# Patient Record
Sex: MALE | Race: BLACK OR AFRICAN AMERICAN | Employment: OTHER | URBAN - NONMETROPOLITAN AREA
[De-identification: names, ages, dates, MRNs, and addresses within clinical notes are randomized per-mention and may not be internally consistent; named-entity substitution may affect disease eponyms.]

---

## 2019-11-30 ENCOUNTER — APPOINTMENT (OUTPATIENT)
Dept: CT IMAGING | Age: 64
DRG: 062 | End: 2019-11-30
Payer: MEDICARE

## 2019-11-30 ENCOUNTER — HOSPITAL ENCOUNTER (INPATIENT)
Age: 64
LOS: 3 days | Discharge: HOME | DRG: 062 | End: 2019-12-03
Attending: EMERGENCY MEDICINE | Admitting: ANESTHESIOLOGY
Payer: MEDICARE

## 2019-11-30 ENCOUNTER — APPOINTMENT (OUTPATIENT)
Dept: GENERAL RADIOLOGY | Age: 64
DRG: 062 | End: 2019-11-30
Payer: MEDICARE

## 2019-11-30 ENCOUNTER — APPOINTMENT (OUTPATIENT)
Dept: INTERVENTIONAL RADIOLOGY/VASCULAR | Age: 64
DRG: 062 | End: 2019-11-30
Payer: MEDICARE

## 2019-11-30 DIAGNOSIS — R29.90 STROKE-LIKE SYMPTOMS: Primary | ICD-10-CM

## 2019-11-30 LAB
AMPHETAMINE+METHAMPHETAMINE URINE SCREEN: NEGATIVE
ANION GAP SERPL CALCULATED.3IONS-SCNC: 11 MEQ/L (ref 8–16)
APTT: 32.6 SECONDS (ref 22–38)
AVERAGE GLUCOSE: 102 MG/DL (ref 70–126)
BARBITURATE QUANTITATIVE URINE: NEGATIVE
BASOPHILS # BLD: 0.5 %
BASOPHILS ABSOLUTE: 0 THOU/MM3 (ref 0–0.1)
BENZODIAZEPINE QUANTITATIVE URINE: NEGATIVE
BILIRUBIN URINE: NEGATIVE
BLOOD, URINE: NEGATIVE
BUN BLDV-MCNC: 19 MG/DL (ref 7–22)
CALCIUM SERPL-MCNC: 9.1 MG/DL (ref 8.5–10.5)
CANNABINOID QUANTITATIVE URINE: POSITIVE
CHARACTER, URINE: CLEAR
CHLORIDE BLD-SCNC: 101 MEQ/L (ref 98–111)
CHOLESTEROL, TOTAL: 159 MG/DL (ref 100–199)
CO2: 25 MEQ/L (ref 23–33)
COCAINE METABOLITE QUANTITATIVE URINE: NEGATIVE
COLOR: YELLOW
CREAT SERPL-MCNC: 1.4 MG/DL (ref 0.4–1.2)
EKG ATRIAL RATE: 65 BPM
EKG P AXIS: 69 DEGREES
EKG P-R INTERVAL: 174 MS
EKG Q-T INTERVAL: 436 MS
EKG QRS DURATION: 76 MS
EKG QTC CALCULATION (BAZETT): 453 MS
EKG R AXIS: 61 DEGREES
EKG T AXIS: 95 DEGREES
EKG VENTRICULAR RATE: 65 BPM
EOSINOPHIL # BLD: 2.5 %
EOSINOPHILS ABSOLUTE: 0.2 THOU/MM3 (ref 0–0.4)
ERYTHROCYTE [DISTWIDTH] IN BLOOD BY AUTOMATED COUNT: 16.3 % (ref 11.5–14.5)
ERYTHROCYTE [DISTWIDTH] IN BLOOD BY AUTOMATED COUNT: 41.1 FL (ref 35–45)
GFR SERPL CREATININE-BSD FRML MDRD: 62 ML/MIN/1.73M2
GLUCOSE BLD-MCNC: 114 MG/DL (ref 70–108)
GLUCOSE BLD-MCNC: 99 MG/DL (ref 70–108)
GLUCOSE URINE: NEGATIVE MG/DL
HBA1C MFR BLD: 5.4 % (ref 4.4–6.4)
HCT VFR BLD CALC: 35.9 % (ref 42–52)
HDLC SERPL-MCNC: 67 MG/DL
HEMOGLOBIN: 11.1 GM/DL (ref 14–18)
IMMATURE GRANS (ABS): 0.09 THOU/MM3 (ref 0–0.07)
IMMATURE GRANULOCYTES: 1.2 %
INR BLD: 1.02 (ref 0.85–1.13)
KETONES, URINE: NEGATIVE
LDL CHOLESTEROL CALCULATED: 79 MG/DL
LEUKOCYTE ESTERASE, URINE: NEGATIVE
LYMPHOCYTES # BLD: 16.7 %
LYMPHOCYTES ABSOLUTE: 1.3 THOU/MM3 (ref 1–4.8)
MCH RBC QN AUTO: 22.1 PG (ref 26–33)
MCHC RBC AUTO-ENTMCNC: 30.9 GM/DL (ref 32.2–35.5)
MCV RBC AUTO: 71.4 FL (ref 80–94)
MONOCYTES # BLD: 8.3 %
MONOCYTES ABSOLUTE: 0.6 THOU/MM3 (ref 0.4–1.3)
MRSA SCREEN RT-PCR: NEGATIVE
NITRITE, URINE: NEGATIVE
NUCLEATED RED BLOOD CELLS: 0 /100 WBC
OPIATES, URINE: NEGATIVE
OSMOLALITY CALCULATION: 276.1 MOSMOL/KG (ref 275–300)
OXYCODONE: NEGATIVE
PH UA: 6.5 (ref 5–9)
PHENCYCLIDINE QUANTITATIVE URINE: NEGATIVE
PLATELET # BLD: 221 THOU/MM3 (ref 130–400)
PMV BLD AUTO: 9.1 FL (ref 9.4–12.4)
POTASSIUM REFLEX MAGNESIUM: 4.3 MEQ/L (ref 3.5–5.2)
PRO-BNP: 152.4 PG/ML (ref 0–900)
PROTEIN UA: NEGATIVE
RBC # BLD: 5.03 MILL/MM3 (ref 4.7–6.1)
SEG NEUTROPHILS: 70.8 %
SEGMENTED NEUTROPHILS ABSOLUTE COUNT: 5.4 THOU/MM3 (ref 1.8–7.7)
SODIUM BLD-SCNC: 137 MEQ/L (ref 135–145)
SPECIFIC GRAVITY, URINE: 1.03 (ref 1–1.03)
T4 FREE: 1.02 NG/DL (ref 0.93–1.76)
TRIGL SERPL-MCNC: 64 MG/DL (ref 0–199)
TROPONIN T: < 0.01 NG/ML
TSH SERPL DL<=0.05 MIU/L-ACNC: 5.46 UIU/ML (ref 0.4–4.2)
UROBILINOGEN, URINE: 1 EU/DL (ref 0–1)
VANCOMYCIN RESISTANT ENTEROCOCCUS: NEGATIVE
WBC # BLD: 7.6 THOU/MM3 (ref 4.8–10.8)

## 2019-11-30 PROCEDURE — 84484 ASSAY OF TROPONIN QUANT: CPT

## 2019-11-30 PROCEDURE — 6370000000 HC RX 637 (ALT 250 FOR IP): Performed by: ANESTHESIOLOGY

## 2019-11-30 PROCEDURE — 87500 VANOMYCIN DNA AMP PROBE: CPT

## 2019-11-30 PROCEDURE — 70498 CT ANGIOGRAPHY NECK: CPT

## 2019-11-30 PROCEDURE — 84443 ASSAY THYROID STIM HORMONE: CPT

## 2019-11-30 PROCEDURE — 83880 ASSAY OF NATRIURETIC PEPTIDE: CPT

## 2019-11-30 PROCEDURE — 84439 ASSAY OF FREE THYROXINE: CPT

## 2019-11-30 PROCEDURE — 82948 REAGENT STRIP/BLOOD GLUCOSE: CPT

## 2019-11-30 PROCEDURE — 36415 COLL VENOUS BLD VENIPUNCTURE: CPT

## 2019-11-30 PROCEDURE — 3E05317 INTRODUCTION OF OTHER THROMBOLYTIC INTO PERIPHERAL ARTERY, PERCUTANEOUS APPROACH: ICD-10-PCS | Performed by: PSYCHIATRY & NEUROLOGY

## 2019-11-30 PROCEDURE — 80061 LIPID PANEL: CPT

## 2019-11-30 PROCEDURE — 80048 BASIC METABOLIC PNL TOTAL CA: CPT

## 2019-11-30 PROCEDURE — 83036 HEMOGLOBIN GLYCOSYLATED A1C: CPT

## 2019-11-30 PROCEDURE — 6360000004 HC RX CONTRAST MEDICATION: Performed by: EMERGENCY MEDICINE

## 2019-11-30 PROCEDURE — 2000000000 HC ICU R&B

## 2019-11-30 PROCEDURE — 99222 1ST HOSP IP/OBS MODERATE 55: CPT | Performed by: PSYCHIATRY & NEUROLOGY

## 2019-11-30 PROCEDURE — 81003 URINALYSIS AUTO W/O SCOPE: CPT

## 2019-11-30 PROCEDURE — 2709999900 HC NON-CHARGEABLE SUPPLY

## 2019-11-30 PROCEDURE — 70496 CT ANGIOGRAPHY HEAD: CPT

## 2019-11-30 PROCEDURE — 87081 CULTURE SCREEN ONLY: CPT

## 2019-11-30 PROCEDURE — 87641 MR-STAPH DNA AMP PROBE: CPT

## 2019-11-30 PROCEDURE — 94760 N-INVAS EAR/PLS OXIMETRY 1: CPT

## 2019-11-30 PROCEDURE — 80307 DRUG TEST PRSMV CHEM ANLYZR: CPT

## 2019-11-30 PROCEDURE — 85025 COMPLETE CBC W/AUTO DIFF WBC: CPT

## 2019-11-30 PROCEDURE — 99285 EMERGENCY DEPT VISIT HI MDM: CPT

## 2019-11-30 PROCEDURE — 99223 1ST HOSP IP/OBS HIGH 75: CPT | Performed by: ANESTHESIOLOGY

## 2019-11-30 PROCEDURE — 99291 CRITICAL CARE FIRST HOUR: CPT | Performed by: PSYCHIATRY & NEUROLOGY

## 2019-11-30 PROCEDURE — 93005 ELECTROCARDIOGRAM TRACING: CPT | Performed by: EMERGENCY MEDICINE

## 2019-11-30 PROCEDURE — 37195 THROMBOLYTIC THERAPY STROKE: CPT

## 2019-11-30 PROCEDURE — 71045 X-RAY EXAM CHEST 1 VIEW: CPT

## 2019-11-30 PROCEDURE — 85730 THROMBOPLASTIN TIME PARTIAL: CPT

## 2019-11-30 PROCEDURE — 6360000002 HC RX W HCPCS: Performed by: EMERGENCY MEDICINE

## 2019-11-30 PROCEDURE — 85610 PROTHROMBIN TIME: CPT

## 2019-11-30 PROCEDURE — 2580000003 HC RX 258: Performed by: EMERGENCY MEDICINE

## 2019-11-30 PROCEDURE — 70450 CT HEAD/BRAIN W/O DYE: CPT

## 2019-11-30 RX ORDER — ATORVASTATIN CALCIUM 40 MG/1
40 TABLET, FILM COATED ORAL NIGHTLY
Status: DISCONTINUED | OUTPATIENT
Start: 2019-11-30 | End: 2019-12-03 | Stop reason: HOSPADM

## 2019-11-30 RX ORDER — GABAPENTIN 100 MG/1
200 CAPSULE ORAL 2 TIMES DAILY
Status: DISCONTINUED | OUTPATIENT
Start: 2019-11-30 | End: 2019-11-30

## 2019-11-30 RX ORDER — ASPIRIN 300 MG/1
300 SUPPOSITORY RECTAL DAILY
Status: DISCONTINUED | OUTPATIENT
Start: 2019-12-01 | End: 2019-12-01

## 2019-11-30 RX ORDER — GABAPENTIN 100 MG/1
100 CAPSULE ORAL 3 TIMES DAILY
Status: DISCONTINUED | OUTPATIENT
Start: 2019-11-30 | End: 2019-12-01

## 2019-11-30 RX ORDER — SODIUM CHLORIDE 9 MG/ML
1000 INJECTION, SOLUTION INTRAVENOUS CONTINUOUS
Status: DISCONTINUED | OUTPATIENT
Start: 2019-11-30 | End: 2019-12-01

## 2019-11-30 RX ORDER — LISINOPRIL 10 MG/1
10 TABLET ORAL 2 TIMES DAILY
Status: DISCONTINUED | OUTPATIENT
Start: 2019-11-30 | End: 2019-12-03 | Stop reason: HOSPADM

## 2019-11-30 RX ORDER — SODIUM CHLORIDE 0.9 % (FLUSH) 0.9 %
10 SYRINGE (ML) INJECTION EVERY 12 HOURS SCHEDULED
Status: DISCONTINUED | OUTPATIENT
Start: 2019-11-30 | End: 2019-12-03 | Stop reason: HOSPADM

## 2019-11-30 RX ORDER — DEXTROSE MONOHYDRATE 25 G/50ML
12.5 INJECTION, SOLUTION INTRAVENOUS
Status: ACTIVE | OUTPATIENT
Start: 2019-11-30 | End: 2019-11-30

## 2019-11-30 RX ORDER — AMIODARONE HYDROCHLORIDE 200 MG/1
200 TABLET ORAL DAILY
Status: DISCONTINUED | OUTPATIENT
Start: 2019-11-30 | End: 2019-12-03 | Stop reason: HOSPADM

## 2019-11-30 RX ORDER — SODIUM CHLORIDE 9 MG/ML
1000 INJECTION, SOLUTION INTRAVENOUS CONTINUOUS
Status: DISCONTINUED | OUTPATIENT
Start: 2019-11-30 | End: 2019-11-30

## 2019-11-30 RX ORDER — SODIUM CHLORIDE 0.9 % (FLUSH) 0.9 %
10 SYRINGE (ML) INJECTION PRN
Status: DISCONTINUED | OUTPATIENT
Start: 2019-11-30 | End: 2019-12-03 | Stop reason: HOSPADM

## 2019-11-30 RX ORDER — CARVEDILOL 6.25 MG/1
6.25 TABLET ORAL 2 TIMES DAILY WITH MEALS
Status: DISCONTINUED | OUTPATIENT
Start: 2019-11-30 | End: 2019-12-03 | Stop reason: HOSPADM

## 2019-11-30 RX ORDER — ASPIRIN 81 MG/1
81 TABLET ORAL DAILY
Status: DISCONTINUED | OUTPATIENT
Start: 2019-12-01 | End: 2019-12-03 | Stop reason: HOSPADM

## 2019-11-30 RX ORDER — NITROGLYCERIN 0.4 MG/1
0.4 TABLET SUBLINGUAL EVERY 5 MIN PRN
Status: DISCONTINUED | OUTPATIENT
Start: 2019-11-30 | End: 2019-12-03 | Stop reason: HOSPADM

## 2019-11-30 RX ORDER — FERROUS SULFATE 325(65) MG
325 TABLET ORAL 2 TIMES DAILY WITH MEALS
Status: DISCONTINUED | OUTPATIENT
Start: 2019-11-30 | End: 2019-12-01

## 2019-11-30 RX ORDER — LABETALOL 20 MG/4 ML (5 MG/ML) INTRAVENOUS SYRINGE
10 EVERY 4 HOURS PRN
Status: DISCONTINUED | OUTPATIENT
Start: 2019-11-30 | End: 2019-12-03 | Stop reason: HOSPADM

## 2019-11-30 RX ORDER — RANOLAZINE 500 MG/1
500 TABLET, EXTENDED RELEASE ORAL 2 TIMES DAILY
Status: DISCONTINUED | OUTPATIENT
Start: 2019-12-01 | End: 2019-12-03 | Stop reason: HOSPADM

## 2019-11-30 RX ORDER — HYDRALAZINE HYDROCHLORIDE 20 MG/ML
10 INJECTION INTRAMUSCULAR; INTRAVENOUS EVERY 4 HOURS PRN
Status: DISCONTINUED | OUTPATIENT
Start: 2019-11-30 | End: 2019-12-03 | Stop reason: HOSPADM

## 2019-11-30 RX ORDER — ISOSORBIDE MONONITRATE 30 MG/1
30 TABLET, EXTENDED RELEASE ORAL 2 TIMES DAILY
Status: DISCONTINUED | OUTPATIENT
Start: 2019-11-30 | End: 2019-12-03 | Stop reason: HOSPADM

## 2019-11-30 RX ORDER — 0.9 % SODIUM CHLORIDE 0.9 %
50 INTRAVENOUS SOLUTION INTRAVENOUS ONCE
Status: COMPLETED | OUTPATIENT
Start: 2019-11-30 | End: 2019-11-30

## 2019-11-30 RX ORDER — ONDANSETRON 2 MG/ML
4 INJECTION INTRAMUSCULAR; INTRAVENOUS EVERY 6 HOURS PRN
Status: DISCONTINUED | OUTPATIENT
Start: 2019-11-30 | End: 2019-12-03 | Stop reason: HOSPADM

## 2019-11-30 RX ORDER — CLOPIDOGREL BISULFATE 75 MG/1
75 TABLET ORAL DAILY
Status: DISCONTINUED | OUTPATIENT
Start: 2019-12-01 | End: 2019-12-01

## 2019-11-30 RX ADMIN — SODIUM CHLORIDE 50 ML: 9 INJECTION, SOLUTION INTRAVENOUS at 13:45

## 2019-11-30 RX ADMIN — AMIODARONE HYDROCHLORIDE 200 MG: 200 TABLET ORAL at 18:57

## 2019-11-30 RX ADMIN — ATORVASTATIN CALCIUM 40 MG: 40 TABLET, FILM COATED ORAL at 19:53

## 2019-11-30 RX ADMIN — LISINOPRIL 10 MG: 10 TABLET ORAL at 19:52

## 2019-11-30 RX ADMIN — GABAPENTIN 100 MG: 100 CAPSULE ORAL at 19:52

## 2019-11-30 RX ADMIN — FERROUS SULFATE TAB 325 MG (65 MG ELEMENTAL FE) 325 MG: 325 (65 FE) TAB at 18:57

## 2019-11-30 RX ADMIN — CARVEDILOL 6.25 MG: 6.25 TABLET, FILM COATED ORAL at 18:57

## 2019-11-30 RX ADMIN — SODIUM CHLORIDE 1000 ML: 9 INJECTION, SOLUTION INTRAVENOUS at 12:44

## 2019-11-30 RX ADMIN — ISOSORBIDE MONONITRATE 30 MG: 30 TABLET ORAL at 19:53

## 2019-11-30 RX ADMIN — ALTEPLASE 6.5 MG: KIT at 12:37

## 2019-11-30 RX ADMIN — IOPAMIDOL 80 ML: 755 INJECTION, SOLUTION INTRAVENOUS at 13:21

## 2019-11-30 RX ADMIN — ALTEPLASE 58.8 MG: KIT at 12:38

## 2019-11-30 ASSESSMENT — ENCOUNTER SYMPTOMS
DIARRHEA: 0
COUGH: 0
RHINORRHEA: 0
NAUSEA: 0
SORE THROAT: 0
EYE DISCHARGE: 0
SHORTNESS OF BREATH: 0
ABDOMINAL PAIN: 0
WHEEZING: 0
EYE REDNESS: 0
VOMITING: 0
BACK PAIN: 0

## 2019-11-30 ASSESSMENT — PAIN SCALES - GENERAL
PAINLEVEL_OUTOF10: 7
PAINLEVEL_OUTOF10: 0

## 2019-11-30 ASSESSMENT — VISUAL ACUITY: OU: 1

## 2019-11-30 ASSESSMENT — PAIN DESCRIPTION - LOCATION: LOCATION: CHEST;HEAD

## 2019-11-30 ASSESSMENT — PAIN DESCRIPTION - PAIN TYPE: TYPE: ACUTE PAIN

## 2019-11-30 ASSESSMENT — PAIN DESCRIPTION - PROGRESSION: CLINICAL_PROGRESSION: NOT CHANGED

## 2019-11-30 ASSESSMENT — PAIN DESCRIPTION - ONSET: ONSET: ON-GOING

## 2019-11-30 ASSESSMENT — PAIN DESCRIPTION - FREQUENCY: FREQUENCY: CONTINUOUS

## 2019-11-30 ASSESSMENT — PAIN DESCRIPTION - DESCRIPTORS: DESCRIPTORS: SHARP;CONSTANT

## 2019-12-01 ENCOUNTER — APPOINTMENT (OUTPATIENT)
Dept: CT IMAGING | Age: 64
DRG: 062 | End: 2019-12-01
Payer: MEDICARE

## 2019-12-01 LAB
ALBUMIN SERPL-MCNC: 3.5 G/DL (ref 3.5–5.1)
ALP BLD-CCNC: 52 U/L (ref 38–126)
ALT SERPL-CCNC: 16 U/L (ref 11–66)
ANION GAP SERPL CALCULATED.3IONS-SCNC: 11 MEQ/L (ref 8–16)
AST SERPL-CCNC: 14 U/L (ref 5–40)
AVERAGE GLUCOSE: 93 MG/DL (ref 70–126)
BASOPHILS # BLD: 0.3 %
BASOPHILS ABSOLUTE: 0 THOU/MM3 (ref 0–0.1)
BILIRUB SERPL-MCNC: 0.4 MG/DL (ref 0.3–1.2)
BUN BLDV-MCNC: 17 MG/DL (ref 7–22)
CALCIUM SERPL-MCNC: 8.6 MG/DL (ref 8.5–10.5)
CHLORIDE BLD-SCNC: 105 MEQ/L (ref 98–111)
CO2: 22 MEQ/L (ref 23–33)
CREAT SERPL-MCNC: 1 MG/DL (ref 0.4–1.2)
EOSINOPHIL # BLD: 2.5 %
EOSINOPHILS ABSOLUTE: 0.2 THOU/MM3 (ref 0–0.4)
ERYTHROCYTE [DISTWIDTH] IN BLOOD BY AUTOMATED COUNT: 16.1 % (ref 11.5–14.5)
ERYTHROCYTE [DISTWIDTH] IN BLOOD BY AUTOMATED COUNT: 40.4 FL (ref 35–45)
GFR SERPL CREATININE-BSD FRML MDRD: > 90 ML/MIN/1.73M2
GLUCOSE BLD-MCNC: 98 MG/DL (ref 70–108)
HBA1C MFR BLD: 5.1 % (ref 4.4–6.4)
HCT VFR BLD CALC: 33.2 % (ref 42–52)
HEMOGLOBIN: 10.5 GM/DL (ref 14–18)
IMMATURE GRANS (ABS): 0.08 THOU/MM3 (ref 0–0.07)
IMMATURE GRANULOCYTES: 1.1 %
LYMPHOCYTES # BLD: 21.1 %
LYMPHOCYTES ABSOLUTE: 1.5 THOU/MM3 (ref 1–4.8)
MCH RBC QN AUTO: 22.4 PG (ref 26–33)
MCHC RBC AUTO-ENTMCNC: 31.6 GM/DL (ref 32.2–35.5)
MCV RBC AUTO: 70.9 FL (ref 80–94)
MONOCYTES # BLD: 6.5 %
MONOCYTES ABSOLUTE: 0.5 THOU/MM3 (ref 0.4–1.3)
NUCLEATED RED BLOOD CELLS: 0 /100 WBC
PLATELET # BLD: 215 THOU/MM3 (ref 130–400)
PMV BLD AUTO: 9.1 FL (ref 9.4–12.4)
POTASSIUM SERPL-SCNC: 4.1 MEQ/L (ref 3.5–5.2)
RBC # BLD: 4.68 MILL/MM3 (ref 4.7–6.1)
SEG NEUTROPHILS: 68.5 %
SEGMENTED NEUTROPHILS ABSOLUTE COUNT: 4.9 THOU/MM3 (ref 1.8–7.7)
SODIUM BLD-SCNC: 138 MEQ/L (ref 135–145)
TOTAL PROTEIN: 6.2 G/DL (ref 6.1–8)
VITAMIN B-12: 382 PG/ML (ref 211–911)
WBC # BLD: 7.1 THOU/MM3 (ref 4.8–10.8)

## 2019-12-01 PROCEDURE — 85025 COMPLETE CBC W/AUTO DIFF WBC: CPT

## 2019-12-01 PROCEDURE — 6360000002 HC RX W HCPCS: Performed by: ANESTHESIOLOGY

## 2019-12-01 PROCEDURE — 6370000000 HC RX 637 (ALT 250 FOR IP): Performed by: ANESTHESIOLOGY

## 2019-12-01 PROCEDURE — 36415 COLL VENOUS BLD VENIPUNCTURE: CPT

## 2019-12-01 PROCEDURE — 2580000003 HC RX 258: Performed by: ANESTHESIOLOGY

## 2019-12-01 PROCEDURE — 2060000000 HC ICU INTERMEDIATE R&B

## 2019-12-01 PROCEDURE — 99233 SBSQ HOSP IP/OBS HIGH 50: CPT | Performed by: ANESTHESIOLOGY

## 2019-12-01 PROCEDURE — 2709999900 HC NON-CHARGEABLE SUPPLY

## 2019-12-01 PROCEDURE — 97110 THERAPEUTIC EXERCISES: CPT

## 2019-12-01 PROCEDURE — 80053 COMPREHEN METABOLIC PANEL: CPT

## 2019-12-01 PROCEDURE — 83036 HEMOGLOBIN GLYCOSYLATED A1C: CPT

## 2019-12-01 PROCEDURE — 6370000000 HC RX 637 (ALT 250 FOR IP): Performed by: FAMILY MEDICINE

## 2019-12-01 PROCEDURE — 97166 OT EVAL MOD COMPLEX 45 MIN: CPT

## 2019-12-01 PROCEDURE — 70450 CT HEAD/BRAIN W/O DYE: CPT

## 2019-12-01 PROCEDURE — 6370000000 HC RX 637 (ALT 250 FOR IP): Performed by: PSYCHIATRY & NEUROLOGY

## 2019-12-01 PROCEDURE — 6360000002 HC RX W HCPCS: Performed by: PSYCHIATRY & NEUROLOGY

## 2019-12-01 PROCEDURE — 94761 N-INVAS EAR/PLS OXIMETRY MLT: CPT

## 2019-12-01 PROCEDURE — 99232 SBSQ HOSP IP/OBS MODERATE 35: CPT | Performed by: PSYCHIATRY & NEUROLOGY

## 2019-12-01 PROCEDURE — 82607 VITAMIN B-12: CPT

## 2019-12-01 RX ORDER — IRON/C/B12/CALCIU/STOMACH CONC 70-150-10
70 TABLET ORAL DAILY
Status: DISCONTINUED | OUTPATIENT
Start: 2019-12-01 | End: 2019-12-01 | Stop reason: CLARIF

## 2019-12-01 RX ORDER — ACETAMINOPHEN 325 MG/1
TABLET ORAL
Status: DISCONTINUED
Start: 2019-12-01 | End: 2019-12-01

## 2019-12-01 RX ORDER — BUTALBITAL, ACETAMINOPHEN AND CAFFEINE 50; 325; 40 MG/1; MG/1; MG/1
1 TABLET ORAL EVERY 4 HOURS PRN
Status: DISCONTINUED | OUTPATIENT
Start: 2019-12-01 | End: 2019-12-03 | Stop reason: HOSPADM

## 2019-12-01 RX ORDER — CYANOCOBALAMIN 1000 UG/ML
1000 INJECTION INTRAMUSCULAR; SUBCUTANEOUS ONCE
Status: COMPLETED | OUTPATIENT
Start: 2019-12-01 | End: 2019-12-01

## 2019-12-01 RX ORDER — GABAPENTIN 300 MG/1
300 CAPSULE ORAL 2 TIMES DAILY
Status: DISCONTINUED | OUTPATIENT
Start: 2019-12-02 | End: 2019-12-03 | Stop reason: HOSPADM

## 2019-12-01 RX ORDER — M-VIT,TX,IRON,MINS/CALC/FOLIC 27MG-0.4MG
1 TABLET ORAL DAILY
Status: DISCONTINUED | OUTPATIENT
Start: 2019-12-01 | End: 2019-12-03 | Stop reason: HOSPADM

## 2019-12-01 RX ORDER — CLOPIDOGREL BISULFATE 75 MG/1
75 TABLET ORAL DAILY
Status: DISCONTINUED | OUTPATIENT
Start: 2019-12-01 | End: 2019-12-03 | Stop reason: HOSPADM

## 2019-12-01 RX ORDER — FERROUS SULFATE 325(65) MG
325 TABLET ORAL 2 TIMES DAILY WITH MEALS
Status: DISCONTINUED | OUTPATIENT
Start: 2019-12-01 | End: 2019-12-03 | Stop reason: HOSPADM

## 2019-12-01 RX ORDER — LANOLIN ALCOHOL/MO/W.PET/CERES
1000 CREAM (GRAM) TOPICAL DAILY
Status: DISCONTINUED | OUTPATIENT
Start: 2019-12-02 | End: 2019-12-03 | Stop reason: HOSPADM

## 2019-12-01 RX ORDER — ACETAMINOPHEN 325 MG/1
650 TABLET ORAL EVERY 4 HOURS PRN
Status: DISCONTINUED | OUTPATIENT
Start: 2019-12-01 | End: 2019-12-03 | Stop reason: HOSPADM

## 2019-12-01 RX ADMIN — CARVEDILOL 6.25 MG: 6.25 TABLET, FILM COATED ORAL at 09:00

## 2019-12-01 RX ADMIN — CYANOCOBALAMIN 1000 MCG: 1000 INJECTION, SOLUTION INTRAMUSCULAR at 20:34

## 2019-12-01 RX ADMIN — CARVEDILOL 6.25 MG: 6.25 TABLET, FILM COATED ORAL at 18:06

## 2019-12-01 RX ADMIN — ENOXAPARIN SODIUM 40 MG: 40 INJECTION SUBCUTANEOUS at 18:05

## 2019-12-01 RX ADMIN — BUTALBITAL, ACETAMINOPHEN, AND CAFFEINE 1 TABLET: 50; 325; 40 TABLET ORAL at 16:38

## 2019-12-01 RX ADMIN — LISINOPRIL 10 MG: 10 TABLET ORAL at 20:35

## 2019-12-01 RX ADMIN — LISINOPRIL 10 MG: 10 TABLET ORAL at 08:59

## 2019-12-01 RX ADMIN — CLOPIDOGREL BISULFATE 75 MG: 75 TABLET ORAL at 18:33

## 2019-12-01 RX ADMIN — ISOSORBIDE MONONITRATE 30 MG: 30 TABLET ORAL at 09:00

## 2019-12-01 RX ADMIN — ASPIRIN 81 MG: 81 TABLET ORAL at 18:05

## 2019-12-01 RX ADMIN — FERROUS SULFATE TAB 325 MG (65 MG ELEMENTAL FE) 325 MG: 325 (65 FE) TAB at 20:35

## 2019-12-01 RX ADMIN — ACETAMINOPHEN 650 MG: 325 TABLET ORAL at 04:17

## 2019-12-01 RX ADMIN — GABAPENTIN 100 MG: 100 CAPSULE ORAL at 09:00

## 2019-12-01 RX ADMIN — Medication 10 ML: at 20:35

## 2019-12-01 RX ADMIN — MULTIPLE VITAMINS W/ MINERALS TAB 1 TABLET: TAB at 09:00

## 2019-12-01 RX ADMIN — RANOLAZINE 500 MG: 500 TABLET, FILM COATED, EXTENDED RELEASE ORAL at 20:35

## 2019-12-01 RX ADMIN — GABAPENTIN 100 MG: 100 CAPSULE ORAL at 16:38

## 2019-12-01 RX ADMIN — ATORVASTATIN CALCIUM 40 MG: 40 TABLET, FILM COATED ORAL at 20:35

## 2019-12-01 RX ADMIN — ISOSORBIDE MONONITRATE 30 MG: 30 TABLET ORAL at 20:35

## 2019-12-01 RX ADMIN — AMIODARONE HYDROCHLORIDE 200 MG: 200 TABLET ORAL at 09:00

## 2019-12-01 ASSESSMENT — PAIN DESCRIPTION - LOCATION
LOCATION: HEAD

## 2019-12-01 ASSESSMENT — PAIN SCALES - GENERAL
PAINLEVEL_OUTOF10: 6
PAINLEVEL_OUTOF10: 6
PAINLEVEL_OUTOF10: 0
PAINLEVEL_OUTOF10: 0
PAINLEVEL_OUTOF10: 3
PAINLEVEL_OUTOF10: 8

## 2019-12-01 ASSESSMENT — PAIN DESCRIPTION - PROGRESSION
CLINICAL_PROGRESSION: GRADUALLY IMPROVING
CLINICAL_PROGRESSION: GRADUALLY IMPROVING
CLINICAL_PROGRESSION: GRADUALLY WORSENING
CLINICAL_PROGRESSION: GRADUALLY IMPROVING

## 2019-12-01 ASSESSMENT — PAIN DESCRIPTION - DESCRIPTORS
DESCRIPTORS: HEADACHE
DESCRIPTORS: ACHING

## 2019-12-01 ASSESSMENT — PAIN DESCRIPTION - PAIN TYPE
TYPE: ACUTE PAIN

## 2019-12-01 ASSESSMENT — PAIN DESCRIPTION - FREQUENCY
FREQUENCY: CONTINUOUS
FREQUENCY: INTERMITTENT
FREQUENCY: CONTINUOUS
FREQUENCY: INTERMITTENT

## 2019-12-01 ASSESSMENT — PAIN DESCRIPTION - ONSET
ONSET: GRADUAL

## 2019-12-01 ASSESSMENT — PAIN - FUNCTIONAL ASSESSMENT: PAIN_FUNCTIONAL_ASSESSMENT: ACTIVITIES ARE NOT PREVENTED

## 2019-12-01 ASSESSMENT — PAIN DESCRIPTION - ORIENTATION: ORIENTATION: POSTERIOR

## 2019-12-02 PROBLEM — D35.2 PITUITARY ADENOMA (HCC): Status: ACTIVE | Noted: 2019-12-02

## 2019-12-02 LAB
CORTISOL COLLECTION INFO: NORMAL
CORTISOL: 6.82 UG/DL
FOLLICLE STIMULATING HORMONE: 10.1 MIU/ML (ref 0.9–11)
LUTEINIZING HORMONE: 4.8 MIU/ML (ref 0.6–6.3)
LV EF: 45 %
LVEF MODALITY: NORMAL
MRSA SCREEN: NORMAL
PROLACTIN: 16.7 NG/ML

## 2019-12-02 PROCEDURE — APPSS180 APP SPLIT SHARED TIME > 60 MINUTES: Performed by: NURSE PRACTITIONER

## 2019-12-02 PROCEDURE — 97162 PT EVAL MOD COMPLEX 30 MIN: CPT

## 2019-12-02 PROCEDURE — 2580000003 HC RX 258: Performed by: ANESTHESIOLOGY

## 2019-12-02 PROCEDURE — 82024 ASSAY OF ACTH: CPT

## 2019-12-02 PROCEDURE — 6360000002 HC RX W HCPCS: Performed by: ANESTHESIOLOGY

## 2019-12-02 PROCEDURE — 2709999900 HC NON-CHARGEABLE SUPPLY

## 2019-12-02 PROCEDURE — 6370000000 HC RX 637 (ALT 250 FOR IP): Performed by: NURSE PRACTITIONER

## 2019-12-02 PROCEDURE — 97530 THERAPEUTIC ACTIVITIES: CPT

## 2019-12-02 PROCEDURE — 82533 TOTAL CORTISOL: CPT

## 2019-12-02 PROCEDURE — 82530 CORTISOL FREE: CPT

## 2019-12-02 PROCEDURE — 84305 ASSAY OF SOMATOMEDIN: CPT

## 2019-12-02 PROCEDURE — 36415 COLL VENOUS BLD VENIPUNCTURE: CPT

## 2019-12-02 PROCEDURE — 6370000000 HC RX 637 (ALT 250 FOR IP): Performed by: PSYCHIATRY & NEUROLOGY

## 2019-12-02 PROCEDURE — 94761 N-INVAS EAR/PLS OXIMETRY MLT: CPT

## 2019-12-02 PROCEDURE — 6370000000 HC RX 637 (ALT 250 FOR IP): Performed by: ANESTHESIOLOGY

## 2019-12-02 PROCEDURE — 83001 ASSAY OF GONADOTROPIN (FSH): CPT

## 2019-12-02 PROCEDURE — 97535 SELF CARE MNGMENT TRAINING: CPT

## 2019-12-02 PROCEDURE — 84403 ASSAY OF TOTAL TESTOSTERONE: CPT

## 2019-12-02 PROCEDURE — 2060000000 HC ICU INTERMEDIATE R&B

## 2019-12-02 PROCEDURE — 93306 TTE W/DOPPLER COMPLETE: CPT

## 2019-12-02 PROCEDURE — 83002 ASSAY OF GONADOTROPIN (LH): CPT

## 2019-12-02 PROCEDURE — 99233 SBSQ HOSP IP/OBS HIGH 50: CPT | Performed by: INTERNAL MEDICINE

## 2019-12-02 PROCEDURE — 84146 ASSAY OF PROLACTIN: CPT

## 2019-12-02 RX ORDER — PANTOPRAZOLE SODIUM 40 MG/1
40 TABLET, DELAYED RELEASE ORAL
Status: DISCONTINUED | OUTPATIENT
Start: 2019-12-02 | End: 2019-12-03 | Stop reason: HOSPADM

## 2019-12-02 RX ADMIN — ENOXAPARIN SODIUM 40 MG: 40 INJECTION SUBCUTANEOUS at 08:14

## 2019-12-02 RX ADMIN — LISINOPRIL 10 MG: 10 TABLET ORAL at 08:14

## 2019-12-02 RX ADMIN — Medication 1000 MCG: at 08:14

## 2019-12-02 RX ADMIN — RANOLAZINE 500 MG: 500 TABLET, FILM COATED, EXTENDED RELEASE ORAL at 08:14

## 2019-12-02 RX ADMIN — ISOSORBIDE MONONITRATE 30 MG: 30 TABLET ORAL at 20:28

## 2019-12-02 RX ADMIN — AMIODARONE HYDROCHLORIDE 200 MG: 200 TABLET ORAL at 08:14

## 2019-12-02 RX ADMIN — LISINOPRIL 10 MG: 10 TABLET ORAL at 20:28

## 2019-12-02 RX ADMIN — ASPIRIN 81 MG: 81 TABLET ORAL at 08:14

## 2019-12-02 RX ADMIN — RANOLAZINE 500 MG: 500 TABLET, FILM COATED, EXTENDED RELEASE ORAL at 20:28

## 2019-12-02 RX ADMIN — ATORVASTATIN CALCIUM 40 MG: 40 TABLET, FILM COATED ORAL at 20:28

## 2019-12-02 RX ADMIN — BUTALBITAL, ACETAMINOPHEN, AND CAFFEINE 1 TABLET: 50; 325; 40 TABLET ORAL at 21:19

## 2019-12-02 RX ADMIN — CARVEDILOL 6.25 MG: 6.25 TABLET, FILM COATED ORAL at 08:14

## 2019-12-02 RX ADMIN — CLOPIDOGREL BISULFATE 75 MG: 75 TABLET ORAL at 08:14

## 2019-12-02 RX ADMIN — FERROUS SULFATE TAB 325 MG (65 MG ELEMENTAL FE) 325 MG: 325 (65 FE) TAB at 08:14

## 2019-12-02 RX ADMIN — Medication 10 ML: at 08:15

## 2019-12-02 RX ADMIN — GABAPENTIN 300 MG: 300 CAPSULE ORAL at 08:14

## 2019-12-02 RX ADMIN — MULTIPLE VITAMINS W/ MINERALS TAB 1 TABLET: TAB at 08:14

## 2019-12-02 RX ADMIN — Medication 10 ML: at 20:30

## 2019-12-02 RX ADMIN — GABAPENTIN 300 MG: 300 CAPSULE ORAL at 20:28

## 2019-12-02 RX ADMIN — FERROUS SULFATE TAB 325 MG (65 MG ELEMENTAL FE) 325 MG: 325 (65 FE) TAB at 18:00

## 2019-12-02 RX ADMIN — ISOSORBIDE MONONITRATE 30 MG: 30 TABLET ORAL at 08:14

## 2019-12-02 RX ADMIN — CARVEDILOL 6.25 MG: 6.25 TABLET, FILM COATED ORAL at 18:00

## 2019-12-02 RX ADMIN — PANTOPRAZOLE SODIUM 40 MG: 40 TABLET, DELAYED RELEASE ORAL at 12:04

## 2019-12-02 ASSESSMENT — ENCOUNTER SYMPTOMS
WHEEZING: 0
PHOTOPHOBIA: 0
SHORTNESS OF BREATH: 0
CHEST TIGHTNESS: 0
ABDOMINAL PAIN: 0
SORE THROAT: 0
BACK PAIN: 0
COLOR CHANGE: 0
TROUBLE SWALLOWING: 0
NAUSEA: 0
VOMITING: 0

## 2019-12-02 ASSESSMENT — PAIN SCALES - GENERAL
PAINLEVEL_OUTOF10: 0
PAINLEVEL_OUTOF10: 7
PAINLEVEL_OUTOF10: 0
PAINLEVEL_OUTOF10: 0

## 2019-12-03 VITALS
OXYGEN SATURATION: 99 % | HEART RATE: 65 BPM | SYSTOLIC BLOOD PRESSURE: 165 MMHG | TEMPERATURE: 98.4 F | WEIGHT: 144.8 LBS | BODY MASS INDEX: 21.45 KG/M2 | DIASTOLIC BLOOD PRESSURE: 95 MMHG | RESPIRATION RATE: 20 BRPM | HEIGHT: 69 IN

## 2019-12-03 LAB
ACTH: 26.7 PG/ML (ref 7.2–63.3)
TESTOSTERONE TOTAL: 544 NG/DL (ref 300–720)

## 2019-12-03 PROCEDURE — 6370000000 HC RX 637 (ALT 250 FOR IP): Performed by: PSYCHIATRY & NEUROLOGY

## 2019-12-03 PROCEDURE — 6370000000 HC RX 637 (ALT 250 FOR IP): Performed by: ANESTHESIOLOGY

## 2019-12-03 PROCEDURE — 97116 GAIT TRAINING THERAPY: CPT

## 2019-12-03 PROCEDURE — 97535 SELF CARE MNGMENT TRAINING: CPT

## 2019-12-03 PROCEDURE — 99239 HOSP IP/OBS DSCHRG MGMT >30: CPT | Performed by: NURSE PRACTITIONER

## 2019-12-03 PROCEDURE — 6370000000 HC RX 637 (ALT 250 FOR IP): Performed by: NURSE PRACTITIONER

## 2019-12-03 PROCEDURE — 97110 THERAPEUTIC EXERCISES: CPT

## 2019-12-03 PROCEDURE — 94760 N-INVAS EAR/PLS OXIMETRY 1: CPT

## 2019-12-03 RX ORDER — M-VIT,TX,IRON,MINS/CALC/FOLIC 27MG-0.4MG
1 TABLET ORAL DAILY
Qty: 30 TABLET | Refills: 0 | Status: SHIPPED | OUTPATIENT
Start: 2019-12-04

## 2019-12-03 RX ORDER — RANOLAZINE 500 MG/1
500 TABLET, EXTENDED RELEASE ORAL 2 TIMES DAILY
Qty: 60 TABLET | Refills: 0 | Status: SHIPPED | OUTPATIENT
Start: 2019-12-03

## 2019-12-03 RX ORDER — FERROUS SULFATE 325(65) MG
325 TABLET ORAL 2 TIMES DAILY WITH MEALS
Qty: 30 TABLET | Refills: 3 | Status: SHIPPED | OUTPATIENT
Start: 2019-12-03

## 2019-12-03 RX ORDER — ATORVASTATIN CALCIUM 40 MG/1
40 TABLET, FILM COATED ORAL NIGHTLY
Qty: 30 TABLET | Refills: 0 | Status: SHIPPED | OUTPATIENT
Start: 2019-12-03

## 2019-12-03 RX ORDER — CARVEDILOL 6.25 MG/1
6.25 TABLET ORAL 2 TIMES DAILY WITH MEALS
Qty: 60 TABLET | Refills: 0 | Status: SHIPPED | OUTPATIENT
Start: 2019-12-03

## 2019-12-03 RX ORDER — PANTOPRAZOLE SODIUM 40 MG/1
40 TABLET, DELAYED RELEASE ORAL
Qty: 30 TABLET | Refills: 0 | Status: SHIPPED | OUTPATIENT
Start: 2019-12-04

## 2019-12-03 RX ADMIN — Medication 1000 MCG: at 09:50

## 2019-12-03 RX ADMIN — ASPIRIN 81 MG: 81 TABLET ORAL at 09:51

## 2019-12-03 RX ADMIN — MULTIPLE VITAMINS W/ MINERALS TAB 1 TABLET: TAB at 09:51

## 2019-12-03 RX ADMIN — AMIODARONE HYDROCHLORIDE 200 MG: 200 TABLET ORAL at 09:51

## 2019-12-03 RX ADMIN — PANTOPRAZOLE SODIUM 40 MG: 40 TABLET, DELAYED RELEASE ORAL at 05:28

## 2019-12-03 RX ADMIN — BUTALBITAL, ACETAMINOPHEN, AND CAFFEINE 1 TABLET: 50; 325; 40 TABLET ORAL at 04:14

## 2019-12-03 RX ADMIN — GABAPENTIN 300 MG: 300 CAPSULE ORAL at 09:51

## 2019-12-03 RX ADMIN — LISINOPRIL 10 MG: 10 TABLET ORAL at 09:51

## 2019-12-03 RX ADMIN — ISOSORBIDE MONONITRATE 30 MG: 30 TABLET ORAL at 09:51

## 2019-12-03 RX ADMIN — RANOLAZINE 500 MG: 500 TABLET, FILM COATED, EXTENDED RELEASE ORAL at 09:51

## 2019-12-03 RX ADMIN — FERROUS SULFATE TAB 325 MG (65 MG ELEMENTAL FE) 325 MG: 325 (65 FE) TAB at 09:50

## 2019-12-03 RX ADMIN — CLOPIDOGREL BISULFATE 75 MG: 75 TABLET ORAL at 09:51

## 2019-12-03 ASSESSMENT — PAIN DESCRIPTION - LOCATION: LOCATION: HEAD

## 2019-12-03 ASSESSMENT — PAIN DESCRIPTION - DESCRIPTORS: DESCRIPTORS: HEADACHE

## 2019-12-03 ASSESSMENT — PAIN DESCRIPTION - FREQUENCY: FREQUENCY: INTERMITTENT

## 2019-12-03 ASSESSMENT — PAIN SCALES - GENERAL
PAINLEVEL_OUTOF10: 0
PAINLEVEL_OUTOF10: 5

## 2019-12-03 ASSESSMENT — PAIN DESCRIPTION - ORIENTATION: ORIENTATION: POSTERIOR

## 2019-12-03 ASSESSMENT — PAIN DESCRIPTION - PROGRESSION
CLINICAL_PROGRESSION: GRADUALLY WORSENING
CLINICAL_PROGRESSION: RESOLVED

## 2019-12-03 ASSESSMENT — PAIN DESCRIPTION - ONSET: ONSET: GRADUAL

## 2019-12-03 ASSESSMENT — PAIN DESCRIPTION - PAIN TYPE: TYPE: ACUTE PAIN

## 2019-12-03 ASSESSMENT — PAIN - FUNCTIONAL ASSESSMENT: PAIN_FUNCTIONAL_ASSESSMENT: ACTIVITIES ARE NOT PREVENTED

## 2019-12-04 LAB
SOMATOMEDIN IGF 1 Z-SCORE: -0.3
SOMATOMEDIN: 105 NG/ML (ref 46–219)

## 2019-12-05 LAB — CORTISOL (UR), FREE: NORMAL

## 2021-09-10 ENCOUNTER — APPOINTMENT (OUTPATIENT)
Dept: CT IMAGING | Age: 66
DRG: 556 | End: 2021-09-10
Payer: MEDICARE

## 2021-09-10 ENCOUNTER — HOSPITAL ENCOUNTER (INPATIENT)
Age: 66
LOS: 3 days | Discharge: HOME | DRG: 556 | End: 2021-09-13
Attending: FAMILY MEDICINE | Admitting: INTERNAL MEDICINE
Payer: MEDICARE

## 2021-09-10 ENCOUNTER — APPOINTMENT (OUTPATIENT)
Dept: GENERAL RADIOLOGY | Age: 66
DRG: 556 | End: 2021-09-10
Payer: MEDICARE

## 2021-09-10 DIAGNOSIS — R07.9 CHEST PAIN, UNSPECIFIED TYPE: ICD-10-CM

## 2021-09-10 DIAGNOSIS — G89.29 CHRONIC HEAD PAIN: ICD-10-CM

## 2021-09-10 DIAGNOSIS — R51.9 CHRONIC HEAD PAIN: ICD-10-CM

## 2021-09-10 DIAGNOSIS — G81.92 HEMIPARESIS OF LEFT DOMINANT SIDE, UNSPECIFIED HEMIPARESIS ETIOLOGY (HCC): Primary | ICD-10-CM

## 2021-09-10 DIAGNOSIS — D35.2 PITUITARY ADENOMA (HCC): ICD-10-CM

## 2021-09-10 PROBLEM — I63.9 ACUTE CEREBROVASCULAR ACCIDENT (CVA) DUE TO ISCHEMIA (HCC): Status: ACTIVE | Noted: 2021-09-10

## 2021-09-10 LAB
ALBUMIN SERPL-MCNC: 4 G/DL (ref 3.5–5.1)
ALP BLD-CCNC: 59 U/L (ref 38–126)
ALT SERPL-CCNC: 14 U/L (ref 11–66)
ANION GAP SERPL CALCULATED.3IONS-SCNC: 5 MEQ/L (ref 8–16)
AST SERPL-CCNC: 48 U/L (ref 5–40)
BASOPHILS # BLD: 0.3 %
BASOPHILS ABSOLUTE: 0 THOU/MM3 (ref 0–0.1)
BILIRUB SERPL-MCNC: 1.2 MG/DL (ref 0.3–1.2)
BUN BLDV-MCNC: 12 MG/DL (ref 7–22)
CALCIUM SERPL-MCNC: 8.8 MG/DL (ref 8.5–10.5)
CHLORIDE BLD-SCNC: 105 MEQ/L (ref 98–111)
CO2: 25 MEQ/L (ref 23–33)
CREAT SERPL-MCNC: 0.7 MG/DL (ref 0.4–1.2)
EKG ATRIAL RATE: 77 BPM
EKG P AXIS: 78 DEGREES
EKG P-R INTERVAL: 172 MS
EKG Q-T INTERVAL: 404 MS
EKG QRS DURATION: 82 MS
EKG QTC CALCULATION (BAZETT): 457 MS
EKG R AXIS: -24 DEGREES
EKG T AXIS: 38 DEGREES
EKG VENTRICULAR RATE: 77 BPM
EOSINOPHIL # BLD: 0.8 %
EOSINOPHILS ABSOLUTE: 0.1 THOU/MM3 (ref 0–0.4)
ERYTHROCYTE [DISTWIDTH] IN BLOOD BY AUTOMATED COUNT: 16.9 % (ref 11.5–14.5)
ERYTHROCYTE [DISTWIDTH] IN BLOOD BY AUTOMATED COUNT: 41.1 FL (ref 35–45)
GFR SERPL CREATININE-BSD FRML MDRD: > 90 ML/MIN/1.73M2
GLUCOSE BLD-MCNC: 84 MG/DL (ref 70–108)
GLUCOSE BLD-MCNC: 85 MG/DL (ref 70–108)
GLUCOSE BLD-MCNC: 88 MG/DL
HCT VFR BLD CALC: 38.1 % (ref 42–52)
HEMOGLOBIN: 12.1 GM/DL (ref 14–18)
IMMATURE GRANS (ABS): 0.06 THOU/MM3 (ref 0–0.07)
IMMATURE GRANULOCYTES: 0.7 %
INR BLD: 1.06 (ref 0.85–1.13)
LYMPHOCYTES # BLD: 9.9 %
LYMPHOCYTES ABSOLUTE: 0.9 THOU/MM3 (ref 1–4.8)
MCH RBC QN AUTO: 22.4 PG (ref 26–33)
MCHC RBC AUTO-ENTMCNC: 31.8 GM/DL (ref 32.2–35.5)
MCV RBC AUTO: 70.4 FL (ref 80–94)
MONOCYTES # BLD: 6.8 %
MONOCYTES ABSOLUTE: 0.6 THOU/MM3 (ref 0.4–1.3)
MRSA SCREEN RT-PCR: NEGATIVE
NUCLEATED RED BLOOD CELLS: 0 /100 WBC
OSMOLALITY CALCULATION: 269.1 MOSMOL/KG (ref 275–300)
PLATELET # BLD: 214 THOU/MM3 (ref 130–400)
PMV BLD AUTO: 9.1 FL (ref 9.4–12.4)
POC CREATININE WHOLE BLOOD: 0.9 MG/DL (ref 0.5–1.2)
POTASSIUM REFLEX MAGNESIUM: 5.6 MEQ/L (ref 3.5–5.2)
POTASSIUM SERPL-SCNC: 3.6 MEQ/L (ref 3.5–5.2)
RBC # BLD: 5.41 MILL/MM3 (ref 4.7–6.1)
REASON FOR REJECTION: NORMAL
REJECTED TEST: NORMAL
SEG NEUTROPHILS: 81.5 %
SEGMENTED NEUTROPHILS ABSOLUTE COUNT: 7.3 THOU/MM3 (ref 1.8–7.7)
SODIUM BLD-SCNC: 135 MEQ/L (ref 135–145)
TOTAL PROTEIN: 7.3 G/DL (ref 6.1–8)
TROPONIN T: < 0.01 NG/ML
VANCOMYCIN RESISTANT ENTEROCOCCUS: NEGATIVE
WBC # BLD: 8.9 THOU/MM3 (ref 4.8–10.8)

## 2021-09-10 PROCEDURE — 99223 1ST HOSP IP/OBS HIGH 75: CPT | Performed by: INTERNAL MEDICINE

## 2021-09-10 PROCEDURE — 2500000003 HC RX 250 WO HCPCS: Performed by: PHYSICIAN ASSISTANT

## 2021-09-10 PROCEDURE — 87081 CULTURE SCREEN ONLY: CPT

## 2021-09-10 PROCEDURE — 3E04317 INTRODUCTION OF OTHER THROMBOLYTIC INTO CENTRAL VEIN, PERCUTANEOUS APPROACH: ICD-10-PCS | Performed by: HOSPITALIST

## 2021-09-10 PROCEDURE — 85025 COMPLETE CBC W/AUTO DIFF WBC: CPT

## 2021-09-10 PROCEDURE — 85610 PROTHROMBIN TIME: CPT

## 2021-09-10 PROCEDURE — 82948 REAGENT STRIP/BLOOD GLUCOSE: CPT

## 2021-09-10 PROCEDURE — 2000000000 HC ICU R&B

## 2021-09-10 PROCEDURE — 36415 COLL VENOUS BLD VENIPUNCTURE: CPT

## 2021-09-10 PROCEDURE — 6360000002 HC RX W HCPCS: Performed by: PHYSICIAN ASSISTANT

## 2021-09-10 PROCEDURE — 93005 ELECTROCARDIOGRAM TRACING: CPT | Performed by: FAMILY MEDICINE

## 2021-09-10 PROCEDURE — 93307 TTE W/O DOPPLER COMPLETE: CPT

## 2021-09-10 PROCEDURE — 70498 CT ANGIOGRAPHY NECK: CPT

## 2021-09-10 PROCEDURE — 71275 CT ANGIOGRAPHY CHEST: CPT

## 2021-09-10 PROCEDURE — 6370000000 HC RX 637 (ALT 250 FOR IP): Performed by: STUDENT IN AN ORGANIZED HEALTH CARE EDUCATION/TRAINING PROGRAM

## 2021-09-10 PROCEDURE — 99284 EMERGENCY DEPT VISIT MOD MDM: CPT

## 2021-09-10 PROCEDURE — 80053 COMPREHEN METABOLIC PANEL: CPT

## 2021-09-10 PROCEDURE — 6360000004 HC RX CONTRAST MEDICATION: Performed by: FAMILY MEDICINE

## 2021-09-10 PROCEDURE — 84132 ASSAY OF SERUM POTASSIUM: CPT

## 2021-09-10 PROCEDURE — 70496 CT ANGIOGRAPHY HEAD: CPT

## 2021-09-10 PROCEDURE — 2580000003 HC RX 258: Performed by: PHYSICIAN ASSISTANT

## 2021-09-10 PROCEDURE — 87500 VANOMYCIN DNA AMP PROBE: CPT

## 2021-09-10 PROCEDURE — 2580000003 HC RX 258: Performed by: STUDENT IN AN ORGANIZED HEALTH CARE EDUCATION/TRAINING PROGRAM

## 2021-09-10 PROCEDURE — 70450 CT HEAD/BRAIN W/O DYE: CPT

## 2021-09-10 PROCEDURE — 87641 MR-STAPH DNA AMP PROBE: CPT

## 2021-09-10 PROCEDURE — 82565 ASSAY OF CREATININE: CPT

## 2021-09-10 PROCEDURE — 84484 ASSAY OF TROPONIN QUANT: CPT

## 2021-09-10 RX ORDER — FUROSEMIDE 10 MG/ML
20 INJECTION INTRAMUSCULAR; INTRAVENOUS ONCE
Status: DISCONTINUED | OUTPATIENT
Start: 2021-09-10 | End: 2021-09-10

## 2021-09-10 RX ORDER — SODIUM CHLORIDE 0.9 % (FLUSH) 0.9 %
5-40 SYRINGE (ML) INJECTION PRN
Status: DISCONTINUED | OUTPATIENT
Start: 2021-09-10 | End: 2021-09-13 | Stop reason: HOSPADM

## 2021-09-10 RX ORDER — SODIUM CHLORIDE 0.9 % (FLUSH) 0.9 %
5-40 SYRINGE (ML) INJECTION EVERY 12 HOURS SCHEDULED
Status: DISCONTINUED | OUTPATIENT
Start: 2021-09-10 | End: 2021-09-13 | Stop reason: HOSPADM

## 2021-09-10 RX ORDER — SODIUM CHLORIDE 9 MG/ML
25 INJECTION, SOLUTION INTRAVENOUS PRN
Status: DISCONTINUED | OUTPATIENT
Start: 2021-09-10 | End: 2021-09-13 | Stop reason: HOSPADM

## 2021-09-10 RX ORDER — ONDANSETRON 4 MG/1
4 TABLET, ORALLY DISINTEGRATING ORAL EVERY 8 HOURS PRN
Status: DISCONTINUED | OUTPATIENT
Start: 2021-09-10 | End: 2021-09-13 | Stop reason: HOSPADM

## 2021-09-10 RX ORDER — SODIUM CHLORIDE 0.9 % (FLUSH) 0.9 %
5-40 SYRINGE (ML) INJECTION EVERY 12 HOURS SCHEDULED
Status: DISCONTINUED | OUTPATIENT
Start: 2021-09-10 | End: 2021-09-10 | Stop reason: SDUPTHER

## 2021-09-10 RX ORDER — HYDROCODONE BITARTRATE AND ACETAMINOPHEN 5; 325 MG/1; MG/1
1 TABLET ORAL EVERY 6 HOURS PRN
Status: DISCONTINUED | OUTPATIENT
Start: 2021-09-10 | End: 2021-09-13 | Stop reason: HOSPADM

## 2021-09-10 RX ORDER — POLYETHYLENE GLYCOL 3350 17 G/17G
17 POWDER, FOR SOLUTION ORAL DAILY PRN
Status: DISCONTINUED | OUTPATIENT
Start: 2021-09-10 | End: 2021-09-13 | Stop reason: HOSPADM

## 2021-09-10 RX ORDER — DEXTROSE MONOHYDRATE 25 G/50ML
12.5 INJECTION, SOLUTION INTRAVENOUS
Status: ACTIVE | OUTPATIENT
Start: 2021-09-10 | End: 2021-09-10

## 2021-09-10 RX ORDER — SODIUM POLYSTYRENE SULFONATE 15 G/60ML
15 SUSPENSION ORAL; RECTAL ONCE
Status: COMPLETED | OUTPATIENT
Start: 2021-09-10 | End: 2021-09-10

## 2021-09-10 RX ORDER — HYDROCODONE BITARTRATE AND ACETAMINOPHEN 5; 325 MG/1; MG/1
2 TABLET ORAL EVERY 6 HOURS PRN
Status: DISCONTINUED | OUTPATIENT
Start: 2021-09-10 | End: 2021-09-13 | Stop reason: HOSPADM

## 2021-09-10 RX ORDER — ONDANSETRON 2 MG/ML
4 INJECTION INTRAMUSCULAR; INTRAVENOUS EVERY 6 HOURS PRN
Status: DISCONTINUED | OUTPATIENT
Start: 2021-09-10 | End: 2021-09-13 | Stop reason: HOSPADM

## 2021-09-10 RX ORDER — SODIUM CHLORIDE 9 MG/ML
INJECTION, SOLUTION INTRAVENOUS CONTINUOUS
Status: DISCONTINUED | OUTPATIENT
Start: 2021-09-10 | End: 2021-09-11

## 2021-09-10 RX ORDER — 0.9 % SODIUM CHLORIDE 0.9 %
50 INTRAVENOUS SOLUTION INTRAVENOUS ONCE
Status: COMPLETED | OUTPATIENT
Start: 2021-09-10 | End: 2021-09-10

## 2021-09-10 RX ORDER — PANTOPRAZOLE SODIUM 40 MG/1
40 TABLET, DELAYED RELEASE ORAL
Status: DISCONTINUED | OUTPATIENT
Start: 2021-09-11 | End: 2021-09-13 | Stop reason: HOSPADM

## 2021-09-10 RX ORDER — ATORVASTATIN CALCIUM 80 MG/1
80 TABLET, FILM COATED ORAL NIGHTLY
Status: DISCONTINUED | OUTPATIENT
Start: 2021-09-10 | End: 2021-09-13 | Stop reason: HOSPADM

## 2021-09-10 RX ORDER — FUROSEMIDE 20 MG/1
20 TABLET ORAL DAILY
Status: DISCONTINUED | OUTPATIENT
Start: 2021-09-10 | End: 2021-09-11

## 2021-09-10 RX ADMIN — IOPAMIDOL 80 ML: 755 INJECTION, SOLUTION INTRAVENOUS at 11:44

## 2021-09-10 RX ADMIN — SODIUM CHLORIDE 50 ML: 9 INJECTION, SOLUTION INTRAVENOUS at 13:30

## 2021-09-10 RX ADMIN — HYDROCODONE BITARTRATE AND ACETAMINOPHEN 2 TABLET: 5; 325 TABLET ORAL at 20:11

## 2021-09-10 RX ADMIN — SODIUM CHLORIDE: 9 INJECTION, SOLUTION INTRAVENOUS at 15:19

## 2021-09-10 RX ADMIN — ALTEPLASE 6.9 MG: KIT at 12:08

## 2021-09-10 RX ADMIN — FUROSEMIDE 20 MG: 20 TABLET ORAL at 16:32

## 2021-09-10 RX ADMIN — ATORVASTATIN CALCIUM 80 MG: 80 TABLET, FILM COATED ORAL at 20:12

## 2021-09-10 RX ADMIN — SODIUM CHLORIDE 2 MG/HR: 9 INJECTION, SOLUTION INTRAVENOUS at 19:07

## 2021-09-10 RX ADMIN — ALTEPLASE 61.7 MG: KIT at 12:12

## 2021-09-10 RX ADMIN — SODIUM CHLORIDE 5 MG/HR: 9 INJECTION, SOLUTION INTRAVENOUS at 13:34

## 2021-09-10 RX ADMIN — SODIUM CHLORIDE, PRESERVATIVE FREE 10 ML: 5 INJECTION INTRAVENOUS at 20:12

## 2021-09-10 RX ADMIN — SODIUM POLYSTYRENE SULFONATE 15 G: 15 SUSPENSION ORAL; RECTAL at 14:52

## 2021-09-10 ASSESSMENT — ENCOUNTER SYMPTOMS
SHORTNESS OF BREATH: 0
NAUSEA: 0
SORE THROAT: 1
RHINORRHEA: 1
ABDOMINAL PAIN: 0
COUGH: 1

## 2021-09-10 ASSESSMENT — PAIN DESCRIPTION - ONSET
ONSET: ON-GOING
ONSET: ON-GOING

## 2021-09-10 ASSESSMENT — PAIN DESCRIPTION - ORIENTATION
ORIENTATION: LEFT
ORIENTATION: ANTERIOR;LEFT
ORIENTATION: ANTERIOR

## 2021-09-10 ASSESSMENT — PAIN DESCRIPTION - LOCATION
LOCATION: HEAD
LOCATION: CHEST
LOCATION: HEAD

## 2021-09-10 ASSESSMENT — PAIN DESCRIPTION - FREQUENCY
FREQUENCY: CONTINUOUS
FREQUENCY: CONTINUOUS

## 2021-09-10 ASSESSMENT — PAIN - FUNCTIONAL ASSESSMENT: PAIN_FUNCTIONAL_ASSESSMENT: ACTIVITIES ARE NOT PREVENTED

## 2021-09-10 ASSESSMENT — PAIN SCALES - GENERAL
PAINLEVEL_OUTOF10: 4
PAINLEVEL_OUTOF10: 8
PAINLEVEL_OUTOF10: 8
PAINLEVEL_OUTOF10: 10
PAINLEVEL_OUTOF10: 10

## 2021-09-10 ASSESSMENT — PAIN DESCRIPTION - DESCRIPTORS
DESCRIPTORS: THROBBING
DESCRIPTORS: THROBBING
DESCRIPTORS: CRAMPING

## 2021-09-10 ASSESSMENT — PAIN DESCRIPTION - PAIN TYPE
TYPE: ACUTE PAIN

## 2021-09-10 ASSESSMENT — PAIN DESCRIPTION - PROGRESSION: CLINICAL_PROGRESSION: NOT CHANGED

## 2021-09-10 NOTE — Clinical Note
Patient Class: Inpatient [101]   REQUIRED: Diagnosis: Stroke-like symptoms [703919]   Estimated Length of Stay: Estimated stay of more than 2 midnights   Telemetry/Cardiac Monitoring Required?: Yes

## 2021-09-10 NOTE — H&P
CRITICAL CARE PROGRESS NOTE      Patient:  Ronal Rubio    Unit/Bed:4D-10/010-A  YOB: 1955  MRN: [id number]   PCP: Corwin Santo MD  Date of Admission: 9/10/2021  Chief Complaint:-Left-sided weakness    Assessment and Plan:    Left-sided weakness with Vision Problems: Stroke alert was called. CT head along with CT of the head and neck was negative. TPA was given as it was within window. Neurology following. Echo pending. Hold all anticoagulation for 24 hours post TPA. PT and OT.  currently on a Cardene drip to p maintain pressure less than 185/105. Hemoglobin A1c and lipid panel for tomorrow morning. N.p.o. until speech evaluates. Neurochecks every 4 hours. Maintain telemetry. Per chart review patient has history of left-sided weakness with most recent in 7/21. At that time CT and CTA of the head and neck was also negative. Repeat CT of head in am per neurology. High Dose Statin. Pituitary macroadenoma: Diagnosed in 2020. Patient has since had intermittent vision problems. Follows with neurosurgery in [place]. Obtain FSH, LH, and Prolactin Levels in am.    CAD status post CABG: We will hold aspirin and Plavix due to patient receiving TPA. Resume after 24 hours post TPA. History of ischemic cardiomyopathy status post defibrillator:  ECHO in 2019 shows EF 45%. No obvious shunting seen at that time. GERD:  Resume protonix    Marijuana Use: States this is for his headaches. Benign essential hypertension: Current home meds on hold. Patient is currently on a Cardene drip to keep a blood pressure goal of less than 185/105. We will resume medications once off Cardene drip. Hyperkalemia: Likely secondary to home lisinopril use. Was given Lasix 20 oral and Kayexalate once. Repeat potassium pending  Migraines: Likely secondary to pituitary macroadenoma. Takes Neurontin at home. Normocytic Anemia:   Close to baseline. Monitor with CBC. Takes ferrous sulfate at home. Non-complaint with Meds: Per chart review. Will get social work consult   R 4.5 cm lower lobe nodule: has hx; patient is a former smoker. Does not follow with pulmonary for this nodule      INITIAL H AND P AND ICU COURSE:  Veronica Tsaio SADIE Murphy is a 79-year-old male with history of CAD status post CABG in 2011, pituitary macroadenoma ischemic cardiomyopathy with reduced EF and defibrillator placed, hypertension, marijuana use, and hyperlipidemia presents to [facility] room as a stroke alert. Patient stated around 9 AM this morning he was driving and he felt his left foot was weaker. Patient states he was also having some vision problems. Patient stated he also experienced some paresthesias in his left lower extremity and left upper extremity. Patient states he is developed some chest pain also at that time that radiated down his left arm. Came to emergency room where CT of the head and CTA of the head and neck were performed. Scans revealed no acute findings including no hemorrhage or large vessel occlusions. Due to patient having chest pain decision to hold treatment when she TPA was made until after the CTA was completed. Patient was a candidate for TPA as his last well-known was on for 4.5 hours and patient received TPA. Patient was transferred to ICU afterwards for monitoring. per chart review patient has history of left-sided weakness with most recent admission in 7/21 where patient was suspected to have a  acute lacunar infarct. Patient at that time was also having left-sided weakness CT of the head and CT of the head and neck was done and were negative at that time. Neurology at that chose not to MRI. Patient was previously admitted in 11/2019 also due to a strokelike symptoms. Patient at that time received TPA. Sent home on an event monitor which detected some nonsustained SVTs and SVTs.         Past Medical History:  As stated in HPI  Family History:    Family History   Problem Relation Age of Onset    High Blood Pressure Mother     Arthritis Mother     Cancer Mother         lung breast    Stroke Mother     Heart Attack Father         Father passed away from myocardial infarction. Arthritis Father     High Blood Pressure Maternal Aunt     High Cholesterol Maternal Aunt     Stroke Maternal Aunt      Social History:  Daily Marijuana use; Hx of tobacco smoking;     ROS   GENERAL: Positive for left sided weakness, negative for fatigue, no recent change in weight, no fever,  SKN: No lesions or rashes. HEAD: No headaches or recent injury  EYES: intermittent loss in vision bilaterally, no diplopia or blurred vision  EARS: No hearing loss, no tinnitus  NOSE/THROAT: No rhinorrhea or pharyngitis, no nasal drainage  NECK: No lumps or unusual neck stiffness  PULMONARY: No dyspnea, orthopnea or paroxysmal nocturnal dyspnea, stridor wheezing cough  CARDIAC: No chest pain, pressure, heaviness or tightness  GI: No history melena or hematochezia, no diarrhea or constipation  PERIPHERAL VASCULAR: No intermittent claudication or unusual leg cramps  MUSCULOSKELETAL: Occasional arthralgias, myalgias  NEUROLOGICAL:  Alert and oriented x3; reports headaches  hEMATOLOGIC:  No anemia, unusual bruising or bleeding  PSYCH: Denies any homicidal or suicidal ideations    Scheduled Meds:   sodium chloride flush  5-40 mL IntraVENous 2 times per day    atorvastatin  80 mg Oral Nightly    furosemide  20 mg Oral Daily     Continuous Infusions:   sodium chloride      niCARdipine 2.5 mg/hr (09/10/21 1639)    sodium chloride 100 mL/hr at 09/10/21 1519       PHYSICAL EXAMINATION:  T:  98.6. P: 75. RR:  14 B/P:  148/94. O2 Sat:  96.  I/O:  -1525  Body mass index is 22.85 kg/m². GCS:   15  General:   Elderly male with no acute distress  HEENT:  normocephalic and atraumatic. No scleral icterus. PERR  Neck: supple. No Thyromegaly. Lungs: clear to auscultation. No retractions  Cardiac: RRR. No JVD. Abdomen: soft. Nontender. Extremities:  No clubbing, cyanosis, or edema x 4. Vasculature: capillary refill < 3 seconds. Palpable dorsalis pedis pulses. Skin:  warm and dry. Psych:  Alert and oriented x3. Affect appropriate  Lymph:  No supraclavicular adenopathy. Neurologic:  No focal deficit. No seizures. Data: (All radiographs, tracings, PFTs, and imaging are personally viewed and interpreted unless otherwise noted). Sodium 135, potassium 5.6, chloride 105, CO2 25, BUN 12, creatinine 0.7, anion gap 5, GFR greater than 90, glucose 88, calcium 9.8  Troponin negative  WBC 8.9, hemoglobin 12.1, MCV 70.4, platelets 105  Telemetry shows normal sinus rhythm  Albumin 4.0, alk phos 59, ALT 14, AST 48, bilirubin 1.2   INR 1.06  CT of the head shows no intracranial hemorrhage, stable moderate severity chronic small vessel ischemic changes, and 1.5 cm focal area of mucosal thickening in the anterior aspect of the sphenoid sinus on the right. CTA of the head and neck shows no significant hemodynamic stenosis in the right and left common and internal carotid arteries. Dominant left and smaller right vertebral artery with antegrade flow bilaterally. Atherosclerotic calcification of the cavernous segments of both internal carotid arteries and in the distal vertebral arteries. Negative CTA of the [place]. Enlarged pituitary gland. Retention cyst or polyp in the right sphenoid sinus. Postoperative changes in the cervical spine. Status post CABG. Pacemaker in place  CT of the chest shows no evidence for aortic dissection or mediastinal hematoma. Cannot get evaluation of pulmonary arteries. Mild cardiomegaly status post CABG and pacemaker placement. Slight thickening of the interstitial lung markings. 4.3 nodule in the right lower lobe.       Seen with multidisciplinary ICU team.  Meets Continued ICU Level Care Criteria:    [x] Yes   [] No - Transfer Planned to listed location:  [] HOSPITALIST CONTACTED- DR Blackwell and plan discussed with Dr. Kassandra Watts. Electronically signed by Fabiola Rivas DO  CRITICAL CARE SPECIALIST  Patient seen by me. Case discussed with resident physician. Neurosurgical consult for visual changes. .  Italicized font represents changes to the note made by me.     Electronically signed by Jessica Price MD on 9/13/2021 at 6:51 AM

## 2021-09-10 NOTE — ED TRIAGE NOTES
PT states I started having chest pain about 0845 & denies taking nitro. EKG completed, connected to monitor. Respirations equal & unlabored.  Reports chest pain 8/10 at this time

## 2021-09-10 NOTE — ED NOTES
Bedside report given to [facility] RN  Discussed NIH and initial vitals     Mily Dickinson RN  09/10/21 4306

## 2021-09-10 NOTE — CONSULTS
headache, but denies any double vision. He gets headaches often and attributes to these to a pituitary tumor that he currently has. He does feel like he is slurring his words intermittently. He denies any dizziness or palpitations. His chest pain is improving. He does report a cough, runny nose, and sore throat for 1 week duration. Of note, the patient is fully vaccinated against COVID-19. Time of symptoms onset/last time seen at baseline:  9am.  Time of stroke alert:  1134. Time of Neurology arrival:  1136. Vascular risk factors:  Smoking, HTN, HLD. Initial Glucose: 88 .  Old deficits from prior stroke:  none. INR in ED if anticoagulated:  n/a.  BP in ED:  146/96. Initial NIHSS: 4.  Modified Caroline Score upon admission:  0.   IV tPA administerd:  1208. Time of Initial Imaging Read:  1150. Review of Systems   Review of Systems   Constitutional: Negative for chills and fever. HENT: Positive for rhinorrhea and sore throat. Eyes: Positive for visual disturbance. Respiratory: Positive for cough. Negative for shortness of breath. Cardiovascular: Positive for chest pain. Negative for palpitations. Gastrointestinal: Negative for abdominal pain and nausea. Genitourinary: Negative for dysuria. Musculoskeletal: Negative for myalgias. Skin: Negative for rash. Neurological: Positive for speech difficulty, numbness and headaches. Negative for dizziness and weakness. Psychiatric/Behavioral: Negative for dysphoric mood. The patient is not nervous/anxious.         Medications   Scheduled Meds:    sodium chloride flush  5-40 mL IntraVENous 2 times per day    atorvastatin  80 mg Oral Nightly    furosemide  20 mg Oral Daily     Continuous Infusions:    sodium chloride      niCARdipine 5 mg/hr (09/10/21 1334)    sodium chloride 100 mL/hr at 09/10/21 1519     PRN Meds: sodium chloride flush, sodium chloride, dextrose, ondansetron **OR** ondansetron, polyethylene glycol    Past History seconds   6b Motor right leg 0=No drift, limb holds 90 (or 45) degrees for full 10 seconds   7 Limb Ataxia 1=Present in one limb   8 Sensory 1=Mild to moderate sensory loss; patient feels pinprick is less sharp or is dull on the affected side; there is a loss of superficial pain with pinprick but patient is aware He is being touched   9 Best Language 0=No aphasia, normal   10 Dysarthria 0=Normal   11 Extinction and Inattention 0=No abnormality   TOTAL  4   Time NIHSS performed: 1145  Vitals:  BP (!) 193/113   Pulse 75   Temp 98.9 °F (37.2 °C) (Oral)   Resp 16   Ht 5' 9\" (1.753 m)   Wt 168 lb (76.2 kg)   SpO2 94%   BMI 24.81 kg/m²   Temp (24hrs), Av.4 °F (36.9 °C), Min:97.8 °F (36.6 °C), Max:98.9 °F (37.2 °C)      I/O (24Hr): Intake/Output Summary (Last 24 hours) at 9/10/2021 1525  Last data filed at 9/10/2021 1447  Gross per 24 hour   Intake --   Output 750 ml   Net -750 ml       Physical Exam  Vitals reviewed. Constitutional:       General: He is not in acute distress. Appearance: Normal appearance. He is not ill-appearing. HENT:      Head: Normocephalic and atraumatic. Right Ear: External ear normal.      Left Ear: External ear normal.      Nose: Nose normal.      Mouth/Throat:      Mouth: Mucous membranes are moist.      Pharynx: No oropharyngeal exudate or posterior oropharyngeal erythema. Eyes:      Extraocular Movements: EOM normal.      Pupils: Pupils are equal, round, and reactive to light. Cardiovascular:      Rate and Rhythm: Normal rate and regular rhythm. Heart sounds: Normal heart sounds. No murmur heard. Pulmonary:      Effort: Pulmonary effort is normal. No respiratory distress. Breath sounds: Normal breath sounds. No wheezing. Abdominal:      General: Bowel sounds are normal.      Palpations: Abdomen is soft. Tenderness: There is no abdominal tenderness. Musculoskeletal:         General: Normal range of motion. Right lower leg: No edema. Left lower leg: No edema. Skin:     General: Skin is warm. Neurological:      Mental Status: He is alert and oriented to person, place, and time. Coordination: Finger-Nose-Finger Test abnormal (ataxia noted on left). Heel to [facility] Test normal.      Deep Tendon Reflexes:      Reflex Scores:       Tricep reflexes are 1+ on the right side and 1+ on the left side. Bicep reflexes are 1+ on the right side and 1+ on the left side. Brachioradialis reflexes are 1+ on the right side and 1+ on the left side. Patellar reflexes are 1+ on the right side and 1+ on the left side. Achilles reflexes are 1+ on the right side and 1+ on the left side. Psychiatric:         Mood and Affect: Mood normal.         Speech: Speech normal.         Behavior: Behavior normal.       Neurologic Exam     Mental Status   Oriented to person, place, and time. Attention: normal. Concentration: normal.   Speech: speech is normal   Level of consciousness: alert  Normal comprehension. Cranial Nerves     CN II   Right visual field deficit: upper nasal quadrant(s)  Left visual field deficit: upper temporal quadrant(s)    CN III, IV, VI   Pupils are equal, round, and reactive to light. Extraocular motions are normal.   Right pupil: Size: 3 mm. Shape: regular. Reactivity: brisk. Left pupil: Size: 3 mm. Shape: regular. Reactivity: brisk. CN V   Right facial sensation deficit: none  Left facial sensation deficit: complete    CN VII   Facial expression full, symmetric.      CN VIII   CN VIII normal.     CN IX, X   CN IX normal.   CN X normal.     CN XI   CN XI normal.   Right trapezius strength: normal  Left trapezius strength: weak    CN XII   CN XII normal.   Tongue deviation: none    Motor Exam   Muscle bulk: normal  Overall muscle tone: normal  Right arm tone: normal  Left arm tone: normal  Right arm pronator drift: absent  Left arm pronator drift: present  Right leg tone: normal  Left leg tone: normal  Muscle strength 5/5 in right upper and lower extremity. Muscle strength 4+/5 in left upper and lower extremities. Sensory Exam   Patient has left sided hypoesthesia in upper and lower extremities to light touch     Gait, Coordination, and Reflexes     Coordination   Finger to nose coordination: abnormal (ataxia noted on left)  Heel to shin coordination: normal    Reflexes   Right brachioradialis: 1+  Left brachioradialis: 1+  Right biceps: 1+  Left biceps: 1+  Right triceps: 1+  Left triceps: 1+  Right patellar: 1+  Left patellar: 1+  Right achilles: 1+  Left achilles: 1+       Labs/Imaging/Diagnostics   Labs:  CBC:  Recent Labs     09/10/21  1113   WBC 8.9   RBC 5.41   HGB 12.1*   HCT 38.1*   MCV 70.4*        CHEMISTRIES:  Recent Labs     09/10/21  1113 09/10/21  1135     --    K 5.6*  --      --    CO2 25  --    BUN 12  --    CREATININE 0.7  --    GLUCOSE 84 88     PT/INR:  Recent Labs     09/10/21  1113   INR 1.06     APTT:No results for input(s): APTT in the last 72 hours. LIVER PROFILE:  Recent Labs     09/10/21  1113   AST 48*   ALT 14   BILITOT 1.2   ALKPHOS 59       Imaging Last 24 Hours:  CTA HEAD W WO CONTRAST    Result Date: 9/10/2021  PROCEDURE: CTA HEAD W WO CONTRAST, CTA NECK W WO CONTRAST CLINICAL INFORMATION: headache, left-sided weakness. COMPARISON: CT scan of the brain obtained on the same day. TECHNIQUE: 1 mm axial images were obtained through the head and neck after the fast bolus administration of contrast. A noncontrast localizer was obtained. 3-D reconstructions were performed on a dedicated 3-D workstation. These include multiplanar MPR images and multiplanar MIP images. Centerline reconstructions were obtained of the carotid systems. Isovue intravenous contrast was given. All CT scans at this facility use dose modulation, iterative reconstruction, and/or weight-based dosing when appropriate to reduce radiation dose to as low as reasonably achievable.  FINDINGS: CTA NECK: Aortic arch and branches: Atherosclerotic calcification aortic arch. No significant stenosis but at the origin of the brachiocephalic, left common carotid and left subclavian arteries. Right common carotid artery/ICA: No significant hemodynamic stenosis in the right common and internal carotid arteries. Left common carotid artery/ICA: No significant hemodynamic stenosis in the left common and internal carotid arteries. Vertebral arteries: Dominant left and smaller right vertebral artery with antegrade flow bilaterally. CTA HEAD: Internal carotid arteries: Atherosclerotic calcification in the cavernous segments of both internal carotid arteries. No evidence of severe stenosis in the internal carotid arteries bilaterally. . Middle cerebral arteries: No significant hemodynamic stenosis in the middle cerebral arteries bilaterally. Kannan C[place] Anterior cerebral arteries: No significant hemodynamic stenosis in the anterior cerebral arteries bilaterally. . Vertebral arteries: Atherosclerotic calcification in the distal vertebral arteries bilaterally. No significant hemodynamic stenosis. . Basilar artery: Normal. Superior cerebellar arteries: Normal. Posterior cerebral arteries: Normal. The proximal branches are also normal. No aneurysms, stenoses or occlusions are noted. The superior sagittal sinus, vein of Ramu, internal cerebral veins, straight sinus, transverse sinuses and sigmoid sinuses are patent. Axial source data: Enlarged pituitary gland measuring 11 x 11 mm in size. Retention cyst or polyp in the right sphenoid sinus. Postoperative changes in the cervical spine. 1. No significant hemodynamic stenosis in the right and left common and internal carotid arteries. 2. Dominant left and smaller right vertebral artery with antegrade flow bilaterally. 3. Atherosclerotic calcification in the cavernous segments of both internal carotid arteries and in the distal vertebral arteries. No evidence of severe stenosis.  4. Otherwise negative CTA of the [place]. 5. Enlarged pituitary gland. MRI scan of the brain and pituitary gland without intravenous contrast medium would be helpful for better position. 6. Retention cyst or polyp in the right sphenoid sinus. 7. Postoperative changes in the cervical spine. 8. Status post coronary bypass surgery. Pacemaker in place. **This report has been created using voice recognition software. It may contain minor errors which are inherent in voice recognition technology. ** Final report electronically signed by DR Jacky Pimentel on 9/10/2021 12:48 PM    CT Head WO Contrast    Result Date: 9/10/2021  PROCEDURE: CT HEAD WO CONTRAST CLINICAL INFORMATION: headache, left-sided weakness. COMPARISON: Head CT 12/1/2019. TECHNIQUE: Noncontrast 5 mm axial images were obtained through the brain. Sagittal and coronal reconstructions were obtained. All CT scans at this facility use dose modulation, iterative reconstruction, and/or weight-based dosing when appropriate to reduce radiation dose to as low as reasonably achievable. FINDINGS: There is no hemorrhage. There are no intra-or extra-axial collections. There is no hydrocephalus, midline shift or mass effect. There is a moderate amount of patchy abnormal low attenuation in the white matter the brain consistent with chronic small vessel ischemic changes. This is stable. There are vascular calcifications. The ventricles are prominent. This is consistent with central volume loss. There is a focal area of mucosal thickening along the anterior wall of the right sphenoid sinus measuring 1.5 cm. This has increased in size. There is some mucosal thickening in the left sphenoid sinus. There is no suspicious calvarial abnormality. 1. No intracranial hemorrhage. 2. Stable moderate severity chronic small vessel ischemic changes. 3. 1.5 cm focal area of mucosal thickening in the anterior aspect of the sphenoid sinus on the right. This has increased in size.  This may represent a mucous retention cyst. Preliminary findings of no hemorrhage were telephoned to Shirley Yi, the neurology PA at 11:50 AM on 9/10/2021 . **This report has been created using voice recognition software. It may contain minor errors which are inherent in voice recognition technology. ** Final report electronically signed by Dr. Stanley Mckay on 9/10/2021 11:53 AM    CTA NECK W WO CONTRAST    Result Date: 9/10/2021  PROCEDURE: CTA HEAD W WO CONTRAST, CTA NECK W WO CONTRAST CLINICAL INFORMATION: headache, left-sided weakness. COMPARISON: CT scan of the brain obtained on the same day. TECHNIQUE: 1 mm axial images were obtained through the head and neck after the fast bolus administration of contrast. A noncontrast localizer was obtained. 3-D reconstructions were performed on a dedicated 3-D workstation. These include multiplanar MPR images and multiplanar MIP images. Centerline reconstructions were obtained of the carotid systems. Isovue intravenous contrast was given. All CT scans at this facility use dose modulation, iterative reconstruction, and/or weight-based dosing when appropriate to reduce radiation dose to as low as reasonably achievable. FINDINGS: CTA NECK: Aortic arch and branches: Atherosclerotic calcification aortic arch. No significant stenosis but at the origin of the brachiocephalic, left common carotid and left subclavian arteries. Right common carotid artery/ICA: No significant hemodynamic stenosis in the right common and internal carotid arteries. Left common carotid artery/ICA: No significant hemodynamic stenosis in the left common and internal carotid arteries. Vertebral arteries: Dominant left and smaller right vertebral artery with antegrade flow bilaterally. CTA HEAD: Internal carotid arteries: Atherosclerotic calcification in the cavernous segments of both internal carotid arteries. No evidence of severe stenosis in the internal carotid arteries bilaterally. . Middle cerebral arteries: No significant hemodynamic stenosis in the middle cerebral arteries bilaterally. [place] Anterior cerebral arteries: No significant hemodynamic stenosis in the anterior cerebral arteries bilaterally. . Vertebral arteries: Atherosclerotic calcification in the distal vertebral arteries bilaterally. No significant hemodynamic stenosis. . Basilar artery: Normal. Superior cerebellar arteries: Normal. Posterior cerebral arteries: Normal. The proximal branches are also normal. No aneurysms, stenoses or occlusions are noted. The superior sagittal sinus, vein of Ramu, internal cerebral veins, straight sinus, transverse sinuses and sigmoid sinuses are patent. Axial source data: Enlarged pituitary gland measuring 11 x 11 mm in size. Retention cyst or polyp in the right sphenoid sinus. Postoperative changes in the cervical spine. 1. No significant hemodynamic stenosis in the right and left common and internal carotid arteries. 2. Dominant left and smaller right vertebral artery with antegrade flow bilaterally. 3. Atherosclerotic calcification in the cavernous segments of both internal carotid arteries and in the distal vertebral arteries. No evidence of severe stenosis. 4. Otherwise negative CTA of the [place]. 5. Enlarged pituitary gland. MRI scan of the brain and pituitary gland without intravenous contrast medium would be helpful for better position. 6. Retention cyst or polyp in the right sphenoid sinus. 7. Postoperative changes in the cervical spine. 8. Status post coronary bypass surgery. Pacemaker in place. **This report has been created using voice recognition software. It may contain minor errors which are inherent in voice recognition technology. ** Final report electronically signed by DR Obdulio De La Rosa on 9/10/2021 12:48 PM    CTA Chest W WO Contrast    Result Date: 9/10/2021  PROCEDURE: CTA CHEST W WO CONTRAST CLINICAL INFORMATION: chest pain, concern for dissection. COMPARISON: CTA chest dated 1/23/2012. [place] TECHNIQUE: 3 mm axial images were obtained through the chest after the administration of IV contrast.  A non-contrast localizer was obtained. 3D reconstructions were performed on the scanner to include MIP coronal and sagittal images through the chest. Isovue was the intravenous contrast utilized. All CT scans at this facility use dose modulation, iterative reconstruction, and/or weight-based dosing when appropriate to reduce radiation dose to as low as reasonably achievable. FINDINGS: There was inadequate opacification of the pulmonary arterial system. The ascending aorta measures 3.2 cm in diameter. The descending thoracic aorta measures 2.7 cm in diameter. . There is no evidence for aortic dissection. There is no mediastinal hematoma. . There is mild cardiomegaly in this patient status post coronary bypass surgery and pacemaker placement. . There is no pericardial or pleural effusion. There is no mediastinal, axillary or hilar adenopathy. There is slight thickening off the interstitial lung markings with no superimposed  infiltrates. There is a 4.3 mm nodule in the right lower lobe. No suspicious osseous lesions are present. There are no suspicious findings in the imaged aspects of the upper abdomen. 1. No evidence for aortic dissection or mediastinal hematoma. 2. Inadequate evaluation of the pulmonary arteries. 3. Mild cardiomegaly status post coronary bypass surgery and pacemaker placement. 4. Slight thickening of the interstitial lung markings. 4.3 mm nodule in the right lower lobe. 5. Otherwise negative CT scan of the chest. **This report has been created using voice recognition software. It may contain minor errors which are inherent in voice recognition technology. ** Final report electronically signed by DR Nayeli Bullock on 9/10/2021 12:36 PM        Assessment and Plan:          1.  Left sided weakness, sensory deficits, concern for ischemic stroke     Order for IV nicardipine placed as patient was above BP goal of 180/105 in ICU   IV tPA started at 12:08   Patient admitted to ICU   The patient's defibrillator is not MRI compatible. We will obtain a repeat non-contrast CT head tomorrow at 1300 to rule out bleed.  No lovenox 40 mg or heparin within 24 hours and please use SCDs for DVT prophylaxis    IV tPA precautions for 24 hours   No deep artery puncture or catheter within next 24 hours   Hold all antiplatelets and anticoagulation until 24 hours and if repeat CT or MRI is negative for bleeding   Neuro checks per IV tPA protocol then every 1 hour   Strict bedrest, ok to elevate head of bed to 30 degrees   NIHSS q shift   Permissive HTN with a blood pressure goal of less than 180/105 for 24 hours post-tPa administration.  2D Echo with bubble study, ordered   HgbA1C and Fasting Lipid Panel in am   Statin increased per primary team   PT/OT/SLP Evaluation    Maintain telemetry, monitor for atrial-fib   Maintain oxygenation saturation >94%   Start 0.9% Saline IV at 75 mL/hr   Dysphagia screen prior to oral intake   NPO pending swallow evaluation   Monitor for infection  o Urinalysis with Reflex  o Chest XRay   EKG, ordered   Labs daily   SCDs and lovenox for DVT prophylaxis   CT head is needed if severe headache or altered mental status   Provide stroke education for individualized risk factors     This patient was seen and evaluated with Dr. Dasia Tam and he is in agreement with the assessment and plan.   Electronically signed by Delmy Reynoso PA-C on 9/10/21 at 3:25 PM EDT

## 2021-09-10 NOTE — ED NOTES
Stroke alert called. Pt connected to transport monitor. Transported to CT scan with nurse.      Albina Sheldon RN  09/10/21 4026

## 2021-09-10 NOTE — ED NOTES
PT c/o vision changes. Dr Janel Veloz at bedside & evaluates last known well  0900.        Subha Saunders RN  09/10/21 7338
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
## 2021-09-11 ENCOUNTER — APPOINTMENT (OUTPATIENT)
Dept: CT IMAGING | Age: 66
DRG: 556 | End: 2021-09-11
Payer: MEDICARE

## 2021-09-11 LAB
ANION GAP SERPL CALCULATED.3IONS-SCNC: 9 MEQ/L (ref 8–16)
AVERAGE GLUCOSE: 99 MG/DL (ref 70–126)
BUN BLDV-MCNC: 9 MG/DL (ref 7–22)
CALCIUM SERPL-MCNC: 8.5 MG/DL (ref 8.5–10.5)
CHLORIDE BLD-SCNC: 106 MEQ/L (ref 98–111)
CHOLESTEROL, TOTAL: 130 MG/DL (ref 100–199)
CO2: 24 MEQ/L (ref 23–33)
CREAT SERPL-MCNC: 0.8 MG/DL (ref 0.4–1.2)
ERYTHROCYTE [DISTWIDTH] IN BLOOD BY AUTOMATED COUNT: 17.3 % (ref 11.5–14.5)
ERYTHROCYTE [DISTWIDTH] IN BLOOD BY AUTOMATED COUNT: 40.3 FL (ref 35–45)
GFR SERPL CREATININE-BSD FRML MDRD: > 90 ML/MIN/1.73M2
GLUCOSE BLD-MCNC: 93 MG/DL (ref 70–108)
HBA1C MFR BLD: 5.3 % (ref 4.4–6.4)
HCT VFR BLD CALC: 40.4 % (ref 42–52)
HDLC SERPL-MCNC: 49 MG/DL
HEMOGLOBIN: 12.8 GM/DL (ref 14–18)
LDL CHOLESTEROL CALCULATED: 67 MG/DL
MCH RBC QN AUTO: 22.3 PG (ref 26–33)
MCHC RBC AUTO-ENTMCNC: 31.7 GM/DL (ref 32.2–35.5)
MCV RBC AUTO: 70.3 FL (ref 80–94)
PLATELET # BLD: 217 THOU/MM3 (ref 130–400)
PMV BLD AUTO: 8.5 FL (ref 9.4–12.4)
POTASSIUM SERPL-SCNC: 3.6 MEQ/L (ref 3.5–5.2)
PROLACTIN: 17.8 NG/ML
RBC # BLD: 5.75 MILL/MM3 (ref 4.7–6.1)
SODIUM BLD-SCNC: 139 MEQ/L (ref 135–145)
TRIGL SERPL-MCNC: 72 MG/DL (ref 0–199)
WBC # BLD: 9.3 THOU/MM3 (ref 4.8–10.8)

## 2021-09-11 PROCEDURE — 83036 HEMOGLOBIN GLYCOSYLATED A1C: CPT

## 2021-09-11 PROCEDURE — 84146 ASSAY OF PROLACTIN: CPT

## 2021-09-11 PROCEDURE — 99232 SBSQ HOSP IP/OBS MODERATE 35: CPT | Performed by: INTERNAL MEDICINE

## 2021-09-11 PROCEDURE — 92523 SPEECH SOUND LANG COMPREHEN: CPT

## 2021-09-11 PROCEDURE — 92610 EVALUATE SWALLOWING FUNCTION: CPT

## 2021-09-11 PROCEDURE — 80048 BASIC METABOLIC PNL TOTAL CA: CPT

## 2021-09-11 PROCEDURE — 97161 PT EVAL LOW COMPLEX 20 MIN: CPT

## 2021-09-11 PROCEDURE — 97166 OT EVAL MOD COMPLEX 45 MIN: CPT

## 2021-09-11 PROCEDURE — 2060000000 HC ICU INTERMEDIATE R&B

## 2021-09-11 PROCEDURE — 70450 CT HEAD/BRAIN W/O DYE: CPT

## 2021-09-11 PROCEDURE — 6370000000 HC RX 637 (ALT 250 FOR IP): Performed by: NURSE PRACTITIONER

## 2021-09-11 PROCEDURE — 6370000000 HC RX 637 (ALT 250 FOR IP): Performed by: INTERNAL MEDICINE

## 2021-09-11 PROCEDURE — 97116 GAIT TRAINING THERAPY: CPT

## 2021-09-11 PROCEDURE — 99232 SBSQ HOSP IP/OBS MODERATE 35: CPT | Performed by: SURGERY

## 2021-09-11 PROCEDURE — 85027 COMPLETE CBC AUTOMATED: CPT

## 2021-09-11 PROCEDURE — 36415 COLL VENOUS BLD VENIPUNCTURE: CPT

## 2021-09-11 PROCEDURE — 2580000003 HC RX 258: Performed by: STUDENT IN AN ORGANIZED HEALTH CARE EDUCATION/TRAINING PROGRAM

## 2021-09-11 PROCEDURE — 80061 LIPID PANEL: CPT

## 2021-09-11 PROCEDURE — 6370000000 HC RX 637 (ALT 250 FOR IP): Performed by: SURGERY

## 2021-09-11 PROCEDURE — 6370000000 HC RX 637 (ALT 250 FOR IP): Performed by: STUDENT IN AN ORGANIZED HEALTH CARE EDUCATION/TRAINING PROGRAM

## 2021-09-11 RX ORDER — ASPIRIN 81 MG/1
81 TABLET, CHEWABLE ORAL DAILY
Status: CANCELLED | OUTPATIENT
Start: 2021-09-11

## 2021-09-11 RX ORDER — LISINOPRIL 2.5 MG/1
2.5 TABLET ORAL DAILY
Status: DISCONTINUED | OUTPATIENT
Start: 2021-09-12 | End: 2021-09-13 | Stop reason: HOSPADM

## 2021-09-11 RX ORDER — LISINOPRIL 2.5 MG/1
2.5 TABLET ORAL DAILY
Status: DISCONTINUED | OUTPATIENT
Start: 2021-09-11 | End: 2021-09-11

## 2021-09-11 RX ORDER — CARVEDILOL 6.25 MG/1
6.25 TABLET ORAL 2 TIMES DAILY WITH MEALS
Status: DISCONTINUED | OUTPATIENT
Start: 2021-09-11 | End: 2021-09-13 | Stop reason: HOSPADM

## 2021-09-11 RX ORDER — GABAPENTIN 100 MG/1
100 CAPSULE ORAL 3 TIMES DAILY
Status: DISCONTINUED | OUTPATIENT
Start: 2021-09-11 | End: 2021-09-13 | Stop reason: HOSPADM

## 2021-09-11 RX ORDER — AMIODARONE HYDROCHLORIDE 200 MG/1
200 TABLET ORAL DAILY
Status: DISCONTINUED | OUTPATIENT
Start: 2021-09-11 | End: 2021-09-13 | Stop reason: HOSPADM

## 2021-09-11 RX ORDER — ASPIRIN 81 MG/1
81 TABLET, CHEWABLE ORAL DAILY
Status: DISCONTINUED | OUTPATIENT
Start: 2021-09-11 | End: 2021-09-13 | Stop reason: HOSPADM

## 2021-09-11 RX ADMIN — CARVEDILOL 6.25 MG: 6.25 TABLET, FILM COATED ORAL at 16:29

## 2021-09-11 RX ADMIN — ATORVASTATIN CALCIUM 80 MG: 80 TABLET, FILM COATED ORAL at 20:13

## 2021-09-11 RX ADMIN — SODIUM CHLORIDE, PRESERVATIVE FREE 10 ML: 5 INJECTION INTRAVENOUS at 08:16

## 2021-09-11 RX ADMIN — GABAPENTIN 100 MG: 100 CAPSULE ORAL at 20:13

## 2021-09-11 RX ADMIN — AMIODARONE HYDROCHLORIDE 200 MG: 200 TABLET ORAL at 10:20

## 2021-09-11 RX ADMIN — HYDROCODONE BITARTRATE AND ACETAMINOPHEN 2 TABLET: 5; 325 TABLET ORAL at 19:29

## 2021-09-11 RX ADMIN — ASPIRIN 81 MG: 81 TABLET, CHEWABLE ORAL at 16:45

## 2021-09-11 RX ADMIN — HYDROCODONE BITARTRATE AND ACETAMINOPHEN 2 TABLET: 5; 325 TABLET ORAL at 12:51

## 2021-09-11 RX ADMIN — SODIUM CHLORIDE, PRESERVATIVE FREE 10 ML: 5 INJECTION INTRAVENOUS at 20:13

## 2021-09-11 RX ADMIN — APIXABAN 5 MG: 5 TABLET, FILM COATED ORAL at 21:38

## 2021-09-11 RX ADMIN — PANTOPRAZOLE SODIUM 40 MG: 40 TABLET, DELAYED RELEASE ORAL at 06:06

## 2021-09-11 RX ADMIN — HYDROCODONE BITARTRATE AND ACETAMINOPHEN 2 TABLET: 5; 325 TABLET ORAL at 04:22

## 2021-09-11 RX ADMIN — SODIUM CHLORIDE 100 ML/HR: 9 INJECTION, SOLUTION INTRAVENOUS at 01:30

## 2021-09-11 ASSESSMENT — PAIN DESCRIPTION - PROGRESSION
CLINICAL_PROGRESSION: NOT CHANGED

## 2021-09-11 ASSESSMENT — PAIN DESCRIPTION - LOCATION
LOCATION: HEAD

## 2021-09-11 ASSESSMENT — PAIN DESCRIPTION - ONSET
ONSET: ON-GOING

## 2021-09-11 ASSESSMENT — PAIN SCALES - GENERAL
PAINLEVEL_OUTOF10: 8
PAINLEVEL_OUTOF10: 3
PAINLEVEL_OUTOF10: 9
PAINLEVEL_OUTOF10: 7
PAINLEVEL_OUTOF10: 9
PAINLEVEL_OUTOF10: 0
PAINLEVEL_OUTOF10: 8
PAINLEVEL_OUTOF10: 7
PAINLEVEL_OUTOF10: 7
PAINLEVEL_OUTOF10: 4
PAINLEVEL_OUTOF10: 9
PAINLEVEL_OUTOF10: 8

## 2021-09-11 ASSESSMENT — PAIN - FUNCTIONAL ASSESSMENT
PAIN_FUNCTIONAL_ASSESSMENT: ACTIVITIES ARE NOT PREVENTED
PAIN_FUNCTIONAL_ASSESSMENT: ACTIVITIES ARE NOT PREVENTED
PAIN_FUNCTIONAL_ASSESSMENT: PREVENTS OR INTERFERES SOME ACTIVE ACTIVITIES AND ADLS
PAIN_FUNCTIONAL_ASSESSMENT: ACTIVITIES ARE NOT PREVENTED
PAIN_FUNCTIONAL_ASSESSMENT: ACTIVITIES ARE NOT PREVENTED

## 2021-09-11 ASSESSMENT — PAIN DESCRIPTION - PAIN TYPE
TYPE: ACUTE PAIN

## 2021-09-11 ASSESSMENT — PAIN DESCRIPTION - ORIENTATION
ORIENTATION: LEFT;ANTERIOR
ORIENTATION: LEFT
ORIENTATION: ANTERIOR
ORIENTATION: LEFT
ORIENTATION: LEFT
ORIENTATION: ANTERIOR;LEFT
ORIENTATION: ANTERIOR

## 2021-09-11 ASSESSMENT — PAIN DESCRIPTION - DESCRIPTORS
DESCRIPTORS: SHARP
DESCRIPTORS: THROBBING
DESCRIPTORS: HEADACHE
DESCRIPTORS: THROBBING
DESCRIPTORS: THROBBING;HEADACHE
DESCRIPTORS: THROBBING
DESCRIPTORS: HEADACHE

## 2021-09-11 ASSESSMENT — ENCOUNTER SYMPTOMS
COUGH: 1
SORE THROAT: 1
ABDOMINAL PAIN: 0
RHINORRHEA: 1
NAUSEA: 0
SHORTNESS OF BREATH: 0

## 2021-09-11 ASSESSMENT — PAIN DESCRIPTION - FREQUENCY
FREQUENCY: CONTINUOUS

## 2021-09-11 NOTE — PROGRESS NOTES
CRITICAL CARE PROGRESS NOTE      Patient:  [address]egorz St    Unit/Bed:4D-10/010-A  YOB: 1955  MRN: [id number]   PCP: Antwon Campo MD  Date of Admission: 9/10/2021  Chief Complaint:-Left-sided weakness    Assessment and Plan:    1. Left-sided weakness with Vision Problems: Stroke alert was called. CT head along with CT of the head and neck was negative. TPA was given as it was within window. Neurology following. Echo pending. Hold all anticoagulation for 24 hours post TPA.(given at 12 yesterday)   PT and OT.  currently on a Cardene drip to p maintain pressure less than 185/105. Hemoglobin A1c and Lipid panel normal .  N.p.o. until speech evaluates. Neurochecks every 4 hours. Maintain telemetry. Per chart review patient has history of left-sided weakness with most recent in 7/21. At that time CT and CTA of the head and neck was also negative. Repeat CT of head today  per neurology. High Dose Statin. 2. Pituitary macroadenoma: Diagnosed in 2020. Patient has since had intermittent vision problems. Follows with neurosurgery in [place]. Obtain FSH, LH, and Prolactin Levels in am.    3. CAD status post CABG: We will hold aspirin and Plavix due to patient receiving TPA. Resume after 24 hours post TPA. 4. History of ischemic cardiomyopathy status post defibrillator:  ECHO in 2019 shows EF 45%. No obvious shunting seen at that time. 5. GERD:  Resume protonix    6. Marijuana Use: States this is for his headaches. 7. Benign essential hypertension: Current home meds on hold. Patient is currently on a Cardene drip to keep a blood pressure goal of less than 185/105- cardeene drip currently off. Restart home meds   8. Hyperkalemia: Likely secondary to home lisinopril use. Was given Lasix 20 oral and Kayexalate once. Potassium 3.6 today   9. Migraines: Likely secondary to pituitary macroadenoma. Takes Neurontin at home. 10. Normocytic Anemia:   Close to baseline. Monitor with CBC.  Takes ferrous sulfate at home. 11. Non-complaint with Meds: Per chart review. Will get social work consult   12. R 4.5 cm lower lobe nodule: has hx; patient is a former smoker. Does not follow with pulmonary for this nodule      INITIAL H AND P AND ICU COURSE:  Ching RYAN. [employer] is a 21-year-old male with history of CAD status post CABG in 2011, pituitary macroadenoma ischemic cardiomyopathy with reduced EF and defibrillator placed, hypertension, marijuana use, and hyperlipidemia presents to [facility] room as a stroke alert. Patient stated around 9 AM this morning he was driving and he felt his left foot was weaker. Patient states he was also having some vision problems. Patient stated he also experienced some paresthesias in his left lower extremity and left upper extremity. Patient states he is developed some chest pain also at that time that radiated down his left arm. Came to emergency room where CT of the head and CTA of the head and neck were performed. Scans revealed no acute findings including no hemorrhage or large vessel occlusions. Due to patient having chest pain decision to hold treatment when she TPA was made until after the CTA was completed. Patient was a candidate for TPA as his last well-known was on for 4.5 hours and patient received TPA. Patient was transferred to ICU afterwards for monitoring. per chart review patient has history of left-sided weakness with most recent admission in 7/21 where patient was suspected to have a  acute lacunar infarct. Patient at that time was also having left-sided weakness CT of the head and CT of the head and neck was done and were negative at that time. Neurology at that chose not to MRI. Patient was previously admitted in 11/2019 also due to a strokelike symptoms. Patient at that time received TPA. Sent home on an event monitor which detected some nonsustained SVTs and SVTs.         Past Medical History:  As stated in HPI  Family History: Family History   Problem Relation Age of Onset    High Blood Pressure Mother     Arthritis Mother     Cancer Mother         lung breast    Stroke Mother     Heart Attack Father         Father passed away from myocardial infarction.  Arthritis Father     High Blood Pressure Maternal Aunt     High Cholesterol Maternal Aunt     Stroke Maternal Aunt      Social History:  Daily Marijuana use; Hx of tobacco smoking;     ROS   GENERAL: Positive for left sided weakness, negative for fatigue, no recent change in weight, no fever,  SKN: No lesions or rashes. HEAD: No headaches or recent injury  EYES: intermittent loss in vision bilaterally, no diplopia or blurred vision  EARS: No hearing loss, no tinnitus  NOSE/THROAT: No rhinorrhea or pharyngitis, no nasal drainage  NECK: No lumps or unusual neck stiffness  PULMONARY: No dyspnea, orthopnea or paroxysmal nocturnal dyspnea, stridor wheezing cough  CARDIAC: No chest pain, pressure, heaviness or tightness  GI: No history melena or hematochezia, no diarrhea or constipation  PERIPHERAL VASCULAR: No intermittent claudication or unusual leg cramps  MUSCULOSKELETAL: Occasional arthralgias, myalgias  NEUROLOGICAL:  Alert and oriented x3;no complaints   hEMATOLOGIC:  No anemia, unusual bruising or bleeding  PSYCH: Denies any homicidal or suicidal ideations    Scheduled Meds:   sodium chloride flush  5-40 mL IntraVENous 2 times per day    atorvastatin  80 mg Oral Nightly    furosemide  20 mg Oral Daily    pantoprazole  40 mg Oral QAM AC     Continuous Infusions:   sodium chloride      niCARdipine Stopped (09/11/21 0210)    sodium chloride 100 mL/hr (09/11/21 0130)       PHYSICAL EXAMINATION:  T:  98.6. P: 62. RR:  13 B/P:  131/88. O2 Sat: 62 I/O:  -1102/550 (net 552)  Body mass index is 23.44 kg/m². GCS:   15  General:   Elderly male with no acute distress  HEENT:  normocephalic and atraumatic. No scleral icterus. PERR  Neck: supple. No Thyromegaly.   Lungs: clear to auscultation. No retractions  Cardiac: RRR. No JVD. Abdomen: soft. Nontender. Extremities:  No clubbing, cyanosis, or edema x 4. Vasculature: capillary refill < 3 seconds. Palpable dorsalis pedis pulses. Skin:  warm and dry. Psych:  Alert and oriented x3. Affect appropriate  Lymph:  No supraclavicular adenopathy. Neurologic:  No focal deficit. No seizures. , no weakness, CN grossly intact and     Data: (All radiographs, tracings, PFTs, and imaging are personally viewed and interpreted unless otherwise noted).  Sodium 135, potassium 5.6, chloride 105, CO2 25, BUN 12, creatinine 0.7, anion gap 5, GFR greater than 90, glucose 88, calcium 9.8   Troponin negative   WBC 8.9, hemoglobin 12.1, MCV 70.4, platelets 244   Telemetry shows normal sinus rhythm   Albumin 4.0, alk phos 59, ALT 14, AST 48, bilirubin 1.2    INR 1.06   CT of the head shows no intracranial hemorrhage, stable moderate severity chronic small vessel ischemic changes, and 1.5 cm focal area of mucosal thickening in the anterior aspect of the sphenoid sinus on the right.  CTA of the head and neck shows no significant hemodynamic stenosis in the right and left common and internal carotid arteries. Dominant left and smaller right vertebral artery with antegrade flow bilaterally. Atherosclerotic calcification of the cavernous segments of both internal carotid arteries and in the distal vertebral arteries. Negative CTA of the [place]. Enlarged pituitary gland. Retention cyst or polyp in the right sphenoid sinus. Postoperative changes in the cervical spine. Status post CABG. Pacemaker in place   CT of the chest shows no evidence for aortic dissection or mediastinal hematoma. Cannot get evaluation of pulmonary arteries. Mild cardiomegaly status post CABG and pacemaker placement. Slight thickening of the interstitial lung markings. 4.3 nodule in the right lower lobe.       Seen with multidisciplinary ICU team.  Meets Continued ICU Level Care Criteria:    [] Yes   [x] No - Transfer Planned to listed location:  4A after 24 hrs from TPA adminstration @ noon today pending no changes to CT head   [] HOSPITALIST CONTACTED-              Electronically signed by Daniela Trujillo MD  CRITICAL CARE SPECIALIST
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
## 2021-09-11 NOTE — PROGRESS NOTES
ACCEPTING FOR HOSPITALIST SERVICE      Assessment and Plan:        1. Recurrent left sided weakness- neg CT- s/p TPA. CT today ok. ?hemiplegic migraine? 2. Ischemic cardiomyopathy  3. hbp- slowly restart meds  4. Hyperkalemia- resolved- will cautiously resume ACEI      CC:  Left weakness  HPI: pt with ischemic cm, has had several admissions for left weakness with TPA. Appears to get pounding headaches with it.    ROS (12 point review of systems completed. Pertinent positives noted.  Otherwise ROS is negative) : had chest pain on admission  PMH:  Per HPI  SHX:  Light smoker, lives alone  FHX: stroke, hbp  Allergies: See above    Medications:     sodium chloride        amiodarone  200 mg Oral Daily    carvedilol  6.25 mg Oral BID WC    gabapentin  100 mg Oral TID    [START ON 9/12/2021] lisinopril  2.5 mg Oral Daily    sodium chloride flush  5-40 mL IntraVENous 2 times per day    atorvastatin  80 mg Oral Nightly    pantoprazole  40 mg Oral QAM AC       Vital Signs:   BP (!) 150/94   Pulse 60   Temp 98 °F (36.7 °C)   Resp 11   Ht 5' 9\" (1.753 m)   Wt 158 lb 11.7 oz (72 kg)   SpO2 96%   BMI 23.44 kg/m²      Intake/Output Summary (Last 24 hours) at 9/11/2021 1423  Last data filed at 9/11/2021 1330  Gross per 24 hour   Intake 3037.65 ml   Output 2750 ml   Net 287.65 ml        Physical Examination: General appearance - alert, well appearing, and in no distress  Mental status - alert, oriented to person, place, and time  Neck - supple, no significant adenopathy, no JVD, or carotid bruits  Chest - clear to auscultation, no wheezes, rales or rhonchi, symmetric air entry  Heart - normal rate, regular rhythm, normal S1, S2, no murmurs, rubs, clicks or gallops  Abdomen - soft, nontender, nondistended, no masses or organomegaly  Neurological - mild LLE weakness; no facial assymetry  Musculoskeletal - no joint tenderness, deformity or swelling  Extremities - peripheral pulses normal, no pedal edema, no clubbing or cyanosis  Skin - normal coloration and turgor, no rashes, no suspicious skin lesions noted    Data: (All radiographs, tracings, PFTs, and imaging are personally viewed and interpreted unless otherwise noted).     Reviewed cat      Electronically signed by Claudene Stabs, MD on 9/11/2021 at 2:23 PM
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
## 2021-09-11 NOTE — FLOWSHEET NOTE
09/11/21 1014   Encounter Summary   Services provided to: Patient   Referral/Consult From: Rounding   Continue Visiting Yes  (9/11 NR)   Complexity of Encounter Low   Length of Encounter 15 minutes   Routine   Type Initial   Assessment Sleeping   Intervention Prayer   In my encounter with the 73 yr old patient, I attempted to see the patient, but they were unresponsive at this time. No family was present in the room. I offered a prayer at the pts side. A  will attempt to see the patient at a later time as a follow up. The pt was admitted due to stroke-like-symptoms.

## 2021-09-11 NOTE — PROGRESS NOTES
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 Patient stated around 9 AM this morning he was driving and he felt his left foot was weaker. Patient states he was also having some vision problems. Patient stated he also experienced some paresthesias in his left lower extremity and left upper extremity. Patient states he is developed some chest pain also at that time that radiated down his left arm. Came to emergency room where CT of the head and CTA of the head and neck were performed. Scans revealed no acute findings including no hemorrhage or large vessel occlusions. Due to patient having chest pain decision to hold treatment when she TPA was made until after the CTA was completed. Patient was a candidate for TPA as his last well-known was on for 4.5 hours and patient received TPA. Patient was transferred to ICU afterwards for monitoring. per chart review patient has history of left-sided weakness with most recent admission in  where patient was suspected to have a  acute lacunar infarct. Patient at that time was also having left-sided weakness CT of the head and CT of the head and neck was done and were negative at that time. Neurology at that chose not to MRI. Patient was previously admitted in 2019 also due to a strokelike symptoms. Patient at that time received TPA. Sent home on an event monitor which detected some nonsustained SVTs and SVTs. \"     Restrictions/Precautions:  Restrictions/Precautions: General Precautions    Subjective:  Chart Reviewed: Yes  Patient assessed for rehabilitation services?: Yes  Family / Caregiver Present: No  Subjective: Ok to see pt per nursing. Pt in bed when therapy arrived, agreeable to PT session, nursing wanting pt walking in hallway with portable heart monitor. Pt willing to sit in bedside chair at end of session. General:  Overall Orientation Status: Within Normal Limits  Follows Commands: Within Functional Limits    Vision: Impaired  Vision Exceptions: Wears glasses at all times    Hearing: Within functional limits         Pain: 3/10: headache    Vitals: WNLs during session with monitoring    Social/Functional History:    Lives With: Alone  Type of Home: House  Home Layout: One level  Home Access: Stairs to enter with rails  Entrance Stairs - Number of Steps: 3 MARIANA  Entrance Stairs - Rails: Left  Home Equipment: Cane             ADL Assistance: Independent  Homemaking Assistance: Independent  Homemaking Responsibilities: Yes  Ambulation Assistance: Independent  Transfer Assistance: Independent    Active : Yes  Occupation: Part time employment, Self employed  Additional Comments: Pt is was IND and active prior, completed all tasks himself, used cane for amb, has own company working PT.  Pt reports he does not have family or friends in the area to assist pt    OBJECTIVE:  Range of Motion:  Bilateral Lower Extremity: WNL    Strength:  Bilateral Lower Extremity: [facility]  4/5 grossly  Balance:  Static Standing Balance: Stand By Assistance, X 1, with cues for safety, with verbal cues   Dynamic Standing Balance: Contact Guard Assistance, X 1, with cues for safety, with verbal cues     Bed Mobility:  Supine to Sit: Stand By Assistance, X 1, with head of bed raised, with rail  Scooting: Stand By Assistance, X 1  Good technique noted and safety  Transfers:  Sit to Stand: Stand By Assistance, X 1, cues for hand placement, with verbal cues  Stand to Sit:Stand By Assistance, X 1, cues for hand placement, with verbal cues  RW for support as needed, cues for safety, fair demo noted with no LOB noted with completion, fair overall safety  Ambulation:  Stand By Assistance, Contact Guard Assistance, X 1, with cues for safety, with verbal cues , with increased time for completion  Distance: 300 ft  Surface: Level Tile  Device:Rolling Walker  Gait Deviations: Forward Flexed Posture, Decreased Step Length Bilaterally, Decreased Gait Speed and Decreased Heel Strike Bilaterally  Cues for safety with use of RW, no LOB noted, will trial cane    Functional Outcome Measures: Completed  AM-PAC Inpatient Mobility Raw Score : 19  AM-PAC Inpatient T-Scale Score : 45.44    ASSESSMENT:  Activity Tolerance:  Patient tolerance of  treatment: good. Treatment Initiated: Treatment and education initiated within context of evaluation. Evaluation time included review of current medical information, gathering information related to past medical, social and functional history, completion of standardized testing, formal and informal observation of tasks, assessment of data and development of plan of care and goals. Treatment time included skilled education and facilitation of tasks to increase safety and independence with functional mobility for improved independence and quality of life. Assessment: Body structures, Functions, Activity limitations: Decreased functional mobility , Increased pain, Decreased balance, Decreased strength, Decreased safe awareness, Decreased endurance  Assessment: Pt cont to require skilled PT services to progress with strength, endurance, balance, and mobility to progress towards PLOF and increase IND with functional tasks to return home safely with reduced risk for falls.   Prognosis: Good    REQUIRES PT FOLLOW UP: Yes    Discharge Recommendations:  Discharge Recommendations: Continue to assess pending progress, Home with Home health PT, Patient would benefit from continued therapy after discharge    Patient Education:  PT Education: PT Role, Plan of Care, Transfer Training, Equipment, Functional Mobility Training, General Safety, Gait Training    Equipment Recommendations:  Equipment Needed: No  Other: may need  RW if pt not safe with cane    Plan:  Times per week: 5-6x N  Current Treatment Recommendations: Strengthening, Neuromuscular Re-education, Home Exercise Program, Safety Education & Training, Balance Training, Endurance Training, Patient/Caregiver Education & Training, Transfer Training, Gait Training, Functional Mobility Training, Equipment Evaluation, Education, & procurement, Stair training    Goals:  Patient goals : go home and walk with cane  Short term goals  Time Frame for Short term goals: by discharge  Short term goal 1: Pt will amb with LRAD with S for >400 feet with no LOB with good safety to progress towards PLOF. Short term goal 2: Pt will complete higher level balance tasks with good safety and able self correct LOB to return home safely. Short term goal 3: Pt will complete transfers with IND with LRAD for safety to progress with mobility. Short term goal 4: Pt will complete stairs with rail for support with S to return home safely. Long term goals  Time Frame for Long term goals : NA due to short ELOS    Following session, patient left in safe position with all fall risk precautions in place. Pt left in chair with all needs and call light in reach following session, chair alarm on.

## 2021-09-11 NOTE — PROGRESS NOTES
[id number] ([place] [address] THERAPY  [facility] ICU 4D  EVALUATION    Time:   Time In:   Time Out: 1605  Timed Code Treatment Minutes: 5 Minutes  Minutes: 20          Date: 2021  Patient Name: Sarah Hewitt,   Gender: male      MRN: [id number]  : 1955  (73 y.o.)  Referring Practitioner: Dr. 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 Patient stated around 9 AM this morning he was driving and he felt his left foot was weaker. Patient states he was also having some vision problems. Patient stated he also experienced some paresthesias in his left lower extremity and left upper extremity. Patient states he is developed some chest pain also at that time that radiated down his left arm. Came to emergency room where CT of the head and CTA of the head and neck were performed. Scans revealed no acute findings including no hemorrhage or large vessel occlusions. Due to patient having chest pain decision to hold treatment when the TPA was made until after the CTA was completed. Patient was a candidate for TPA as his last well-known was on for 4.5 hours and patient received TPA. Patient was transferred to ICU afterwards for monitoring. Per chart review patient has history of left-sided weakness with most recent admission in  where patient was suspected to have a  acute lacunar infarct. Patient at that time was also having left-sided weakness CT of the head and CT of the head and neck was done and were negative at that time. Neurology at that chose not to MRI.     Restrictions/Precautions:  Restrictions/Precautions: General Precautions    Subjective  Chart Reviewed: Yes, Orders, History and Physical, Other (comment) (PT hand    ADL:   No ADL's completed this session. Ludia BALANCE:  Sitting Balance:  Stand By Assistance. Doing UE manual muscle testing    BED MOBILITY:  Not Tested    TRANSFERS:  Not demonstrated secondary to pt refusal.         Activity Tolerance:  Patient tolerance of  treatment: fair. Pt C/O fatigue from the activities today. Pt also was having a headache which limited his participation in the evaluation. Assessment:  Assessment: Patient would benefit from continued skilled OT services to address above deficits. He presents with stroke-like symptoms. He has a defibrillator. He had CABG 10 yrs ago. Pt was independent with self care and walked with a cane prior to admission. 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 Pt demonstrated his upper body functioning. He has fairly good coordination and hand strength but his L arm strength and sensation in his LUE are decreased. Pt has decreased vision with recent onset, per pt. He also C/O headaches. Performance deficits / Impairments: Decreased functional mobility , Decreased ADL status, Decreased endurance, Decreased strength, Decreased sensation, Decreased vision/visual deficit  Prognosis: Good  REQUIRES OT FOLLOW UP: Yes  Decision Making: Medium Complexity    Treatment Initiated: Treatment and education initiated within context of evaluation. Evaluation time included review of current medical information, gathering information related to past medical, social and functional history, completion of standardized testing, formal and informal observation of tasks, assessment of data and development of plan of care and goals. Treatment time included skilled education and facilitation of tasks to increase safety and independence with ADL's for improved functional independence and quality of life. Pt did start to read part of a hospital handbook in his room.   Pt was encouraged to read and to be careful how small of a print he is trying to read as well as for how long he is reading. Pt stated that he would avoid reading when it is making any headache worse. Discharge Recommendations:  Continue to assess pending progress, Patient would benefit from continued therapy after discharge, Home with Home health OT    Patient Education:  OT Education: OT Role, Plan of Care  Patient Education: Importance of monitoring his fatigue so that he can take breaks and stay safe while doing activities    Equipment Recommendations:  Equipment Needed: Yes  Other: Grabbars in shower; shower seat    Plan:  Times per week: 6x  Current Treatment Recommendations: Endurance Training, Functional Mobility Training, Strengthening, Self-Care / ADL, Safety Education & Training, Pain Management  Plan Comment: Pt would benefit from continued skilled OT services when medically stable and discharged from Acute. HHOT recommended. Specific instructions for Next Treatment: Functional mobility as able; ADLs and safety awareness; UE strengthening exercises. See long-term goal time frame for expected duration of plan of care. If no long-term goals established, a short length of stay is anticipated. Goals:  Patient goals : \"I want to return home and be able to remain independent. \" pt states. Short term goals  Time Frame for Short term goals: By discharge  Short term goal 1: Pt will demonstrate functional mobility with OTR to prepare for doing self care at the sink. Short term goal 2: Pt will complete BUE ROM/moderate resistance exercises while following any handout needed to increase his strength and endurance for ease of doing ADLs and functional mobility. Short term goal 3: Pt will complete a spongebath or dressing in shorts and a top with setup A while using any adaptations needed and verbal cues for safety to increase his independence with self care. Following session, patient left in safe position with all fall risk precautions in place.

## 2021-09-12 LAB
ANION GAP SERPL CALCULATED.3IONS-SCNC: 10 MEQ/L (ref 8–16)
BUN BLDV-MCNC: 16 MG/DL (ref 7–22)
CALCIUM SERPL-MCNC: 8.2 MG/DL (ref 8.5–10.5)
CHLORIDE BLD-SCNC: 105 MEQ/L (ref 98–111)
CO2: 24 MEQ/L (ref 23–33)
CREAT SERPL-MCNC: 0.9 MG/DL (ref 0.4–1.2)
GFR SERPL CREATININE-BSD FRML MDRD: > 90 ML/MIN/1.73M2
GLUCOSE BLD-MCNC: 88 MG/DL (ref 70–108)
MAGNESIUM: 2.1 MG/DL (ref 1.6–2.4)
MRSA SCREEN: NORMAL
PHOSPHORUS: 2.7 MG/DL (ref 2.4–4.7)
POTASSIUM SERPL-SCNC: 3.6 MEQ/L (ref 3.5–5.2)
SODIUM BLD-SCNC: 139 MEQ/L (ref 135–145)

## 2021-09-12 PROCEDURE — 2580000003 HC RX 258: Performed by: PHYSICIAN ASSISTANT

## 2021-09-12 PROCEDURE — 80048 BASIC METABOLIC PNL TOTAL CA: CPT

## 2021-09-12 PROCEDURE — 2060000000 HC ICU INTERMEDIATE R&B

## 2021-09-12 PROCEDURE — 6370000000 HC RX 637 (ALT 250 FOR IP): Performed by: STUDENT IN AN ORGANIZED HEALTH CARE EDUCATION/TRAINING PROGRAM

## 2021-09-12 PROCEDURE — 36415 COLL VENOUS BLD VENIPUNCTURE: CPT

## 2021-09-12 PROCEDURE — 6370000000 HC RX 637 (ALT 250 FOR IP): Performed by: SURGERY

## 2021-09-12 PROCEDURE — 99232 SBSQ HOSP IP/OBS MODERATE 35: CPT | Performed by: INTERNAL MEDICINE

## 2021-09-12 PROCEDURE — 99233 SBSQ HOSP IP/OBS HIGH 50: CPT | Performed by: NURSE PRACTITIONER

## 2021-09-12 PROCEDURE — 83735 ASSAY OF MAGNESIUM: CPT

## 2021-09-12 PROCEDURE — 6370000000 HC RX 637 (ALT 250 FOR IP): Performed by: INTERNAL MEDICINE

## 2021-09-12 PROCEDURE — 6370000000 HC RX 637 (ALT 250 FOR IP): Performed by: NURSE PRACTITIONER

## 2021-09-12 PROCEDURE — 84100 ASSAY OF PHOSPHORUS: CPT

## 2021-09-12 PROCEDURE — 2580000003 HC RX 258: Performed by: STUDENT IN AN ORGANIZED HEALTH CARE EDUCATION/TRAINING PROGRAM

## 2021-09-12 RX ADMIN — SODIUM CHLORIDE, PRESERVATIVE FREE 10 ML: 5 INJECTION INTRAVENOUS at 08:17

## 2021-09-12 RX ADMIN — PANTOPRAZOLE SODIUM 40 MG: 40 TABLET, DELAYED RELEASE ORAL at 08:17

## 2021-09-12 RX ADMIN — GABAPENTIN 100 MG: 100 CAPSULE ORAL at 21:18

## 2021-09-12 RX ADMIN — APIXABAN 5 MG: 5 TABLET, FILM COATED ORAL at 08:16

## 2021-09-12 RX ADMIN — AMIODARONE HYDROCHLORIDE 200 MG: 200 TABLET ORAL at 08:17

## 2021-09-12 RX ADMIN — APIXABAN 5 MG: 5 TABLET, FILM COATED ORAL at 21:18

## 2021-09-12 RX ADMIN — HYDROCODONE BITARTRATE AND ACETAMINOPHEN 2 TABLET: 5; 325 TABLET ORAL at 15:17

## 2021-09-12 RX ADMIN — ASPIRIN 81 MG: 81 TABLET, CHEWABLE ORAL at 08:16

## 2021-09-12 RX ADMIN — HYDROCODONE BITARTRATE AND ACETAMINOPHEN 2 TABLET: 5; 325 TABLET ORAL at 08:17

## 2021-09-12 RX ADMIN — CARVEDILOL 6.25 MG: 6.25 TABLET, FILM COATED ORAL at 17:26

## 2021-09-12 RX ADMIN — LISINOPRIL 2.5 MG: 2.5 TABLET ORAL at 08:16

## 2021-09-12 RX ADMIN — ATORVASTATIN CALCIUM 80 MG: 80 TABLET, FILM COATED ORAL at 21:18

## 2021-09-12 RX ADMIN — HYDROCODONE BITARTRATE AND ACETAMINOPHEN 2 TABLET: 5; 325 TABLET ORAL at 21:18

## 2021-09-12 RX ADMIN — SODIUM CHLORIDE, PRESERVATIVE FREE 10 ML: 5 INJECTION INTRAVENOUS at 21:18

## 2021-09-12 RX ADMIN — CARVEDILOL 6.25 MG: 6.25 TABLET, FILM COATED ORAL at 08:16

## 2021-09-12 RX ADMIN — GABAPENTIN 100 MG: 100 CAPSULE ORAL at 15:17

## 2021-09-12 RX ADMIN — GABAPENTIN 100 MG: 100 CAPSULE ORAL at 08:16

## 2021-09-12 RX ADMIN — HYDROCODONE BITARTRATE AND ACETAMINOPHEN 2 TABLET: 5; 325 TABLET ORAL at 01:25

## 2021-09-12 ASSESSMENT — PAIN DESCRIPTION - DESCRIPTORS
DESCRIPTORS: HEADACHE

## 2021-09-12 ASSESSMENT — ENCOUNTER SYMPTOMS
COUGH: 1
RHINORRHEA: 0
SORE THROAT: 0
SHORTNESS OF BREATH: 0
ABDOMINAL PAIN: 0
NAUSEA: 0

## 2021-09-12 ASSESSMENT — PAIN DESCRIPTION - PAIN TYPE
TYPE: CHRONIC PAIN

## 2021-09-12 ASSESSMENT — PAIN SCALES - GENERAL
PAINLEVEL_OUTOF10: 9
PAINLEVEL_OUTOF10: 9
PAINLEVEL_OUTOF10: 6
PAINLEVEL_OUTOF10: 8
PAINLEVEL_OUTOF10: 10
PAINLEVEL_OUTOF10: 8
PAINLEVEL_OUTOF10: 7

## 2021-09-12 ASSESSMENT — PAIN DESCRIPTION - PROGRESSION
CLINICAL_PROGRESSION: NOT CHANGED

## 2021-09-12 ASSESSMENT — PAIN DESCRIPTION - LOCATION
LOCATION: HEAD

## 2021-09-12 ASSESSMENT — PAIN - FUNCTIONAL ASSESSMENT
PAIN_FUNCTIONAL_ASSESSMENT: ACTIVITIES ARE NOT PREVENTED
PAIN_FUNCTIONAL_ASSESSMENT: ACTIVITIES ARE NOT PREVENTED

## 2021-09-12 ASSESSMENT — PAIN DESCRIPTION - ORIENTATION
ORIENTATION: LEFT
ORIENTATION: LEFT

## 2021-09-12 ASSESSMENT — PAIN DESCRIPTION - ONSET
ONSET: ON-GOING
ONSET: ON-GOING

## 2021-09-12 ASSESSMENT — PAIN DESCRIPTION - FREQUENCY
FREQUENCY: CONTINUOUS
FREQUENCY: CONTINUOUS
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
## 2021-09-13 VITALS
RESPIRATION RATE: 18 BRPM | HEART RATE: 68 BPM | DIASTOLIC BLOOD PRESSURE: 89 MMHG | WEIGHT: 158.73 LBS | SYSTOLIC BLOOD PRESSURE: 149 MMHG | OXYGEN SATURATION: 96 % | TEMPERATURE: 98 F | HEIGHT: 69 IN | BODY MASS INDEX: 23.51 KG/M2

## 2021-09-13 LAB
ANION GAP SERPL CALCULATED.3IONS-SCNC: 8 MEQ/L (ref 8–16)
BASOPHILS # BLD: 0.4 %
BASOPHILS ABSOLUTE: 0 THOU/MM3 (ref 0–0.1)
BUN BLDV-MCNC: 17 MG/DL (ref 7–22)
CALCIUM SERPL-MCNC: 8.4 MG/DL (ref 8.5–10.5)
CHLORIDE BLD-SCNC: 105 MEQ/L (ref 98–111)
CO2: 25 MEQ/L (ref 23–33)
CREAT SERPL-MCNC: 0.9 MG/DL (ref 0.4–1.2)
EOSINOPHIL # BLD: 1.1 %
EOSINOPHILS ABSOLUTE: 0.1 THOU/MM3 (ref 0–0.4)
ERYTHROCYTE [DISTWIDTH] IN BLOOD BY AUTOMATED COUNT: 15.9 % (ref 11.5–14.5)
ERYTHROCYTE [DISTWIDTH] IN BLOOD BY AUTOMATED COUNT: 40.3 FL (ref 35–45)
GFR SERPL CREATININE-BSD FRML MDRD: > 90 ML/MIN/1.73M2
GLUCOSE BLD-MCNC: 87 MG/DL (ref 70–108)
HCT VFR BLD CALC: 36.7 % (ref 42–52)
HEMOGLOBIN: 11.6 GM/DL (ref 14–18)
IMMATURE GRANS (ABS): 0.04 THOU/MM3 (ref 0–0.07)
IMMATURE GRANULOCYTES: 0.6 %
LYMPHOCYTES # BLD: 19.2 %
LYMPHOCYTES ABSOLUTE: 1.4 THOU/MM3 (ref 1–4.8)
MCH RBC QN AUTO: 22.4 PG (ref 26–33)
MCHC RBC AUTO-ENTMCNC: 31.6 GM/DL (ref 32.2–35.5)
MCV RBC AUTO: 70.8 FL (ref 80–94)
MONOCYTES # BLD: 8 %
MONOCYTES ABSOLUTE: 0.6 THOU/MM3 (ref 0.4–1.3)
NUCLEATED RED BLOOD CELLS: 0 /100 WBC
PLATELET # BLD: 204 THOU/MM3 (ref 130–400)
PMV BLD AUTO: 9.1 FL (ref 9.4–12.4)
POTASSIUM SERPL-SCNC: 3.7 MEQ/L (ref 3.5–5.2)
RBC # BLD: 5.18 MILL/MM3 (ref 4.7–6.1)
SEG NEUTROPHILS: 70.7 %
SEGMENTED NEUTROPHILS ABSOLUTE COUNT: 5 THOU/MM3 (ref 1.8–7.7)
SODIUM BLD-SCNC: 138 MEQ/L (ref 135–145)
WBC # BLD: 7.1 THOU/MM3 (ref 4.8–10.8)

## 2021-09-13 PROCEDURE — 2580000003 HC RX 258: Performed by: STUDENT IN AN ORGANIZED HEALTH CARE EDUCATION/TRAINING PROGRAM

## 2021-09-13 PROCEDURE — 6370000000 HC RX 637 (ALT 250 FOR IP): Performed by: STUDENT IN AN ORGANIZED HEALTH CARE EDUCATION/TRAINING PROGRAM

## 2021-09-13 PROCEDURE — 99222 1ST HOSP IP/OBS MODERATE 55: CPT | Performed by: NEUROLOGICAL SURGERY

## 2021-09-13 PROCEDURE — 97535 SELF CARE MNGMENT TRAINING: CPT

## 2021-09-13 PROCEDURE — 6370000000 HC RX 637 (ALT 250 FOR IP): Performed by: SURGERY

## 2021-09-13 PROCEDURE — 97110 THERAPEUTIC EXERCISES: CPT

## 2021-09-13 PROCEDURE — 85025 COMPLETE CBC W/AUTO DIFF WBC: CPT

## 2021-09-13 PROCEDURE — 80048 BASIC METABOLIC PNL TOTAL CA: CPT

## 2021-09-13 PROCEDURE — 6370000000 HC RX 637 (ALT 250 FOR IP): Performed by: INTERNAL MEDICINE

## 2021-09-13 PROCEDURE — 99239 HOSP IP/OBS DSCHRG MGMT >30: CPT | Performed by: HOSPITALIST

## 2021-09-13 PROCEDURE — 6370000000 HC RX 637 (ALT 250 FOR IP): Performed by: NURSE PRACTITIONER

## 2021-09-13 PROCEDURE — 97116 GAIT TRAINING THERAPY: CPT

## 2021-09-13 PROCEDURE — 36415 COLL VENOUS BLD VENIPUNCTURE: CPT

## 2021-09-13 RX ORDER — ASPIRIN 81 MG/1
81 TABLET, CHEWABLE ORAL DAILY
Qty: 30 TABLET | Refills: 3 | Status: SHIPPED | OUTPATIENT
Start: 2021-09-14

## 2021-09-13 RX ORDER — LISINOPRIL 10 MG/1
10 TABLET ORAL DAILY
Qty: 30 TABLET | Refills: 3
Start: 2021-09-13

## 2021-09-13 RX ORDER — HYDROCODONE BITARTRATE AND ACETAMINOPHEN 5; 325 MG/1; MG/1
1 TABLET ORAL EVERY 6 HOURS PRN
Qty: 6 TABLET | Refills: 0 | Status: SHIPPED | OUTPATIENT
Start: 2021-09-13 | End: 2021-09-18

## 2021-09-13 RX ADMIN — ASPIRIN 81 MG: 81 TABLET, CHEWABLE ORAL at 08:39

## 2021-09-13 RX ADMIN — HYDROCODONE BITARTRATE AND ACETAMINOPHEN 2 TABLET: 5; 325 TABLET ORAL at 11:40

## 2021-09-13 RX ADMIN — SODIUM CHLORIDE, PRESERVATIVE FREE 10 ML: 5 INJECTION INTRAVENOUS at 08:40

## 2021-09-13 RX ADMIN — AMIODARONE HYDROCHLORIDE 200 MG: 200 TABLET ORAL at 08:39

## 2021-09-13 RX ADMIN — CARVEDILOL 6.25 MG: 6.25 TABLET, FILM COATED ORAL at 08:39

## 2021-09-13 RX ADMIN — PANTOPRAZOLE SODIUM 40 MG: 40 TABLET, DELAYED RELEASE ORAL at 05:48

## 2021-09-13 RX ADMIN — LISINOPRIL 2.5 MG: 2.5 TABLET ORAL at 08:39

## 2021-09-13 RX ADMIN — GABAPENTIN 100 MG: 100 CAPSULE ORAL at 08:39

## 2021-09-13 RX ADMIN — HYDROCODONE BITARTRATE AND ACETAMINOPHEN 2 TABLET: 5; 325 TABLET ORAL at 03:20

## 2021-09-13 RX ADMIN — APIXABAN 5 MG: 5 TABLET, FILM COATED ORAL at 08:39

## 2021-09-13 ASSESSMENT — PAIN DESCRIPTION - PAIN TYPE
TYPE: CHRONIC PAIN

## 2021-09-13 ASSESSMENT — ENCOUNTER SYMPTOMS
ABDOMINAL PAIN: 0
CHEST TIGHTNESS: 0
BACK PAIN: 0
TROUBLE SWALLOWING: 0

## 2021-09-13 ASSESSMENT — PAIN DESCRIPTION - PROGRESSION
CLINICAL_PROGRESSION: NOT CHANGED

## 2021-09-13 ASSESSMENT — PAIN DESCRIPTION - LOCATION
LOCATION: HEAD
LOCATION: HAND
LOCATION: HEAD

## 2021-09-13 ASSESSMENT — PAIN - FUNCTIONAL ASSESSMENT
PAIN_FUNCTIONAL_ASSESSMENT: PREVENTS OR INTERFERES SOME ACTIVE ACTIVITIES AND ADLS
PAIN_FUNCTIONAL_ASSESSMENT: PREVENTS OR INTERFERES SOME ACTIVE ACTIVITIES AND ADLS
PAIN_FUNCTIONAL_ASSESSMENT: ACTIVITIES ARE NOT PREVENTED

## 2021-09-13 ASSESSMENT — PAIN DESCRIPTION - DIRECTION
RADIATING_TOWARDS: L EYE
RADIATING_TOWARDS: L EYE

## 2021-09-13 ASSESSMENT — PAIN DESCRIPTION - DESCRIPTORS
DESCRIPTORS: HEADACHE

## 2021-09-13 ASSESSMENT — PAIN DESCRIPTION - ORIENTATION: ORIENTATION: LEFT

## 2021-09-13 ASSESSMENT — PAIN DESCRIPTION - ONSET
ONSET: ON-GOING

## 2021-09-13 ASSESSMENT — PAIN DESCRIPTION - FREQUENCY
FREQUENCY: CONTINUOUS

## 2021-09-13 ASSESSMENT — PAIN SCALES - GENERAL
PAINLEVEL_OUTOF10: 8
PAINLEVEL_OUTOF10: 4

## 2021-09-13 NOTE — PROGRESS NOTES
Patient/family has been educated on their personal risk factors of:   Hypertension  Previous stroke  previous TIA  Hyperlipidemia  smoking cessation  Carotid Artery disease      They have been given hand outs on the following medications:  asa  statins(lipitor)  Antihypertensive(amiodorone, coreg, lisinopril)    Treatment for stroke includes:  Risk factor modifications  Following the medication regime prescribed by physician      Educated on BEFAST-Balance-Eyesight-Face-Arm-Speech-Time    A stroke is a brain attack. Stroke is a brain injury. It occurs when the brain's blood supply is interrupted. Blood carries oxygen and nutrients to the brain. Without oxygen and nutrients from blood, brain tissue starts to die rapidly. This can happen in less than 10 minutes. A stroke occurs when blood flow to the brain is blocked (called ischemic stroke). This is caused by one of the following:   Sudden decreased blood flow   Damage to a blood vessel supplying blood to the brain can occur suddenly from either:   Injury   A clot that forms and breaks off from another part of the body (such as the heart or neck)   There are certain conditions which predispose people to form blood clots, such as:   Cancer   Pregnancy   Atrial fibrillation   Certain autoimmune diseases   Local blood clot   A build-up of fatty substances ( atherosclerotic plaque ) along the inner lining of the artery causes:   Narrowing of artery   Reduced elasticity   Local inflammation   Blood protein defects leading to increased clotting tendency   Decreased blood flow in the artery   Clot in an artery supplying the brain   Inflammatory conditions in the blood vessels (vasculitis)   A stroke may also occur if a blood vessel breaks and bleeds into or around the brain. This is called hemorrhagic stroke. This condition needs to be monitored closely. Be sure to keep all appointments. Have exams and blood tests done as directed.      Call 911 If Any of the Following Occurs   It is important that you and those around you know the warning signs for stroke. CALL 911 immediately if you have any of the following which may suggest a new stroke:   Sudden weakness or numbness of face, arm, or leg, especially on one side of the body   Sudden confusion   Sudden trouble speaking or understanding   Sudden trouble seeing in one or both eyes   Sudden dizziness, trouble walking, loss of balance, or coordination   Sudden severe headache with no known cause   If you think you have an emergency, CALL 911       To help reduce your risk of stroke, take the following steps:   Eat a well-balanced diet. The DASH diet rich in fruits, vegetables and low-fat dairy foods, and low in saturated fat, total fat, and cholesterolmay help keep your blood pressure in the healthy range. Exercise regularly. Maintain a healthy weight. (Your body mass index should be below 25.)   If you smoke, quit . Drink alcohol in moderation. Moderate is two or fewer drinks per day for men and one or fewer drinks per day for women and older adults. Control your diabetes    All patient/family questions were answered and teach back method was utilized.

## 2021-09-13 NOTE — CARE COORDINATION
9/13/21, 3:19 PM EDT    Patient goals/plan/ treatment preferences discussed by  and . Patient goals/plan/ treatment preferences reviewed with patient/ family. Patient/ family verbalize understanding of discharge plan and are in agreement with goal/plan/treatment preferences. Understanding was demonstrated using the teach back method. AVS provided by RN at time of discharge, which includes all necessary medical information pertaining to the patients current course of illness, treatment, post-discharge goals of care, and treatment preferences. IMM Letter  IMM Letter given to Patient/Family/Significant other/Guardian/POA/by[de-identified]   IMM Letter date given[de-identified] 09/13/21  IMM Letter time given[de-identified] 3612     Pt to be discharged to home. He will need to set up appt with his PCP in [facility] as we are unable to schedule for an out of state resident. Handy Evans RN updated. . she will instruct pt importance of following up with his PCP in [place]. He denies any needs or services at this time.

## 2021-09-13 NOTE — PROGRESS NOTES
upon OT arrival; pleasant and agreeable to OT tx. Reports L UE is moving better and notices no deficits. PAIN: 0/10:     Vitals: Vitals not assessed per clinical judgement, see nursing flowsheet    COGNITION: WFL    ADL:   Grooming: Supervision. standing at sink to complete oral care and apply deodorant with no LOB or c/o fatigue or SOB  Bathing: Stand By Assistance. while standing at sink to complete UB/LB bathing and assist for back with 2 w/w close by for support as needed. demo no LOB during dyn standing task  Upper Extremity Dressing: Minimal Assistance. to change hospital gown  Lower Extremity Dressing: Independent. to demo doffing/donning slipper socks seated in recliner. Anthony Hernandez BALANCE:  Sitting Balance:  Independent. no LOB during dyn sitting task  Standing Balance: Supervision. with two hand release and no LOB during prolonged standing at sink to complete ADL tasks    BED MOBILITY:  Not Tested    TRANSFERS:  Sit to Stand:  Modified Independent. with good hand placement and ease with sit to stand    FUNCTIONAL MOBILITY:  Assistive Device: Rolling Walker  Assist Level:  Supervision. Distance: To and from bathroom  Patient abandoned 2 w/w prior to getting fully to recliner to sit with no LOB. Reports he uses a standard cane at home. ADDITIONAL ACTIVITIES:  Completed 1 set x 15 reps of UB exer with orange theraband with rest breaks and no difficulty gripping theraband with L UE t/o therex to increase UB strength for functional transfers and self care routine. ASSESSMENT:  Assessment: Patient demonstrates increased activity tolerance demonstrating no SOB during prolonged standing at sink to complete bathing and grooming tasks with 2 w/w available for support as needed.  patient reports his L UE is functioning better and demonstrated no asymmetry b/t R and L UE during functional tasks and did not demonstrate any neglect as well t/o ADL tasks using equally; especially during (B) UE exercises while following any handout needed to increase his strength and endurance for ease of doing ADLs and functional mobility. Short term goal 3: Pt will complete a spongebath or dressing in shorts and a top with setup A while using any adaptations needed and verbal cues for safety to increase his independence with self care. Following session, patient left in safe position with all fall risk precautions in place.
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
## 2021-09-13 NOTE — PROGRESS NOTES
EKG called this RN and advised interrogation report was never received. Dr. Yair Champion made aware and he advised to have it interrogated before patient leaves.  Tech/rep at bedside now

## 2021-09-13 NOTE — PROGRESS NOTES
Patient is in process of moving and says he will no longer have a PCP in his new area. Patient stated he would like to follow with [facility]'s network if possible. CM/RUTH made aware.

## 2021-09-13 NOTE — PROGRESS NOTES
Evaluated patient cost of Eliquis 5mg BID for Tala Fischer RPh. The patient has no copay and the medication is covered 100% by insurance. -[facility] (ext. [phone] 9/13/2021,12:20 PM
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
## 2021-09-13 NOTE — DISCHARGE SUMMARY
Hospital Medicine Discharge Summary      Patient Identification:   Yoselin Farias   : [id number]  MRN: [id number]   Account: [de-identified]      Patient's PCP: Jair Rodriguez MD    Admit Date: 9/10/2021     Discharge Date:   2021    Admitting Physician: Yaron Li MD     Discharge Physician: Luz Tobin MD     Discharge Diagnoses:    1. Left-sided Weakness/Paresthesias: Unclear etiology. CT head on arrival - no evidence of acute infarction or hemorrhage. CTA H/N - No evidence of significant hemodynamic stenosis. Received TPA. Repeat CT head with no change from earlier study. Unfortunately, patient's AICD not MRI compatible. Work-up thus far negative for cause. Interventional neurology believes stroke is likely embolic and therefore recommend initiating Eliquis along with aspirin and discontinuing Plavix.      2. CAD s/p CABG x3 (): No chest pain. Troponin negative. EKG reviewed. ASA, Lipitor, Imdur resumed. Plavix discontinued and Eliquis initiated to avoid triple therapy.     3. S/p Pacer/AICD: Placed in 2018 d/t hx of NSVT and syncope.     4. Chronic Systolic CHF: Echo  with EF at 40-45%. Moderate global hypokinesis of the left ventricle.      5. Hx of NSVT: On Amiodarone.      6. Primary Hypertension:  Uncontrolled. On Coreg and lisinopril.     7. Hyperlipidemia: On Lipitor.     8. GERD: On PPI.     9. Pituitary Macroadenoma: Noted. Seen by Dr. Jeanne White. Patient advised to follow-up with neurosurgery outpatient clinic in 3-4 weeks from discharge. The patient was seen and examined on day of discharge and this discharge summary is in conjunction with any daily progress note from day of discharge. Hospital Course:   Maicol Petit. Go Jara is a 79-year-old male with history of CAD status post CABG in , pituitary macroadenoma ischemic cardiomyopathy with reduced EF and defibrillator placed, hypertension, marijuana use, and hyperlipidemia presents to [facility] room as a stroke alert. color, texture, turgor normal.  No rashes or lesions. Neurologic:  Left pronator drift. Muscle strength 4/5 in the LUE/LLE and 5/5 in the RUE/RLE. Psychiatric: Alert and oriented, thought content appropriate, normal insight  Capillary Refill: Brisk,< 3 seconds   Peripheral Pulses: +2 palpable, equal bilaterally       Labs: For convenience and continuity at follow-up the following most recent labs are provided:      CBC:    Lab Results   Component Value Date    WBC 7.1 09/13/2021    HGB 11.6 09/13/2021    HCT 36.7 09/13/2021     09/13/2021       Renal:    Lab Results   Component Value Date     09/13/2021    K 3.7 09/13/2021    K 5.6 09/10/2021     09/13/2021    CO2 25 09/13/2021    BUN 17 09/13/2021    CREATININE 0.9 09/13/2021    CALCIUM 8.4 09/13/2021    PHOS 2.7 09/12/2021         Significant Diagnostic Studies    Radiology:   CT HEAD WO CONTRAST   Final Result   No acute intracranial process. No change from earlier study. **This report has been created using voice recognition software. It may contain minor errors which are inherent in voice recognition technology. **      Final report electronically signed by Dr. Palak Gonzalez on 9/11/2021 1:41 PM      CTA Chest W WO Contrast   Final Result      1. No evidence for aortic dissection or mediastinal hematoma. 2. Inadequate evaluation of the pulmonary arteries. 3. Mild cardiomegaly status post coronary bypass surgery and pacemaker placement. 4. Slight thickening of the interstitial lung markings. 4.3 mm nodule in the right lower lobe. 5. Otherwise negative CT scan of the chest.         **This report has been created using voice recognition software. It may contain minor errors which are inherent in voice recognition technology. **      Final report electronically signed by DR Miky Lawler on 9/10/2021 12:36 PM      CTA [address]   Final Result       1.  No significant hemodynamic stenosis in the right and left common and internal carotid arteries. 2. Dominant left and smaller right vertebral artery with antegrade flow bilaterally. 3. Atherosclerotic calcification in the cavernous segments of both internal carotid arteries and in the distal vertebral arteries. No evidence of severe stenosis. 4. Otherwise negative CTA of the [place]. 5. Enlarged pituitary gland. MRI scan of the brain and pituitary gland without intravenous contrast medium would be helpful for better position. 6. Retention cyst or polyp in the right sphenoid sinus. 7. Postoperative changes in the cervical spine. 8. Status post coronary bypass surgery. Pacemaker in place. **This report has been created using voice recognition software. It may contain minor errors which are inherent in voice recognition technology. **      Final report electronically signed by DR Alva Sands on 9/10/2021 12:48 PM      CTA HEAD W WO CONTRAST   Final Result       1. No significant hemodynamic stenosis in the right and left common and internal carotid arteries. 2. Dominant left and smaller right vertebral artery with antegrade flow bilaterally. 3. Atherosclerotic calcification in the cavernous segments of both internal carotid arteries and in the distal vertebral arteries. No evidence of severe stenosis. 4. Otherwise negative CTA of the [place]. 5. Enlarged pituitary gland. MRI scan of the brain and pituitary gland without intravenous contrast medium would be helpful for better position. 6. Retention cyst or polyp in the right sphenoid sinus. 7. Postoperative changes in the cervical spine. 8. Status post coronary bypass surgery. Pacemaker in place. **This report has been created using voice recognition software. It may contain minor errors which are inherent in voice recognition technology. **      Final report electronically signed by DR Alva Sands on 9/10/2021 12:48 PM      CT Head WO Contrast   Final Result daily (with meals)             gabapentin (NEURONTIN) 100 MG capsule  Take 1 capsule by mouth 3 times daily             isosorbide mononitrate (IMDUR) 60 MG extended release tablet  Take 60 mg by mouth 2 times daily             lisinopril (PRINIVIL;ZESTRIL) 10 MG tablet  Take 1 tablet by mouth 2 times daily             Multiple Vitamins-Minerals (THERAPEUTIC MULTIVITAMIN-MINERALS) tablet  Take 1 tablet by mouth daily             nitroGLYCERIN (NITROSTAT) 0.4 MG SL tablet  Place 0.4 mg under the tongue every 5 minutes as needed. pantoprazole (PROTONIX) 40 MG tablet  Take 1 tablet by mouth every morning (before breakfast)             ranolazine (RANEXA) 500 MG extended release tablet  Take 1 tablet by mouth 2 times daily             vitamin B-12 1000 MCG tablet  Take 1 tablet by mouth daily                 Time Spent on discharge is more than 30 minutes in the examination, evaluation, counseling and review of medications and discharge plan. Signed: Thank you Sophia Neal MD for the opportunity to be involved in this patient's care.     Electronically signed by Iona Weiss MD on 9/13/2021 at 1:21 PM

## 2021-09-13 NOTE — PROGRESS NOTES
Discharge instructions reviewed with patient. All questions answered. Patient verbalized understanding. IV x2 removed per policy with tip intact. Meds to beds delivered meds. All personal belongings gathered. Copies of AVS give. Patient repeated information back to this RN. He is fully aware he is to find a PCP immediately for follow-up care. Patient leaves in stable condition.

## 2021-09-13 NOTE — PROGRESS NOTES
CLINICAL PHARMACY: DISCHARGE MED RECONCILIATION/REVIEW    [facility]) Select Patient?: No  Total # of Interventions Recommended: 1 -   - Decreased Dose #: 1     Total # Interventions Accepted: 1  Intervention Severity:   - Level 1 Intervention Present?: No   - Level 2 #: 0   - Level 3 #: 1   Time Spent (min):  20    Additional Documentation:    Discharge medication reconciliation reviewed and complete.     Javier DorantesD, BCPS  9/13/2021  1:41 PM

## 2021-09-14 NOTE — PROGRESS NOTES
CLINICAL PHARMACY NOTE: MEDS TO BEDS    Total # of Prescriptions Filled: 2   The following medications were delivered to the patient:  Eliquis 5mg  ASA 81mg    Additional Documentation:
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
## 2021-09-17 NOTE — ADT AUTH CERT
Neurology [address] - [facility] Day 3 (9/12/2021) by Veronica Hernandez RN       Review Status Review Entered   Completed 9/16/2021 13:05      Criteria Review      Care Day: 3 Care Date: 9/12/2021 Level of Care: ICU    Guideline Day 2    Level Of Care    ( ) Floor    9/16/2021 1:04 PM EDT by Roshni Ch      ICU    Clinical Status    ( ) * No ICU or intermediate care needs    9/16/2021 1:04 PM EDT by Huang Man monitoring    Interventions    (X) Inpatient interventions continue    9/16/2021 1:04 PM EDT by Roshni Ch      monitoring    * Milestone   Additional Notes   9/12/21       VS- temp 98.5, HR 61, RR 18, /94, O2 97%          Labs- Ca 8.2      Internal medicine-   Assessment/Plan:       Left-sided Weakness/Paresthesias: Unclear etiology / Concern for ischemic stroke. CT head on arrival - no evidence of acute infarction or hemorrhage. CTA H/N - No evidence of significant hemodynamic stenosis. Received TPA.  Repeat CT head with no change from earlier study.  Unfortunately, patient's AICD not MRI compatible. Work-up thus far negative for cause.  Interventional neurology believes stroke is likely embolic and therefore recommend initiating Eliquis along with aspirin and discontinuing Plavix.       CAD s/p CABG x3 (2011): No chest pain. Troponin negative. EKG reviewed. ASA, Lipitor, Imdur resumed. Plavix discontinued and Eliquis initiated to avoid triple therapy.       S/p Pacer/AICD: Placed in 2018 d/t hx of NSVT and syncope. Pacer interrogation.       Chronic Systolic CHF: Echo 18/82/42 with EF at 40-45%. Moderate global hypokinesis of the left ventricle. Patient is euvolemic. Monitor intake and output. Daily weights.       Hx of NSVT: Amiodarone resumed. Maintain telemetry.       Primary Hypertension:  Uncontrolled. Coreg and lisinopril resumed with hold parameters.       Hyperlipidemia: Lipitor resumed.       GERD: Protonix resumed.       Pituitary Macroadenoma: Known hx.  Neurosurgery consulted per patient request for possible resection.       Subjective (past 24 hours):   Patient seen and examined. Reports still having left sided weakness. Would like to speak to neurosurgery about pituitary gland resection. No chest pain, shortness of breath, palpitations.                   Neurology-   Interval history 9/12/2021:   Patient reports feeling about the same today. Repeat CT head negative for hemorrhage. Patient still reports headache 8/10.        Assessment and Plan:           Tino Joe is a 61-year-old male who presented to the ER on 9/10/2021 and was a stroke alert for left-sided weakness and left sensory deficit.  TPA was given because the patients LKW was within the 4.5-hour timeframe and he had no contraindicating factors.  We are unable to obtain an MRI due to patient's defibrillator incompatibility.  Because patient's echo shows an EF of 40 to 45%, moderate global hypokinesis of the left ventricle and he presents with focal deficits while being on aspirin and Plavix it is our impression that his stroke is likely embolic in nature therefore we will recommend starting the patient on anticoagulation in addition to his antiplatelet.       Left sided weakness, sensory deficits, concern for ischemic stroke       The patient's defibrillator is not MRI compatible   Repeat head CT negative for hemorrhage   SCDs for DVT prophylaxis   Continue 5mg eliquis BID along with 81 mg ASA. Stop plavix   Neuro checks every 4 hours   NIHSS q shift   2D Echo completed and shows hypokinesis of left ventricle. Normal atria size, no pericardial effusion.  EF 40-45%   HgbA1C 5.3   Continue statin therapy, LDL 67 (Goal [id number])   PT/OT/SLP on board   Maintain telemetry, monitor for atrial-fib   Maintain oxygenation saturation >94%   Speech evaluated patient-regular diet   CT head is needed if severe headache or altered mental status   Provide stroke education for individualized risk factors   Neurology will sign off at this time             Summary- Patient in ICU          Patient received Coradrone 200mg PO x1, Eliquis 5mg PO x2, ASA 81mg PO x1, Lipitor 80mg PO x1, Coreg 6.25mg PO x2, Neurontin 100mg PO x3, Prinivil 2.5mg PO x1, Protonix 40mg PO x1, Norco 10-650mg PO x4,          =========================================================================

## 2021-10-04 NOTE — PROGRESS NOTES
CLINICAL PHARMACY NOTE: MEDS TO BEDS    Total # of Prescriptions Filled: 1   The following medications were delivered to the patient:  Hydrocodone-APAP 5/325mg    Additional Documentation:

## 2023-05-20 ENCOUNTER — APPOINTMENT (OUTPATIENT)
Dept: GENERAL RADIOLOGY | Age: 68
End: 2023-05-20
Payer: MEDICARE

## 2023-05-20 ENCOUNTER — HOSPITAL ENCOUNTER (OUTPATIENT)
Age: 68
Setting detail: OBSERVATION
Discharge: HOME | End: 2023-05-22
Attending: STUDENT IN AN ORGANIZED HEALTH CARE EDUCATION/TRAINING PROGRAM | Admitting: INTERNAL MEDICINE
Payer: MEDICARE

## 2023-05-20 DIAGNOSIS — R07.9 CHEST PAIN, UNSPECIFIED TYPE: Primary | ICD-10-CM

## 2023-05-20 DIAGNOSIS — R51.9 NONINTRACTABLE HEADACHE, UNSPECIFIED CHRONICITY PATTERN, UNSPECIFIED HEADACHE TYPE: ICD-10-CM

## 2023-05-20 LAB
ANION GAP SERPL CALC-SCNC: 11 MEQ/L (ref 8–16)
BASOPHILS ABSOLUTE: 0 THOU/MM3 (ref 0–0.1)
BASOPHILS NFR BLD AUTO: 0.3 %
BUN SERPL-MCNC: 15 MG/DL (ref 7–22)
CALCIUM SERPL-MCNC: 9.1 MG/DL (ref 8.5–10.5)
CHLORIDE SERPL-SCNC: 105 MEQ/L (ref 98–111)
CO2 SERPL-SCNC: 26 MEQ/L (ref 23–33)
CREAT SERPL-MCNC: 1.1 MG/DL (ref 0.4–1.2)
DEPRECATED RDW RBC AUTO: 41.1 FL (ref 35–45)
EOSINOPHIL NFR BLD AUTO: 2.5 %
EOSINOPHILS ABSOLUTE: 0.2 THOU/MM3 (ref 0–0.4)
ERYTHROCYTE [DISTWIDTH] IN BLOOD BY AUTOMATED COUNT: 17.2 % (ref 11.5–14.5)
FLUAV RNA RESP QL NAA+PROBE: NOT DETECTED
FLUBV RNA RESP QL NAA+PROBE: NOT DETECTED
GFR SERPL CREATININE-BSD FRML MDRD: > 60 ML/MIN/1.73M2
GLUCOSE SERPL-MCNC: 96 MG/DL (ref 70–108)
HCT VFR BLD AUTO: 41.2 % (ref 42–52)
HGB BLD-MCNC: 12.7 GM/DL (ref 14–18)
IMM GRANULOCYTES # BLD AUTO: 0.04 THOU/MM3 (ref 0–0.07)
IMM GRANULOCYTES NFR BLD AUTO: 0.5 %
LYMPHOCYTES ABSOLUTE: 1.5 THOU/MM3 (ref 1–4.8)
LYMPHOCYTES NFR BLD AUTO: 19.7 %
MAGNESIUM SERPL-MCNC: 2.2 MG/DL (ref 1.6–2.4)
MCH RBC QN AUTO: 22 PG (ref 26–33)
MCHC RBC AUTO-ENTMCNC: 30.8 GM/DL (ref 32.2–35.5)
MCV RBC AUTO: 71.3 FL (ref 80–94)
MONOCYTES ABSOLUTE: 0.5 THOU/MM3 (ref 0.4–1.3)
MONOCYTES NFR BLD AUTO: 6.1 %
NEUTROPHILS NFR BLD AUTO: 70.9 %
NRBC BLD AUTO-RTO: 0 /100 WBC
OSMOLALITY SERPL CALC.SUM OF ELEC: 283.8 MOSMOL/KG (ref 275–300)
PLATELET # BLD AUTO: 202 THOU/MM3 (ref 130–400)
PMV BLD AUTO: 8.8 FL (ref 9.4–12.4)
POTASSIUM SERPL-SCNC: 3.9 MEQ/L (ref 3.5–5.2)
RBC # BLD AUTO: 5.78 MILL/MM3 (ref 4.7–6.1)
SARS-COV-2 RNA RESP QL NAA+PROBE: NOT DETECTED
SEGMENTED NEUTROPHILS ABSOLUTE COUNT: 5.5 THOU/MM3 (ref 1.8–7.7)
SODIUM SERPL-SCNC: 142 MEQ/L (ref 135–145)
TROPONIN T: < 0.01 NG/ML
TSH SERPL DL<=0.005 MIU/L-ACNC: 3.04 UIU/ML (ref 0.4–4.2)
WBC # BLD AUTO: 7.7 THOU/MM3 (ref 4.8–10.8)

## 2023-05-20 PROCEDURE — 84443 ASSAY THYROID STIM HORMONE: CPT

## 2023-05-20 PROCEDURE — 6360000002 HC RX W HCPCS: Performed by: STUDENT IN AN ORGANIZED HEALTH CARE EDUCATION/TRAINING PROGRAM

## 2023-05-20 PROCEDURE — 84484 ASSAY OF TROPONIN QUANT: CPT

## 2023-05-20 PROCEDURE — 85025 COMPLETE CBC W/AUTO DIFF WBC: CPT

## 2023-05-20 PROCEDURE — 93005 ELECTROCARDIOGRAM TRACING: CPT | Performed by: STUDENT IN AN ORGANIZED HEALTH CARE EDUCATION/TRAINING PROGRAM

## 2023-05-20 PROCEDURE — 80048 BASIC METABOLIC PNL TOTAL CA: CPT

## 2023-05-20 PROCEDURE — 83735 ASSAY OF MAGNESIUM: CPT

## 2023-05-20 PROCEDURE — 96374 THER/PROPH/DIAG INJ IV PUSH: CPT

## 2023-05-20 PROCEDURE — 87636 SARSCOV2 & INF A&B AMP PRB: CPT

## 2023-05-20 PROCEDURE — 36415 COLL VENOUS BLD VENIPUNCTURE: CPT

## 2023-05-20 PROCEDURE — 71045 X-RAY EXAM CHEST 1 VIEW: CPT

## 2023-05-20 PROCEDURE — 6370000000 HC RX 637 (ALT 250 FOR IP): Performed by: STUDENT IN AN ORGANIZED HEALTH CARE EDUCATION/TRAINING PROGRAM

## 2023-05-20 PROCEDURE — 99285 EMERGENCY DEPT VISIT HI MDM: CPT

## 2023-05-20 RX ORDER — ONDANSETRON 2 MG/ML
4 INJECTION INTRAMUSCULAR; INTRAVENOUS ONCE
Status: COMPLETED | OUTPATIENT
Start: 2023-05-20 | End: 2023-05-20

## 2023-05-20 RX ORDER — ASPIRIN 81 MG/1
324 TABLET, CHEWABLE ORAL ONCE
Status: COMPLETED | OUTPATIENT
Start: 2023-05-20 | End: 2023-05-20

## 2023-05-20 RX ORDER — ACETAMINOPHEN 500 MG
1000 TABLET ORAL ONCE
Status: COMPLETED | OUTPATIENT
Start: 2023-05-20 | End: 2023-05-20

## 2023-05-20 RX ORDER — NITROGLYCERIN 0.4 MG/1
0.4 TABLET SUBLINGUAL EVERY 5 MIN PRN
Status: DISCONTINUED | OUTPATIENT
Start: 2023-05-20 | End: 2023-05-21 | Stop reason: SDUPTHER

## 2023-05-20 RX ORDER — PROCHLORPERAZINE EDISYLATE 5 MG/ML
10 INJECTION INTRAMUSCULAR; INTRAVENOUS ONCE
Status: COMPLETED | OUTPATIENT
Start: 2023-05-20 | End: 2023-05-20

## 2023-05-20 RX ADMIN — ACETAMINOPHEN 1000 MG: 500 TABLET ORAL at 22:20

## 2023-05-20 RX ADMIN — ONDANSETRON 4 MG: 2 INJECTION INTRAMUSCULAR; INTRAVENOUS at 21:47

## 2023-05-20 RX ADMIN — NITROGLYCERIN 0.4 MG: 0.4 TABLET, ORALLY DISINTEGRATING SUBLINGUAL at 21:50

## 2023-05-20 RX ADMIN — NITROGLYCERIN 0.4 MG: 0.4 TABLET, ORALLY DISINTEGRATING SUBLINGUAL at 22:03

## 2023-05-20 RX ADMIN — PROCHLORPERAZINE EDISYLATE 10 MG: 5 INJECTION INTRAMUSCULAR; INTRAVENOUS at 23:34

## 2023-05-20 RX ADMIN — ASPIRIN 81 MG 324 MG: 81 TABLET ORAL at 21:49

## 2023-05-20 ASSESSMENT — PAIN DESCRIPTION - FREQUENCY: FREQUENCY: CONTINUOUS

## 2023-05-20 ASSESSMENT — PAIN SCALES - GENERAL
PAINLEVEL_OUTOF10: 8

## 2023-05-20 ASSESSMENT — PAIN DESCRIPTION - ONSET: ONSET: ON-GOING

## 2023-05-20 ASSESSMENT — PAIN DESCRIPTION - ORIENTATION: ORIENTATION: MID

## 2023-05-20 ASSESSMENT — HEART SCORE: ECG: 1

## 2023-05-20 ASSESSMENT — PAIN DESCRIPTION - PAIN TYPE: TYPE: ACUTE PAIN

## 2023-05-20 ASSESSMENT — PAIN DESCRIPTION - LOCATION: LOCATION: CHEST

## 2023-05-20 ASSESSMENT — PAIN DESCRIPTION - DESCRIPTORS: DESCRIPTORS: ACHING

## 2023-05-20 ASSESSMENT — PAIN - FUNCTIONAL ASSESSMENT
PAIN_FUNCTIONAL_ASSESSMENT: 0-10
PAIN_FUNCTIONAL_ASSESSMENT: 0-10
PAIN_FUNCTIONAL_ASSESSMENT: ACTIVITIES ARE NOT PREVENTED
PAIN_FUNCTIONAL_ASSESSMENT: 0-10

## 2023-05-21 LAB
AMPHETAMINES UR QL SCN: NEGATIVE
BARBITURATES UR QL SCN: NEGATIVE
BENZODIAZ UR QL SCN: NEGATIVE
BZE UR QL SCN: NEGATIVE
CANNABINOIDS UR QL SCN: POSITIVE
CHOLEST SERPL-MCNC: 131 MG/DL (ref 100–199)
EKG ATRIAL RATE: 72 BPM
EKG P AXIS: 67 DEGREES
EKG P-R INTERVAL: 164 MS
EKG Q-T INTERVAL: 400 MS
EKG QRS DURATION: 74 MS
EKG QTC CALCULATION (BAZETT): 438 MS
EKG R AXIS: 0 DEGREES
EKG T AXIS: 53 DEGREES
EKG VENTRICULAR RATE: 72 BPM
FENTANYL: NEGATIVE
HDLC SERPL-MCNC: 44 MG/DL
LDLC SERPL CALC-MCNC: 71 MG/DL
OPIATES UR QL SCN: NEGATIVE
OXYCODONE: NEGATIVE
PCP UR QL SCN: NEGATIVE
PROLACTIN SERPL-MCNC: 17.5 NG/ML
TRIGL SERPL-MCNC: 79 MG/DL (ref 0–199)
TROPONIN T: < 0.01 NG/ML
TROPONIN T: < 0.01 NG/ML

## 2023-05-21 PROCEDURE — 2580000003 HC RX 258: Performed by: INTERNAL MEDICINE

## 2023-05-21 PROCEDURE — 80307 DRUG TEST PRSMV CHEM ANLYZR: CPT

## 2023-05-21 PROCEDURE — G0378 HOSPITAL OBSERVATION PER HR: HCPCS

## 2023-05-21 PROCEDURE — 84146 ASSAY OF PROLACTIN: CPT

## 2023-05-21 PROCEDURE — 6360000002 HC RX W HCPCS: Performed by: INTERNAL MEDICINE

## 2023-05-21 PROCEDURE — 84484 ASSAY OF TROPONIN QUANT: CPT

## 2023-05-21 PROCEDURE — 6370000000 HC RX 637 (ALT 250 FOR IP): Performed by: INTERNAL MEDICINE

## 2023-05-21 PROCEDURE — 36415 COLL VENOUS BLD VENIPUNCTURE: CPT

## 2023-05-21 PROCEDURE — 93010 ELECTROCARDIOGRAM REPORT: CPT | Performed by: INTERNAL MEDICINE

## 2023-05-21 PROCEDURE — 80061 LIPID PANEL: CPT

## 2023-05-21 RX ORDER — ALBUTEROL SULFATE 90 UG/1
2 AEROSOL, METERED RESPIRATORY (INHALATION) PRN
OUTPATIENT
Start: 2023-05-21 | End: 2023-05-21

## 2023-05-21 RX ORDER — ASPIRIN 81 MG/1
81 TABLET, CHEWABLE ORAL DAILY
Status: DISCONTINUED | OUTPATIENT
Start: 2023-05-21 | End: 2023-05-22 | Stop reason: HOSPADM

## 2023-05-21 RX ORDER — SODIUM CHLORIDE 0.9 % (FLUSH) 0.9 %
5-40 SYRINGE (ML) INJECTION PRN
OUTPATIENT
Start: 2023-05-21 | End: 2023-05-21

## 2023-05-21 RX ORDER — ONDANSETRON 4 MG/1
4 TABLET, ORALLY DISINTEGRATING ORAL EVERY 8 HOURS PRN
Status: DISCONTINUED | OUTPATIENT
Start: 2023-05-21 | End: 2023-05-22 | Stop reason: HOSPADM

## 2023-05-21 RX ORDER — PANTOPRAZOLE SODIUM 40 MG/1
40 TABLET, DELAYED RELEASE ORAL
Status: DISCONTINUED | OUTPATIENT
Start: 2023-05-21 | End: 2023-05-22 | Stop reason: HOSPADM

## 2023-05-21 RX ORDER — DOCUSATE SODIUM 100 MG/1
100 CAPSULE, LIQUID FILLED ORAL DAILY
Status: DISCONTINUED | OUTPATIENT
Start: 2023-05-21 | End: 2023-05-22 | Stop reason: HOSPADM

## 2023-05-21 RX ORDER — CLOPIDOGREL BISULFATE 75 MG/1
75 TABLET ORAL DAILY
Status: DISCONTINUED | OUTPATIENT
Start: 2023-05-21 | End: 2023-05-22 | Stop reason: HOSPADM

## 2023-05-21 RX ORDER — SODIUM CHLORIDE 9 MG/ML
INJECTION, SOLUTION INTRAVENOUS CONTINUOUS
Status: DISCONTINUED | OUTPATIENT
Start: 2023-05-21 | End: 2023-05-22

## 2023-05-21 RX ORDER — METOPROLOL TARTRATE 5 MG/5ML
5 INJECTION INTRAVENOUS EVERY 5 MIN PRN
OUTPATIENT
Start: 2023-05-21 | End: 2023-05-21

## 2023-05-21 RX ORDER — ACETAMINOPHEN 325 MG/1
650 TABLET ORAL EVERY 4 HOURS PRN
Status: DISCONTINUED | OUTPATIENT
Start: 2023-05-21 | End: 2023-05-22 | Stop reason: HOSPADM

## 2023-05-21 RX ORDER — ISOSORBIDE MONONITRATE 30 MG/1
30 TABLET, EXTENDED RELEASE ORAL DAILY
Status: DISCONTINUED | OUTPATIENT
Start: 2023-05-21 | End: 2023-05-22 | Stop reason: HOSPADM

## 2023-05-21 RX ORDER — POLYETHYLENE GLYCOL 3350 17 G/17G
17 POWDER, FOR SOLUTION ORAL DAILY
Status: DISCONTINUED | OUTPATIENT
Start: 2023-05-21 | End: 2023-05-22 | Stop reason: HOSPADM

## 2023-05-21 RX ORDER — ENOXAPARIN SODIUM 100 MG/ML
40 INJECTION SUBCUTANEOUS EVERY 24 HOURS
Status: DISCONTINUED | OUTPATIENT
Start: 2023-05-21 | End: 2023-05-22

## 2023-05-21 RX ORDER — ONDANSETRON 2 MG/ML
4 INJECTION INTRAMUSCULAR; INTRAVENOUS EVERY 6 HOURS PRN
Status: DISCONTINUED | OUTPATIENT
Start: 2023-05-21 | End: 2023-05-22 | Stop reason: HOSPADM

## 2023-05-21 RX ORDER — AMINOPHYLLINE DIHYDRATE 25 MG/ML
50 INJECTION, SOLUTION INTRAVENOUS PRN
OUTPATIENT
Start: 2023-05-21 | End: 2023-05-21

## 2023-05-21 RX ORDER — ATORVASTATIN CALCIUM 40 MG/1
40 TABLET, FILM COATED ORAL NIGHTLY
Status: DISCONTINUED | OUTPATIENT
Start: 2023-05-21 | End: 2023-05-22 | Stop reason: HOSPADM

## 2023-05-21 RX ORDER — NITROGLYCERIN 0.4 MG/1
0.4 TABLET SUBLINGUAL EVERY 5 MIN PRN
Status: DISCONTINUED | OUTPATIENT
Start: 2023-05-21 | End: 2023-05-22 | Stop reason: HOSPADM

## 2023-05-21 RX ORDER — SODIUM CHLORIDE 9 MG/ML
500 INJECTION, SOLUTION INTRAVENOUS CONTINUOUS PRN
OUTPATIENT
Start: 2023-05-21 | End: 2023-05-21

## 2023-05-21 RX ORDER — NITROGLYCERIN 0.4 MG/1
0.4 TABLET SUBLINGUAL EVERY 5 MIN PRN
OUTPATIENT
Start: 2023-05-21 | End: 2023-05-21

## 2023-05-21 RX ORDER — CALCIUM CARBONATE 200(500)MG
500 TABLET,CHEWABLE ORAL 3 TIMES DAILY PRN
Status: DISCONTINUED | OUTPATIENT
Start: 2023-05-21 | End: 2023-05-22 | Stop reason: HOSPADM

## 2023-05-21 RX ORDER — ATROPINE SULFATE 0.1 MG/ML
0.5 INJECTION INTRAVENOUS EVERY 5 MIN PRN
OUTPATIENT
Start: 2023-05-21 | End: 2023-05-21

## 2023-05-21 RX ADMIN — ATORVASTATIN CALCIUM 40 MG: 40 TABLET, FILM COATED ORAL at 21:45

## 2023-05-21 RX ADMIN — ASPIRIN 81 MG 81 MG: 81 TABLET ORAL at 08:26

## 2023-05-21 RX ADMIN — ENOXAPARIN SODIUM 40 MG: 100 INJECTION SUBCUTANEOUS at 08:26

## 2023-05-21 RX ADMIN — CLOPIDOGREL BISULFATE 75 MG: 75 TABLET ORAL at 08:26

## 2023-05-21 RX ADMIN — ATORVASTATIN CALCIUM 40 MG: 40 TABLET, FILM COATED ORAL at 02:53

## 2023-05-21 RX ADMIN — SODIUM CHLORIDE: 9 INJECTION, SOLUTION INTRAVENOUS at 01:22

## 2023-05-21 RX ADMIN — METOPROLOL TARTRATE 25 MG: 25 TABLET, FILM COATED ORAL at 21:45

## 2023-05-21 RX ADMIN — METOPROLOL TARTRATE 25 MG: 25 TABLET, FILM COATED ORAL at 02:53

## 2023-05-21 RX ADMIN — METOPROLOL TARTRATE 25 MG: 25 TABLET, FILM COATED ORAL at 08:27

## 2023-05-21 RX ADMIN — ISOSORBIDE MONONITRATE 30 MG: 30 TABLET, EXTENDED RELEASE ORAL at 08:27

## 2023-05-21 RX ADMIN — DOCUSATE SODIUM 100 MG: 100 CAPSULE, LIQUID FILLED ORAL at 08:26

## 2023-05-21 RX ADMIN — PANTOPRAZOLE SODIUM 40 MG: 40 TABLET, DELAYED RELEASE ORAL at 06:44

## 2023-05-21 RX ADMIN — POLYETHYLENE GLYCOL 3350 17 G: 17 POWDER, FOR SOLUTION ORAL at 08:26

## 2023-05-21 ASSESSMENT — PAIN SCALES - GENERAL
PAINLEVEL_OUTOF10: 8
PAINLEVEL_OUTOF10: 0

## 2023-05-22 ENCOUNTER — APPOINTMENT (OUTPATIENT)
Dept: NON INVASIVE DIAGNOSTICS | Age: 68
End: 2023-05-22
Payer: MEDICARE

## 2023-05-22 VITALS
DIASTOLIC BLOOD PRESSURE: 76 MMHG | HEIGHT: 69 IN | HEART RATE: 64 BPM | OXYGEN SATURATION: 99 % | BODY MASS INDEX: 22.75 KG/M2 | RESPIRATION RATE: 16 BRPM | WEIGHT: 153.6 LBS | TEMPERATURE: 98.5 F | SYSTOLIC BLOOD PRESSURE: 160 MMHG

## 2023-05-22 PROCEDURE — 99205 OFFICE O/P NEW HI 60 MIN: CPT | Performed by: NUCLEAR MEDICINE

## 2023-05-22 PROCEDURE — 6360000002 HC RX W HCPCS

## 2023-05-22 PROCEDURE — 93017 CV STRESS TEST TRACING ONLY: CPT | Performed by: NUCLEAR MEDICINE

## 2023-05-22 PROCEDURE — G0378 HOSPITAL OBSERVATION PER HR: HCPCS

## 2023-05-22 PROCEDURE — 3430000000 HC RX DIAGNOSTIC RADIOPHARMACEUTICAL: Performed by: NUCLEAR MEDICINE

## 2023-05-22 PROCEDURE — A9500 TC99M SESTAMIBI: HCPCS | Performed by: NUCLEAR MEDICINE

## 2023-05-22 PROCEDURE — 6370000000 HC RX 637 (ALT 250 FOR IP): Performed by: INTERNAL MEDICINE

## 2023-05-22 PROCEDURE — 78452 HT MUSCLE IMAGE SPECT MULT: CPT | Performed by: NUCLEAR MEDICINE

## 2023-05-22 PROCEDURE — 6360000002 HC RX W HCPCS: Performed by: INTERNAL MEDICINE

## 2023-05-22 RX ORDER — TETRAKIS(2-METHOXYISOBUTYLISOCYANIDE)COPPER(I) TETRAFLUOROBORATE 1 MG/ML
29.4 INJECTION, POWDER, LYOPHILIZED, FOR SOLUTION INTRAVENOUS
Status: COMPLETED | OUTPATIENT
Start: 2023-05-22 | End: 2023-05-22

## 2023-05-22 RX ORDER — AMIODARONE HYDROCHLORIDE 200 MG/1
200 TABLET ORAL DAILY
Status: DISCONTINUED | OUTPATIENT
Start: 2023-05-22 | End: 2023-05-22 | Stop reason: HOSPADM

## 2023-05-22 RX ORDER — TETRAKIS(2-METHOXYISOBUTYLISOCYANIDE)COPPER(I) TETRAFLUOROBORATE 1 MG/ML
9.3 INJECTION, POWDER, LYOPHILIZED, FOR SOLUTION INTRAVENOUS
Status: COMPLETED | OUTPATIENT
Start: 2023-05-22 | End: 2023-05-22

## 2023-05-22 RX ORDER — AMLODIPINE BESYLATE 10 MG/1
10 TABLET ORAL DAILY
Status: DISCONTINUED | OUTPATIENT
Start: 2023-05-22 | End: 2023-05-22 | Stop reason: HOSPADM

## 2023-05-22 RX ORDER — LISINOPRIL 10 MG/1
10 TABLET ORAL DAILY
Status: DISCONTINUED | OUTPATIENT
Start: 2023-05-22 | End: 2023-05-22 | Stop reason: HOSPADM

## 2023-05-22 RX ORDER — AMLODIPINE BESYLATE 10 MG/1
10 TABLET ORAL DAILY
Qty: 30 TABLET | Refills: 3 | Status: SHIPPED | OUTPATIENT
Start: 2023-05-22

## 2023-05-22 RX ADMIN — METOPROLOL TARTRATE 25 MG: 25 TABLET, FILM COATED ORAL at 11:26

## 2023-05-22 RX ADMIN — CLOPIDOGREL BISULFATE 75 MG: 75 TABLET ORAL at 11:26

## 2023-05-22 RX ADMIN — PANTOPRAZOLE SODIUM 40 MG: 40 TABLET, DELAYED RELEASE ORAL at 05:54

## 2023-05-22 RX ADMIN — ENOXAPARIN SODIUM 40 MG: 100 INJECTION SUBCUTANEOUS at 11:26

## 2023-05-22 RX ADMIN — POLYETHYLENE GLYCOL 3350 17 G: 17 POWDER, FOR SOLUTION ORAL at 11:26

## 2023-05-22 RX ADMIN — Medication 29.4 MILLICURIE: at 08:23

## 2023-05-22 RX ADMIN — Medication 9.3 MILLICURIE: at 07:28

## 2023-05-22 RX ADMIN — ISOSORBIDE MONONITRATE 30 MG: 30 TABLET, EXTENDED RELEASE ORAL at 11:26

## 2023-05-22 RX ADMIN — ASPIRIN 81 MG 81 MG: 81 TABLET ORAL at 11:26

## 2023-05-22 RX ADMIN — DOCUSATE SODIUM 100 MG: 100 CAPSULE, LIQUID FILLED ORAL at 11:27

## 2023-05-22 NOTE — CARE COORDINATION
Case Management Assessment  Initial Evaluation    Date/Time of Evaluation: 5/22/2023 11:59 AM  Assessment Completed by: Ervin Potts RN    If patient is discharged prior to next notation, then this note serves as note for discharge by case management. Patient Name: Meghan Echavarria                   YOB: 1955  Diagnosis: Chest pain [R07.9]                   Date / Time: 5/20/2023  8:59 PM  Location: [address] [place] [address]     Patient Admission Status: Observation   Readmission Risk Low 0-14, Mod 15-19), High > 20: No data recorded  Current PCP: Cristóbal Molina, DO  PCP verified by CM? Yes    Chart Reviewed: Yes      History Provided by: Patient  Patient Orientation: Alert and Oriented    Patient Cognition: Alert    Hospitalization in the last 30 days (Readmission):  No    If yes, Readmission Assessment in CM Navigator will be completed. Advance Directives:      Code Status: Prior   Patient's Primary Decision Maker is: Legal Next of Kin      Discharge Planning:    Patient lives with: Friends Type of Home: House  Primary Care Giver: Self  Patient Support Systems include: Family Members   Current Financial resources: Medicare  Current community resources: None  Current services prior to admission: None, Durable Medical Equipment            Current DME: [employer]            Type of Home Care services:  None    ADLS  Prior functional level: Independent in ADLs/IADLs  Current functional level: Independent in ADLs/IADLs    Family can provide assistance at DC: Yes  Would you like Case Management to discuss the discharge plan with any other family members/significant others, and if so, who?  No  Plans to Return to Present Housing: Yes  Other Identified Issues/Barriers to RETURNING to current housing: No  Potential Assistance needed at discharge: N/A            Potential DME:    Patient expects to discharge to: [address] [place] [address] for transportation at discharge: Self    Financial    Payor: Jennifer Burden / Plan: [facility]

## 2023-05-22 NOTE — DISCHARGE SUMMARY
Discharge Summary    Charly Sawyer  :  1955  MRN:  [id number]    Admit date:  2023  Discharge date:      Admitting Physician:  Veronica Castañeda MD    Discharge Diagnoses:    Chest pains  Chronic systolic congestive heart failure. CAD /History of CABG 2018  AICD status since 2018  Hyperlipidemia  Hypertension  16mm pituitary microadenoma     Admission Condition:  serious  Discharged Condition:  good    Hospital Course:   ***    Discharge Medications:         Medication List        START taking these medications      amLODIPine 10 MG tablet  Commonly known as: NORVASC  Take 1 tablet by mouth daily            CONTINUE taking these medications      amiodarone 200 MG tablet  Commonly known as: CORDARONE  Take 1 tablet by mouth daily.      apixaban 5 MG Tabs tablet  Commonly known as: ELIQUIS  Take 1 tablet by mouth 2 times daily     aspirin 81 MG chewable tablet  Take 1 tablet by mouth daily     atorvastatin 40 MG tablet  Commonly known as: LIPITOR  Take 1 tablet by mouth nightly     carvedilol 6.25 MG tablet  Commonly known as: COREG  Take 1 tablet by mouth 2 times daily (with meals)     cyanocobalamin 1000 MCG tablet  Take 1 tablet by mouth daily     ferrous sulfate 325 (65 Fe) MG tablet  Commonly known as: IRON 325  Take 1 tablet by mouth 2 times daily (with meals)     gabapentin 100 MG capsule  Commonly known as: NEURONTIN  Take 1 capsule by mouth 3 times daily     isosorbide mononitrate 60 MG extended release tablet  Commonly known as: IMDUR     lisinopril 10 MG tablet  Commonly known as: PRINIVIL;ZESTRIL  Take 1 tablet by mouth daily     nitroGLYCERIN 0.4 MG SL tablet  Commonly known as: NITROSTAT     pantoprazole 40 MG tablet  Commonly known as: PROTONIX  Take 1 tablet by mouth every morning (before breakfast)     ranolazine 500 MG extended release tablet  Commonly known as: RANEXA  Take 1 tablet by mouth 2 times daily     therapeutic multivitamin-minerals tablet  Take 1 tablet by mouth daily

## 2023-05-22 NOTE — CONSULTS
documentation was approximately 80 minutes. Electronically signed by Sissy Hale MD on 5/22/2023 at 3:20 PM    Interventional Cardiology - [facility]     Patient seen and examined  Discussed with the staff on rounds  COMPLIANCE and FOLLOW UP issues  Know CMP And CAD  Chest pain , chronic symptoms  Multiple ER visits since October last year   Planning non invasive work up   Decide accordingly   Thank you for allowing me to participate in the care of your patient.  Please don't hesitate to contact me regarding any further issues related to the patient care    Shabnam Interiano MD

## 2023-05-22 NOTE — PROGRESS NOTES
Comprehensive Nutrition Assessment    Type and Reason for Visit:  Initial, Positive Nutrition Screen    Nutrition Recommendations/Plan:   Diet as per provider  Trial Ensure Enlive BID     Malnutrition Assessment:  Malnutrition Status:  Insufficient data (05/22/23 1233)    Context:  Acute Illness     Findings of the 6 clinical characteristics of malnutrition:  Energy Intake:  Unable to assess  Weight Loss:  No significant weight loss (per EMR)     Body Fat Loss:  Unable to assess     Muscle Mass Loss:  Unable to assess    Fluid Accumulation:  No significant fluid accumulation     Strength:  Not Performed    Nutrition Assessment:     Pt. nutritionally compromised AEB NPO. At risk for further nutrition compromise r/t admit SOB and chest pain, and underlying medical condition (Hx; Anemia, Angina, Arthritis, CAD, Cardiomyopathy, Depression, GERD, HTN, HLD, Polysubstance Abuse). Nutrition Related Findings:    Pt. Report/Treatments/Miscellaneous: Pt off unit during attempted visit (at stress test), will re-attempt visit as time allows. GI Status: No BM  Pertinent Labs: 5/20 BUN 15, Cr 1.1, Glucose 96  Pertinent Meds: Colace, Protonix, Glycolax,  ml/hr     Wound Type: None       Current Nutrition Intake & Therapies:    Average Meal Intake: NPO, 1-25%  Average Supplements Intake: NPO  ADULT DIET; Regular    Anthropometric Measures:  Height: 5' 9\" (175.3 cm)  Ideal Body Weight (IBW): 160 lbs (73 kg)    Admission Body Weight: 153 lb 5 oz (69.5 kg) (5/20; No Edema)  Current Body Weight: 153 lb 10 oz (69.7 kg) (5/22; No Edema), 96 % IBW. Weight Source: Bed Scale  Current BMI (kg/m2): 22.7  Usual Body Weight:  (Per EMR 9/11/21 158 lb 12 oz)     Weight Adjustment For: No Adjustment                 BMI Categories: Normal Weight (BMI 18.5-24. 9)    Estimated Daily Nutrient Needs:  Energy Requirements Based On: Kcal/kg  Weight Used for Energy Requirements: Current (69.7 kg)  Energy (kcal/day): 6055-0183 (25-30

## 2023-05-22 NOTE — PROGRESS NOTES
INTERNAL MEDICINE Progress Note  5/22/2023 4:42 PM  Subjective:   Admit Date: 5/20/2023  PCP: Katy Lacey DO  Interval History:   No further CP, no SOB    Objective:   Vitals: BP (!) 160/76   Pulse 64   Temp 98.5 °F (36.9 °C) (Oral)   Resp 16   Ht 5' 9\" (1.753 m)   Wt 153 lb 9.6 oz (69.7 kg)   SpO2 99%   BMI 22.68 kg/m²   General appearance: alert and cooperative with exam  HEENT: Head: atraumatic  Neck: no adenopathy, no carotid bruit, and no JVD  Lungs: clear to auscultation bilaterally  Heart: S1, S2 normal  Abdomen: soft, non-tender; bowel sounds normal; no masses,  no organomegaly  Extremities: no edema, redness or tenderness in the calves or thighs  Neurologic: Mental status: Alert, oriented, thought content appropriate      Medications:   Scheduled Meds:   amLODIPine  10 mg Oral Daily    aspirin  81 mg Oral Daily    atorvastatin  40 mg Oral Nightly    clopidogrel  75 mg Oral Daily    docusate sodium  100 mg Oral Daily    enoxaparin  40 mg SubCUTAneous Q24H    isosorbide mononitrate  30 mg Oral Daily    metoprolol tartrate  25 mg Oral BID    pantoprazole  40 mg Oral QAM AC    polyethylene glycol  17 g Oral Daily     Continuous Infusions:    Lab Results:   CBC:   Recent Labs     05/20/23  2105   WBC 7.7   HGB 12.7*        BMP:    Recent Labs     05/20/23  2105      K 3.9      CO2 26   BUN 15   CREATININE 1.1   GLUCOSE 96     Lipids:   Recent Labs     05/21/23  0553   CHOL 131   HDL 44     Magnesium:    Lab Results   Component Value Date/Time    MG 2.2 05/20/2023 09:05 PM     HgBA1c:    Lab Results   Component Value Date/Time    LABA1C 5.3 09/11/2021 04:20 AM     TSH:    Lab Results   Component Value Date/Time    TSH 3.040 05/20/2023 09:05 PM     FOLATE:    Lab Results   Component Value Date/Time    FOLATE 13.4 03/14/2012 06:03 AM     IRON:    Lab Results   Component Value Date/Time    IRON 90 11/24/2016 07:26 AM     FERRITIN:    Lab Results   Component Value Date/Time    FERRITIN

## 2023-05-22 NOTE — PLAN OF CARE
Problem: Discharge Planning  Goal: Discharge to home or other facility with appropriate resources  Outcome: Adequate for Discharge     Problem: Pain  Goal: Verbalizes/displays adequate comfort level or baseline comfort level  Outcome: Adequate for Discharge     Problem: Safety - Adult  Goal: Free from fall injury  Outcome: Adequate for Discharge     Problem: Chronic Conditions and Co-morbidities  Goal: Patient's chronic conditions and co-morbidity symptoms are monitored and maintained or improved  Outcome: Adequate for Discharge     Problem: Nutrition Deficit:  Goal: Optimize nutritional status  Outcome: Adequate for Discharge

## 2023-08-11 ENCOUNTER — HOSPITAL ENCOUNTER (EMERGENCY)
Age: 68
Discharge: HOME | End: 2023-08-11
Attending: EMERGENCY MEDICINE
Payer: MEDICARE

## 2023-08-11 ENCOUNTER — APPOINTMENT (OUTPATIENT)
Dept: CT IMAGING | Age: 68
End: 2023-08-11
Payer: MEDICARE

## 2023-08-11 VITALS
WEIGHT: 160 LBS | SYSTOLIC BLOOD PRESSURE: 137 MMHG | TEMPERATURE: 97.5 F | HEART RATE: 74 BPM | RESPIRATION RATE: 18 BRPM | OXYGEN SATURATION: 99 % | DIASTOLIC BLOOD PRESSURE: 73 MMHG | BODY MASS INDEX: 23.63 KG/M2

## 2023-08-11 DIAGNOSIS — R00.2 PALPITATIONS: Primary | ICD-10-CM

## 2023-08-11 LAB
ANION GAP SERPL CALC-SCNC: 15 MEQ/L (ref 8–16)
BASOPHILS ABSOLUTE: 0 THOU/MM3 (ref 0–0.1)
BASOPHILS NFR BLD AUTO: 0.3 %
BUN SERPL-MCNC: 14 MG/DL (ref 7–22)
CALCIUM SERPL-MCNC: 9 MG/DL (ref 8.5–10.5)
CHLORIDE SERPL-SCNC: 100 MEQ/L (ref 98–111)
CO2 SERPL-SCNC: 20 MEQ/L (ref 23–33)
CREAT SERPL-MCNC: 0.8 MG/DL (ref 0.4–1.2)
DEPRECATED RDW RBC AUTO: 42.5 FL (ref 35–45)
EOSINOPHIL NFR BLD AUTO: 1.4 %
EOSINOPHILS ABSOLUTE: 0.1 THOU/MM3 (ref 0–0.4)
ERYTHROCYTE [DISTWIDTH] IN BLOOD BY AUTOMATED COUNT: 17.8 % (ref 11.5–14.5)
ETHANOL SERPL-MCNC: 0.07 %
GFR SERPL CREATININE-BSD FRML MDRD: > 60 ML/MIN/1.73M2
GLUCOSE SERPL-MCNC: 111 MG/DL (ref 70–108)
HCT VFR BLD AUTO: 39.2 % (ref 42–52)
HGB BLD-MCNC: 12.2 GM/DL (ref 14–18)
IMM GRANULOCYTES # BLD AUTO: 0.14 THOU/MM3 (ref 0–0.07)
IMM GRANULOCYTES NFR BLD AUTO: 1.5 %
LYMPHOCYTES ABSOLUTE: 2.6 THOU/MM3 (ref 1–4.8)
LYMPHOCYTES NFR BLD AUTO: 28.7 %
MCH RBC QN AUTO: 22.3 PG (ref 26–33)
MCHC RBC AUTO-ENTMCNC: 31.1 GM/DL (ref 32.2–35.5)
MCV RBC AUTO: 71.8 FL (ref 80–94)
MONOCYTES ABSOLUTE: 0.8 THOU/MM3 (ref 0.4–1.3)
MONOCYTES NFR BLD AUTO: 8.7 %
NEUTROPHILS NFR BLD AUTO: 59.4 %
NRBC BLD AUTO-RTO: 0 /100 WBC
OSMOLALITY SERPL CALC.SUM OF ELEC: 271.3 MOSMOL/KG (ref 275–300)
PLATELET # BLD AUTO: 207 THOU/MM3 (ref 130–400)
PMV BLD AUTO: 9.3 FL (ref 9.4–12.4)
POTASSIUM SERPL-SCNC: 3.5 MEQ/L (ref 3.5–5.2)
RBC # BLD AUTO: 5.46 MILL/MM3 (ref 4.7–6.1)
SEGMENTED NEUTROPHILS ABSOLUTE COUNT: 5.5 THOU/MM3 (ref 1.8–7.7)
SODIUM SERPL-SCNC: 135 MEQ/L (ref 135–145)
TROPONIN, HIGH SENSITIVITY: 6 NG/L (ref 0–12)
WBC # BLD AUTO: 9.2 THOU/MM3 (ref 4.8–10.8)

## 2023-08-11 PROCEDURE — 84484 ASSAY OF TROPONIN QUANT: CPT

## 2023-08-11 PROCEDURE — 82077 ASSAY SPEC XCP UR&BREATH IA: CPT

## 2023-08-11 PROCEDURE — 93005 ELECTROCARDIOGRAM TRACING: CPT | Performed by: EMERGENCY MEDICINE

## 2023-08-11 PROCEDURE — 70450 CT HEAD/BRAIN W/O DYE: CPT

## 2023-08-11 PROCEDURE — 36415 COLL VENOUS BLD VENIPUNCTURE: CPT

## 2023-08-11 PROCEDURE — 80048 BASIC METABOLIC PNL TOTAL CA: CPT

## 2023-08-11 PROCEDURE — 85025 COMPLETE CBC W/AUTO DIFF WBC: CPT

## 2023-08-11 PROCEDURE — 99284 EMERGENCY DEPT VISIT MOD MDM: CPT

## 2023-08-11 PROCEDURE — 93010 ELECTROCARDIOGRAM REPORT: CPT | Performed by: INTERNAL MEDICINE

## 2023-08-11 ASSESSMENT — PAIN - FUNCTIONAL ASSESSMENT
PAIN_FUNCTIONAL_ASSESSMENT: NONE - DENIES PAIN
PAIN_FUNCTIONAL_ASSESSMENT: NONE - DENIES PAIN

## 2023-08-11 NOTE — ED NOTES
Attempted to interrogate pacemaker at this time. Interpretation unable to transmitter. Provider notified.       Arsh Oden RN  08/11/23 9527
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
## 2023-08-11 NOTE — DISCHARGE INSTRUCTIONS
Get up slowly; dangle your feet over the bed before standing up, do not stand up quickly. Avoid drugs and alcohol. Return to the Emergency Department for blacking out, any headache, slurring of speech, loss of strength, excessive nausea or vomiting, any other care or concern.

## 2023-08-11 NOTE — ED NOTES
Pt updated on POC at this time, verbalized understanding. Pt denies pain or discomfort. Call light in reach. Telemetry in place.       Maciel Ayers RN  08/11/23 0952

## 2023-08-12 LAB
EKG ATRIAL RATE: 60 BPM
EKG P-R INTERVAL: 216 MS
EKG Q-T INTERVAL: 484 MS
EKG QRS DURATION: 94 MS
EKG QTC CALCULATION (BAZETT): 484 MS
EKG R AXIS: -8 DEGREES
EKG T AXIS: 36 DEGREES
EKG VENTRICULAR RATE: 60 BPM

## 2023-10-03 ENCOUNTER — APPOINTMENT (OUTPATIENT)
Dept: GENERAL RADIOLOGY | Age: 68
End: 2023-10-03
Payer: MEDICARE

## 2023-10-03 ENCOUNTER — HOSPITAL ENCOUNTER (EMERGENCY)
Age: 68
Discharge: HOME | End: 2023-10-03
Attending: EMERGENCY MEDICINE
Payer: MEDICARE

## 2023-10-03 VITALS
SYSTOLIC BLOOD PRESSURE: 173 MMHG | HEART RATE: 74 BPM | DIASTOLIC BLOOD PRESSURE: 108 MMHG | RESPIRATION RATE: 10 BRPM | OXYGEN SATURATION: 98 % | TEMPERATURE: 97.8 F | BODY MASS INDEX: 25.4 KG/M2 | WEIGHT: 172 LBS

## 2023-10-03 DIAGNOSIS — G89.29 CHRONIC NONINTRACTABLE HEADACHE, UNSPECIFIED HEADACHE TYPE: ICD-10-CM

## 2023-10-03 DIAGNOSIS — R51.9 CHRONIC NONINTRACTABLE HEADACHE, UNSPECIFIED HEADACHE TYPE: ICD-10-CM

## 2023-10-03 DIAGNOSIS — R07.9 CHEST PAIN, UNSPECIFIED TYPE: Primary | ICD-10-CM

## 2023-10-03 LAB
ALBUMIN SERPL BCG-MCNC: 3.9 G/DL (ref 3.5–5.1)
ALP SERPL-CCNC: 71 U/L (ref 38–126)
ALT SERPL W/O P-5'-P-CCNC: 11 U/L (ref 11–66)
ANION GAP SERPL CALC-SCNC: 11 MEQ/L (ref 8–16)
AST SERPL-CCNC: 17 U/L (ref 5–40)
BASOPHILS ABSOLUTE: 0 THOU/MM3 (ref 0–0.1)
BASOPHILS NFR BLD AUTO: 0.5 %
BILIRUB CONJ SERPL-MCNC: < 0.2 MG/DL (ref 0–0.3)
BILIRUB SERPL-MCNC: 0.9 MG/DL (ref 0.3–1.2)
BUN SERPL-MCNC: 15 MG/DL (ref 7–22)
CALCIUM SERPL-MCNC: 9.1 MG/DL (ref 8.5–10.5)
CHLORIDE SERPL-SCNC: 108 MEQ/L (ref 98–111)
CO2 SERPL-SCNC: 22 MEQ/L (ref 23–33)
CREAT SERPL-MCNC: 0.9 MG/DL (ref 0.4–1.2)
DEPRECATED RDW RBC AUTO: 38.2 FL (ref 35–45)
EOSINOPHIL NFR BLD AUTO: 1.2 %
EOSINOPHILS ABSOLUTE: 0.1 THOU/MM3 (ref 0–0.4)
ERYTHROCYTE [DISTWIDTH] IN BLOOD BY AUTOMATED COUNT: 15.8 % (ref 11.5–14.5)
GFR SERPL CREATININE-BSD FRML MDRD: > 60 ML/MIN/1.73M2
GLUCOSE SERPL-MCNC: 100 MG/DL (ref 70–108)
HCT VFR BLD AUTO: 37.3 % (ref 42–52)
HGB BLD-MCNC: 12.1 GM/DL (ref 14–18)
IMM GRANULOCYTES # BLD AUTO: 0.03 THOU/MM3 (ref 0–0.07)
IMM GRANULOCYTES NFR BLD AUTO: 0.5 %
LYMPHOCYTES ABSOLUTE: 1.2 THOU/MM3 (ref 1–4.8)
LYMPHOCYTES NFR BLD AUTO: 18 %
MCH RBC QN AUTO: 22.7 PG (ref 26–33)
MCHC RBC AUTO-ENTMCNC: 32.4 GM/DL (ref 32.2–35.5)
MCV RBC AUTO: 69.9 FL (ref 80–94)
MICROCYTES BLD QL SMEAR: PRESENT
MONOCYTES ABSOLUTE: 0.5 THOU/MM3 (ref 0.4–1.3)
MONOCYTES NFR BLD AUTO: 7.9 %
NEUTROPHILS NFR BLD AUTO: 71.9 %
NRBC BLD AUTO-RTO: 0 /100 WBC
NT-PROBNP SERPL IA-MCNC: 280.6 PG/ML (ref 0–124)
OSMOLALITY SERPL CALC.SUM OF ELEC: 282.2 MOSMOL/KG (ref 275–300)
PLATELET # BLD AUTO: 213 THOU/MM3 (ref 130–400)
PMV BLD AUTO: 9.9 FL (ref 9.4–12.4)
POIKILOCYTES: ABNORMAL
POTASSIUM SERPL-SCNC: 4.4 MEQ/L (ref 3.5–5.2)
PROT SERPL-MCNC: 6.6 G/DL (ref 6.1–8)
RBC # BLD AUTO: 5.34 MILL/MM3 (ref 4.7–6.1)
SCAN OF BLOOD SMEAR: NORMAL
SEGMENTED NEUTROPHILS ABSOLUTE COUNT: 4.7 THOU/MM3 (ref 1.8–7.7)
SODIUM SERPL-SCNC: 141 MEQ/L (ref 135–145)
TARGETS BLD QL SMEAR: ABNORMAL
TROPONIN, HIGH SENSITIVITY: 7 NG/L (ref 0–12)
TROPONIN, HIGH SENSITIVITY: 7 NG/L (ref 0–12)
WBC # BLD AUTO: 6.6 THOU/MM3 (ref 4.8–10.8)

## 2023-10-03 PROCEDURE — 85025 COMPLETE CBC W/AUTO DIFF WBC: CPT

## 2023-10-03 PROCEDURE — 96374 THER/PROPH/DIAG INJ IV PUSH: CPT

## 2023-10-03 PROCEDURE — 83880 ASSAY OF NATRIURETIC PEPTIDE: CPT

## 2023-10-03 PROCEDURE — 93005 ELECTROCARDIOGRAM TRACING: CPT | Performed by: STUDENT IN AN ORGANIZED HEALTH CARE EDUCATION/TRAINING PROGRAM

## 2023-10-03 PROCEDURE — 6360000002 HC RX W HCPCS: Performed by: STUDENT IN AN ORGANIZED HEALTH CARE EDUCATION/TRAINING PROGRAM

## 2023-10-03 PROCEDURE — 99285 EMERGENCY DEPT VISIT HI MDM: CPT

## 2023-10-03 PROCEDURE — 80053 COMPREHEN METABOLIC PANEL: CPT

## 2023-10-03 PROCEDURE — 6370000000 HC RX 637 (ALT 250 FOR IP): Performed by: STUDENT IN AN ORGANIZED HEALTH CARE EDUCATION/TRAINING PROGRAM

## 2023-10-03 PROCEDURE — 93010 ELECTROCARDIOGRAM REPORT: CPT | Performed by: INTERNAL MEDICINE

## 2023-10-03 PROCEDURE — 71046 X-RAY EXAM CHEST 2 VIEWS: CPT

## 2023-10-03 PROCEDURE — 84484 ASSAY OF TROPONIN QUANT: CPT

## 2023-10-03 PROCEDURE — 36415 COLL VENOUS BLD VENIPUNCTURE: CPT

## 2023-10-03 PROCEDURE — 82248 BILIRUBIN DIRECT: CPT

## 2023-10-03 RX ORDER — ASPIRIN 81 MG/1
324 TABLET, CHEWABLE ORAL ONCE
Status: DISCONTINUED | OUTPATIENT
Start: 2023-10-03 | End: 2023-10-03 | Stop reason: HOSPADM

## 2023-10-03 RX ORDER — MORPHINE SULFATE 2 MG/ML
2 INJECTION, SOLUTION INTRAMUSCULAR; INTRAVENOUS ONCE
Status: COMPLETED | OUTPATIENT
Start: 2023-10-03 | End: 2023-10-03

## 2023-10-03 RX ADMIN — MORPHINE SULFATE 2 MG: 2 INJECTION, SOLUTION INTRAMUSCULAR; INTRAVENOUS at 16:08

## 2023-10-03 ASSESSMENT — PAIN DESCRIPTION - LOCATION: LOCATION: CHEST

## 2023-10-03 ASSESSMENT — PAIN DESCRIPTION - PAIN TYPE: TYPE: ACUTE PAIN

## 2023-10-03 ASSESSMENT — HEART SCORE: ECG: 0

## 2023-10-03 ASSESSMENT — PAIN - FUNCTIONAL ASSESSMENT: PAIN_FUNCTIONAL_ASSESSMENT: 0-10

## 2023-10-03 ASSESSMENT — PAIN SCALES - GENERAL: PAINLEVEL_OUTOF10: 8

## 2023-10-03 NOTE — ED NOTES
Pt resting on cot watching television. Medicated for pain. Udpdated on poc. No concerns voiced.      Naun August, RN  10/03/23 [id number]

## 2023-10-04 LAB
EKG ATRIAL RATE: 76 BPM
EKG P AXIS: 67 DEGREES
EKG P-R INTERVAL: 156 MS
EKG Q-T INTERVAL: 406 MS
EKG QRS DURATION: 86 MS
EKG QTC CALCULATION (BAZETT): 456 MS
EKG R AXIS: -29 DEGREES
EKG T AXIS: 52 DEGREES
EKG VENTRICULAR RATE: 76 BPM

## 2023-10-16 ENCOUNTER — APPOINTMENT (OUTPATIENT)
Dept: GENERAL RADIOLOGY | Age: 68
End: 2023-10-16
Payer: MEDICARE

## 2023-10-16 ENCOUNTER — HOSPITAL ENCOUNTER (OUTPATIENT)
Age: 68
Setting detail: OBSERVATION
Discharge: HOME | End: 2023-10-20
Attending: STUDENT IN AN ORGANIZED HEALTH CARE EDUCATION/TRAINING PROGRAM
Payer: MEDICARE

## 2023-10-16 DIAGNOSIS — N17.9 AKI (ACUTE KIDNEY INJURY) (HCC): ICD-10-CM

## 2023-10-16 DIAGNOSIS — R07.9 CHEST PAIN, UNSPECIFIED TYPE: Primary | ICD-10-CM

## 2023-10-16 PROBLEM — I48.0 PAROXYSMAL ATRIAL FIBRILLATION (HCC): Status: ACTIVE | Noted: 2022-12-07

## 2023-10-16 LAB
ANION GAP SERPL CALC-SCNC: 12 MEQ/L (ref 8–16)
BASOPHILS ABSOLUTE: 0 THOU/MM3 (ref 0–0.1)
BASOPHILS NFR BLD AUTO: 0.5 %
BUN SERPL-MCNC: 16 MG/DL (ref 7–22)
CALCIUM SERPL-MCNC: 9.8 MG/DL (ref 8.5–10.5)
CHLORIDE SERPL-SCNC: 103 MEQ/L (ref 98–111)
CO2 SERPL-SCNC: 24 MEQ/L (ref 23–33)
CREAT SERPL-MCNC: 1 MG/DL (ref 0.4–1.2)
DEPRECATED RDW RBC AUTO: 39.3 FL (ref 35–45)
EOSINOPHIL NFR BLD AUTO: 1.7 %
EOSINOPHILS ABSOLUTE: 0.1 THOU/MM3 (ref 0–0.4)
ERYTHROCYTE [DISTWIDTH] IN BLOOD BY AUTOMATED COUNT: 17.5 % (ref 11.5–14.5)
GFR SERPL CREATININE-BSD FRML MDRD: > 60 ML/MIN/1.73M2
GLUCOSE SERPL-MCNC: 93 MG/DL (ref 70–108)
HCT VFR BLD AUTO: 46.3 % (ref 42–52)
HGB BLD-MCNC: 14.3 GM/DL (ref 14–18)
IMM GRANULOCYTES # BLD AUTO: 0.12 THOU/MM3 (ref 0–0.07)
IMM GRANULOCYTES NFR BLD AUTO: 1.5 %
LYMPHOCYTES ABSOLUTE: 1.7 THOU/MM3 (ref 1–4.8)
LYMPHOCYTES NFR BLD AUTO: 20.1 %
MCH RBC QN AUTO: 22 PG (ref 26–33)
MCHC RBC AUTO-ENTMCNC: 30.9 GM/DL (ref 32.2–35.5)
MCV RBC AUTO: 71.1 FL (ref 80–94)
MONOCYTES ABSOLUTE: 0.6 THOU/MM3 (ref 0.4–1.3)
MONOCYTES NFR BLD AUTO: 7.3 %
NEUTROPHILS NFR BLD AUTO: 68.9 %
NRBC BLD AUTO-RTO: 0 /100 WBC
NT-PROBNP SERPL IA-MCNC: 126.5 PG/ML (ref 0–124)
OSMOLALITY SERPL CALC.SUM OF ELEC: 278.4 MOSMOL/KG (ref 275–300)
PLATELET # BLD AUTO: 225 THOU/MM3 (ref 130–400)
PMV BLD AUTO: 8.9 FL (ref 9.4–12.4)
POTASSIUM SERPL-SCNC: 4.1 MEQ/L (ref 3.5–5.2)
RBC # BLD AUTO: 6.51 MILL/MM3 (ref 4.7–6.1)
SEGMENTED NEUTROPHILS ABSOLUTE COUNT: 5.7 THOU/MM3 (ref 1.8–7.7)
SODIUM SERPL-SCNC: 139 MEQ/L (ref 135–145)
TROPONIN, HIGH SENSITIVITY: 14 NG/L (ref 0–12)
TROPONIN, HIGH SENSITIVITY: 15 NG/L (ref 0–12)
WBC # BLD AUTO: 8.3 THOU/MM3 (ref 4.8–10.8)

## 2023-10-16 PROCEDURE — 84484 ASSAY OF TROPONIN QUANT: CPT

## 2023-10-16 PROCEDURE — 6370000000 HC RX 637 (ALT 250 FOR IP): Performed by: PHYSICIAN ASSISTANT

## 2023-10-16 PROCEDURE — 36415 COLL VENOUS BLD VENIPUNCTURE: CPT

## 2023-10-16 PROCEDURE — 85025 COMPLETE CBC W/AUTO DIFF WBC: CPT

## 2023-10-16 PROCEDURE — G0378 HOSPITAL OBSERVATION PER HR: HCPCS

## 2023-10-16 PROCEDURE — 6370000000 HC RX 637 (ALT 250 FOR IP): Performed by: EMERGENCY MEDICINE

## 2023-10-16 PROCEDURE — 80048 BASIC METABOLIC PNL TOTAL CA: CPT

## 2023-10-16 PROCEDURE — 93010 ELECTROCARDIOGRAM REPORT: CPT | Performed by: INTERNAL MEDICINE

## 2023-10-16 PROCEDURE — 99285 EMERGENCY DEPT VISIT HI MDM: CPT

## 2023-10-16 PROCEDURE — 99223 1ST HOSP IP/OBS HIGH 75: CPT | Performed by: PHYSICIAN ASSISTANT

## 2023-10-16 PROCEDURE — 6360000002 HC RX W HCPCS: Performed by: PHYSICIAN ASSISTANT

## 2023-10-16 PROCEDURE — 71046 X-RAY EXAM CHEST 2 VIEWS: CPT

## 2023-10-16 PROCEDURE — 93005 ELECTROCARDIOGRAM TRACING: CPT | Performed by: STUDENT IN AN ORGANIZED HEALTH CARE EDUCATION/TRAINING PROGRAM

## 2023-10-16 PROCEDURE — 2580000003 HC RX 258: Performed by: PHYSICIAN ASSISTANT

## 2023-10-16 PROCEDURE — 83880 ASSAY OF NATRIURETIC PEPTIDE: CPT

## 2023-10-16 RX ORDER — AMLODIPINE BESYLATE 10 MG/1
10 TABLET ORAL DAILY
Status: DISCONTINUED | OUTPATIENT
Start: 2023-10-17 | End: 2023-10-20 | Stop reason: HOSPADM

## 2023-10-16 RX ORDER — AMIODARONE HYDROCHLORIDE 200 MG/1
200 TABLET ORAL DAILY
Status: DISCONTINUED | OUTPATIENT
Start: 2023-10-17 | End: 2023-10-20 | Stop reason: HOSPADM

## 2023-10-16 RX ORDER — ONDANSETRON 4 MG/1
4 TABLET, ORALLY DISINTEGRATING ORAL EVERY 8 HOURS PRN
Status: DISCONTINUED | OUTPATIENT
Start: 2023-10-16 | End: 2023-10-20 | Stop reason: HOSPADM

## 2023-10-16 RX ORDER — SPIRONOLACTONE 25 MG/1
25 TABLET ORAL DAILY
Status: ON HOLD | COMMUNITY
End: 2023-10-20 | Stop reason: HOSPADM

## 2023-10-16 RX ORDER — CARVEDILOL 6.25 MG/1
6.25 TABLET ORAL 2 TIMES DAILY WITH MEALS
Status: DISCONTINUED | OUTPATIENT
Start: 2023-10-17 | End: 2023-10-20 | Stop reason: HOSPADM

## 2023-10-16 RX ORDER — MULTIVITAMIN WITH IRON
1 TABLET ORAL DAILY
Status: DISCONTINUED | OUTPATIENT
Start: 2023-10-17 | End: 2023-10-20 | Stop reason: HOSPADM

## 2023-10-16 RX ORDER — HYDRALAZINE HYDROCHLORIDE 25 MG/1
25 TABLET, FILM COATED ORAL EVERY 8 HOURS SCHEDULED
Status: DISCONTINUED | OUTPATIENT
Start: 2023-10-16 | End: 2023-10-20

## 2023-10-16 RX ORDER — ACETAMINOPHEN 650 MG/1
650 SUPPOSITORY RECTAL EVERY 6 HOURS PRN
Status: DISCONTINUED | OUTPATIENT
Start: 2023-10-16 | End: 2023-10-20 | Stop reason: HOSPADM

## 2023-10-16 RX ORDER — MORPHINE SULFATE 4 MG/ML
4 INJECTION, SOLUTION INTRAMUSCULAR; INTRAVENOUS
Status: DISCONTINUED | OUTPATIENT
Start: 2023-10-16 | End: 2023-10-20 | Stop reason: HOSPADM

## 2023-10-16 RX ORDER — ACETAMINOPHEN 325 MG/1
650 TABLET ORAL EVERY 6 HOURS PRN
Status: DISCONTINUED | OUTPATIENT
Start: 2023-10-16 | End: 2023-10-20 | Stop reason: HOSPADM

## 2023-10-16 RX ORDER — SODIUM CHLORIDE 0.9 % (FLUSH) 0.9 %
5-40 SYRINGE (ML) INJECTION PRN
Status: DISCONTINUED | OUTPATIENT
Start: 2023-10-16 | End: 2023-10-20 | Stop reason: HOSPADM

## 2023-10-16 RX ORDER — MAGNESIUM SULFATE IN WATER 40 MG/ML
2000 INJECTION, SOLUTION INTRAVENOUS PRN
Status: DISCONTINUED | OUTPATIENT
Start: 2023-10-16 | End: 2023-10-20 | Stop reason: HOSPADM

## 2023-10-16 RX ORDER — POTASSIUM CHLORIDE 7.45 MG/ML
10 INJECTION INTRAVENOUS PRN
Status: DISCONTINUED | OUTPATIENT
Start: 2023-10-16 | End: 2023-10-20 | Stop reason: HOSPADM

## 2023-10-16 RX ORDER — POLYETHYLENE GLYCOL 3350 17 G/17G
17 POWDER, FOR SOLUTION ORAL DAILY PRN
Status: DISCONTINUED | OUTPATIENT
Start: 2023-10-16 | End: 2023-10-20 | Stop reason: HOSPADM

## 2023-10-16 RX ORDER — NITROGLYCERIN 0.4 MG/1
0.4 TABLET SUBLINGUAL EVERY 5 MIN PRN
Status: DISCONTINUED | OUTPATIENT
Start: 2023-10-16 | End: 2023-10-20 | Stop reason: HOSPADM

## 2023-10-16 RX ORDER — GABAPENTIN 100 MG/1
100 CAPSULE ORAL 3 TIMES DAILY
Status: DISCONTINUED | OUTPATIENT
Start: 2023-10-16 | End: 2023-10-20 | Stop reason: HOSPADM

## 2023-10-16 RX ORDER — ASPIRIN 81 MG/1
81 TABLET, CHEWABLE ORAL DAILY
Status: DISCONTINUED | OUTPATIENT
Start: 2023-10-17 | End: 2023-10-20 | Stop reason: HOSPADM

## 2023-10-16 RX ORDER — RANOLAZINE 500 MG/1
500 TABLET, EXTENDED RELEASE ORAL 2 TIMES DAILY
Status: DISCONTINUED | OUTPATIENT
Start: 2023-10-16 | End: 2023-10-18

## 2023-10-16 RX ORDER — NITROGLYCERIN 20 MG/100ML
5-200 INJECTION INTRAVENOUS CONTINUOUS
Status: DISCONTINUED | OUTPATIENT
Start: 2023-10-16 | End: 2023-10-18

## 2023-10-16 RX ORDER — ISOSORBIDE MONONITRATE 60 MG/1
60 TABLET, EXTENDED RELEASE ORAL 2 TIMES DAILY
Status: DISCONTINUED | OUTPATIENT
Start: 2023-10-16 | End: 2023-10-18

## 2023-10-16 RX ORDER — ONDANSETRON 2 MG/ML
4 INJECTION INTRAMUSCULAR; INTRAVENOUS EVERY 6 HOURS PRN
Status: DISCONTINUED | OUTPATIENT
Start: 2023-10-16 | End: 2023-10-20 | Stop reason: HOSPADM

## 2023-10-16 RX ORDER — ASPIRIN 81 MG/1
324 TABLET, CHEWABLE ORAL ONCE
Status: COMPLETED | OUTPATIENT
Start: 2023-10-16 | End: 2023-10-16

## 2023-10-16 RX ORDER — ATORVASTATIN CALCIUM 40 MG/1
40 TABLET, FILM COATED ORAL NIGHTLY
Status: DISCONTINUED | OUTPATIENT
Start: 2023-10-16 | End: 2023-10-20 | Stop reason: HOSPADM

## 2023-10-16 RX ORDER — POTASSIUM CHLORIDE 20 MEQ/1
40 TABLET, EXTENDED RELEASE ORAL PRN
Status: DISCONTINUED | OUTPATIENT
Start: 2023-10-16 | End: 2023-10-20 | Stop reason: HOSPADM

## 2023-10-16 RX ORDER — NITROGLYCERIN 0.4 MG/1
0.4 TABLET SUBLINGUAL EVERY 5 MIN PRN
Status: DISCONTINUED | OUTPATIENT
Start: 2023-10-16 | End: 2023-10-16

## 2023-10-16 RX ORDER — FERROUS SULFATE 325(65) MG
325 TABLET ORAL 2 TIMES DAILY WITH MEALS
Status: DISCONTINUED | OUTPATIENT
Start: 2023-10-17 | End: 2023-10-20 | Stop reason: HOSPADM

## 2023-10-16 RX ORDER — MORPHINE SULFATE 2 MG/ML
2 INJECTION, SOLUTION INTRAMUSCULAR; INTRAVENOUS
Status: DISCONTINUED | OUTPATIENT
Start: 2023-10-16 | End: 2023-10-20 | Stop reason: HOSPADM

## 2023-10-16 RX ORDER — LANOLIN ALCOHOL/MO/W.PET/CERES
1000 CREAM (GRAM) TOPICAL DAILY
Status: DISCONTINUED | OUTPATIENT
Start: 2023-10-17 | End: 2023-10-20 | Stop reason: HOSPADM

## 2023-10-16 RX ORDER — LISINOPRIL 10 MG/1
10 TABLET ORAL DAILY
Status: DISCONTINUED | OUTPATIENT
Start: 2023-10-17 | End: 2023-10-20 | Stop reason: HOSPADM

## 2023-10-16 RX ORDER — SODIUM CHLORIDE 9 MG/ML
INJECTION, SOLUTION INTRAVENOUS PRN
Status: DISCONTINUED | OUTPATIENT
Start: 2023-10-16 | End: 2023-10-20 | Stop reason: HOSPADM

## 2023-10-16 RX ORDER — SODIUM CHLORIDE 0.9 % (FLUSH) 0.9 %
5-40 SYRINGE (ML) INJECTION EVERY 12 HOURS SCHEDULED
Status: DISCONTINUED | OUTPATIENT
Start: 2023-10-16 | End: 2023-10-20 | Stop reason: HOSPADM

## 2023-10-16 RX ORDER — PANTOPRAZOLE SODIUM 40 MG/1
40 TABLET, DELAYED RELEASE ORAL
Status: DISCONTINUED | OUTPATIENT
Start: 2023-10-17 | End: 2023-10-20 | Stop reason: HOSPADM

## 2023-10-16 RX ADMIN — APIXABAN 5 MG: 5 TABLET, FILM COATED ORAL at 23:29

## 2023-10-16 RX ADMIN — SODIUM CHLORIDE, PRESERVATIVE FREE 10 ML: 5 INJECTION INTRAVENOUS at 23:13

## 2023-10-16 RX ADMIN — ATORVASTATIN CALCIUM 40 MG: 40 TABLET, FILM COATED ORAL at 23:30

## 2023-10-16 RX ADMIN — NITROGLYCERIN 5 MCG/MIN: 20 INJECTION INTRAVENOUS at 23:12

## 2023-10-16 RX ADMIN — NITROGLYCERIN 0.4 MG: 0.4 TABLET, ORALLY DISINTEGRATING SUBLINGUAL at 19:29

## 2023-10-16 RX ADMIN — ASPIRIN 324 MG: 81 TABLET, CHEWABLE ORAL at 19:28

## 2023-10-16 RX ADMIN — GABAPENTIN 100 MG: 100 CAPSULE ORAL at 23:29

## 2023-10-16 RX ADMIN — NITROGLYCERIN 0.4 MG: 0.4 TABLET, ORALLY DISINTEGRATING SUBLINGUAL at 19:36

## 2023-10-16 RX ADMIN — RANOLAZINE 500 MG: 500 TABLET, EXTENDED RELEASE ORAL at 23:29

## 2023-10-16 RX ADMIN — ACETAMINOPHEN 650 MG: 325 TABLET ORAL at 23:12

## 2023-10-16 ASSESSMENT — PAIN DESCRIPTION - PAIN TYPE
TYPE: ACUTE PAIN

## 2023-10-16 ASSESSMENT — PAIN DESCRIPTION - FREQUENCY
FREQUENCY: CONTINUOUS
FREQUENCY: CONTINUOUS

## 2023-10-16 ASSESSMENT — PAIN - FUNCTIONAL ASSESSMENT
PAIN_FUNCTIONAL_ASSESSMENT: 0-10
PAIN_FUNCTIONAL_ASSESSMENT: ACTIVITIES ARE NOT PREVENTED
PAIN_FUNCTIONAL_ASSESSMENT: ACTIVITIES ARE NOT PREVENTED

## 2023-10-16 ASSESSMENT — PAIN DESCRIPTION - DESCRIPTORS
DESCRIPTORS: ACHING
DESCRIPTORS: ACHING
DESCRIPTORS: PRESSURE

## 2023-10-16 ASSESSMENT — PAIN DESCRIPTION - LOCATION
LOCATION: CHEST
LOCATION: HEAD
LOCATION: CHEST

## 2023-10-16 ASSESSMENT — PAIN DESCRIPTION - ORIENTATION
ORIENTATION: MID
ORIENTATION: LEFT

## 2023-10-16 ASSESSMENT — PAIN SCALES - GENERAL
PAINLEVEL_OUTOF10: 8
PAINLEVEL_OUTOF10: 3
PAINLEVEL_OUTOF10: 8
PAINLEVEL_OUTOF10: 3
PAINLEVEL_OUTOF10: 7

## 2023-10-16 ASSESSMENT — ENCOUNTER SYMPTOMS
ALLERGIC/IMMUNOLOGIC NEGATIVE: 1
EYES NEGATIVE: 1
NAUSEA: 1
SHORTNESS OF BREATH: 1

## 2023-10-16 ASSESSMENT — PAIN DESCRIPTION - ONSET
ONSET: ON-GOING
ONSET: ON-GOING

## 2023-10-16 ASSESSMENT — HEART SCORE: ECG: 0
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
## 2023-10-16 NOTE — H&P
Creatinine 1.0 0.4 - 1.2 mg/dL    Calcium 9.8 8.5 - 10.5 mg/dL   Troponin    Collection Time: 10/16/23  5:31 PM   Result Value Ref Range    Troponin, High Sensitivity 15 (H) 0 - 12 ng/L   Brain Natriuretic Peptide    Collection Time: 10/16/23  5:31 PM   Result Value Ref Range    Pro-.5 (H) 0.0 - 124.0 pg/mL   Anion Gap    Collection Time: 10/16/23  5:31 PM   Result Value Ref Range    Anion Gap 12.0 8.0 - 16.0 meq/L   Glomerular Filtration Rate, Estimated    Collection Time: 10/16/23  5:31 PM   Result Value Ref Range    Est, Glom Filt Rate >60 >60 ml/min/1.73m2   Osmolality    Collection Time: 10/16/23  5:31 PM   Result Value Ref Range    Osmolality Calc 278.4 275.0 - 300.0 mOsmol/kg         Vital Signs: T: 97.9 F P: 79 RR: 10 B/P: 167/110: FiO2: RA: O2 Sat: 98%: I/O: No intake or output data in the 24 hours ending 10/16/23 1935      General:   No acute distress  HEENT:  normocephalic and atraumatic. No scleral icterus. PEARLA, mucous membranes moist  Neck: supple. Trachea midline. No JVD. Full ROM, no meningismus. Lungs: clear to auscultation. No retractions, no accessory muscle use. Cardiac: RRR, no murmur, 2+ pulses  Abdomen: soft. Nontender. Bowel sounds active  Extremities:  No clubbing, cyanosis x 4, no edema    Vasculature: capillary refill < 3 seconds. Skin:  warm and dry. No  visible rashes  Psych:  Alert and oriented x3. Affect appropriate  Lymph:  No supraclavicular adenopathy. Neurologic:  CN II-XII grossly intact. No focal deficit. Data: (All radiographs, tracings, PFTs, and imaging are personally viewed and interpreted unless otherwise noted).    Past visit data reviewed  EKG: rhythm: normal sinus rhythm, rate=78 bpm, ku=040 ms, qrs=76 ms, pp=544 ms, axis=40 degrees        Electronically signed by  Yara Ybarra PA-C

## 2023-10-16 NOTE — ED TRIAGE NOTES
Patient presents to ER with complaints of chest pain that started this morning. Patient reports he had increased belching this morning and had eggs, sausage, and acevedo. Pt is aware and understanding  Cardiac

## 2023-10-17 LAB
ANION GAP SERPL CALC-SCNC: 15 MEQ/L (ref 8–16)
APTT PPP: 36.6 SECONDS (ref 22–38)
BUN SERPL-MCNC: 18 MG/DL (ref 7–22)
CALCIUM SERPL-MCNC: 8.8 MG/DL (ref 8.5–10.5)
CHLORIDE SERPL-SCNC: 105 MEQ/L (ref 98–111)
CO2 SERPL-SCNC: 20 MEQ/L (ref 23–33)
CREAT SERPL-MCNC: 0.9 MG/DL (ref 0.4–1.2)
DEPRECATED RDW RBC AUTO: 38.2 FL (ref 35–45)
DEPRECATED RDW RBC AUTO: 39.5 FL (ref 35–45)
ERYTHROCYTE [DISTWIDTH] IN BLOOD BY AUTOMATED COUNT: 15.9 % (ref 11.5–14.5)
ERYTHROCYTE [DISTWIDTH] IN BLOOD BY AUTOMATED COUNT: 17 % (ref 11.5–14.5)
GFR SERPL CREATININE-BSD FRML MDRD: > 60 ML/MIN/1.73M2
GLUCOSE SERPL-MCNC: 99 MG/DL (ref 70–108)
HCT VFR BLD AUTO: 39.6 % (ref 42–52)
HCT VFR BLD AUTO: 40.7 % (ref 42–52)
HEPARIN UNFRACTIONATED: 1.71 U/ML (ref 0.3–0.7)
HGB BLD-MCNC: 12.5 GM/DL (ref 14–18)
HGB BLD-MCNC: 12.6 GM/DL (ref 14–18)
INR PPP: 1.36 (ref 0.85–1.13)
MCH RBC QN AUTO: 21.7 PG (ref 26–33)
MCH RBC QN AUTO: 22 PG (ref 26–33)
MCHC RBC AUTO-ENTMCNC: 31 GM/DL (ref 32.2–35.5)
MCHC RBC AUTO-ENTMCNC: 31.6 GM/DL (ref 32.2–35.5)
MCV RBC AUTO: 69.7 FL (ref 80–94)
MCV RBC AUTO: 70.1 FL (ref 80–94)
PLATELET # BLD AUTO: 205 THOU/MM3 (ref 130–400)
PLATELET # BLD AUTO: 222 THOU/MM3 (ref 130–400)
PMV BLD AUTO: 9 FL (ref 9.4–12.4)
PMV BLD AUTO: 9 FL (ref 9.4–12.4)
POTASSIUM SERPL-SCNC: 4.1 MEQ/L (ref 3.5–5.2)
RBC # BLD AUTO: 5.68 MILL/MM3 (ref 4.7–6.1)
RBC # BLD AUTO: 5.81 MILL/MM3 (ref 4.7–6.1)
SODIUM SERPL-SCNC: 140 MEQ/L (ref 135–145)
TROPONIN, HIGH SENSITIVITY: 14 NG/L (ref 0–12)
WBC # BLD AUTO: 6.7 THOU/MM3 (ref 4.8–10.8)
WBC # BLD AUTO: 7.4 THOU/MM3 (ref 4.8–10.8)

## 2023-10-17 PROCEDURE — 85730 THROMBOPLASTIN TIME PARTIAL: CPT

## 2023-10-17 PROCEDURE — 84484 ASSAY OF TROPONIN QUANT: CPT

## 2023-10-17 PROCEDURE — 6360000002 HC RX W HCPCS

## 2023-10-17 PROCEDURE — 85520 HEPARIN ASSAY: CPT

## 2023-10-17 PROCEDURE — 93010 ELECTROCARDIOGRAM REPORT: CPT | Performed by: INTERNAL MEDICINE

## 2023-10-17 PROCEDURE — 93005 ELECTROCARDIOGRAM TRACING: CPT

## 2023-10-17 PROCEDURE — 85610 PROTHROMBIN TIME: CPT

## 2023-10-17 PROCEDURE — 6360000002 HC RX W HCPCS: Performed by: INTERNAL MEDICINE

## 2023-10-17 PROCEDURE — 6370000000 HC RX 637 (ALT 250 FOR IP): Performed by: PHYSICIAN ASSISTANT

## 2023-10-17 PROCEDURE — 36415 COLL VENOUS BLD VENIPUNCTURE: CPT

## 2023-10-17 PROCEDURE — 85027 COMPLETE CBC AUTOMATED: CPT

## 2023-10-17 PROCEDURE — 99232 SBSQ HOSP IP/OBS MODERATE 35: CPT | Performed by: INTERNAL MEDICINE

## 2023-10-17 PROCEDURE — 80048 BASIC METABOLIC PNL TOTAL CA: CPT

## 2023-10-17 PROCEDURE — G0378 HOSPITAL OBSERVATION PER HR: HCPCS

## 2023-10-17 RX ORDER — HEPARIN SODIUM 10000 [USP'U]/100ML
5-30 INJECTION, SOLUTION INTRAVENOUS CONTINUOUS
Status: DISCONTINUED | OUTPATIENT
Start: 2023-10-17 | End: 2023-10-19

## 2023-10-17 RX ORDER — HEPARIN SODIUM 1000 [USP'U]/ML
2000 INJECTION, SOLUTION INTRAVENOUS; SUBCUTANEOUS PRN
Status: DISCONTINUED | OUTPATIENT
Start: 2023-10-17 | End: 2023-10-19 | Stop reason: ALTCHOICE

## 2023-10-17 RX ORDER — SPIRONOLACTONE 25 MG/1
25 TABLET ORAL DAILY
Status: DISCONTINUED | OUTPATIENT
Start: 2023-10-17 | End: 2023-10-20 | Stop reason: HOSPADM

## 2023-10-17 RX ORDER — HEPARIN SODIUM 1000 [USP'U]/ML
4000 INJECTION, SOLUTION INTRAVENOUS; SUBCUTANEOUS PRN
Status: DISCONTINUED | OUTPATIENT
Start: 2023-10-17 | End: 2023-10-19 | Stop reason: ALTCHOICE

## 2023-10-17 RX ORDER — HEPARIN SODIUM 1000 [USP'U]/ML
60 INJECTION, SOLUTION INTRAVENOUS; SUBCUTANEOUS ONCE
Status: DISCONTINUED | OUTPATIENT
Start: 2023-10-17 | End: 2023-10-17

## 2023-10-17 RX ADMIN — CARVEDILOL 6.25 MG: 6.25 TABLET, FILM COATED ORAL at 07:49

## 2023-10-17 RX ADMIN — ATORVASTATIN CALCIUM 40 MG: 40 TABLET, FILM COATED ORAL at 21:15

## 2023-10-17 RX ADMIN — FERROUS SULFATE TAB 325 MG (65 MG ELEMENTAL FE) 325 MG: 325 (65 FE) TAB at 07:51

## 2023-10-17 RX ADMIN — MORPHINE SULFATE 2 MG: 2 INJECTION, SOLUTION INTRAMUSCULAR; INTRAVENOUS at 15:09

## 2023-10-17 RX ADMIN — SPIRONOLACTONE 25 MG: 25 TABLET ORAL at 07:54

## 2023-10-17 RX ADMIN — APIXABAN 5 MG: 5 TABLET, FILM COATED ORAL at 07:49

## 2023-10-17 RX ADMIN — MORPHINE SULFATE 4 MG: 4 INJECTION, SOLUTION INTRAMUSCULAR; INTRAVENOUS at 00:35

## 2023-10-17 RX ADMIN — RANOLAZINE 500 MG: 500 TABLET, EXTENDED RELEASE ORAL at 07:54

## 2023-10-17 RX ADMIN — HYDRALAZINE HYDROCHLORIDE 25 MG: 25 TABLET, FILM COATED ORAL at 07:52

## 2023-10-17 RX ADMIN — RANOLAZINE 500 MG: 500 TABLET, EXTENDED RELEASE ORAL at 21:16

## 2023-10-17 RX ADMIN — AMLODIPINE BESYLATE 10 MG: 10 TABLET ORAL at 07:48

## 2023-10-17 RX ADMIN — MORPHINE SULFATE 2 MG: 2 INJECTION, SOLUTION INTRAMUSCULAR; INTRAVENOUS at 07:55

## 2023-10-17 RX ADMIN — MORPHINE SULFATE 4 MG: 4 INJECTION, SOLUTION INTRAMUSCULAR; INTRAVENOUS at 04:50

## 2023-10-17 RX ADMIN — Medication 1 TABLET: at 07:53

## 2023-10-17 RX ADMIN — AMIODARONE HYDROCHLORIDE 200 MG: 200 TABLET ORAL at 07:48

## 2023-10-17 RX ADMIN — MORPHINE SULFATE 4 MG: 4 INJECTION, SOLUTION INTRAMUSCULAR; INTRAVENOUS at 21:16

## 2023-10-17 RX ADMIN — GABAPENTIN 100 MG: 100 CAPSULE ORAL at 21:16

## 2023-10-17 RX ADMIN — HEPARIN SODIUM AND DEXTROSE 12 UNITS/KG/HR: 10000; 5 INJECTION INTRAVENOUS at 15:17

## 2023-10-17 RX ADMIN — PANTOPRAZOLE SODIUM 40 MG: 40 TABLET, DELAYED RELEASE ORAL at 06:23

## 2023-10-17 RX ADMIN — GABAPENTIN 100 MG: 100 CAPSULE ORAL at 15:08

## 2023-10-17 RX ADMIN — LISINOPRIL 10 MG: 10 TABLET ORAL at 07:52

## 2023-10-17 RX ADMIN — GABAPENTIN 100 MG: 100 CAPSULE ORAL at 07:52

## 2023-10-17 RX ADMIN — HYDRALAZINE HYDROCHLORIDE 25 MG: 25 TABLET, FILM COATED ORAL at 01:12

## 2023-10-17 RX ADMIN — Medication 1000 MCG: at 07:54

## 2023-10-17 ASSESSMENT — PAIN SCALES - GENERAL
PAINLEVEL_OUTOF10: 7
PAINLEVEL_OUTOF10: 5
PAINLEVEL_OUTOF10: 7
PAINLEVEL_OUTOF10: 6
PAINLEVEL_OUTOF10: 7
PAINLEVEL_OUTOF10: 8
PAINLEVEL_OUTOF10: 6
PAINLEVEL_OUTOF10: 5
PAINLEVEL_OUTOF10: 0
PAINLEVEL_OUTOF10: 4

## 2023-10-17 ASSESSMENT — PAIN DESCRIPTION - ONSET
ONSET: ON-GOING

## 2023-10-17 ASSESSMENT — PAIN DESCRIPTION - DESCRIPTORS
DESCRIPTORS: ACHING

## 2023-10-17 ASSESSMENT — PAIN DESCRIPTION - PAIN TYPE
TYPE: ACUTE PAIN

## 2023-10-17 ASSESSMENT — PAIN - FUNCTIONAL ASSESSMENT
PAIN_FUNCTIONAL_ASSESSMENT: ACTIVITIES ARE NOT PREVENTED

## 2023-10-17 ASSESSMENT — PAIN DESCRIPTION - LOCATION
LOCATION: CHEST;HEAD
LOCATION: HEAD
LOCATION: CHEST
LOCATION: HEAD
LOCATION: HEAD

## 2023-10-17 ASSESSMENT — PAIN DESCRIPTION - FREQUENCY
FREQUENCY: CONTINUOUS

## 2023-10-17 ASSESSMENT — PAIN DESCRIPTION - ORIENTATION
ORIENTATION: MID;LEFT
ORIENTATION: MID
ORIENTATION: MID

## 2023-10-17 NOTE — PROGRESS NOTES
Internal Medicine Resident Progress Note    Name: Damian Redmond, male, : 1955, MRN: [id number]    PCP: No primary care provider on file. Date of Admission: 10/16/2023  Date of Service: Pt seen/examined on 10/17/23      Assessment/Plan:  Unstable angina: Patient presented with left-sided chest pain, minimally relieved with nitro. It was mildly relieved with morphine. There have been no EKG changes. He had a positive stress test at [facility] and a chronic occlusion of the RCA echo showed EF 35 to 40%. Troponin 15, 14, 14 trending down. Patient was aspirin loaded. Cardio consulted, recommendations appreciated  Patient on nitro drip and continuing home Ranexa  Heparin GTT started and holding Eliquis    HTN: Continue home amlodipine, Coreg, hydralazine, lisinopril, Aldactone  Patient also on nitro drip    Paroxysmal A-fib: Will continue home amiodarone, holding Eliquis as patient is on heparin gtt. HFmrEF: Patient had an echo  stating EF 35 to 40%. Daily weights, strict I's and O's    Microcytic anemia: Continue iron, daily vitamin and B12 supplementation. Baseline seems to be around 14. Disposition:   [x] Home  [] Inpatient Rehab  [] Psychiatric Unit  [] SNF  [] [address]  [] Other-    ===================================================================      Chief Complaint: Chest pain    Hospital Course:    Patient presents the emergency department for complaint of chest pain that started this morning. Patient states chest pain was 10 out of 10 constant radiating to his left arm and down the left arm. He felt short of breath and nauseated with the pain. Denies diaphoresis. Patient reports he was hospitalized a week ago at [facility] for chest pain. He states they were unable to complete a heart cath because the blood vessels in his arm were \"blocked\" and they \"could not get to his heart\".  Patient reports that his blood pressures have been poorly controlled

## 2023-10-17 NOTE — ED NOTES
ED to inpatient nurses report    Chief Complaint   Patient presents with    Chest Pain      Present to ED from home  LOC: alert and orientated to name, place, date  Vital signs   Vitals:    10/16/23 1929 10/16/23 1936 10/16/23 2031 10/16/23 2201   BP: (!) 159/95 (!) 167/110 (!) 153/103 (!) 142/99   Pulse: 81 79 70 72   Resp:   12 11   Temp:       TempSrc:       SpO2:    99%   Weight:       Height:          Oxygen Baseline none    Current needs required none   LDAs:   Peripheral IV 10/16/23 Right;Upper Arm (Active)   Site Assessment Clean, dry & intact 10/16/23 2208   Line Status Flushed 10/16/23 2208   Line Care Connections checked and tightened 10/16/23 2208   Phlebitis Assessment No symptoms 10/16/23 2208   Infiltration Assessment 0 10/16/23 2208   Dressing Status Clean, dry & intact 10/16/23 2208   Dressing Type Transparent 10/16/23 2208     Mobility: Requires assistance * 1  Pending ED orders: none  Present condition: stable    C-SSRS Risk of Suicide: No Risk  Swallow Screening    Preferred Language: Ruthy Posada     Electronically signed by Cristobal Mcguire RN on 10/16/2023 at 10:29 PM      Cristobal Mcguire RN  10/16/23 2230
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
## 2023-10-18 LAB
ANION GAP SERPL CALC-SCNC: 11 MEQ/L (ref 8–16)
APTT PPP: 118 SECONDS (ref 22–38)
APTT PPP: 56.7 SECONDS (ref 22–38)
APTT PPP: 74.8 SECONDS (ref 22–38)
APTT PPP: 92.8 SECONDS (ref 22–38)
BUN SERPL-MCNC: 23 MG/DL (ref 7–22)
CALCIUM SERPL-MCNC: 8.7 MG/DL (ref 8.5–10.5)
CHLORIDE SERPL-SCNC: 104 MEQ/L (ref 98–111)
CO2 SERPL-SCNC: 24 MEQ/L (ref 23–33)
CREAT SERPL-MCNC: 1.3 MG/DL (ref 0.4–1.2)
DEPRECATED RDW RBC AUTO: 38.6 FL (ref 35–45)
EKG ATRIAL RATE: 62 BPM
EKG ATRIAL RATE: 78 BPM
EKG P AXIS: 40 DEGREES
EKG P AXIS: 40 DEGREES
EKG P-R INTERVAL: 172 MS
EKG P-R INTERVAL: 174 MS
EKG Q-T INTERVAL: 388 MS
EKG Q-T INTERVAL: 450 MS
EKG QRS DURATION: 76 MS
EKG QRS DURATION: 94 MS
EKG QTC CALCULATION (BAZETT): 442 MS
EKG QTC CALCULATION (BAZETT): 456 MS
EKG R AXIS: -5 DEGREES
EKG T AXIS: 45 DEGREES
EKG T AXIS: 53 DEGREES
EKG VENTRICULAR RATE: 62 BPM
EKG VENTRICULAR RATE: 78 BPM
ERYTHROCYTE [DISTWIDTH] IN BLOOD BY AUTOMATED COUNT: 15.9 % (ref 11.5–14.5)
GFR SERPL CREATININE-BSD FRML MDRD: 60 ML/MIN/1.73M2
GLUCOSE SERPL-MCNC: 90 MG/DL (ref 70–108)
HCT VFR BLD AUTO: 38.6 % (ref 42–52)
HGB BLD-MCNC: 12 GM/DL (ref 14–18)
MCH RBC QN AUTO: 21.7 PG (ref 26–33)
MCHC RBC AUTO-ENTMCNC: 31.1 GM/DL (ref 32.2–35.5)
MCV RBC AUTO: 69.7 FL (ref 80–94)
PATHOLOGIST REVIEW: ABNORMAL
PLATELET # BLD AUTO: 200 THOU/MM3 (ref 130–400)
PMV BLD AUTO: 8.9 FL (ref 9.4–12.4)
POTASSIUM SERPL-SCNC: 4.2 MEQ/L (ref 3.5–5.2)
RBC # BLD AUTO: 5.54 MILL/MM3 (ref 4.7–6.1)
SCAN OF BLOOD SMEAR: NORMAL
SODIUM SERPL-SCNC: 139 MEQ/L (ref 135–145)
WBC # BLD AUTO: 8.4 THOU/MM3 (ref 4.8–10.8)

## 2023-10-18 PROCEDURE — G0378 HOSPITAL OBSERVATION PER HR: HCPCS

## 2023-10-18 PROCEDURE — 36415 COLL VENOUS BLD VENIPUNCTURE: CPT

## 2023-10-18 PROCEDURE — 99223 1ST HOSP IP/OBS HIGH 75: CPT | Performed by: INTERNAL MEDICINE

## 2023-10-18 PROCEDURE — 6360000002 HC RX W HCPCS

## 2023-10-18 PROCEDURE — 85027 COMPLETE CBC AUTOMATED: CPT

## 2023-10-18 PROCEDURE — 6370000000 HC RX 637 (ALT 250 FOR IP): Performed by: PHYSICIAN ASSISTANT

## 2023-10-18 PROCEDURE — 80048 BASIC METABOLIC PNL TOTAL CA: CPT

## 2023-10-18 PROCEDURE — 85730 THROMBOPLASTIN TIME PARTIAL: CPT

## 2023-10-18 PROCEDURE — 6360000002 HC RX W HCPCS: Performed by: INTERNAL MEDICINE

## 2023-10-18 PROCEDURE — 6370000000 HC RX 637 (ALT 250 FOR IP): Performed by: INTERNAL MEDICINE

## 2023-10-18 RX ORDER — ISOSORBIDE MONONITRATE 60 MG/1
120 TABLET, EXTENDED RELEASE ORAL DAILY
Status: DISCONTINUED | OUTPATIENT
Start: 2023-10-18 | End: 2023-10-20 | Stop reason: HOSPADM

## 2023-10-18 RX ORDER — RANOLAZINE 500 MG/1
1000 TABLET, EXTENDED RELEASE ORAL 2 TIMES DAILY
Status: DISCONTINUED | OUTPATIENT
Start: 2023-10-18 | End: 2023-10-20 | Stop reason: HOSPADM

## 2023-10-18 RX ADMIN — ASPIRIN 81 MG: 81 TABLET, CHEWABLE ORAL at 09:33

## 2023-10-18 RX ADMIN — HEPARIN SODIUM AND DEXTROSE 11 UNITS/KG/HR: 10000; 5 INJECTION INTRAVENOUS at 16:42

## 2023-10-18 RX ADMIN — GABAPENTIN 100 MG: 100 CAPSULE ORAL at 21:48

## 2023-10-18 RX ADMIN — GABAPENTIN 100 MG: 100 CAPSULE ORAL at 16:08

## 2023-10-18 RX ADMIN — PANTOPRAZOLE SODIUM 40 MG: 40 TABLET, DELAYED RELEASE ORAL at 06:28

## 2023-10-18 RX ADMIN — CARVEDILOL 6.25 MG: 6.25 TABLET, FILM COATED ORAL at 09:32

## 2023-10-18 RX ADMIN — Medication 1000 MCG: at 09:32

## 2023-10-18 RX ADMIN — SPIRONOLACTONE 25 MG: 25 TABLET ORAL at 09:33

## 2023-10-18 RX ADMIN — GABAPENTIN 100 MG: 100 CAPSULE ORAL at 09:32

## 2023-10-18 RX ADMIN — MORPHINE SULFATE 4 MG: 4 INJECTION, SOLUTION INTRAMUSCULAR; INTRAVENOUS at 10:56

## 2023-10-18 RX ADMIN — EMPAGLIFLOZIN 10 MG: 10 TABLET, FILM COATED ORAL at 09:32

## 2023-10-18 RX ADMIN — LISINOPRIL 10 MG: 10 TABLET ORAL at 09:32

## 2023-10-18 RX ADMIN — MORPHINE SULFATE 4 MG: 4 INJECTION, SOLUTION INTRAMUSCULAR; INTRAVENOUS at 01:43

## 2023-10-18 RX ADMIN — FERROUS SULFATE TAB 325 MG (65 MG ELEMENTAL FE) 325 MG: 325 (65 FE) TAB at 16:08

## 2023-10-18 RX ADMIN — Medication 1 TABLET: at 09:32

## 2023-10-18 RX ADMIN — RANOLAZINE 1000 MG: 500 TABLET, EXTENDED RELEASE ORAL at 21:48

## 2023-10-18 RX ADMIN — MORPHINE SULFATE 4 MG: 4 INJECTION, SOLUTION INTRAMUSCULAR; INTRAVENOUS at 06:45

## 2023-10-18 RX ADMIN — ATORVASTATIN CALCIUM 40 MG: 40 TABLET, FILM COATED ORAL at 21:46

## 2023-10-18 RX ADMIN — CARVEDILOL 6.25 MG: 6.25 TABLET, FILM COATED ORAL at 16:08

## 2023-10-18 RX ADMIN — ISOSORBIDE MONONITRATE 120 MG: 60 TABLET, EXTENDED RELEASE ORAL at 16:37

## 2023-10-18 RX ADMIN — RANOLAZINE 500 MG: 500 TABLET, EXTENDED RELEASE ORAL at 09:32

## 2023-10-18 RX ADMIN — AMIODARONE HYDROCHLORIDE 200 MG: 200 TABLET ORAL at 09:32

## 2023-10-18 RX ADMIN — AMLODIPINE BESYLATE 10 MG: 10 TABLET ORAL at 09:33

## 2023-10-18 RX ADMIN — FERROUS SULFATE TAB 325 MG (65 MG ELEMENTAL FE) 325 MG: 325 (65 FE) TAB at 09:32

## 2023-10-18 ASSESSMENT — PAIN DESCRIPTION - DIRECTION
RADIATING_TOWARDS: DENIES
RADIATING_TOWARDS: DENIES

## 2023-10-18 ASSESSMENT — PAIN DESCRIPTION - DESCRIPTORS
DESCRIPTORS_2: POUNDING
DESCRIPTORS: ACHING
DESCRIPTORS: POUNDING
DESCRIPTORS: POUNDING
DESCRIPTORS: ACHING
DESCRIPTORS: ACHING

## 2023-10-18 ASSESSMENT — PAIN - FUNCTIONAL ASSESSMENT
PAIN_FUNCTIONAL_ASSESSMENT: ACTIVITIES ARE NOT PREVENTED
PAIN_FUNCTIONAL_ASSESSMENT_SITE2: ACTIVITIES ARE NOT PREVENTED
PAIN_FUNCTIONAL_ASSESSMENT: ACTIVITIES ARE NOT PREVENTED

## 2023-10-18 ASSESSMENT — PAIN DESCRIPTION - FREQUENCY
FREQUENCY: CONTINUOUS

## 2023-10-18 ASSESSMENT — PAIN DESCRIPTION - LOCATION
LOCATION: HEAD
LOCATION: CHEST
LOCATION: HEAD
LOCATION_2: HEAD
LOCATION: HEAD
LOCATION: HEAD;CHEST

## 2023-10-18 ASSESSMENT — PAIN DESCRIPTION - ONSET
ONSET: ON-GOING

## 2023-10-18 ASSESSMENT — PAIN DESCRIPTION - PAIN TYPE
TYPE: ACUTE PAIN

## 2023-10-18 ASSESSMENT — PAIN SCALES - GENERAL
PAINLEVEL_OUTOF10: 8
PAINLEVEL_OUTOF10: 0
PAINLEVEL_OUTOF10: 7
PAINLEVEL_OUTOF10: 0
PAINLEVEL_OUTOF10: 8
PAINLEVEL_OUTOF10: 7
PAINLEVEL_OUTOF10: 8
PAINLEVEL_OUTOF10: 8
PAINLEVEL_OUTOF10: 7
PAINLEVEL_OUTOF10: 0

## 2023-10-18 ASSESSMENT — PAIN DESCRIPTION - ORIENTATION
ORIENTATION: MID
ORIENTATION_2: LEFT;DISTAL
ORIENTATION: MID;LEFT
ORIENTATION: MID

## 2023-10-18 ASSESSMENT — PAIN DESCRIPTION - INTENSITY: RATING_2: 8

## 2023-10-18 NOTE — CONSULTS
[facility]'s  Consult    Patient's Name/Date of Birth: Aaron Grier / 1955 (41 y.o.)    Date: October 18, 2023     Referring Provider: Wood Farah MD    CHIEF COMPLAINT: Chest pain      HPI: This is a pleasant 80 y.o. male with a PMH pertinent for obstructive CAD s/p CABG 9/15/18, prior PCIs, HFrEF s/p AICD 9/13/18, PAF on eliquis, hypertension and hyperlipidemia that presented to [facility] on 10/16/23 for worsening chest pain that started that morning when the patient was at rest. He reports the pain was initially sharp and stabbing but transitioned to a pressure like sensation. He states that it is associated with pain radiating up into his jaw. This chest pressure is associated with shortness of breath. Patient was seen at [facility] last week for simliar complaint and patient reports ythat he underwent LHC at that time. Per patient no intervention was done at that time, he states that he had a blockage in a coronary but was not opened. No documentation received from [facility] at this time. Patient does not follow with a cardiologist at this time, he is [facility] [place] and was only following a PCP. He is non compliant with home medications. On admission to the hospital patient was started on a heparin gtt and his eliquis was held. Patient was started on a nitro gtt that slightly mitigated his chest pain. Patient reports that he has intermittent chest pressure and SOB since admission. Patient seen and evaluated morning of 10/18/23, he reports that he is having some shortness of breath with talking at this time and is having some dizziness while sitting in bed. Patient denies any chest pain or chest pressure at this time. No fever, chills, palpitations, abdominal pain, nausea or vomiting. Echo: MIKEY, 6/19/23 - Moderately decreased left ventricular systolic function. The EF is   visually estimated at 40%. Mild global hypokinesis. Mildly reduced RV function.  Mild biatrial

## 2023-10-18 NOTE — PROGRESS NOTES
Problem: Discharge Planning  Goal: Discharge to home or other facility with appropriate resources  Outcome: Progressing  Note: Pt will discharge home when appropriate. Problem: Pain  Goal: Verbalizes/displays adequate comfort level or baseline comfort level  Outcome: Progressing  Note: Pt denies pain on my shift. Non pharmaceutical interventions like ice and repositioning offered before pain medications. Will continue to assess. Problem: Safety - Adult  Goal: Free from fall injury  Outcome: Progressing  Note: Fall precaution in place. Bed alarm/chair alarm. Bed locked in lowest position. Fall band applied if applicable. Call light and overhead table within reach. Will continue to monitor. Pt verbalizes understanding of care plan. Pt contributes to goal settings.

## 2023-10-18 NOTE — CARE COORDINATION
10/18/23, 11:00 AM EDT    DISCHARGE ON GOING EVALUATION    Maxi Pill day: 0  Location: [facility]/036-A Reason for admit: Chest pain [R07.9]  Chest pain, unspecified type [R07.9]   Procedure: No.  Barriers to Discharge: Cardiology consulted and awaiting recs. Holding Eliquis and on Heparin gtt. Ntg gtt as well. Creat bumped to 1.3 today from 0.9 yesterday. PCP: No primary care provider on file. Patient Goals/Plan/Treatment Preferences: From home with a room mate. Missed recent PCP appt but to be re-scheduled with that [facility] Provider.

## 2023-10-18 NOTE — PROGRESS NOTES
Internal Medicine Resident Progress Note    Name: Raymond Meredith, male, : 1955, MRN: [id number]    PCP: No primary care provider on file. Date of Admission: 10/16/2023  Date of Service: Pt seen/examined on 10/18/23      Assessment/Plan:  Unstable angina: Patient presented with left-sided chest pain, minimally relieved with nitro. It was mildly relieved with morphine. There have been no EKG changes. He had a positive stress test at [facility] and a chronic occlusion of the RCA echo showed EF 35 to 40%. Troponin 15, 14, 14 trending down. Patient was aspirin loaded. Cardio consulted, recommendations appreciated  We will continue medical therapy as ACS is not suspected and no plan for [facility]  We will plan to discontinue heparin drip tomorrow and restart Eliquis  Discontinuing nitro drip, increased home Ranexa and resumed Imdur  Heparin GTT started and holding Eliquis    HTN: Continue home amlodipine, Coreg, hydralazine, lisinopril, Aldactone  Will monitor blood pressure now that nitro drip is off may have to adjust above medications    Paroxysmal A-fib: Will continue home amiodarone, holding Eliquis as patient is on heparin gtt. will transition back to Eliquis tomorrow    HFmrEF: Patient had an echo  stating EF 35 to 40%. Daily weights, strict I's and O's    Microcytic anemia: Continue iron, daily vitamin and B12 supplementation. Baseline seems to be around 14. Disposition:   [x] Home  [] Inpatient Rehab  [] Psychiatric Unit  [] SNF  [] [address] [place] [address]  [] Other-    ===================================================================      Chief Complaint: Chest pain    Hospital Course:    Patient presents the emergency department for complaint of chest pain that started this morning. Patient states chest pain was 10 out of 10 constant radiating to his left arm and down the left arm. He felt short of breath and nauseated with the pain. Denies diaphoresis.   Patient reports he was

## 2023-10-19 LAB
ANION GAP SERPL CALC-SCNC: 10 MEQ/L (ref 8–16)
ANION GAP SERPL CALC-SCNC: 12 MEQ/L (ref 8–16)
APTT PPP: 56.6 SECONDS (ref 22–38)
APTT PPP: 73.4 SECONDS (ref 22–38)
BUN SERPL-MCNC: 32 MG/DL (ref 7–22)
BUN SERPL-MCNC: 34 MG/DL (ref 7–22)
CALCIUM SERPL-MCNC: 8.6 MG/DL (ref 8.5–10.5)
CALCIUM SERPL-MCNC: 8.6 MG/DL (ref 8.5–10.5)
CHLORIDE SERPL-SCNC: 102 MEQ/L (ref 98–111)
CHLORIDE SERPL-SCNC: 104 MEQ/L (ref 98–111)
CO2 SERPL-SCNC: 22 MEQ/L (ref 23–33)
CO2 SERPL-SCNC: 22 MEQ/L (ref 23–33)
CREAT SERPL-MCNC: 1.6 MG/DL (ref 0.4–1.2)
CREAT SERPL-MCNC: 1.7 MG/DL (ref 0.4–1.2)
CREAT UR-MCNC: 249.5 MG/DL
DEPRECATED RDW RBC AUTO: 38.7 FL (ref 35–45)
ERYTHROCYTE [DISTWIDTH] IN BLOOD BY AUTOMATED COUNT: 16 % (ref 11.5–14.5)
GFR SERPL CREATININE-BSD FRML MDRD: 44 ML/MIN/1.73M2
GFR SERPL CREATININE-BSD FRML MDRD: 47 ML/MIN/1.73M2
GLUCOSE SERPL-MCNC: 129 MG/DL (ref 70–108)
GLUCOSE SERPL-MCNC: 90 MG/DL (ref 70–108)
HCT VFR BLD AUTO: 38.8 % (ref 42–52)
HGB BLD-MCNC: 12 GM/DL (ref 14–18)
MCH RBC QN AUTO: 21.6 PG (ref 26–33)
MCHC RBC AUTO-ENTMCNC: 30.9 GM/DL (ref 32.2–35.5)
MCV RBC AUTO: 69.8 FL (ref 80–94)
OSMOLALITY UR: 701 MOSMOL/KG (ref 250–750)
PLATELET # BLD AUTO: 211 THOU/MM3 (ref 130–400)
PMV BLD AUTO: 9.2 FL (ref 9.4–12.4)
POTASSIUM SERPL-SCNC: 4 MEQ/L (ref 3.5–5.2)
POTASSIUM SERPL-SCNC: 4.5 MEQ/L (ref 3.5–5.2)
RBC # BLD AUTO: 5.56 MILL/MM3 (ref 4.7–6.1)
SODIUM SERPL-SCNC: 136 MEQ/L (ref 135–145)
SODIUM SERPL-SCNC: 136 MEQ/L (ref 135–145)
SODIUM UR-SCNC: 57 MEQ/L
UUN 24H UR-MCNC: 1166 MG/DL
WBC # BLD AUTO: 7.6 THOU/MM3 (ref 4.8–10.8)

## 2023-10-19 PROCEDURE — 2580000003 HC RX 258

## 2023-10-19 PROCEDURE — 83935 ASSAY OF URINE OSMOLALITY: CPT

## 2023-10-19 PROCEDURE — 6370000000 HC RX 637 (ALT 250 FOR IP): Performed by: INTERNAL MEDICINE

## 2023-10-19 PROCEDURE — 99232 SBSQ HOSP IP/OBS MODERATE 35: CPT | Performed by: STUDENT IN AN ORGANIZED HEALTH CARE EDUCATION/TRAINING PROGRAM

## 2023-10-19 PROCEDURE — 6370000000 HC RX 637 (ALT 250 FOR IP): Performed by: PHYSICIAN ASSISTANT

## 2023-10-19 PROCEDURE — G0378 HOSPITAL OBSERVATION PER HR: HCPCS

## 2023-10-19 PROCEDURE — 84540 ASSAY OF URINE/UREA-N: CPT

## 2023-10-19 PROCEDURE — 85027 COMPLETE CBC AUTOMATED: CPT

## 2023-10-19 PROCEDURE — 84300 ASSAY OF URINE SODIUM: CPT

## 2023-10-19 PROCEDURE — 36415 COLL VENOUS BLD VENIPUNCTURE: CPT

## 2023-10-19 PROCEDURE — 82570 ASSAY OF URINE CREATININE: CPT

## 2023-10-19 PROCEDURE — 80048 BASIC METABOLIC PNL TOTAL CA: CPT

## 2023-10-19 PROCEDURE — 85730 THROMBOPLASTIN TIME PARTIAL: CPT

## 2023-10-19 PROCEDURE — 6360000002 HC RX W HCPCS

## 2023-10-19 PROCEDURE — 6370000000 HC RX 637 (ALT 250 FOR IP)

## 2023-10-19 RX ORDER — SODIUM CHLORIDE, SODIUM LACTATE, POTASSIUM CHLORIDE, CALCIUM CHLORIDE 600; 310; 30; 20 MG/100ML; MG/100ML; MG/100ML; MG/100ML
INJECTION, SOLUTION INTRAVENOUS CONTINUOUS
Status: ACTIVE | OUTPATIENT
Start: 2023-10-19 | End: 2023-10-20

## 2023-10-19 RX ORDER — LIDOCAINE 4 G/G
1 PATCH TOPICAL DAILY
Status: DISCONTINUED | OUTPATIENT
Start: 2023-10-19 | End: 2023-10-20 | Stop reason: HOSPADM

## 2023-10-19 RX ORDER — SODIUM CHLORIDE, SODIUM LACTATE, POTASSIUM CHLORIDE, CALCIUM CHLORIDE 600; 310; 30; 20 MG/100ML; MG/100ML; MG/100ML; MG/100ML
INJECTION, SOLUTION INTRAVENOUS CONTINUOUS
Status: ACTIVE | OUTPATIENT
Start: 2023-10-19 | End: 2023-10-19

## 2023-10-19 RX ADMIN — SODIUM CHLORIDE, POTASSIUM CHLORIDE, SODIUM LACTATE AND CALCIUM CHLORIDE: 600; 310; 30; 20 INJECTION, SOLUTION INTRAVENOUS at 16:14

## 2023-10-19 RX ADMIN — GABAPENTIN 100 MG: 100 CAPSULE ORAL at 09:08

## 2023-10-19 RX ADMIN — RANOLAZINE 1000 MG: 500 TABLET, EXTENDED RELEASE ORAL at 09:08

## 2023-10-19 RX ADMIN — AMLODIPINE BESYLATE 10 MG: 10 TABLET ORAL at 09:09

## 2023-10-19 RX ADMIN — MORPHINE SULFATE 4 MG: 4 INJECTION, SOLUTION INTRAMUSCULAR; INTRAVENOUS at 21:35

## 2023-10-19 RX ADMIN — APIXABAN 5 MG: 5 TABLET, FILM COATED ORAL at 21:33

## 2023-10-19 RX ADMIN — LISINOPRIL 10 MG: 10 TABLET ORAL at 09:08

## 2023-10-19 RX ADMIN — MORPHINE SULFATE 4 MG: 4 INJECTION, SOLUTION INTRAMUSCULAR; INTRAVENOUS at 09:09

## 2023-10-19 RX ADMIN — FERROUS SULFATE TAB 325 MG (65 MG ELEMENTAL FE) 325 MG: 325 (65 FE) TAB at 16:13

## 2023-10-19 RX ADMIN — RANOLAZINE 1000 MG: 500 TABLET, EXTENDED RELEASE ORAL at 21:33

## 2023-10-19 RX ADMIN — FERROUS SULFATE TAB 325 MG (65 MG ELEMENTAL FE) 325 MG: 325 (65 FE) TAB at 09:08

## 2023-10-19 RX ADMIN — MORPHINE SULFATE 2 MG: 2 INJECTION, SOLUTION INTRAMUSCULAR; INTRAVENOUS at 06:52

## 2023-10-19 RX ADMIN — Medication 1 TABLET: at 09:08

## 2023-10-19 RX ADMIN — GABAPENTIN 100 MG: 100 CAPSULE ORAL at 21:33

## 2023-10-19 RX ADMIN — POLYETHYLENE GLYCOL 3350 17 G: 17 POWDER, FOR SOLUTION ORAL at 09:09

## 2023-10-19 RX ADMIN — ATORVASTATIN CALCIUM 40 MG: 40 TABLET, FILM COATED ORAL at 21:33

## 2023-10-19 RX ADMIN — ACETAMINOPHEN 650 MG: 325 TABLET ORAL at 16:12

## 2023-10-19 RX ADMIN — AMIODARONE HYDROCHLORIDE 200 MG: 200 TABLET ORAL at 09:09

## 2023-10-19 RX ADMIN — GABAPENTIN 100 MG: 100 CAPSULE ORAL at 16:13

## 2023-10-19 RX ADMIN — PANTOPRAZOLE SODIUM 40 MG: 40 TABLET, DELAYED RELEASE ORAL at 06:45

## 2023-10-19 RX ADMIN — APIXABAN 5 MG: 5 TABLET, FILM COATED ORAL at 10:22

## 2023-10-19 RX ADMIN — EMPAGLIFLOZIN 10 MG: 10 TABLET, FILM COATED ORAL at 12:55

## 2023-10-19 RX ADMIN — Medication 1000 MCG: at 09:08

## 2023-10-19 RX ADMIN — ASPIRIN 81 MG: 81 TABLET, CHEWABLE ORAL at 09:14

## 2023-10-19 ASSESSMENT — PAIN DESCRIPTION - LOCATION
LOCATION: HEAD

## 2023-10-19 ASSESSMENT — PAIN DESCRIPTION - ORIENTATION
ORIENTATION: LEFT
ORIENTATION: MID
ORIENTATION: LEFT;DISTAL
ORIENTATION: LEFT;DISTAL

## 2023-10-19 ASSESSMENT — PAIN DESCRIPTION - DESCRIPTORS
DESCRIPTORS: ACHING
DESCRIPTORS: ACHING;DISCOMFORT
DESCRIPTORS: ACHING
DESCRIPTORS: ACHING

## 2023-10-19 ASSESSMENT — PAIN SCALES - GENERAL
PAINLEVEL_OUTOF10: 3
PAINLEVEL_OUTOF10: 4
PAINLEVEL_OUTOF10: 6
PAINLEVEL_OUTOF10: 8
PAINLEVEL_OUTOF10: 0
PAINLEVEL_OUTOF10: 3
PAINLEVEL_OUTOF10: 0
PAINLEVEL_OUTOF10: 0
PAINLEVEL_OUTOF10: 8

## 2023-10-19 ASSESSMENT — PAIN DESCRIPTION - ONSET
ONSET: ON-GOING

## 2023-10-19 ASSESSMENT — PAIN DESCRIPTION - FREQUENCY
FREQUENCY: INTERMITTENT
FREQUENCY: INTERMITTENT
FREQUENCY: CONTINUOUS
FREQUENCY: CONTINUOUS

## 2023-10-19 ASSESSMENT — PAIN - FUNCTIONAL ASSESSMENT
PAIN_FUNCTIONAL_ASSESSMENT: ACTIVITIES ARE NOT PREVENTED

## 2023-10-19 ASSESSMENT — PAIN DESCRIPTION - DIRECTION
RADIATING_TOWARDS: DENIES
RADIATING_TOWARDS: DENIES

## 2023-10-19 ASSESSMENT — PAIN DESCRIPTION - PAIN TYPE
TYPE: ACUTE PAIN

## 2023-10-19 NOTE — PROGRESS NOTES
Internal Medicine Resident Progress Note    Name: Wen Castillo, male, : 1955, MRN: [id number]    PCP: No primary care provider on file. Date of Admission: 10/16/2023  Date of Service: Pt seen/examined on 10/19/23      Assessment/Plan:  Pleuritic chest pain: Patient presented with left-sided chest pain, minimally relieved with nitro. It was mildly relieved with morphine. There have been no EKG changes. He had a positive stress test at [facility] and a chronic occlusion of the RCA echo showed EF 35 to 40%. Troponin 15, 14, 14 trending down. Patient was aspirin loaded. Cardio consulted, recommendations appreciated  We will continue medical therapy as ACS is not suspected and no plan for LHC  He was restarted on his Eliquis today  Discontinuing nitro drip, increased home Ranexa and resumed Imdur  JASMYN: Patient's creatinine jumped to 1.6 today, 1.7 on repeat. We started on 40 mL an hour of fluid, still increased. We will continue IVF overnight with the anticipation that his kidney function will improve    HTN: Continue home amlodipine, Coreg, hydralazine, lisinopril, Aldactone  Will monitor blood pressure now that nitro drip is off may have to adjust above medications    Paroxysmal A-fib: Will continue home amiodarone, restarted home Eliquis    HFmrEF: Patient had an echo  stating EF 35 to 40%. Daily weights, strict I's and O's    Microcytic anemia: Continue iron, daily vitamin and B12 supplementation. Baseline seems to be around 14. Disposition:   [x] Home  [] Inpatient Rehab  [] Psychiatric Unit  [] SNF  [] [address] [place] [address]  [] Other-    ===================================================================      Chief Complaint: Chest pain    Hospital Course:    Patient presents the emergency department for complaint of chest pain that started this morning. Patient states chest pain was 10 out of 10 constant radiating to his left arm and down the left arm.   He felt short of breath

## 2023-10-19 NOTE — CARE COORDINATION
10/19/23, 2:24 PM EDT    DISCHARGE ON GOING EVALUATION    Priscila Arias day: 0  Location: [facility]/036-A Reason for admit: Chest pain [R07.9]  Chest pain, unspecified type [R07.9]   Procedure: No.  Barriers to Discharge:  Heparin gtt discontinued and Eliquis started. Hypotensive today. IVF initiated. Coreg, hydralazine and Imdur held this morning. CM will cont to follow. PCP: No primary care provider on file. Set up with Dr. Jenn Regan on Nov 6. Patient Goals/Plan/Treatment Preferences: Plans return home with room mate. Scheduled with new PCP.

## 2023-10-20 VITALS
OXYGEN SATURATION: 98 % | SYSTOLIC BLOOD PRESSURE: 118 MMHG | BODY MASS INDEX: 25.48 KG/M2 | HEART RATE: 64 BPM | HEIGHT: 69 IN | DIASTOLIC BLOOD PRESSURE: 79 MMHG | TEMPERATURE: 97.6 F | WEIGHT: 172 LBS | RESPIRATION RATE: 18 BRPM

## 2023-10-20 LAB
ANION GAP SERPL CALC-SCNC: 11 MEQ/L (ref 8–16)
BUN SERPL-MCNC: 29 MG/DL (ref 7–22)
CALCIUM SERPL-MCNC: 8.5 MG/DL (ref 8.5–10.5)
CHLORIDE SERPL-SCNC: 104 MEQ/L (ref 98–111)
CO2 SERPL-SCNC: 26 MEQ/L (ref 23–33)
CREAT SERPL-MCNC: 1.3 MG/DL (ref 0.4–1.2)
DEPRECATED RDW RBC AUTO: 38 FL (ref 35–45)
ERYTHROCYTE [DISTWIDTH] IN BLOOD BY AUTOMATED COUNT: 15.6 % (ref 11.5–14.5)
GFR SERPL CREATININE-BSD FRML MDRD: 60 ML/MIN/1.73M2
GLUCOSE SERPL-MCNC: 91 MG/DL (ref 70–108)
HCT VFR BLD AUTO: 38.3 % (ref 42–52)
HGB BLD-MCNC: 12 GM/DL (ref 14–18)
MCH RBC QN AUTO: 21.7 PG (ref 26–33)
MCHC RBC AUTO-ENTMCNC: 31.3 GM/DL (ref 32.2–35.5)
MCV RBC AUTO: 69.4 FL (ref 80–94)
PLATELET # BLD AUTO: 206 THOU/MM3 (ref 130–400)
PMV BLD AUTO: 9 FL (ref 9.4–12.4)
POTASSIUM SERPL-SCNC: 4.4 MEQ/L (ref 3.5–5.2)
RBC # BLD AUTO: 5.52 MILL/MM3 (ref 4.7–6.1)
SODIUM SERPL-SCNC: 141 MEQ/L (ref 135–145)
WBC # BLD AUTO: 7.4 THOU/MM3 (ref 4.8–10.8)

## 2023-10-20 PROCEDURE — 2580000003 HC RX 258: Performed by: PHYSICIAN ASSISTANT

## 2023-10-20 PROCEDURE — 6360000002 HC RX W HCPCS

## 2023-10-20 PROCEDURE — 85027 COMPLETE CBC AUTOMATED: CPT

## 2023-10-20 PROCEDURE — 6370000000 HC RX 637 (ALT 250 FOR IP): Performed by: PHYSICIAN ASSISTANT

## 2023-10-20 PROCEDURE — 36415 COLL VENOUS BLD VENIPUNCTURE: CPT

## 2023-10-20 PROCEDURE — 6370000000 HC RX 637 (ALT 250 FOR IP): Performed by: INTERNAL MEDICINE

## 2023-10-20 PROCEDURE — 80048 BASIC METABOLIC PNL TOTAL CA: CPT

## 2023-10-20 PROCEDURE — G0378 HOSPITAL OBSERVATION PER HR: HCPCS

## 2023-10-20 PROCEDURE — 99239 HOSP IP/OBS DSCHRG MGMT >30: CPT | Performed by: STUDENT IN AN ORGANIZED HEALTH CARE EDUCATION/TRAINING PROGRAM

## 2023-10-20 PROCEDURE — 6370000000 HC RX 637 (ALT 250 FOR IP)

## 2023-10-20 RX ORDER — BUTALBITAL, ACETAMINOPHEN AND CAFFEINE 50; 325; 40 MG/1; MG/1; MG/1
1 TABLET ORAL EVERY 4 HOURS PRN
Status: DISCONTINUED | OUTPATIENT
Start: 2023-10-20 | End: 2023-10-20 | Stop reason: HOSPADM

## 2023-10-20 RX ORDER — LISINOPRIL 2.5 MG/1
2.5 TABLET ORAL DAILY
Qty: 30 TABLET | Refills: 0 | Status: SHIPPED | OUTPATIENT
Start: 2023-10-20

## 2023-10-20 RX ORDER — FERROUS SULFATE 325(65) MG
325 TABLET ORAL EVERY OTHER DAY
Qty: 30 TABLET | Refills: 3 | Status: SHIPPED | OUTPATIENT
Start: 2023-10-20

## 2023-10-20 RX ORDER — RANOLAZINE 1000 MG/1
1000 TABLET, EXTENDED RELEASE ORAL 2 TIMES DAILY
Qty: 60 TABLET | Refills: 3 | Status: SHIPPED | OUTPATIENT
Start: 2023-10-20

## 2023-10-20 RX ORDER — LIDOCAINE 4 G/G
1 PATCH TOPICAL DAILY
Qty: 15 PATCH | Refills: 1 | Status: SHIPPED | OUTPATIENT
Start: 2023-10-21

## 2023-10-20 RX ADMIN — APIXABAN 5 MG: 5 TABLET, FILM COATED ORAL at 10:00

## 2023-10-20 RX ADMIN — CARVEDILOL 6.25 MG: 6.25 TABLET, FILM COATED ORAL at 10:00

## 2023-10-20 RX ADMIN — BUTALBITAL, ACETAMINOPHEN AND CAFFEINE 1 TABLET: 325; 50; 40 TABLET ORAL at 10:15

## 2023-10-20 RX ADMIN — MORPHINE SULFATE 4 MG: 4 INJECTION, SOLUTION INTRAMUSCULAR; INTRAVENOUS at 02:36

## 2023-10-20 RX ADMIN — MORPHINE SULFATE 4 MG: 4 INJECTION, SOLUTION INTRAMUSCULAR; INTRAVENOUS at 05:20

## 2023-10-20 RX ADMIN — GABAPENTIN 100 MG: 100 CAPSULE ORAL at 10:00

## 2023-10-20 RX ADMIN — ISOSORBIDE MONONITRATE 120 MG: 60 TABLET, EXTENDED RELEASE ORAL at 10:01

## 2023-10-20 RX ADMIN — ASPIRIN 81 MG: 81 TABLET, CHEWABLE ORAL at 10:01

## 2023-10-20 RX ADMIN — AMLODIPINE BESYLATE 10 MG: 10 TABLET ORAL at 10:01

## 2023-10-20 RX ADMIN — RANOLAZINE 1000 MG: 500 TABLET, EXTENDED RELEASE ORAL at 10:00

## 2023-10-20 RX ADMIN — PANTOPRAZOLE SODIUM 40 MG: 40 TABLET, DELAYED RELEASE ORAL at 05:20

## 2023-10-20 RX ADMIN — FERROUS SULFATE TAB 325 MG (65 MG ELEMENTAL FE) 325 MG: 325 (65 FE) TAB at 10:00

## 2023-10-20 RX ADMIN — POLYETHYLENE GLYCOL 3350 17 G: 17 POWDER, FOR SOLUTION ORAL at 10:07

## 2023-10-20 RX ADMIN — Medication 1 TABLET: at 10:00

## 2023-10-20 RX ADMIN — SODIUM CHLORIDE, PRESERVATIVE FREE 10 ML: 5 INJECTION INTRAVENOUS at 10:00

## 2023-10-20 RX ADMIN — Medication 1000 MCG: at 10:00

## 2023-10-20 RX ADMIN — AMIODARONE HYDROCHLORIDE 200 MG: 200 TABLET ORAL at 10:00

## 2023-10-20 ASSESSMENT — PAIN DESCRIPTION - DIRECTION: RADIATING_TOWARDS: DENIES

## 2023-10-20 ASSESSMENT — PAIN DESCRIPTION - ONSET
ONSET: ON-GOING

## 2023-10-20 ASSESSMENT — PAIN DESCRIPTION - LOCATION
LOCATION: HEAD

## 2023-10-20 ASSESSMENT — PAIN DESCRIPTION - DESCRIPTORS
DESCRIPTORS: ACHING

## 2023-10-20 ASSESSMENT — PAIN DESCRIPTION - FREQUENCY
FREQUENCY: CONTINUOUS

## 2023-10-20 ASSESSMENT — PAIN SCALES - GENERAL
PAINLEVEL_OUTOF10: 9
PAINLEVEL_OUTOF10: 4
PAINLEVEL_OUTOF10: 9
PAINLEVEL_OUTOF10: 5
PAINLEVEL_OUTOF10: 8

## 2023-10-20 ASSESSMENT — PAIN - FUNCTIONAL ASSESSMENT
PAIN_FUNCTIONAL_ASSESSMENT: ACTIVITIES ARE NOT PREVENTED

## 2023-10-20 ASSESSMENT — PAIN DESCRIPTION - PAIN TYPE
TYPE: ACUTE PAIN
TYPE: ACUTE PAIN
TYPE: CHRONIC PAIN

## 2023-10-20 ASSESSMENT — PAIN DESCRIPTION - ORIENTATION
ORIENTATION: MID
ORIENTATION: LEFT;DISTAL
ORIENTATION: MID;LEFT

## 2023-10-20 NOTE — DISCHARGE SUMMARY
therapeutic multivitamin-minerals tablet  Take 1 tablet by mouth daily            STOP taking these medications      amLODIPine 10 MG tablet  Commonly known as: NORVASC     spironolactone 25 MG tablet  Commonly known as: ALDACTONE               Where to Get Your Medications        These medications were sent to [address], [address] [facility] [place] [address] [id number]      Phone: [phone]   ferrous sulfate 325 (65 Fe) MG tablet  lidocaine 4 % external patch  lisinopril 2.5 MG tablet  ranolazine 1000 MG extended release tablet                      Time Spent on discharge is 40 minutes in the examination, evaluation, counseling and review of medications and discharge plan. Thank you No primary care provider on file. for the opportunity to be involved in this patient's care.       Signed:    Electronically signed by Joshua Hussein MD on 10/20/23 at 6:50 PM EDT     Case was discussed with Attending, Dr. Jason Llamas

## 2023-10-20 NOTE — PROGRESS NOTES
Pt discharge home with taxi transporting; discharge packet reviewed with pt regarding: medications, follow ups, discharge instructions, and pt education with no further questions or concerns voiced at this time. Heart attack teaching covered with patient and/or family or significant other:  Signs and symptoms of a heart attack. When to call 911 and the importance of calling 911. Personal risk factors and ways to lower their risk. 4.   Importance of quitting smoking if applicable. Heart attack booklet given to the patient and/or family or significant other. Reviewed: How to take Nitroglycerin. The importance of participating in Cardiac Rehab and hours of operation. Heart Healthy Diet. Risk factor modification. (Overweight, Obesity, Diabetes, Hypertension, Smoking, High Cholesterol, Stress)  Discharge instructions for Cath/Intervention procedure site if applicable. Packet given and reviewed at discharge.

## 2023-10-20 NOTE — PLAN OF CARE
Problem: Discharge Planning  Goal: Discharge to home or other facility with appropriate resources  Outcome: Progressing  Flowsheets (Taken 10/18/2023 1800)  Discharge to home or other facility with appropriate resources:   Identify barriers to discharge with patient and caregiver   Arrange for needed discharge resources and transportation as appropriate   Identify discharge learning needs (meds, wound care, etc)   Arrange for interpreters to assist at discharge as needed   Refer to discharge planning if patient needs post-hospital services based on physician order or complex needs related to functional status, cognitive ability or social support system     Problem: Pain  Goal: Verbalizes/displays adequate comfort level or baseline comfort level  Outcome: Progressing  Flowsheets (Taken 10/18/2023 1800)  Verbalizes/displays adequate comfort level or baseline comfort level:   Encourage patient to monitor pain and request assistance   Assess pain using appropriate pain scale   Administer analgesics based on type and severity of pain and evaluate response   Implement non-pharmacological measures as appropriate and evaluate response   Consider cultural and social influences on pain and pain management   Notify Licensed Independent Practitioner if interventions unsuccessful or patient reports new pain     Problem: Safety - Adult  Goal: Free from fall injury  Outcome: Progressing  Flowsheets (Taken 10/18/2023 1800)  Free From Fall Injury:   Instruct family/caregiver on patient safety   Based on caregiver fall risk screen, instruct family/caregiver to ask for assistance with transferring infant if caregiver noted to have fall risk factors     Problem: Chronic Conditions and Co-morbidities  Goal: Patient's chronic conditions and co-morbidity symptoms are monitored and maintained or improved  Outcome: Progressing  Flowsheets (Taken 10/18/2023 1800)  Care Plan - Patient's Chronic Conditions and Co-Morbidity Symptoms are
Problem: Discharge Planning  Goal: Discharge to home or other facility with appropriate resources  Outcome: Progressing  Flowsheets (Taken 10/18/2023 1800)  Discharge to home or other facility with appropriate resources:   Identify barriers to discharge with patient and caregiver   Arrange for needed discharge resources and transportation as appropriate   Identify discharge learning needs (meds, wound care, etc)   Arrange for interpreters to assist at discharge as needed   Refer to discharge planning if patient needs post-hospital services based on physician order or complex needs related to functional status, cognitive ability or social support system     Problem: Pain  Goal: Verbalizes/displays adequate comfort level or baseline comfort level  Outcome: Progressing  Flowsheets (Taken 10/18/2023 1800)  Verbalizes/displays adequate comfort level or baseline comfort level:   Encourage patient to monitor pain and request assistance   Assess pain using appropriate pain scale   Administer analgesics based on type and severity of pain and evaluate response   Implement non-pharmacological measures as appropriate and evaluate response   Consider cultural and social influences on pain and pain management   Notify Licensed Independent Practitioner if interventions unsuccessful or patient reports new pain     Problem: Safety - Adult  Goal: Free from fall injury  Outcome: Progressing  Flowsheets (Taken 10/18/2023 1800)  Free From Fall Injury:   Instruct family/caregiver on patient safety   Based on caregiver fall risk screen, instruct family/caregiver to ask for assistance with transferring infant if caregiver noted to have fall risk factors     Problem: Chronic Conditions and Co-morbidities  Goal: Patient's chronic conditions and co-morbidity symptoms are monitored and maintained or improved  Outcome: Progressing  Flowsheets (Taken 10/18/2023 1800)  Care Plan - Patient's Chronic Conditions and Co-Morbidity Symptoms are
Problem: Discharge Planning  Goal: Discharge to home or other facility with appropriate resources  Outcome: Progressing  Note: Pt will discharge home when appropriate. Problem: Pain  Goal: Verbalizes/displays adequate comfort level or baseline comfort level  Outcome: Progressing  Note: Pt denies pain on my shift. Non pharmaceutical interventions like ice and repositioning offered before pain medications. Will continue to assess. Problem: Safety - Adult  Goal: Free from fall injury  Outcome: Progressing  Note: Fall precaution in place. Bed alarm/chair alarm. Bed locked in lowest position. Fall band applied if applicable. Call light and overhead table within reach. Will continue to monitor. Pt verbalizes understanding of care plan. Pt contributes to goal settings.
Monitored and Maintained or Improved:   Monitor and assess patient's chronic conditions and comorbid symptoms for stability, deterioration, or improvement   Collaborate with multidisciplinary team to address chronic and comorbid conditions and prevent exacerbation or deterioration   Update acute care plan with appropriate goals if chronic or comorbid symptoms are exacerbated and prevent overall improvement and discharge     Problem: Neurosensory - Adult  Goal: Achieves maximal functionality and self care  Outcome: Progressing  Flowsheets (Taken 10/18/2023 1800)  Achieves maximal functionality and self care:   Monitor swallowing and airway patency with patient fatigue and changes in neurological status   Encourage and assist patient to increase activity and self care with guidance from physical therapy/occupational therapy   Encourage visually impaired, hearing impaired and aphasic patients to use assistive/communication devices     Problem: Respiratory - Adult  Goal: Achieves optimal ventilation and oxygenation  Outcome: Progressing  Flowsheets (Taken 10/18/2023 1800)  Achieves optimal ventilation and oxygenation:   Assess for changes in respiratory status   Assess for changes in mentation and behavior   Position to facilitate oxygenation and minimize respiratory effort   Oxygen supplementation based on oxygen saturation or arterial blood gases   Initiate smoking cessation protocol as indicated   Encourage broncho-pulmonary hygiene including cough, deep breathe, incentive spirometry   Assess the need for suctioning and aspirate as needed   Assess and instruct to report shortness of breath or any respiratory difficulty   Respiratory therapy support as indicated     Problem: Cardiovascular - Adult  Goal: Maintains optimal cardiac output and hemodynamic stability  Outcome: Progressing  Flowsheets (Taken 10/18/2023 1800)  Maintains optimal cardiac output and hemodynamic stability:   Monitor blood pressure and heart

## 2023-10-20 NOTE — CARE COORDINATION
10/20/23, 1:16 PM EDT    Patient goals/plan/ treatment preferences discussed by  and . Patient goals/plan/ treatment preferences reviewed with patient/ family. Patient/ family verbalize understanding of discharge plan and are in agreement with goal/plan/treatment preferences. Understanding was demonstrated using the teach back method. AVS provided by RN at time of discharge, which includes all necessary medical information pertaining to the patients current course of illness, treatment, post-discharge goals of care, and treatment preferences. Services At/After Discharge: In cab       IMM Letter  Observation Status Letter date given[de-identified] 10/16/23  Observation Status Letter time given[de-identified] 2005  Observation Status Letter given to Patient/Family/Significant other/Guardian/POA/by[de-identified] Pt Access     Discharged home today to home with room mate. Transportation per cab.

## 2023-10-20 NOTE — DISCHARGE INSTRUCTIONS
Follow up with Dr. Magnolia Ramos, endocrinology, pain management and the family medicine clinic. Stop amlodipine, aldactone. We have decreased your dose of lisinopril to 2.5 mg. We have increased your ranexa to 1000 mg BID. Make sure you check your blood pressure and write it down as your blood pressure has been running soft.      Follow up with primary cardiologist 2 weeks, if does not have one then dr romero 2 weeks    Get blood work (a BMP) in 1 week

## 2024-01-04 ENCOUNTER — HOSPITAL ENCOUNTER (EMERGENCY)
Age: 69
Discharge: HOME | End: 2024-01-04
Attending: FAMILY MEDICINE
Payer: MEDICARE

## 2024-01-04 ENCOUNTER — APPOINTMENT (OUTPATIENT)
Dept: GENERAL RADIOLOGY | Age: 69
End: 2024-01-04
Payer: MEDICARE

## 2024-01-04 VITALS
SYSTOLIC BLOOD PRESSURE: 134 MMHG | WEIGHT: 172 LBS | OXYGEN SATURATION: 97 % | DIASTOLIC BLOOD PRESSURE: 95 MMHG | BODY MASS INDEX: 25.4 KG/M2 | TEMPERATURE: 98.8 F | HEART RATE: 78 BPM | RESPIRATION RATE: 16 BRPM

## 2024-01-04 DIAGNOSIS — U07.1 COVID-19 VIRUS INFECTION: Primary | ICD-10-CM

## 2024-01-04 LAB
ANION GAP SERPL CALC-SCNC: 9 MEQ/L (ref 8–16)
BASOPHILS ABSOLUTE: 0 THOU/MM3 (ref 0–0.1)
BASOPHILS NFR BLD AUTO: 0.4 %
BUN SERPL-MCNC: 18 MG/DL (ref 7–22)
CALCIUM SERPL-MCNC: 9.5 MG/DL (ref 8.5–10.5)
CHLORIDE SERPL-SCNC: 104 MEQ/L (ref 98–111)
CO2 SERPL-SCNC: 27 MEQ/L (ref 23–33)
CREAT SERPL-MCNC: 1.2 MG/DL (ref 0.4–1.2)
DEPRECATED RDW RBC AUTO: 40.6 FL (ref 35–45)
EOSINOPHIL NFR BLD AUTO: 1.2 %
EOSINOPHILS ABSOLUTE: 0.1 THOU/MM3 (ref 0–0.4)
ERYTHROCYTE [DISTWIDTH] IN BLOOD BY AUTOMATED COUNT: 17.7 % (ref 11.5–14.5)
FLUAV RNA RESP QL NAA+PROBE: NOT DETECTED
FLUBV RNA RESP QL NAA+PROBE: NOT DETECTED
GFR SERPL CREATININE-BSD FRML MDRD: > 60 ML/MIN/1.73M2
GLUCOSE SERPL-MCNC: 111 MG/DL (ref 70–108)
HCT VFR BLD AUTO: 44.9 % (ref 42–52)
HGB BLD-MCNC: 13.8 GM/DL (ref 14–18)
IMM GRANULOCYTES # BLD AUTO: 0.06 THOU/MM3 (ref 0–0.07)
IMM GRANULOCYTES NFR BLD AUTO: 0.8 %
LYMPHOCYTES ABSOLUTE: 1.1 THOU/MM3 (ref 1–4.8)
LYMPHOCYTES NFR BLD AUTO: 15.6 %
MCH RBC QN AUTO: 21.8 PG (ref 26–33)
MCHC RBC AUTO-ENTMCNC: 30.7 GM/DL (ref 32.2–35.5)
MCV RBC AUTO: 70.9 FL (ref 80–94)
MONOCYTES ABSOLUTE: 1 THOU/MM3 (ref 0.4–1.3)
MONOCYTES NFR BLD AUTO: 13.1 %
NEUTROPHILS NFR BLD AUTO: 68.9 %
NRBC BLD AUTO-RTO: 0 /100 WBC
NT-PROBNP SERPL IA-MCNC: 355.6 PG/ML (ref 0–124)
OSMOLALITY SERPL CALC.SUM OF ELEC: 282 MOSMOL/KG (ref 275–300)
PLATELET # BLD AUTO: 215 THOU/MM3 (ref 130–400)
PMV BLD AUTO: 9.2 FL (ref 9.4–12.4)
POTASSIUM SERPL-SCNC: 4.2 MEQ/L (ref 3.5–5.2)
RBC # BLD AUTO: 6.33 MILL/MM3 (ref 4.7–6.1)
SARS-COV-2 RNA RESP QL NAA+PROBE: DETECTED
SEGMENTED NEUTROPHILS ABSOLUTE COUNT: 5 THOU/MM3 (ref 1.8–7.7)
SODIUM SERPL-SCNC: 140 MEQ/L (ref 135–145)
TROPONIN, HIGH SENSITIVITY: 8 NG/L (ref 0–12)
WBC # BLD AUTO: 7.3 THOU/MM3 (ref 4.8–10.8)

## 2024-01-04 PROCEDURE — 83880 ASSAY OF NATRIURETIC PEPTIDE: CPT

## 2024-01-04 PROCEDURE — 6370000000 HC RX 637 (ALT 250 FOR IP): Performed by: EMERGENCY MEDICINE

## 2024-01-04 PROCEDURE — 87636 SARSCOV2 & INF A&B AMP PRB: CPT

## 2024-01-04 PROCEDURE — 71046 X-RAY EXAM CHEST 2 VIEWS: CPT

## 2024-01-04 PROCEDURE — 84484 ASSAY OF TROPONIN QUANT: CPT

## 2024-01-04 PROCEDURE — 93005 ELECTROCARDIOGRAM TRACING: CPT | Performed by: STUDENT IN AN ORGANIZED HEALTH CARE EDUCATION/TRAINING PROGRAM

## 2024-01-04 PROCEDURE — 80048 BASIC METABOLIC PNL TOTAL CA: CPT

## 2024-01-04 PROCEDURE — 85025 COMPLETE CBC W/AUTO DIFF WBC: CPT

## 2024-01-04 PROCEDURE — 36415 COLL VENOUS BLD VENIPUNCTURE: CPT

## 2024-01-04 PROCEDURE — 99285 EMERGENCY DEPT VISIT HI MDM: CPT

## 2024-01-04 RX ORDER — ACETAMINOPHEN 500 MG
TABLET ORAL
Status: DISCONTINUED
Start: 2024-01-04 | End: 2024-01-04 | Stop reason: HOSPADM

## 2024-01-04 RX ORDER — ACETAMINOPHEN 500 MG
1000 TABLET ORAL ONCE
Status: COMPLETED | OUTPATIENT
Start: 2024-01-04 | End: 2024-01-04

## 2024-01-04 RX ADMIN — ACETAMINOPHEN 1000 MG: 500 TABLET, FILM COATED ORAL at 17:55

## 2024-01-04 ASSESSMENT — PAIN SCALES - GENERAL
PAINLEVEL_OUTOF10: 5
PAINLEVEL_OUTOF10: 10

## 2024-01-04 ASSESSMENT — PAIN DESCRIPTION - LOCATION
LOCATION: CHEST
LOCATION: CHEST;HEAD

## 2024-01-04 ASSESSMENT — PAIN - FUNCTIONAL ASSESSMENT: PAIN_FUNCTIONAL_ASSESSMENT: 0-10

## 2024-01-04 ASSESSMENT — PAIN DESCRIPTION - PAIN TYPE: TYPE: ACUTE PAIN

## 2024-01-04 ASSESSMENT — HEART SCORE: ECG: 1

## 2024-01-04 ASSESSMENT — PAIN DESCRIPTION - FREQUENCY: FREQUENCY: CONTINUOUS

## 2024-01-04 ASSESSMENT — PAIN DESCRIPTION - ONSET: ONSET: SUDDEN

## 2024-01-04 NOTE — ED PROVIDER NOTES
[facility] EMERGENCY DEPT    EMERGENCY MEDICINE      Pt Name: Crhiss Braga  MRN: [id number]  Birthdate 1955  Date of evaluation: 1/4/2024  Treating Physician:   Resident Physician: An Nolan MD    CHIEF COMPLAINT       Chief Complaint   Patient presents with    Chest Pain     History obtained from chart review and the patient.    HISTORY OF PRESENT ILLNESS    HPI    Chriss Braga is a 68 y.o. male with PMH of ICD, HTN, polysubstance abuse CVA, presents to the emergency department for evaluation of headache with cough and chest pain for the past 2 days.  Patient stated that his roommate told him that he had COVID and patient's been having this pain that has not gone away.  Patient is denying any shortness of breath, fever, nausea, vomiting, diarrhea, trauma.    The patient has no other acute complaints at this time.    PAST MEDICAL AND SURGICAL HISTORY     Past Medical History:   Diagnosis Date    Anemia     Microcytic anemia.     Angina     Arthritis     Atypical chest pain     Question if this is secondary to his uncontrolled hypertension or if this is secondary to exacerbation of COPD or secondary to his recent ICD implantation.    Blood transfusion 2011    CAD (coronary artery disease)     Cardiomyopathy (HCC)     Cataract     bilateral    Chest discomfort     Depression     Dizziness     Patient complains of dizziness with movement.     Erectile dysfunction     Possible erectile dysfunction symptoms.     Family history of hypertension     Frequent nocturnal awakening     Frequent nocturnal awakenings in a patient with a history of CHF, S/P defibrillator placement and excessive daytime sleepiness, rule out sleep apnea versus central sleep apnea.     GERD (gastroesophageal reflux disease)     Currently controlled.     Glaucoma     Headache(784.0)     Homeless     Social issue with the patient currently being homeless.     Hyperlipidemia     Treated medically.    Hypertension     Essential  a visit in 1 week  to establish primary care        This transcription was electronically signed. Parts of this transcriptions may have been dictated by use of voice recognition software and electronically transcribed, and parts may have been transcribed with the assistance of an ED scribe. The transcription may contain errors not detected in proofreading. Please refer to my supervising physician's documentation if my documentation differs.    Electronically Signed: An Nolan MD, 01/04/24, 6:17 PM

## 2024-01-04 NOTE — DISCHARGE INSTRUCTIONS
Take your medication as indicated.  For pain use ibuprofen (Motrin / Advil) or acetaminophen (Tylenol), unless prescribed medications that have acetaminophen in it.  You can take over the counter acetaminophen tablets (1 - 2 tablets of the 500-mg strength every 6 hours) or ibuprofen tablets (2 tablets every 4 hours).    If you have not had a stress test in over a year your primary care physician may order this test as further work-up for your chest pain.  If you have a cardiologist, then you should also call them to discuss further treatment options.    PLEASE RETURN TO THE EMERGENCY DEPARTMENT IMMEDIATELY for worsening symptoms of increasing pain, shortness of breath, feeling of your heart fluttering or racing, swelling to your feet, unable to lay flat, or if you develop any concerning symptoms such as: high fever not relieved by acetaminophen (Tylenol) and/or ibuprofen (Motrin / Advil), chills, persistent nausea and/or vomiting, loss of consciousness, numbness, weakness or tingling in the arms or legs or change in color of the extremities, changes in mental status, persistent headache, blurry vision, loss of bladder / bowel control, unable to follow up with your physician, or other any other care or concern.

## 2024-01-04 NOTE — ED NOTES
[facility] called for a [facility]. [facility] [employer] ETA is about 45-60minutes. Patient given oral nutrition and ice water.

## 2024-01-05 LAB
EKG ATRIAL RATE: 84 BPM
EKG P AXIS: 79 DEGREES
EKG P-R INTERVAL: 168 MS
EKG Q-T INTERVAL: 374 MS
EKG QRS DURATION: 74 MS
EKG QTC CALCULATION (BAZETT): 441 MS
EKG R AXIS: 48 DEGREES
EKG T AXIS: 79 DEGREES
EKG VENTRICULAR RATE: 84 BPM

## 2024-01-05 PROCEDURE — 93010 ELECTROCARDIOGRAM REPORT: CPT | Performed by: INTERNAL MEDICINE

## 2024-01-17 ENCOUNTER — HOSPITAL ENCOUNTER (INPATIENT)
Age: 69
LOS: 5 days | Discharge: HOME HEALTH | DRG: 322 | End: 2024-01-22
Attending: EMERGENCY MEDICINE | Admitting: INTERNAL MEDICINE
Payer: MEDICARE

## 2024-01-17 ENCOUNTER — APPOINTMENT (OUTPATIENT)
Age: 69
DRG: 322 | End: 2024-01-17
Attending: INTERNAL MEDICINE
Payer: MEDICARE

## 2024-01-17 DIAGNOSIS — I21.3 STEMI (ST ELEVATION MYOCARDIAL INFARCTION) (HCC): ICD-10-CM

## 2024-01-17 DIAGNOSIS — I21.29 ST ELEVATION MYOCARDIAL INFARCTION (STEMI) INVOLVING OTHER CORONARY ARTERY (HCC): Primary | ICD-10-CM

## 2024-01-17 DIAGNOSIS — I21.02 ST ELEVATION MYOCARDIAL INFARCTION INVOLVING LEFT ANTERIOR DESCENDING (LAD) CORONARY ARTERY (HCC): ICD-10-CM

## 2024-01-17 DIAGNOSIS — R07.9 CHEST PAIN, UNSPECIFIED TYPE: ICD-10-CM

## 2024-01-17 LAB
ABO: NORMAL
ACTIVATED CLOTTING TIME: 239 SECONDS (ref 1–150)
ANION GAP SERPL CALC-SCNC: 13 MEQ/L (ref 8–16)
ANTIBODY SCREEN: NORMAL
BUN SERPL-MCNC: 21 MG/DL (ref 7–22)
CALCIUM SERPL-MCNC: 9.4 MG/DL (ref 8.5–10.5)
CHLORIDE SERPL-SCNC: 104 MEQ/L (ref 98–111)
CHOLEST SERPL-MCNC: 198 MG/DL (ref 100–199)
CO2 SERPL-SCNC: 22 MEQ/L (ref 23–33)
CREAT SERPL-MCNC: 1 MG/DL (ref 0.4–1.2)
GFR SERPL CREATININE-BSD FRML MDRD: > 60 ML/MIN/1.73M2
GLUCOSE SERPL-MCNC: 121 MG/DL (ref 70–108)
HDLC SERPL-MCNC: 49 MG/DL
INR PPP: 0.99 (ref 0.85–1.13)
LDLC SERPL CALC-MCNC: 130 MG/DL
MAGNESIUM SERPL-MCNC: 2 MG/DL (ref 1.6–2.4)
PHOSPHATE SERPL-MCNC: 2.7 MG/DL (ref 2.4–4.7)
PLATELET # BLD AUTO: 228 THOU/MM3 (ref 130–400)
POTASSIUM SERPL-SCNC: 3.9 MEQ/L (ref 3.5–5.2)
POTASSIUM SERPL-SCNC: 4 MEQ/L (ref 3.5–5.2)
RH FACTOR: NORMAL
SODIUM SERPL-SCNC: 139 MEQ/L (ref 135–145)
TRIGL SERPL-MCNC: 94 MG/DL (ref 0–199)
TROPONIN, HIGH SENSITIVITY: 7 NG/L (ref 0–12)

## 2024-01-17 PROCEDURE — C1725 CATH, TRANSLUMIN NON-LASER: HCPCS | Performed by: INTERNAL MEDICINE

## 2024-01-17 PROCEDURE — C1769 GUIDE WIRE: HCPCS | Performed by: INTERNAL MEDICINE

## 2024-01-17 PROCEDURE — 84484 ASSAY OF TROPONIN QUANT: CPT

## 2024-01-17 PROCEDURE — 80048 BASIC METABOLIC PNL TOTAL CA: CPT

## 2024-01-17 PROCEDURE — 027034Z DILATION OF CORONARY ARTERY, ONE ARTERY WITH DRUG-ELUTING INTRALUMINAL DEVICE, PERCUTANEOUS APPROACH: ICD-10-PCS | Performed by: INTERNAL MEDICINE

## 2024-01-17 PROCEDURE — 6370000000 HC RX 637 (ALT 250 FOR IP): Performed by: INTERNAL MEDICINE

## 2024-01-17 PROCEDURE — 85610 PROTHROMBIN TIME: CPT

## 2024-01-17 PROCEDURE — 99152 MOD SED SAME PHYS/QHP 5/>YRS: CPT | Performed by: INTERNAL MEDICINE

## 2024-01-17 PROCEDURE — 6360000002 HC RX W HCPCS

## 2024-01-17 PROCEDURE — 85049 AUTOMATED PLATELET COUNT: CPT

## 2024-01-17 PROCEDURE — 6360000004 HC RX CONTRAST MEDICATION: Performed by: INTERNAL MEDICINE

## 2024-01-17 PROCEDURE — 83735 ASSAY OF MAGNESIUM: CPT

## 2024-01-17 PROCEDURE — 2709999900 HC NON-CHARGEABLE SUPPLY: Performed by: INTERNAL MEDICINE

## 2024-01-17 PROCEDURE — 6370000000 HC RX 637 (ALT 250 FOR IP): Performed by: PHYSICIAN ASSISTANT

## 2024-01-17 PROCEDURE — 6360000002 HC RX W HCPCS: Performed by: INTERNAL MEDICINE

## 2024-01-17 PROCEDURE — 84100 ASSAY OF PHOSPHORUS: CPT

## 2024-01-17 PROCEDURE — C1887 CATHETER, GUIDING: HCPCS | Performed by: INTERNAL MEDICINE

## 2024-01-17 PROCEDURE — C1894 INTRO/SHEATH, NON-LASER: HCPCS | Performed by: INTERNAL MEDICINE

## 2024-01-17 PROCEDURE — C9606 PERC D-E COR REVASC W AMI S: HCPCS | Performed by: INTERNAL MEDICINE

## 2024-01-17 PROCEDURE — 93010 ELECTROCARDIOGRAM REPORT: CPT | Performed by: NUCLEAR MEDICINE

## 2024-01-17 PROCEDURE — 86900 BLOOD TYPING SEROLOGIC ABO: CPT

## 2024-01-17 PROCEDURE — C1760 CLOSURE DEV, VASC: HCPCS | Performed by: INTERNAL MEDICINE

## 2024-01-17 PROCEDURE — 93005 ELECTROCARDIOGRAM TRACING: CPT | Performed by: INTERNAL MEDICINE

## 2024-01-17 PROCEDURE — 86901 BLOOD TYPING SEROLOGIC RH(D): CPT

## 2024-01-17 PROCEDURE — 93455 CORONARY ART/GRFT ANGIO S&I: CPT | Performed by: INTERNAL MEDICINE

## 2024-01-17 PROCEDURE — 99285 EMERGENCY DEPT VISIT HI MDM: CPT

## 2024-01-17 PROCEDURE — 99153 MOD SED SAME PHYS/QHP EA: CPT | Performed by: INTERNAL MEDICINE

## 2024-01-17 PROCEDURE — 86850 RBC ANTIBODY SCREEN: CPT

## 2024-01-17 PROCEDURE — 93306 TTE W/DOPPLER COMPLETE: CPT

## 2024-01-17 PROCEDURE — 93005 ELECTROCARDIOGRAM TRACING: CPT | Performed by: EMERGENCY MEDICINE

## 2024-01-17 PROCEDURE — 84132 ASSAY OF SERUM POTASSIUM: CPT

## 2024-01-17 PROCEDURE — 36415 COLL VENOUS BLD VENIPUNCTURE: CPT

## 2024-01-17 PROCEDURE — 80061 LIPID PANEL: CPT

## 2024-01-17 PROCEDURE — 85347 COAGULATION TIME ACTIVATED: CPT

## 2024-01-17 PROCEDURE — 4A023N7 MEASUREMENT OF CARDIAC SAMPLING AND PRESSURE, LEFT HEART, PERCUTANEOUS APPROACH: ICD-10-PCS | Performed by: INTERNAL MEDICINE

## 2024-01-17 PROCEDURE — C1874 STENT, COATED/COV W/DEL SYS: HCPCS | Performed by: INTERNAL MEDICINE

## 2024-01-17 PROCEDURE — 2100000000 HC CCU R&B

## 2024-01-17 PROCEDURE — B2111ZZ FLUOROSCOPY OF MULTIPLE CORONARY ARTERIES USING LOW OSMOLAR CONTRAST: ICD-10-PCS | Performed by: INTERNAL MEDICINE

## 2024-01-17 PROCEDURE — 2500000003 HC RX 250 WO HCPCS: Performed by: INTERNAL MEDICINE

## 2024-01-17 DEVICE — STENT CORONARY ONYX FRONTIER RX 2.5X18 MM ZOTAROLIMUS ELUT: Type: IMPLANTABLE DEVICE | Status: FUNCTIONAL

## 2024-01-17 RX ORDER — ATORVASTATIN CALCIUM 80 MG/1
80 TABLET, FILM COATED ORAL NIGHTLY
Status: DISCONTINUED | OUTPATIENT
Start: 2024-01-17 | End: 2024-01-22 | Stop reason: HOSPADM

## 2024-01-17 RX ORDER — MORPHINE SULFATE 4 MG/ML
INJECTION, SOLUTION INTRAMUSCULAR; INTRAVENOUS
Status: COMPLETED
Start: 2024-01-17 | End: 2024-01-17

## 2024-01-17 RX ORDER — LIDOCAINE HYDROCHLORIDE 20 MG/ML
INJECTION, SOLUTION EPIDURAL; INFILTRATION; INTRACAUDAL; PERINEURAL PRN
Status: DISCONTINUED | OUTPATIENT
Start: 2024-01-17 | End: 2024-01-17 | Stop reason: HOSPADM

## 2024-01-17 RX ORDER — GABAPENTIN 100 MG/1
100 CAPSULE ORAL 3 TIMES DAILY
Status: DISCONTINUED | OUTPATIENT
Start: 2024-01-17 | End: 2024-01-17 | Stop reason: ALTCHOICE

## 2024-01-17 RX ORDER — LISINOPRIL 2.5 MG/1
2.5 TABLET ORAL DAILY
Status: DISCONTINUED | OUTPATIENT
Start: 2024-01-17 | End: 2024-01-22 | Stop reason: HOSPADM

## 2024-01-17 RX ORDER — FENTANYL CITRATE 50 UG/ML
INJECTION, SOLUTION INTRAMUSCULAR; INTRAVENOUS PRN
Status: DISCONTINUED | OUTPATIENT
Start: 2024-01-17 | End: 2024-01-17 | Stop reason: HOSPADM

## 2024-01-17 RX ORDER — ASPIRIN 81 MG/1
81 TABLET, CHEWABLE ORAL DAILY
Status: DISCONTINUED | OUTPATIENT
Start: 2024-01-18 | End: 2024-01-18

## 2024-01-17 RX ORDER — SODIUM CHLORIDE 0.9 % (FLUSH) 0.9 %
5-40 SYRINGE (ML) INJECTION PRN
Status: DISCONTINUED | OUTPATIENT
Start: 2024-01-17 | End: 2024-01-22 | Stop reason: HOSPADM

## 2024-01-17 RX ORDER — SODIUM CHLORIDE 9 MG/ML
INJECTION, SOLUTION INTRAVENOUS PRN
Status: DISCONTINUED | OUTPATIENT
Start: 2024-01-17 | End: 2024-01-22 | Stop reason: HOSPADM

## 2024-01-17 RX ORDER — ATORVASTATIN CALCIUM 40 MG/1
40 TABLET, FILM COATED ORAL NIGHTLY
Status: DISCONTINUED | OUTPATIENT
Start: 2024-01-17 | End: 2024-01-18 | Stop reason: SDUPTHER

## 2024-01-17 RX ORDER — AMIODARONE HYDROCHLORIDE 200 MG/1
200 TABLET ORAL DAILY
Status: DISCONTINUED | OUTPATIENT
Start: 2024-01-17 | End: 2024-01-22 | Stop reason: HOSPADM

## 2024-01-17 RX ORDER — HEPARIN SODIUM 1000 [USP'U]/ML
INJECTION, SOLUTION INTRAVENOUS; SUBCUTANEOUS PRN
Status: DISCONTINUED | OUTPATIENT
Start: 2024-01-17 | End: 2024-01-17 | Stop reason: HOSPADM

## 2024-01-17 RX ORDER — NITROGLYCERIN 20 MG/100ML
INJECTION INTRAVENOUS CONTINUOUS PRN
Status: COMPLETED | OUTPATIENT
Start: 2024-01-17 | End: 2024-01-17

## 2024-01-17 RX ORDER — CARVEDILOL 6.25 MG/1
6.25 TABLET ORAL 2 TIMES DAILY WITH MEALS
Status: DISCONTINUED | OUTPATIENT
Start: 2024-01-17 | End: 2024-01-19

## 2024-01-17 RX ORDER — MIDAZOLAM HYDROCHLORIDE 1 MG/ML
INJECTION INTRAMUSCULAR; INTRAVENOUS PRN
Status: DISCONTINUED | OUTPATIENT
Start: 2024-01-17 | End: 2024-01-17 | Stop reason: HOSPADM

## 2024-01-17 RX ORDER — ACETAMINOPHEN 325 MG/1
650 TABLET ORAL EVERY 4 HOURS PRN
Status: DISCONTINUED | OUTPATIENT
Start: 2024-01-17 | End: 2024-01-22 | Stop reason: HOSPADM

## 2024-01-17 RX ORDER — LANOLIN ALCOHOL/MO/W.PET/CERES
1000 CREAM (GRAM) TOPICAL DAILY
Status: DISCONTINUED | OUTPATIENT
Start: 2024-01-17 | End: 2024-01-22 | Stop reason: HOSPADM

## 2024-01-17 RX ORDER — M-VIT,TX,IRON,MINS/CALC/FOLIC 27MG-0.4MG
1 TABLET ORAL DAILY
Status: DISCONTINUED | OUTPATIENT
Start: 2024-01-17 | End: 2024-01-17 | Stop reason: CLARIF

## 2024-01-17 RX ORDER — AMLODIPINE BESYLATE 10 MG/1
10 TABLET ORAL DAILY
COMMUNITY

## 2024-01-17 RX ORDER — ASPIRIN 81 MG/1
81 TABLET, CHEWABLE ORAL DAILY
Status: DISCONTINUED | OUTPATIENT
Start: 2024-01-17 | End: 2024-01-22 | Stop reason: HOSPADM

## 2024-01-17 RX ORDER — HYDROCODONE BITARTRATE AND ACETAMINOPHEN 5; 325 MG/1; MG/1
1 TABLET ORAL EVERY 4 HOURS PRN
Status: DISCONTINUED | OUTPATIENT
Start: 2024-01-17 | End: 2024-01-18

## 2024-01-17 RX ORDER — ISOSORBIDE MONONITRATE 60 MG/1
60 TABLET, EXTENDED RELEASE ORAL 2 TIMES DAILY
Status: DISCONTINUED | OUTPATIENT
Start: 2024-01-17 | End: 2024-01-22 | Stop reason: HOSPADM

## 2024-01-17 RX ORDER — MULTIVITAMIN WITH IRON
1 TABLET ORAL DAILY
Status: DISCONTINUED | OUTPATIENT
Start: 2024-01-17 | End: 2024-01-22 | Stop reason: HOSPADM

## 2024-01-17 RX ORDER — AMIODARONE HYDROCHLORIDE 200 MG/1
200 TABLET ORAL DAILY
Status: DISCONTINUED | OUTPATIENT
Start: 2024-01-17 | End: 2024-01-17

## 2024-01-17 RX ORDER — SODIUM CHLORIDE 0.9 % (FLUSH) 0.9 %
5-40 SYRINGE (ML) INJECTION EVERY 12 HOURS SCHEDULED
Status: DISCONTINUED | OUTPATIENT
Start: 2024-01-17 | End: 2024-01-22 | Stop reason: HOSPADM

## 2024-01-17 RX ORDER — SPIRONOLACTONE 25 MG/1
25 TABLET ORAL DAILY
COMMUNITY

## 2024-01-17 RX ORDER — RANOLAZINE 500 MG/1
1000 TABLET, EXTENDED RELEASE ORAL 2 TIMES DAILY
Status: DISCONTINUED | OUTPATIENT
Start: 2024-01-17 | End: 2024-01-22 | Stop reason: HOSPADM

## 2024-01-17 RX ORDER — NITROGLYCERIN 20 MG/100ML
5-200 INJECTION INTRAVENOUS CONTINUOUS
Status: DISCONTINUED | OUTPATIENT
Start: 2024-01-17 | End: 2024-01-18

## 2024-01-17 RX ORDER — FERROUS SULFATE 325(65) MG
325 TABLET ORAL EVERY OTHER DAY
Status: DISCONTINUED | OUTPATIENT
Start: 2024-01-17 | End: 2024-01-22 | Stop reason: HOSPADM

## 2024-01-17 RX ORDER — PANTOPRAZOLE SODIUM 40 MG/1
40 TABLET, DELAYED RELEASE ORAL
Status: DISCONTINUED | OUTPATIENT
Start: 2024-01-18 | End: 2024-01-17 | Stop reason: ALTCHOICE

## 2024-01-17 RX ORDER — SACUBITRIL AND VALSARTAN 24; 26 MG/1; MG/1
1 TABLET, FILM COATED ORAL 2 TIMES DAILY
Status: ON HOLD | COMMUNITY
End: 2024-01-17

## 2024-01-17 RX ADMIN — LISINOPRIL 2.5 MG: 2.5 TABLET ORAL at 17:00

## 2024-01-17 RX ADMIN — ISOSORBIDE MONONITRATE 60 MG: 60 TABLET, EXTENDED RELEASE ORAL at 20:03

## 2024-01-17 RX ADMIN — FERROUS SULFATE TAB 325 MG (65 MG ELEMENTAL FE) 325 MG: 325 (65 FE) TAB at 16:58

## 2024-01-17 RX ADMIN — TICAGRELOR 90 MG: 90 TABLET ORAL at 20:03

## 2024-01-17 RX ADMIN — HYDROCODONE BITARTRATE AND ACETAMINOPHEN 1 TABLET: 5; 325 TABLET ORAL at 23:49

## 2024-01-17 RX ADMIN — MORPHINE SULFATE: 4 INJECTION, SOLUTION INTRAMUSCULAR; INTRAVENOUS at 13:18

## 2024-01-17 RX ADMIN — RANOLAZINE 1000 MG: 500 TABLET, EXTENDED RELEASE ORAL at 20:03

## 2024-01-17 RX ADMIN — ASPIRIN 81 MG 81 MG: 81 TABLET ORAL at 17:02

## 2024-01-17 RX ADMIN — ACETAMINOPHEN 650 MG: 325 TABLET ORAL at 18:45

## 2024-01-17 RX ADMIN — Medication 1000 MCG: at 16:57

## 2024-01-17 RX ADMIN — AMIODARONE HYDROCHLORIDE 200 MG: 200 TABLET ORAL at 16:55

## 2024-01-17 RX ADMIN — ATORVASTATIN CALCIUM 80 MG: 80 TABLET, FILM COATED ORAL at 20:03

## 2024-01-17 RX ADMIN — Medication 1 TABLET: at 16:57

## 2024-01-17 RX ADMIN — NITROGLYCERIN 80 MCG/MIN: 20 INJECTION INTRAVENOUS at 16:55

## 2024-01-17 RX ADMIN — CARVEDILOL 6.25 MG: 6.25 TABLET, FILM COATED ORAL at 16:56

## 2024-01-17 RX ADMIN — EMPAGLIFLOZIN 10 MG: 10 TABLET, FILM COATED ORAL at 16:58

## 2024-01-17 ASSESSMENT — PAIN - FUNCTIONAL ASSESSMENT: PAIN_FUNCTIONAL_ASSESSMENT: ACTIVITIES ARE NOT PREVENTED

## 2024-01-17 ASSESSMENT — PAIN DESCRIPTION - DESCRIPTORS
DESCRIPTORS: TENDER

## 2024-01-17 ASSESSMENT — PAIN SCALES - GENERAL
PAINLEVEL_OUTOF10: 10
PAINLEVEL_OUTOF10: 8

## 2024-01-17 ASSESSMENT — PAIN DESCRIPTION - LOCATION
LOCATION: GROIN

## 2024-01-17 ASSESSMENT — PAIN DESCRIPTION - ORIENTATION
ORIENTATION: RIGHT

## 2024-01-17 ASSESSMENT — PAIN DESCRIPTION - PAIN TYPE: TYPE: ACUTE PAIN
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
## 2024-01-17 NOTE — H&P
[facility]  Sedation/Analgesia History & Physical    Pt Name: Chriss Braga  Account number: [id number]  MRN: [id number]  YOB: 1955  Provider Performing Procedure: Gunner Hatch MD MD  Referring Provider: No att. providers found   Primary Care Physician: No primary care provider on file.  Date: 1/17/2024    PRE-PROCEDURE    Code Status: FULL CODE  Brief History/Pre-Procedure Diagnosis:   STEMI    Consent: : I have discussed with the patient risks, benefits, and alternatives (and relevant risks, benefits, and side effects related to alternatives or not receiving care), and likelihood of the success.   The patient and/or representative appear to understand and agree to proceed.  The discussion encompasses risks, benefits, and side effects related to the alternatives and the risks related to not receiving the proposed care, treatment, and services.     The indication, risks and benefits of the procedure and possible therapeutic consequences and alternatives were discussed with the patient. The patient was given the opportunity to ask questions and to have them answered to his/her satisfaction. Risks of the procedure include but are not limited to the following: Bleeding, hematoma including retroperitoneal hemmorhage, infection, pain and discomfort, injury to the aorta and other blood vessels, rhythm disturbance, low blood pressure, myocardial infarction, stroke, kidney damage/failure, myocardial perforation, allergic reactions to sedatives/contrast material, loss of pulse/vascular compromise requiring surgery, aneurysm/pseudoaneurysm formation, possible loss of a limb/hand/leg, needing blood transfusion, requiring emergent open heart surgery or emergent coronary intervention, the need for intubation/respiratory support, the requirement for defibrillation/cardioversion, and death. Alternatives to and omission of the suggested procedure were discussed. The patient had no further questions

## 2024-01-17 NOTE — H&P
[facility]'s  Consult    Patient's Name/Date of Birth: Chriss Braga / 1955 (68 y.o.)    Date: January 17, 2024     Referring Provider: No att. providers found    CHIEF COMPLAINT: STEMI      HPI: This is a pleasant 68 y.o. 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  10/10.  Both shoulders and substernal.  No syncope.  No symptoms prior.  Has had hx of stents, unclear anatomy.  Has had a CVA.  Prior EMR shows he was on Eliquis, but has not taken it for a couple days.  Unclear why he took it, no clear afib in the chart.  6/2023 MIKEY done at [facility] shows EF 40%.   2018 cath showed:  Prox RCA to Dist RCA lesion, with 80% stenosis.   · Ost LAD to Prox LAD lesion, with 80% stenosis.   · Ost Cx lesion, with 90% stenosis.   · Ost 1st Mrg lesion, with 75% stenosis.   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   · Continue with medical therapy.     Coronary Findings Diagnostic Dominance: Right   Left Anterior Descending: Ost LAD to Prox LAD lesion is 80% stenosed.   Left Circumflex: Ost Cx lesion is 90% stenosed. First Obtuse Marginal Branch: The vessel is large. Ost 1st Mrg lesion is 75% stenosed.   Right Coronary Artery: The vessel is small. Prox RCA to Dist RCA lesion is 80% stenosed.   Graft To RPDA   LIMA Graft To Dist LAD   Graft To 1st Mrg     No alcohol or tobacco.  No spouse.  POA is friend/aunt.  No kidney issues, allergy to dye.         Echo: No results found for this or any previous visit.       All labs, EKG's, diagnostic testing and images as well as cardiac cath, stress testing were reviewed during this encounter    Past Medical History:   Diagnosis Date    Anemia     Microcytic anemia.     Angina     Arthritis     Atypical chest pain     Question if this is secondary to his uncontrolled hypertension or if this is secondary to exacerbation of COPD or secondary to his recent ICD implantation.    Blood

## 2024-01-18 LAB
ANION GAP SERPL CALC-SCNC: 10 MEQ/L (ref 8–16)
BUN SERPL-MCNC: 19 MG/DL (ref 7–22)
CALCIUM SERPL-MCNC: 9.2 MG/DL (ref 8.5–10.5)
CHLORIDE SERPL-SCNC: 106 MEQ/L (ref 98–111)
CHOLEST SERPL-MCNC: 179 MG/DL (ref 100–199)
CO2 SERPL-SCNC: 24 MEQ/L (ref 23–33)
CREAT SERPL-MCNC: 0.9 MG/DL (ref 0.4–1.2)
DEPRECATED RDW RBC AUTO: 38.9 FL (ref 35–45)
ECHO AO ASC DIAM: 3.3 CM
ECHO AV CUSP MM: 2 CM
ECHO AV PEAK GRADIENT: 5 MMHG
ECHO AV PEAK VELOCITY: 1.1 M/S
ECHO AV VELOCITY RATIO: 0.73
ECHO EST RA PRESSURE: 5 MMHG
ECHO IVC PROX: 1 CM
ECHO LA AREA 2C: 15 CM2
ECHO LA AREA 4C: 13.7 CM2
ECHO LA DIAMETER: 3 CM
ECHO LA MAJOR AXIS: 5.3 CM
ECHO LA MINOR AXIS: 4.8 CM
ECHO LA VOL BP: 35 ML (ref 18–58)
ECHO LA VOL MOD A2C: 39 ML (ref 18–58)
ECHO LA VOL MOD A4C: 28 ML (ref 18–58)
ECHO LV E' LATERAL VELOCITY: 4 CM/S
ECHO LV E' SEPTAL VELOCITY: 3 CM/S
ECHO LV EDV A2C: 95 ML
ECHO LV EDV A4C: 142 ML
ECHO LV EJECTION FRACTION A2C: 5 %
ECHO LV EJECTION FRACTION A4C: 20 %
ECHO LV EJECTION FRACTION BIPLANE: 14 % (ref 55–100)
ECHO LV ESV A2C: 90 ML
ECHO LV ESV A4C: 114 ML
ECHO LV FRACTIONAL SHORTENING: 9 % (ref 28–44)
ECHO LV INTERNAL DIMENSION DIASTOLIC: 5.4 CM (ref 4.2–5.9)
ECHO LV INTERNAL DIMENSION SYSTOLIC: 4.9 CM
ECHO LV ISOVOLUMETRIC RELAXATION TIME (IVRT): 99 MS
ECHO LV IVSD: 1.3 CM (ref 0.6–1)
ECHO LV MASS 2D: 295.6 G (ref 88–224)
ECHO LV POSTERIOR WALL DIASTOLIC: 1.3 CM (ref 0.6–1)
ECHO LV RELATIVE WALL THICKNESS RATIO: 0.48
ECHO LVOT PEAK GRADIENT: 2 MMHG
ECHO LVOT PEAK VELOCITY: 0.8 M/S
ECHO MV A VELOCITY: 0.9 M/S
ECHO MV E DECELERATION TIME (DT): 363 MS
ECHO MV E VELOCITY: 0.35 M/S
ECHO MV E/A RATIO: 0.39
ECHO MV E/E' LATERAL: 8.75
ECHO MV E/E' RATIO (AVERAGED): 10.21
ECHO PV MAX VELOCITY: 0.5 M/S
ECHO PV PEAK GRADIENT: 1 MMHG
ECHO RIGHT VENTRICULAR SYSTOLIC PRESSURE (RVSP): 19 MMHG
ECHO RV INTERNAL DIMENSION: 2.3 CM
ECHO RV TAPSE: 1.2 CM (ref 1.7–?)
ECHO TV E WAVE: 0.4 M/S
ECHO TV REGURGITANT MAX VELOCITY: 1.88 M/S
ECHO TV REGURGITANT PEAK GRADIENT: 14 MMHG
ERYTHROCYTE [DISTWIDTH] IN BLOOD BY AUTOMATED COUNT: 16 % (ref 11.5–14.5)
GFR SERPL CREATININE-BSD FRML MDRD: > 60 ML/MIN/1.73M2
GLUCOSE SERPL-MCNC: 103 MG/DL (ref 70–108)
HCT VFR BLD AUTO: 38.9 % (ref 42–52)
HDLC SERPL-MCNC: 44 MG/DL
HGB BLD-MCNC: 12.1 GM/DL (ref 14–18)
LDLC SERPL CALC-MCNC: 120 MG/DL
MCH RBC QN AUTO: 21.6 PG (ref 26–33)
MCHC RBC AUTO-ENTMCNC: 31.1 GM/DL (ref 32.2–35.5)
MCV RBC AUTO: 69.6 FL (ref 80–94)
PATHOLOGIST REVIEW: ABNORMAL
PLATELET # BLD AUTO: 220 THOU/MM3 (ref 130–400)
PMV BLD AUTO: 9.5 FL (ref 9.4–12.4)
POTASSIUM SERPL-SCNC: 4.1 MEQ/L (ref 3.5–5.2)
RBC # BLD AUTO: 5.59 MILL/MM3 (ref 4.7–6.1)
SCAN OF BLOOD SMEAR: NORMAL
SODIUM SERPL-SCNC: 140 MEQ/L (ref 135–145)
TRIGL SERPL-MCNC: 75 MG/DL (ref 0–199)
WBC # BLD AUTO: 12.5 THOU/MM3 (ref 4.8–10.8)

## 2024-01-18 PROCEDURE — 80048 BASIC METABOLIC PNL TOTAL CA: CPT

## 2024-01-18 PROCEDURE — 6360000002 HC RX W HCPCS: Performed by: REGISTERED NURSE

## 2024-01-18 PROCEDURE — 6360000002 HC RX W HCPCS: Performed by: INTERNAL MEDICINE

## 2024-01-18 PROCEDURE — 6370000000 HC RX 637 (ALT 250 FOR IP): Performed by: INTERNAL MEDICINE

## 2024-01-18 PROCEDURE — 80061 LIPID PANEL: CPT

## 2024-01-18 PROCEDURE — 6370000000 HC RX 637 (ALT 250 FOR IP): Performed by: PHYSICIAN ASSISTANT

## 2024-01-18 PROCEDURE — 93010 ELECTROCARDIOGRAM REPORT: CPT | Performed by: NUCLEAR MEDICINE

## 2024-01-18 PROCEDURE — 93005 ELECTROCARDIOGRAM TRACING: CPT

## 2024-01-18 PROCEDURE — 93306 TTE W/DOPPLER COMPLETE: CPT | Performed by: INTERNAL MEDICINE

## 2024-01-18 PROCEDURE — 99232 SBSQ HOSP IP/OBS MODERATE 35: CPT | Performed by: REGISTERED NURSE

## 2024-01-18 PROCEDURE — 6370000000 HC RX 637 (ALT 250 FOR IP): Performed by: REGISTERED NURSE

## 2024-01-18 PROCEDURE — 2100000000 HC CCU R&B

## 2024-01-18 PROCEDURE — 36415 COLL VENOUS BLD VENIPUNCTURE: CPT

## 2024-01-18 PROCEDURE — 2580000003 HC RX 258: Performed by: INTERNAL MEDICINE

## 2024-01-18 PROCEDURE — 85027 COMPLETE CBC AUTOMATED: CPT

## 2024-01-18 RX ORDER — PANTOPRAZOLE SODIUM 40 MG/1
40 TABLET, DELAYED RELEASE ORAL
Status: DISCONTINUED | OUTPATIENT
Start: 2024-01-18 | End: 2024-01-22 | Stop reason: HOSPADM

## 2024-01-18 RX ORDER — CLOPIDOGREL BISULFATE 75 MG/1
75 TABLET ORAL DAILY
Status: DISCONTINUED | OUTPATIENT
Start: 2024-01-19 | End: 2024-01-22 | Stop reason: HOSPADM

## 2024-01-18 RX ORDER — CLOPIDOGREL 300 MG/1
600 TABLET, FILM COATED ORAL ONCE
Status: COMPLETED | OUTPATIENT
Start: 2024-01-18 | End: 2024-01-18

## 2024-01-18 RX ORDER — ONDANSETRON 2 MG/ML
4 INJECTION INTRAMUSCULAR; INTRAVENOUS EVERY 6 HOURS PRN
Status: DISCONTINUED | OUTPATIENT
Start: 2024-01-18 | End: 2024-01-22 | Stop reason: HOSPADM

## 2024-01-18 RX ORDER — HYDROCODONE BITARTRATE AND ACETAMINOPHEN 5; 325 MG/1; MG/1
2 TABLET ORAL EVERY 4 HOURS PRN
Status: DISCONTINUED | OUTPATIENT
Start: 2024-01-18 | End: 2024-01-22 | Stop reason: HOSPADM

## 2024-01-18 RX ORDER — MORPHINE SULFATE 2 MG/ML
2 INJECTION, SOLUTION INTRAMUSCULAR; INTRAVENOUS ONCE
Status: COMPLETED | OUTPATIENT
Start: 2024-01-18 | End: 2024-01-18

## 2024-01-18 RX ADMIN — APIXABAN 5 MG: 5 TABLET, FILM COATED ORAL at 21:59

## 2024-01-18 RX ADMIN — Medication 1 TABLET: at 09:15

## 2024-01-18 RX ADMIN — MORPHINE SULFATE 2 MG: 2 INJECTION, SOLUTION INTRAMUSCULAR; INTRAVENOUS at 15:01

## 2024-01-18 RX ADMIN — AMIODARONE HYDROCHLORIDE 200 MG: 200 TABLET ORAL at 09:13

## 2024-01-18 RX ADMIN — Medication 1000 MCG: at 09:15

## 2024-01-18 RX ADMIN — ONDANSETRON 4 MG: 2 INJECTION INTRAMUSCULAR; INTRAVENOUS at 02:19

## 2024-01-18 RX ADMIN — CARVEDILOL 6.25 MG: 6.25 TABLET, FILM COATED ORAL at 06:53

## 2024-01-18 RX ADMIN — HYDROCODONE BITARTRATE AND ACETAMINOPHEN 1 TABLET: 5; 325 TABLET ORAL at 12:29

## 2024-01-18 RX ADMIN — APIXABAN 5 MG: 5 TABLET, FILM COATED ORAL at 15:01

## 2024-01-18 RX ADMIN — RANOLAZINE 1000 MG: 500 TABLET, EXTENDED RELEASE ORAL at 09:14

## 2024-01-18 RX ADMIN — ATORVASTATIN CALCIUM 80 MG: 80 TABLET, FILM COATED ORAL at 20:12

## 2024-01-18 RX ADMIN — ISOSORBIDE MONONITRATE 60 MG: 60 TABLET, EXTENDED RELEASE ORAL at 09:14

## 2024-01-18 RX ADMIN — HYDROCODONE BITARTRATE AND ACETAMINOPHEN 2 TABLET: 5; 325 TABLET ORAL at 20:13

## 2024-01-18 RX ADMIN — EMPAGLIFLOZIN 10 MG: 10 TABLET, FILM COATED ORAL at 09:15

## 2024-01-18 RX ADMIN — ISOSORBIDE MONONITRATE 60 MG: 60 TABLET, EXTENDED RELEASE ORAL at 20:13

## 2024-01-18 RX ADMIN — TICAGRELOR 90 MG: 90 TABLET ORAL at 09:13

## 2024-01-18 RX ADMIN — SODIUM CHLORIDE, PRESERVATIVE FREE 10 ML: 5 INJECTION INTRAVENOUS at 09:15

## 2024-01-18 RX ADMIN — CLOPIDOGREL BISULFATE 600 MG: 300 TABLET, FILM COATED ORAL at 20:13

## 2024-01-18 RX ADMIN — CARVEDILOL 6.25 MG: 6.25 TABLET, FILM COATED ORAL at 17:36

## 2024-01-18 RX ADMIN — SODIUM CHLORIDE, PRESERVATIVE FREE 10 ML: 5 INJECTION INTRAVENOUS at 20:13

## 2024-01-18 RX ADMIN — LISINOPRIL 2.5 MG: 2.5 TABLET ORAL at 09:15

## 2024-01-18 RX ADMIN — HYDROCODONE BITARTRATE AND ACETAMINOPHEN 1 TABLET: 5; 325 TABLET ORAL at 06:52

## 2024-01-18 RX ADMIN — RANOLAZINE 1000 MG: 500 TABLET, EXTENDED RELEASE ORAL at 20:13

## 2024-01-18 RX ADMIN — PANTOPRAZOLE SODIUM 40 MG: 40 TABLET, DELAYED RELEASE ORAL at 17:35

## 2024-01-18 RX ADMIN — ASPIRIN 81 MG 81 MG: 81 TABLET ORAL at 09:13

## 2024-01-18 RX ADMIN — NITROGLYCERIN 60 MCG/MIN: 20 INJECTION INTRAVENOUS at 01:53

## 2024-01-18 ASSESSMENT — PAIN DESCRIPTION - ORIENTATION
ORIENTATION: RIGHT

## 2024-01-18 ASSESSMENT — PAIN DESCRIPTION - LOCATION
LOCATION: GROIN;CHEST
LOCATION: CHEST
LOCATION: GROIN;CHEST
LOCATION: CHEST;GROIN

## 2024-01-18 ASSESSMENT — PAIN SCALES - GENERAL
PAINLEVEL_OUTOF10: 7
PAINLEVEL_OUTOF10: 6
PAINLEVEL_OUTOF10: 8
PAINLEVEL_OUTOF10: 8
PAINLEVEL_OUTOF10: 4
PAINLEVEL_OUTOF10: 8

## 2024-01-18 NOTE — CARE COORDINATION
Case Management Assessment  Initial Evaluation    Date/Time of Evaluation: 1/18/2024 11:41 AM  Assessment Completed by: Laura Guardado RN    If patient is discharged prior to next notation, then this note serves as note for discharge by case management.    Patient Name: Chriss Braga                   YOB: 1955  Diagnosis: STEMI (ST elevation myocardial infarction) (HCC) [I21.3]  Chest pain, unspecified type [R07.9]                   Date / Time: 1/17/2024  1:03 PM  Location: [address] [place] [address]     Patient Admission Status: Inpatient   Readmission Risk Low 0-14, Mod 15-19), High > 20: Readmission Risk Score: 17.6    Current PCP: No primary care provider on file.  PCP verified by CM? Yes (Wants to establish with someone locally)    Chart Reviewed: Yes      History Provided by: Patient  Patient Orientation: Alert and Oriented    Patient Cognition: Alert    Hospitalization in the last 30 days (Readmission):  No    If yes, Readmission Assessment in CM Navigator will be completed.    Advance Directives:      Code Status: Full Code   Patient's Primary Decision Maker is: Patient Declined (Legal Next of Kin Remains as Decision Maker)    Primary Decision Maker: [email or web address] - Aunt/Uncle - [phone]    Discharge Planning:    Patient lives with: Alone Type of Home: House  Primary Care Giver: Self  Patient Support Systems include: Children, Family Members   Current Financial resources: Medicaid, Medicare  Current community resources: None  Current services prior to admission: Durable Medical Equipment            Current DME: Cane            Type of Home Care services:  None    ADLS  Prior functional level: Independent in ADLs/IADLs  Current functional level: Independent in ADLs/IADLs    Family can provide assistance at DC: Yes  Would you like Case Management to discuss the discharge plan with any other family members/significant others, and if so, who? No  Plans to Return to Present Housing: Yes  Other Identified

## 2024-01-18 NOTE — H&P
CRITICAL CARE PROGRESS NOTE      Patient:  Chriss Braga    Unit/Bed:3A-11/011-A  YOB: 1955  MRN: [id number]   PCP: No primary care provider on file.  Date of Admission: 1/17/2024  Chief Complaint:-Chest pain, post cath lab     Assessment and Plan:    NSTEMI: Previous medical history of ICD,CABG 15 years ago, presents with chest pain to the ED, EKG with ST elevation on inferolateral wall, required PCI, previous anatomy described low, new anatomy showed Left Anterior Descending: Ost LAD to Prox LAD lesion is 80% stenosed. Left Circumflex: Ost Cx lesion is 90% stenosed. First Obtuse Marginal Branch: The vessel is large. Ost 1st Mrg lesion is 75% stenosed.  Required Graft To RPDA , LIMA Graft To Dist LAD , Graft To 1st Mrg.  Assess in the morning, persisted with chest pain, required new PCI stent on OM1, currently on heparin drip, improvement of chest pain, will monitor  Post PCI: Initial PCI Graft To RPDA , LIMA Graft To Dist LAD , Graft To 1st Mrg, required no intervention for persistence of chest pain, stent to OM I #1 on Coreg, lisinopril, Ranexa, Brilinta, amiodarone  History of hypertension: Home medications Coreg lisinopril Ranexa  History of DVT: Currently on Brilinta  History of hyperlipidemia: Lipitor at home  History of CABG: Cath Lab 2018 : RCA to Dist RCA lesion, with 80% stenosis. Ost LAD to Prox LAD lesion, with 80% stenosis. Ost Cx lesion, with 90% stenosis.  Ost 1st Mrg lesion, with 75% stenosis.       INITIAL H AND P AND ICU COURSE:   Medical history of ICD, CABG, hypertension hyperlipidemia presents emergency department complaining of 1 day of chest pain, and EKG initial presents's elevation inferior leads and lateral wall, admitted for PCI, required initially 3 stents previously described, as arriving to stepdown patient remains with typical chest pain even with IV nitroglycerin why patient required go back to PCI with OM 1 stent.  Currently on ICU for observation, on antiarrhythmic

## 2024-01-18 NOTE — DISCHARGE INSTRUCTIONS
Follow up with Dr. Hatch in 2 weeks  Follow up with CHF clinic in 1-2 weeks to establish care    Follow up with PCP in 1 week  Cont aspirin, plavix, eliquis for 1 month then stop aspirin and continue plavix and eliquis.     Groin Care Instructions        Normal Observation: You may or may not experience these.    Soreness or tenderness that may last a few weeks.    Possible bruising that could last a few weeks and up to one month.   Formation of a small lump (dime to quarter size) that should last only a few weeks.    Care of your incision  You may shower 24 hours after the procedure. Wet the dressing thoroughly and gently remove the bandage from the hospital during showering. It is easier to remove this way.             Gently clean your site daily using soap and water while standing in the shower. Dry thoroughly.   Do not apply powders or lotions to the site for 2 weeks.   Keep the site clean and dry to prevent infection.    Do not sit in a bathtub or a pool of water for 7 days.    Inspect the site daily.    Activity   You may resume normal activity in 2 days, including driving, letting pain be your     guide.   Limit lifting over 5 pounds (half gallon of milk) to one week or until site heals.  Limit vigorous activity (contact sports) to two weeks time.  You will be able to return to work in 1-3 days.    Call our office immediately if you experience any of the following…  Significant bleeding does not stop after 10 minutes of applying firm pressure directly over incision.   Increased swelling of groin or leg.   Unusual pain at groin or down that leg.   Signs of infection: redness, warmth to touch, drainage, poorly healing incision, fever, or chills.

## 2024-01-19 LAB
ANION GAP SERPL CALC-SCNC: 11 MEQ/L (ref 8–16)
BUN SERPL-MCNC: 17 MG/DL (ref 7–22)
CALCIUM SERPL-MCNC: 8.7 MG/DL (ref 8.5–10.5)
CHLORIDE SERPL-SCNC: 104 MEQ/L (ref 98–111)
CO2 SERPL-SCNC: 20 MEQ/L (ref 23–33)
CREAT SERPL-MCNC: 1.3 MG/DL (ref 0.4–1.2)
DEPRECATED MEAN GLUCOSE BLD GHB EST-ACNC: 105 MG/DL (ref 70–126)
DEPRECATED RDW RBC AUTO: 38.5 FL (ref 35–45)
ERYTHROCYTE [DISTWIDTH] IN BLOOD BY AUTOMATED COUNT: 15.5 % (ref 11.5–14.5)
GFR SERPL CREATININE-BSD FRML MDRD: 60 ML/MIN/1.73M2
GLUCOSE SERPL-MCNC: 100 MG/DL (ref 70–108)
HBA1C MFR BLD HPLC: 5.5 % (ref 4.4–6.4)
HCT VFR BLD AUTO: 35.5 % (ref 42–52)
HGB BLD-MCNC: 11.1 GM/DL (ref 14–18)
MAGNESIUM SERPL-MCNC: 2.1 MG/DL (ref 1.6–2.4)
MCH RBC QN AUTO: 21.8 PG (ref 26–33)
MCHC RBC AUTO-ENTMCNC: 31.3 GM/DL (ref 32.2–35.5)
MCV RBC AUTO: 69.7 FL (ref 80–94)
PLATELET # BLD AUTO: 198 THOU/MM3 (ref 130–400)
PMV BLD AUTO: 9.6 FL (ref 9.4–12.4)
POTASSIUM SERPL-SCNC: 3.9 MEQ/L (ref 3.5–5.2)
RBC # BLD AUTO: 5.09 MILL/MM3 (ref 4.7–6.1)
SODIUM SERPL-SCNC: 135 MEQ/L (ref 135–145)
WBC # BLD AUTO: 8.2 THOU/MM3 (ref 4.8–10.8)

## 2024-01-19 PROCEDURE — 2140000000 HC CCU INTERMEDIATE R&B

## 2024-01-19 PROCEDURE — 80048 BASIC METABOLIC PNL TOTAL CA: CPT

## 2024-01-19 PROCEDURE — 83036 HEMOGLOBIN GLYCOSYLATED A1C: CPT

## 2024-01-19 PROCEDURE — 85027 COMPLETE CBC AUTOMATED: CPT

## 2024-01-19 PROCEDURE — 99232 SBSQ HOSP IP/OBS MODERATE 35: CPT | Performed by: REGISTERED NURSE

## 2024-01-19 PROCEDURE — 36415 COLL VENOUS BLD VENIPUNCTURE: CPT

## 2024-01-19 PROCEDURE — 83735 ASSAY OF MAGNESIUM: CPT

## 2024-01-19 PROCEDURE — 94761 N-INVAS EAR/PLS OXIMETRY MLT: CPT

## 2024-01-19 PROCEDURE — 6370000000 HC RX 637 (ALT 250 FOR IP): Performed by: INTERNAL MEDICINE

## 2024-01-19 PROCEDURE — 2580000003 HC RX 258: Performed by: INTERNAL MEDICINE

## 2024-01-19 PROCEDURE — 6370000000 HC RX 637 (ALT 250 FOR IP): Performed by: REGISTERED NURSE

## 2024-01-19 PROCEDURE — 6360000002 HC RX W HCPCS: Performed by: INTERNAL MEDICINE

## 2024-01-19 PROCEDURE — 93010 ELECTROCARDIOGRAM REPORT: CPT | Performed by: NUCLEAR MEDICINE

## 2024-01-19 PROCEDURE — 93005 ELECTROCARDIOGRAM TRACING: CPT | Performed by: STUDENT IN AN ORGANIZED HEALTH CARE EDUCATION/TRAINING PROGRAM

## 2024-01-19 PROCEDURE — 2580000003 HC RX 258: Performed by: REGISTERED NURSE

## 2024-01-19 PROCEDURE — 6360000002 HC RX W HCPCS: Performed by: STUDENT IN AN ORGANIZED HEALTH CARE EDUCATION/TRAINING PROGRAM

## 2024-01-19 RX ORDER — METOPROLOL SUCCINATE 25 MG/1
25 TABLET, EXTENDED RELEASE ORAL DAILY
Status: DISCONTINUED | OUTPATIENT
Start: 2024-01-19 | End: 2024-01-22 | Stop reason: HOSPADM

## 2024-01-19 RX ORDER — SODIUM CHLORIDE 9 MG/ML
INJECTION, SOLUTION INTRAVENOUS CONTINUOUS
Status: DISCONTINUED | OUTPATIENT
Start: 2024-01-19 | End: 2024-01-22

## 2024-01-19 RX ADMIN — SODIUM CHLORIDE, PRESERVATIVE FREE 10 ML: 5 INJECTION INTRAVENOUS at 09:11

## 2024-01-19 RX ADMIN — HYDROCODONE BITARTRATE AND ACETAMINOPHEN 2 TABLET: 5; 325 TABLET ORAL at 13:14

## 2024-01-19 RX ADMIN — ISOSORBIDE MONONITRATE 60 MG: 60 TABLET, EXTENDED RELEASE ORAL at 22:27

## 2024-01-19 RX ADMIN — ASPIRIN 81 MG 81 MG: 81 TABLET ORAL at 09:09

## 2024-01-19 RX ADMIN — Medication 1 TABLET: at 09:10

## 2024-01-19 RX ADMIN — RANOLAZINE 1000 MG: 500 TABLET, EXTENDED RELEASE ORAL at 09:10

## 2024-01-19 RX ADMIN — CLOPIDOGREL BISULFATE 75 MG: 75 TABLET ORAL at 09:09

## 2024-01-19 RX ADMIN — FERROUS SULFATE TAB 325 MG (65 MG ELEMENTAL FE) 325 MG: 325 (65 FE) TAB at 09:10

## 2024-01-19 RX ADMIN — PANTOPRAZOLE SODIUM 40 MG: 40 TABLET, DELAYED RELEASE ORAL at 05:36

## 2024-01-19 RX ADMIN — APIXABAN 5 MG: 5 TABLET, FILM COATED ORAL at 20:59

## 2024-01-19 RX ADMIN — LISINOPRIL 2.5 MG: 2.5 TABLET ORAL at 09:10

## 2024-01-19 RX ADMIN — METOPROLOL SUCCINATE 25 MG: 25 TABLET, EXTENDED RELEASE ORAL at 17:04

## 2024-01-19 RX ADMIN — ONDANSETRON 4 MG: 2 INJECTION INTRAMUSCULAR; INTRAVENOUS at 20:59

## 2024-01-19 RX ADMIN — SODIUM CHLORIDE, PRESERVATIVE FREE 10 ML: 5 INJECTION INTRAVENOUS at 20:59

## 2024-01-19 RX ADMIN — HYDROCODONE BITARTRATE AND ACETAMINOPHEN 2 TABLET: 5; 325 TABLET ORAL at 21:04

## 2024-01-19 RX ADMIN — CARVEDILOL 6.25 MG: 6.25 TABLET, FILM COATED ORAL at 09:10

## 2024-01-19 RX ADMIN — ATORVASTATIN CALCIUM 80 MG: 80 TABLET, FILM COATED ORAL at 20:59

## 2024-01-19 RX ADMIN — RANOLAZINE 1000 MG: 500 TABLET, EXTENDED RELEASE ORAL at 20:59

## 2024-01-19 RX ADMIN — EMPAGLIFLOZIN 10 MG: 10 TABLET, FILM COATED ORAL at 09:10

## 2024-01-19 RX ADMIN — ISOSORBIDE MONONITRATE 60 MG: 60 TABLET, EXTENDED RELEASE ORAL at 09:10

## 2024-01-19 RX ADMIN — SODIUM CHLORIDE: 9 INJECTION, SOLUTION INTRAVENOUS at 11:07

## 2024-01-19 RX ADMIN — AMIODARONE HYDROCHLORIDE 200 MG: 200 TABLET ORAL at 09:10

## 2024-01-19 RX ADMIN — HYDROMORPHONE HYDROCHLORIDE 0.5 MG: 1 INJECTION, SOLUTION INTRAMUSCULAR; INTRAVENOUS; SUBCUTANEOUS at 09:09

## 2024-01-19 RX ADMIN — APIXABAN 5 MG: 5 TABLET, FILM COATED ORAL at 09:09

## 2024-01-19 RX ADMIN — HYDROCODONE BITARTRATE AND ACETAMINOPHEN 2 TABLET: 5; 325 TABLET ORAL at 01:01

## 2024-01-19 RX ADMIN — Medication 1000 MCG: at 09:10

## 2024-01-19 RX ADMIN — HYDROCODONE BITARTRATE AND ACETAMINOPHEN 2 TABLET: 5; 325 TABLET ORAL at 17:04

## 2024-01-19 ASSESSMENT — PAIN DESCRIPTION - LOCATION
LOCATION: HEAD
LOCATION: HEAD
LOCATION: GROIN
LOCATION: HEAD

## 2024-01-19 ASSESSMENT — PAIN SCALES - GENERAL
PAINLEVEL_OUTOF10: 4
PAINLEVEL_OUTOF10: 5
PAINLEVEL_OUTOF10: 0
PAINLEVEL_OUTOF10: 8
PAINLEVEL_OUTOF10: 1
PAINLEVEL_OUTOF10: 9
PAINLEVEL_OUTOF10: 8
PAINLEVEL_OUTOF10: 9

## 2024-01-19 ASSESSMENT — PAIN - FUNCTIONAL ASSESSMENT: PAIN_FUNCTIONAL_ASSESSMENT: PREVENTS OR INTERFERES SOME ACTIVE ACTIVITIES AND ADLS

## 2024-01-19 ASSESSMENT — PAIN DESCRIPTION - DESCRIPTORS
DESCRIPTORS: ACHING

## 2024-01-19 ASSESSMENT — PAIN DESCRIPTION - FREQUENCY
FREQUENCY: INTERMITTENT
FREQUENCY: INTERMITTENT

## 2024-01-19 ASSESSMENT — PAIN DESCRIPTION - PAIN TYPE
TYPE: OTHER (COMMENT)
TYPE: ACUTE PAIN

## 2024-01-19 ASSESSMENT — PAIN DESCRIPTION - ORIENTATION: ORIENTATION: MID

## 2024-01-19 ASSESSMENT — PAIN DESCRIPTION - ONSET: ONSET: ON-GOING

## 2024-01-19 NOTE — CARE COORDINATION
1/19/24, 2:25 PM EST    DISCHARGE ON GOING EVALUATION    Chriss RYAN [facility] day: 2  Location: [address] [place] [address] Reason for admit: STEMI (ST elevation myocardial infarction) (HCC) [I21.3]  Chest pain, unspecified type [R07.9]   Procedure: 1/17 Cardiac cath with Dr. Hatch: Successful PCI of OM1 STEMI via SVG x 1 LESTER. Severe, residual RCA stenosis (medically manage). Patent LIMA to LAD.   1/18 ECHO: results pending.   Barriers to Discharge: Still having some VT throughout the day. Meds being adjusted.   PCP: No primary care provider on file. Desires [facility].   Readmission Risk Score: 16.9%  Patient Goals/Plan/Treatment Preferences: Plans return home. Will need ride home. Pt needs to schedule follow up  and Get Established visit with [facility].     1/19/24, 2:29 PM EST    Patient goals/plan/ treatment preferences discussed by  and .  Patient goals/plan/ treatment preferences reviewed with patient/ family.  Patient/ family verbalize understanding of discharge plan and are in agreement with goal/plan/treatment preferences.  Understanding was demonstrated using the teach back method.  AVS provided by RN at time of discharge, which includes all necessary medical information pertaining to the patients current course of illness, treatment, post-discharge goals of care, and treatment preferences.     Services At/After Discharge: None  Will need transport home.        IMM Letter  IMM Letter given to Patient/Family/Significant other/Guardian/POA/by:: Jazzy JO Case Manager  IMM Letter date given:: 01/19/24  IMM Letter time given:: 0857     Anticipate discharge home in next 24-48 hours. Will need transportation home.

## 2024-01-20 LAB
ANION GAP SERPL CALC-SCNC: 9 MEQ/L (ref 8–16)
BUN SERPL-MCNC: 16 MG/DL (ref 7–22)
CALCIUM SERPL-MCNC: 8.6 MG/DL (ref 8.5–10.5)
CHLORIDE SERPL-SCNC: 105 MEQ/L (ref 98–111)
CO2 SERPL-SCNC: 23 MEQ/L (ref 23–33)
CREAT SERPL-MCNC: 1.3 MG/DL (ref 0.4–1.2)
GFR SERPL CREATININE-BSD FRML MDRD: 60 ML/MIN/1.73M2
GLUCOSE SERPL-MCNC: 100 MG/DL (ref 70–108)
MAGNESIUM SERPL-MCNC: 2.1 MG/DL (ref 1.6–2.4)
POTASSIUM SERPL-SCNC: 4.1 MEQ/L (ref 3.5–5.2)
SODIUM SERPL-SCNC: 137 MEQ/L (ref 135–145)

## 2024-01-20 PROCEDURE — 6370000000 HC RX 637 (ALT 250 FOR IP): Performed by: INTERNAL MEDICINE

## 2024-01-20 PROCEDURE — 36415 COLL VENOUS BLD VENIPUNCTURE: CPT

## 2024-01-20 PROCEDURE — 83735 ASSAY OF MAGNESIUM: CPT

## 2024-01-20 PROCEDURE — 6370000000 HC RX 637 (ALT 250 FOR IP): Performed by: REGISTERED NURSE

## 2024-01-20 PROCEDURE — 80048 BASIC METABOLIC PNL TOTAL CA: CPT

## 2024-01-20 PROCEDURE — 2140000000 HC CCU INTERMEDIATE R&B

## 2024-01-20 PROCEDURE — 2580000003 HC RX 258: Performed by: REGISTERED NURSE

## 2024-01-20 PROCEDURE — 2580000003 HC RX 258: Performed by: INTERNAL MEDICINE

## 2024-01-20 PROCEDURE — 99222 1ST HOSP IP/OBS MODERATE 55: CPT | Performed by: HOSPITALIST

## 2024-01-20 RX ORDER — NITROGLYCERIN 0.4 MG/1
0.4 TABLET SUBLINGUAL EVERY 5 MIN PRN
Status: DISCONTINUED | OUTPATIENT
Start: 2024-01-20 | End: 2024-01-22 | Stop reason: HOSPADM

## 2024-01-20 RX ADMIN — SODIUM CHLORIDE: 9 INJECTION, SOLUTION INTRAVENOUS at 13:55

## 2024-01-20 RX ADMIN — SODIUM CHLORIDE, PRESERVATIVE FREE 10 ML: 5 INJECTION INTRAVENOUS at 19:44

## 2024-01-20 RX ADMIN — PANTOPRAZOLE SODIUM 40 MG: 40 TABLET, DELAYED RELEASE ORAL at 06:42

## 2024-01-20 RX ADMIN — ASPIRIN 81 MG 81 MG: 81 TABLET ORAL at 09:46

## 2024-01-20 RX ADMIN — CLOPIDOGREL BISULFATE 75 MG: 75 TABLET ORAL at 09:46

## 2024-01-20 RX ADMIN — EMPAGLIFLOZIN 10 MG: 10 TABLET, FILM COATED ORAL at 09:46

## 2024-01-20 RX ADMIN — RANOLAZINE 1000 MG: 500 TABLET, EXTENDED RELEASE ORAL at 19:44

## 2024-01-20 RX ADMIN — ISOSORBIDE MONONITRATE 60 MG: 60 TABLET, EXTENDED RELEASE ORAL at 09:46

## 2024-01-20 RX ADMIN — APIXABAN 5 MG: 5 TABLET, FILM COATED ORAL at 19:44

## 2024-01-20 RX ADMIN — ATORVASTATIN CALCIUM 80 MG: 80 TABLET, FILM COATED ORAL at 19:44

## 2024-01-20 RX ADMIN — APIXABAN 5 MG: 5 TABLET, FILM COATED ORAL at 09:46

## 2024-01-20 RX ADMIN — ISOSORBIDE MONONITRATE 60 MG: 60 TABLET, EXTENDED RELEASE ORAL at 19:44

## 2024-01-20 RX ADMIN — HYDROCODONE BITARTRATE AND ACETAMINOPHEN 2 TABLET: 5; 325 TABLET ORAL at 02:33

## 2024-01-20 RX ADMIN — Medication 1 TABLET: at 09:46

## 2024-01-20 RX ADMIN — Medication 1000 MCG: at 09:46

## 2024-01-20 RX ADMIN — AMIODARONE HYDROCHLORIDE 200 MG: 200 TABLET ORAL at 09:46

## 2024-01-20 RX ADMIN — HYDROCODONE BITARTRATE AND ACETAMINOPHEN 2 TABLET: 5; 325 TABLET ORAL at 06:42

## 2024-01-20 RX ADMIN — HYDROCODONE BITARTRATE AND ACETAMINOPHEN 2 TABLET: 5; 325 TABLET ORAL at 12:27

## 2024-01-20 RX ADMIN — RANOLAZINE 1000 MG: 500 TABLET, EXTENDED RELEASE ORAL at 09:46

## 2024-01-20 RX ADMIN — SODIUM CHLORIDE, PRESERVATIVE FREE 10 ML: 5 INJECTION INTRAVENOUS at 09:48

## 2024-01-20 RX ADMIN — METOPROLOL SUCCINATE 25 MG: 25 TABLET, EXTENDED RELEASE ORAL at 19:44

## 2024-01-20 ASSESSMENT — PAIN DESCRIPTION - PAIN TYPE
TYPE: ACUTE PAIN

## 2024-01-20 ASSESSMENT — PAIN - FUNCTIONAL ASSESSMENT
PAIN_FUNCTIONAL_ASSESSMENT: PREVENTS OR INTERFERES SOME ACTIVE ACTIVITIES AND ADLS

## 2024-01-20 ASSESSMENT — PAIN DESCRIPTION - LOCATION
LOCATION: HEAD
LOCATION: HEAD
LOCATION: CHEST;ARM
LOCATION: HEAD

## 2024-01-20 ASSESSMENT — PAIN SCALES - GENERAL
PAINLEVEL_OUTOF10: 8
PAINLEVEL_OUTOF10: 0
PAINLEVEL_OUTOF10: 8

## 2024-01-20 ASSESSMENT — PAIN DESCRIPTION - DESCRIPTORS
DESCRIPTORS: ACHING
DESCRIPTORS: POUNDING
DESCRIPTORS: ACHING
DESCRIPTORS: PRESSURE

## 2024-01-20 ASSESSMENT — PAIN DESCRIPTION - ORIENTATION
ORIENTATION: MID
ORIENTATION: MID
ORIENTATION: LEFT
ORIENTATION: MID;ANTERIOR

## 2024-01-20 ASSESSMENT — PAIN DESCRIPTION - FREQUENCY
FREQUENCY: INTERMITTENT
FREQUENCY: CONTINUOUS
FREQUENCY: INTERMITTENT

## 2024-01-20 ASSESSMENT — PAIN DESCRIPTION - ONSET
ONSET: ON-GOING

## 2024-01-21 ENCOUNTER — APPOINTMENT (OUTPATIENT)
Dept: CT IMAGING | Age: 69
DRG: 322 | End: 2024-01-21
Payer: MEDICARE

## 2024-01-21 LAB
ALBUMIN SERPL BCG-MCNC: 3.7 G/DL (ref 3.5–5.1)
ALP SERPL-CCNC: 69 U/L (ref 38–126)
ALT SERPL W/O P-5'-P-CCNC: 17 U/L (ref 11–66)
ANION GAP SERPL CALC-SCNC: 7 MEQ/L (ref 8–16)
AST SERPL-CCNC: 47 U/L (ref 5–40)
BILIRUB SERPL-MCNC: 1 MG/DL (ref 0.3–1.2)
BUN SERPL-MCNC: 13 MG/DL (ref 7–22)
CALCIUM SERPL-MCNC: 8.6 MG/DL (ref 8.5–10.5)
CHLORIDE SERPL-SCNC: 104 MEQ/L (ref 98–111)
CO2 SERPL-SCNC: 24 MEQ/L (ref 23–33)
CREAT SERPL-MCNC: 1.1 MG/DL (ref 0.4–1.2)
D DIMER PPP IA.FEU-MCNC: 782 NG/ML FEU (ref 0–500)
DEPRECATED RDW RBC AUTO: 39.6 FL (ref 35–45)
EKG ATRIAL RATE: 105 BPM
EKG ATRIAL RATE: 62 BPM
EKG ATRIAL RATE: 73 BPM
EKG ATRIAL RATE: 76 BPM
EKG ATRIAL RATE: 89 BPM
EKG ATRIAL RATE: 97 BPM
EKG P AXIS: 40 DEGREES
EKG P AXIS: 56 DEGREES
EKG P AXIS: 64 DEGREES
EKG P AXIS: 65 DEGREES
EKG P AXIS: 77 DEGREES
EKG P AXIS: 78 DEGREES
EKG P-R INTERVAL: 162 MS
EKG P-R INTERVAL: 164 MS
EKG P-R INTERVAL: 166 MS
EKG P-R INTERVAL: 170 MS
EKG P-R INTERVAL: 172 MS
EKG P-R INTERVAL: 174 MS
EKG Q-T INTERVAL: 358 MS
EKG Q-T INTERVAL: 366 MS
EKG Q-T INTERVAL: 390 MS
EKG Q-T INTERVAL: 426 MS
EKG Q-T INTERVAL: 430 MS
EKG Q-T INTERVAL: 446 MS
EKG QRS DURATION: 82 MS
EKG QRS DURATION: 84 MS
EKG QRS DURATION: 86 MS
EKG QRS DURATION: 96 MS
EKG QRS DURATION: 96 MS
EKG QRS DURATION: 98 MS
EKG QTC CALCULATION (BAZETT): 436 MS
EKG QTC CALCULATION (BAZETT): 454 MS
EKG QTC CALCULATION (BAZETT): 474 MS
EKG QTC CALCULATION (BAZETT): 479 MS
EKG QTC CALCULATION (BAZETT): 483 MS
EKG QTC CALCULATION (BAZETT): 491 MS
EKG R AXIS: 51 DEGREES
EKG R AXIS: 60 DEGREES
EKG R AXIS: 69 DEGREES
EKG R AXIS: 74 DEGREES
EKG R AXIS: 87 DEGREES
EKG R AXIS: 89 DEGREES
EKG T AXIS: -24 DEGREES
EKG T AXIS: -70 DEGREES
EKG T AXIS: -72 DEGREES
EKG T AXIS: -85 DEGREES
EKG T AXIS: 56 DEGREES
EKG T AXIS: 66 DEGREES
EKG VENTRICULAR RATE: 105 BPM
EKG VENTRICULAR RATE: 62 BPM
EKG VENTRICULAR RATE: 73 BPM
EKG VENTRICULAR RATE: 76 BPM
EKG VENTRICULAR RATE: 89 BPM
EKG VENTRICULAR RATE: 97 BPM
ERYTHROCYTE [DISTWIDTH] IN BLOOD BY AUTOMATED COUNT: 15.8 % (ref 11.5–14.5)
GFR SERPL CREATININE-BSD FRML MDRD: > 60 ML/MIN/1.73M2
GLUCOSE SERPL-MCNC: 90 MG/DL (ref 70–108)
HCT VFR BLD AUTO: 35.6 % (ref 42–52)
HGB BLD-MCNC: 10.9 GM/DL (ref 14–18)
MCH RBC QN AUTO: 21.7 PG (ref 26–33)
MCHC RBC AUTO-ENTMCNC: 30.6 GM/DL (ref 32.2–35.5)
MCV RBC AUTO: 70.9 FL (ref 80–94)
PLATELET # BLD AUTO: 179 THOU/MM3 (ref 130–400)
PMV BLD AUTO: 9.4 FL (ref 9.4–12.4)
POTASSIUM SERPL-SCNC: 4.3 MEQ/L (ref 3.5–5.2)
PROT SERPL-MCNC: 6.5 G/DL (ref 6.1–8)
RBC # BLD AUTO: 5.02 MILL/MM3 (ref 4.7–6.1)
SODIUM SERPL-SCNC: 135 MEQ/L (ref 135–145)
WBC # BLD AUTO: 7.1 THOU/MM3 (ref 4.8–10.8)

## 2024-01-21 PROCEDURE — 36415 COLL VENOUS BLD VENIPUNCTURE: CPT

## 2024-01-21 PROCEDURE — 6370000000 HC RX 637 (ALT 250 FOR IP): Performed by: REGISTERED NURSE

## 2024-01-21 PROCEDURE — 2140000000 HC CCU INTERMEDIATE R&B

## 2024-01-21 PROCEDURE — 85379 FIBRIN DEGRADATION QUANT: CPT

## 2024-01-21 PROCEDURE — 80053 COMPREHEN METABOLIC PANEL: CPT

## 2024-01-21 PROCEDURE — 85027 COMPLETE CBC AUTOMATED: CPT

## 2024-01-21 PROCEDURE — 99222 1ST HOSP IP/OBS MODERATE 55: CPT | Performed by: STUDENT IN AN ORGANIZED HEALTH CARE EDUCATION/TRAINING PROGRAM

## 2024-01-21 PROCEDURE — 70450 CT HEAD/BRAIN W/O DYE: CPT

## 2024-01-21 PROCEDURE — 6370000000 HC RX 637 (ALT 250 FOR IP): Performed by: HOSPITALIST

## 2024-01-21 PROCEDURE — 6370000000 HC RX 637 (ALT 250 FOR IP): Performed by: INTERNAL MEDICINE

## 2024-01-21 PROCEDURE — 2580000003 HC RX 258: Performed by: INTERNAL MEDICINE

## 2024-01-21 RX ORDER — LIDOCAINE 4 G/G
1 PATCH TOPICAL DAILY
Status: DISCONTINUED | OUTPATIENT
Start: 2024-01-21 | End: 2024-01-22 | Stop reason: HOSPADM

## 2024-01-21 RX ADMIN — EMPAGLIFLOZIN 10 MG: 10 TABLET, FILM COATED ORAL at 07:36

## 2024-01-21 RX ADMIN — SODIUM CHLORIDE, PRESERVATIVE FREE 10 ML: 5 INJECTION INTRAVENOUS at 07:33

## 2024-01-21 RX ADMIN — RANOLAZINE 1000 MG: 500 TABLET, EXTENDED RELEASE ORAL at 07:36

## 2024-01-21 RX ADMIN — APIXABAN 5 MG: 5 TABLET, FILM COATED ORAL at 09:57

## 2024-01-21 RX ADMIN — APIXABAN 5 MG: 5 TABLET, FILM COATED ORAL at 19:46

## 2024-01-21 RX ADMIN — ASPIRIN 81 MG 81 MG: 81 TABLET ORAL at 07:34

## 2024-01-21 RX ADMIN — RANOLAZINE 1000 MG: 500 TABLET, EXTENDED RELEASE ORAL at 19:47

## 2024-01-21 RX ADMIN — ATORVASTATIN CALCIUM 80 MG: 80 TABLET, FILM COATED ORAL at 19:46

## 2024-01-21 RX ADMIN — Medication 1 TABLET: at 07:36

## 2024-01-21 RX ADMIN — AMIODARONE HYDROCHLORIDE 200 MG: 200 TABLET ORAL at 07:36

## 2024-01-21 RX ADMIN — METOPROLOL SUCCINATE 25 MG: 25 TABLET, EXTENDED RELEASE ORAL at 19:46

## 2024-01-21 RX ADMIN — ISOSORBIDE MONONITRATE 60 MG: 60 TABLET, EXTENDED RELEASE ORAL at 19:46

## 2024-01-21 RX ADMIN — PANTOPRAZOLE SODIUM 40 MG: 40 TABLET, DELAYED RELEASE ORAL at 07:33

## 2024-01-21 RX ADMIN — CLOPIDOGREL BISULFATE 75 MG: 75 TABLET ORAL at 07:34

## 2024-01-21 RX ADMIN — ISOSORBIDE MONONITRATE 60 MG: 60 TABLET, EXTENDED RELEASE ORAL at 07:36

## 2024-01-21 RX ADMIN — SODIUM CHLORIDE, PRESERVATIVE FREE 10 ML: 5 INJECTION INTRAVENOUS at 19:47

## 2024-01-21 RX ADMIN — FERROUS SULFATE TAB 325 MG (65 MG ELEMENTAL FE) 325 MG: 325 (65 FE) TAB at 07:38

## 2024-01-21 RX ADMIN — Medication 1000 MCG: at 07:36

## 2024-01-21 ASSESSMENT — PAIN SCALES - GENERAL
PAINLEVEL_OUTOF10: 0

## 2024-01-22 VITALS
DIASTOLIC BLOOD PRESSURE: 111 MMHG | HEART RATE: 70 BPM | OXYGEN SATURATION: 99 % | WEIGHT: 150.7 LBS | TEMPERATURE: 97.9 F | BODY MASS INDEX: 22.32 KG/M2 | SYSTOLIC BLOOD PRESSURE: 162 MMHG | HEIGHT: 69 IN | RESPIRATION RATE: 16 BRPM

## 2024-01-22 LAB
ALBUMIN SERPL BCG-MCNC: 3.7 G/DL (ref 3.5–5.1)
ALP SERPL-CCNC: 70 U/L (ref 38–126)
ALT SERPL W/O P-5'-P-CCNC: 14 U/L (ref 11–66)
ANION GAP SERPL CALC-SCNC: 8 MEQ/L (ref 8–16)
AST SERPL-CCNC: 34 U/L (ref 5–40)
BILIRUB SERPL-MCNC: 0.9 MG/DL (ref 0.3–1.2)
BUN SERPL-MCNC: 13 MG/DL (ref 7–22)
CALCIUM SERPL-MCNC: 8.8 MG/DL (ref 8.5–10.5)
CHLORIDE SERPL-SCNC: 104 MEQ/L (ref 98–111)
CO2 SERPL-SCNC: 25 MEQ/L (ref 23–33)
CREAT SERPL-MCNC: 1.1 MG/DL (ref 0.4–1.2)
DEPRECATED RDW RBC AUTO: 38.8 FL (ref 35–45)
ERYTHROCYTE [DISTWIDTH] IN BLOOD BY AUTOMATED COUNT: 15.8 % (ref 11.5–14.5)
GFR SERPL CREATININE-BSD FRML MDRD: > 60 ML/MIN/1.73M2
GLUCOSE SERPL-MCNC: 96 MG/DL (ref 70–108)
HCT VFR BLD AUTO: 35.7 % (ref 42–52)
HGB BLD-MCNC: 11.3 GM/DL (ref 14–18)
MCH RBC QN AUTO: 21.9 PG (ref 26–33)
MCHC RBC AUTO-ENTMCNC: 31.7 GM/DL (ref 32.2–35.5)
MCV RBC AUTO: 69.3 FL (ref 80–94)
PLATELET # BLD AUTO: 234 THOU/MM3 (ref 130–400)
PMV BLD AUTO: 10 FL (ref 9.4–12.4)
POTASSIUM SERPL-SCNC: 4.4 MEQ/L (ref 3.5–5.2)
PROT SERPL-MCNC: 6.7 G/DL (ref 6.1–8)
RBC # BLD AUTO: 5.15 MILL/MM3 (ref 4.7–6.1)
SODIUM SERPL-SCNC: 137 MEQ/L (ref 135–145)
WBC # BLD AUTO: 7.8 THOU/MM3 (ref 4.8–10.8)

## 2024-01-22 PROCEDURE — 80053 COMPREHEN METABOLIC PANEL: CPT

## 2024-01-22 PROCEDURE — 2580000003 HC RX 258: Performed by: INTERNAL MEDICINE

## 2024-01-22 PROCEDURE — 99239 HOSP IP/OBS DSCHRG MGMT >30: CPT

## 2024-01-22 PROCEDURE — 6370000000 HC RX 637 (ALT 250 FOR IP): Performed by: INTERNAL MEDICINE

## 2024-01-22 PROCEDURE — 6370000000 HC RX 637 (ALT 250 FOR IP): Performed by: REGISTERED NURSE

## 2024-01-22 PROCEDURE — 6370000000 HC RX 637 (ALT 250 FOR IP): Performed by: HOSPITALIST

## 2024-01-22 PROCEDURE — 36415 COLL VENOUS BLD VENIPUNCTURE: CPT

## 2024-01-22 PROCEDURE — 85027 COMPLETE CBC AUTOMATED: CPT

## 2024-01-22 RX ORDER — METOPROLOL SUCCINATE 25 MG/1
25 TABLET, EXTENDED RELEASE ORAL DAILY
Qty: 30 TABLET | Refills: 3 | Status: SHIPPED | OUTPATIENT
Start: 2024-01-22

## 2024-01-22 RX ORDER — CLOPIDOGREL BISULFATE 75 MG/1
75 TABLET ORAL DAILY
Qty: 30 TABLET | Refills: 3 | Status: SHIPPED | OUTPATIENT
Start: 2024-01-23

## 2024-01-22 RX ORDER — ATORVASTATIN CALCIUM 80 MG/1
80 TABLET, FILM COATED ORAL NIGHTLY
Qty: 30 TABLET | Refills: 1 | Status: SHIPPED | OUTPATIENT
Start: 2024-01-22 | End: 2024-01-22

## 2024-01-22 RX ORDER — PANTOPRAZOLE SODIUM 40 MG/1
40 TABLET, DELAYED RELEASE ORAL
Qty: 30 TABLET | Refills: 3 | Status: SHIPPED | OUTPATIENT
Start: 2024-01-23

## 2024-01-22 RX ORDER — ATORVASTATIN CALCIUM 80 MG/1
80 TABLET, FILM COATED ORAL NIGHTLY
Qty: 30 TABLET | Refills: 1 | Status: SHIPPED | OUTPATIENT
Start: 2024-01-22

## 2024-01-22 RX ADMIN — SODIUM CHLORIDE, PRESERVATIVE FREE 10 ML: 5 INJECTION INTRAVENOUS at 08:25

## 2024-01-22 RX ADMIN — RANOLAZINE 1000 MG: 500 TABLET, EXTENDED RELEASE ORAL at 08:24

## 2024-01-22 RX ADMIN — Medication 1000 MCG: at 08:25

## 2024-01-22 RX ADMIN — ISOSORBIDE MONONITRATE 60 MG: 60 TABLET, EXTENDED RELEASE ORAL at 08:24

## 2024-01-22 RX ADMIN — Medication 1 TABLET: at 08:24

## 2024-01-22 RX ADMIN — AMIODARONE HYDROCHLORIDE 200 MG: 200 TABLET ORAL at 08:23

## 2024-01-22 RX ADMIN — ASPIRIN 81 MG 81 MG: 81 TABLET ORAL at 08:23

## 2024-01-22 RX ADMIN — CLOPIDOGREL BISULFATE 75 MG: 75 TABLET ORAL at 08:23

## 2024-01-22 RX ADMIN — PANTOPRAZOLE SODIUM 40 MG: 40 TABLET, DELAYED RELEASE ORAL at 08:23

## 2024-01-22 RX ADMIN — APIXABAN 5 MG: 5 TABLET, FILM COATED ORAL at 08:23

## 2024-01-22 RX ADMIN — EMPAGLIFLOZIN 10 MG: 10 TABLET, FILM COATED ORAL at 08:24

## 2024-01-22 ASSESSMENT — PAIN SCALES - GENERAL: PAINLEVEL_OUTOF10: 0

## 2024-01-22 NOTE — PLAN OF CARE
Problem: Chronic Conditions and Co-morbidities  Goal: Patient's chronic conditions and co-morbidity symptoms are monitored and maintained or improved  1/22/2024 1101 by Nelia Abdi RN  Outcome: Progressing  Flowsheets (Taken 1/19/2024 2237 by Katty Forbes RN)  Care Plan - Patient's Chronic Conditions and Co-Morbidity Symptoms are Monitored and Maintained or Improved: Monitor and assess patient's chronic conditions and comorbid symptoms for stability, deterioration, or improvement  Note: Meds ordered for chronic conditions.  1/22/2024 0316 by Maria Luz Rojas, RN  Outcome: Progressing  Flowsheets (Taken 1/19/2024 2237 by Katty Forbes RN)  Care Plan - Patient's Chronic Conditions and Co-Morbidity Symptoms are Monitored and Maintained or Improved: Monitor and assess patient's chronic conditions and comorbid symptoms for stability, deterioration, or improvement     Problem: Discharge Planning  Goal: Discharge to home or other facility with appropriate resources  1/22/2024 1101 by Nelia Abdi RN  Outcome: Progressing  Flowsheets (Taken 1/19/2024 2237 by Katty Forbes RN)  Discharge to home or other facility with appropriate resources:   Identify barriers to discharge with patient and caregiver   Identify discharge learning needs (meds, wound care, etc)   Refer to discharge planning if patient needs post-hospital services based on physician order or complex needs related to functional status, cognitive ability or social support system   Arrange for needed discharge resources and transportation as appropriate  Note: He is from home and plans on returning there at discharge.  He is agreeable to home health nurse to set up pill boxes.    1/22/2024 0316 by Maria Luz Rojas, RN  Outcome: Progressing  Flowsheets (Taken 1/19/2024 2237 by Katty Forbes RN)  Discharge to home or other facility with appropriate resources:   Identify barriers to discharge with patient and caregiver   
  Problem: Chronic Conditions and Co-morbidities  Goal: Patient's chronic conditions and co-morbidity symptoms are monitored and maintained or improved  Outcome: Progressing  Flowsheets (Taken 1/18/2024 0133)  Care Plan - Patient's Chronic Conditions and Co-Morbidity Symptoms are Monitored and Maintained or Improved:   Monitor and assess patient's chronic conditions and comorbid symptoms for stability, deterioration, or improvement   Collaborate with multidisciplinary team to address chronic and comorbid conditions and prevent exacerbation or deterioration   Update acute care plan with appropriate goals if chronic or comorbid symptoms are exacerbated and prevent overall improvement and discharge     Problem: Discharge Planning  Goal: Discharge to home or other facility with appropriate resources  Outcome: Progressing  Flowsheets (Taken 1/18/2024 0133)  Discharge to home or other facility with appropriate resources:   Identify barriers to discharge with patient and caregiver   Identify discharge learning needs (meds, wound care, etc)   Arrange for needed discharge resources and transportation as appropriate     Problem: Safety - Adult  Goal: Free from fall injury  Outcome: Progressing  Flowsheets (Taken 1/18/2024 0133)  Free From Fall Injury: Instruct family/caregiver on patient safety     Problem: Pain  Goal: Verbalizes/displays adequate comfort level or baseline comfort level  Outcome: Progressing  Flowsheets (Taken 1/18/2024 0133)  Verbalizes/displays adequate comfort level or baseline comfort level:   Encourage patient to monitor pain and request assistance   Administer analgesics based on type and severity of pain and evaluate response   Implement non-pharmacological measures as appropriate and evaluate response   Assess pain using appropriate pain scale     
  Problem: Chronic Conditions and Co-morbidities  Goal: Patient's chronic conditions and co-morbidity symptoms are monitored and maintained or improved  Outcome: Progressing  Flowsheets (Taken 1/19/2024 2237 by Katty Forbes RN)  Care Plan - Patient's Chronic Conditions and Co-Morbidity Symptoms are Monitored and Maintained or Improved: Monitor and assess patient's chronic conditions and comorbid symptoms for stability, deterioration, or improvement     Problem: Discharge Planning  Goal: Discharge to home or other facility with appropriate resources  Outcome: Progressing  Flowsheets (Taken 1/19/2024 2237 by Katty Forbes RN)  Discharge to home or other facility with appropriate resources:   Identify barriers to discharge with patient and caregiver   Identify discharge learning needs (meds, wound care, etc)   Refer to discharge planning if patient needs post-hospital services based on physician order or complex needs related to functional status, cognitive ability or social support system   Arrange for needed discharge resources and transportation as appropriate     Problem: Safety - Adult  Goal: Free from fall injury  Outcome: Progressing  Flowsheets (Taken 1/19/2024 2237 by Katty Forbes RN)  Free From Fall Injury: Instruct family/caregiver on patient safety     Problem: Pain  Goal: Verbalizes/displays adequate comfort level or baseline comfort level  Outcome: Progressing  Flowsheets (Taken 1/19/2024 2237 by Katty Forbes RN)  Verbalizes/displays adequate comfort level or baseline comfort level:   Encourage patient to monitor pain and request assistance   Assess pain using appropriate pain scale   Implement non-pharmacological measures as appropriate and evaluate response   Administer analgesics based on type and severity of pain and evaluate response     Problem: Cardiovascular - Adult  Goal: Maintains optimal cardiac output and hemodynamic stability  Outcome: Progressing  Flowsheets (Taken 
  Problem: Chronic Conditions and Co-morbidities  Goal: Patient's chronic conditions and co-morbidity symptoms are monitored and maintained or improved  Outcome: Progressing  Flowsheets (Taken 1/19/2024 2237)  Care Plan - Patient's Chronic Conditions and Co-Morbidity Symptoms are Monitored and Maintained or Improved: Monitor and assess patient's chronic conditions and comorbid symptoms for stability, deterioration, or improvement     Problem: Discharge Planning  Goal: Discharge to home or other facility with appropriate resources  Outcome: Progressing  Flowsheets (Taken 1/19/2024 2237)  Discharge to home or other facility with appropriate resources:   Identify barriers to discharge with patient and caregiver   Identify discharge learning needs (meds, wound care, etc)   Refer to discharge planning if patient needs post-hospital services based on physician order or complex needs related to functional status, cognitive ability or social support system   Arrange for needed discharge resources and transportation as appropriate     Problem: Safety - Adult  Goal: Free from fall injury  Outcome: Progressing  Flowsheets  Taken 1/19/2024 2237  Free From Fall Injury: Instruct family/caregiver on patient safety  Taken 1/19/2024 2235  Free From Fall Injury: Instruct family/caregiver on patient safety     Problem: Pain  Goal: Verbalizes/displays adequate comfort level or baseline comfort level  Outcome: Progressing  Flowsheets (Taken 1/19/2024 2237)  Verbalizes/displays adequate comfort level or baseline comfort level:   Encourage patient to monitor pain and request assistance   Assess pain using appropriate pain scale   Implement non-pharmacological measures as appropriate and evaluate response   Administer analgesics based on type and severity of pain and evaluate response     Problem: Cardiovascular - Adult  Goal: Maintains optimal cardiac output and hemodynamic stability  Outcome: Progressing  Flowsheets (Taken 1/19/2024 
1/19/2024 2237 by Katty Forbes RN)  Maintains optimal cardiac output and hemodynamic stability:   Monitor blood pressure and heart rate   Assess for signs of decreased cardiac output  Goal: Absence of cardiac dysrhythmias or at baseline  Outcome: Progressing  Flowsheets (Taken 1/19/2024 2237 by Katty Forbes RN)  Absence of cardiac dysrhythmias or at baseline:   Monitor cardiac rate and rhythm   Assess for signs of decreased cardiac output     Problem: Skin/Tissue Integrity - Adult  Goal: Incisions, wounds, or drain sites healing without S/S of infection  Outcome: Progressing  Flowsheets (Taken 1/19/2024 2235 by Katty Forbes RN)  Incisions, Wounds, or Drain Sites Healing Without Sign and Symptoms of Infection: TWICE DAILY: Assess and document skin integrity     Problem: Hematologic - Adult  Goal: Maintains hematologic stability  Outcome: Progressing  Flowsheets (Taken 1/19/2024 2237 by Katty Forbes RN)  Maintains hematologic stability:   Assess for signs and symptoms of bleeding or hemorrhage   Monitor labs for bleeding or clotting disorders     Problem: ABCDS Injury Assessment  Goal: Absence of physical injury  Outcome: Progressing  Flowsheets (Taken 1/19/2024 2237 by Katty Forbes RN)  Absence of Physical Injury: Implement safety measures based on patient assessment     Problem: Infection - Adult  Goal: Absence of infection during hospitalization  Outcome: Progressing  Flowsheets (Taken 1/19/2024 2237 by Katty Forbes RN)  Absence of infection during hospitalization:   Assess and monitor for signs and symptoms of infection   Monitor all insertion sites i.e., indwelling lines, tubes and drains   Monitor lab/diagnostic results       Care plan reviewed with patient.  Patient verbalizes understanding of the care plan and contributed to goal setting.

## 2024-01-22 NOTE — CARE COORDINATION
1/22/24, 1:20 PM EST    Patient goals/plan/ treatment preferences discussed by  and .  Patient goals/plan/ treatment preferences reviewed with patient/ family.  Patient/ family verbalize understanding of discharge plan and are in agreement with goal/plan/treatment preferences.  Understanding was demonstrated using the teach back method.  AVS provided by RN at time of discharge, which includes all necessary medical information pertaining to the patients current course of illness, treatment, post-discharge goals of care, and treatment preferences. SW called referral to [facility].     Services At/After Discharge: Home Health       IMM Letter  IMM Letter given to Patient/Family/Significant other/Guardian/POA/by:: Laura JO   IMM Letter date given:: 01/22/24  IMM Letter time given:: 0950      Discharged home with [facility] for RN.

## 2024-01-22 NOTE — CARE COORDINATION
1/22/24, 10:58 AM EST    DISCHARGE PLANNING EVALUATION    Left VM for Deepthi at [facility] with referral information for new [facility]. Physician to put in HH orders.

## 2024-01-22 NOTE — CARE COORDINATION
Pt verbalized understanding and gave permission for possible discharge within 4 hours of receiving IMM.    Electronically signed by Laura Guardado RN on 1/22/2024 at 10:56 AM

## 2024-01-22 NOTE — FLOWSHEET NOTE
How to care for your heart after coronary artery disease educational booklet and cardiac cath discharge instructions discussed and given to the patient.    Discussed the importance of taking all of his blood thinners, antiplatelets, and to take all his meds as directed.

## 2024-01-22 NOTE — PROGRESS NOTES
Hospitalist Progress Note      Name:  Chriss Braga /Age/Sex: 1955  (68 y.o. male)   MRN & CSN:  [id number] & [id number] Admission Date/Time: 2024  1:03 PM   Location:  [address] [place] [address] PCP: No primary care provider on file.         Hospital Day: 5    Assessment and Plan:     68 years old male with history of hypertension hyperlipidemia history of CABG 3 times was admitted because of acute chest pain with ST elevation on the inferior and lateral wall status post left heart cath and 3 PCI then chest pain persists and the patient required another new stent today to OM1    STEMI: Status post urgent left heart cath with 4 PCI  Continue on aspirin atorvastatin Plavix Isorbid mononitrate and metoprolol  Cardiology on board  Cardiology recommend to continue Eliquis Plavix aspirin and may consider dropping aspirin in 1 month  Continue statin  Patient still complaining of chest pain left-sided radiating to left arm this morning  Cardiology believes it is noncardiac pain  Started on lidocaine patch        2-acute kidney injury with serum creatinine jumped to 1.3:   Continue IV fluids to prevent contrast-induced nephropathy  Monitor renal function  Hold nephrotoxic medication      Headache: Patient states he has a brain tumor and he complained of headache  I looked at the CT of the head in  and there is no evidence of brain tumor  Order MRI of the brain, unable to do MRI because of defibrillator  Order another CT head without contrast       Diet ADULT DIET; Regular; Low Fat/Low Chol/High Fiber/JAMISON; Low Sodium (2 gm); 2000 ml  ADULT ORAL NUTRITION SUPPLEMENT; Dinner, Lunch, Breakfast; Standard High Calorie/High Protein Oral Supplement   DVT Prophylaxis [] Lovenox, []  Heparin, [] SCDs, [] Ambulation   GI Prophylaxis [] PPI,  [] H2 Blocker,  [] Carafate,  [] Diet/Tube Feeds   Code Status Full Code   Disposition Patient requires continued admission due to    MDM [] Low, [] Moderate,[]  High  Patient's risk as 
      Hospitalist Progress Note      Name:  Chriss Braga /Age/Sex: 1955  (68 y.o. male)   MRN & CSN:  [id number] & [id number] Admission Date/Time: 2024  1:03 PM   Location:  [address] [place] [address] PCP: No primary care provider on file.         Hospital Day: 4    Assessment and Plan:     68 years old male with history of hypertension hyperlipidemia history of CABG 3 times was admitted because of acute chest pain with ST elevation on the inferior and lateral wall status post left heart cath and 3 PCI then chest pain persists and the patient required another new stent today to OM1    STEMI: Status post urgent left heart cath with 4 PCI  Continue on aspirin atorvastatin Plavix Isorbid mononitrate and metoprolol  Cardiology on board  Cardiology recommend to continue Eliquis Plavix aspirin and may consider dropping aspirin in 1 month  Continue statin  Patient still complaining of chest pain left-sided radiating to left arm this morning      2-acute kidney injury with serum creatinine jumped to 1.3:   Continue IV fluids to prevent contrast-induced nephropathy  Monitor renal function  Hold nephrotoxic medication      Headache: Patient states he has a brain tumor and he complained of headache  I looked at the CT of the head in  and there is no evidence of brain tumor  Order MRI of the brain      Diet ADULT DIET; Regular; Low Fat/Low Chol/High Fiber/JAMISON; Low Sodium (2 gm); 2000 ml  ADULT ORAL NUTRITION SUPPLEMENT; Dinner, Lunch, Breakfast; Standard High Calorie/High Protein Oral Supplement   DVT Prophylaxis [] Lovenox, []  Heparin, [] SCDs, [] Ambulation   GI Prophylaxis [] PPI,  [] H2 Blocker,  [] Carafate,  [] Diet/Tube Feeds   Code Status Full Code   Disposition Patient requires continued admission due to    MDM [] Low, [] Moderate,[]  High  Patient's risk as above due to      History of Present Illness:   Patient was seen and examined at the bedside  Patient patient reports left-sided chest pain today morning 
      Hospitalist Progress Note      Name:  Chriss Braga /Age/Sex: 1955  (68 y.o. male)   MRN & CSN:  [id number] & [id number] Admission Date/Time: 2024  1:03 PM   Location:  [address] [place] [address] PCP: No primary care provider on file.         Hospital Day: 3    Assessment and Plan:     68 years old male with history of hypertension hyperlipidemia history of CABG 3 times was admitted because of acute chest pain with ST elevation on the inferior and lateral wall status post left heart cath and 3 PCI then chest pain persists and the patient required another new stent today to OM1    STEMI: Status post urgent left heart cath with 4 PCI  Continue on aspirin atorvastatin Plavix Isorbid mononitrate and metoprolol  Cardiology on board  Cardiology recommend to continue Eliquis Plavix aspirin and may consider dropping aspirin in 1 month  Continue statin      2-acute kidney injury with serum creatinine jumped to 1.3:   Continue IV fluids to prevent contrast-induced nephropathy  Monitor renal function  Hold nephrotoxic medication      Diet ADULT DIET; Regular; Low Fat/Low Chol/High Fiber/JAMISON; Low Sodium (2 gm); 2000 ml  ADULT ORAL NUTRITION SUPPLEMENT; Dinner, Lunch, Breakfast; Standard High Calorie/High Protein Oral Supplement   DVT Prophylaxis [] Lovenox, []  Heparin, [] SCDs, [] Ambulation   GI Prophylaxis [] PPI,  [] H2 Blocker,  [] Carafate,  [] Diet/Tube Feeds   Code Status Full Code   Disposition Patient requires continued admission due to    MDM [] Low, [] Moderate,[]  High  Patient's risk as above due to      History of Present Illness:   Patient was seen and examined at the bedside  Patient still complaining of chest pain      Objective:     Intake/Output Summary (Last 24 hours) at 2024 1348  Last data filed at 2024 0909  Gross per 24 hour   Intake 950 ml   Output 425 ml   Net 525 ml      Vitals:   Vitals:    24 1314   BP:    Pulse:    Resp: 16   Temp:    SpO2:      Physical Exam:   GEN Awake. Alert , 
  CRITICAL CARE PROGRESS NOTE      Patient:  Chriss Braga    Unit/Bed:3B-33/033-A  YOB: 1955  MRN: [id number]   PCP: No primary care provider on file.  Date of Admission: 1/17/2024  Chief Complaint:-Chest pain, post cath lab     Assessment and Plan:    NSTEMI: Previous medical history of ICD,CABG 15 years ago, presents with chest pain to the ED, EKG with ST elevation on inferolateral wall, required PCI, previous anatomy described low, new anatomy showed Left Anterior Descending: Ost LAD to Prox LAD lesion is 80% stenosed. Left Circumflex: Ost Cx lesion is 90% stenosed. First Obtuse Marginal Branch: The vessel is large. Ost 1st Mrg lesion is 75% stenosed.  Required Graft To RPDA , LIMA Graft To Dist LAD , Graft To 1st Mrg.  Assess in the morning, persisted with chest pain, required new PCI stent on OM1, currently on heparin drip, no chest pain after second intervention, will transfer  Post PCI: Initial PCI Graft To RPDA , LIMA Graft To Dist LAD , Graft To 1st Mrg, required no intervention for persistence of chest pain, stent to OM I #1 on Coreg, lisinopril, Ranexa, Brilinta, amiodarone  History of hypertension: Home medications Coreg lisinopril Ranexa  History of DVT: Currently on Brilinta  History of hyperlipidemia: Lipitor at home  History of CABG: Cath Lab 2018 : RCA to Dist RCA lesion, with 80% stenosis. Ost LAD to Prox LAD lesion, with 80% stenosis. Ost Cx lesion, with 90% stenosis.  Ost 1st Mrg lesion, with 75% stenosis.       INITIAL H AND P AND ICU COURSE:   Medical history of ICD, CABG, hypertension hyperlipidemia presents emergency department complaining of 1 day of chest pain, and EKG initial presents's elevation inferior leads and lateral wall, admitted for PCI, required initially 3 stents previously described, as arriving to stepdown patient remains with typical chest pain even with IV nitroglycerin why patient required go back to PCI with OM 1 stent.  Currently on ICU for observation, on 
CLINICAL PHARMACY: DISCHARGE MED RECONCILIATION/REVIEW    [facility] Patient?: No  Total # of Interventions Recommended: 1 -   - Increased Dose #: 1  Total # Interventions Accepted: 1  Intervention Severity:   - Level 1 Intervention Present?: No   - Level 2 #: 0   - Level 3 #: 1   Time Spent (min): 15    Additional Documentation:   
Cardiology Progress Note      Patient:  Chriss Braga  YOB: 1955  MRN: [id number]   Acct: [id number]  Admit Date:  1/17/2024  Primary Cardiologist: Reports does not follow up routinely with any cardiology practice  Consulting Cardiologist: Dr. Hatch     Chief Complaint: STEMI      HPI per Dr. Hatch Consult:  This is a pleasant 68 y.o. 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  10/10.  Both shoulders and substernal.  No syncope.  No symptoms prior.  Has had hx of stents, unclear anatomy.  Has had a CVA.  Prior EMR shows he was on Eliquis, but has not taken it for a couple days.  Unclear why he took it, no clear afib in the chart.  6/2023 MIKEY done at [facility] shows EF 40%.   2018 cath showed:  Prox RCA to Dist RCA lesion, with 80% stenosis.   · Ost LAD to Prox LAD lesion, with 80% stenosis.   · Ost Cx lesion, with 90% stenosis.   · Ost 1st Mrg lesion, with 75% stenosis.   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   · Continue with medical therapy.     Coronary Findings Diagnostic Dominance: Right   Left Anterior Descending: Ost LAD to Prox LAD lesion is 80% stenosed.   Left Circumflex: Ost Cx lesion is 90% stenosed. First Obtuse Marginal Branch: The vessel is large. Ost 1st Mrg lesion is 75% stenosed.   Right Coronary Artery: The vessel is small. Prox RCA to Dist RCA lesion is 80% stenosed.   Graft To RPDA   LIMA Graft To Dist LAD   Graft To 1st Mrg      No alcohol or tobacco.  No spouse.  POA is friend/aunt.  No kidney issues, allergy to dye.         Subjective (Events in last 24 hours):   Seen in follow up. Continues with reports of multifocal pain: +headache; w/ left anterior chest pain- sore/ localized/+point tenderness; easily reproducible to palpation. Tenderness at right groin cath site- soft/no hematoma, distal perfusion/sensation intact.     Intermittent nausea- tolerating medications; PO intake.    Vitals stable. 
Cardiology Progress Note      Patient:  Chriss Braga  YOB: 1955  MRN: [id number]   Acct: [id number]  Admit Date:  1/17/2024  Primary Cardiologist: Reports does not follow up routinely with any cardiology practice  Consulting Cardiologist: Dr. Hatch     Chief Complaint: STEMI    HPI per Dr. Hatch Consult:  This is a pleasant 68 y.o. 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  10/10.  Both shoulders and substernal.  No syncope.  No symptoms prior.  Has had hx of stents, unclear anatomy.  Has had a CVA.  Prior EMR shows he was on Eliquis, but has not taken it for a couple days.  Unclear why he took it, no clear afib in the chart.  6/2023 MIKEY done at [facility] shows EF 40%.   2018 cath showed:  Prox RCA to Dist RCA lesion, with 80% stenosis.   · Ost LAD to Prox LAD lesion, with 80% stenosis.   · Ost Cx lesion, with 90% stenosis.   · Ost 1st Mrg lesion, with 75% stenosis.   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   · Continue with medical therapy.     Coronary Findings Diagnostic Dominance: Right   Left Anterior Descending: Ost LAD to Prox LAD lesion is 80% stenosed.   Left Circumflex: Ost Cx lesion is 90% stenosed. First Obtuse Marginal Branch: The vessel is large. Ost 1st Mrg lesion is 75% stenosed.   Right Coronary Artery: The vessel is small. Prox RCA to Dist RCA lesion is 80% stenosed.   Graft To RPDA   LIMA Graft To Dist LAD   Graft To 1st Mrg      No alcohol or tobacco.  No spouse.  POA is friend/aunt.  No kidney issues, allergy to dye.         Subjective (Events in last 24 hours):   Seen in follow up. Reports had episode misternal chest discomfort yesterday immediately after cath. Now having pain over anterior left chest- +point tenderness; easily reproducible to palpation- likely MSK etiology. Pain at right groin cath site- treated with norco- soft/no hematoma, distal perfusion/sensation intact.     Nausea; one 
Cardiology Progress Note      Patient:  Chriss Braga  YOB: 1955  MRN: [id number]   Acct: [id number]  Admit Date:  1/17/2024  Primary Cardiologist:  none  Consulting Cardiologist: Dr. Hatch      Chief Complaint: STEMI        HPI per Dr. Hatch Consult:  This is a pleasant 68 y.o. 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  10/10.  Both shoulders and substernal.  No syncope.  No symptoms prior.  Has had hx of stents, unclear anatomy.  Has had a CVA.  Prior EMR shows he was on Eliquis, but has not taken it for a couple days.  Unclear why he took it, no clear afib in the chart.  6/2023 MIKEY done at [facility] shows EF 40%.   2018 cath showed:  Prox RCA to Dist RCA lesion, with 80% stenosis.   · Ost LAD to Prox LAD lesion, with 80% stenosis.   · Ost Cx lesion, with 90% stenosis.   · Ost 1st Mrg lesion, with 75% stenosis.   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   · Continue with medical therapy.     Coronary Findings Diagnostic Dominance: Right   Left Anterior Descending: Ost LAD to Prox LAD lesion is 80% stenosed.   Left Circumflex: Ost Cx lesion is 90% stenosed. First Obtuse Marginal Branch: The vessel is large. Ost 1st Mrg lesion is 75% stenosed.   Right Coronary Artery: The vessel is small. Prox RCA to Dist RCA lesion is 80% stenosed.   Graft To RPDA   LIMA Graft To Dist LAD   Graft To 1st Mrg      No alcohol or tobacco.  No spouse.  POA is friend/aunt.  No kidney issues, allergy to dye.     Subjective (Events in last 24 hours):   Pt awake, alert. Nad. Chest pains much improved. D/w patient again about importance of plavix, asa, eliquis for 1  month then stop aspirin. Patient expressed understanding and agrees to be compliant with meds and follow up       Objective:   BP (!) 149/94   Pulse 67   Temp 98.2 °F (36.8 °C) (Oral)   Resp 16   Ht 1.753 m (5' 9\")   Wt 68.2 kg (150 lb 4.8 oz)   SpO2 98%   BMI 22.20 kg/m²  
Evaluated cost of Entresto for [facility]    Entresto: $4.60/month, patient eligible for free 30-day trial from [facility].     If there are any questions, please call me. Thank you! Brianna Malik [facility] - Prescription Assistance ([phone]) 1/18/2024,7:22 AM    
Inpatient Cardiac Rehabilitation Consult    Received consult for Phase II Cardiac Rehabilitation.  Patient needs cardiac rehab due to STEMI/PCI on 1/17/24.  Importance of Cardiac Rehab discussed with patient.  Reviewed cardiac rehab class times.  Patient questions answered.  We will contact patient at home to schedule evaluation appointment.    Cardiac Rehab brochure given.            
MI Documentation    MI: : STEMI    PCI: YES    EF: 40%  ASA w/i first 24 hours: YES   BB w/i first 24 hours: YES       ------------------------------------------  1.  ASA: YES  2.  BB: YES  3.  ACE/ARB: YES  4.  Statin: YES  5.  P2Y12: YES    ))))))REMEMBER NTG SL AT DISCHARGE((((((    
MRI not able to be completed due to pt having MR UNSAFE ICD.  Rn notified, order will be cancelled  
Pharmacy Medication History Note      List of current medications patient is taking is complete.    Source of information: Patient     Changes made to medication list:  Medications removed (include reason, ex. therapy complete or physician discontinued):  Gabapentin, lidocaine patches, and pantoprazole all removed as patient reported no longer taking them.    Medications added/doses adjusted:  Amlodipine 10 mg tablets, Entresto 24-26 mg tablets, and spironolactone 25 mg tablets were added as they were in his dispense history and patient confirmed still taking.     Other notes (ex. Recent course of antibiotics, Coumadin dosing):  Patient claimed taking all the medications \"a couple days ago\" however his dispense report shows not having medications billed through insurance since October 20th at the earliest for 30 day supplies.   Denies use of other OTC or herbal medications.      Allergies reviewed      Electronically signed by Tim Greer on 1/17/2024 at 4:11 PM                                                     
Pt was in bed and alone at the time of the visit. He was dealing with ST elevated myocardial infarction. He was encouraged and blessed.    01/18/24 1408   Encounter Summary   Encounter Overview/Reason  Initial Encounter   Service Provided For: Patient   Referral/Consult From: [facility]   Support System Family members   Last Encounter  01/18/24   Complexity of Encounter Low   Begin Time 1250   End Time  1257   Total Time Calculated 7 min   Spiritual/Emotional needs   Type Spiritual Support   Assessment/Intervention/Outcome   Assessment Hopeful   Intervention Empowerment   Outcome Encouraged       
Breakfast; Standard High Calorie/High Protein Oral Supplement    Anthropometric Measures:  Height: 175.3 cm (5' 9\")  Ideal Body Weight (IBW): 160 lbs (73 kg)    Admission Body Weight: 77.1 kg (170 lb) (1/17; no edema noted)  Current Body Weight: 77.1 kg (170 lb) (1/17; no edema noted), 106.3 % IBW.  Current BMI (kg/m2): 25.1  Usual Body Weight:  (170 lbs per pt report. Per EMR: 153 lb 9.6 oz on 5/22/23)  BMI Categories: Overweight (BMI 25.0-29.9)    Estimated Daily Nutrient Needs:  Energy Requirements Based On: Kcal/kg  Weight Used for Energy Requirements: Current (77.1 kg on 1/19)  Energy (kcal/day): 7135-1855 kcal/day (20-25 kcal/kg)  Weight Used for Protein Requirements: Current  Protein (g/day): 100-116 g/day (1.3-1.5 g/kg)    Nutrition Diagnosis:   Inadequate oral intake related to inadequate protein-energy intake as evidenced by poor intake prior to admission    Nutrition Interventions:   Food and/or Nutrient Delivery: Continue Current Diet, Start Oral Nutrition Supplement, Vitamin Supplement  Nutrition Education/Counseling: Education completed (Completed Cardiac, Low-Sodium and Fluid-Restricted diet education on 1/19/23. RD contact information provided.)  Coordination of Nutrition Care: Continue to monitor while inpatient    Goals:  Previous Goal Met: Progressing toward Goal(s)  Goals: PO intake 75% or greater, by next RD assessment    Nutrition Monitoring and Evaluation:   Food/Nutrient Intake Outcomes: Food and Nutrient Intake, Supplement Intake, Vitamin/Mineral Intake  Physical Signs/Symptoms Outcomes: Biochemical Data, Weight, Skin, Nutrition Focused Physical Findings, GI Status, Fluid Status or Edema    Discharge Planning:    Too soon to determine     Noelle Quach MS, RD, LD  Contact: [phone]    
mmHg                         Aorta Measurements          Dimensions   Measurement Value (Range)    Ascending Aorta 3.3 cm                   Diastolic Filling/Shunts          Diastolic Filling   MV E Velocity 0.35 m/s           MV A Velocity 0.9 m/s           MV E/A 0.39           E/E' Ratio (Averaged) 10.21           LV E' Lateral Velocity 4 cm/s           E/E' Lateral 8.75           LV E' Septal Velocity 3 cm/s           MV E Wave Deceleration Time 363 ms           LV IVRT 99 ms           TV E Wave Velocity 0.4 m/s                   Left Heart Cath: 1/17/2024  Conclusion          Successful PCI of OM1 STEMI via SVG x 1 LESTER    Severe, residual RCA stenosis (medically manage)    Patent LIMA to LAD     Recommendations     1.  Bedrest.  2.  Optimal medical therapy.  3.  Routine access site care.  4.  Gentle diuresis  5.  DAPT.  6.  Guideline therapy for CAD/CHF.  7.  Uptitrate antianginals.  8.  Cardiac rehab (if indicated)  9.  Follow up with primary cardiologist/PCP in two to four weeks post procedure      Complications     Complications documented before study signed (1/17/2024  2:41 PM)      No complications were associated with this study.   Documented by Gunner Hatch MD - 1/17/2024  2:40 PM         Coronary Findings     Diagnostic  Dominance: Right  Left Main   The vessel was visualized by angiography and is moderate in size. Size of vessel >=2.0 mm. There is mild disease.      Left Anterior Descending   Size of vessel >=2.0 mm.   Ost LAD to Mid LAD lesion, 85% stenosed.      First Diagonal Branch   1st Diag lesion, 90% stenosed.      Second Diagonal Branch   2nd Diag lesion, 70% stenosed.      Left Circumflex   The vessel was visualized by angiography and is moderate in size. Size of vessel >=2.0 mm. There is mild disease.   Prox Cx lesion, 70% stenosed with 90% stenosed side branch in 1st Mrg.      First Obtuse Marginal Branch   1st Mrg lesion, 100% stenosed. Lesion is the culprit lesion. The lesion is

## 2024-01-25 NOTE — DISCHARGE SUMMARY
Hospitalist Discharge Summary    Patient: Chriss Braga  YOB: 1955  MRN: [id number]   Acct: [id number]    Primary Care Physician: No primary care provider on file.    Admit date  1/17/2024    Discharge date: 1/22/2024     Chief Complaint on presentation: Chest pain    Initial H&P / Hospital Course:   Patient presented on 1/17 with complaints of chest pain.  Patient found to have a STEMI and taken to the Cath Lab.  Graft to RPDA, LIMA graft to distal LAD, and graft to first MRG and stent to OM1.  Patient was admitted to the ICU on 1/18 following Cath Lab.  Per cardiology, patient to take aspirin/Plavix, statin, beta-blocker, lisinopril.  Additionally to take Amio, Eliquis, Imdur, and Ranexa.  Patient was then transferred to stepdown unit on 1/19 and developed an JASMYN with creatinine jumped to 1.3.  On 1/20 creatinine improved and patient also noted that he had a headache and reported history of a brain tumor.  MRI brain was placed however given defibrillator, patient could not undergo MRI.  CT head without contrast ordered-revealed no acute findings on 1/21.  Patient is stable from a cardiac standpoint to be discharged home 1/22.  Patient improvement in his headache following some pain medication.  Okay with cardiology for discharge.  Discussed plan for discharge with patient and patient in agreement.  All VSS and patient appropriate for discharge    Subjective (day of discharge):   1/22: Patient reported having a slight headache, improved following some pain medication.  Reports he gets chronic headaches at home.  Denies any chest pain or shortness of breath.  Denies palpitations or lightheadedness/dizziness.    Patient responded well to medical management. Consultants had signed off or were contacted and agree with discharge plan. The patient was discharged in stable condition with appropriate outpatient follow up arranged.      Discharge Assessment and Plan:    STEMI s/p left heart cath with PCI,

## 2024-02-03 ENCOUNTER — HOSPITAL ENCOUNTER (INPATIENT)
Age: 69
LOS: 3 days | Discharge: HOME HEALTH | DRG: 281 | End: 2024-02-06
Attending: STUDENT IN AN ORGANIZED HEALTH CARE EDUCATION/TRAINING PROGRAM | Admitting: STUDENT IN AN ORGANIZED HEALTH CARE EDUCATION/TRAINING PROGRAM
Payer: MEDICARE

## 2024-02-03 ENCOUNTER — APPOINTMENT (OUTPATIENT)
Dept: GENERAL RADIOLOGY | Age: 69
DRG: 281 | End: 2024-02-03
Payer: MEDICARE

## 2024-02-03 ENCOUNTER — APPOINTMENT (OUTPATIENT)
Dept: CT IMAGING | Age: 69
DRG: 281 | End: 2024-02-03
Payer: MEDICARE

## 2024-02-03 DIAGNOSIS — I21.4 NSTEMI (NON-ST ELEVATED MYOCARDIAL INFARCTION) (HCC): Primary | ICD-10-CM

## 2024-02-03 DIAGNOSIS — Z87.891 PERSONAL HISTORY OF TOBACCO USE: ICD-10-CM

## 2024-02-03 DIAGNOSIS — I20.0 UNSTABLE ANGINA (HCC): ICD-10-CM

## 2024-02-03 DIAGNOSIS — R93.89 ABNORMAL CHEST CT: ICD-10-CM

## 2024-02-03 DIAGNOSIS — R07.9 CHEST PAIN, UNSPECIFIED TYPE: ICD-10-CM

## 2024-02-03 LAB
ANION GAP SERPL CALC-SCNC: 9 MEQ/L (ref 8–16)
APTT PPP: 117.8 SECONDS (ref 22–38)
APTT PPP: 32.6 SECONDS (ref 22–38)
APTT PPP: 79.3 SECONDS (ref 22–38)
BUN SERPL-MCNC: 19 MG/DL (ref 7–22)
CALCIUM SERPL-MCNC: 9.1 MG/DL (ref 8.5–10.5)
CHLORIDE SERPL-SCNC: 106 MEQ/L (ref 98–111)
CO2 SERPL-SCNC: 26 MEQ/L (ref 23–33)
CREAT SERPL-MCNC: 1 MG/DL (ref 0.4–1.2)
DEPRECATED RDW RBC AUTO: 40.4 FL (ref 35–45)
EKG ATRIAL RATE: 74 BPM
EKG P AXIS: 67 DEGREES
EKG P-R INTERVAL: 166 MS
EKG Q-T INTERVAL: 410 MS
EKG QRS DURATION: 84 MS
EKG QTC CALCULATION (BAZETT): 455 MS
EKG R AXIS: 123 DEGREES
EKG T AXIS: -45 DEGREES
EKG VENTRICULAR RATE: 74 BPM
ERYTHROCYTE [DISTWIDTH] IN BLOOD BY AUTOMATED COUNT: 16.3 % (ref 11.5–14.5)
GFR SERPL CREATININE-BSD FRML MDRD: > 60 ML/MIN/1.73M2
GLUCOSE SERPL-MCNC: 90 MG/DL (ref 70–108)
HCT VFR BLD AUTO: 37.1 % (ref 42–52)
HEPARIN UNFRACTIONATED: < 0.04 U/ML (ref 0.3–0.7)
HGB BLD-MCNC: 11.5 GM/DL (ref 14–18)
INR PPP: 0.99 (ref 0.85–1.13)
MCH RBC QN AUTO: 22.1 PG (ref 26–33)
MCHC RBC AUTO-ENTMCNC: 31 GM/DL (ref 32.2–35.5)
MCV RBC AUTO: 71.2 FL (ref 80–94)
NT-PROBNP SERPL IA-MCNC: 846.3 PG/ML (ref 0–124)
OSMOLALITY SERPL CALC.SUM OF ELEC: 283 MOSMOL/KG (ref 275–300)
PLATELET # BLD AUTO: 246 THOU/MM3 (ref 130–400)
PMV BLD AUTO: 9.1 FL (ref 9.4–12.4)
POTASSIUM SERPL-SCNC: 4.6 MEQ/L (ref 3.5–5.2)
RBC # BLD AUTO: 5.21 MILL/MM3 (ref 4.7–6.1)
SODIUM SERPL-SCNC: 141 MEQ/L (ref 135–145)
TROPONIN, HIGH SENSITIVITY: 17 NG/L (ref 0–12)
TROPONIN, HIGH SENSITIVITY: 19 NG/L (ref 0–12)
WBC # BLD AUTO: 7 THOU/MM3 (ref 4.8–10.8)

## 2024-02-03 PROCEDURE — 2060000000 HC ICU INTERMEDIATE R&B

## 2024-02-03 PROCEDURE — 85027 COMPLETE CBC AUTOMATED: CPT

## 2024-02-03 PROCEDURE — 36415 COLL VENOUS BLD VENIPUNCTURE: CPT

## 2024-02-03 PROCEDURE — 85520 HEPARIN ASSAY: CPT

## 2024-02-03 PROCEDURE — 93005 ELECTROCARDIOGRAM TRACING: CPT | Performed by: PHYSICIAN ASSISTANT

## 2024-02-03 PROCEDURE — 6360000002 HC RX W HCPCS: Performed by: STUDENT IN AN ORGANIZED HEALTH CARE EDUCATION/TRAINING PROGRAM

## 2024-02-03 PROCEDURE — 2580000003 HC RX 258: Performed by: PHYSICIAN ASSISTANT

## 2024-02-03 PROCEDURE — 6370000000 HC RX 637 (ALT 250 FOR IP): Performed by: PHYSICIAN ASSISTANT

## 2024-02-03 PROCEDURE — 96374 THER/PROPH/DIAG INJ IV PUSH: CPT

## 2024-02-03 PROCEDURE — 6360000004 HC RX CONTRAST MEDICATION: Performed by: STUDENT IN AN ORGANIZED HEALTH CARE EDUCATION/TRAINING PROGRAM

## 2024-02-03 PROCEDURE — 71275 CT ANGIOGRAPHY CHEST: CPT

## 2024-02-03 PROCEDURE — 85730 THROMBOPLASTIN TIME PARTIAL: CPT

## 2024-02-03 PROCEDURE — 85610 PROTHROMBIN TIME: CPT

## 2024-02-03 PROCEDURE — 93010 ELECTROCARDIOGRAM REPORT: CPT | Performed by: INTERNAL MEDICINE

## 2024-02-03 PROCEDURE — 84484 ASSAY OF TROPONIN QUANT: CPT

## 2024-02-03 PROCEDURE — 93005 ELECTROCARDIOGRAM TRACING: CPT | Performed by: STUDENT IN AN ORGANIZED HEALTH CARE EDUCATION/TRAINING PROGRAM

## 2024-02-03 PROCEDURE — 99223 1ST HOSP IP/OBS HIGH 75: CPT | Performed by: PHYSICIAN ASSISTANT

## 2024-02-03 PROCEDURE — 99285 EMERGENCY DEPT VISIT HI MDM: CPT

## 2024-02-03 PROCEDURE — 71045 X-RAY EXAM CHEST 1 VIEW: CPT

## 2024-02-03 PROCEDURE — 80048 BASIC METABOLIC PNL TOTAL CA: CPT

## 2024-02-03 PROCEDURE — 6360000002 HC RX W HCPCS: Performed by: PHYSICIAN ASSISTANT

## 2024-02-03 PROCEDURE — 83880 ASSAY OF NATRIURETIC PEPTIDE: CPT

## 2024-02-03 PROCEDURE — 6370000000 HC RX 637 (ALT 250 FOR IP): Performed by: STUDENT IN AN ORGANIZED HEALTH CARE EDUCATION/TRAINING PROGRAM

## 2024-02-03 RX ORDER — ACETAMINOPHEN 325 MG/1
650 TABLET ORAL EVERY 6 HOURS PRN
Status: DISCONTINUED | OUTPATIENT
Start: 2024-02-03 | End: 2024-02-06 | Stop reason: HOSPADM

## 2024-02-03 RX ORDER — ONDANSETRON 4 MG/1
4 TABLET, ORALLY DISINTEGRATING ORAL EVERY 8 HOURS PRN
Status: DISCONTINUED | OUTPATIENT
Start: 2024-02-03 | End: 2024-02-06 | Stop reason: HOSPADM

## 2024-02-03 RX ORDER — POTASSIUM CHLORIDE 20 MEQ/1
40 TABLET, EXTENDED RELEASE ORAL PRN
Status: DISCONTINUED | OUTPATIENT
Start: 2024-02-03 | End: 2024-02-06 | Stop reason: HOSPADM

## 2024-02-03 RX ORDER — AMIODARONE HYDROCHLORIDE 200 MG/1
200 TABLET ORAL DAILY
Status: DISCONTINUED | OUTPATIENT
Start: 2024-02-04 | End: 2024-02-06 | Stop reason: HOSPADM

## 2024-02-03 RX ORDER — ATORVASTATIN CALCIUM 80 MG/1
80 TABLET, FILM COATED ORAL NIGHTLY
Status: DISCONTINUED | OUTPATIENT
Start: 2024-02-03 | End: 2024-02-06 | Stop reason: HOSPADM

## 2024-02-03 RX ORDER — POTASSIUM CHLORIDE 7.45 MG/ML
10 INJECTION INTRAVENOUS PRN
Status: DISCONTINUED | OUTPATIENT
Start: 2024-02-03 | End: 2024-02-06 | Stop reason: HOSPADM

## 2024-02-03 RX ORDER — HEPARIN SODIUM 1000 [USP'U]/ML
4000 INJECTION, SOLUTION INTRAVENOUS; SUBCUTANEOUS ONCE
Status: COMPLETED | OUTPATIENT
Start: 2024-02-03 | End: 2024-02-03

## 2024-02-03 RX ORDER — CLOPIDOGREL BISULFATE 75 MG/1
75 TABLET ORAL DAILY
Status: DISCONTINUED | OUTPATIENT
Start: 2024-02-03 | End: 2024-02-06 | Stop reason: HOSPADM

## 2024-02-03 RX ORDER — HEPARIN SODIUM 1000 [USP'U]/ML
4000 INJECTION, SOLUTION INTRAVENOUS; SUBCUTANEOUS PRN
Status: DISCONTINUED | OUTPATIENT
Start: 2024-02-03 | End: 2024-02-05

## 2024-02-03 RX ORDER — HEPARIN SODIUM 10000 [USP'U]/100ML
5-30 INJECTION, SOLUTION INTRAVENOUS CONTINUOUS
Status: DISCONTINUED | OUTPATIENT
Start: 2024-02-03 | End: 2024-02-05

## 2024-02-03 RX ORDER — ACETAMINOPHEN 650 MG/1
650 SUPPOSITORY RECTAL EVERY 6 HOURS PRN
Status: DISCONTINUED | OUTPATIENT
Start: 2024-02-03 | End: 2024-02-06 | Stop reason: HOSPADM

## 2024-02-03 RX ORDER — ISOSORBIDE MONONITRATE 60 MG/1
60 TABLET, EXTENDED RELEASE ORAL 2 TIMES DAILY
Status: DISCONTINUED | OUTPATIENT
Start: 2024-02-03 | End: 2024-02-06 | Stop reason: HOSPADM

## 2024-02-03 RX ORDER — SODIUM CHLORIDE 0.9 % (FLUSH) 0.9 %
5-40 SYRINGE (ML) INJECTION PRN
Status: DISCONTINUED | OUTPATIENT
Start: 2024-02-03 | End: 2024-02-06 | Stop reason: HOSPADM

## 2024-02-03 RX ORDER — MAGNESIUM SULFATE IN WATER 40 MG/ML
2000 INJECTION, SOLUTION INTRAVENOUS PRN
Status: DISCONTINUED | OUTPATIENT
Start: 2024-02-03 | End: 2024-02-06 | Stop reason: HOSPADM

## 2024-02-03 RX ORDER — ASPIRIN 81 MG/1
81 TABLET, CHEWABLE ORAL DAILY
Status: DISCONTINUED | OUTPATIENT
Start: 2024-02-04 | End: 2024-02-06 | Stop reason: HOSPADM

## 2024-02-03 RX ORDER — NITROGLYCERIN 0.4 MG/1
0.4 TABLET SUBLINGUAL EVERY 5 MIN PRN
Status: DISCONTINUED | OUTPATIENT
Start: 2024-02-03 | End: 2024-02-06 | Stop reason: HOSPADM

## 2024-02-03 RX ORDER — HEPARIN SODIUM 1000 [USP'U]/ML
2000 INJECTION, SOLUTION INTRAVENOUS; SUBCUTANEOUS PRN
Status: DISCONTINUED | OUTPATIENT
Start: 2024-02-03 | End: 2024-02-05

## 2024-02-03 RX ORDER — FENTANYL CITRATE 50 UG/ML
50 INJECTION, SOLUTION INTRAMUSCULAR; INTRAVENOUS ONCE
Status: COMPLETED | OUTPATIENT
Start: 2024-02-03 | End: 2024-02-03

## 2024-02-03 RX ORDER — SODIUM CHLORIDE 9 MG/ML
INJECTION, SOLUTION INTRAVENOUS PRN
Status: DISCONTINUED | OUTPATIENT
Start: 2024-02-03 | End: 2024-02-06 | Stop reason: HOSPADM

## 2024-02-03 RX ORDER — SPIRONOLACTONE 25 MG/1
25 TABLET ORAL DAILY
Status: DISCONTINUED | OUTPATIENT
Start: 2024-02-04 | End: 2024-02-06 | Stop reason: HOSPADM

## 2024-02-03 RX ORDER — SODIUM CHLORIDE 0.9 % (FLUSH) 0.9 %
5-40 SYRINGE (ML) INJECTION EVERY 12 HOURS SCHEDULED
Status: DISCONTINUED | OUTPATIENT
Start: 2024-02-03 | End: 2024-02-06 | Stop reason: HOSPADM

## 2024-02-03 RX ORDER — MORPHINE SULFATE 4 MG/ML
4 INJECTION, SOLUTION INTRAMUSCULAR; INTRAVENOUS
Status: DISCONTINUED | OUTPATIENT
Start: 2024-02-03 | End: 2024-02-06

## 2024-02-03 RX ORDER — AMLODIPINE BESYLATE 10 MG/1
10 TABLET ORAL DAILY
Status: DISCONTINUED | OUTPATIENT
Start: 2024-02-04 | End: 2024-02-06 | Stop reason: HOSPADM

## 2024-02-03 RX ORDER — POLYETHYLENE GLYCOL 3350 17 G/17G
17 POWDER, FOR SOLUTION ORAL DAILY PRN
Status: DISCONTINUED | OUTPATIENT
Start: 2024-02-03 | End: 2024-02-06 | Stop reason: HOSPADM

## 2024-02-03 RX ORDER — MORPHINE SULFATE 2 MG/ML
2 INJECTION, SOLUTION INTRAMUSCULAR; INTRAVENOUS
Status: DISCONTINUED | OUTPATIENT
Start: 2024-02-03 | End: 2024-02-06

## 2024-02-03 RX ORDER — RANOLAZINE 500 MG/1
1000 TABLET, EXTENDED RELEASE ORAL 2 TIMES DAILY
Status: DISCONTINUED | OUTPATIENT
Start: 2024-02-03 | End: 2024-02-06 | Stop reason: HOSPADM

## 2024-02-03 RX ORDER — LISINOPRIL 2.5 MG/1
2.5 TABLET ORAL DAILY
Status: DISCONTINUED | OUTPATIENT
Start: 2024-02-03 | End: 2024-02-06 | Stop reason: HOSPADM

## 2024-02-03 RX ORDER — METOPROLOL SUCCINATE 25 MG/1
25 TABLET, EXTENDED RELEASE ORAL DAILY
Status: DISCONTINUED | OUTPATIENT
Start: 2024-02-03 | End: 2024-02-06 | Stop reason: HOSPADM

## 2024-02-03 RX ORDER — PANTOPRAZOLE SODIUM 40 MG/1
40 TABLET, DELAYED RELEASE ORAL
Status: DISCONTINUED | OUTPATIENT
Start: 2024-02-04 | End: 2024-02-06 | Stop reason: HOSPADM

## 2024-02-03 RX ORDER — ONDANSETRON 2 MG/ML
4 INJECTION INTRAMUSCULAR; INTRAVENOUS EVERY 6 HOURS PRN
Status: DISCONTINUED | OUTPATIENT
Start: 2024-02-03 | End: 2024-02-06 | Stop reason: HOSPADM

## 2024-02-03 RX ADMIN — METOPROLOL SUCCINATE 25 MG: 25 TABLET, EXTENDED RELEASE ORAL at 23:43

## 2024-02-03 RX ADMIN — HEPARIN SODIUM 4000 UNITS: 1000 INJECTION INTRAVENOUS; SUBCUTANEOUS at 13:46

## 2024-02-03 RX ADMIN — MORPHINE SULFATE 4 MG: 4 INJECTION, SOLUTION INTRAMUSCULAR; INTRAVENOUS at 22:55

## 2024-02-03 RX ADMIN — ATORVASTATIN CALCIUM 80 MG: 80 TABLET, FILM COATED ORAL at 23:44

## 2024-02-03 RX ADMIN — SODIUM CHLORIDE, PRESERVATIVE FREE 10 ML: 5 INJECTION INTRAVENOUS at 22:30

## 2024-02-03 RX ADMIN — IOPAMIDOL 80 ML: 755 INJECTION, SOLUTION INTRAVENOUS at 15:22

## 2024-02-03 RX ADMIN — NITROGLYCERIN 0.4 MG: 0.4 TABLET, ORALLY DISINTEGRATING SUBLINGUAL at 12:32

## 2024-02-03 RX ADMIN — HEPARIN SODIUM 12 UNITS/KG/HR: 10000 INJECTION, SOLUTION INTRAVENOUS at 13:50

## 2024-02-03 RX ADMIN — MORPHINE SULFATE 4 MG: 4 INJECTION, SOLUTION INTRAMUSCULAR; INTRAVENOUS at 18:18

## 2024-02-03 RX ADMIN — NITROGLYCERIN 0.4 MG: 0.4 TABLET, ORALLY DISINTEGRATING SUBLINGUAL at 12:41

## 2024-02-03 RX ADMIN — NITROGLYCERIN 0.4 MG: 0.4 TABLET, ORALLY DISINTEGRATING SUBLINGUAL at 12:27

## 2024-02-03 RX ADMIN — FENTANYL CITRATE 50 MCG: 50 INJECTION INTRAMUSCULAR; INTRAVENOUS at 12:29

## 2024-02-03 ASSESSMENT — PAIN SCALES - GENERAL
PAINLEVEL_OUTOF10: 7
PAINLEVEL_OUTOF10: 8
PAINLEVEL_OUTOF10: 4
PAINLEVEL_OUTOF10: 8
PAINLEVEL_OUTOF10: 6
PAINLEVEL_OUTOF10: 0

## 2024-02-03 ASSESSMENT — HEART SCORE: ECG: 1

## 2024-02-03 ASSESSMENT — PAIN DESCRIPTION - LOCATION
LOCATION: CHEST

## 2024-02-03 ASSESSMENT — PAIN DESCRIPTION - DESCRIPTORS: DESCRIPTORS: HEAVINESS

## 2024-02-03 ASSESSMENT — PAIN DESCRIPTION - ORIENTATION: ORIENTATION: MID

## 2024-02-03 ASSESSMENT — PAIN - FUNCTIONAL ASSESSMENT: PAIN_FUNCTIONAL_ASSESSMENT: 0-10

## 2024-02-03 NOTE — ED PROVIDER NOTES
[facility]  EMERGENCY DEPARTMENT ENCOUNTER          Pt Name: Chriss Braga  MRN: [id number]  Birthdate 1955  Date of evaluation: 2/3/2024  Emergency Physician: Xavier Willingham MD    CHIEF COMPLAINT       Chief Complaint   Patient presents with    Chest Pain     History obtained from the patient.      HISTORY OF PRESENT ILLNESS    HPI  Chriss Braga is a 68 y.o. male with past medical history of CAD, STEMI, hyperlipidemia, hypertension, ICD, cardiomyopathy, GERD, CVA, pituitary adenoma who presents to the emergency department for evaluation of chest pain. Reports chest pain started about one hour prior to arrival while sweeping the steps inside his house. Reports pain was left parasternal chest (\"Same spot I had pain with my heart attack\".). Says its radiating down left arm, back, and jaw. Reports nausea with diaphoresis when pain occurred. EMS was called and was given full dose of ASA but no nitroglycerin. Reports currently feeling short of breath and chest pain 8/10. Reports palpitations. Denies fevers, leg swelling, abdominal pain. {Pain is not pleuritic.    The patient has no other acute complaints at this time.          REVIEW OF SYSTEMS   Review of Systems    See HPI. 12 point ROS performed, pertinent positives listed above otherwise negative  PAST MEDICAL AND SURGICAL HISTORY     Past Medical History:   Diagnosis Date    Anemia     Microcytic anemia.     Angina     Arthritis     Atypical chest pain     Question if this is secondary to his uncontrolled hypertension or if this is secondary to exacerbation of COPD or secondary to his recent ICD implantation.    Blood transfusion 2011    CAD (coronary artery disease)     Cardiomyopathy (HCC)     Cataract     bilateral    Chest discomfort     Depression     Dizziness     Patient complains of dizziness with movement.     Erectile dysfunction     Possible erectile dysfunction symptoms.     Family history of hypertension

## 2024-02-03 NOTE — ED NOTES
Dr. Huber at bedside aware patient pain still 8/10. Orders to give last oral nitro at this time.

## 2024-02-03 NOTE — H&P
Hospitalist History & Physical    Patient:  Chriss Braga    Unit/Bed:04/004A  YOB: 1955  MRN: [id number]   Acct: [id number]   PCP: No primary care provider on file.  Code Status: Prior    Date of Service: Pt seen/examined on 02/03/24 and admitted to Inpatient with expected LOS less than two midnights due to medical therapy.     Chief Complaint: chest pain    Assessment/Plan:    Unstable angina  Patient had recent STEMI with PCI on 1/17/2024   Initial high-sensitivity troponin 17, repeat 19  EKG with T wave inversions in inferior leads, seen previously on EKG 1/17/2024  Placed on IV Heparin drip  Nitroglycerin and Morphine for pain management.   Cardiology consult  Coronary artery diease  History of CABG x3 in 2011  Recent STEMI with PCI of OM1 on 1/17/2024, with severe RCA stenosis treatment with medical management, and patent BRADEN to LAD. Patient on aspirin and Plavix.   Continue aspirin, Plavix, Imdur, Toprol, and Ranexa.   HFrEF not in acute exacerbation  TTE on 1/17/2024 shows EF of 10-15%.  AICD in place.  Defer repeat echocardiogram to cardiology service.  Hold Aldactone in light of possible cath  Low-sodium diet.  Strict ins and outs.  Daily weights.  Fluid restriction.  Endobronchial lesion, pulmonary nodules  CTA of the chest notable for a lesion within the right mainstem bronchus concerning for mucus versus endobronchial lesion.  Also notable for 3 pulmonary nodules measuring 7 mm, 4 mm, and 3 mm.  Consult pulmonology for further assessment.  Paroxsymal atrial fibrillation  EKG shows current sinus rhythm   Continue amiodarone  Hold Eliquis while on heparin   Hyperlipidemia  Continue Lipitor  Hypertension  Continue Lisinopril and Amlodipine  GERD  Continue Protonix  History of Pituitary Macroadenoma  Noted    History of Present Illness:  Chriss Braga is a 68 y.o. male with a history of STEMI with PCI on 1/17/2024, hyperlipidemia, hypertension, coronary artery disease, HFrEF, ICD  hours.  Recent Labs     02/03/24  1331   INR 0.99     No results for input(s): \"CKTOTAL\", \"TROPONINI\" in the last 72 hours.  Urinalysis:   Lab Results   Component Value Date/Time    NITRU NEGATIVE 11/30/2019 02:00 PM    WBCUA 25-50 01/07/2012 05:00 PM    BACTERIA NONE 01/07/2012 05:00 PM    RBCUA 25-50 01/07/2012 05:00 PM    BLOODU NEGATIVE 11/30/2019 02:00 PM    SPECGRAV >1.030 11/23/2016 05:05 PM    GLUCOSEU NEGATIVE 11/30/2019 02:00 PM       EKG:  sinus rhythm with PACs, T wave inversions in inferior leads seen on previous EKG 1/19/2024     Radiology:  CTA CHEST W WO CONTRAST    Result Date: 2/3/2024  PROCEDURE: CTA CHEST W WO CONTRAST CLINICAL INFORMATION: 8/10 chest pain, recent cardiac intervention, shortness of breath COMPARISON: 1/23/2012 TECHNIQUE: 1.5 mm axial images were obtained through the chest after the administration of IV contrast.  A non-contrast localizer was obtained.  3D reconstructions were performed on the scanner to include coronal and sagittal reformatted images through both the right and left pulmonary arteries. All CT scans at this facility use dose modulation, iterative reconstruction, and/or weight-based dosing when appropriate to reduce radiation dose to as low as reasonably achievable. FINDINGS: There is a focal opacity demonstrated within the proximal right mainstem bronchus which could be related to retained mucus secretion. However, an endobronchial lesion is not excluded. This is seen on axial image 88. Coronary artery calcifications are noted. There is no CT angiographic evidence of pulmonary embolism. There is a 4 mm pulmonary nodule demonstrated within the right mid lung on axial image 134. There is a 7 mm pulmonary nodule within the right lower lobe on axial image 153. There is a 3 mm pulmonary nodule within the right lower lobe on axial image 145. No focal consolidation, pleural effusion or pneumothorax is seen. There is no axillary, hilar or mediastinal lymphadenopathy.

## 2024-02-03 NOTE — ED TRIAGE NOTES
Patient to ER due to having chest pain. Patient states he was here two weeks ago and went straight to cath lab for a MI. Patient had a stent placed per patient. Patient states his pain is 8/10 at this time. Patient took aspirin 324mg per EMS but no nitro PTA. Patient has been taking medications he was prescribed since last visit but has not yet taken them today. Patient has a defibrillator in place. Patient was sweaping his steps around 1100 when he started having chest pain similar to two weeks ago when he had his MI.

## 2024-02-04 LAB
ANION GAP SERPL CALC-SCNC: 9 MEQ/L (ref 8–16)
APTT PPP: 58.6 SECONDS (ref 22–38)
APTT PPP: 69.5 SECONDS (ref 22–38)
APTT PPP: 86.5 SECONDS (ref 22–38)
BASOPHILS ABSOLUTE: 0 THOU/MM3 (ref 0–0.1)
BASOPHILS NFR BLD AUTO: 0.5 %
BUN SERPL-MCNC: 14 MG/DL (ref 7–22)
CALCIUM SERPL-MCNC: 8.5 MG/DL (ref 8.5–10.5)
CHLORIDE SERPL-SCNC: 108 MEQ/L (ref 98–111)
CO2 SERPL-SCNC: 23 MEQ/L (ref 23–33)
CREAT SERPL-MCNC: 0.9 MG/DL (ref 0.4–1.2)
DEPRECATED RDW RBC AUTO: 39.2 FL (ref 35–45)
EKG ATRIAL RATE: 63 BPM
EKG P AXIS: 60 DEGREES
EKG P-R INTERVAL: 168 MS
EKG Q-T INTERVAL: 430 MS
EKG QRS DURATION: 88 MS
EKG QTC CALCULATION (BAZETT): 440 MS
EKG R AXIS: -38 DEGREES
EKG T AXIS: -60 DEGREES
EKG VENTRICULAR RATE: 63 BPM
EOSINOPHIL NFR BLD AUTO: 2.4 %
EOSINOPHILS ABSOLUTE: 0.2 THOU/MM3 (ref 0–0.4)
ERYTHROCYTE [DISTWIDTH] IN BLOOD BY AUTOMATED COUNT: 15.8 % (ref 11.5–14.5)
GFR SERPL CREATININE-BSD FRML MDRD: > 60 ML/MIN/1.73M2
GLUCOSE SERPL-MCNC: 89 MG/DL (ref 70–108)
HCT VFR BLD AUTO: 33.3 % (ref 42–52)
HGB BLD-MCNC: 10.4 GM/DL (ref 14–18)
IMM GRANULOCYTES # BLD AUTO: 0.06 THOU/MM3 (ref 0–0.07)
IMM GRANULOCYTES NFR BLD AUTO: 1 %
LYMPHOCYTES ABSOLUTE: 1.9 THOU/MM3 (ref 1–4.8)
LYMPHOCYTES NFR BLD AUTO: 29.9 %
MAGNESIUM SERPL-MCNC: 2.1 MG/DL (ref 1.6–2.4)
MCH RBC QN AUTO: 21.9 PG (ref 26–33)
MCHC RBC AUTO-ENTMCNC: 31.2 GM/DL (ref 32.2–35.5)
MCV RBC AUTO: 70.1 FL (ref 80–94)
MONOCYTES ABSOLUTE: 0.5 THOU/MM3 (ref 0.4–1.3)
MONOCYTES NFR BLD AUTO: 7.5 %
NEUTROPHILS NFR BLD AUTO: 58.7 %
NRBC BLD AUTO-RTO: 0 /100 WBC
PLATELET # BLD AUTO: 223 THOU/MM3 (ref 130–400)
PMV BLD AUTO: 9.4 FL (ref 9.4–12.4)
POTASSIUM SERPL-SCNC: 3.8 MEQ/L (ref 3.5–5.2)
RBC # BLD AUTO: 4.75 MILL/MM3 (ref 4.7–6.1)
SEGMENTED NEUTROPHILS ABSOLUTE COUNT: 3.7 THOU/MM3 (ref 1.8–7.7)
SODIUM SERPL-SCNC: 140 MEQ/L (ref 135–145)
TROPONIN, HIGH SENSITIVITY: 15 NG/L (ref 0–12)
TROPONIN, HIGH SENSITIVITY: 15 NG/L (ref 0–12)
WBC # BLD AUTO: 6.3 THOU/MM3 (ref 4.8–10.8)

## 2024-02-04 PROCEDURE — 6370000000 HC RX 637 (ALT 250 FOR IP): Performed by: PHYSICIAN ASSISTANT

## 2024-02-04 PROCEDURE — 85025 COMPLETE CBC W/AUTO DIFF WBC: CPT

## 2024-02-04 PROCEDURE — 85730 THROMBOPLASTIN TIME PARTIAL: CPT

## 2024-02-04 PROCEDURE — 99233 SBSQ HOSP IP/OBS HIGH 50: CPT | Performed by: STUDENT IN AN ORGANIZED HEALTH CARE EDUCATION/TRAINING PROGRAM

## 2024-02-04 PROCEDURE — 80048 BASIC METABOLIC PNL TOTAL CA: CPT

## 2024-02-04 PROCEDURE — 2580000003 HC RX 258: Performed by: PHYSICIAN ASSISTANT

## 2024-02-04 PROCEDURE — 36415 COLL VENOUS BLD VENIPUNCTURE: CPT

## 2024-02-04 PROCEDURE — 99223 1ST HOSP IP/OBS HIGH 75: CPT | Performed by: NUCLEAR MEDICINE

## 2024-02-04 PROCEDURE — 2060000000 HC ICU INTERMEDIATE R&B

## 2024-02-04 PROCEDURE — 84484 ASSAY OF TROPONIN QUANT: CPT

## 2024-02-04 PROCEDURE — 6360000002 HC RX W HCPCS: Performed by: STUDENT IN AN ORGANIZED HEALTH CARE EDUCATION/TRAINING PROGRAM

## 2024-02-04 PROCEDURE — 6370000000 HC RX 637 (ALT 250 FOR IP): Performed by: STUDENT IN AN ORGANIZED HEALTH CARE EDUCATION/TRAINING PROGRAM

## 2024-02-04 PROCEDURE — 6360000002 HC RX W HCPCS: Performed by: PHYSICIAN ASSISTANT

## 2024-02-04 PROCEDURE — 93010 ELECTROCARDIOGRAM REPORT: CPT | Performed by: INTERNAL MEDICINE

## 2024-02-04 PROCEDURE — 83735 ASSAY OF MAGNESIUM: CPT

## 2024-02-04 RX ORDER — GLUCAGON 1 MG/ML
1 KIT INJECTION PRN
Status: DISCONTINUED | OUTPATIENT
Start: 2024-02-04 | End: 2024-02-06 | Stop reason: HOSPADM

## 2024-02-04 RX ORDER — DEXTROSE MONOHYDRATE 100 MG/ML
INJECTION, SOLUTION INTRAVENOUS CONTINUOUS PRN
Status: DISCONTINUED | OUTPATIENT
Start: 2024-02-04 | End: 2024-02-06 | Stop reason: HOSPADM

## 2024-02-04 RX ADMIN — AMIODARONE HYDROCHLORIDE 200 MG: 200 TABLET ORAL at 09:13

## 2024-02-04 RX ADMIN — RANOLAZINE 1000 MG: 500 TABLET, EXTENDED RELEASE ORAL at 09:14

## 2024-02-04 RX ADMIN — AMLODIPINE BESYLATE 10 MG: 10 TABLET ORAL at 09:13

## 2024-02-04 RX ADMIN — MORPHINE SULFATE 2 MG: 2 INJECTION, SOLUTION INTRAMUSCULAR; INTRAVENOUS at 15:47

## 2024-02-04 RX ADMIN — LISINOPRIL 2.5 MG: 2.5 TABLET ORAL at 00:19

## 2024-02-04 RX ADMIN — ISOSORBIDE MONONITRATE 60 MG: 60 TABLET, EXTENDED RELEASE ORAL at 00:19

## 2024-02-04 RX ADMIN — MORPHINE SULFATE 4 MG: 4 INJECTION, SOLUTION INTRAMUSCULAR; INTRAVENOUS at 02:56

## 2024-02-04 RX ADMIN — ASPIRIN 81 MG 81 MG: 81 TABLET ORAL at 09:14

## 2024-02-04 RX ADMIN — MORPHINE SULFATE 4 MG: 4 INJECTION, SOLUTION INTRAMUSCULAR; INTRAVENOUS at 08:58

## 2024-02-04 RX ADMIN — LISINOPRIL 2.5 MG: 2.5 TABLET ORAL at 09:14

## 2024-02-04 RX ADMIN — SODIUM CHLORIDE, PRESERVATIVE FREE 10 ML: 5 INJECTION INTRAVENOUS at 20:32

## 2024-02-04 RX ADMIN — ISOSORBIDE MONONITRATE 60 MG: 60 TABLET, EXTENDED RELEASE ORAL at 20:32

## 2024-02-04 RX ADMIN — MORPHINE SULFATE 4 MG: 4 INJECTION, SOLUTION INTRAMUSCULAR; INTRAVENOUS at 23:11

## 2024-02-04 RX ADMIN — SODIUM CHLORIDE, PRESERVATIVE FREE 10 ML: 5 INJECTION INTRAVENOUS at 09:14

## 2024-02-04 RX ADMIN — ISOSORBIDE MONONITRATE 60 MG: 60 TABLET, EXTENDED RELEASE ORAL at 09:14

## 2024-02-04 RX ADMIN — MORPHINE SULFATE 4 MG: 4 INJECTION, SOLUTION INTRAMUSCULAR; INTRAVENOUS at 20:32

## 2024-02-04 RX ADMIN — CLOPIDOGREL BISULFATE 75 MG: 75 TABLET ORAL at 02:56

## 2024-02-04 RX ADMIN — CLOPIDOGREL BISULFATE 75 MG: 75 TABLET ORAL at 09:14

## 2024-02-04 RX ADMIN — POLYETHYLENE GLYCOL 3350 17 G: 17 POWDER, FOR SOLUTION ORAL at 09:14

## 2024-02-04 RX ADMIN — RANOLAZINE 1000 MG: 500 TABLET, EXTENDED RELEASE ORAL at 21:23

## 2024-02-04 RX ADMIN — RANOLAZINE 1000 MG: 500 TABLET, EXTENDED RELEASE ORAL at 00:19

## 2024-02-04 RX ADMIN — HEPARIN SODIUM 11 UNITS/KG/HR: 10000 INJECTION, SOLUTION INTRAVENOUS at 23:05

## 2024-02-04 RX ADMIN — NITROGLYCERIN 0.4 MG: 0.4 TABLET, ORALLY DISINTEGRATING SUBLINGUAL at 12:01

## 2024-02-04 RX ADMIN — ATORVASTATIN CALCIUM 80 MG: 80 TABLET, FILM COATED ORAL at 20:32

## 2024-02-04 ASSESSMENT — PAIN SCALES - GENERAL
PAINLEVEL_OUTOF10: 7
PAINLEVEL_OUTOF10: 7
PAINLEVEL_OUTOF10: 6
PAINLEVEL_OUTOF10: 8
PAINLEVEL_OUTOF10: 7
PAINLEVEL_OUTOF10: 6
PAINLEVEL_OUTOF10: 8
PAINLEVEL_OUTOF10: 7
PAINLEVEL_OUTOF10: 7
PAINLEVEL_OUTOF10: 6
PAINLEVEL_OUTOF10: 7

## 2024-02-04 ASSESSMENT — PAIN DESCRIPTION - DESCRIPTORS
DESCRIPTORS: ACHING;SHARP;PRESSURE
DESCRIPTORS: ACHING
DESCRIPTORS: SHARP;PRESSURE
DESCRIPTORS: ACHING;SHARP;PRESSURE
DESCRIPTORS: THROBBING
DESCRIPTORS: SHARP
DESCRIPTORS: PRESSURE

## 2024-02-04 ASSESSMENT — PAIN DESCRIPTION - ONSET
ONSET: GRADUAL

## 2024-02-04 ASSESSMENT — PAIN DESCRIPTION - PAIN TYPE
TYPE: ACUTE PAIN

## 2024-02-04 ASSESSMENT — PAIN - FUNCTIONAL ASSESSMENT
PAIN_FUNCTIONAL_ASSESSMENT: ACTIVITIES ARE NOT PREVENTED

## 2024-02-04 ASSESSMENT — PAIN DESCRIPTION - FREQUENCY
FREQUENCY: INTERMITTENT

## 2024-02-04 ASSESSMENT — PAIN DESCRIPTION - ORIENTATION
ORIENTATION: MID
ORIENTATION: MID
ORIENTATION: LEFT
ORIENTATION: MID

## 2024-02-04 ASSESSMENT — PAIN DESCRIPTION - LOCATION
LOCATION: CHEST

## 2024-02-04 NOTE — CONSULTS
nodules appear relatively unchanged from prior CT chest 4/26/23  - Will need repeat CT chest in 3 months to follow-up on the nodules and follow up in Pulmonology clinic upon discharge home       All questions and concerns were addressed to patient's satisfaction.    Thank you for allowing us to participate in the care of this patient.  Please reach out to me with any additional questions or concerns.  We will continue to follow along.      I spent 80 mins in the care of this patient. This includes time spent doing chart review, review of imaging and lab work, review of prior provider documentation, and discussion with staff as well as patient and their family.       Electronically signed by   Jose South DO on 2/4/2024 at 8:52 AM    **This report has been created using voice recognition software. It may contain minor errors which are inherent in voice recognition technology.**

## 2024-02-04 NOTE — PROGRESS NOTES
Patient in bed awake. Bedside table and call light in reach. No pain or concerns at this time. Xuan WHITE/MONTSE

## 2024-02-04 NOTE — PLAN OF CARE
Problem: Pain  Goal: Verbalizes/displays adequate comfort level or baseline comfort level  Outcome: Progressing     Problem: Chronic Conditions and Co-morbidities  Goal: Patient's chronic conditions and co-morbidity symptoms are monitored and maintained or improved  Outcome: Progressing     Problem: Safety - Adult  Goal: Free from fall injury  Outcome: Progressing

## 2024-02-04 NOTE — CARE COORDINATION
02/04/24 1307   Service Assessment   Patient Orientation Alert and Oriented   Cognition Alert   History Provided By Patient   Primary Caregiver Self   Accompanied By/Relationship n/a   Support Systems Children;Family Members;Friends/Neighbors   Patient's Healthcare Decision Maker is: Patient Declined (Legal Next of Kin Remains as Decision Maker)   PCP Verified by CM Yes  ([facility] to go to [facility]; didn't make appointment; wants to establish)   Prior Functional Level Independent in ADLs/IADLs   Current Functional Level Independent in ADLs/IADLs   Can patient return to prior living arrangement Yes   Ability to make needs known: Good   Family able to assist with home care needs: Yes   Would you like for me to discuss the discharge plan with any other family members/significant others, and if so, who? No   Financial Resources Medicare   Community Resources ECF/Home Care   CM/SW Referral ADLs/IADLs;DME   Social/Functional History   Lives With Alone   Type of Home House   Active  No   Patient's  Info \"I find someone. Usually a friend or family\"   Discharge Planning   Type of Residence House   Living Arrangements Alone   Current Services Prior To Admission Durable Medical Equipment;Home Care   Current DME Prior to Arrival Cane   Potential Assistance Needed N/A   DME Ordered? No   Potential Assistance Purchasing Medications No   Type of Home Care Services Nursing Services   Patient expects to be discharged to: House   Services At/After Discharge   Confirm Follow Up Transport Cab     Patient Goals/Plan/Treatment Preferences: Met w/ Chriss. Verified insurance and PCP. He was supposed to establish PCP at [facility] after last hospitalization, but didn't have transportation to appointment. Would still like to establish PCP at [facility]. Clinic and states he can get transportation. He lives at home independently. Has a cane. Was set up with [facility] at discharge, however he states they haven't came  out to see him yet. Prefers to continue with [facility]. Chriss plans to return home alone. Says he will need a cab home but is unable to afford it.     If patient is discharged prior to next notation, then this note serves as note for discharge by case management.

## 2024-02-04 NOTE — PROGRESS NOTES
Internal Medicine Resident Progress Note    Name: Chriss Braga, male, : 1955, MRN: [id number]    PCP: No primary care provider on file.    Date of Admission: 2/3/2024  Date of Service: Pt seen/examined on 24      Assessment/Plan:  Unstable angina: Typical chest pain, radiating to left arm. Accompanied by diaphoresis, nausea, feeling of heart racing, SOB. HEART score 8. EKG on 2/3/24 shows new T wave depressions in V5 and V6. Troponins 17 -> 19 -> 15 -> 15. Recent STEMI with PCI on . Noted 18 beat run of NSVT on tele.   Continue heparin gtt  Continue ASA and high intensity statin.  Continue home BB and ACEI.  Cardiology consulted, appreciate recommendations.  PRN EKGs for worsened chest pain  Continuous telemetry   ECHO ordered  SL Nitro + morphine pain panel   Obstructive CAD s/p LESTER on 24 and CABG x 3 on  : Active. Last Cardiac Stress test on 23: negative for ischemia. Last LHC on 24: successful PCI of OM1 STEMI via SVG x1 LESTER. On ASA 81 mg.   Continue high dose Statin   Continue Plavix 75mg daily   Continue ASA 81 mg  Endobronchial lesion, pulmonary nodules: CTA chest noted for lesion within right mainstem bronchus concerning for mucous vs endobronchial lesion; also noted 3 pulmonary nodules in right lung measuring 7mm, 4mm, 3mm. Then mainstem lesion is new compared to prior CT on 23. Nodules appear relatively unchanged from prior CT.   Pulmonology consulted, appreciate recommendations  Bronchoscopy planned for 2 with endobronchial biopsies  Per cardio, okay to proceed with bronch if pt is able to remain on blood thinners given his recent STEMI; otherwise recommend waiting   Systolic HFrEF: w/ EF of 10-15% on Echo 24. NYHA II. BNP on arrival 846 (baseline 355). CXR findings - negative acute findings, cardiac silhouette mildly enlarged. Appears euvolemic, no abnormal lung sounds or edema noted; not in acute exacerbation.  Admission weight f/b daily standing  not excluded. This is seen on axial image 88. Coronary artery calcifications are noted. There is no CT angiographic evidence of pulmonary embolism. There is a 4 mm pulmonary nodule demonstrated within the right mid lung on axial image 134. There is a 7 mm pulmonary nodule within the right lower lobe on axial image 153. There is a 3 mm pulmonary nodule within the right lower lobe on axial image 145. No focal consolidation, pleural effusion or pneumothorax is seen. There is no axillary, hilar or mediastinal lymphadenopathy. Limited evaluation of the upper abdomen appears unremarkable. No acute osseous findings are seen.     1. There is a focal opacity demonstrated within the proximal right mainstem bronchus which could be related to retained mucus secretion. However, an endobronchial lesion is not excluded. 2. There is no CT angiographic evidence of pulmonary embolism. 3. There is a 4 mm pulmonary nodule demonstrated within the right mid lung on axial image 134. There is a 7 mm pulmonary nodule within the right lower lobe on axial image 153. There is a 3 mm pulmonary nodule within the right lower lobe on axial image 145. In the absence of a known primary malignancy, recommend three-month follow-up chest CT to document stability. **This report has been created using voice recognition software.  It may contain minor errors which are inherent in voice recognition technology.**  Final report electronically signed by Dr. Chris Cope on 2/3/2024 3:36 PM    XR CHEST PORTABLE    Result Date: 2/3/2024  PROCEDURE: XR CHEST PORTABLE CLINICAL INFORMATION: Chest pain COMPARISON: 1/4/2024 TECHNIQUE: AP portable chest radiograph performed. FINDINGS: No focal pulmonary consolidation. Cardiac silhouette is mildly enlarged. No pleural effusion. No pneumothorax. No acute bony abnormality. Median sternotomy has been performed. Degenerative changes of the thoracic spine.     1. No acute cardiopulmonary finding. **This report has been

## 2024-02-04 NOTE — PROGRESS NOTES
Patient awake in bed. Respirations are 14. Bedside table and call light in reach. Patient has no pain or concerns at this time.

## 2024-02-04 NOTE — CARE COORDINATION
02/04/24 1306   Readmission Assessment   Number of Days since last admission? 8-30 days   Previous Disposition Home with Home Health   Who is being Interviewed Patient   What was the patient's/caregiver's perception as to why they think they needed to return back to the hospital? Could not/did not make appointment with PCP;Other (Comment)  (Had CP)   Did you visit your Primary Care Physician after you left the hospital, before you returned this time? No   Why weren't you able to visit your PCP? Had no transportation   Did you see a specialist, such as Cardiac, Pulmonary, Orthopedic Physician, etc. after you left the hospital? No   Who advised the patient to return to the hospital? Self-referral   Does the patient report anything that got in the way of taking their medications? No   In our efforts to provide the best possible care to you and others like you, can you think of anything that we could have done to help you after you left the hospital the first time, so that you might not have needed to return so soon? Other (Comment)  (\"no.\")

## 2024-02-04 NOTE — PLAN OF CARE
Problem: Discharge Planning  Goal: Discharge to home or other facility with appropriate resources  Outcome: Progressing  Flowsheets (Taken 2/4/2024 0857)  Discharge to home or other facility with appropriate resources: Identify barriers to discharge with patient and caregiver     Problem: Pain  Goal: Verbalizes/displays adequate comfort level or baseline comfort level  2/4/2024 1813 by Jaun Santillan, RN  Outcome: Progressing  2/4/2024 0833 by CATHLEEN MCDONOUGH  Outcome: Progressing     Problem: Chronic Conditions and Co-morbidities  Goal: Patient's chronic conditions and co-morbidity symptoms are monitored and maintained or improved  2/4/2024 1813 by Jaun Santillan RN  Outcome: Progressing  Flowsheets (Taken 2/4/2024 0857)  Care Plan - Patient's Chronic Conditions and Co-Morbidity Symptoms are Monitored and Maintained or Improved: Monitor and assess patient's chronic conditions and comorbid symptoms for stability, deterioration, or improvement  2/4/2024 0833 by CATHLEEN MCDONOUGH  Outcome: Progressing     Problem: Safety - Adult  Goal: Free from fall injury  2/4/2024 1813 by Jaun Santillan RN  Outcome: Progressing  2/4/2024 0833 by CATHLEEN MCDONOUGH  Outcome: Progressing     Problem: Cardiovascular - Adult  Goal: Maintains optimal cardiac output and hemodynamic stability  Outcome: Progressing  Goal: Absence of cardiac dysrhythmias or at baseline  Outcome: Progressing     Problem: Metabolic/Fluid and Electrolytes - Adult  Goal: Electrolytes maintained within normal limits  Outcome: Progressing     Problem: Hematologic - Adult  Goal: Maintains hematologic stability  Outcome: Progressing  Flowsheets (Taken 2/4/2024 0857)  Maintains hematologic stability: Assess for signs and symptoms of bleeding or hemorrhage

## 2024-02-04 NOTE — CONSULTS
a day, amlodipine 10 a day, Eliquis  5 a day, Lipitor 80 a day, Plavix 75 a day, Protonix 40 a day, Ranexa  500 twice a day, Aldactone 25 a day.    SOCIAL HISTORY:  History of smoking.  Denies any alcohol or drug abuse.    FAMILY HISTORY:  Significant for coronary artery disease.    PHYSICAL EXAMINATION:  VITAL SIGNS:  Blood pressure 140/80, heart rate of 70.  GENERAL APPEARANCE:  A pleasant gentleman in no obvious acute distress.  EYES AND EARS:  No discharge.  NECK:  No JVD.  No bruits.  No masses.  LUNGS:  Decreased air entry.  No crackles.  No wheezes.  HEART:  Normal S1, S2.  Systolic murmur grade 2/6.  ABDOMEN:  Soft, nontender.  Positive bowel sounds.  No organomegaly.  EXTREMITIES:  No significant edema.  NEUROLOGIC:  Grossly intact.  Awake, alert.  No focal deficits.  PSYCH:  No evidence of active psychosis.  SKIN:  No rashes.    LABORATORY DATA: Showed sodium 140, potassium 3.8, BUN 14, creatinine  0.9, white count 6.3, hemoglobin 10.4, hematocrit 33.3, platelets 223.    EKG showed sinus rhythm, nonspecific changes.    IMPRESSION:  This is a gentleman who comes in with the above  presentation.  Unfortunately, this gentleman is a very poor historian  and he was sitting, looking pretty comfortable, yet complaining about a  steady chest pain of 8/10 which I could not make anything in particular  out of it.  He apparently has had the steady chest pain since the  myocardial infarct.  Currently with the absence of objective findings, I  am not certain that we should re-cath the patient unless there is more  objective findings.    RECOMMENDATIONS:  As follows:  1.  Continue with medical treatment.  2.  We will obtain an echo to rule out any pericardial effusion or signs  of pericarditis.  3.  Further management will follow accordingly.    Thank you for allowing me to participate in the care of this patient.          MARCIN AUGUST M.D.    D: 02/04/2024 12:23:52       T: 02/04/2024 12:27:06      [email or web address]  Job#: [id number]     Doc#: [id number]    CC:

## 2024-02-05 ENCOUNTER — ANESTHESIA EVENT (OUTPATIENT)
Dept: ENDOSCOPY | Age: 69
DRG: 281 | End: 2024-02-05
Payer: MEDICARE

## 2024-02-05 ENCOUNTER — ANESTHESIA (OUTPATIENT)
Dept: ENDOSCOPY | Age: 69
DRG: 281 | End: 2024-02-05
Payer: MEDICARE

## 2024-02-05 ENCOUNTER — APPOINTMENT (OUTPATIENT)
Age: 69
DRG: 281 | End: 2024-02-05
Attending: NUCLEAR MEDICINE
Payer: MEDICARE

## 2024-02-05 PROBLEM — R93.89 ABNORMAL CHEST CT: Status: ACTIVE | Noted: 2024-02-05

## 2024-02-05 PROBLEM — I21.4 NSTEMI (NON-ST ELEVATED MYOCARDIAL INFARCTION) (HCC): Status: ACTIVE | Noted: 2024-01-17

## 2024-02-05 LAB
ACINETOBACTER CALCOACETICUS-BAUMANNII BY PCR: NOT DETECTED
ANION GAP SERPL CALC-SCNC: 7 MEQ/L (ref 8–16)
APTT PPP: 34.5 SECONDS (ref 22–38)
APTT PPP: 37.9 SECONDS (ref 22–38)
APTT PPP: 97.6 SECONDS (ref 22–38)
BLACTX-M ISLT/SPM QL: NORMAL
BLAIMP ISLT/SPM QL: NORMAL
BLAKPC ISLT/SPM QL: NORMAL
BLAOXA-48-LIKE ISLT/SPM QL: NORMAL
BLAVIM ISLT/SPM QL: NORMAL
BUN SERPL-MCNC: 15 MG/DL (ref 7–22)
C PNEUM DNA LOWER RESP QL NAA+NON-PROBE: NOT DETECTED
CALCIUM SERPL-MCNC: 8.8 MG/DL (ref 8.5–10.5)
CHLORIDE SERPL-SCNC: 104 MEQ/L (ref 98–111)
CO2 SERPL-SCNC: 28 MEQ/L (ref 23–33)
CREAT SERPL-MCNC: 1.1 MG/DL (ref 0.4–1.2)
CRP SERPL-MCNC: < 0.3 MG/DL (ref 0–1)
DEPRECATED RDW RBC AUTO: 39.4 FL (ref 35–45)
ECHO BSA: 1.97 M2
ECHO LV EDV A2C: 127 ML
ECHO LV EDV A4C: 117 ML
ECHO LV EDV INDEX A4C: 60 ML/M2
ECHO LV EDV NDEX A2C: 65 ML/M2
ECHO LV EJECTION FRACTION A2C: 27 %
ECHO LV EJECTION FRACTION A4C: 27 %
ECHO LV EJECTION FRACTION BIPLANE: 28 % (ref 55–100)
ECHO LV ESV A2C: 92 ML
ECHO LV ESV A4C: 85 ML
ECHO LV ESV INDEX A2C: 47 ML/M2
ECHO LV ESV INDEX A4C: 44 ML/M2
ENTEROBACTER CLOACAE COMPLEX BY PCR: NOT DETECTED
ERYTHROCYTE [DISTWIDTH] IN BLOOD BY AUTOMATED COUNT: 15.9 % (ref 11.5–14.5)
ERYTHROCYTE [SEDIMENTATION RATE] IN BLOOD BY WESTERGREN METHOD: 9 MM/HR (ref 0–10)
ESCHERICHIA COLI BY PCR: NOT DETECTED
FLUAV RNA LOWER RESP QL NAA+NON-PROBE: NOT DETECTED
FLUBV RNA LOWER RESP QL NAA+NON-PROBE: NOT DETECTED
GFR SERPL CREATININE-BSD FRML MDRD: > 60 ML/MIN/1.73M2
GLUCOSE SERPL-MCNC: 95 MG/DL (ref 70–108)
HADV DNA LOWER RESP QL NAA+NON-PROBE: NOT DETECTED
HAEMOPHILUS INFLUENZAE BY PCR: NOT DETECTED
HCOV RNA LOWER RESP QL NAA+NON-PROBE: NOT DETECTED
HCT VFR BLD AUTO: 32.3 % (ref 42–52)
HGB BLD-MCNC: 10 GM/DL (ref 14–18)
HMPV RNA LOWER RESP QL NAA+NON-PROBE: NOT DETECTED
HPIV RNA LOWER RESP QL NAA+NON-PROBE: NOT DETECTED
KLEBSIELLA AEROGENES BY PCR: NOT DETECTED
KLEBSIELLA OXYTOCA BY PCR: NOT DETECTED
KLEBSIELLA PNEUMONIAE GROUP BY PCR: NOT DETECTED
L PNEUMO DNA LOWER RESP QL NAA+NON-PROBE: NOT DETECTED
M PNEUMO DNA LOWER RESP QL NAA+NON-PROBE: NOT DETECTED
MAGNESIUM SERPL-MCNC: 2 MG/DL (ref 1.6–2.4)
MCH RBC QN AUTO: 21.8 PG (ref 26–33)
MCHC RBC AUTO-ENTMCNC: 31 GM/DL (ref 32.2–35.5)
MCV RBC AUTO: 70.4 FL (ref 80–94)
MORAXELLA CATARRHALIS BY PCR: NOT DETECTED
PLATELET # BLD AUTO: 202 THOU/MM3 (ref 130–400)
PMV BLD AUTO: 9 FL (ref 9.4–12.4)
POTASSIUM SERPL-SCNC: 4.3 MEQ/L (ref 3.5–5.2)
PROTEUS SPECIES BY PCR: NOT DETECTED
PSEUDOMONAS AERUGINOSA BY PCR: NOT DETECTED
RBC # BLD AUTO: 4.59 MILL/MM3 (ref 4.7–6.1)
RESISTANT GENE MECA/C & MREJ BY PCR: NORMAL
RESISTANT GENE NDM BY PCR: NORMAL
RSV RNA LOWER RESP QL NAA+NON-PROBE: NOT DETECTED
RV+EV RNA LOWER RESP QL NAA+NON-PROBE: NOT DETECTED
SERRATIA MARCESCENS BY PCR: NOT DETECTED
SODIUM SERPL-SCNC: 139 MEQ/L (ref 135–145)
SOURCE: NORMAL
SPECIMEN ACCEPTABILITY: NORMAL
STAPH AUREUS BY PCR: NOT DETECTED
STREP AGALACTIAE BY PCR: NOT DETECTED
STREP PNEUMONIAE BY PCR: NOT DETECTED
STREP PYOGENES BY PCR: NOT DETECTED
WBC # BLD AUTO: 6.3 THOU/MM3 (ref 4.8–10.8)

## 2024-02-05 PROCEDURE — 6360000002 HC RX W HCPCS: Performed by: STUDENT IN AN ORGANIZED HEALTH CARE EDUCATION/TRAINING PROGRAM

## 2024-02-05 PROCEDURE — 87541 LEGION PNEUMO DNA AMP PROB: CPT

## 2024-02-05 PROCEDURE — 3700000000 HC ANESTHESIA ATTENDED CARE: Performed by: INTERNAL MEDICINE

## 2024-02-05 PROCEDURE — 6370000000 HC RX 637 (ALT 250 FOR IP): Performed by: PHYSICIAN ASSISTANT

## 2024-02-05 PROCEDURE — 0BC38ZZ EXTIRPATION OF MATTER FROM RIGHT MAIN BRONCHUS, VIA NATURAL OR ARTIFICIAL OPENING ENDOSCOPIC: ICD-10-PCS | Performed by: INTERNAL MEDICINE

## 2024-02-05 PROCEDURE — 31645 BRNCHSC W/THER ASPIR 1ST: CPT | Performed by: INTERNAL MEDICINE

## 2024-02-05 PROCEDURE — 7100000000 HC PACU RECOVERY - FIRST 15 MIN: Performed by: INTERNAL MEDICINE

## 2024-02-05 PROCEDURE — 2580000003 HC RX 258: Performed by: PHYSICIAN ASSISTANT

## 2024-02-05 PROCEDURE — 87486 CHLMYD PNEUM DNA AMP PROBE: CPT

## 2024-02-05 PROCEDURE — 86140 C-REACTIVE PROTEIN: CPT

## 2024-02-05 PROCEDURE — 93325 DOPPLER ECHO COLOR FLOW MAPG: CPT | Performed by: NUCLEAR MEDICINE

## 2024-02-05 PROCEDURE — 6360000002 HC RX W HCPCS: Performed by: PHYSICIAN ASSISTANT

## 2024-02-05 PROCEDURE — 80048 BASIC METABOLIC PNL TOTAL CA: CPT

## 2024-02-05 PROCEDURE — 36415 COLL VENOUS BLD VENIPUNCTURE: CPT

## 2024-02-05 PROCEDURE — 99232 SBSQ HOSP IP/OBS MODERATE 35: CPT | Performed by: STUDENT IN AN ORGANIZED HEALTH CARE EDUCATION/TRAINING PROGRAM

## 2024-02-05 PROCEDURE — 85027 COMPLETE CBC AUTOMATED: CPT

## 2024-02-05 PROCEDURE — 85651 RBC SED RATE NONAUTOMATED: CPT

## 2024-02-05 PROCEDURE — 87798 DETECT AGENT NOS DNA AMP: CPT

## 2024-02-05 PROCEDURE — 87205 SMEAR GRAM STAIN: CPT

## 2024-02-05 PROCEDURE — 3609027000 HC BRONCHOSCOPY: Performed by: INTERNAL MEDICINE

## 2024-02-05 PROCEDURE — 87581 M.PNEUMON DNA AMP PROBE: CPT

## 2024-02-05 PROCEDURE — 99233 SBSQ HOSP IP/OBS HIGH 50: CPT | Performed by: INTERNAL MEDICINE

## 2024-02-05 PROCEDURE — 87631 RESP VIRUS 3-5 TARGETS: CPT

## 2024-02-05 PROCEDURE — 93308 TTE F-UP OR LMTD: CPT

## 2024-02-05 PROCEDURE — 7100000001 HC PACU RECOVERY - ADDTL 15 MIN: Performed by: INTERNAL MEDICINE

## 2024-02-05 PROCEDURE — 2500000003 HC RX 250 WO HCPCS: Performed by: STUDENT IN AN ORGANIZED HEALTH CARE EDUCATION/TRAINING PROGRAM

## 2024-02-05 PROCEDURE — 85730 THROMBOPLASTIN TIME PARTIAL: CPT

## 2024-02-05 PROCEDURE — 87070 CULTURE OTHR SPECIMN AEROBIC: CPT

## 2024-02-05 PROCEDURE — 99233 SBSQ HOSP IP/OBS HIGH 50: CPT | Performed by: STUDENT IN AN ORGANIZED HEALTH CARE EDUCATION/TRAINING PROGRAM

## 2024-02-05 PROCEDURE — 1200000003 HC TELEMETRY R&B

## 2024-02-05 PROCEDURE — 83735 ASSAY OF MAGNESIUM: CPT

## 2024-02-05 PROCEDURE — 3700000001 HC ADD 15 MINUTES (ANESTHESIA): Performed by: INTERNAL MEDICINE

## 2024-02-05 PROCEDURE — 93308 TTE F-UP OR LMTD: CPT | Performed by: NUCLEAR MEDICINE

## 2024-02-05 PROCEDURE — 2580000003 HC RX 258: Performed by: STUDENT IN AN ORGANIZED HEALTH CARE EDUCATION/TRAINING PROGRAM

## 2024-02-05 RX ORDER — SODIUM CHLORIDE 9 MG/ML
INJECTION, SOLUTION INTRAVENOUS CONTINUOUS PRN
Status: DISCONTINUED | OUTPATIENT
Start: 2024-02-05 | End: 2024-02-05 | Stop reason: SDUPTHER

## 2024-02-05 RX ORDER — DEXAMETHASONE SODIUM PHOSPHATE 4 MG/ML
INJECTION, SOLUTION INTRA-ARTICULAR; INTRALESIONAL; INTRAMUSCULAR; INTRAVENOUS; SOFT TISSUE PRN
Status: DISCONTINUED | OUTPATIENT
Start: 2024-02-05 | End: 2024-02-05 | Stop reason: SDUPTHER

## 2024-02-05 RX ORDER — PROPOFOL 10 MG/ML
INJECTION, EMULSION INTRAVENOUS PRN
Status: DISCONTINUED | OUTPATIENT
Start: 2024-02-05 | End: 2024-02-05 | Stop reason: SDUPTHER

## 2024-02-05 RX ORDER — ATORVASTATIN CALCIUM 80 MG/1
80 TABLET, FILM COATED ORAL DAILY
COMMUNITY

## 2024-02-05 RX ORDER — ATORVASTATIN CALCIUM 80 MG/1
80 TABLET, FILM COATED ORAL DAILY
Status: ON HOLD | COMMUNITY
End: 2024-02-06 | Stop reason: HOSPADM

## 2024-02-05 RX ORDER — SUCCINYLCHOLINE/SOD CL,ISO/PF 200MG/10ML
SYRINGE (ML) INTRAVENOUS PRN
Status: DISCONTINUED | OUTPATIENT
Start: 2024-02-05 | End: 2024-02-05 | Stop reason: SDUPTHER

## 2024-02-05 RX ORDER — CARVEDILOL 6.25 MG/1
6.25 TABLET ORAL 2 TIMES DAILY WITH MEALS
Status: ON HOLD | COMMUNITY
End: 2024-02-05

## 2024-02-05 RX ORDER — ONDANSETRON 2 MG/ML
INJECTION INTRAMUSCULAR; INTRAVENOUS PRN
Status: DISCONTINUED | OUTPATIENT
Start: 2024-02-05 | End: 2024-02-05 | Stop reason: SDUPTHER

## 2024-02-05 RX ADMIN — APIXABAN 5 MG: 5 TABLET, FILM COATED ORAL at 20:47

## 2024-02-05 RX ADMIN — AMLODIPINE BESYLATE 10 MG: 10 TABLET ORAL at 08:46

## 2024-02-05 RX ADMIN — CLOPIDOGREL BISULFATE 75 MG: 75 TABLET ORAL at 08:46

## 2024-02-05 RX ADMIN — ISOSORBIDE MONONITRATE 60 MG: 60 TABLET, EXTENDED RELEASE ORAL at 08:46

## 2024-02-05 RX ADMIN — SODIUM CHLORIDE, PRESERVATIVE FREE 10 ML: 5 INJECTION INTRAVENOUS at 19:19

## 2024-02-05 RX ADMIN — AMIODARONE HYDROCHLORIDE 200 MG: 200 TABLET ORAL at 08:46

## 2024-02-05 RX ADMIN — ATORVASTATIN CALCIUM 80 MG: 80 TABLET, FILM COATED ORAL at 19:45

## 2024-02-05 RX ADMIN — ASPIRIN 81 MG 81 MG: 81 TABLET ORAL at 08:46

## 2024-02-05 RX ADMIN — ONDANSETRON 4 MG: 2 INJECTION INTRAMUSCULAR; INTRAVENOUS at 15:58

## 2024-02-05 RX ADMIN — MORPHINE SULFATE 4 MG: 4 INJECTION, SOLUTION INTRAMUSCULAR; INTRAVENOUS at 03:28

## 2024-02-05 RX ADMIN — Medication 120 MG: at 15:50

## 2024-02-05 RX ADMIN — RANOLAZINE 1000 MG: 500 TABLET, EXTENDED RELEASE ORAL at 08:45

## 2024-02-05 RX ADMIN — RANOLAZINE 1000 MG: 500 TABLET, EXTENDED RELEASE ORAL at 19:45

## 2024-02-05 RX ADMIN — SODIUM CHLORIDE: 9 INJECTION, SOLUTION INTRAVENOUS at 15:44

## 2024-02-05 RX ADMIN — DEXAMETHASONE SODIUM PHOSPHATE 8 MG: 4 INJECTION, SOLUTION INTRAMUSCULAR; INTRAVENOUS at 15:57

## 2024-02-05 RX ADMIN — PANTOPRAZOLE SODIUM 40 MG: 40 TABLET, DELAYED RELEASE ORAL at 05:29

## 2024-02-05 RX ADMIN — PROPOFOL 170 MG: 10 INJECTION, EMULSION INTRAVENOUS at 15:50

## 2024-02-05 RX ADMIN — ISOSORBIDE MONONITRATE 60 MG: 60 TABLET, EXTENDED RELEASE ORAL at 19:44

## 2024-02-05 RX ADMIN — LISINOPRIL 2.5 MG: 2.5 TABLET ORAL at 08:46

## 2024-02-05 RX ADMIN — MORPHINE SULFATE 4 MG: 4 INJECTION, SOLUTION INTRAMUSCULAR; INTRAVENOUS at 19:45

## 2024-02-05 ASSESSMENT — PAIN SCALES - GENERAL
PAINLEVEL_OUTOF10: 5
PAINLEVEL_OUTOF10: 8
PAINLEVEL_OUTOF10: 8
PAINLEVEL_OUTOF10: 0

## 2024-02-05 ASSESSMENT — PAIN - FUNCTIONAL ASSESSMENT
PAIN_FUNCTIONAL_ASSESSMENT: PREVENTS OR INTERFERES SOME ACTIVE ACTIVITIES AND ADLS
PAIN_FUNCTIONAL_ASSESSMENT: NONE - DENIES PAIN
PAIN_FUNCTIONAL_ASSESSMENT: NONE - DENIES PAIN

## 2024-02-05 ASSESSMENT — PAIN DESCRIPTION - DESCRIPTORS
DESCRIPTORS: CRAMPING
DESCRIPTORS: SHARP

## 2024-02-05 ASSESSMENT — PAIN DESCRIPTION - ORIENTATION: ORIENTATION: MID

## 2024-02-05 ASSESSMENT — PAIN DESCRIPTION - PAIN TYPE: TYPE: ACUTE PAIN

## 2024-02-05 ASSESSMENT — PAIN DESCRIPTION - LOCATION: LOCATION: CHEST

## 2024-02-05 ASSESSMENT — PAIN DESCRIPTION - ONSET: ONSET: ON-GOING

## 2024-02-05 ASSESSMENT — PAIN DESCRIPTION - FREQUENCY: FREQUENCY: INTERMITTENT

## 2024-02-05 NOTE — PROGRESS NOTES
Pharmacy Medication History Note      List of current medications patient is taking is complete.    Source of information: Patient at bedside    Changes made to medication list:  Medications removed (include reason, ex. therapy complete or physician discontinued):  Dapagliflozin 10 mg tablet  Spironolactone 25 mg tablet    Medications added:    Medications doses adjusted:  Ranolazine 1000 mg ER tablet from twice daily to once daily.    Other notes (ex. Recent course of antibiotics, Coumadin dosing):  Pt was discharged 1/22/24 with instructions to stop carvedilol- was changed to metoprolol succinate pt said he was taking carvedilol. Will want to confirm at discharge he is not taking both.   Was directed to stop atorvastatin 40 mg and increase to 80 mg. Rx filled 1/22/24.  Denies use of other OTC or herbal medications.      Allergies reviewed      Electronically signed by Jonah Newman on 2/5/2024 at 1:57 PM

## 2024-02-05 NOTE — PROGRESS NOTES
Cardiology Progress Note      Patient:  Chriss Braga  YOB: 1955  MRN: [id number]   Acct: [id number]  Admit Date:  2/3/2024  Primary Cardiologist: Gunner Hatch MD  Note per DR Jaime:  \"REASON FOR THE CONSULTATION:  Chest pain.     REQUESTING PROVIDER:  Hospitalist service.     HISTORY OF PRESENT ILLNESS:  This is a very unfortunate 68-year-old  gentleman who is not necessarily the best historian who seems to have  had some of his workup in [place] and moved to the area here not too  long ago.  He was in for ST-elevation myocardial infarct a few weeks  ago, had stent, discharged home, and apparently has had chest pain since  then.  He describes a sharp pain, pretty much steady, constant all the  time which did not get any better or worse by any specific maneuvers,  came in with these symptoms, and EKG and cardiac enzymes did not show  any significant elevation.  He seemed to be comfortable, yet he  continued to mention that he had this steady chest pain on the left side  which was not responding to any specific treatment.  We were consulted  to assist in the management of the patient.   \"    Subjective (Events in last 24 hours):   Pt awake, alert. Nad. Denies cp currently. No sob. He has felt much better since yesterday. Planning bronch with antiplatelets on board later today. Have echo planned for today as well. D/w patient about his symptoms, less likely cardiac type symptoms.     Objective:   /79   Pulse 61   Temp 98.4 °F (36.9 °C) (Oral)   Resp 18   Ht 1.753 m (5' 9\")   Wt 79 kg (174 lb 2.6 oz)   SpO2 95%   BMI 25.72 kg/m²      vss  TELEMETRY: nsr     Physical Exam:  General Appearance: alert and oriented to person, place and time, in no acute distress  Cardiovascular: normal rate, regular rhythm, normal S1 and S2, no murmurs, rubs, clicks, or gallops, distal pulses intact, no carotid bruits, no JVD  Pulmonary/Chest: clear to auscultation bilaterally- no wheezes, rales or rhonchi,

## 2024-02-05 NOTE — CARE COORDINATION
Collaborative Discharge Planning    Chriss Braga  :  1955  MRN:  [id number]    ADMIT DATE:  2/3/2024      Discharge Planning Discharge Planning  Type of Residence: House  Living Arrangements: Alone  Support Systems: Children, Family Members, Friends/Neighbors  Current Services Prior To Admission: Durable Medical Equipment, Home Care  Current DME Prior to Arrival: Cane  Potential Assistance Needed: N/A  DME Ordered?: No  Potential Assistance Purchasing Medications: No  Type of Home Care Services: Nursing Services  Patient expects to be discharged to:: House  White Board Notes /Social Work Whiteboard Notes  /Social Work Whiteboard: 2/ CM; plans home alone w [facility] (nsg, med mgmt), needs [facility], new PCP w [facility] (see AVS)    Discharge Plan Home with home health  Plans home alone w [facility] (nsg, medication s/u as PTA), needs [facility], new PCP w [facility] (see AVS), has cane  Discharge Milestones and Delays: Clinical status  USA/Right MSB Opacity (plug v  lesion)  Pulmonary plans Bronchoscopy/RML Nodule  History: AICD, Cardiomyopathy, Polysubstance Abuse, Smoker/Alcohol/Marijuana Use   ECHO planned today  Cardiology OK to not hold anticoagulation w recent MI 2 weeks ago  Client assigned to TYLER; Hand-off given to TYLER Martin CM        SIGNED:  Ralph Mojica RN   2024, 12:52 PM

## 2024-02-05 NOTE — PLAN OF CARE
Problem: Discharge Planning  Goal: Discharge to home or other facility with appropriate resources  2/5/2024 0209 by Ary Noel RN  Outcome: Progressing  Flowsheets (Taken 2/4/2024 2031)  Discharge to home or other facility with appropriate resources:   Identify barriers to discharge with patient and caregiver   Identify discharge learning needs (meds, wound care, etc)  2/4/2024 1813 by Jaun Santillan RN  Outcome: Progressing  Flowsheets (Taken 2/4/2024 0857)  Discharge to home or other facility with appropriate resources: Identify barriers to discharge with patient and caregiver     Problem: Pain  Goal: Verbalizes/displays adequate comfort level or baseline comfort level  2/5/2024 0209 by Ary Noel RN  Outcome: Progressing  Flowsheets (Taken 2/4/2024 2015)  Verbalizes/displays adequate comfort level or baseline comfort level:   Encourage patient to monitor pain and request assistance   Assess pain using appropriate pain scale   Administer analgesics based on type and severity of pain and evaluate response   Implement non-pharmacological measures as appropriate and evaluate response  2/4/2024 1813 by Jaun Santillan RN  Outcome: Progressing     Problem: Chronic Conditions and Co-morbidities  Goal: Patient's chronic conditions and co-morbidity symptoms are monitored and maintained or improved  2/5/2024 0209 by Ary Noel RN  Outcome: Progressing  Flowsheets (Taken 2/4/2024 2031)  Care Plan - Patient's Chronic Conditions and Co-Morbidity Symptoms are Monitored and Maintained or Improved:   Monitor and assess patient's chronic conditions and comorbid symptoms for stability, deterioration, or improvement   Collaborate with multidisciplinary team to address chronic and comorbid conditions and prevent exacerbation or deterioration  2/4/2024 1813 by Jaun Santillan RN  Outcome: Progressing  Flowsheets (Taken 2/4/2024 0857)  Care Plan - Patient's Chronic Conditions and Co-Morbidity Symptoms are  Monitored and Maintained or Improved: Monitor and assess patient's chronic conditions and comorbid symptoms for stability, deterioration, or improvement     Problem: Safety - Adult  Goal: Free from fall injury  2/5/2024 0209 by Ary Noel RN  Outcome: Progressing  Flowsheets (Taken 2/5/2024 0209)  Free From Fall Injury:   Instruct family/caregiver on patient safety   Based on caregiver fall risk screen, instruct family/caregiver to ask for assistance with transferring infant if caregiver noted to have fall risk factors  2/4/2024 1813 by Jaun Santillan RN  Outcome: Progressing     Problem: Cardiovascular - Adult  Goal: Maintains optimal cardiac output and hemodynamic stability  2/5/2024 0209 by Ary Noel RN  Outcome: Progressing  Flowsheets (Taken 2/4/2024 2031)  Maintains optimal cardiac output and hemodynamic stability:   Monitor blood pressure and heart rate   Monitor urine output and notify Licensed Independent Practitioner for values outside of normal range   Assess for signs of decreased cardiac output  2/4/2024 1813 by Jaun Santillan RN  Outcome: Progressing     Problem: Cardiovascular - Adult  Goal: Absence of cardiac dysrhythmias or at baseline  2/5/2024 0209 by Ary Noel RN  Outcome: Progressing  Flowsheets (Taken 2/4/2024 2031)  Absence of cardiac dysrhythmias or at baseline:   Monitor cardiac rate and rhythm   Assess for signs of decreased cardiac output  2/4/2024 1813 by Jaun Santillan RN  Outcome: Progressing     Problem: Metabolic/Fluid and Electrolytes - Adult  Goal: Electrolytes maintained within normal limits  2/5/2024 0209 by Ary Noel RN  Outcome: Progressing  Flowsheets (Taken 2/4/2024 2031)  Electrolytes maintained within normal limits:   Monitor response to electrolyte replacements, including repeat lab results as appropriate   Monitor labs and assess patient for signs and symptoms of electrolyte imbalances   Administer electrolyte replacement as

## 2024-02-05 NOTE — PROGRESS NOTES
Patient in endo for bronchoscopy. Scope  used. Pictures taken. Washings completed, Specimen labeled and sent to lab. Procedure completed. Patient tolerated well.

## 2024-02-05 NOTE — PROGRESS NOTES
1611 Awake and oriented on arrival to PACU with o2 3L NC HOB elevated , denies any pain or nausea   1625 resting resp easy   1640 resting resp easy   1648 awakens easily to name denies any needs  1651 meets criteria for discharge , transported to

## 2024-02-05 NOTE — OP NOTE
Therapeutic Bronchoscopy procedure note under general anesthesia for mucus plugging     Patient: Chriss Braga                        : 1955                           Operation: Flexible therapeutic fiberoptic bronchoscopy without fluoroscopy.      During procedure following procedures were performed:  Therapeutic bronchoscope was performed from right tracheobronchial tree. Mucus plugs were removed by doing therapeutic suctioning from right tracheobronchial tree.    Bronch washings obtained from right tracheobronchial tree.     Date of Operation: 2024     Indication for procedure: Abnormal CT angiogram of chest with contrast performed on 3 February 2024.  The CT chest showed  focal opacity demonstrated within the proximal right mainstem bronchus which could be related to retained mucus secretion Vs endobronchial lesion is not excluded.      Pre operative diagnoses:   -Abnormal CT scan of chest  -Proximal right mainstem bronchus endobronchial lesion- ?  Etiology  -Pulmonary nodules  -Coronary artery disease S/p and placement     Post operative diagnoses:   -Proximal right mainstem bronchus endobronchial lesion due to retained thick mucous plug.  -Abnormal CT scan of chest  -Pulmonary nodules  -Coronary artery disease S/p and placement.        Surgeon: Dylan Ayala MD     Consent: Chriss Braga is a 68 y.o. male . The risks, benefits, complications, treatment options and expected outcomes were discussed with the patient. Consent obtained from the patient after explaining the risks and complications of the procedure. The possibilities of reaction to medication, pulmonary aspiration, perforation of a viscus, development of pneumothorax with requirement of chest tube placement,air way bleeding, failure to diagnose a condition and creating a complication leading to respiratory failure requiring mechanical ventilation, transfusion or operation were discussed with the patient who freely signed the  performed from right side of the lower part of the trachea and right tracheobronchial tree. Mucus plugs were removed by doing therapeutic suctioning.       Bronchial washings: Bronchial washings were performed form right tracheobronchial tree. More than 15 ml of Bronchial washings were obtained and sent to the laboratory for further analysis.     Endobronchial findings:  Patient noted to have a thick light white-colored mucous present in the lower part of the trachea especially on the right side.  After removing the thick mucus by therapeutic suctioning visualization of the kayla is sharp with no growths.     After removing the above mucous plugs/thick secretions, airway examination revealed  Trachea: Normal mucosa.  Kayla: Normal mucosa  Right main bronchus: Normal mucosa  Right upper lobe bronchus: Normal mucosa  Right Middle lobe bronchus: Normal mucosa  Right Lower lobe bronchus:Normal mucosa  Left main bronchus: Normal mucosa  Left upper lobe bronchus: Normal mucosa  Left lower lobe bronchus: Normal mucosa        The Patient was taken to the endoscopy recovery area in satisfactory condition.        Estimated Blood Loss: None     Complications: None.     Recommendation/Plan:  1. F/U on culturs results  2.  Follow pneumonia panel by PCR     Follow up: Patient will be followed as inpatient.     Attestation: I performed the procedure.     Dylan Ayala MD

## 2024-02-05 NOTE — CARE COORDINATION
DISCHARGE PLANNING EVALUATION  2/5/24, 1:26 PM EST    Reason for Referral: current with [facility].  Mental Status: Answered questions appropriately  Decision Making: Makes own decisions   Family/Social/Home Environment: Patient resides at home alone. He stated that his home is 2 stories and he manages the stairs in his home.   Current Services including food security, transportation and housekeeping: Self   Current Equipment: NA  Payment Source:[employer]  Concerns or Barriers to Discharge: NA  Post-acute (PAC) provider list was provided to patient. Patient was informed of their freedom to choose PAC provider. Discussed and offered to show the patient the relevant PAC Providers quality and resource use measures on Medicare Compare web site via computer based on patient's goals of care and treatment preferences. Questions regarding selection process were answered.      Teach Back Method used with  Chriss regarding care plan and options for discharge.   Patient  verbalized understanding of the plan of care and contribute to goal setting.       Patient goals, treatment preferences and discharge plan: Patient stated that he was current with [facility].  Called and spoke with Deepthi and she stated that they had received a referral on patient but that he missed his PCP appointment and they are unable to start services on patient.  She stated that patient would need seen to establish a PCP and then they could make the referral.  Spoke with patient and he stated forget it to setting up home health.     Electronically signed by BUBBA Byrd on 2/5/2024 at 1:26 PM

## 2024-02-05 NOTE — PLAN OF CARE
Problem: Discharge Planning  Goal: Discharge to home or other facility with appropriate resources  2/5/2024 1351 by Jaun Santillan RN  Outcome: Progressing  2/5/2024 0209 by Ary Noel RN  Outcome: Progressing  Flowsheets (Taken 2/4/2024 2031)  Discharge to home or other facility with appropriate resources:   Identify barriers to discharge with patient and caregiver   Identify discharge learning needs (meds, wound care, etc)     Problem: Pain  Goal: Verbalizes/displays adequate comfort level or baseline comfort level  2/5/2024 1351 by Jaun Santillan RN  Outcome: Progressing  2/5/2024 0209 by Ary Noel RN  Outcome: Progressing  Flowsheets (Taken 2/4/2024 2015)  Verbalizes/displays adequate comfort level or baseline comfort level:   Encourage patient to monitor pain and request assistance   Assess pain using appropriate pain scale   Administer analgesics based on type and severity of pain and evaluate response   Implement non-pharmacological measures as appropriate and evaluate response     Problem: Chronic Conditions and Co-morbidities  Goal: Patient's chronic conditions and co-morbidity symptoms are monitored and maintained or improved  2/5/2024 1351 by Jaun Santillan RN  Outcome: Progressing  2/5/2024 0209 by Ary Noel RN  Outcome: Progressing  Flowsheets (Taken 2/4/2024 2031)  Care Plan - Patient's Chronic Conditions and Co-Morbidity Symptoms are Monitored and Maintained or Improved:   Monitor and assess patient's chronic conditions and comorbid symptoms for stability, deterioration, or improvement   Collaborate with multidisciplinary team to address chronic and comorbid conditions and prevent exacerbation or deterioration     Problem: Safety - Adult  Goal: Free from fall injury  2/5/2024 1351 by Jaun Santillan RN  Outcome: Progressing  2/5/2024 0209 by Ary Noel RN  Outcome: Progressing  Flowsheets (Taken 2/5/2024 0209)  Free From Fall Injury:   Instruct family/caregiver

## 2024-02-05 NOTE — ANESTHESIA POSTPROCEDURE EVALUATION
Department of Anesthesiology  Postprocedure Note    Patient: Chriss Braga  MRN: [id number]  YOB: 1955  Date of evaluation: 2/5/2024    Procedure Summary       Date: 02/05/24 Room / Location: [place] [address] / [facility]    Anesthesia Start: 1544 Anesthesia Stop:     Procedure: BRONCHOSCOPY FOR AIRWAY EXAMINATION Diagnosis: Unstable angina (HCC)    Surgeons: Dylan Ayala MD Responsible Provider: Igor Johnson DO    Anesthesia Type: Not recorded ASA Status: Not recorded            Anesthesia Type: No value filed.    Rashid Phase I:      Rashid Phase II:      Anesthesia Post Evaluation    Patient location during evaluation: PACU  Patient participation: complete - patient participated  Level of consciousness: awake  Airway patency: patent  Nausea & Vomiting: no vomiting and no nausea  Cardiovascular status: hemodynamically stable  Respiratory status: acceptable  Hydration status: stable  Pain management: adequate    No notable events documented.

## 2024-02-05 NOTE — PROGRESS NOTES
Physician Progress Note      PATIENT:               ERIKA HICKS  CSN #:                  [id number]  :                       1955  ADMIT DATE:       2/3/2024 11:30 AM  DISCH DATE:  RESPONDING  PROVIDER #:        FRANKLIN ZAMBRANO          QUERY TEXT:    Patient admitted with Unstable angina, noted to have Paroxysmal atrial   fibrillation and on Eliquis. Now on Heparin Drip.   If possible, please   document in progress notes and discharge summary if you are evaluating and/or   treating any of the following:    The medical record reflects the following:  Risk Factors: Patient is an 68-year-old male with hypertension and CHF,   history of Atrial fibrillation  Clinical Indicators: maintained on Eliquis  Treatment: Eliquis management, bleeding precautions    Thank you. Laura Maldonado RN, Clinical Documentation Integrity, [employer]   [employer], [facility], CRCR  Options provided:  -- Secondary hypercoagulable state due to Atrial Fibrillation.  -- Other - I will add my own diagnosis  -- Disagree - Not applicable / Not valid  -- Disagree - Clinically unable to determine / Unknown  -- Refer to Clinical Documentation Reviewer    PROVIDER RESPONSE TEXT:    Patient is on Heparin drip now for possible NSTEMI event.    Query created by: Laura Maldonado on 2024 10:49 AM      Electronically signed by:  FRANKLIN ZAMBRANO 2024 1:12 PM

## 2024-02-05 NOTE — PROGRESS NOTES
Internal Medicine Resident Progress Note    Name: Chriss Braga, male, : 1955, MRN: [id number]    PCP: No primary care provider on file.    Date of Admission: 2/3/2024  Date of Service: Pt seen/examined on 24      Assessment/Plan:  Unstable angina, improving: Typical chest pain, radiating to left arm. Accompanied by diaphoresis, nausea, feeling of heart racing, SOB. HEART score 8. EKG on 2/3/24 shows new T wave depressions in V5 and V6. Troponins 17 -> 19 -> 15 -> 15. Recent STEMI with PCI on . Noted 18 beat run of NSVT on tele .   Stop Heparin gtt   Per cardio, okay to resume home Eliquis after bronchoscopy   Continue ASA and high intensity statin.  Continue home BB and ACEI.  Cardiology consulted, appreciate recommendations  F/u OP with Dr. Hatch, cardiology, in 1-2 weeks after discharge   PRN EKGs for worsened chest pain  Continuous telemetry   ECHO completed, follow results   SL Nitro + morphine pain panel   Endobronchial lesion, pulmonary nodules: CTA chest noted for lesion within right mainstem bronchus concerning for mucous vs endobronchial lesion; also noted 3 pulmonary nodules in right lung measuring 7mm, 4mm, 3mm. Then mainstem lesion is new compared to prior CT on 23. Nodules appear relatively unchanged from prior CT.   Pulmonology consulted, appreciate recommendations  Heparin gtt off   Bronchoscopy planned for  with possible tissue biopsy  Will need repeat CT chest in 3 mo to f/u on nodules  F/u in pulmonology clinic after discharge   Obstructive CAD s/p LESTER on 24 and CABG x 3 on  : Active. Last Cardiac Stress test on 23: negative for ischemia. Last LHC on 24: successful PCI of OM1 STEMI via SVG x1 LESTER. On ASA 81 mg.   Continue high dose Statin   Continue Plavix 75mg daily   Continue ASA 81 mg  Systolic HFrEF: w/ EF of 10-15% on Echo 24. NYHA II. BNP on arrival 846 (baseline 355). CXR findings - negative acute findings, cardiac silhouette mildly

## 2024-02-05 NOTE — PRE SEDATION
SURGERY      cabg harvests --right leg       Medications:   Current Facility-Administered Medications   Medication Dose Route Frequency Provider Last Rate Last Admin    glucose chewable tablet 16 g  4 tablet Oral PRN Emma Kapadia MD        dextrose bolus 10% 125 mL  125 mL IntraVENous PRN Emma Kapadia MD        Or    dextrose bolus 10% 250 mL  250 mL IntraVENous PRN Emma Kapadia MD        glucagon injection 1 mg  1 mg SubCUTAneous PRN Emma Kapadia MD        dextrose 10 % infusion   IntraVENous Continuous PRN Emma Kapadia MD        nitroGLYCERIN (NITROSTAT) SL tablet 0.4 mg  0.4 mg SubLINGual Q5 Min PRN Xavier Willingham MD   0.4 mg at 02/04/24 1201    heparin (porcine) injection 4,000 Units  4,000 Units IntraVENous PRN Xavier Willingham MD        heparin (porcine) injection 2,000 Units  2,000 Units IntraVENous PRN Xavier Willingham MD        [Held by provider] heparin 25,000 units in dextrose 5% 250 mL (premix) infusion  5-30 Units/kg/hr IntraVENous Continuous Xavier Willingham MD   Stopped at 02/05/24 1022    sodium chloride flush 0.9 % injection 5-40 mL  5-40 mL IntraVENous 2 times per day Tank Yanez PA-C   10 mL at 02/04/24 2032    sodium chloride flush 0.9 % injection 5-40 mL  5-40 mL IntraVENous PRN Tank Yanez PA-C        0.9 % sodium chloride infusion   IntraVENous PRN Tank Yanez PA-C        potassium chloride (KLOR-CON M) extended release tablet 40 mEq  40 mEq Oral PRN OhlemacTank laguerre PA-C        Or    potassium bicarb-citric acid (EFFER-K) effervescent tablet 40 mEq  40 mEq Oral PRN OhlemacherTank PA-C        Or    potassium chloride 10 mEq/100 mL IVPB (Peripheral Line)  10 mEq IntraVENous PRN Tank Yanez, PA-C        magnesium sulfate 2000 mg in 50 mL IVPB premix  2,000 mg IntraVENous PRN Tank Yanez PA-C        ondansetron (ZOFRAN-ODT) disintegrating tablet 4 mg  4 mg Oral Q8H PRN Tank Yanez,

## 2024-02-05 NOTE — PROGRESS NOTES
[facility]      Patient - Chriss Braga   MRN -  [id number]   [id number]   - 1955      Date of Admission -  2/3/2024 11:30 AM  Date of evaluation -  2024  Room - --A   Hospital Day - 2  Consulting - Rafal John MD Primary Care Physician - No primary care provider on file.     Problem List      Active Hospital Problems    Diagnosis Date Noted    Unstable angina (HCC) [I20.0] 2024     Reason for Consult    New endobronchial lesion vs mucous plug  HPI   History Obtained From: Patient  and electronic medical record.    Chriss Braga is a 68 y.o. male PMH STEMI with PCI in 2024, HTN, HLD, HFrEF EF 10-15% with ICD placement  and CABG x 3 in , paroxysmal A-fib and pituitary macroadenoma who presented with chest pain.  Pulmonology was consulted due to incidentally found exophytic right mainstem bronchus lesion was concerning for mucus impaction versus endobronchial mass.  Patient was evaluated at bedside this afternoon.  He denies choking on food, cough, sputum production, wheezing, hemoptysis, night sweats, flushing, diarrhea, rash but does endorse 10 pound weight loss over the last 12 months.      Past 24 hrs   -On RA   -Reports Chest pain has resolved  -On heparin gtt NPO for planned procedure  -Denies active wheezing, hemoptysis, or AOB  All other systems reviewed    PMHx   Past Medical History      Diagnosis Date    Anemia     Microcytic anemia.     Angina     Arthritis     Atypical chest pain     Question if this is secondary to his uncontrolled hypertension or if this is secondary to exacerbation of COPD or secondary to his recent ICD implantation.    Blood transfusion     CAD (coronary artery disease)     Cardiomyopathy (HCC)     Cataract     bilateral    Chest discomfort     Depression     Dizziness     Patient complains of dizziness with movement.     Erectile dysfunction     Possible erectile dysfunction symptoms.   CONTRAST    2/3/2024    FINDINGS:     There is a focal opacity demonstrated within the proximal right mainstem bronchus which could be related to retained mucus secretion. However, an endobronchial lesion is not excluded. This is seen on axial image 88.  Coronary artery calcifications are noted.  There is no CT angiographic evidence of pulmonary embolism.  There is a 4 mm pulmonary nodule demonstrated within the right mid lung on axial image 134. There is a 7 mm pulmonary nodule within the right lower lobe on axial image 153. There is a 3 mm pulmonary nodule within the right lower lobe on axial image 145.  No focal consolidation, pleural effusion or pneumothorax is seen.  There is no axillary, hilar or mediastinal lymphadenopathy.  Limited evaluation of the upper abdomen appears unremarkable.  No acute osseous findings are seen.     IMPRESSION:  1. There is a focal opacity demonstrated within the proximal right mainstem bronchus which could be related to retained mucus secretion. However, an endobronchial lesion is not excluded.     2. There is no CT angiographic evidence of pulmonary embolism.     3. There is a 4 mm pulmonary nodule demonstrated within the right mid lung on axial image 134. There is a 7 mm pulmonary nodule within the right lower lobe on axial image 153. There is a 3 mm pulmonary nodule within the right lower lobe on axial image   145. In the absence of a known primary malignancy, recommend three-month follow-up chest CT to document stability.      (See actual reports for details)    Assessment   -Exophytic Right Mainstem Endobronchial Lesion- seen on CTA chest 2/3/2024 appx 11 x12 mm, noted multiple sub centimeter nodules in right lung field as noted in CTA report largest 7 mm   -Pulmonary Nodules- sub centimeter follow-up with repeat imaging outpatient    -Personal history of tobacco use-10 years 0.5 PPD Quit January 2024  -Personal history of marijuana use    Assessment   -Spoke with [employer] PA

## 2024-02-05 NOTE — ANESTHESIA PRE PROCEDURE
Department of Anesthesiology  Preprocedure Note       Name:  Chriss Braga   Age:  68 y.o.  :  1955                                          MRN:  [id number]         Date:  2024      Surgeon: Surgeon(s):  Dylan Ayala MD    Procedure: Procedure(s):  BRONCHOSCOPY FOR AIRWAY EXAMINATION    Medications prior to admission:   Prior to Admission medications    Medication Sig Start Date End Date Taking? Authorizing Provider   atorvastatin (LIPITOR) 80 MG tablet Take 1 tablet by mouth daily   Yes Aman Jenkins MD   atorvastatin (LIPITOR) 80 MG tablet Take 1 tablet by mouth daily   Yes Aman Jenkins MD   metoprolol succinate (TOPROL XL) 25 MG extended release tablet Take 1 tablet by mouth daily 24   Niki Hale PA-C   clopidogrel (PLAVIX) 75 MG tablet Take 1 tablet by mouth daily 24   Niki Hale PA-C   pantoprazole (PROTONIX) 40 MG tablet Take 1 tablet by mouth every morning (before breakfast) 24   Niki Hale PA-C   amLODIPine (NORVASC) 10 MG tablet Take 1 tablet by mouth daily    ProviderAman MD   ranolazine (RANEXA) 1000 MG extended release tablet Take 1 tablet by mouth 2 times daily  Patient taking differently: Take 1 tablet by mouth daily 10/20/23   Ancelmo Trujillo MD   ferrous sulfate (IRON 325) 325 (65 Fe) MG tablet Take 1 tablet by mouth every other day 10/20/23   Ancelmo Trujillo MD   lisinopril (PRINIVIL;ZESTRIL) 2.5 MG tablet Take 1 tablet by mouth daily 10/20/23   Ancelmo Trujillo MD   aspirin 81 MG chewable tablet Take 1 tablet by mouth daily 21   Franck Francis MD   apixaban (ELIQUIS) 5 MG TABS tablet Take 1 tablet by mouth 2 times daily 21   Franck Francis MD   Multiple Vitamins-Minerals (THERAPEUTIC MULTIVITAMIN-MINERALS) tablet Take 1 tablet by mouth daily 19   Lorraine Esquivel APRN - CNP   vitamin B-12 1000 MCG tablet Take 1 tablet by mouth daily 19   Lorraine Esquivel

## 2024-02-05 NOTE — PROCEDURES
Therapeutic Bronchoscopy procedure note under general anesthesia for mucus plugging    Patient: Chriss Braga  : 1955      Operation: Flexible therapeutic fiberoptic bronchoscopy without fluoroscopy.     During procedure following procedures were performed:  Therapeutic bronchoscope was performed from right tracheobronchial tree. Mucus plugs were removed by doing therapeutic suctioning from right tracheobronchial tree.    Bronch washings obtained from right tracheobronchial tree.    Date of Operation: 2024    Indication for procedure: Abnormal CT angiogram of chest with contrast performed on 3 February 2024.  The CT chest showed  focal opacity demonstrated within the proximal right mainstem bronchus which could be related to retained mucus secretion Vs endobronchial lesion is not excluded.     Pre operative diagnoses:   -Abnormal CT scan of chest  -Proximal right mainstem bronchus endobronchial lesion- ?  Etiology  -Pulmonary nodules  -Coronary artery disease S/p and placement    Post operative diagnoses:   -Proximal right mainstem bronchus endobronchial lesion due to retained thick mucous plug.  -Abnormal CT scan of chest  -Pulmonary nodules  -Coronary artery disease S/p and placement.      Surgeon: Dylan Ayala MD    Consent: Chriss Braga is a 68 y.o. male . The risks, benefits, complications, treatment options and expected outcomes were discussed with the patient. Consent obtained from the patient after explaining the risks and complications of the procedure. The possibilities of reaction to medication, pulmonary aspiration, perforation of a viscus, development of pneumothorax with requirement of chest tube placement,air way bleeding, failure to diagnose a condition and creating a complication leading to respiratory failure requiring mechanical ventilation, transfusion or operation were discussed with the patient who freely signed the consent.    The patient was counseled at length about the

## 2024-02-06 VITALS
BODY MASS INDEX: 25.8 KG/M2 | DIASTOLIC BLOOD PRESSURE: 82 MMHG | TEMPERATURE: 97.9 F | HEIGHT: 69 IN | WEIGHT: 174.16 LBS | HEART RATE: 78 BPM | RESPIRATION RATE: 18 BRPM | SYSTOLIC BLOOD PRESSURE: 134 MMHG | OXYGEN SATURATION: 98 %

## 2024-02-06 LAB
ANION GAP SERPL CALC-SCNC: 12 MEQ/L (ref 8–16)
BUN SERPL-MCNC: 18 MG/DL (ref 7–22)
CALCIUM SERPL-MCNC: 8.9 MG/DL (ref 8.5–10.5)
CHLORIDE SERPL-SCNC: 103 MEQ/L (ref 98–111)
CO2 SERPL-SCNC: 21 MEQ/L (ref 23–33)
CREAT SERPL-MCNC: 1.2 MG/DL (ref 0.4–1.2)
DEPRECATED RDW RBC AUTO: 38.4 FL (ref 35–45)
ERYTHROCYTE [DISTWIDTH] IN BLOOD BY AUTOMATED COUNT: 15.7 % (ref 11.5–14.5)
GFR SERPL CREATININE-BSD FRML MDRD: > 60 ML/MIN/1.73M2
GLUCOSE SERPL-MCNC: 144 MG/DL (ref 70–108)
HCT VFR BLD AUTO: 34.7 % (ref 42–52)
HGB BLD-MCNC: 10.7 GM/DL (ref 14–18)
MAGNESIUM SERPL-MCNC: 2.1 MG/DL (ref 1.6–2.4)
MCH RBC QN AUTO: 21.5 PG (ref 26–33)
MCHC RBC AUTO-ENTMCNC: 30.8 GM/DL (ref 32.2–35.5)
MCV RBC AUTO: 69.7 FL (ref 80–94)
PLATELET # BLD AUTO: 229 THOU/MM3 (ref 130–400)
PMV BLD AUTO: 9.2 FL (ref 9.4–12.4)
POTASSIUM SERPL-SCNC: 4.8 MEQ/L (ref 3.5–5.2)
RBC # BLD AUTO: 4.98 MILL/MM3 (ref 4.7–6.1)
SODIUM SERPL-SCNC: 136 MEQ/L (ref 135–145)
WBC # BLD AUTO: 5.7 THOU/MM3 (ref 4.8–10.8)

## 2024-02-06 PROCEDURE — 6370000000 HC RX 637 (ALT 250 FOR IP): Performed by: STUDENT IN AN ORGANIZED HEALTH CARE EDUCATION/TRAINING PROGRAM

## 2024-02-06 PROCEDURE — 6370000000 HC RX 637 (ALT 250 FOR IP): Performed by: PHYSICIAN ASSISTANT

## 2024-02-06 PROCEDURE — 2580000003 HC RX 258: Performed by: PHYSICIAN ASSISTANT

## 2024-02-06 PROCEDURE — 99239 HOSP IP/OBS DSCHRG MGMT >30: CPT | Performed by: STUDENT IN AN ORGANIZED HEALTH CARE EDUCATION/TRAINING PROGRAM

## 2024-02-06 PROCEDURE — 6360000002 HC RX W HCPCS: Performed by: PHYSICIAN ASSISTANT

## 2024-02-06 PROCEDURE — 99232 SBSQ HOSP IP/OBS MODERATE 35: CPT | Performed by: INTERNAL MEDICINE

## 2024-02-06 PROCEDURE — 85027 COMPLETE CBC AUTOMATED: CPT

## 2024-02-06 PROCEDURE — 83735 ASSAY OF MAGNESIUM: CPT

## 2024-02-06 PROCEDURE — 36415 COLL VENOUS BLD VENIPUNCTURE: CPT

## 2024-02-06 PROCEDURE — 80048 BASIC METABOLIC PNL TOTAL CA: CPT

## 2024-02-06 RX ORDER — LIDOCAINE 4 G/G
1 PATCH TOPICAL DAILY
Status: DISCONTINUED | OUTPATIENT
Start: 2024-02-06 | End: 2024-02-06 | Stop reason: HOSPADM

## 2024-02-06 RX ORDER — LIDOCAINE 4 G/G
1 PATCH TOPICAL DAILY
Qty: 10 PATCH | Refills: 0 | Status: SHIPPED | OUTPATIENT
Start: 2024-02-06

## 2024-02-06 RX ORDER — OXYCODONE HYDROCHLORIDE AND ACETAMINOPHEN 5; 325 MG/1; MG/1
1 TABLET ORAL EVERY 8 HOURS PRN
Qty: 9 TABLET | Refills: 0 | Status: ON HOLD | OUTPATIENT
Start: 2024-02-06 | End: 2024-02-12 | Stop reason: HOSPADM

## 2024-02-06 RX ORDER — OXYCODONE HYDROCHLORIDE AND ACETAMINOPHEN 5; 325 MG/1; MG/1
1 TABLET ORAL EVERY 8 HOURS PRN
Status: DISCONTINUED | OUTPATIENT
Start: 2024-02-06 | End: 2024-02-06 | Stop reason: HOSPADM

## 2024-02-06 RX ADMIN — CLOPIDOGREL BISULFATE 75 MG: 75 TABLET ORAL at 08:53

## 2024-02-06 RX ADMIN — OXYCODONE HYDROCHLORIDE AND ACETAMINOPHEN 1 TABLET: 5; 325 TABLET ORAL at 10:52

## 2024-02-06 RX ADMIN — METOPROLOL SUCCINATE 25 MG: 25 TABLET, EXTENDED RELEASE ORAL at 08:32

## 2024-02-06 RX ADMIN — PANTOPRAZOLE SODIUM 40 MG: 40 TABLET, DELAYED RELEASE ORAL at 04:48

## 2024-02-06 RX ADMIN — SODIUM CHLORIDE, PRESERVATIVE FREE 10 ML: 5 INJECTION INTRAVENOUS at 08:56

## 2024-02-06 RX ADMIN — LISINOPRIL 2.5 MG: 2.5 TABLET ORAL at 08:54

## 2024-02-06 RX ADMIN — AMIODARONE HYDROCHLORIDE 200 MG: 200 TABLET ORAL at 08:54

## 2024-02-06 RX ADMIN — RANOLAZINE 1000 MG: 500 TABLET, EXTENDED RELEASE ORAL at 08:54

## 2024-02-06 RX ADMIN — ASPIRIN 81 MG 81 MG: 81 TABLET ORAL at 08:53

## 2024-02-06 RX ADMIN — APIXABAN 5 MG: 5 TABLET, FILM COATED ORAL at 08:54

## 2024-02-06 RX ADMIN — AMLODIPINE BESYLATE 10 MG: 10 TABLET ORAL at 08:32

## 2024-02-06 RX ADMIN — MORPHINE SULFATE 4 MG: 4 INJECTION, SOLUTION INTRAMUSCULAR; INTRAVENOUS at 00:58

## 2024-02-06 RX ADMIN — SPIRONOLACTONE 25 MG: 25 TABLET ORAL at 08:53

## 2024-02-06 RX ADMIN — ISOSORBIDE MONONITRATE 60 MG: 60 TABLET, EXTENDED RELEASE ORAL at 08:32

## 2024-02-06 RX ADMIN — MORPHINE SULFATE 2 MG: 2 INJECTION, SOLUTION INTRAMUSCULAR; INTRAVENOUS at 04:46

## 2024-02-06 ASSESSMENT — PAIN SCALES - GENERAL
PAINLEVEL_OUTOF10: 8
PAINLEVEL_OUTOF10: 6

## 2024-02-06 ASSESSMENT — PAIN DESCRIPTION - LOCATION
LOCATION: CHEST

## 2024-02-06 ASSESSMENT — PAIN DESCRIPTION - DESCRIPTORS
DESCRIPTORS: DISCOMFORT

## 2024-02-06 ASSESSMENT — PAIN DESCRIPTION - ORIENTATION
ORIENTATION: LEFT
ORIENTATION: MID

## 2024-02-06 ASSESSMENT — PAIN DESCRIPTION - PAIN TYPE: TYPE: ACUTE PAIN

## 2024-02-06 NOTE — PLAN OF CARE
Problem: Discharge Planning  Goal: Discharge to home or other facility with appropriate resources  2/5/2024 2055 by Sharon Adame RN  Outcome: Progressing  2/5/2024 1351 by Jaun Santillan RN  Outcome: Progressing     Problem: Pain  Goal: Verbalizes/displays adequate comfort level or baseline comfort level  2/5/2024 2055 by Sharon Adame RN  Outcome: Progressing  2/5/2024 1351 by Jaun Santillan RN  Outcome: Progressing     Problem: Chronic Conditions and Co-morbidities  Goal: Patient's chronic conditions and co-morbidity symptoms are monitored and maintained or improved  2/5/2024 2055 by Sharon Adame RN  Outcome: Progressing  2/5/2024 1351 by Jaun Santillan RN  Outcome: Progressing     Problem: Safety - Adult  Goal: Free from fall injury  2/5/2024 2055 by Sharon Adame RN  Outcome: Progressing  2/5/2024 1351 by Jaun Santillan RN  Outcome: Progressing     Problem: Cardiovascular - Adult  Goal: Maintains optimal cardiac output and hemodynamic stability  2/5/2024 2055 by Sharon Adame RN  Outcome: Progressing  2/5/2024 1351 by Jaun Santillan RN  Outcome: Progressing  Goal: Absence of cardiac dysrhythmias or at baseline  2/5/2024 2055 by Sharon Adame RN  Outcome: Progressing  2/5/2024 1351 by Jaun Santillan RN  Outcome: Progressing     Problem: Metabolic/Fluid and Electrolytes - Adult  Goal: Electrolytes maintained within normal limits  2/5/2024 2055 by Sharon Adame RN  Outcome: Progressing  2/5/2024 1351 by Jaun Santillan RN  Outcome: Progressing     Problem: Hematologic - Adult  Goal: Maintains hematologic stability  2/5/2024 2055 by Sharon Adame RN  Outcome: Progressing  2/5/2024 1351 by Jaun Santillan RN  Outcome: Progressing

## 2024-02-06 NOTE — PROGRESS NOTES
[facility]      Patient - Chriss Braga   MRN -  [id number]   [id number]   - 1955      Date of Admission -  2/3/2024 11:30 AM  Date of evaluation -  2024  Room - 8A--A   Hospital Day - 3  Consulting - Rafal John MD Primary Care Physician - No primary care provider on file.     Problem List      Active Hospital Problems    Diagnosis Date Noted    NSTEMI (non-ST elevated myocardial infarction) (HCC) [I21.4] 2024     Priority: High    Abnormal chest CT [R93.89] 2024    Unstable angina (HCC) [I20.0] 2024     Reason for Consult    New endobronchial lesion vs mucous plug   HPI   History Obtained From: Patient  and electronic medical record.    Chriss Braga is a 68 y.o. male PMH STEMI with PCI in 2024, HTN, HLD, HFrEF EF 10-15% with ICD placement  and CABG x 3 in , paroxysmal A-fib and pituitary macroadenoma who presented with chest pain.  Pulmonology was consulted due to incidentally found exophytic right mainstem bronchus lesion was concerning for mucus impaction versus endobronchial mass.  Patient was evaluated at bedside this afternoon.  He denies choking on food, cough, sputum production, wheezing, hemoptysis, night sweats, flushing, diarrhea, rash but does endorse 10 pound weight loss over the last 12 months.      Past 24 hrs   -Completed bronchoscopy yesterday right bronchus with noted mucous plug see Op note for full details   -On RA   -reports no SOB at rest denies significant sore throat or hemoptysis   -ECHO completed improved LVEF see below for details  All other systems reviewed    PMHx   Past Medical History      Diagnosis Date    Anemia     Microcytic anemia.     Angina     Arthritis     Atypical chest pain     Question if this is secondary to his uncontrolled hypertension or if this is secondary to exacerbation of COPD or secondary to his recent ICD implantation.    Blood transfusion     CAD  EP Progress Note    EP Attending: Mitchell Lacy MD  EP NP:  FLORESITA Krueger  EP service phone:  [phone]    CHIEF COMPLAINT: Syncope, Heart block    Subjective:  No acute events noted overnight.  Denies any recurrent syncope since admission.  Denies chest pain, palpitations, SOB or dizziness.     Denies any fever or chills, recent illness or infection, denies nausea/vomiting or diarrhea.        ALLERGIES:  No Known Allergies    VITAL SIGNS:   Visit Vitals  /71 (BP Location: [facility], Patient Position: Semi-Traore's)   Pulse 63   Temp 97.8 °F (36.6 °C) (Temporal)   Resp 18   Ht 5' 11\" (1.803 m)   Wt 84.9 kg   SpO2 94%   BMI 26.11 kg/m²        PHYSICAL EXAM  GENERAL:  Cooperative, sitting comfortably, in no acute distress.  HEAD: Normocephalic, atraumatic.  NECK: Trachea midline.  CARDIOVASCULAR:   Regular rate and rhythm, normal S1/S2, no murmur.   RESPIRATORY:  Clear to ausculation bilaterally. No wheezes, rales, or rhonchi.  GI: Non-distended, bowel sounds heard.  EXTREMITIES: No clubbing or cyanosis. No pitting edema.  NEURO: No obvious motor or sensor deficits.  PSYCHIATRIC:   Alert and oriented to person, place, and time.  INTEGUMENTARY:  Warm, no rashes or nodules.    Telemetry: Sinus bradycardia, NSR with first degree AVB, RBBB, episodes of Wenckebach and Mobitz II     LAB  Lab Results   Component Value Date    TSH 1.040 08/27/2019    POTASSIUM 4.4 08/27/2019    CHLORIDE 110 (H) 08/27/2019    GLUCOSE 107 (H) 08/27/2019    CALCIUM 8.9 08/27/2019    CO2 29 08/27/2019    BUN 13 08/27/2019    CREATININE 0.82 08/27/2019    WBC 8.5 08/26/2019    RBC 3.84 (L) 08/26/2019    HCT 37.3 (L) 08/26/2019    HGB 13.7 08/26/2019     08/26/2019       I have personally reviewed and interpreted ECG and telemetry.  I have reviewed and summarized old records as in the impression section.    Impression:   · Syncope without prodrome in the setting of conduction system disease (second degree AV block type  I-Wenckebach, Trafasicular block - first degree AVB, left anterior fascicular block & right bundle branch block)  ? History of 3 syncopal episodes, all with minimal to no prodrome, worrisome for severe bradycardia given underlying conduction system disease  ? On no AV hema blocking agents  · Hypertension   · ECG/cardiac monitoring  ? ECG 8/27/19-sinus rhythm 69 bpm with first-degree AV block, left axis deviation, nonspecific intraventricular conduction delay,  ms, QTc 454 ms  ? ECG 8/26/19-sinus rhythm 75 bpm with first-degree AV block, right bundle-branch block, left anterior fascicular block,  ms, QTc 480 ms  · Cardiac imaging  ? Echocardiogram 8/26/19-EF 60%, no regional wall motion abnormalities, no significant valve disease, IVS 1.1, LVPW 1.0, LA VI 39.9 mL/m²  · Labs  ? 8/27/19-creatinine 0.82, GFR 82, potassium 4.4, TSH WNL  ? 8/26/19-WBC 8.5, hemoglobin 13.7, platelets 174    Recommendation:  · Would recommend he undergo insertion of dual chamber pacemaker given recurrent syncope without prodrome in the setting of severe conduction system disease as evidenced by sinus bradycardia, Wenckebach, and trifascicular block. The procedure itself was discussed in detail with the patient including risks, benefits, and alternatives. Questions were invited and answered. He verbalized understanding and wishes to pursue insertion of dual-chamber pacemaker  · Post-device restrictions along with incisional care and pain management were reviewed. Additional remote monitoring and office follow-ups were discussed  · N.p.o.  · Will require IV and left arm (patient is right-handed) prior to procedure - discussed with nursing services  · Start IV fluids at 75 cc an hour  · Of note, patient resides in [place] and will require follow-up with EP for incision check and future device interrogation.  He reports that his wife has been looking into EP group near his hometown.       Device implant was discussed in detail.  The procedure, benefits, alternatives, and risks including but not limited to infection, bleeding, pneumothorax, hemothorax, tamponade, neurovascular injury, pericarditis, embolic events, kidney injury, emergent need for surgery,  myocardial infarction, stroke and death were explained to the patient. The patient's questions were answered. The patient voiced understanding and wishes to proceed.    Discussed with nursing services    During this visit I obtained the chief complaint, history of present illness, performed the exam and developed the impression and plan. I confirmed with patient the review of system, past family and social history that was originally obtained by the nurse/scribe. All of this has been recorded here as a scribed note by the nurse/scribe who performed the duties of a scribe for this encounter in my presence.               For EP questions between 7am-4pm please contact the NP/MD listed above. After 4pm and before 7am contact the answering service.     His blood pressure is 134/82 and his pulse is 78. His respiration is 18 and oxygen saturation is 98%.   Body mass index is 25.72 kg/m².    SUPPLEMENTAL O2: O2 Flow Rate (L/min): 2 L/min     I/O      Intake/Output Summary (Last 24 hours) at 2/6/2024 1114  Last data filed at 2/6/2024 0430  Gross per 24 hour   Intake 375 ml   Output 975 ml   Net -600 ml       I/O last 3 completed shifts:  In: 625 [P.O.:490; I.V.:135]  Out: 1500 [Urine:1500]   Patient Vitals for the past 96 hrs (Last 3 readings):   Weight   02/05/24 0530 79 kg (174 lb 2.6 oz)   02/03/24 1141 79.4 kg (175 lb)         Physical Exam   Nursing note and vitals reviewed.  Physical Exam   Constitutional: No distress on RA. Patient appears moderately built and  moderately nourished.   Head: Normocephalic and atraumatic.   Mouth/Throat: Oropharynx is clear and moist.  No oral thrush.  Eyes: Conjunctivae are normal. Pupils are equal, round. No scleral icterus.   Neck: Neck supple. No tracheal deviation present.   Cardiovascular: S1 and S2 with no murmur.  No peripheral edema  Pulmonary/Chest: Normal effort with bilateral air entry, clear breath sounds. No stridor. No respiratory distress. Patient exhibits no tenderness.   Abdominal: Soft. Bowel sounds audible. No distension or tenderness to palp.   Musculoskeletal: Moves all extremities  Neurological: Patient is alert and follows simple commands     Labs  - Old records and notes have been reviewed in [facility]   ABG  No results found for: \"PH\", \"PO2\", \"PCO2\", \"HCO3\", \"O2SAT\"  No results found for: \"IFIO2\", \"MODE\", \"SETTIDVOL\", \"SETPEEP\"  CBC  Recent Labs     02/04/24  0402 02/05/24  0421 02/06/24  0528   WBC 6.3 6.3 5.7   RBC 4.75 4.59* 4.98   HGB 10.4* 10.0* 10.7*   HCT 33.3* 32.3* 34.7*   MCV 70.1* 70.4* 69.7*   MCH 21.9* 21.8* 21.5*   MCHC 31.2* 31.0* 30.8*    202 229   MPV 9.4 9.0* 9.2*        BMP  Recent Labs     02/04/24  0402 02/05/24  0421 02/06/24  0528    139 136   K 3.8 4.3 4.8    104

## 2024-02-06 NOTE — CARE COORDINATION
2/6/24, 11:12 AM EST    Patient goals/plan/ treatment preferences discussed by  and .  Patient goals/plan/ treatment preferences reviewed with patient/ family.  Patient/ family verbalize understanding of discharge plan and are in agreement with goal/plan/treatment preferences.  Understanding was demonstrated using the teach back method.  AVS provided by RN at time of discharge, which includes all necessary medical information pertaining to the patients current course of illness, treatment, post-discharge goals of care, and treatment preferences.     Services At/After Discharge: Home Health, Aide services, and Nursing service       IMM Letter  IMM Letter given to Patient/Family/Significant other/Guardian/POA/by:: Veronica JO CM  IMM Letter date given:: 02/06/24  IMM Letter time given:: 1057     See SW note regarding [facility]. HH services dependent on follow up to establish new PCP.     Pt verbalized understanding and gave permission for possible discharge within 4 hours of receiving IMM.

## 2024-02-06 NOTE — CARE COORDINATION
2/6/24, 9:52 AM EST    DISCHARGE PLANNING EVALUATION    DONAL Martni RN spoke with patient, he is agreeable to [facility].  New PCP appointment scheduled [facility]. Patient is aware that he need to go to new PCP appointment for [facility] to begin. Left message with [facility] with referral, informed discharge is today.

## 2024-02-06 NOTE — PROGRESS NOTES
Discharge teaching and instructions for diagnosis/procedure of unstable angina completed with patient using teachback method. AVS reviewed. Prescriptions sent to OP pharmacy by provider. Patient voiced understanding regarding prescriptions, follow up appointments, and care of self at home. Discharged in a wheelchair to  home with support per self via [employer].

## 2024-02-06 NOTE — DISCHARGE SUMMARY
Resident Discharge Summary (Hospitalist)      Patient Identification:   Chriss Braga   : 1955  MRN: [id number]   Account: [id number]   Patient's PCP: No primary care provider on file.    Admit Date: 2/3/2024   Discharge Date:   24    Admitting Physician: Rafal Jhon MD  Discharge Physician: Emma Kapadia MD       Discharge Diagnoses:  Unstable angina, improving: Typical chest pain, radiating to left arm. Accompanied by diaphoresis, nausea, feeling of heart racing, SOB. HEART score 8. EKG on 2/3/24 shows new T wave depressions in V5 and V6. Troponins 17 -> 19 -> 15 -> 15. Recent STEMI with PCI on . Noted 18 beat run of NSVT on tele . ECHO from  improved compared to prior; EF 30-35%, moderate global hypokinesis, abnormal diastolic function.   Continue home Eliquis, ASA, Statin, Metoprolol, Lisinopril   F/u OP with Dr. Hatch, cardiology, in 1-2 weeks after discharge   Home health order for skilled nursing and medication management  Lidocaine patch daily as needed for pain  Percocet every 8 hours as needed for pain for 3 days   Retained mucous plug, pulmonary nodules: CTA chest noted for lesion within right mainstem bronchus concerning for mucous vs endobronchial lesion; also noted 3 pulmonary nodules in right lung measuring 7mm, 4mm, 3mm. Then mainstem lesion is new compared to prior CT on 23. Nodules appear relatively unchanged from prior CT.   Bronchoscopy performed 24  Right mainstem bronchus lesion was found to be due to a retained thick mucous plug. The plug was removed and the kayla appeared shark with no growths. Sample sent for culture.   Will need repeat CT chest in 3 mo to f/u on nodules  F/u in pulmonology clinic 1 week after discharge   Obstructive CAD s/p LESTER on 24 and CABG x 3 on  : Active. Last Cardiac Stress test on 23: negative for ischemia. Last LHC on 24: successful PCI of OM1 STEMI via SVG x1 LESTER. On ASA 81 mg.   Continue high dose

## 2024-02-07 LAB
BACTERIA SPEC RESP CULT: NORMAL
GRAM STN SPEC: NORMAL

## 2024-02-08 ENCOUNTER — APPOINTMENT (OUTPATIENT)
Dept: CT IMAGING | Age: 69
DRG: 303 | End: 2024-02-08
Payer: MEDICARE

## 2024-02-08 ENCOUNTER — APPOINTMENT (OUTPATIENT)
Dept: GENERAL RADIOLOGY | Age: 69
DRG: 303 | End: 2024-02-08
Payer: MEDICARE

## 2024-02-08 ENCOUNTER — TELEPHONE (OUTPATIENT)
Dept: CARDIOLOGY CLINIC | Age: 69
End: 2024-02-08

## 2024-02-08 ENCOUNTER — HOSPITAL ENCOUNTER (INPATIENT)
Age: 69
LOS: 3 days | Discharge: HOME HEALTH | DRG: 303 | End: 2024-02-12
Attending: STUDENT IN AN ORGANIZED HEALTH CARE EDUCATION/TRAINING PROGRAM | Admitting: NUCLEAR MEDICINE
Payer: MEDICARE

## 2024-02-08 DIAGNOSIS — I21.4 NSTEMI (NON-ST ELEVATED MYOCARDIAL INFARCTION) (HCC): ICD-10-CM

## 2024-02-08 DIAGNOSIS — I20.0 UNSTABLE ANGINA (HCC): ICD-10-CM

## 2024-02-08 DIAGNOSIS — I21.02 ST ELEVATION MYOCARDIAL INFARCTION INVOLVING LEFT ANTERIOR DESCENDING (LAD) CORONARY ARTERY (HCC): ICD-10-CM

## 2024-02-08 DIAGNOSIS — R55 SYNCOPE AND COLLAPSE: Primary | ICD-10-CM

## 2024-02-08 DIAGNOSIS — R07.9 CHEST PAIN, UNSPECIFIED TYPE: ICD-10-CM

## 2024-02-08 DIAGNOSIS — I25.5 ISCHEMIC CARDIOMYOPATHY: ICD-10-CM

## 2024-02-08 LAB
ALBUMIN SERPL BCG-MCNC: 3.7 G/DL (ref 3.5–5.1)
ALP SERPL-CCNC: 73 U/L (ref 38–126)
ALT SERPL W/O P-5'-P-CCNC: 14 U/L (ref 11–66)
ANION GAP SERPL CALC-SCNC: 11 MEQ/L (ref 8–16)
APTT PPP: 30.3 SECONDS (ref 22–38)
AST SERPL-CCNC: 18 U/L (ref 5–40)
BASOPHILS ABSOLUTE: 0 THOU/MM3 (ref 0–0.1)
BASOPHILS NFR BLD AUTO: 0.3 %
BILIRUB SERPL-MCNC: 1.1 MG/DL (ref 0.3–1.2)
BUN SERPL-MCNC: 21 MG/DL (ref 7–22)
CALCIUM SERPL-MCNC: 8.5 MG/DL (ref 8.5–10.5)
CHLORIDE SERPL-SCNC: 100 MEQ/L (ref 98–111)
CO2 SERPL-SCNC: 23 MEQ/L (ref 23–33)
CREAT SERPL-MCNC: 1.3 MG/DL (ref 0.4–1.2)
CRP SERPL-MCNC: 0.71 MG/DL (ref 0–1)
DEPRECATED RDW RBC AUTO: 40.1 FL (ref 35–45)
EOSINOPHIL NFR BLD AUTO: 0.2 %
EOSINOPHILS ABSOLUTE: 0 THOU/MM3 (ref 0–0.4)
ERYTHROCYTE [DISTWIDTH] IN BLOOD BY AUTOMATED COUNT: 17.6 % (ref 11.5–14.5)
ERYTHROCYTE [SEDIMENTATION RATE] IN BLOOD BY WESTERGREN METHOD: 10 MM/HR (ref 0–10)
GFR SERPL CREATININE-BSD FRML MDRD: 60 ML/MIN/1.73M2
GLUCOSE BLD STRIP.AUTO-MCNC: 112 MG/DL (ref 70–108)
GLUCOSE SERPL-MCNC: 81 MG/DL (ref 70–108)
HCT VFR BLD AUTO: 43 % (ref 42–52)
HGB BLD-MCNC: 13.2 GM/DL (ref 14–18)
IMM GRANULOCYTES # BLD AUTO: 0.11 THOU/MM3 (ref 0–0.07)
IMM GRANULOCYTES NFR BLD AUTO: 1.1 %
INR PPP: 1.07 (ref 0.85–1.13)
LYMPHOCYTES ABSOLUTE: 0.6 THOU/MM3 (ref 1–4.8)
LYMPHOCYTES NFR BLD AUTO: 6 %
MCH RBC QN AUTO: 21.9 PG (ref 26–33)
MCHC RBC AUTO-ENTMCNC: 30.7 GM/DL (ref 32.2–35.5)
MCV RBC AUTO: 71.2 FL (ref 80–94)
MONOCYTES ABSOLUTE: 0.5 THOU/MM3 (ref 0.4–1.3)
MONOCYTES NFR BLD AUTO: 4.6 %
NEUTROPHILS NFR BLD AUTO: 87.8 %
NRBC BLD AUTO-RTO: 0 /100 WBC
OSMOLALITY SERPL CALC.SUM OF ELEC: 270.2 MOSMOL/KG (ref 275–300)
PLATELET # BLD AUTO: 237 THOU/MM3 (ref 130–400)
PMV BLD AUTO: 9.4 FL (ref 9.4–12.4)
POTASSIUM SERPL-SCNC: 4.5 MEQ/L (ref 3.5–5.2)
PROT SERPL-MCNC: 7 G/DL (ref 6.1–8)
RBC # BLD AUTO: 6.04 MILL/MM3 (ref 4.7–6.1)
REASON FOR REJECTION: NORMAL
REJECTED TEST: NORMAL
SEGMENTED NEUTROPHILS ABSOLUTE COUNT: 8.6 THOU/MM3 (ref 1.8–7.7)
SODIUM SERPL-SCNC: 134 MEQ/L (ref 135–145)
TROPONIN, HIGH SENSITIVITY: 12 NG/L (ref 0–12)
WBC # BLD AUTO: 9.8 THOU/MM3 (ref 4.8–10.8)

## 2024-02-08 PROCEDURE — 85651 RBC SED RATE NONAUTOMATED: CPT

## 2024-02-08 PROCEDURE — 36415 COLL VENOUS BLD VENIPUNCTURE: CPT

## 2024-02-08 PROCEDURE — 85610 PROTHROMBIN TIME: CPT

## 2024-02-08 PROCEDURE — 99285 EMERGENCY DEPT VISIT HI MDM: CPT

## 2024-02-08 PROCEDURE — 96365 THER/PROPH/DIAG IV INF INIT: CPT

## 2024-02-08 PROCEDURE — 99223 1ST HOSP IP/OBS HIGH 75: CPT

## 2024-02-08 PROCEDURE — 70450 CT HEAD/BRAIN W/O DYE: CPT

## 2024-02-08 PROCEDURE — 6360000002 HC RX W HCPCS

## 2024-02-08 PROCEDURE — 84484 ASSAY OF TROPONIN QUANT: CPT

## 2024-02-08 PROCEDURE — 82948 REAGENT STRIP/BLOOD GLUCOSE: CPT

## 2024-02-08 PROCEDURE — 6360000004 HC RX CONTRAST MEDICATION

## 2024-02-08 PROCEDURE — 80053 COMPREHEN METABOLIC PANEL: CPT

## 2024-02-08 PROCEDURE — 93005 ELECTROCARDIOGRAM TRACING: CPT

## 2024-02-08 PROCEDURE — 70496 CT ANGIOGRAPHY HEAD: CPT

## 2024-02-08 PROCEDURE — 70498 CT ANGIOGRAPHY NECK: CPT

## 2024-02-08 PROCEDURE — 71045 X-RAY EXAM CHEST 1 VIEW: CPT

## 2024-02-08 PROCEDURE — 85730 THROMBOPLASTIN TIME PARTIAL: CPT

## 2024-02-08 PROCEDURE — 71275 CT ANGIOGRAPHY CHEST: CPT

## 2024-02-08 PROCEDURE — 85025 COMPLETE CBC W/AUTO DIFF WBC: CPT

## 2024-02-08 PROCEDURE — 2580000003 HC RX 258

## 2024-02-08 PROCEDURE — 86140 C-REACTIVE PROTEIN: CPT

## 2024-02-08 RX ORDER — 0.9 % SODIUM CHLORIDE 0.9 %
500 INTRAVENOUS SOLUTION INTRAVENOUS ONCE
Status: COMPLETED | OUTPATIENT
Start: 2024-02-08 | End: 2024-02-08

## 2024-02-08 RX ORDER — SODIUM CHLORIDE 9 MG/ML
INJECTION, SOLUTION INTRAVENOUS CONTINUOUS
Status: DISCONTINUED | OUTPATIENT
Start: 2024-02-08 | End: 2024-02-10

## 2024-02-08 RX ORDER — NITROGLYCERIN 20 MG/100ML
5-200 INJECTION INTRAVENOUS CONTINUOUS
Status: DISCONTINUED | OUTPATIENT
Start: 2024-02-08 | End: 2024-02-09

## 2024-02-08 RX ADMIN — IOPAMIDOL 80 ML: 755 INJECTION, SOLUTION INTRAVENOUS at 20:55

## 2024-02-08 RX ADMIN — SODIUM CHLORIDE: 9 INJECTION, SOLUTION INTRAVENOUS at 22:11

## 2024-02-08 RX ADMIN — SODIUM CHLORIDE 500 ML: 9 INJECTION, SOLUTION INTRAVENOUS at 21:10

## 2024-02-08 RX ADMIN — NITROGLYCERIN 5 MCG/MIN: 20 INJECTION INTRAVENOUS at 23:04

## 2024-02-08 RX ADMIN — IOPAMIDOL 80 ML: 755 INJECTION, SOLUTION INTRAVENOUS at 21:07

## 2024-02-08 NOTE — TELEPHONE ENCOUNTER
Patient no showed to new patient CHF appointment   Called to patient   No answer   [facility]  Letter mailed

## 2024-02-09 ENCOUNTER — APPOINTMENT (OUTPATIENT)
Age: 69
DRG: 303 | End: 2024-02-09
Payer: MEDICARE

## 2024-02-09 PROBLEM — R55 SYNCOPE AND COLLAPSE: Status: ACTIVE | Noted: 2024-02-09

## 2024-02-09 PROBLEM — R53.1 LEFT-SIDED WEAKNESS: Status: ACTIVE | Noted: 2024-02-09

## 2024-02-09 PROBLEM — J20.8 ACUTE BRONCHITIS DUE TO OTHER SPECIFIED ORGANISMS: Status: ACTIVE | Noted: 2024-02-09

## 2024-02-09 LAB
AMPHETAMINES UR QL SCN: NEGATIVE
ANION GAP SERPL CALC-SCNC: 11 MEQ/L (ref 8–16)
ANION GAP SERPL CALC-SCNC: 12 MEQ/L (ref 8–16)
BARBITURATES UR QL SCN: NEGATIVE
BASOPHILS ABSOLUTE: 0 THOU/MM3 (ref 0–0.1)
BASOPHILS NFR BLD AUTO: 0.2 %
BENZODIAZ UR QL SCN: NEGATIVE
BILIRUB UR QL STRIP.AUTO: NEGATIVE
BUN SERPL-MCNC: 20 MG/DL (ref 7–22)
BUN SERPL-MCNC: 20 MG/DL (ref 7–22)
BZE UR QL SCN: NEGATIVE
CALCIUM SERPL-MCNC: 8.3 MG/DL (ref 8.5–10.5)
CALCIUM SERPL-MCNC: 8.4 MG/DL (ref 8.5–10.5)
CANNABINOIDS UR QL SCN: POSITIVE
CHARACTER UR: CLEAR
CHLORIDE SERPL-SCNC: 103 MEQ/L (ref 98–111)
CHLORIDE SERPL-SCNC: 104 MEQ/L (ref 98–111)
CO2 SERPL-SCNC: 21 MEQ/L (ref 23–33)
CO2 SERPL-SCNC: 22 MEQ/L (ref 23–33)
COLOR: YELLOW
CREAT SERPL-MCNC: 1.1 MG/DL (ref 0.4–1.2)
CREAT SERPL-MCNC: 1.1 MG/DL (ref 0.4–1.2)
DEPRECATED RDW RBC AUTO: 39.9 FL (ref 35–45)
ECHO AO ASC DIAM: 3.4 CM
ECHO AO ASCENDING AORTA INDEX: 1.79 CM/M2
ECHO AV CUSP MM: 2.2 CM
ECHO BSA: 1.9 M2
ECHO IVC PROX: 1.4 CM
ECHO LA AREA 2C: 14.5 CM2
ECHO LA AREA 4C: 15.5 CM2
ECHO LA DIAMETER INDEX: 1.74 CM/M2
ECHO LA DIAMETER: 3.3 CM
ECHO LA MAJOR AXIS: 5.6 CM
ECHO LA MINOR AXIS: 5.1 CM
ECHO LA VOL BP: 35 ML (ref 18–58)
ECHO LA VOL MOD A2C: 34 ML (ref 18–58)
ECHO LA VOL MOD A4C: 34 ML (ref 18–58)
ECHO LA VOL/BSA BIPLANE: 18 ML/M2 (ref 16–34)
ECHO LA VOLUME INDEX MOD A2C: 18 ML/M2 (ref 16–34)
ECHO LA VOLUME INDEX MOD A4C: 18 ML/M2 (ref 16–34)
ECHO LV EDV A2C: 114 ML
ECHO LV EDV A4C: 162 ML
ECHO LV EDV INDEX A4C: 85 ML/M2
ECHO LV EDV NDEX A2C: 60 ML/M2
ECHO LV EJECTION FRACTION A2C: 43 %
ECHO LV EJECTION FRACTION A4C: 33 %
ECHO LV EJECTION FRACTION BIPLANE: 37 % (ref 55–100)
ECHO LV ESV A2C: 66 ML
ECHO LV ESV A4C: 108 ML
ECHO LV ESV INDEX A2C: 35 ML/M2
ECHO LV ESV INDEX A4C: 57 ML/M2
ECHO LV FRACTIONAL SHORTENING: 15 % (ref 28–44)
ECHO LV INTERNAL DIMENSION DIASTOLE INDEX: 2.74 CM/M2
ECHO LV INTERNAL DIMENSION DIASTOLIC: 5.2 CM (ref 4.2–5.9)
ECHO LV INTERNAL DIMENSION SYSTOLIC INDEX: 2.32 CM/M2
ECHO LV INTERNAL DIMENSION SYSTOLIC: 4.4 CM
ECHO LV IVSD: 1.2 CM (ref 0.6–1)
ECHO LV MASS 2D: 263.4 G (ref 88–224)
ECHO LV MASS INDEX 2D: 138.7 G/M2 (ref 49–115)
ECHO LV POSTERIOR WALL DIASTOLIC: 1.3 CM (ref 0.6–1)
ECHO LV RELATIVE WALL THICKNESS RATIO: 0.5
ECHO RV INTERNAL DIMENSION: 2.9 CM
ECHO RV TAPSE: 1.5 CM (ref 1.7–?)
EKG ATRIAL RATE: 66 BPM
EKG ATRIAL RATE: 72 BPM
EKG ATRIAL RATE: 83 BPM
EKG P AXIS: 29 DEGREES
EKG P AXIS: 34 DEGREES
EKG P AXIS: 70 DEGREES
EKG P-R INTERVAL: 158 MS
EKG P-R INTERVAL: 158 MS
EKG P-R INTERVAL: 166 MS
EKG Q-T INTERVAL: 374 MS
EKG Q-T INTERVAL: 428 MS
EKG Q-T INTERVAL: 442 MS
EKG QRS DURATION: 88 MS
EKG QRS DURATION: 96 MS
EKG QRS DURATION: 98 MS
EKG QTC CALCULATION (BAZETT): 439 MS
EKG QTC CALCULATION (BAZETT): 463 MS
EKG QTC CALCULATION (BAZETT): 468 MS
EKG R AXIS: -24 DEGREES
EKG R AXIS: -48 DEGREES
EKG R AXIS: 51 DEGREES
EKG T AXIS: -3 DEGREES
EKG T AXIS: -37 DEGREES
EKG T AXIS: 16 DEGREES
EKG VENTRICULAR RATE: 66 BPM
EKG VENTRICULAR RATE: 72 BPM
EKG VENTRICULAR RATE: 83 BPM
EOSINOPHIL NFR BLD AUTO: 0.2 %
EOSINOPHILS ABSOLUTE: 0 THOU/MM3 (ref 0–0.4)
ERYTHROCYTE [DISTWIDTH] IN BLOOD BY AUTOMATED COUNT: 16.3 % (ref 11.5–14.5)
FENTANYL: NEGATIVE
FOLATE SERPL-MCNC: 9.9 NG/ML (ref 4.8–24.2)
GFR SERPL CREATININE-BSD FRML MDRD: > 60 ML/MIN/1.73M2
GFR SERPL CREATININE-BSD FRML MDRD: > 60 ML/MIN/1.73M2
GLUCOSE SERPL-MCNC: 95 MG/DL (ref 70–108)
GLUCOSE SERPL-MCNC: 96 MG/DL (ref 70–108)
GLUCOSE UR QL STRIP.AUTO: NEGATIVE MG/DL
HCT VFR BLD AUTO: 39.3 % (ref 42–52)
HGB BLD-MCNC: 12.2 GM/DL (ref 14–18)
HGB UR QL STRIP.AUTO: NEGATIVE
IMM GRANULOCYTES # BLD AUTO: 0.1 THOU/MM3 (ref 0–0.07)
IMM GRANULOCYTES NFR BLD AUTO: 1.2 %
KETONES UR QL STRIP.AUTO: NEGATIVE
LYMPHOCYTES ABSOLUTE: 0.9 THOU/MM3 (ref 1–4.8)
LYMPHOCYTES NFR BLD AUTO: 10.4 %
MCH RBC QN AUTO: 21.8 PG (ref 26–33)
MCHC RBC AUTO-ENTMCNC: 31 GM/DL (ref 32.2–35.5)
MCV RBC AUTO: 70.3 FL (ref 80–94)
MONOCYTES ABSOLUTE: 0.6 THOU/MM3 (ref 0.4–1.3)
MONOCYTES NFR BLD AUTO: 7 %
NEUTROPHILS NFR BLD AUTO: 81 %
NRBC BLD AUTO-RTO: 0 /100 WBC
OPIATES UR QL SCN: NEGATIVE
OSMOLALITY SERPL CALC.SUM OF ELEC: 274.4 MOSMOL/KG (ref 275–300)
OSMOLALITY UR: 803 MOSMOL/KG (ref 250–750)
OXYCODONE: NEGATIVE
PCP UR QL SCN: NEGATIVE
PH UR STRIP.AUTO: 5 [PH] (ref 5–9)
PLATELET # BLD AUTO: 206 THOU/MM3 (ref 130–400)
PMV BLD AUTO: 8.9 FL (ref 9.4–12.4)
POTASSIUM SERPL-SCNC: 3.8 MEQ/L (ref 3.5–5.2)
POTASSIUM SERPL-SCNC: 4.1 MEQ/L (ref 3.5–5.2)
PROT UR STRIP.AUTO-MCNC: NEGATIVE MG/DL
RBC # BLD AUTO: 5.59 MILL/MM3 (ref 4.7–6.1)
SEGMENTED NEUTROPHILS ABSOLUTE COUNT: 6.6 THOU/MM3 (ref 1.8–7.7)
SODIUM SERPL-SCNC: 136 MEQ/L (ref 135–145)
SODIUM SERPL-SCNC: 137 MEQ/L (ref 135–145)
SODIUM UR-SCNC: 53 MEQ/L
SP GR UR REFRACT.AUTO: > 1.03 (ref 1–1.03)
TROPONIN, HIGH SENSITIVITY: 11 NG/L (ref 0–12)
TROPONIN, HIGH SENSITIVITY: 12 NG/L (ref 0–12)
TSH SERPL DL<=0.005 MIU/L-ACNC: 3.16 UIU/ML (ref 0.4–4.2)
UROBILINOGEN, URINE: 1 EU/DL (ref 0–1)
VIT B12 SERPL-MCNC: 567 PG/ML (ref 211–911)
WBC # BLD AUTO: 8.2 THOU/MM3 (ref 4.8–10.8)
WBC #/AREA URNS HPF: NEGATIVE /[HPF]

## 2024-02-09 PROCEDURE — 82746 ASSAY OF FOLIC ACID SERUM: CPT

## 2024-02-09 PROCEDURE — 36415 COLL VENOUS BLD VENIPUNCTURE: CPT

## 2024-02-09 PROCEDURE — 93005 ELECTROCARDIOGRAM TRACING: CPT | Performed by: INTERNAL MEDICINE

## 2024-02-09 PROCEDURE — 97161 PT EVAL LOW COMPLEX 20 MIN: CPT

## 2024-02-09 PROCEDURE — 93010 ELECTROCARDIOGRAM REPORT: CPT | Performed by: INTERNAL MEDICINE

## 2024-02-09 PROCEDURE — 83935 ASSAY OF URINE OSMOLALITY: CPT

## 2024-02-09 PROCEDURE — 93005 ELECTROCARDIOGRAM TRACING: CPT

## 2024-02-09 PROCEDURE — 82607 VITAMIN B-12: CPT

## 2024-02-09 PROCEDURE — 84300 ASSAY OF URINE SODIUM: CPT

## 2024-02-09 PROCEDURE — 6370000000 HC RX 637 (ALT 250 FOR IP): Performed by: INTERNAL MEDICINE

## 2024-02-09 PROCEDURE — 2060000000 HC ICU INTERMEDIATE R&B

## 2024-02-09 PROCEDURE — 81003 URINALYSIS AUTO W/O SCOPE: CPT

## 2024-02-09 PROCEDURE — 6370000000 HC RX 637 (ALT 250 FOR IP): Performed by: PHYSICIAN ASSISTANT

## 2024-02-09 PROCEDURE — 92523 SPEECH SOUND LANG COMPREHEN: CPT

## 2024-02-09 PROCEDURE — 92610 EVALUATE SWALLOWING FUNCTION: CPT

## 2024-02-09 PROCEDURE — 97530 THERAPEUTIC ACTIVITIES: CPT

## 2024-02-09 PROCEDURE — 99223 1ST HOSP IP/OBS HIGH 75: CPT

## 2024-02-09 PROCEDURE — 84484 ASSAY OF TROPONIN QUANT: CPT

## 2024-02-09 PROCEDURE — 85025 COMPLETE CBC W/AUTO DIFF WBC: CPT

## 2024-02-09 PROCEDURE — 99232 SBSQ HOSP IP/OBS MODERATE 35: CPT | Performed by: INTERNAL MEDICINE

## 2024-02-09 PROCEDURE — 2580000003 HC RX 258

## 2024-02-09 PROCEDURE — 99223 1ST HOSP IP/OBS HIGH 75: CPT | Performed by: NUCLEAR MEDICINE

## 2024-02-09 PROCEDURE — 80307 DRUG TEST PRSMV CHEM ANLYZR: CPT

## 2024-02-09 PROCEDURE — 80048 BASIC METABOLIC PNL TOTAL CA: CPT

## 2024-02-09 PROCEDURE — 6370000000 HC RX 637 (ALT 250 FOR IP)

## 2024-02-09 PROCEDURE — 84443 ASSAY THYROID STIM HORMONE: CPT

## 2024-02-09 PROCEDURE — 93307 TTE W/O DOPPLER COMPLETE: CPT

## 2024-02-09 PROCEDURE — 93307 TTE W/O DOPPLER COMPLETE: CPT | Performed by: NUCLEAR MEDICINE

## 2024-02-09 RX ORDER — ATORVASTATIN CALCIUM 80 MG/1
80 TABLET, FILM COATED ORAL DAILY
Status: DISCONTINUED | OUTPATIENT
Start: 2024-02-09 | End: 2024-02-12 | Stop reason: HOSPADM

## 2024-02-09 RX ORDER — POTASSIUM CHLORIDE 7.45 MG/ML
10 INJECTION INTRAVENOUS PRN
Status: DISCONTINUED | OUTPATIENT
Start: 2024-02-09 | End: 2024-02-12 | Stop reason: HOSPADM

## 2024-02-09 RX ORDER — DOXYCYCLINE HYCLATE 100 MG
100 TABLET ORAL 2 TIMES DAILY
Status: DISCONTINUED | OUTPATIENT
Start: 2024-02-09 | End: 2024-02-12 | Stop reason: HOSPADM

## 2024-02-09 RX ORDER — METOPROLOL SUCCINATE 25 MG/1
25 TABLET, EXTENDED RELEASE ORAL DAILY
Status: DISCONTINUED | OUTPATIENT
Start: 2024-02-09 | End: 2024-02-12 | Stop reason: HOSPADM

## 2024-02-09 RX ORDER — POLYETHYLENE GLYCOL 3350 17 G/17G
17 POWDER, FOR SOLUTION ORAL DAILY PRN
Status: DISCONTINUED | OUTPATIENT
Start: 2024-02-09 | End: 2024-02-12 | Stop reason: HOSPADM

## 2024-02-09 RX ORDER — HYDROCODONE BITARTRATE AND ACETAMINOPHEN 5; 325 MG/1; MG/1
1 TABLET ORAL ONCE
Status: COMPLETED | OUTPATIENT
Start: 2024-02-09 | End: 2024-02-09

## 2024-02-09 RX ORDER — ONDANSETRON 4 MG/1
4 TABLET, ORALLY DISINTEGRATING ORAL EVERY 8 HOURS PRN
Status: DISCONTINUED | OUTPATIENT
Start: 2024-02-09 | End: 2024-02-11

## 2024-02-09 RX ORDER — ASPIRIN 81 MG/1
81 TABLET, CHEWABLE ORAL DAILY
Status: DISCONTINUED | OUTPATIENT
Start: 2024-02-09 | End: 2024-02-12 | Stop reason: HOSPADM

## 2024-02-09 RX ORDER — RANOLAZINE 500 MG/1
1000 TABLET, EXTENDED RELEASE ORAL DAILY
Status: DISCONTINUED | OUTPATIENT
Start: 2024-02-09 | End: 2024-02-12 | Stop reason: HOSPADM

## 2024-02-09 RX ORDER — AMLODIPINE BESYLATE 10 MG/1
10 TABLET ORAL DAILY
Status: DISCONTINUED | OUTPATIENT
Start: 2024-02-09 | End: 2024-02-12 | Stop reason: HOSPADM

## 2024-02-09 RX ORDER — CLOPIDOGREL BISULFATE 75 MG/1
75 TABLET ORAL DAILY
Status: DISCONTINUED | OUTPATIENT
Start: 2024-02-09 | End: 2024-02-12 | Stop reason: HOSPADM

## 2024-02-09 RX ORDER — ACETAMINOPHEN 325 MG/1
650 TABLET ORAL EVERY 6 HOURS PRN
Status: DISCONTINUED | OUTPATIENT
Start: 2024-02-09 | End: 2024-02-12 | Stop reason: HOSPADM

## 2024-02-09 RX ORDER — FERROUS SULFATE 325(65) MG
325 TABLET ORAL EVERY OTHER DAY
Status: DISCONTINUED | OUTPATIENT
Start: 2024-02-10 | End: 2024-02-12 | Stop reason: HOSPADM

## 2024-02-09 RX ORDER — MULTIVITAMIN WITH IRON
1 TABLET ORAL DAILY
Status: DISCONTINUED | OUTPATIENT
Start: 2024-02-09 | End: 2024-02-12 | Stop reason: HOSPADM

## 2024-02-09 RX ORDER — ISOSORBIDE MONONITRATE 60 MG/1
60 TABLET, EXTENDED RELEASE ORAL 2 TIMES DAILY
Status: DISCONTINUED | OUTPATIENT
Start: 2024-02-09 | End: 2024-02-12 | Stop reason: HOSPADM

## 2024-02-09 RX ORDER — PANTOPRAZOLE SODIUM 40 MG/1
40 TABLET, DELAYED RELEASE ORAL
Status: DISCONTINUED | OUTPATIENT
Start: 2024-02-09 | End: 2024-02-12 | Stop reason: HOSPADM

## 2024-02-09 RX ORDER — LANOLIN ALCOHOL/MO/W.PET/CERES
1000 CREAM (GRAM) TOPICAL DAILY
Status: DISCONTINUED | OUTPATIENT
Start: 2024-02-09 | End: 2024-02-12 | Stop reason: HOSPADM

## 2024-02-09 RX ORDER — ONDANSETRON 2 MG/ML
4 INJECTION INTRAMUSCULAR; INTRAVENOUS EVERY 6 HOURS PRN
Status: DISCONTINUED | OUTPATIENT
Start: 2024-02-09 | End: 2024-02-11

## 2024-02-09 RX ORDER — HYDROCODONE POLISTIREX AND CHLORPHENIRAMINE POLISTIREX 10; 8 MG/5ML; MG/5ML
5 SUSPENSION, EXTENDED RELEASE ORAL EVERY 12 HOURS PRN
Status: DISCONTINUED | OUTPATIENT
Start: 2024-02-09 | End: 2024-02-12 | Stop reason: HOSPADM

## 2024-02-09 RX ORDER — MAGNESIUM SULFATE IN WATER 40 MG/ML
2000 INJECTION, SOLUTION INTRAVENOUS PRN
Status: DISCONTINUED | OUTPATIENT
Start: 2024-02-09 | End: 2024-02-12 | Stop reason: HOSPADM

## 2024-02-09 RX ORDER — LIDOCAINE 4 G/G
1 PATCH TOPICAL DAILY
Status: DISCONTINUED | OUTPATIENT
Start: 2024-02-09 | End: 2024-02-12 | Stop reason: HOSPADM

## 2024-02-09 RX ORDER — POTASSIUM CHLORIDE 20 MEQ/1
40 TABLET, EXTENDED RELEASE ORAL PRN
Status: DISCONTINUED | OUTPATIENT
Start: 2024-02-09 | End: 2024-02-12 | Stop reason: HOSPADM

## 2024-02-09 RX ORDER — ACETAMINOPHEN 650 MG/1
650 SUPPOSITORY RECTAL EVERY 6 HOURS PRN
Status: DISCONTINUED | OUTPATIENT
Start: 2024-02-09 | End: 2024-02-12 | Stop reason: HOSPADM

## 2024-02-09 RX ORDER — AMIODARONE HYDROCHLORIDE 200 MG/1
200 TABLET ORAL DAILY
Status: DISCONTINUED | OUTPATIENT
Start: 2024-02-09 | End: 2024-02-12 | Stop reason: HOSPADM

## 2024-02-09 RX ORDER — LISINOPRIL 2.5 MG/1
2.5 TABLET ORAL DAILY
Status: DISCONTINUED | OUTPATIENT
Start: 2024-02-09 | End: 2024-02-12 | Stop reason: HOSPADM

## 2024-02-09 RX ADMIN — HYDROCODONE BITARTRATE AND ACETAMINOPHEN 1 TABLET: 5; 325 TABLET ORAL at 04:23

## 2024-02-09 RX ADMIN — SODIUM CHLORIDE: 9 INJECTION, SOLUTION INTRAVENOUS at 14:44

## 2024-02-09 RX ADMIN — APIXABAN 5 MG: 5 TABLET, FILM COATED ORAL at 03:33

## 2024-02-09 RX ADMIN — Medication 1000 MCG: at 08:21

## 2024-02-09 RX ADMIN — APIXABAN 5 MG: 5 TABLET, FILM COATED ORAL at 08:22

## 2024-02-09 RX ADMIN — AMIODARONE HYDROCHLORIDE 200 MG: 200 TABLET ORAL at 08:22

## 2024-02-09 RX ADMIN — PANTOPRAZOLE SODIUM 40 MG: 40 TABLET, DELAYED RELEASE ORAL at 03:33

## 2024-02-09 RX ADMIN — ISOSORBIDE MONONITRATE 60 MG: 60 TABLET, EXTENDED RELEASE ORAL at 11:10

## 2024-02-09 RX ADMIN — Medication 1 TABLET: at 08:23

## 2024-02-09 RX ADMIN — CLOPIDOGREL BISULFATE 75 MG: 75 TABLET ORAL at 08:22

## 2024-02-09 RX ADMIN — ISOSORBIDE MONONITRATE 60 MG: 60 TABLET, EXTENDED RELEASE ORAL at 21:17

## 2024-02-09 RX ADMIN — RANOLAZINE 1000 MG: 500 TABLET, EXTENDED RELEASE ORAL at 08:21

## 2024-02-09 RX ADMIN — DOXYCYCLINE HYCLATE 100 MG: 100 TABLET, COATED ORAL at 11:10

## 2024-02-09 RX ADMIN — APIXABAN 5 MG: 5 TABLET, FILM COATED ORAL at 21:17

## 2024-02-09 RX ADMIN — METOPROLOL SUCCINATE 25 MG: 25 TABLET, EXTENDED RELEASE ORAL at 08:21

## 2024-02-09 RX ADMIN — AMLODIPINE BESYLATE 10 MG: 10 TABLET ORAL at 08:22

## 2024-02-09 RX ADMIN — Medication 5 ML: at 23:16

## 2024-02-09 RX ADMIN — SODIUM CHLORIDE: 9 INJECTION, SOLUTION INTRAVENOUS at 23:29

## 2024-02-09 RX ADMIN — DOXYCYCLINE HYCLATE 100 MG: 100 TABLET, COATED ORAL at 21:18

## 2024-02-09 RX ADMIN — SODIUM CHLORIDE: 9 INJECTION, SOLUTION INTRAVENOUS at 01:51

## 2024-02-09 RX ADMIN — ASPIRIN 81 MG 81 MG: 81 TABLET ORAL at 08:21

## 2024-02-09 RX ADMIN — ATORVASTATIN CALCIUM 80 MG: 80 TABLET, FILM COATED ORAL at 08:21

## 2024-02-09 NOTE — CARE COORDINATION
Case Management Assessment  Initial Evaluation    Date/Time of Evaluation: 2/9/2024 11:13 AM  Assessment Completed by: Gina hCun RN    If patient is discharged prior to next notation, then this note serves as note for discharge by case management.    Patient Name: Chriss Braga                   YOB: 1955  Diagnosis: Syncope and collapse [R55]  Unstable angina (HCC) [I20.0]  Chest pain, unspecified type [R07.9]                   Date / Time: 2/8/2024  8:14 PM  Location: [facility]03/003     Patient Admission Status: Inpatient   Readmission Risk Low 0-14, Mod 15-19), High > 20: Readmission Risk Score: 21.7    Current PCP: No primary care provider on file.  PCP verified by CM? Yes (pt to follow up at [facility])    Chart Reviewed: Yes      History Provided by: Patient  Patient Orientation: Alert and Oriented    Patient Cognition: Alert    Hospitalization in the last 30 days (Readmission):  Yes    If yes, Readmission Assessment in CM Navigator will be completed.    Advance Directives:      Code Status: Full Code   Patient's Primary Decision Maker is: Patient Declined (Legal Next of Kin Remains as Decision Maker)    Primary Decision Maker: katie hopikns - Aunt/Uncle - [phone]    Discharge Planning:    Patient lives with: Alone Type of Home: House  Primary Care Giver: Self  Patient Support Systems include: Family Members   Current Financial resources: Medicare  Current community resources: ECF/Home Care  Current services prior to admission: Home Care (current [facility])            Current DME: Other (Comment) (none)            Type of Home Care services:  Nursing Services    ADLS  Prior functional level: Independent in ADLs/IADLs  Current functional level: Independent in ADLs/IADLs    Family can provide assistance at DC: Yes  Would you like Case Management to discuss the discharge plan with any other family members/significant others, and if so, who? No  Plans to Return to Present

## 2024-02-09 NOTE — CONSULTS
Neurology Consult Note    Date:2/9/2024       Room:[facility]003-A  Patient Name:Chriss Braga     YOB: 1955     Age:68 y.o.    Requesting Physician: Zacarias Mclaughlin MD     Reason for Consult:  Evaluate for syncope, L-weakness and drift, Code stroke      Chief Complaint:   Chief Complaint   Patient presents with    Chest Pain    Loss of Consciousness       Subjective     Chriss Braga is a 68 y.o. male with a history of arthritis, CAD, cardiomyopathy, depression, hypertension, hyperlipidemia, headache, GERD, ICD, polysubstance abuse, previous right MCA infarct, and atrial fibrillation on Eliquis who presented to [facility] ED yesterday 2/8 for the evaluation of a syncopal event with preceding chest pain. Patient states that he had a stabbing left sided chest pain that radiated down his left arm that began approximately two hours prior to arrival to ED. Patient was given 4 nitro and 324mg Aspirin in route to the hospital by EMS. Patient admits to compliance with Eliquis, Aspirin, and Plavix. He states he recently quit smoking, but does admit to cannabis use. Chart review on patient indicates that he has been seen several times with these same symptoms of transient left sided weakness that comes and goes. Patient was recently discharged from [facility] on 2/6/24 for unstable angina and retained mucus plug. On examination patient still present with mild left sided weakness and paresthesias. He states that it is improving. CT head negative for any acute intracranial abnormalities, it does reveal old right MCA infarct. CTA head and neck negative for hemodynamically significant stenosis. Patient unable to obtain MRI brain secondary to ICD. Low suspicion for acute ischemic event, suspect symptoms secondary to old right MCA infarct.     Review of Systems   Review of Systems   Constitutional:  Negative for chills and fever.   HENT:  Negative for rhinorrhea and sore throat.    Eyes:  Positive for visual disturbance 
LEUKOCYTESUR NEGATIVE 02/09/2024 03:37 AM    UROBILINOGEN 1.0 02/09/2024 03:37 AM    BILIRUBINUR NEGATIVE 02/09/2024 03:37 AM    BILIRUBINUR NEGATIVE 01/07/2012 05:00 PM    BLOODU NEGATIVE 02/09/2024 03:37 AM    GLUCOSEU NEGATIVE 02/09/2024 03:37 AM    AMORPHOUS NONE SEEN 01/07/2012 05:00 PM         Physical Exam:  Vitals:    02/09/24 0815   BP: (!) 156/93   Pulse: 76   Resp: 18   Temp: 98.5 °F (36.9 °C)   SpO2: 99%      Intake/Output Summary (Last 24 hours) at 2/9/2024 0822  Last data filed at 2/9/2024 0339  Gross per 24 hour   Intake 512.15 ml   Output 100 ml   Net 412.15 ml      General:  No acute distress  Neck: Supple, no JVD  Heart: RRR S1 and S2, no murmurs.   Lungs: CTAB  Abdomen: positive bowel sounds, soft, non-tender, non-distended, no bruits, no masses  Extremities:no edema.   Neurologic: alert and oriented x 3, cranial nerves 2-12 grossly intact, motor and sensory intact, moving all extremities  Skin: No rashes  Psych: AO x3.     Assessment:  Recurrent Angina: HEART Score: 5.   Pt having left-sided CP, radiating to left arm.  Soon afterwards had syncopal episode.  Complained of dizziness prior to syncopal episode.  Was given SL nitro with significant relief.   Recent [facility] 01/2024.   Given SL nitro + 324 ASA.   EKG is NSR, nonspecific T wave abnormality in inferior leads.   Troponin x3: 12 + 12 +12.   Recent hospital admission 02/3/24 for CP.   - Continue medical treatment.   - Repeat echo.   - On nitro gtt, discontinue.   - Will medically manage.     CAD s/p CABG 2011 + PCI OM1:   [facility] 01/17/2024: PCI of OM1. Severe, residual RCA stenosis. Patent LIMA to LAD.   - On ASA + Plavix + Eliquis + Ranexa 1g BID, Toprol XL 25 mg.     Chronic systolic HFrEF 30%: s/p AICD. ICM   TTE 02/04/2024: LV systolic function moderately reduced, EF 30-35%. Moderate global hypokinesis present. Abnormal diastolic function.   GDMT: Lisinopril 2,5 mg, Toprol XL 25 mg.   Diuretic: N/A  CTA chest: Cardiomegaly  - Strict I/O, daily

## 2024-02-09 NOTE — ED TRIAGE NOTES
Pt presents to the ED via EMS for evaluation of chest pain and LOC. Pt reports having chest pain and SOB approx 1 hour PTA. Pt had witnessed LOC with fall from chair to floor. When EMS arrives pt is alert and oriented. Pt received  .4 nitro and 324 of aspirin PTA. Pt now complaining of chest pain rated 7/10 and left sided paresthesias.

## 2024-02-09 NOTE — ED PROVIDER NOTES
with which the patient agrees.      FINAL DIAGNOSES:  Final diagnoses:   Syncope and collapse   Chest pain, unspecified type       Condition: condition: stable  Dispo: Admit to med/surg floor  DISPOSITION Decision To Admit 02/08/2024 10:45:58 PM      This transcription was electronically signed. It was dictated by use of voice recognition software and electronically transcribed. The transcription may contain errors not detected in proofreading.

## 2024-02-09 NOTE — CARE COORDINATION
2/9/24, 3:00 PM EST    DISCHARGE PLANNING EVALUATION    Met with patient.  He plans to return home alone.  Asked if he has a wife because there is one on the face sheet.  He said he is  and she lives in [place].  He told SW that she is supportive.  He would like [facility] at discharge.  Called Deepthi at [facility] and he needs to be set up with a PCP and be seen before they will take him.
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
## 2024-02-09 NOTE — H&P
Hospitalist History & Physical    Patient:  Chriss Braga    Unit/Bed:06/006A  YOB: 1955  MRN: [id number]   Acct: [id number]   PCP: No primary care provider on file.  Code Status: Prior    Date of Service: Pt seen/examined on 02/08/24 and admitted to Inpatient with expected LOS greater than two midnights due to medical therapy.     Chief Complaint: chest pain and LOC    Assessment/Plan:    Syncope and collapse secondary to concern for CVA/TIA vs substance abuse: Patient sustained a witnessed fall from chair to floor at home. Complains of left sided weakness with paraesthesias and blurry vision. CT Head without evidence of acute infarct. CTA H&N unremarkable, without significant stenosis, occlusion, aneurysm, or dissection. NIH 7. 500mL fluid bolus given in ED.   Risk Factor modification: Continue ASA, Plavix, statin.   Lipid panel in AM, LDL goal 45-70.   Check A1c, goal <7.   Allow permissive HTN with SBP up to 220/120 for 24 hrs.   After 24hrs, BP goal <130/80.   Neuro checks q4h.   Echo with bubble study.   Tele to monitor for AF. Event monitor at dc.   PT/OT/SLP  Neurology consulted.   Fall precautions  Orthostatic BP QS   UDS pending.     Unstable angina: Patient reports chest pain and SOB prior to arrival. Heart score 5. Given 4 SL Nitro and 324mg ASA by EMS. Initial HS troponin 12. EKG without acute ischemic changes. Patient recently admitted 2/3-2/6/2024 for NSTEMI. CTA chest negative for PE, no acute infiltrate or pleural effusion, unchanged pulmonary nodules, cardiomegaly noted. CXR negative for acute disease, cardiomegaly noted. Nitro gtt initiated in ED.   Continue Nitro gtt.   Trend troponin  Repeat EKG  Continue ranolazine  Telemetry monitoring  Cardiology consult in AM    Obstructive CAD s/p LESTER on 1/17/2024 and CABG x3 in 2011: Last Cardiac Stress test on 5/20/23: negative for ischemia. Last LHC on 1/17/24: successful PCI of OM1 STEMI via SVG x1 LESTER.  Continue ASA,

## 2024-02-09 NOTE — CARE COORDINATION
02/09/24 1117   Readmission Assessment   Number of Days since last admission? 1-7 days   Previous Disposition Home with Home Health   Who is being Interviewed Patient   What was the patient's/caregiver's perception as to why they think they needed to return back to the hospital? Other (Comment)  (\"I think I passed out\")   Did you visit your Primary Care Physician after you left the hospital, before you returned this time? No  (just discharged)   Why weren't you able to visit your PCP? Other (Comment)  (just discharged)   Did you see a specialist, such as Cardiac, Pulmonary, Orthopedic Physician, etc. after you left the hospital? No   Who advised the patient to return to the hospital? Self-referral   Does the patient report anything that got in the way of taking their medications? No   In our efforts to provide the best possible care to you and others like you, can you think of anything that we could have done to help you after you left the hospital the first time, so that you might not have needed to return so soon? Other (Comment)  (\"I was ready to go \")

## 2024-02-10 LAB
CHOLEST SERPL-MCNC: 91 MG/DL (ref 100–199)
DEPRECATED MEAN GLUCOSE BLD GHB EST-ACNC: 108 MG/DL (ref 70–126)
HBA1C MFR BLD HPLC: 5.6 % (ref 4.4–6.4)
HDLC SERPL-MCNC: 31 MG/DL
LDLC SERPL CALC-MCNC: 44 MG/DL
TRIGL SERPL-MCNC: 81 MG/DL (ref 0–199)

## 2024-02-10 PROCEDURE — 99232 SBSQ HOSP IP/OBS MODERATE 35: CPT | Performed by: NURSE PRACTITIONER

## 2024-02-10 PROCEDURE — 99232 SBSQ HOSP IP/OBS MODERATE 35: CPT | Performed by: INTERNAL MEDICINE

## 2024-02-10 PROCEDURE — 36415 COLL VENOUS BLD VENIPUNCTURE: CPT

## 2024-02-10 PROCEDURE — 80061 LIPID PANEL: CPT

## 2024-02-10 PROCEDURE — 6370000000 HC RX 637 (ALT 250 FOR IP): Performed by: PHYSICIAN ASSISTANT

## 2024-02-10 PROCEDURE — 83036 HEMOGLOBIN GLYCOSYLATED A1C: CPT

## 2024-02-10 PROCEDURE — 2060000000 HC ICU INTERMEDIATE R&B

## 2024-02-10 PROCEDURE — 6370000000 HC RX 637 (ALT 250 FOR IP): Performed by: INTERNAL MEDICINE

## 2024-02-10 PROCEDURE — 6370000000 HC RX 637 (ALT 250 FOR IP)

## 2024-02-10 RX ADMIN — FERROUS SULFATE TAB 325 MG (65 MG ELEMENTAL FE) 325 MG: 325 (65 FE) TAB at 08:27

## 2024-02-10 RX ADMIN — RANOLAZINE 1000 MG: 500 TABLET, EXTENDED RELEASE ORAL at 08:27

## 2024-02-10 RX ADMIN — ISOSORBIDE MONONITRATE 60 MG: 60 TABLET, EXTENDED RELEASE ORAL at 19:19

## 2024-02-10 RX ADMIN — Medication 1000 MCG: at 08:27

## 2024-02-10 RX ADMIN — Medication 5 ML: at 08:28

## 2024-02-10 RX ADMIN — AMLODIPINE BESYLATE 10 MG: 10 TABLET ORAL at 08:27

## 2024-02-10 RX ADMIN — APIXABAN 5 MG: 5 TABLET, FILM COATED ORAL at 08:28

## 2024-02-10 RX ADMIN — ISOSORBIDE MONONITRATE 60 MG: 60 TABLET, EXTENDED RELEASE ORAL at 08:27

## 2024-02-10 RX ADMIN — Medication 1 TABLET: at 08:27

## 2024-02-10 RX ADMIN — APIXABAN 5 MG: 5 TABLET, FILM COATED ORAL at 19:20

## 2024-02-10 RX ADMIN — DOXYCYCLINE HYCLATE 100 MG: 100 TABLET, COATED ORAL at 19:19

## 2024-02-10 RX ADMIN — ASPIRIN 81 MG 81 MG: 81 TABLET ORAL at 08:28

## 2024-02-10 RX ADMIN — METOPROLOL SUCCINATE 25 MG: 25 TABLET, EXTENDED RELEASE ORAL at 08:27

## 2024-02-10 RX ADMIN — ATORVASTATIN CALCIUM 80 MG: 80 TABLET, FILM COATED ORAL at 08:27

## 2024-02-10 RX ADMIN — AMIODARONE HYDROCHLORIDE 200 MG: 200 TABLET ORAL at 08:28

## 2024-02-10 RX ADMIN — DOXYCYCLINE HYCLATE 100 MG: 100 TABLET, COATED ORAL at 08:28

## 2024-02-10 RX ADMIN — CLOPIDOGREL BISULFATE 75 MG: 75 TABLET ORAL at 08:28

## 2024-02-10 NOTE — DISCHARGE INSTRUCTIONS
Event monitor at discharge  No driving, climbing ladders or swimming for 6 months.  Follow-up with your primary care provider (PCP) in 1 to 2 weeks. ( GO TO THE [facility]) - SCHEDULED FOR 2/21/24 at 130p  Follow-up with Dr. Jaime in office in 2 weeks  Follow-up with CHF clinic in 2 - 3 week  [facility]  [address] [place] [address]  [address]  [place] [address]  Get DirectionsTel: [phone]  Fax: [phone]  Hours:  Monday - Thursday  8:00 AM - 4:30 PM  Friday  8:00 AM - 2:00 PM  Saturday - Sunday  Closed  Follow-up with outpatient neurology Dr. Aguilar in 2-4 weeks.  Repeat CT chest in 3 months to follow-up on nodules and full PFT prior to follow up in Pulmonology clinic.    Cardiology office is located in [facility] - [address].  [address] at [facility] - enter front entrance of hospital and go to left at . Take K elevators to 2nd floor. Cardiology check-in desk will be to your right as you exit the elevator.

## 2024-02-10 NOTE — FLOWSHEET NOTE
Orthos done at 1325. Pt went walking in hallway see chart.    02/10/24 1325   Vital Signs   Orthostatic B/P and Pulse? Yes   Blood Pressure Lying 113/83   Pulse Lying 73 PER MINUTE   Blood Pressure Sitting 118/81   Pulse Sitting 80 PER MINUTE   Blood Pressure Standing 101/70   Pulse Standing 85 PER MINUTE

## 2024-02-11 LAB
ANION GAP SERPL CALC-SCNC: 11 MEQ/L (ref 8–16)
BUN SERPL-MCNC: 10 MG/DL (ref 7–22)
CALCIUM SERPL-MCNC: 8.3 MG/DL (ref 8.5–10.5)
CHLORIDE SERPL-SCNC: 106 MEQ/L (ref 98–111)
CO2 SERPL-SCNC: 20 MEQ/L (ref 23–33)
CREAT SERPL-MCNC: 0.9 MG/DL (ref 0.4–1.2)
EKG ATRIAL RATE: 67 BPM
EKG P AXIS: 60 DEGREES
EKG P-R INTERVAL: 172 MS
EKG Q-T INTERVAL: 442 MS
EKG QRS DURATION: 92 MS
EKG QTC CALCULATION (BAZETT): 467 MS
EKG R AXIS: -13 DEGREES
EKG T AXIS: 31 DEGREES
EKG VENTRICULAR RATE: 67 BPM
GFR SERPL CREATININE-BSD FRML MDRD: > 60 ML/MIN/1.73M2
GLUCOSE SERPL-MCNC: 106 MG/DL (ref 70–108)
POTASSIUM SERPL-SCNC: 3.9 MEQ/L (ref 3.5–5.2)
SODIUM SERPL-SCNC: 137 MEQ/L (ref 135–145)
TROPONIN, HIGH SENSITIVITY: 10 NG/L (ref 0–12)

## 2024-02-11 PROCEDURE — 6370000000 HC RX 637 (ALT 250 FOR IP): Performed by: PHYSICIAN ASSISTANT

## 2024-02-11 PROCEDURE — 99232 SBSQ HOSP IP/OBS MODERATE 35: CPT | Performed by: NURSE PRACTITIONER

## 2024-02-11 PROCEDURE — 99232 SBSQ HOSP IP/OBS MODERATE 35: CPT | Performed by: INTERNAL MEDICINE

## 2024-02-11 PROCEDURE — 36415 COLL VENOUS BLD VENIPUNCTURE: CPT

## 2024-02-11 PROCEDURE — 6370000000 HC RX 637 (ALT 250 FOR IP): Performed by: NURSE PRACTITIONER

## 2024-02-11 PROCEDURE — 80048 BASIC METABOLIC PNL TOTAL CA: CPT

## 2024-02-11 PROCEDURE — 93010 ELECTROCARDIOGRAM REPORT: CPT | Performed by: INTERNAL MEDICINE

## 2024-02-11 PROCEDURE — 84484 ASSAY OF TROPONIN QUANT: CPT

## 2024-02-11 PROCEDURE — 6370000000 HC RX 637 (ALT 250 FOR IP)

## 2024-02-11 PROCEDURE — 93005 ELECTROCARDIOGRAM TRACING: CPT | Performed by: INTERNAL MEDICINE

## 2024-02-11 PROCEDURE — 6370000000 HC RX 637 (ALT 250 FOR IP): Performed by: INTERNAL MEDICINE

## 2024-02-11 PROCEDURE — 2060000000 HC ICU INTERMEDIATE R&B

## 2024-02-11 RX ORDER — POTASSIUM CHLORIDE 20 MEQ/1
20 TABLET, EXTENDED RELEASE ORAL ONCE
Status: COMPLETED | OUTPATIENT
Start: 2024-02-11 | End: 2024-02-11

## 2024-02-11 RX ORDER — RANOLAZINE 1000 MG/1
1000 TABLET, EXTENDED RELEASE ORAL DAILY
Qty: 60 TABLET | Refills: 3 | Status: SHIPPED | OUTPATIENT
Start: 2024-02-12

## 2024-02-11 RX ADMIN — DOXYCYCLINE HYCLATE 100 MG: 100 TABLET, COATED ORAL at 22:10

## 2024-02-11 RX ADMIN — PANTOPRAZOLE SODIUM 40 MG: 40 TABLET, DELAYED RELEASE ORAL at 03:22

## 2024-02-11 RX ADMIN — ATORVASTATIN CALCIUM 80 MG: 80 TABLET, FILM COATED ORAL at 20:08

## 2024-02-11 RX ADMIN — ACETAMINOPHEN 650 MG: 325 TABLET ORAL at 12:00

## 2024-02-11 RX ADMIN — Medication 5 ML: at 08:41

## 2024-02-11 RX ADMIN — CLOPIDOGREL BISULFATE 75 MG: 75 TABLET ORAL at 08:40

## 2024-02-11 RX ADMIN — ASPIRIN 81 MG 81 MG: 81 TABLET ORAL at 08:40

## 2024-02-11 RX ADMIN — Medication 1000 MCG: at 08:40

## 2024-02-11 RX ADMIN — ISOSORBIDE MONONITRATE 60 MG: 60 TABLET, EXTENDED RELEASE ORAL at 20:08

## 2024-02-11 RX ADMIN — APIXABAN 5 MG: 5 TABLET, FILM COATED ORAL at 22:10

## 2024-02-11 RX ADMIN — RANOLAZINE 1000 MG: 500 TABLET, EXTENDED RELEASE ORAL at 08:40

## 2024-02-11 RX ADMIN — POTASSIUM CHLORIDE 20 MEQ: 1500 TABLET, EXTENDED RELEASE ORAL at 11:43

## 2024-02-11 RX ADMIN — Medication 1 TABLET: at 08:41

## 2024-02-11 RX ADMIN — AMLODIPINE BESYLATE 10 MG: 10 TABLET ORAL at 08:41

## 2024-02-11 RX ADMIN — APIXABAN 5 MG: 5 TABLET, FILM COATED ORAL at 08:40

## 2024-02-11 RX ADMIN — METOPROLOL SUCCINATE 25 MG: 25 TABLET, EXTENDED RELEASE ORAL at 08:41

## 2024-02-11 RX ADMIN — ONDANSETRON 4 MG: 4 TABLET, ORALLY DISINTEGRATING ORAL at 08:54

## 2024-02-11 RX ADMIN — ISOSORBIDE MONONITRATE 60 MG: 60 TABLET, EXTENDED RELEASE ORAL at 08:41

## 2024-02-11 RX ADMIN — DOXYCYCLINE HYCLATE 100 MG: 100 TABLET, COATED ORAL at 08:41

## 2024-02-11 RX ADMIN — AMIODARONE HYDROCHLORIDE 200 MG: 200 TABLET ORAL at 08:41

## 2024-02-12 ENCOUNTER — APPOINTMENT (OUTPATIENT)
Age: 69
DRG: 303 | End: 2024-02-12
Payer: MEDICARE

## 2024-02-12 VITALS
RESPIRATION RATE: 16 BRPM | HEIGHT: 69 IN | DIASTOLIC BLOOD PRESSURE: 69 MMHG | TEMPERATURE: 98.4 F | HEART RATE: 61 BPM | OXYGEN SATURATION: 97 % | SYSTOLIC BLOOD PRESSURE: 116 MMHG | WEIGHT: 168.65 LBS | BODY MASS INDEX: 24.98 KG/M2

## 2024-02-12 LAB — ECHO BSA: 1.9 M2

## 2024-02-12 PROCEDURE — 97130 THER IVNTJ EA ADDL 15 MIN: CPT

## 2024-02-12 PROCEDURE — 95819 EEG AWAKE AND ASLEEP: CPT | Performed by: PSYCHIATRY & NEUROLOGY

## 2024-02-12 PROCEDURE — 97166 OT EVAL MOD COMPLEX 45 MIN: CPT

## 2024-02-12 PROCEDURE — 6370000000 HC RX 637 (ALT 250 FOR IP)

## 2024-02-12 PROCEDURE — 93270 REMOTE 30 DAY ECG REV/REPORT: CPT

## 2024-02-12 PROCEDURE — 6370000000 HC RX 637 (ALT 250 FOR IP): Performed by: INTERNAL MEDICINE

## 2024-02-12 PROCEDURE — 95819 EEG AWAKE AND ASLEEP: CPT

## 2024-02-12 PROCEDURE — 97129 THER IVNTJ 1ST 15 MIN: CPT

## 2024-02-12 PROCEDURE — 99239 HOSP IP/OBS DSCHRG MGMT >30: CPT | Performed by: INTERNAL MEDICINE

## 2024-02-12 RX ORDER — DOXYCYCLINE HYCLATE 100 MG
100 TABLET ORAL 2 TIMES DAILY
Qty: 12 TABLET | Refills: 0 | Status: SHIPPED | OUTPATIENT
Start: 2024-02-12 | End: 2024-02-18

## 2024-02-12 RX ADMIN — CLOPIDOGREL BISULFATE 75 MG: 75 TABLET ORAL at 10:01

## 2024-02-12 RX ADMIN — ASPIRIN 81 MG 81 MG: 81 TABLET ORAL at 10:01

## 2024-02-12 RX ADMIN — DOXYCYCLINE HYCLATE 100 MG: 100 TABLET, COATED ORAL at 10:01

## 2024-02-12 RX ADMIN — ISOSORBIDE MONONITRATE 60 MG: 60 TABLET, EXTENDED RELEASE ORAL at 10:01

## 2024-02-12 RX ADMIN — FERROUS SULFATE TAB 325 MG (65 MG ELEMENTAL FE) 325 MG: 325 (65 FE) TAB at 10:01

## 2024-02-12 RX ADMIN — PANTOPRAZOLE SODIUM 40 MG: 40 TABLET, DELAYED RELEASE ORAL at 06:51

## 2024-02-12 RX ADMIN — Medication 1 TABLET: at 10:01

## 2024-02-12 RX ADMIN — RANOLAZINE 1000 MG: 500 TABLET, EXTENDED RELEASE ORAL at 10:01

## 2024-02-12 RX ADMIN — Medication 1000 MCG: at 10:01

## 2024-02-12 RX ADMIN — APIXABAN 5 MG: 5 TABLET, FILM COATED ORAL at 10:01

## 2024-02-12 RX ADMIN — AMLODIPINE BESYLATE 10 MG: 10 TABLET ORAL at 10:01

## 2024-02-12 RX ADMIN — AMIODARONE HYDROCHLORIDE 200 MG: 200 TABLET ORAL at 10:01

## 2024-02-12 RX ADMIN — METOPROLOL SUCCINATE 25 MG: 25 TABLET, EXTENDED RELEASE ORAL at 10:01

## 2024-02-12 NOTE — PROGRESS NOTES
[facility]     Neurodiagnostic Laboratory Technician Worksheet      EEG Date: 2024    Name: Chriss Braga  : 1955  Age: 68 y.o.  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 Pt here for a fall with chest pain and tingling in upper and lower extremities on the left side.  CT of the head 2024     IMPRESSION:  1. No evidence of an acute infarct or intraparenchymal hemorrhage.  2. Enlarged pituitary gland is again noted, estimated to measure 1.4 cm in   craniocaudal dimension. This does not appear to be significantly changed.   An underlying pituitary mass lesion is suspected. Follow-up outpatient   pituitary MRI is advised.     This document has been electronically signed by: Willie Chowdhury M.D. on   2024 09:11 PM    CTA of the head 2024  IMPRESSION:  1. Unremarkable CTA head.     This document has been electronically signed by: Willie Chowdhury M.D. on   2024 09:55 PM       Hand Dominance: Right and left   Sedation: No   H.V. Completed: No, age protocol   Photic: Yes   Sleep: Yes   Drowsy: Yes   Sleep Deprived: No   Seizures Observed: No   Mentality: alert     Technician: Briana Marti 2024             
6 [facility]--HOSPITALIST GROUP    Hospitalist PROGRESS NOTE dictated by Zacarias Mclaughlin MD on 2024    Patient ID: Chriss Braga is 68 y.o. and presently in room [facility]  : 1955 MRN: [id number]    Admit date: 2024  Primary Care Physician: No primary care provider on file.   No care team member to display  Tel: None  IP CONSULT TO STROKE TEAM  IP CONSULT TO NEUROLOGY  IP CONSULT TO CARDIOLOGY  IP CONSULT TO SOCIAL WORK  IP CONSULT TO SOCIAL WORK  Admitting Physician: No admitting provider for patient encounter.   Code Status:  Full Code    Chriss Braga is a 68 y.o.  male who presented with Chest Pain and Loss of Consciousness    Admit date: 2024  Room: [facility]    History of Present Illness:  Chriss Braga is a 68 y.o. male with PMHx of NSTEMI, CHF, CAD, HTN, PAF who presented to [facility] with chief complaint of chest pain and LOC. Patient states that he developed some left sided chest pain that radiated down his left arm described as pressure and stabbing rated 8/10 with SOB this afternoon and then passed out while sitting in a chair. States he was at a neighbors house and they witnessed the fall. Patient lost consciousness but did not hit his head. Reports that he has had a productive cough since quitting smoking approximately 1 month ago. Complains of numbness and tingling to his left upper and lower extremities. Reports a poor oral intake. Also states that he has had diarrhea the last couple days. Reports some urinary retention. Admits to smoking marijuana frequently. Complains of a headache currently and states chest pain remains a 7/10. Denies dizziness, or lightheadedness. Denies fever or chills. Denies recent illness. Denies abdominal pain or nausea.    -----    Patient sustained a witnessed fall from chair to floor at home. Complains of left sided weakness with paraesthesias and blurry vision. CT Head without evidence of acute infarct. CTA H&N 
6 [facility]--HOSPITALIST GROUP    Hospitalist PROGRESS NOTE dictated by Zacarias Mclaughlin MD on 2/10/2024    Patient ID: Chriss Braga is 68 y.o. and presently in room --A  : 1955 MRN: [id number]    Admit date: 2024  Primary Care Physician: No primary care provider on file.   No care team member to display  Tel: None  IP CONSULT TO STROKE TEAM  IP CONSULT TO NEUROLOGY  IP CONSULT TO CARDIOLOGY  IP CONSULT TO SOCIAL WORK  IP CONSULT TO SOCIAL WORK  Admitting Physician: No admitting provider for patient encounter.   Code Status:  Full Code    Chriss Braga is a 68 y.o.  male who presented with Chest Pain and Loss of Consciousness    Admit date: 2024  Room: [facility]    Patient sustained a witnessed fall from chair to floor at home. Complains of left sided weakness with paraesthesias and blurry vision. CT Head without evidence of acute infarct. CTA H&N unremarkable, without significant stenosis, occlusion, aneurysm, or dissection. NIH 7. 500mL fluid bolus given in ED. Allow permissive HTN with SBP up to 220/120 for 24 hrs.    Patient reports chest pain and SOB prior to arrival. Heart score 5. Given 4 SL Nitro and 324mg ASA by EMS. Initial HS troponin 12. EKG without acute ischemic changes    --------    2024: Patient states he has left-sided pleuritic type chest pain with inspiration or cough for the past 2 days.  Cough productive of yellow sputum for 2 days.  No shortness of breath.    Also has had watery diarrhea having 3 times yesterday and once today.  No hematochezia or melena.  Vomited once yesterday but no coffee-ground or hematemesis.  Denies abdominal pain.  No fevers or chills.    Patient did have a syncopal episode lasting 2 to 3 minutes yesterday that was witnessed by his cousin.  Apparently no shaking motions.  No fecal or urinary incontinence.  No tongue biting.  No confusion when came around his knee where he was.  Patient was sitting when 
[facility]  PHYSICAL THERAPY MISSED TREATMENT NOTE  [facility] NEUROSCIENCES 4A    Date: 2024  Patient Name: Chriss Braga        MRN: [id number]   : 1955  (68 y.o.)  Gender: male   Referring Practitioner: Katina Calixto APRN - CNP  Diagnosis: syncope and collapse         REASON FOR MISSED TREATMENT:  Patient getting EEG at time of attempt. Will check back as time permits.    GHULAM GarnerT    
[facility]  SPEECH THERAPY  [facility] NEUROSCIENCES 4A  Speech - Language - Cognitive Evaluation + Clinical Swallow Evaluation    SLP Individual Minutes  Time In: 1044  Time Out: 1102  Minutes: 18  Timed Code Treatment Minutes: 0 Minutes     Speech, Language, Cognitive Evaluation: 10 minutes  Clinical Swallow Evaluation: 8 minutes    Date: 2024  Patient Name: Chriss Braga      CSN: [id number]   : 1955  (68 y.o.)  Gender: male   Referring Physician:  Katina Calixto APRN - CNP   Diagnosis: Syncope and collapse  Precautions: fall risk  History of Present Illness/Injury: Patient admitted to [facility] with above diagnosis: see physician H&P for further information. Per chart review, \"Chriss Braga is a 68 y.o. male with PMHx of NSTEMI, CHF, CAD, HTN, PAF who presented to [facility] with chief complaint of chest pain and LOC. Patient states that he developed some left sided chest pain that radiated down his left arm described as pressure and stabbing rated 8/10 with SOB this afternoon and then passed out while sitting in a chair. States he was at a neighbors house and they witnessed the fall. Patient lost consciousness but did not hit his head. Reports that he has had a productive cough since quitting smoking approximately 1 month ago. Complains of numbness and tingling to his left upper and lower extremities. Reports a poor oral intake. Also states that he has had diarrhea the last couple days. Reports some urinary retention. Admits to smoking marijuana frequently. Complains of a headache currently and states chest pain remains a 7/10. Denies dizziness, or lightheadedness. Denies fever or chills. Denies recent illness. Denies abdominal pain or nausea.\"    Past Medical History:   Diagnosis Date    Anemia     Microcytic anemia.     Angina     Arthritis     Atypical chest pain     Question if this is secondary to his uncontrolled hypertension or if this is secondary to exacerbation of COPD or secondary to his 
Cardiology Progress Note      Patient:  Chriss Braga  YOB: 1955  MRN: [id number]   Acct: [id number]  Admit Date:  2/8/2024  Primary Cardiologist: Anand Lisa MD    Rationale for Cardiology consult: chest pain    HPI/PMH: per Dr. Jaime consult note of 2/9/2024;  This is a pleasant 68 y.o. male with PMHx of CAD, CHF, HTN, PAF, substance abuse, who presents with CP. Pt states he developed lest sided CP that radiating to left arm. He was sitting in chair, when he passed out from pain. Unsure of LOC.  Patient also complained of dizziness.  EMS was called, patient was given 4 SL nitro and significant relief.  Patient had CT head done which showed no acute infarct or parenchymal hemorrhage, CTA head and neck had no significant LVO unremarkable CTA head.  CTA chest showed no evidence of PE.  Cardiomegaly.  No evidence of TAA.  No acute infiltrate or pleural effusion noted.  Troponin X3 12 +12 +11.  Positive cannabinoids. EKG is NSR, nonspecific T wave abnormality in inferior leads.      Chief Complaint: \"Feeling good today\"    Nursing reports: received 2/10;  Pt neighbor called pt. Stating that he had what looked like a seizure prior to coming in here. Pt \neighbor, Paige, stated to pt that he had fallen on the floor and eyes rolled to back of head and was staring off into space.   Primary service notified by nursing; Neurology consulted.       Subjective (Events in last 24 hours):   No chest discomfort or dyspnea  No lightheadedness or dizziness; no syncope or near syncope  BP stable - lisinopril remains on hold  Tele without evidence of arrhythmia  No shocks from AICD  Tolerated ambulation yesterday well  K 3.39; Potassium 20 meq x 1  No noted seizure activity    2/10/24  Denies chest discomfort or dyspnea  States only felt dizzy prior to event - no chest pain that he recalls  Had been at a gathering - had just sat down to eat - thought maybe got too warm; no heart racing; no shock from his ICD  No 
Cardiology Progress Note      Patient:  Chriss Braga  YOB: 1955  MRN: [id number]   Acct: [id number]  Admit Date:  2/8/2024  Primary Cardiologist: Anand Lisa MD    Rationale for Cardiology consult: chest pain    HPI/PMH: per Dr. Jaime consult note of 2/9/2024;  This is a pleasant 68 y.o. male with PMHx of CAD, CHF, HTN, PAF, substance abuse, who presents with CP. Pt states he developed lest sided CP that radiating to left arm. He was sitting in chair, when he passed out from pain. Unsure of LOC.  Patient also complained of dizziness.  EMS was called, patient was given 4 SL nitro and significant relief.  Patient had CT head done which showed no acute infarct or parenchymal hemorrhage, CTA head and neck had no significant LVO unremarkable CTA head.  CTA chest showed no evidence of PE.  Cardiomegaly.  No evidence of TAA.  No acute infiltrate or pleural effusion noted.  Troponin X3 12 +12 +11.  Positive cannabinoids. EKG is NSR, nonspecific T wave abnormality in inferior leads.      Chief Complaint: \"Feeling fine - just laying here - have had no problems\"    Subjective (Events in last 24 hours):   Denies chest discomfort or dyspnea  States only felt dizzy prior to event - no chest pain that he recalls  Had been at a gathering - had just sat down to eat - thought maybe got too warm; no heart racing; no shock from his ICD  No lightheadedness or dizziness; no syncope or near syncope here thus far - has not been getting up  No swelling or water retention  Just moved to area from [facility] - yet to establish with cardiology - discussed  - will follow in our office and in CHF clinic at discharge      Objective:   /80   Pulse 74   Temp 98.5 °F (36.9 °C) (Oral)   Resp 16   Ht 1.753 m (5' 9\")   Wt 74.1 kg (163 lb 6.4 oz)   SpO2 95%   BMI 24.13 kg/m²        TELEMETRY: SR 70's without ectopy    Physical Exam:  General Appearance: alert and oriented to person, place and time, in no acute distress, 
[facility]  INPATIENT OCCUPATIONAL THERAPY  [facility] NEUROSCIENCES 4A  EVALUATION    Time:   Time In: 908  Time Out: 920  Timed Code Treatment Minutes: 0 Minutes  Minutes: 12          Date: 2024  Patient Name: Chriss Braga,   Gender: male      MRN: [id number]  : 1955  (68 y.o.)  Referring Practitioner: Katina Calixto APRN - CNP  Diagnosis: syncope and collapse  Additional Pertinent Hx: Chriss Braga is a 68 y.o. male with PMHx of NSTEMI, CHF, CAD, HTN, PAF who presented to [facility] with chief complaint of chest pain and LOC. Patient states that he developed some left sided chest pain that radiated down his left arm described as pressure and stabbing rated 8/10 with SOB this afternoon and then passed out while sitting in a chair. States he was at a neighbors house and they witnessed the fall. Patient lost consciousness but did not hit his head. Reports that he has had a productive cough since quitting smoking approximately 1 month ago. Complains of numbness and tingling to his left upper and lower extremities. Reports a poor oral intake. Also states that he has had diarrhea the last couple days. Reports some urinary retention. Admits to smoking marijuana frequently. Complains of a headache currently and states chest pain remains a 7/10. Denies dizziness, or lightheadedness. Denies fever or chills. Denies recent illness. Denies abdominal pain or nausea.    Restrictions/Precautions:  Restrictions/Precautions: General Precautions, Fall Risk  Position Activity Restriction  Other position/activity restrictions: defibrillator    Subjective  Chart Reviewed: Yes, History and Physical, Orders, Progress Notes  Patient assessed for rehabilitation services?: Yes    Subjective: RN approved OT visit, patient agreeable to OT session.    Pain: denied/10:     Vitals: Vitals not assessed per clinical judgement, see nursing flowsheet    Social/Functional History:  Lives With: Alone  Type of Home: House  Home 
[facility]  INPATIENT PHYSICAL THERAPY  EVALUATION  [facility] 4A - 4A-03/003-A    Time In: 0730  Time Out: 0750  Timed Code Treatment Minutes: 11 Minutes  Minutes: 20          Date: 2024  Patient Name: Chriss Braga,  Gender:  male        MRN: [id number]  : 1955  (68 y.o.)      Referring Practitioner: Katina Calixto APRN - CNP  Diagnosis: syncope and collapse  Additional Pertinent Hx: Per EMR \"Chriss Braga is a 68 y.o. male with PMHx of NSTEMI, CHF, CAD, HTN, PAF who presented to [facility] with chief complaint of chest pain and LOC. Patient states that he developed some left sided chest pain that radiated down his left arm described as pressure and stabbing rated 8/10 with SOB this afternoon and then passed out while sitting in a chair. States he was at a neighbors house and they witnessed the fall. Patient lost consciousness but did not hit his head. Reports that he has had a productive cough since quitting smoking approximately 1 month ago. Complains of numbness and tingling to his left upper and lower extremities. Reports a poor oral intake. Also states that he has had diarrhea the last couple days. Reports some urinary retention. Admits to smoking marijuana frequently. Complains of a headache currently and states chest pain remains a 7/10. Denies dizziness, or lightheadedness. Denies fever or chills. Denies recent illness. Denies abdominal pain or nausea.  \"     Restrictions/Precautions:  Restrictions/Precautions: General Precautions, Fall Risk  Position Activity Restriction  Other position/activity restrictions: defibrillator    Subjective:  Chart Reviewed: Yes  Patient assessed for rehabilitation services?: Yes  Family / Caregiver Present: No  Subjective: OK to see pt per nursing. Pt in bed sleeping when PT arrived, breakfast, present, agreeable to session and to sit in chair.    General:  Overall Orientation Status: Within Normal Limits  Orientation Level: Oriented 
MRI cannot be done due to patient having an ICD that's UNSAFE for MRI.  MRI orders will be cancelled.  If you have questions please call [phone]  
[facility]  OCCUPATIONAL THERAPY MISSED TREATMENT NOTE  [facility] NEUROSCIENCES 4A  4A-03/003-A      Date: 2024  Patient Name: Chriss Braga        CSN: [id number]   : 1955  (68 y.o.)  Gender: male                REASON FOR MISSED TREATMENT:  OT attempted X2 this date. At 940 pt declined due to just returning to bed. Checked at 1345 and pt declined due to chest pain and not feeling well. RN was informed. Will check back as able                
Patient admitted to 4A Room 03 from ED  Complaint upon arrival to the room chest pain, left side weakness  Vital signs obtained. Assessment and data collection initiated. Oriented to room. Policies and procedures for 4a explained All questions answered with no further questions at this time. Fall prevention and safety brochure discussed with patient. 2 person skin check completed.    
Patient/family has been educated on their personal risk factors of:  Hypertension  Hyperlipidemia  smoking cessation  Atrial fibrillation      They have been given hand outs on the following medications:(  give handouts/attach to AVS)   asa  Plavix    statins(Specify)      Treatment for stroke includes:  Risk factor modifications  Following the medication regime prescribed by physician      Educated on BE FAST-Face-Arm-Speech-Time    A stroke is a brain attack.Stroke is a brain injury. It occurs when the brain's blood supply is interrupted. Blood carries oxygen and nutrients to the brain. Without oxygen and nutrients from blood, brain tissue starts to die rapidly. This can happen in less than 10 minutes.    A stroke occurs when blood flow to the brain is blocked (called ischemic stroke). This is caused by one of the following:   Sudden decreased blood flow   Damage to a blood vessel supplying blood to the brain can occur suddenly from either:   Injury   A clot that forms and breaks off from another part of the body (such as the heart or neck)   There are certain conditions which predispose people to form blood clots, such as:   Cancer   Pregnancy   Atrial fibrillation   Certain autoimmune diseases   Local blood clot   A build-up of fatty substances ( atherosclerotic plaque ) along the inner lining of the artery causes:   Narrowing of artery   Reduced elasticity   Local inflammation   Blood protein defects leading to increased clotting tendency   Decreased blood flow in the artery   Clot in an artery supplying the brain   Inflammatory conditions in the blood vessels (vasculitis)   A stroke may also occur if a blood vessel breaks and bleeds into or around the brain. This is called hemorrhagic stroke.      This condition needs to be monitored closely. Be sure to keep all appointments. Have exams and blood tests done as directed.     Call 911 If Any of the Following Occurs   It is important that you and those around you 
Pt neighbor called pt. Stating that he had what looked like a seizure prior to coming in here.  Pt \neighbor, Paige,  stated to pt that he had fallen on the floor and eyes rolled to back of head and was staring off into space.  This nurse reached out to Dr. Mclaughlin to make him aware.   
Report received from primary RN Jennifer. Patient laying in bed breathing regularly watching TV. No signs of distress.    Jaron WILLARD/.  
Reported off of primary RN patient in bed with unlabored respirations, watching tv.    Jaron Hong RSROBERT/SN.  
[facility]  INPATIENT SPEECH THERAPY  STR[facility] NEUROSCIENCES 4A  DAILY NOTE    TIME   SLP Individual Minutes  Time In: 930  Time Out: 955  Minutes: 25  Timed Code Treatment Minutes: 25 Minutes       Date: 2024  Patient Name: Chriss Braga      CSN: [id number]   : 1955  (68 y.o.)  Gender: male   Referring Physician:  Katina Calixto APRN - CNP    Diagnosis: Syncope and collapse  Precautions: Fall risk  Current Diet: Regular, thin liquids   Respiratory Status: Room Air  Swallowing Strategies:  Full Upright Position, Small Bite/Sip, Alternate Solids and Liquids, Limit Distractions, and Monitor for Fatigue   Date of Last MBS/FEES: Not Applicable    Pain:  No pain reported.    Subjective:  Spoke with RN Tanya for approval of ST interventions. Upon arrival, patient sitting upright in recliner. Alert and cooperative.     Short-Term Goals:  SHORT TERM GOAL #1:  Goal 1: Patient will complete immediate/delayed recall tasks with 70% accuracy with min cues to improve retention of novel information  INTERVENTIONS:DNT due to focus on other STGs.     SHORT TERM GOAL #2:  Goal 2: Patient will complete executive functioning and verbal/visual reasoning tasks 70% accuracy with mod cues for potential safe return to prior level of independence  INTERVENTIONS:  Medication Errors:   3/4 independent, 1/4 min cues  *cues required for comprehension     Money Word Problems - Fitly   3/5 independent, 2/5 min cues  *Min cues required for visual scanning and mental math     SHORT TERM GOAL #3:  Goal 3: Patient will complete thought organization and sequencing tasks with 70% accuracy with min cues to improve mental flexibility and organization  INTERVENTIONS:  Sequencing ADLs:  4/5 independent, 1/5 min-mod cues required  *Cues required for reasoning     SHORT TERM GOAL #4:  Goal 4: Patient will complete attention tasks with no more than 5 errors/redirections in 5 minutes/task completion to improve mental focus 
COMPARISON: 02/03/2024. FINDINGS: A single frontal view of the chest was performed. Cervical spinal hardware is partially visualized. Median sternotomy wires and a left-sided pacer are again noted. Cardiomegaly is again noted. No focal infiltrate or consolidation. No pleural effusion or pneumothorax.     1. No acute disease. 2. Cardiomegaly is again noted. This document has been electronically signed by: Willie Chowdhury M.D. on 02/08/2024 10:21 PM    CTA CHEST W WO CONTRAST    Result Date: 2/8/2024  CTA CHEST WITH CONTRAST AND 3D POST PROCESSING COMPARISON: 02/03/2024. FINDINGS: Sagittal and coronal MIP 3D reconstruction images were performed. Again noted are postoperative changes of a median sternotomy. A left-sided pacer is again noted. There is no evidence of a pulmonary embolism. Cardiomegaly is again noted. Thoracic aorta is partially obscured by motion/streak artifact but is normal in caliber without evidence of an aneurysm. No definite dissection. Previously noted secretions within the right mainstem bronchus are no longer present. No evidence of a pneumothorax or pneumomediastinum. No focal infiltrate or consolidation. Dependent atelectatic/ground-glass changes are noted bilaterally. Pulmonary nodules do not appear to be significantly changed. As an example, a nodule in the posterior aspect of the left lower lobe measures 5-6 mm on axial image 80 of series 602. As another example, a 6-7 mm nodule is noted in the right lower lobe on axial image 66. As another example, a nodule in the right mid lung is estimated to measure 5-6 mm on axial image 56. Subpleural bleb is again noted within the medial aspect of the right lung base. Scans of the upper abdomen demonstrate cholelithiasis. No pericholecystic inflammation. Stomach is underdistended which precludes accurate assessment. The bone windows demonstrate no acute abnormalities.     1. No evidence of a pulmonary embolism. 2. Cardiomegaly. No evidence of a thoracic 
image 145. No focal consolidation, pleural effusion or pneumothorax is seen. There is no axillary, hilar or mediastinal lymphadenopathy. Limited evaluation of the upper abdomen appears unremarkable. No acute osseous findings are seen.     1. There is a focal opacity demonstrated within the proximal right mainstem bronchus which could be related to retained mucus secretion. However, an endobronchial lesion is not excluded. 2. There is no CT angiographic evidence of pulmonary embolism. 3. There is a 4 mm pulmonary nodule demonstrated within the right mid lung on axial image 134. There is a 7 mm pulmonary nodule within the right lower lobe on axial image 153. There is a 3 mm pulmonary nodule within the right lower lobe on axial image 145. In the absence of a known primary malignancy, recommend three-month follow-up chest CT to document stability. **This report has been created using voice recognition software.  It may contain minor errors which are inherent in voice recognition technology.**  Final report electronically signed by Dr. Chris Cope on 2/3/2024 3:36 PM    XR CHEST PORTABLE    Result Date: 2/3/2024  PROCEDURE: XR CHEST PORTABLE CLINICAL INFORMATION: Chest pain COMPARISON: 1/4/2024 TECHNIQUE: AP portable chest radiograph performed. FINDINGS: No focal pulmonary consolidation. Cardiac silhouette is mildly enlarged. No pleural effusion. No pneumothorax. No acute bony abnormality. Median sternotomy has been performed. Degenerative changes of the thoracic spine.     1. No acute cardiopulmonary finding. **This report has been created using voice recognition software.  It may contain minor errors which are inherent in voice recognition technology.** Final report electronically signed by Dr Irma Parekh on 2/3/2024 12:10 PM    CT HEAD WO CONTRAST    Result Date: 1/21/2024  PROCEDURE: CT HEAD WO CONTRAST CLINICAL INFORMATION: Headache with history of brain tumor as per patient. COMPARISON: Head CT 8/11/2023.

## 2024-02-12 NOTE — PROCEDURES
Date: 2/12/2024  Referring physician:Zacarias Mclaughlin MD    Indication  Patient aged 68 y with possible seizures. EEG done to assess for epileptiform activity.    Introduction  This routine 20-minute EEG was recorded using the International 10-20 System on a [employer] workstation at 256 samples/s. Automated spike and seizure detection algorithms were applied.    Description  During the maximal alert state, a well-regulated, symmetric, and reactive 9 Hz posterior dominant rhythm was seen. No consistent focal slowing or interhemispheric asymmetry was noted. Stage I and stage II sleep were observed. There were no interictal epileptiform discharges or electrographic seizures.    Activations  Hyperventilation was not performed. Intermittent photic stimulation was performed and demonstrated no posterior driving response.    Impression  Normal awake and sleep EEG.       EKG lead did not show clear arrhythmia, if still in concern consider formal EKG or correlation with telemetry.       No epileptiform discharges were identified. Please note the absence of such activity on this record cannot conclusively rule out an epileptic disorder. If such is still clinically suspected, a repeat study with sleep deprivation and/or prolonged sampling may be helpful.    Zacarias Su MD  Epilepsy Board Certified.  Neurology Board Certified.    Electronically Signed

## 2024-02-12 NOTE — CARE COORDINATION
2/12/24, 2:22 PM EST    Patient goals/plan/ treatment preferences discussed by  and .  Patient goals/plan/ treatment preferences reviewed with patient/ family.  Patient/ family verbalize understanding of discharge plan and are in agreement with goal/plan/treatment preferences.  Understanding was demonstrated using the teach back method.  AVS provided by RN at time of discharge, which includes all necessary medical information pertaining to the patients current course of illness, treatment, post-discharge goals of care, and treatment preferences.     Services At/After Discharge: Home Health and PT       IMM Letter  IMM Letter given to Patient/Family/Significant other/Guardian/POA/by:: DONAL  IMM Letter date given:: 02/12/24  IMM Letter time given:: 1000     Chriss will be discharged to home today.  He told SW that he does not have a ride and can't afford to pay for a cab.  Set up a [employer] for 4:30 today.  Called Deepthi at [facility] and made her aware patients is discharged and orders are in.  She told SW that they will not be able to see until patient goes to his PCP appointment.  He is aware.  He has  orders for PT.

## 2024-02-12 NOTE — PLAN OF CARE
Problem: Safety - Adult  Goal: Free from fall injury  Outcome: Completed  Flowsheets (Taken 2/12/2024 1420)  Free From Fall Injury: Instruct family/caregiver on patient safety     Problem: Discharge Planning  Goal: Discharge to home or other facility with appropriate resources  Outcome: Completed  Flowsheets (Taken 2/12/2024 1420)  Discharge to home or other facility with appropriate resources:   Identify barriers to discharge with patient and caregiver   Arrange for needed discharge resources and transportation as appropriate   Identify discharge learning needs (meds, wound care, etc)     Problem: Pain  Goal: Verbalizes/displays adequate comfort level or baseline comfort level  Outcome: Completed  Flowsheets (Taken 2/12/2024 1420)  Verbalizes/displays adequate comfort level or baseline comfort level:   Encourage patient to monitor pain and request assistance   Assess pain using appropriate pain scale   Administer analgesics based on type and severity of pain and evaluate response   Implement non-pharmacological measures as appropriate and evaluate response     Problem: ABCDS Injury Assessment  Goal: Absence of physical injury  Outcome: Completed  Flowsheets (Taken 2/12/2024 1420)  Absence of Physical Injury: Implement safety measures based on patient assessment     Problem: Skin/Tissue Integrity  Goal: Absence of new skin breakdown  Description: 1.  Monitor for areas of redness and/or skin breakdown  2.  Assess vascular access sites hourly  3.  Every 4-6 hours minimum:  Change oxygen saturation probe site  4.  Every 4-6 hours:  If on nasal continuous positive airway pressure, respiratory therapy assess nares and determine need for appliance change or resting period.  Outcome: Completed     Problem: Neurosensory - Adult  Goal: Achieves stable or improved neurological status  Outcome: Completed  Flowsheets (Taken 2/12/2024 1420)  Achieves stable or improved neurological status:   Assess for and report changes in 
  Problem: Safety - Adult  Goal: Free from fall injury  Outcome: Progressing  Flowsheets (Taken 2/9/2024 0139)  Free From Fall Injury:   Instruct family/caregiver on patient safety   Based on caregiver fall risk screen, instruct family/caregiver to ask for assistance with transferring infant if caregiver noted to have fall risk factors     Problem: Discharge Planning  Goal: Discharge to home or other facility with appropriate resources  Outcome: Progressing  Flowsheets (Taken 2/9/2024 0139)  Discharge to home or other facility with appropriate resources:   Arrange for needed discharge resources and transportation as appropriate   Identify discharge learning needs (meds, wound care, etc)     Problem: Pain  Goal: Verbalizes/displays adequate comfort level or baseline comfort level  Outcome: Progressing  Flowsheets (Taken 2/9/2024 0139)  Verbalizes/displays adequate comfort level or baseline comfort level:   Encourage patient to monitor pain and request assistance   Assess pain using appropriate pain scale   Administer analgesics based on type and severity of pain and evaluate response     Problem: Skin/Tissue Integrity  Goal: Absence of new skin breakdown  Description: 1.  Monitor for areas of redness and/or skin breakdown  2.  Assess vascular access sites hourly  3.  Every 4-6 hours minimum:  Change oxygen saturation probe site  4.  Every 4-6 hours:  If on nasal continuous positive airway pressure, respiratory therapy assess nares and determine need for appliance change or resting period.  Outcome: Progressing  Note: No signs of skin breakdown.  Skin warm, dry, and intact.  Mucous membranes pink and moist.  Assistance with turns/ambulation provided PRN.  Will continue to monitor.       Problem: Neurosensory - Adult  Goal: Achieves stable or improved neurological status  Outcome: Progressing  Flowsheets (Taken 2/9/2024 0139)  Achieves stable or improved neurological status:   Assess for and report changes in 
  Problem: Safety - Adult  Goal: Free from fall injury  Outcome: Progressing  Free From Fall Injury:   Instruct family/caregiver on patient safety   Based on caregiver fall risk screen, instruct family/caregiver to ask for assistance with transferring infant if caregiver noted to have fall risk factors     Problem: Discharge Planning  Goal: Discharge to home or other facility with appropriate resources  Outcome: Progressing  Discharge to home or other facility with appropriate resources:   Arrange for needed discharge resources and transportation as appropriate   Identify discharge learning needs (meds, wound care, etc)     Problem: Pain  Goal: Verbalizes/displays adequate comfort level or baseline comfort level  Outcome: Progressing  Verbalizes/displays adequate comfort level or baseline comfort level:   Encourage patient to monitor pain and request assistance   Assess pain using appropriate pain scale   Administer analgesics based on type and severity of pain and evaluate response     Problem: Skin/Tissue Integrity  Goal: Absence of new skin breakdown  Description: 1.  Monitor for areas of redness and/or skin breakdown  2.  Assess vascular access sites hourly  3.  Every 4-6 hours minimum:  Change oxygen saturation probe site  4.  Every 4-6 hours:  If on nasal continuous positive airway pressure, respiratory therapy assess nares and determine need for appliance change or resting period.  Outcome: Progressing  Note: No signs of skin breakdown.  Skin warm, dry, and intact.  Mucous membranes pink and moist.  Assistance with turns/ambulation provided PRN.  Will continue to monitor.       Problem: Neurosensory - Adult  Goal: Achieves stable or improved neurological status  Outcome: Progressing  Achieves stable or improved neurological status:   Assess for and report changes in neurological status   Initiate measures to prevent increased intracranial pressure     Problem: Neurosensory - Adult  Goal: Absence of 
Consult received.  Please see SW note dated 2/9/2024.  
seizures  Outcome: Progressing  Absence of seizures:   Monitor for seizure activity.  If seizure occurs, document type and location of movements and any associated apnea   If seizure occurs, turn head to side and suction secretions as needed     Problem: Hematologic - Adult  Goal: Maintains hematologic stability  Outcome: Progressing  Maintains hematologic stability:   Assess for signs and symptoms of bleeding or hemorrhage   Administer blood products/factors as ordered   Monitor labs for bleeding or clotting disorders   Care plan reviewed with patient.  Patient verbalizes understanding of the plan of care and contributed to goal setting.      
disorders     Problem: Chronic Conditions and Co-morbidities  Goal: Patient's chronic conditions and co-morbidity symptoms are monitored and maintained or improved  Outcome: Progressing  Flowsheets (Taken 2/9/2024 0972)  Care Plan - Patient's Chronic Conditions and Co-Morbidity Symptoms are Monitored and Maintained or Improved:   Monitor and assess patient's chronic conditions and comorbid symptoms for stability, deterioration, or improvement   Collaborate with multidisciplinary team to address chronic and comorbid conditions and prevent exacerbation or deterioration   Update acute care plan with appropriate goals if chronic or comorbid symptoms are exacerbated and prevent overall improvement and discharge   Care plan reviewed with patient.  Patient verbalized understanding of the plan of care and contribute to goal setting.

## 2024-02-12 NOTE — DISCHARGE SUMMARY
lower lobe on axial image 145. In the absence of a known primary malignancy, recommend three-month follow-up chest CT to document stability. **This report has been created using voice recognition software.  It may contain minor errors which are inherent in voice recognition technology.**  Final report electronically signed by Dr. Chris Cope on 2/3/2024 3:36 PM    XR CHEST PORTABLE    Result Date: 2/3/2024  PROCEDURE: XR CHEST PORTABLE CLINICAL INFORMATION: Chest pain COMPARISON: 1/4/2024 TECHNIQUE: AP portable chest radiograph performed. FINDINGS: No focal pulmonary consolidation. Cardiac silhouette is mildly enlarged. No pleural effusion. No pneumothorax. No acute bony abnormality. Median sternotomy has been performed. Degenerative changes of the thoracic spine.     1. No acute cardiopulmonary finding. **This report has been created using voice recognition software.  It may contain minor errors which are inherent in voice recognition technology.** Final report electronically signed by Dr Irma Parekh on 2/3/2024 12:10 PM    CT HEAD WO CONTRAST    Result Date: 1/21/2024  PROCEDURE: CT HEAD WO CONTRAST CLINICAL INFORMATION: Headache with history of brain tumor as per patient. COMPARISON: Head CT 8/11/2023. TECHNIQUE: Noncontrast 5 mm axial images were obtained through the brain. Sagittal and coronal reconstructions were obtained. . All CT scans at this facility use dose modulation, iterative reconstruction, and/or weight-based dosing when appropriate to reduce radiation dose to as low as reasonably achievable. FINDINGS: There is no hemorrhage. There are no intra-or extra-axial collections.  There is no hydrocephalus, midline shift or mass effect.  There is moderate global volume loss. There is a moderate amount of abnormal low attenuation white matter the brain suggesting chronic small vessel ischemic changes. There are vascular calcifications. There is a stable area of mucosal thickening in the sphenoid sinus on

## 2024-02-21 ENCOUNTER — HOSPITAL ENCOUNTER (INPATIENT)
Age: 69
LOS: 1 days | Discharge: HOME | DRG: 563 | End: 2024-02-23
Attending: EMERGENCY MEDICINE
Payer: MEDICARE

## 2024-02-21 ENCOUNTER — TELEPHONE (OUTPATIENT)
Dept: CARDIAC REHAB | Age: 69
End: 2024-02-21

## 2024-02-21 ENCOUNTER — TELEPHONE (OUTPATIENT)
Age: 69
End: 2024-02-21

## 2024-02-21 DIAGNOSIS — I20.0 UNSTABLE ANGINA PECTORIS (HCC): Primary | ICD-10-CM

## 2024-02-21 DIAGNOSIS — T14.8XXA MUSCLE STRAIN: ICD-10-CM

## 2024-02-21 DIAGNOSIS — D49.6 NEOPLASM OF BRAIN (HCC): ICD-10-CM

## 2024-02-21 DIAGNOSIS — R07.9 CHEST PAIN, UNSPECIFIED TYPE: ICD-10-CM

## 2024-02-21 PROBLEM — E78.5 DYSLIPIDEMIA: Status: ACTIVE | Noted: 2022-12-07

## 2024-02-21 PROBLEM — F12.90 MARIJUANA USE: Status: ACTIVE | Noted: 2018-11-16

## 2024-02-21 PROBLEM — R19.7 DIARRHEA: Status: ACTIVE | Noted: 2024-02-21

## 2024-02-21 PROBLEM — H53.8 BLURRING OF VISUAL IMAGE: Status: ACTIVE | Noted: 2023-08-01

## 2024-02-21 PROBLEM — I50.22 CHRONIC SYSTOLIC HEART FAILURE (HCC): Status: ACTIVE | Noted: 2022-12-07

## 2024-02-21 PROBLEM — I16.1 HYPERTENSIVE EMERGENCY: Status: ACTIVE | Noted: 2021-11-08
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
## 2024-02-21 PROBLEM — R52 ACUTE PAIN: Status: ACTIVE | Noted: 2022-12-07

## 2024-02-21 PROBLEM — R41.0 CONFUSION: Status: ACTIVE | Noted: 2018-07-19

## 2024-02-21 PROBLEM — M54.50 PAIN OF LUMBAR SPINE: Status: ACTIVE | Noted: 2024-02-21

## 2024-02-21 PROBLEM — R51.9 HEADACHE: Status: ACTIVE | Noted: 2024-02-21

## 2024-02-21 PROBLEM — R73.03 PREDIABETES: Status: ACTIVE | Noted: 2021-08-24

## 2024-02-21 PROBLEM — I50.9 CHF (CONGESTIVE HEART FAILURE) (HCC): Status: ACTIVE | Noted: 2023-05-01

## 2024-02-21 PROBLEM — I95.9 HYPOTENSION: Status: ACTIVE | Noted: 2023-06-13

## 2024-02-21 PROBLEM — H53.9 VISION DISTURBANCE: Status: ACTIVE | Noted: 2019-11-08

## 2024-02-21 PROBLEM — R06.89 INEFFECTIVE AIRWAY CLEARANCE: Status: ACTIVE | Noted: 2022-12-07

## 2024-02-21 PROBLEM — N18.30 STAGE 3 CHRONIC KIDNEY DISEASE (HCC): Status: ACTIVE | Noted: 2024-02-21

## 2024-02-21 PROBLEM — R79.89 ELEVATED TROPONIN: Status: ACTIVE | Noted: 2024-02-21

## 2024-02-21 PROCEDURE — 85025 COMPLETE CBC W/AUTO DIFF WBC: CPT

## 2024-02-21 PROCEDURE — 83690 ASSAY OF LIPASE: CPT

## 2024-02-21 PROCEDURE — 80053 COMPREHEN METABOLIC PANEL: CPT

## 2024-02-21 PROCEDURE — 93005 ELECTROCARDIOGRAM TRACING: CPT | Performed by: EMERGENCY MEDICINE

## 2024-02-21 PROCEDURE — 83880 ASSAY OF NATRIURETIC PEPTIDE: CPT

## 2024-02-21 PROCEDURE — 36415 COLL VENOUS BLD VENIPUNCTURE: CPT

## 2024-02-21 PROCEDURE — 99285 EMERGENCY DEPT VISIT HI MDM: CPT

## 2024-02-21 PROCEDURE — 84484 ASSAY OF TROPONIN QUANT: CPT

## 2024-02-21 PROCEDURE — 83735 ASSAY OF MAGNESIUM: CPT

## 2024-02-21 PROCEDURE — 96374 THER/PROPH/DIAG INJ IV PUSH: CPT

## 2024-02-21 PROCEDURE — 85379 FIBRIN DEGRADATION QUANT: CPT

## 2024-02-21 ASSESSMENT — PAIN SCALES - GENERAL: PAINLEVEL_OUTOF10: 8

## 2024-02-21 ASSESSMENT — PAIN DESCRIPTION - DESCRIPTORS: DESCRIPTORS: ACHING

## 2024-02-21 ASSESSMENT — PAIN DESCRIPTION - LOCATION: LOCATION: CHEST

## 2024-02-21 ASSESSMENT — PAIN - FUNCTIONAL ASSESSMENT: PAIN_FUNCTIONAL_ASSESSMENT: 0-10

## 2024-02-21 NOTE — TELEPHONE ENCOUNTER
Called Chriss to reschedule missed cardiac rehab evaluation from 2/1/24.  Left a message for him to call us back.  
Home

## 2024-02-22 ENCOUNTER — APPOINTMENT (OUTPATIENT)
Dept: CT IMAGING | Age: 69
DRG: 563 | End: 2024-02-22
Payer: MEDICARE

## 2024-02-22 ENCOUNTER — APPOINTMENT (OUTPATIENT)
Dept: GENERAL RADIOLOGY | Age: 69
DRG: 563 | End: 2024-02-22
Payer: MEDICARE

## 2024-02-22 PROBLEM — I51.89 LEFT VENTRICULAR SYSTOLIC DYSFUNCTION: Status: ACTIVE | Noted: 2024-02-22

## 2024-02-22 PROBLEM — I51.9 LEFT VENTRICULAR SYSTOLIC DYSFUNCTION: Status: ACTIVE | Noted: 2024-02-22

## 2024-02-22 LAB
ALBUMIN SERPL BCG-MCNC: 4.2 G/DL (ref 3.5–5.1)
ALP SERPL-CCNC: 75 U/L (ref 38–126)
ALT SERPL W/O P-5'-P-CCNC: 25 U/L (ref 11–66)
AMPHETAMINES UR QL SCN: NEGATIVE
ANION GAP SERPL CALC-SCNC: 11 MEQ/L (ref 8–16)
ANION GAP SERPL CALC-SCNC: 11 MEQ/L (ref 8–16)
APTT PPP: 91.3 SECONDS (ref 22–38)
APTT PPP: > 200 SECONDS (ref 22–38)
AST SERPL-CCNC: 21 U/L (ref 5–40)
BARBITURATES UR QL SCN: NEGATIVE
BASOPHILS ABSOLUTE: 0 THOU/MM3 (ref 0–0.1)
BASOPHILS NFR BLD AUTO: 0.4 %
BENZODIAZ UR QL SCN: NEGATIVE
BILIRUB SERPL-MCNC: 0.6 MG/DL (ref 0.3–1.2)
BUN SERPL-MCNC: 14 MG/DL (ref 7–22)
BUN SERPL-MCNC: 15 MG/DL (ref 7–22)
BZE UR QL SCN: NEGATIVE
CALCIUM SERPL-MCNC: 8.8 MG/DL (ref 8.5–10.5)
CALCIUM SERPL-MCNC: 9.5 MG/DL (ref 8.5–10.5)
CANNABINOIDS UR QL SCN: POSITIVE
CHLORIDE SERPL-SCNC: 102 MEQ/L (ref 98–111)
CHLORIDE SERPL-SCNC: 103 MEQ/L (ref 98–111)
CO2 SERPL-SCNC: 23 MEQ/L (ref 23–33)
CO2 SERPL-SCNC: 25 MEQ/L (ref 23–33)
CREAT SERPL-MCNC: 0.9 MG/DL (ref 0.4–1.2)
CREAT SERPL-MCNC: 0.9 MG/DL (ref 0.4–1.2)
CRP SERPL-MCNC: < 0.3 MG/DL (ref 0–1)
D DIMER PPP IA.FEU-MCNC: 473 NG/ML FEU (ref 0–500)
DEPRECATED RDW RBC AUTO: 40.3 FL (ref 35–45)
DEPRECATED RDW RBC AUTO: 40.6 FL (ref 35–45)
EKG ATRIAL RATE: 62 BPM
EKG ATRIAL RATE: 67 BPM
EKG P AXIS: 30 DEGREES
EKG P AXIS: 42 DEGREES
EKG P-R INTERVAL: 176 MS
EKG P-R INTERVAL: 186 MS
EKG Q-T INTERVAL: 394 MS
EKG Q-T INTERVAL: 446 MS
EKG QRS DURATION: 88 MS
EKG QRS DURATION: 94 MS
EKG QTC CALCULATION (BAZETT): 416 MS
EKG QTC CALCULATION (BAZETT): 452 MS
EKG R AXIS: -3 DEGREES
EKG R AXIS: 16 DEGREES
EKG T AXIS: -10 DEGREES
EKG T AXIS: -8 DEGREES
EKG VENTRICULAR RATE: 62 BPM
EKG VENTRICULAR RATE: 67 BPM
EOSINOPHIL NFR BLD AUTO: 0.9 %
EOSINOPHILS ABSOLUTE: 0.1 THOU/MM3 (ref 0–0.4)
ERYTHROCYTE [DISTWIDTH] IN BLOOD BY AUTOMATED COUNT: 16 % (ref 11.5–14.5)
ERYTHROCYTE [DISTWIDTH] IN BLOOD BY AUTOMATED COUNT: 16.3 % (ref 11.5–14.5)
FENTANYL: NEGATIVE
FERRITIN SERPL IA-MCNC: 266 NG/ML (ref 22–322)
FOLATE SERPL-MCNC: 10.5 NG/ML (ref 4.8–24.2)
GFR SERPL CREATININE-BSD FRML MDRD: > 60 ML/MIN/1.73M2
GFR SERPL CREATININE-BSD FRML MDRD: > 60 ML/MIN/1.73M2
GLUCOSE SERPL-MCNC: 91 MG/DL (ref 70–108)
GLUCOSE SERPL-MCNC: 97 MG/DL (ref 70–108)
HCT VFR BLD AUTO: 35.7 % (ref 42–52)
HCT VFR BLD AUTO: 39.2 % (ref 42–52)
HGB BLD-MCNC: 11 GM/DL (ref 14–18)
HGB BLD-MCNC: 12.2 GM/DL (ref 14–18)
IMM GRANULOCYTES # BLD AUTO: 0.11 THOU/MM3 (ref 0–0.07)
IMM GRANULOCYTES NFR BLD AUTO: 1.1 %
INR PPP: 1.08 (ref 0.85–1.13)
IRON SATN MFR SERPL: 23 % (ref 20–50)
IRON SERPL-MCNC: 50 UG/DL (ref 65–195)
LACTATE SERPL-SCNC: 1.4 MMOL/L (ref 0.5–2)
LIPASE SERPL-CCNC: 25.3 U/L (ref 5.6–51.3)
LYMPHOCYTES ABSOLUTE: 2 THOU/MM3 (ref 1–4.8)
LYMPHOCYTES NFR BLD AUTO: 20 %
MAGNESIUM SERPL-MCNC: 2.1 MG/DL (ref 1.6–2.4)
MAGNESIUM SERPL-MCNC: 2.2 MG/DL (ref 1.6–2.4)
MCH RBC QN AUTO: 21.8 PG (ref 26–33)
MCH RBC QN AUTO: 22 PG (ref 26–33)
MCHC RBC AUTO-ENTMCNC: 30.8 GM/DL (ref 32.2–35.5)
MCHC RBC AUTO-ENTMCNC: 31.1 GM/DL (ref 32.2–35.5)
MCV RBC AUTO: 70.6 FL (ref 80–94)
MCV RBC AUTO: 70.8 FL (ref 80–94)
MONOCYTES ABSOLUTE: 0.7 THOU/MM3 (ref 0.4–1.3)
MONOCYTES NFR BLD AUTO: 6.7 %
NEUTROPHILS NFR BLD AUTO: 70.9 %
NRBC BLD AUTO-RTO: 0 /100 WBC
NT-PROBNP SERPL IA-MCNC: 916.7 PG/ML (ref 0–124)
OPIATES UR QL SCN: NEGATIVE
OSMOLALITY SERPL CALC.SUM OF ELEC: 274.2 MOSMOL/KG (ref 275–300)
OSMOLALITY SERPL CALC.SUM OF ELEC: 276.1 MOSMOL/KG (ref 275–300)
OXYCODONE: NEGATIVE
PCP UR QL SCN: NEGATIVE
PLATELET # BLD AUTO: 230 THOU/MM3 (ref 130–400)
PLATELET # BLD AUTO: 257 THOU/MM3 (ref 130–400)
PMV BLD AUTO: 9 FL (ref 9.4–12.4)
PMV BLD AUTO: 9.1 FL (ref 9.4–12.4)
POTASSIUM SERPL-SCNC: 3.9 MEQ/L (ref 3.5–5.2)
POTASSIUM SERPL-SCNC: 4.2 MEQ/L (ref 3.5–5.2)
PROT SERPL-MCNC: 7.4 G/DL (ref 6.1–8)
RBC # BLD AUTO: 5.04 MILL/MM3 (ref 4.7–6.1)
RBC # BLD AUTO: 5.55 MILL/MM3 (ref 4.7–6.1)
SEGMENTED NEUTROPHILS ABSOLUTE COUNT: 7.1 THOU/MM3 (ref 1.8–7.7)
SODIUM SERPL-SCNC: 137 MEQ/L (ref 135–145)
SODIUM SERPL-SCNC: 138 MEQ/L (ref 135–145)
TIBC SERPL-MCNC: 219 UG/DL (ref 171–450)
TROPONIN, HIGH SENSITIVITY: 21 NG/L (ref 0–12)
TROPONIN, HIGH SENSITIVITY: 23 NG/L (ref 0–12)
VIT B12 SERPL-MCNC: 510 PG/ML (ref 211–911)
WBC # BLD AUTO: 10 THOU/MM3 (ref 4.8–10.8)
WBC # BLD AUTO: 7.7 THOU/MM3 (ref 4.8–10.8)

## 2024-02-22 PROCEDURE — 2140000000 HC CCU INTERMEDIATE R&B

## 2024-02-22 PROCEDURE — 71045 X-RAY EXAM CHEST 1 VIEW: CPT

## 2024-02-22 PROCEDURE — 86140 C-REACTIVE PROTEIN: CPT

## 2024-02-22 PROCEDURE — 6370000000 HC RX 637 (ALT 250 FOR IP): Performed by: EMERGENCY MEDICINE

## 2024-02-22 PROCEDURE — 6360000002 HC RX W HCPCS: Performed by: STUDENT IN AN ORGANIZED HEALTH CARE EDUCATION/TRAINING PROGRAM

## 2024-02-22 PROCEDURE — 2580000003 HC RX 258: Performed by: EMERGENCY MEDICINE

## 2024-02-22 PROCEDURE — 93010 ELECTROCARDIOGRAM REPORT: CPT | Performed by: INTERNAL MEDICINE

## 2024-02-22 PROCEDURE — 82607 VITAMIN B-12: CPT

## 2024-02-22 PROCEDURE — 83550 IRON BINDING TEST: CPT

## 2024-02-22 PROCEDURE — 71250 CT THORAX DX C-: CPT

## 2024-02-22 PROCEDURE — A4216 STERILE WATER/SALINE, 10 ML: HCPCS | Performed by: EMERGENCY MEDICINE

## 2024-02-22 PROCEDURE — 80048 BASIC METABOLIC PNL TOTAL CA: CPT

## 2024-02-22 PROCEDURE — 85027 COMPLETE CBC AUTOMATED: CPT

## 2024-02-22 PROCEDURE — 83735 ASSAY OF MAGNESIUM: CPT

## 2024-02-22 PROCEDURE — 36415 COLL VENOUS BLD VENIPUNCTURE: CPT

## 2024-02-22 PROCEDURE — 6370000000 HC RX 637 (ALT 250 FOR IP): Performed by: STUDENT IN AN ORGANIZED HEALTH CARE EDUCATION/TRAINING PROGRAM

## 2024-02-22 PROCEDURE — 85610 PROTHROMBIN TIME: CPT

## 2024-02-22 PROCEDURE — 2580000003 HC RX 258

## 2024-02-22 PROCEDURE — 6360000002 HC RX W HCPCS

## 2024-02-22 PROCEDURE — 93005 ELECTROCARDIOGRAM TRACING: CPT | Performed by: INTERNAL MEDICINE

## 2024-02-22 PROCEDURE — 82728 ASSAY OF FERRITIN: CPT

## 2024-02-22 PROCEDURE — 2500000003 HC RX 250 WO HCPCS: Performed by: EMERGENCY MEDICINE

## 2024-02-22 PROCEDURE — 84484 ASSAY OF TROPONIN QUANT: CPT

## 2024-02-22 PROCEDURE — 6370000000 HC RX 637 (ALT 250 FOR IP)

## 2024-02-22 PROCEDURE — 82746 ASSAY OF FOLIC ACID SERUM: CPT

## 2024-02-22 PROCEDURE — 83605 ASSAY OF LACTIC ACID: CPT

## 2024-02-22 PROCEDURE — 80307 DRUG TEST PRSMV CHEM ANLYZR: CPT

## 2024-02-22 PROCEDURE — 83540 ASSAY OF IRON: CPT

## 2024-02-22 PROCEDURE — 85730 THROMBOPLASTIN TIME PARTIAL: CPT

## 2024-02-22 RX ORDER — MORPHINE SULFATE 2 MG/ML
2 INJECTION, SOLUTION INTRAMUSCULAR; INTRAVENOUS
Status: DISCONTINUED | OUTPATIENT
Start: 2024-02-22 | End: 2024-02-22

## 2024-02-22 RX ORDER — HEPARIN SODIUM 1000 [USP'U]/ML
2000 INJECTION, SOLUTION INTRAVENOUS; SUBCUTANEOUS PRN
Status: DISCONTINUED | OUTPATIENT
Start: 2024-02-22 | End: 2024-02-22

## 2024-02-22 RX ORDER — HEPARIN SODIUM 1000 [USP'U]/ML
4000 INJECTION, SOLUTION INTRAVENOUS; SUBCUTANEOUS PRN
Status: DISCONTINUED | OUTPATIENT
Start: 2024-02-22 | End: 2024-02-22

## 2024-02-22 RX ORDER — NITROGLYCERIN 0.4 MG/1
0.4 TABLET SUBLINGUAL EVERY 5 MIN PRN
Status: DISCONTINUED | OUTPATIENT
Start: 2024-02-22 | End: 2024-02-23 | Stop reason: HOSPADM

## 2024-02-22 RX ORDER — LISINOPRIL 2.5 MG/1
2.5 TABLET ORAL DAILY
Status: DISCONTINUED | OUTPATIENT
Start: 2024-02-22 | End: 2024-02-23 | Stop reason: HOSPADM

## 2024-02-22 RX ORDER — SODIUM CHLORIDE 9 MG/ML
INJECTION, SOLUTION INTRAVENOUS PRN
Status: DISCONTINUED | OUTPATIENT
Start: 2024-02-22 | End: 2024-02-23 | Stop reason: HOSPADM

## 2024-02-22 RX ORDER — NITROGLYCERIN 20 MG/100ML
5-200 INJECTION INTRAVENOUS CONTINUOUS
Status: DISCONTINUED | OUTPATIENT
Start: 2024-02-22 | End: 2024-02-22

## 2024-02-22 RX ORDER — POTASSIUM CHLORIDE 20 MEQ/1
40 TABLET, EXTENDED RELEASE ORAL PRN
Status: DISCONTINUED | OUTPATIENT
Start: 2024-02-22 | End: 2024-02-23 | Stop reason: HOSPADM

## 2024-02-22 RX ORDER — ONDANSETRON 4 MG/1
4 TABLET, ORALLY DISINTEGRATING ORAL EVERY 8 HOURS PRN
Status: DISCONTINUED | OUTPATIENT
Start: 2024-02-22 | End: 2024-02-23 | Stop reason: HOSPADM

## 2024-02-22 RX ORDER — ACETAMINOPHEN 650 MG/1
650 SUPPOSITORY RECTAL EVERY 6 HOURS PRN
Status: DISCONTINUED | OUTPATIENT
Start: 2024-02-22 | End: 2024-02-23 | Stop reason: HOSPADM

## 2024-02-22 RX ORDER — MORPHINE SULFATE 2 MG/ML
2 INJECTION, SOLUTION INTRAMUSCULAR; INTRAVENOUS
Status: DISCONTINUED | OUTPATIENT
Start: 2024-02-22 | End: 2024-02-23

## 2024-02-22 RX ORDER — ATORVASTATIN CALCIUM 80 MG/1
80 TABLET, FILM COATED ORAL DAILY
Status: DISCONTINUED | OUTPATIENT
Start: 2024-02-22 | End: 2024-02-23 | Stop reason: HOSPADM

## 2024-02-22 RX ORDER — MAGNESIUM SULFATE IN WATER 40 MG/ML
2000 INJECTION, SOLUTION INTRAVENOUS PRN
Status: DISCONTINUED | OUTPATIENT
Start: 2024-02-22 | End: 2024-02-23 | Stop reason: HOSPADM

## 2024-02-22 RX ORDER — ONDANSETRON 2 MG/ML
4 INJECTION INTRAMUSCULAR; INTRAVENOUS EVERY 6 HOURS PRN
Status: DISCONTINUED | OUTPATIENT
Start: 2024-02-22 | End: 2024-02-23 | Stop reason: HOSPADM

## 2024-02-22 RX ORDER — ACETAMINOPHEN 325 MG/1
650 TABLET ORAL EVERY 6 HOURS PRN
Status: DISCONTINUED | OUTPATIENT
Start: 2024-02-22 | End: 2024-02-23 | Stop reason: HOSPADM

## 2024-02-22 RX ORDER — ENOXAPARIN SODIUM 100 MG/ML
40 INJECTION SUBCUTANEOUS DAILY
Status: DISCONTINUED | OUTPATIENT
Start: 2024-02-22 | End: 2024-02-22

## 2024-02-22 RX ORDER — CLOPIDOGREL BISULFATE 75 MG/1
75 TABLET ORAL DAILY
Status: DISCONTINUED | OUTPATIENT
Start: 2024-02-22 | End: 2024-02-23 | Stop reason: HOSPADM

## 2024-02-22 RX ORDER — POLYETHYLENE GLYCOL 3350 17 G/17G
17 POWDER, FOR SOLUTION ORAL DAILY PRN
Status: DISCONTINUED | OUTPATIENT
Start: 2024-02-22 | End: 2024-02-23 | Stop reason: HOSPADM

## 2024-02-22 RX ORDER — METOPROLOL SUCCINATE 25 MG/1
25 TABLET, EXTENDED RELEASE ORAL DAILY
Status: DISCONTINUED | OUTPATIENT
Start: 2024-02-22 | End: 2024-02-23 | Stop reason: HOSPADM

## 2024-02-22 RX ORDER — POTASSIUM CHLORIDE 7.45 MG/ML
10 INJECTION INTRAVENOUS
Status: DISPENSED | OUTPATIENT
Start: 2024-02-22 | End: 2024-02-22

## 2024-02-22 RX ORDER — ASPIRIN 81 MG/1
324 TABLET, CHEWABLE ORAL ONCE
Status: COMPLETED | OUTPATIENT
Start: 2024-02-22 | End: 2024-02-22

## 2024-02-22 RX ORDER — SODIUM CHLORIDE 0.9 % (FLUSH) 0.9 %
10 SYRINGE (ML) INJECTION EVERY 12 HOURS SCHEDULED
Status: DISCONTINUED | OUTPATIENT
Start: 2024-02-22 | End: 2024-02-23 | Stop reason: HOSPADM

## 2024-02-22 RX ORDER — SODIUM CHLORIDE, SODIUM LACTATE, POTASSIUM CHLORIDE, CALCIUM CHLORIDE 600; 310; 30; 20 MG/100ML; MG/100ML; MG/100ML; MG/100ML
INJECTION, SOLUTION INTRAVENOUS CONTINUOUS
Status: ACTIVE | OUTPATIENT
Start: 2024-02-22 | End: 2024-02-22

## 2024-02-22 RX ORDER — AMIODARONE HYDROCHLORIDE 200 MG/1
200 TABLET ORAL DAILY
Status: DISCONTINUED | OUTPATIENT
Start: 2024-02-22 | End: 2024-02-23

## 2024-02-22 RX ORDER — SODIUM CHLORIDE 0.9 % (FLUSH) 0.9 %
10 SYRINGE (ML) INJECTION PRN
Status: DISCONTINUED | OUTPATIENT
Start: 2024-02-22 | End: 2024-02-23 | Stop reason: HOSPADM

## 2024-02-22 RX ORDER — POTASSIUM CHLORIDE 7.45 MG/ML
10 INJECTION INTRAVENOUS PRN
Status: DISCONTINUED | OUTPATIENT
Start: 2024-02-22 | End: 2024-02-23 | Stop reason: HOSPADM

## 2024-02-22 RX ORDER — ASPIRIN 81 MG/1
81 TABLET, CHEWABLE ORAL DAILY
Status: DISCONTINUED | OUTPATIENT
Start: 2024-02-23 | End: 2024-02-23 | Stop reason: HOSPADM

## 2024-02-22 RX ORDER — PANTOPRAZOLE SODIUM 40 MG/1
40 TABLET, DELAYED RELEASE ORAL
Status: DISCONTINUED | OUTPATIENT
Start: 2024-02-22 | End: 2024-02-23 | Stop reason: HOSPADM

## 2024-02-22 RX ORDER — HEPARIN SODIUM 1000 [USP'U]/ML
4000 INJECTION, SOLUTION INTRAVENOUS; SUBCUTANEOUS ONCE
Status: COMPLETED | OUTPATIENT
Start: 2024-02-22 | End: 2024-02-22

## 2024-02-22 RX ORDER — RANOLAZINE 500 MG/1
1000 TABLET, EXTENDED RELEASE ORAL DAILY
Status: DISCONTINUED | OUTPATIENT
Start: 2024-02-22 | End: 2024-02-23 | Stop reason: HOSPADM

## 2024-02-22 RX ORDER — FERROUS SULFATE 325(65) MG
325 TABLET ORAL EVERY OTHER DAY
Status: DISCONTINUED | OUTPATIENT
Start: 2024-02-22 | End: 2024-02-23 | Stop reason: HOSPADM

## 2024-02-22 RX ORDER — HEPARIN SODIUM 10000 [USP'U]/100ML
5-30 INJECTION, SOLUTION INTRAVENOUS CONTINUOUS
Status: DISCONTINUED | OUTPATIENT
Start: 2024-02-22 | End: 2024-02-22

## 2024-02-22 RX ADMIN — AMIODARONE HYDROCHLORIDE 200 MG: 200 TABLET ORAL at 10:23

## 2024-02-22 RX ADMIN — NITROGLYCERIN 0.4 MG: 0.4 TABLET, ORALLY DISINTEGRATING SUBLINGUAL at 00:45

## 2024-02-22 RX ADMIN — APIXABAN 5 MG: 5 TABLET, FILM COATED ORAL at 20:03

## 2024-02-22 RX ADMIN — CLOPIDOGREL BISULFATE 75 MG: 75 TABLET ORAL at 08:42

## 2024-02-22 RX ADMIN — PANTOPRAZOLE SODIUM 40 MG: 40 TABLET, DELAYED RELEASE ORAL at 10:23

## 2024-02-22 RX ADMIN — NITROGLYCERIN 5 MCG/MIN: 20 INJECTION INTRAVENOUS at 06:48

## 2024-02-22 RX ADMIN — FAMOTIDINE 20 MG: 10 INJECTION, SOLUTION INTRAVENOUS at 00:37

## 2024-02-22 RX ADMIN — NITROGLYCERIN 0.4 MG: 0.4 TABLET, ORALLY DISINTEGRATING SUBLINGUAL at 00:36

## 2024-02-22 RX ADMIN — MORPHINE SULFATE 2 MG: 2 INJECTION, SOLUTION INTRAMUSCULAR; INTRAVENOUS at 20:03

## 2024-02-22 RX ADMIN — POTASSIUM CHLORIDE 10 MEQ: 7.46 INJECTION, SOLUTION INTRAVENOUS at 10:20

## 2024-02-22 RX ADMIN — HEPARIN SODIUM 12 UNITS/KG/HR: 10000 INJECTION, SOLUTION INTRAVENOUS at 06:22

## 2024-02-22 RX ADMIN — MORPHINE SULFATE 2 MG: 2 INJECTION, SOLUTION INTRAMUSCULAR; INTRAVENOUS at 23:15

## 2024-02-22 RX ADMIN — ASPIRIN 81 MG 324 MG: 81 TABLET ORAL at 00:36

## 2024-02-22 RX ADMIN — HEPARIN SODIUM 4000 UNITS: 1000 INJECTION INTRAVENOUS; SUBCUTANEOUS at 06:18

## 2024-02-22 RX ADMIN — ATORVASTATIN CALCIUM 80 MG: 80 TABLET, FILM COATED ORAL at 10:23

## 2024-02-22 RX ADMIN — NITROGLYCERIN 0.4 MG: 0.4 TABLET, ORALLY DISINTEGRATING SUBLINGUAL at 01:45

## 2024-02-22 RX ADMIN — MORPHINE SULFATE 2 MG: 2 INJECTION, SOLUTION INTRAMUSCULAR; INTRAVENOUS at 16:07

## 2024-02-22 RX ADMIN — MORPHINE SULFATE 2 MG: 2 INJECTION, SOLUTION INTRAMUSCULAR; INTRAVENOUS at 12:27

## 2024-02-22 RX ADMIN — SODIUM CHLORIDE, PRESERVATIVE FREE 10 ML: 5 INJECTION INTRAVENOUS at 20:03

## 2024-02-22 RX ADMIN — RANOLAZINE 1000 MG: 500 TABLET, EXTENDED RELEASE ORAL at 10:23

## 2024-02-22 RX ADMIN — POTASSIUM CHLORIDE 10 MEQ: 7.46 INJECTION, SOLUTION INTRAVENOUS at 08:39

## 2024-02-22 RX ADMIN — SODIUM CHLORIDE, POTASSIUM CHLORIDE, SODIUM LACTATE AND CALCIUM CHLORIDE 1000 ML: 600; 310; 30; 20 INJECTION, SOLUTION INTRAVENOUS at 06:56

## 2024-02-22 RX ADMIN — METOPROLOL SUCCINATE 25 MG: 25 TABLET, EXTENDED RELEASE ORAL at 10:23

## 2024-02-22 ASSESSMENT — PAIN DESCRIPTION - LOCATION
LOCATION: CHEST

## 2024-02-22 ASSESSMENT — PAIN DESCRIPTION - DESCRIPTORS
DESCRIPTORS: SHARP
DESCRIPTORS: ACHING
DESCRIPTORS: SHARP
DESCRIPTORS: ACHING
DESCRIPTORS: SHARP
DESCRIPTORS: ACHING

## 2024-02-22 ASSESSMENT — PAIN SCALES - GENERAL
PAINLEVEL_OUTOF10: 7
PAINLEVEL_OUTOF10: 7
PAINLEVEL_OUTOF10: 6
PAINLEVEL_OUTOF10: 8
PAINLEVEL_OUTOF10: 4
PAINLEVEL_OUTOF10: 8
PAINLEVEL_OUTOF10: 6
PAINLEVEL_OUTOF10: 6
PAINLEVEL_OUTOF10: 8
PAINLEVEL_OUTOF10: 5
PAINLEVEL_OUTOF10: 6
PAINLEVEL_OUTOF10: 6
PAINLEVEL_OUTOF10: 7
PAINLEVEL_OUTOF10: 8
PAINLEVEL_OUTOF10: 8
PAINLEVEL_OUTOF10: 7
PAINLEVEL_OUTOF10: 8
PAINLEVEL_OUTOF10: 8
PAINLEVEL_OUTOF10: 7
PAINLEVEL_OUTOF10: 8
PAINLEVEL_OUTOF10: 7

## 2024-02-22 ASSESSMENT — PAIN - FUNCTIONAL ASSESSMENT
PAIN_FUNCTIONAL_ASSESSMENT: 0-10
PAIN_FUNCTIONAL_ASSESSMENT: 0-10
PAIN_FUNCTIONAL_ASSESSMENT: PREVENTS OR INTERFERES SOME ACTIVE ACTIVITIES AND ADLS
PAIN_FUNCTIONAL_ASSESSMENT: 0-10
PAIN_FUNCTIONAL_ASSESSMENT: ACTIVITIES ARE NOT PREVENTED
PAIN_FUNCTIONAL_ASSESSMENT: PREVENTS OR INTERFERES SOME ACTIVE ACTIVITIES AND ADLS
PAIN_FUNCTIONAL_ASSESSMENT: 0-10
PAIN_FUNCTIONAL_ASSESSMENT: 0-10
PAIN_FUNCTIONAL_ASSESSMENT: PREVENTS OR INTERFERES SOME ACTIVE ACTIVITIES AND ADLS
PAIN_FUNCTIONAL_ASSESSMENT: PREVENTS OR INTERFERES SOME ACTIVE ACTIVITIES AND ADLS
PAIN_FUNCTIONAL_ASSESSMENT: 0-10

## 2024-02-22 ASSESSMENT — PAIN DESCRIPTION - PAIN TYPE
TYPE: ACUTE PAIN

## 2024-02-22 ASSESSMENT — PAIN DESCRIPTION - ONSET
ONSET: ON-GOING

## 2024-02-22 ASSESSMENT — PAIN DESCRIPTION - ORIENTATION
ORIENTATION: LEFT;MID
ORIENTATION: LEFT
ORIENTATION: LEFT;MID

## 2024-02-22 ASSESSMENT — PAIN DESCRIPTION - FREQUENCY
FREQUENCY: CONTINUOUS

## 2024-02-22 ASSESSMENT — PAIN DESCRIPTION - DIRECTION: RADIATING_TOWARDS: LEFT ARM

## 2024-02-22 NOTE — ED PROVIDER NOTES
[facility] EMERGENCY DEPT      EMERGENCY MEDICINE     Pt Name: Chriss Braga  MRN: [id number]  Birthdate 1955  Date of evaluation: 2/21/2024  Provider: RJ BREWER MD    CHIEF COMPLAINT       Chief Complaint   Patient presents with    Chest Pain     HISTORY OF PRESENT ILLNESS   Chriss Braga is a pleasant 68 y.o. male who presents to the emergency department from from home, brought in by EMS for evaluation of chest pain.  Patient states the chest pain started 1 hour prior to arrival.  He was sitting at home doing nothing.  Patient states the pain is in the left chest and feels as if someone is pushing on his chest.  He rates an 8 out of 10.  Patient states that he did try 1 nitro at home with no relief.  He was recently admitted for cardiac syncope and had to have 2 stents placed.  Patient is to follow-up with Dr. Estevez.  Patient states that he has been dizzy during the day.  When the chest pain started he became short of breath and dizzy.  He denies any diaphoresis.  He states the pain is still ongoing.  Patient does have a history of heart failure and triple bypass as well.  He denies any recent cough, congestion, nausea, vomiting, diarrhea.  Patient does state that he smoked marijuana today.  He denies any cigarette smoking or alcohol.    PASTMEDICAL HISTORY     Past Medical History:   Diagnosis Date    Anemia     Microcytic anemia.     Angina     Arthritis     Atypical chest pain     Question if this is secondary to his uncontrolled hypertension or if this is secondary to exacerbation of COPD or secondary to his recent ICD implantation.    Blood transfusion 2011    CAD (coronary artery disease)     Cardiomyopathy (HCC)     Cataract     bilateral    Chest discomfort     Depression     Dizziness     Patient complains of dizziness with movement.     Erectile dysfunction     Possible erectile dysfunction symptoms.     Family history of hypertension     Frequent nocturnal awakening     Frequent nocturnal

## 2024-02-22 NOTE — ED NOTES
ED to inpatient nurses report      Chief Complaint:  Chief Complaint   Patient presents with    Chest Pain     Present to ED from: home    MOA:     LOC: alert and orientated to name, place, date  Mobility: Requires assistance * 1  Oxygen Baseline: room air    Current needs required: room air     Code Status:   Prior    What abnormal results were found and what did you give/do to treat them? Nitroglycerin sublingual, Aspirin- chest pain  Any procedures or intervention occur? NA    Mental Status:  Level of Consciousness: Alert (0)    Psych Assessment:        Vitals:  Patient Vitals for the past 24 hrs:   BP Temp Temp src Pulse Resp SpO2 Height Weight   02/22/24 0413 121/81 -- -- 74 12 97 % -- --   02/22/24 0316 (!) 137/99 -- -- 82 12 98 % -- --   02/22/24 0231 121/88 -- -- 83 12 -- -- --   02/22/24 0145 (!) 131/95 -- -- 83 13 96 % -- --   02/22/24 0129 (!) 127/90 -- -- 80 13 96 % -- --   02/22/24 0044 (!) 133/90 -- -- 77 14 -- -- --   02/22/24 0036 (!) 144/98 -- -- 79 -- -- -- --   02/21/24 2351 (!) 163/96 97.8 °F (36.6 °C) Oral 69 13 98 % 1.753 m (5' 9\") 76.2 kg (168 lb)        LDAs:   Peripheral IV 02/21/24 Left Forearm (Active)   Site Assessment Clean, dry & intact 02/22/24 0414   Line Status Normal saline locked 02/22/24 0414   Phlebitis Assessment No symptoms 02/22/24 0414   Infiltration Assessment 0 02/22/24 0414   Dressing Status Clean, dry & intact 02/22/24 0414       Ambulatory Status:  No data recorded    Diagnosis:  DISPOSITION Admitted 02/22/2024 04:14:50 AM   Final diagnoses:   Unstable angina pectoris (HCC)        Consults:  IP CONSULT TO CARDIOLOGY     Pain Score:  Pain Assessment  Pain Assessment: 0-10  Pain Level: 7  Pain Location: Chest  Pain Descriptors: Aching    C-SSRS:   Risk of Suicide: No Risk    Sepsis Screening:  Sepsis Risk Score: 1.04    Italo Fall Risk:       Swallow Screening        Preferred Language:   English      ALLERGIES     Patient has no known allergies.    SURGICAL HISTORY

## 2024-02-22 NOTE — CONSULTS
[facility]'s  Consult    Patient's Name/Date of Birth: Chriss Braga / 1955 (68 y.o.)    Date: February 22, 2024     Referring Provider: Tino Verde MD    CHIEF COMPLAINT: Chest pain    CONSULT FOR: Chest pain history of CABG and recent PCI recurrent angina.    HPI: This is a pleasant 68 y.o. male presents with chest pain that does not radiate.  Recently had stent placement in mid January, chest pain has been ongoing for 1 day.  Chest pain now improved to 3 out of 10 with nitro drip 10 from 8 out of 10.  Patient currently resting comfortably notes no worsening or relieving factors that may contribute to his chest pain.  Describes chest pain as achy and dull.    Echo: Left Ventricle: Severely reduced left ventricular systolic function with a visually estimated EF of 25 - 30%. Left ventricle is mildly dilated. Normal wall thickness. Severe global hypokinesis present.       All labs, EKG's, diagnostic testing and images as well as cardiac cath, stress testing were reviewed during this encounter    Past Medical History:   Diagnosis Date    Anemia     Microcytic anemia.     Angina     Arthritis     Atypical chest pain     Question if this is secondary to his uncontrolled hypertension or if this is secondary to exacerbation of COPD or secondary to his recent ICD implantation.    Blood transfusion 2011    CAD (coronary artery disease)     Cardiomyopathy (HCC)     Cataract     bilateral    Chest discomfort     Depression     Dizziness     Patient complains of dizziness with movement.     Erectile dysfunction     Possible erectile dysfunction symptoms.     Family history of hypertension     Frequent nocturnal awakening     Frequent nocturnal awakenings in a patient with a history of CHF, S/P defibrillator placement and excessive daytime sleepiness, rule out sleep apnea versus central sleep apnea.     GERD (gastroesophageal reflux disease)     Currently controlled.     Glaucoma

## 2024-02-22 NOTE — ED TRIAGE NOTES
Patient presents to ED from home with C/O chest pain. Patient states it started an hour PTA when he was sitting down at home. Patient states the middle left of his chest feels very achy and rating pain 8/10.  Patient states he had a heart attack 2 weeks ago and had 2 stents placed. Patient states he became short of breath and dizzy when the chest pain started. Patient denies taking any medication PTA. Patient states he has history of heart failure and triple bypass as well as a defibrillator in place.

## 2024-02-22 NOTE — ED NOTES
Patient resting on cot. VS stable. Telemetry in place. Patient covered with blanket. Lights dimmed for comfort. Call light in reach. Patient denies any needs at this time.

## 2024-02-22 NOTE — ED NOTES
Patient resting on cot. VS stable. Telemetry in place. Call light in reach. Patient covered with blanket. Lights dimmed for comfort. Patient denies any needs at this time .

## 2024-02-22 NOTE — ED NOTES
Patient resting on cot. VS stable. Telemetry in place. Patient covered with blanket. Patient given urinal per request. Lights dimmed for comfort. Call light in reach. Patient denies any other needs at this time.

## 2024-02-22 NOTE — CARE COORDINATION
Case Management Assessment  Initial Evaluation    Date/Time of Evaluation: 2/22/2024 1:13 PM  Assessment Completed by: Laura Guardado RN    If patient is discharged prior to next notation, then this note serves as note for discharge by case management.    Patient Name: Chriss Braga                   YOB: 1955  Diagnosis: Unstable angina pectoris (HCC) [I20.0]  Chest pain [R07.9]                   Date / Time: 2/21/2024 11:45 PM  Location: [address] [place] [address]     Patient Admission Status: Inpatient   Readmission Risk Low 0-14, Mod 15-19), High > 20: Readmission Risk Score: 27.6    Current PCP: No primary care provider on file.  PCP verified by CM? Yes (Had appt today at resident clinic)    Chart Reviewed: Yes      History Provided by: Patient  Patient Orientation: Alert and Oriented    Patient Cognition: Alert    Hospitalization in the last 30 days (Readmission):  Yes    If yes, Readmission Assessment in CM Navigator will be completed.    Advance Directives:      Code Status: Full Code   Patient's Primary Decision Maker is: Patient Declined (Legal Next of Kin Remains as Decision Maker)    Primary Decision Maker: Holly Christensen - Spouse - [phone]    Discharge Planning:    Patient lives with: Alone Type of Home: House  Primary Care Giver: Self  Patient Support Systems include: Spouse/Significant Other, Friends/Neighbors, Family Members, Children   Current Financial resources: Medicare  Current community resources: None  Current services prior to admission: Durable Medical Equipment            Current DME: [facility]            Type of Home Care services:  None    ADLS  Prior functional level: Independent in ADLs/IADLs  Current functional level: Independent in ADLs/IADLs    Family can provide assistance at DC: Yes  Would you like Case Management to discuss the discharge plan with any other family members/significant others, and if so, who? No  Plans to Return to Present Housing: Yes  Other Identified

## 2024-02-22 NOTE — ED NOTES
Patient resting on cot. VS stable. Telemetry in place. Call light in reach. Patient denies any needs at this time.

## 2024-02-22 NOTE — ED NOTES
Patient resting on cot watching tv. VS stable. Call light in reach. Patient denies any needs at this time.

## 2024-02-22 NOTE — CARE COORDINATION
02/22/24 1107   Readmission Assessment   Number of Days since last admission? 8-30 days   Previous Disposition Home Alone  (was to have HH, but never established with PCP)   Who is being Interviewed Patient   What was the patient's/caregiver's perception as to why they think they needed to return back to the hospital? Other (Comment)  (chest pain)   Did you visit your Primary Care Physician after you left the hospital, before you returned this time? No   Why weren't you able to visit your PCP? Other (Comment)  (appt scheduled 2/22)   Did you see a specialist, such as Cardiac, Pulmonary, Orthopedic Physician, etc. after you left the hospital? No  (didn't show for appt due to no ride)   Who advised the patient to return to the hospital? Self-referral   Does the patient report anything that got in the way of taking their medications? No   In our efforts to provide the best possible care to you and others like you, can you think of anything that we could have done to help you after you left the hospital the first time, so that you might not have needed to return so soon? Other (Comment)  (\"nothing\")

## 2024-02-23 VITALS
BODY MASS INDEX: 23.9 KG/M2 | WEIGHT: 161.38 LBS | OXYGEN SATURATION: 99 % | HEART RATE: 63 BPM | HEIGHT: 69 IN | TEMPERATURE: 97.7 F | RESPIRATION RATE: 16 BRPM | DIASTOLIC BLOOD PRESSURE: 93 MMHG | SYSTOLIC BLOOD PRESSURE: 138 MMHG

## 2024-02-23 PROBLEM — T14.8XXA MUSCLE STRAIN: Status: ACTIVE | Noted: 2024-02-23

## 2024-02-23 LAB
ANION GAP SERPL CALC-SCNC: 10 MEQ/L (ref 8–16)
ARTERIAL PATENCY WRIST A: POSITIVE
BASE EXCESS BLDA CALC-SCNC: -0.4 MMOL/L (ref -2.5–2.5)
BDY SITE: NORMAL
BUN SERPL-MCNC: 13 MG/DL (ref 7–22)
CALCIUM SERPL-MCNC: 9.1 MG/DL (ref 8.5–10.5)
CHLORIDE SERPL-SCNC: 104 MEQ/L (ref 98–111)
CO2 SERPL-SCNC: 23 MEQ/L (ref 23–33)
COLLECTED BY:: NORMAL
CREAT SERPL-MCNC: 1 MG/DL (ref 0.4–1.2)
DEVICE: NORMAL
EKG ATRIAL RATE: 60 BPM
EKG P AXIS: 38 DEGREES
EKG P-R INTERVAL: 218 MS
EKG Q-T INTERVAL: 446 MS
EKG QRS DURATION: 92 MS
EKG QTC CALCULATION (BAZETT): 446 MS
EKG R AXIS: -13 DEGREES
EKG T AXIS: -28 DEGREES
EKG VENTRICULAR RATE: 60 BPM
GFR SERPL CREATININE-BSD FRML MDRD: > 60 ML/MIN/1.73M2
GLUCOSE SERPL-MCNC: 92 MG/DL (ref 70–108)
HCO3 BLDA-SCNC: 24 MMOL/L (ref 23–28)
HCT VFR BLD AUTO: 37 % (ref 42–52)
HGB BLD-MCNC: 11.3 GM/DL (ref 14–18)
MAGNESIUM SERPL-MCNC: 2.1 MG/DL (ref 1.6–2.4)
PCO2 BLDA: 39 MMHG (ref 35–45)
PH BLDA: 7.4 [PH] (ref 7.35–7.45)
PO2 BLDA: 87 MMHG (ref 71–104)
POTASSIUM SERPL-SCNC: 4.5 MEQ/L (ref 3.5–5.2)
SAO2 % BLDA: 97 %
SODIUM SERPL-SCNC: 137 MEQ/L (ref 135–145)

## 2024-02-23 PROCEDURE — 83735 ASSAY OF MAGNESIUM: CPT

## 2024-02-23 PROCEDURE — 97110 THERAPEUTIC EXERCISES: CPT

## 2024-02-23 PROCEDURE — 6370000000 HC RX 637 (ALT 250 FOR IP): Performed by: STUDENT IN AN ORGANIZED HEALTH CARE EDUCATION/TRAINING PROGRAM

## 2024-02-23 PROCEDURE — 82803 BLOOD GASES ANY COMBINATION: CPT

## 2024-02-23 PROCEDURE — 97162 PT EVAL MOD COMPLEX 30 MIN: CPT

## 2024-02-23 PROCEDURE — 2580000003 HC RX 258

## 2024-02-23 PROCEDURE — 80048 BASIC METABOLIC PNL TOTAL CA: CPT

## 2024-02-23 PROCEDURE — 6360000002 HC RX W HCPCS: Performed by: STUDENT IN AN ORGANIZED HEALTH CARE EDUCATION/TRAINING PROGRAM

## 2024-02-23 PROCEDURE — 93005 ELECTROCARDIOGRAM TRACING: CPT | Performed by: STUDENT IN AN ORGANIZED HEALTH CARE EDUCATION/TRAINING PROGRAM

## 2024-02-23 PROCEDURE — 36415 COLL VENOUS BLD VENIPUNCTURE: CPT

## 2024-02-23 PROCEDURE — 6370000000 HC RX 637 (ALT 250 FOR IP)

## 2024-02-23 PROCEDURE — 85018 HEMOGLOBIN: CPT

## 2024-02-23 PROCEDURE — 37799 UNLISTED PX VASCULAR SURGERY: CPT

## 2024-02-23 PROCEDURE — 97165 OT EVAL LOW COMPLEX 30 MIN: CPT

## 2024-02-23 PROCEDURE — 85014 HEMATOCRIT: CPT

## 2024-02-23 RX ORDER — CYCLOBENZAPRINE HCL 10 MG
10 TABLET ORAL 3 TIMES DAILY PRN
Status: DISCONTINUED | OUTPATIENT
Start: 2024-02-23 | End: 2024-02-23 | Stop reason: HOSPADM

## 2024-02-23 RX ORDER — OXYCODONE HYDROCHLORIDE 5 MG/1
5 TABLET ORAL EVERY 4 HOURS PRN
Status: DISCONTINUED | OUTPATIENT
Start: 2024-02-23 | End: 2024-02-23 | Stop reason: HOSPADM

## 2024-02-23 RX ORDER — MORPHINE SULFATE 2 MG/ML
1 INJECTION, SOLUTION INTRAMUSCULAR; INTRAVENOUS
Status: DISCONTINUED | OUTPATIENT
Start: 2024-02-23 | End: 2024-02-23 | Stop reason: HOSPADM

## 2024-02-23 RX ORDER — CYCLOBENZAPRINE HCL 10 MG
10 TABLET ORAL 3 TIMES DAILY PRN
Qty: 30 TABLET | Refills: 0 | Status: SHIPPED | OUTPATIENT
Start: 2024-02-23 | End: 2024-03-04

## 2024-02-23 RX ORDER — FERROUS SULFATE 325(65) MG
325 TABLET ORAL EVERY OTHER DAY
Qty: 30 TABLET | Refills: 3 | Status: SHIPPED | OUTPATIENT
Start: 2024-02-23

## 2024-02-23 RX ORDER — OXYCODONE HYDROCHLORIDE AND ACETAMINOPHEN 5; 325 MG/1; MG/1
1 TABLET ORAL EVERY 4 HOURS PRN
Qty: 18 TABLET | Refills: 0 | Status: SHIPPED | OUTPATIENT
Start: 2024-02-23 | End: 2024-02-26

## 2024-02-23 RX ADMIN — MORPHINE SULFATE 2 MG: 2 INJECTION, SOLUTION INTRAMUSCULAR; INTRAVENOUS at 05:34

## 2024-02-23 RX ADMIN — METOPROLOL SUCCINATE 25 MG: 25 TABLET, EXTENDED RELEASE ORAL at 09:19

## 2024-02-23 RX ADMIN — ASPIRIN 81 MG 81 MG: 81 TABLET ORAL at 09:16

## 2024-02-23 RX ADMIN — ATORVASTATIN CALCIUM 80 MG: 80 TABLET, FILM COATED ORAL at 09:19

## 2024-02-23 RX ADMIN — MORPHINE SULFATE 2 MG: 2 INJECTION, SOLUTION INTRAMUSCULAR; INTRAVENOUS at 02:30

## 2024-02-23 RX ADMIN — AMIODARONE HYDROCHLORIDE 200 MG: 200 TABLET ORAL at 09:19

## 2024-02-23 RX ADMIN — APIXABAN 5 MG: 5 TABLET, FILM COATED ORAL at 09:19

## 2024-02-23 RX ADMIN — CYCLOBENZAPRINE 10 MG: 10 TABLET, FILM COATED ORAL at 16:13

## 2024-02-23 RX ADMIN — OXYCODONE 5 MG: 5 TABLET ORAL at 09:15

## 2024-02-23 RX ADMIN — SODIUM CHLORIDE, PRESERVATIVE FREE 10 ML: 5 INJECTION INTRAVENOUS at 09:25

## 2024-02-23 RX ADMIN — MORPHINE SULFATE 1 MG: 2 INJECTION, SOLUTION INTRAMUSCULAR; INTRAVENOUS at 12:07

## 2024-02-23 RX ADMIN — RANOLAZINE 1000 MG: 500 TABLET, EXTENDED RELEASE ORAL at 09:19

## 2024-02-23 RX ADMIN — CLOPIDOGREL BISULFATE 75 MG: 75 TABLET ORAL at 09:16

## 2024-02-23 RX ADMIN — PANTOPRAZOLE SODIUM 40 MG: 40 TABLET, DELAYED RELEASE ORAL at 05:33

## 2024-02-23 ASSESSMENT — PAIN DESCRIPTION - LOCATION
LOCATION: CHEST

## 2024-02-23 ASSESSMENT — PAIN DESCRIPTION - DESCRIPTORS
DESCRIPTORS: ACHING
DESCRIPTORS: SHARP

## 2024-02-23 ASSESSMENT — PAIN DESCRIPTION - PAIN TYPE
TYPE: ACUTE PAIN
TYPE: ACUTE PAIN

## 2024-02-23 ASSESSMENT — PAIN DESCRIPTION - FREQUENCY
FREQUENCY: CONTINUOUS
FREQUENCY: CONTINUOUS

## 2024-02-23 ASSESSMENT — PAIN SCALES - GENERAL
PAINLEVEL_OUTOF10: 8
PAINLEVEL_OUTOF10: 6
PAINLEVEL_OUTOF10: 6
PAINLEVEL_OUTOF10: 7
PAINLEVEL_OUTOF10: 7
PAINLEVEL_OUTOF10: 6
PAINLEVEL_OUTOF10: 8
PAINLEVEL_OUTOF10: 8
PAINLEVEL_OUTOF10: 6

## 2024-02-23 ASSESSMENT — PAIN DESCRIPTION - ORIENTATION
ORIENTATION: LEFT

## 2024-02-23 ASSESSMENT — PAIN DESCRIPTION - ONSET
ONSET: ON-GOING
ONSET: ON-GOING

## 2024-02-23 NOTE — DISCHARGE INSTRUCTIONS
Your chest pain does not appear to be cardiac. Due to the tenderness in your left chest wall this is likely muscle strain. There were no findings on your CT that were concerning for fracture. Follow up with your Family Doctor, Dr. Dillon in one week.  We are discharging you on Percocet 1 tablet every 4 hours for pain and Flexeril 1 tablet every 8 hours for muscle spasms. Both of these tablets can cause drowsiness and poor concentration. So do not drive or do anything that requires good mental function while taking these.     F/up dr funes 4 weeks  Continue taking Eliquis 5 mg twice daily. Also continue taking your clopidogrel (Plavix)  Stop taking aspirin. Follow up with Dr. Funes to see if you need to stay on the Plavix. You may be fine just taking Eliquis.   Make sure to refill your Nitro tabs every 6 months if the bottle is open and every year if it is unopened. The tablets break down over time.   We will have you see Dr Elliott in Neurosurgery and Dr. Clark in endocrinology regarding your pituitary mass.

## 2024-02-23 NOTE — CARE COORDINATION
2/23/24, 9:25 AM EST    DISCHARGE ON GOING EVALUATION    Chriss RYAN [facility] day: 1  Location: [facility]22/022A Reason for admit: Unstable angina pectoris (HCC) [I20.0]  Chest pain [R07.9]   Procedure: none    Barriers to Discharge: Hospitalist and Cardiology following. PT/OT consulted. Urine tox screen yesterday: positive for Cannabinoids.     PCP: No primary care provider on file.  Readmission Risk Score: 24.9%  Patient Goals/Plan/Treatment Preferences: From home alone. [facility] once he gets set up with PCP.     Anticipate discharge today or this weekend.     2/23/24, 9:29 AM EST    Patient goals/plan/ treatment preferences discussed by  and .  Patient goals/plan/ treatment preferences reviewed with patient/ family.  Patient/ family verbalize understanding of discharge plan and are in agreement with goal/plan/treatment preferences.  Understanding was demonstrated using the teach back method.  AVS provided by RN at time of discharge, which includes all necessary medical information pertaining to the patients current course of illness, treatment, post-discharge goals of care, and treatment preferences.     Services At/After Discharge: None       IMM Letter  IMM Letter given to Patient/Family/Significant other/Guardian/POA/by:: Laura JO CM  IMM Letter date given:: 02/23/24  IMM Letter time given:: 1002

## 2024-02-23 NOTE — PROGRESS NOTES
CLINICAL PHARMACY: DISCHARGE MED RECONCILIATION/REVIEW    [facility] Patient?: No  Total # of Interventions Recommended: 5 -   - New Order #: 1  - Updated Order #: 4     Total # Interventions Accepted: 5  Intervention Severity:   - Level 1 Intervention Present?: No   - Level 2 #: 5   - Level 3 #: 0   Time Spent (min): 30    Bridget Jaquez PharmD 2/23/2024 3:17 PM  
Discharge Note:      All discharge instructions reviewed at this time .  patient belongings returned to patient. PIV removed x2 . Medications were dropped off by outpatient pharmacy. All follow up appointments required reviewed. Patient aware. Pt denies any further questions regarding discharge at this time  Pt wheeled out to front discharge doors at this time.     Pt left premises without any issues in private vehicle at this time.    
Encounter Date: 9/30/2017    ED Physician Progress Notes       SCRIBE NOTE: I, Berna Harper, am scribing for, and in the presence of,  Dr. Holman.  Physician Statement: I, Dr. Holman, personally performed the services described in this documentation as scribed by Berna Harper in my presence, and it is both accurate and complete.      EKG - STEMI Decision  Initial Reading: No STEMI present.      
Patient brought to unit from ED via stretcher. Patient ambulated to bed and tolerated well. Assisted primary RN with admission and obtained vital signs. Chico Delatorre  
Patient educated on how to use incentive spirometer. Patient verbalized understanding and demonstrated proper use. Emphasized importance and usage of device, with coughing and deep breathing every 2 hours while awake.        
Patient educated on how to use incentive spirometer. Patient verbalized understanding and demonstrated proper use. Emphasized importance and usage of device, with coughing and deep breathing every 2 hours while awake. Patient is able to use independently without additional assistance from staff  .       
Pharmacy Medication History Note    List of current medications patient is taking is complete.    Source of information: Patient, Epic external fill hx, [facility] ([facility]), [facility], [facility]    Changes made to medication list:  Medications removed:   Amiodarone 200 mg daily- on medication list from 10/16/2012, unable to verify as active medication at any of the pharmacies pt fills at: [facility], [facility] and [facility]    Last Fills   [facility] 2/12/2024 -- ranolazine, doxycycline (6 days supply).      Patient reports getting medications from [facility] but pharmacy      confirmed has only filled ranolazine and doxycyline  [facility] 1/22/2024 -- atorvastatin, clopidogrel, doxycycline, metoprolol succinate (all for a 30 day supply)   [facility] 10/12/23 -- amlodipine, aspirin, carvedilol, farxiga, isosorbide, nitroglycerin, entresto, and spironolactone- these medications were removed from active medication list due to refill history does not support patient having mediation available to take    [facility] -- unable to get ahold of pharmacy. Eliquis last fill was 6/27/2023 through [facility], but unable to reach phone number provided.   Patient reports taking all medications at home but suspected noncompliance. Patient has had several hospital stays over the last two months, as well as March 2023 - October 2023.   External fill history also not showing amiodarone and no pharmacies were able to confirm a previous fill of amiodarone.     Other notes (ex. Recent course of antibiotics, Coumadin dosing):  Denies use of other OTC or herbal medications.    Allergies reviewed    Electronically signed by Cassy NeilD. Candidate    on 2/23/2024 at 11:11 AM       
Report given to SANDRO Main. Patient resting in bed comfortably, watching TV. [facility] Hospital[facility]re  
[facility]  INPATIENT OCCUPATIONAL THERAPY  STRZ CCU-STEPDOWN 3B  EVALUATION    Time:    Time In: 08  Time Out: 08  Timed Code Treatment Minutes: 0 Minutes  Minutes: 10          Date: 2024  Patient Name: Chriss Braga,   Gender: male      MRN: [id number]  : 1955  (68 y.o.)  Referring Practitioner: August Diego DO  Diagnosis: unstable angina pectoris  Additional Pertinent Hx: Per H & P:68 y.o. male with a PMH of CAD, ICM, AF, HTN, HLD, anemia who presented with chest pain.    Restrictions/Precautions:  Restrictions/Precautions: Fall Risk    Subjective  Chart Reviewed: Yes, Orders, Progress Notes, History and Physical  Patient assessed for rehabilitation services?: Yes  Family / Caregiver Present: No    Subjective: agreeable to session then hard to engage in tasks    Pain: some/10: sternal    Vitals: Heart Rate: 75    Social/Functional History:  Lives With: Alone  Type of Home: House  Home Layout: Two level, Bed/Bath upstairs  Home Access: Stairs to enter with rails (3)  Home Equipment: Cane   Bathroom Shower/Tub: Walk-in shower  Bathroom Toilet: Handicap height       ADL Assistance: Independent  Homemaking Assistance: Independent  Ambulation Assistance: Independent  Transfer Assistance: Independent    Active : Yes  Patient's  Info: \"Can drive, but usually have a friend take me\"     Additional Comments: Pt reports fully indep PLOF without AD most of the time, only occassionally uses cane.    VISION:WFL    HEARING:  WFL    COGNITION: WFL    RANGE OF MOTION:  Bilateral Upper Extremity:  WFL    STRENGTH:  Bilateral Upper Extremity:  WFL    SENSATION:   WFL    ADL:   Footwear Management: Stand By Assistance.  Able to cross BLE up to adjust slipper socks .  **Pt declined further ADLs at this time.     IADL:   Not Tested    BALANCE:  Sitting Balance:  Independent.    Standing Balance: Stand By Assistance. -CGA    BED MOBILITY:  Supine to Sit: Stand By Assistance with HOB 
life.    Assessment:  Body Structures, Functions, Activity Limitations Requiring Skilled Therapeutic Intervention: Decreased functional mobility , Decreased endurance, Decreased balance, Decreased strength  Assessment: Chriss Braga is a 68 y.o. male that presents with chest pain. Pt demonstrates a decrease in baseline by way of bed mobility, transfers and ambulation secondary to decreased activity tolerance, strength, fatigue, and balance deficits. Pt will benefit from skilled PT services throughout admission and beyond hospital discharge for improvements in functional mobility and in order to decrease fall risk and return pt to OF.     Therapy Prognosis: Good    Requires PT Follow-Up: Yes    Discharge Recommendations:  Discharge Recommendations: Home with assist PRN    Patient Education:      .    Patient Education  Education Given To: Patient  Education Provided: Role of Therapy, Plan of Care  Education Method: Verbal  Barriers to Learning: None  Education Outcome: Verbalized understanding       Equipment Recommendations:  Equipment Needed: No    Plan:  Current Treatment Recommendations: Strengthening, Balance training, Gait training, Functional mobility training, Stair training, Transfer training, Endurance training, Patient/Caregiver education & training, Safety education & training, Therapeutic activities  General Plan:  (1-2x GM)    Goals:  Patient Goals : to go home  Short Term Goals  Time Frame for Short Term Goals: by discharge  Short Term Goal 1: Pt to transfer supine <--> sit mod I to enable pt to get in/out of bed.  Short Term Goal 2: Pt to transfer sit <--> stand mod I for increased functional mobility.  Short Term Goal 3: Pt to ambulate >300 feet without AD mod I for community re-entry.  Long Term Goals  Time Frame for Long Term Goals : NA due to short length of stay.    Following session, patient left in safe position with all fall risk precautions in place.

## 2024-02-23 NOTE — DISCHARGE SUMMARY
PM    XR CHEST PORTABLE    Result Date: 2/22/2024  1 view chest x-ray Comparison: 02/08/2024 Findings: Dual lead pacer/AICD via left subclavian approach appears intact and unchanged from comparison. Status post CABG. Mild cardiomegaly. Mild hypoventilatory changes. Extrinsic module seen over the right medial chest. There is no acute congestion, infiltrate or effusion currently. Minimal chronic scarring suggested in the right medial base. Partially visualized plate and screw fixation of the midcervical spine. Mild osteopenia. Intact skeletal structures.     Impression: No significant acute changes. This document has been electronically signed by: Sony Paz III, MD on 02/22/2024 12:38 AM      Labs:   Recent Results (from the past 72 hour(s))   EKG 12 Lead (Chest Pain)    Collection Time: 02/21/24 11:41 PM   Result Value Ref Range    Ventricular Rate 67 BPM    Atrial Rate 67 BPM    P-R Interval 176 ms    QRS Duration 88 ms    Q-T Interval 394 ms    QTc Calculation (Bazett) 416 ms    P Axis 42 degrees    R Axis 16 degrees    T Axis -8 degrees   CBC with Auto Differential    Collection Time: 02/21/24 11:52 PM   Result Value Ref Range    WBC 10.0 4.8 - 10.8 thou/mm3    RBC 5.55 4.70 - 6.10 mill/mm3    Hemoglobin 12.2 (L) 14.0 - 18.0 gm/dl    Hematocrit 39.2 (L) 42.0 - 52.0 %    MCV 70.6 (L) 80.0 - 94.0 fL    MCH 22.0 (L) 26.0 - 33.0 pg    MCHC 31.1 (L) 32.2 - 35.5 gm/dl    RDW-CV 16.3 (H) 11.5 - 14.5 %    RDW-SD 40.6 35.0 - 45.0 fL    Platelets 257 130 - 400 thou/mm3    MPV 9.1 (L) 9.4 - 12.4 fL    Seg Neutrophils 70.9 %    Lymphocytes 20.0 %    Monocytes 6.7 %    Eosinophils 0.9 %    Basophils 0.4 %    Immature Granulocytes 1.1 %    Segs Absolute 7.1 1.8 - 7.7 thou/mm3    Lymphocytes Absolute 2.0 1.0 - 4.8 thou/mm3    Monocytes Absolute 0.7 0.4 - 1.3 thou/mm3    Eosinophils Absolute 0.1 0.0 - 0.4 thou/mm3    Basophils Absolute 0.0 0.0 - 0.1 thou/mm3    Immature Grans (Abs) 0.11 (H) 0.00 - 0.07 thou/mm3    nRBC 0

## 2024-02-23 NOTE — PLAN OF CARE
Problem: Discharge Planning  Goal: Discharge to home or other facility with appropriate resources  2/22/2024 2350 by Maria Luz Dunn RN  Outcome: Progressing  Flowsheets (Taken 2/22/2024 2350)  Discharge to home or other facility with appropriate resources:   Identify barriers to discharge with patient and caregiver   Identify discharge learning needs (meds, wound care, etc)   Refer to discharge planning if patient needs post-hospital services based on physician order or complex needs related to functional status, cognitive ability or social support system   Arrange for needed discharge resources and transportation as appropriate     Problem: Pain  Goal: Verbalizes/displays adequate comfort level or baseline comfort level  2/22/2024 2350 by Maria Luz Dunn RN  Outcome: Progressing  Flowsheets  Taken 2/22/2024 2350 by Maria Luz Dunn RN  Verbalizes/displays adequate comfort level or baseline comfort level:   Encourage patient to monitor pain and request assistance   Administer analgesics based on type and severity of pain and evaluate response   Consider cultural and social influences on pain and pain management   Assess pain using appropriate pain scale   Implement non-pharmacological measures as appropriate and evaluate response  Taken 2/22/2024 2320 by Iron Lei RN  Verbalizes/displays adequate comfort level or baseline comfort level:   Encourage patient to monitor pain and request assistance   Assess pain using appropriate pain scale   Administer analgesics based on type and severity of pain and evaluate response   Consider cultural and social influences on pain and pain management   Implement non-pharmacological measures as appropriate and evaluate response   Notify Licensed Independent Practitioner if interventions unsuccessful or patient reports new pain  Note: Ongoing assessment & interventions provided throughout shift.  Reminded patient to report any pain, pressure, or shortness of breath to the 
Cardiology consulted, awaiting recommendations, no procedures today.      Problem: Cardiovascular - Adult  Goal: Absence of cardiac dysrhythmias or at baseline  Outcome: Progressing  Flowsheets (Taken 2/22/2024 0803)  Absence of cardiac dysrhythmias or at baseline: Monitor cardiac rate and rhythm  Note: HR remains sinus rhythm per telemetry.      Problem: Metabolic/Fluid and Electrolytes - Adult  Goal: Electrolytes maintained within normal limits  Outcome: Progressing  Flowsheets (Taken 2/22/2024 0803)  Electrolytes maintained within normal limits: Monitor labs and assess patient for signs and symptoms of electrolyte imbalances  Note: Monitor labs, IV potassium given today per physician order.      Problem: Chronic Conditions and Co-morbidities  Goal: Patient's chronic conditions and co-morbidity symptoms are monitored and maintained or improved  Outcome: Progressing  Flowsheets (Taken 2/22/2024 0803)  Care Plan - Patient's Chronic Conditions and Co-Morbidity Symptoms are Monitored and Maintained or Improved: Monitor and assess patient's chronic conditions and comorbid symptoms for stability, deterioration, or improvement   Care plan reviewed with patient.  Patient  verbalize understanding of the plan of care and contribute to goal setting.     
dysrhythmias or at baseline: Monitor cardiac rate and rhythm     Problem: Chronic Conditions and Co-morbidities  Goal: Patient's chronic conditions and co-morbidity symptoms are monitored and maintained or improved  Outcome: Progressing  Flowsheets (Taken 2/23/2024 0000 by Iron Lei RN)  Care Plan - Patient's Chronic Conditions and Co-Morbidity Symptoms are Monitored and Maintained or Improved:   Monitor and assess patient's chronic conditions and comorbid symptoms for stability, deterioration, or improvement   Collaborate with multidisciplinary team to address chronic and comorbid conditions and prevent exacerbation or deterioration   Update acute care plan with appropriate goals if chronic or comorbid symptoms are exacerbated and prevent overall improvement and discharge     
with full range of motion. Trachea midline.  No gross JVD appreciated.  Respiratory: CTA bilateral, no wheeze, rales, rhonchi, normal effort  Cardiovascular: RRR w/ normal S1/S2 without murmurs.    No lower extremity edema.   Abdomen: Soft, non-tender, non-distended with normal bowel sounds.  Musculoskeletal: No joint swelling or tenderness. Normal tone. No abnormal movements.  10 cm circular area, left chest, hypertonic muscle texture, TTP,  Skin: Warm and dry. No rashes or lesions.  Neurologic:  No focal sensory/motor deficits in the upper or lower extremities. Cranial nerves:  grossly non-focal 2-12.     Psychiatric: Alert and oriented, normal insight and thought content.   Capillary Refill: Brisk,< 3 seconds.  Peripheral Pulses: +2 palpable, equal bilaterally.       Labs:   Recent Labs     02/21/24 2352 02/22/24  0709   WBC 10.0 7.7   HGB 12.2* 11.0*   HCT 39.2* 35.7*    230     Recent Labs     02/21/24  2352 02/22/24  0709    137   K 3.9 4.2    103   CO2 25 23   BUN 15 14   CREATININE 0.9 0.9   CALCIUM 9.5 8.8     Recent Labs     02/21/24  2352   AST 21   ALT 25   BILITOT 0.6   ALKPHOS 75     Recent Labs     02/22/24  0709   INR 1.08     No results for input(s): \"TROPONINT\" in the last 72 hours.  No results for input(s): \"PROCAL\" in the last 72 hours.   Lab Results   Component Value Date/Time    NITRU NEGATIVE 02/09/2024 03:37 AM    WBCUA 25-50 01/07/2012 05:00 PM    BACTERIA NONE 01/07/2012 05:00 PM    RBCUA 25-50 01/07/2012 05:00 PM    BLOODU NEGATIVE 02/09/2024 03:37 AM    SPECGRAV >1.030 11/23/2016 05:05 PM    GLUCOSEU NEGATIVE 02/09/2024 03:37 AM       Radiology:  see assessment and plan for discussion of pertinent imaging.  (Use [id number] dot phrase for last 24 hrs)  XR CHEST PORTABLE    Result Date: 2/22/2024  1 view chest x-ray Comparison: 02/08/2024 Findings: Dual lead pacer/AICD via left subclavian approach appears intact and unchanged from comparison. Status post CABG. Mild

## 2024-02-27 ENCOUNTER — HOSPITAL ENCOUNTER (EMERGENCY)
Age: 69
Discharge: HOME | End: 2024-02-27
Attending: EMERGENCY MEDICINE
Payer: MEDICARE

## 2024-02-27 ENCOUNTER — APPOINTMENT (OUTPATIENT)
Dept: GENERAL RADIOLOGY | Age: 69
End: 2024-02-27
Payer: MEDICARE

## 2024-02-27 VITALS
OXYGEN SATURATION: 98 % | HEART RATE: 71 BPM | SYSTOLIC BLOOD PRESSURE: 142 MMHG | TEMPERATURE: 98 F | RESPIRATION RATE: 12 BRPM | DIASTOLIC BLOOD PRESSURE: 94 MMHG

## 2024-02-27 DIAGNOSIS — U07.1 COVID-19: ICD-10-CM

## 2024-02-27 DIAGNOSIS — R07.9 CHEST PAIN, UNSPECIFIED TYPE: Primary | ICD-10-CM

## 2024-02-27 LAB
ALBUMIN SERPL BCG-MCNC: 4.1 G/DL (ref 3.5–5.1)
ALP SERPL-CCNC: 93 U/L (ref 38–126)
ALT SERPL W/O P-5'-P-CCNC: 19 U/L (ref 11–66)
ANION GAP SERPL CALC-SCNC: 11 MEQ/L (ref 8–16)
APTT PPP: 34.1 SECONDS (ref 22–38)
AST SERPL-CCNC: 20 U/L (ref 5–40)
BASOPHILS ABSOLUTE: 0 THOU/MM3 (ref 0–0.1)
BASOPHILS NFR BLD AUTO: 0.3 %
BILIRUB CONJ SERPL-MCNC: < 0.2 MG/DL (ref 0–0.3)
BILIRUB SERPL-MCNC: 0.8 MG/DL (ref 0.3–1.2)
BUN SERPL-MCNC: 17 MG/DL (ref 7–22)
CALCIUM SERPL-MCNC: 9.4 MG/DL (ref 8.5–10.5)
CHLORIDE SERPL-SCNC: 104 MEQ/L (ref 98–111)
CO2 SERPL-SCNC: 24 MEQ/L (ref 23–33)
CREAT SERPL-MCNC: 1.1 MG/DL (ref 0.4–1.2)
D DIMER PPP IA.FEU-MCNC: 372 NG/ML FEU (ref 0–500)
DEPRECATED RDW RBC AUTO: 40.3 FL (ref 35–45)
EOSINOPHIL NFR BLD AUTO: 1.1 %
EOSINOPHILS ABSOLUTE: 0.1 THOU/MM3 (ref 0–0.4)
ERYTHROCYTE [DISTWIDTH] IN BLOOD BY AUTOMATED COUNT: 16.4 % (ref 11.5–14.5)
FLUAV RNA RESP QL NAA+PROBE: NOT DETECTED
FLUBV RNA RESP QL NAA+PROBE: NOT DETECTED
GFR SERPL CREATININE-BSD FRML MDRD: > 60 ML/MIN/1.73M2
GLUCOSE SERPL-MCNC: 103 MG/DL (ref 70–108)
HCT VFR BLD AUTO: 39.9 % (ref 42–52)
HGB BLD-MCNC: 12.2 GM/DL (ref 14–18)
IMM GRANULOCYTES # BLD AUTO: 0.05 THOU/MM3 (ref 0–0.07)
IMM GRANULOCYTES NFR BLD AUTO: 0.7 %
INR PPP: 1 (ref 0.85–1.13)
LYMPHOCYTES ABSOLUTE: 1.6 THOU/MM3 (ref 1–4.8)
LYMPHOCYTES NFR BLD AUTO: 20.4 %
MCH RBC QN AUTO: 21.8 PG (ref 26–33)
MCHC RBC AUTO-ENTMCNC: 30.6 GM/DL (ref 32.2–35.5)
MCV RBC AUTO: 71.3 FL (ref 80–94)
MONOCYTES ABSOLUTE: 0.5 THOU/MM3 (ref 0.4–1.3)
MONOCYTES NFR BLD AUTO: 7.1 %
NEUTROPHILS NFR BLD AUTO: 70.4 %
NRBC BLD AUTO-RTO: 0 /100 WBC
NT-PROBNP SERPL IA-MCNC: 850.3 PG/ML (ref 0–124)
OSMOLALITY SERPL CALC.SUM OF ELEC: 279.3 MOSMOL/KG (ref 275–300)
PLATELET # BLD AUTO: 239 THOU/MM3 (ref 130–400)
PMV BLD AUTO: 9 FL (ref 9.4–12.4)
POTASSIUM SERPL-SCNC: 4.4 MEQ/L (ref 3.5–5.2)
PROT SERPL-MCNC: 7.6 G/DL (ref 6.1–8)
RBC # BLD AUTO: 5.6 MILL/MM3 (ref 4.7–6.1)
SARS-COV-2 RNA RESP QL NAA+PROBE: DETECTED
SEGMENTED NEUTROPHILS ABSOLUTE COUNT: 5.4 THOU/MM3 (ref 1.8–7.7)
SODIUM SERPL-SCNC: 139 MEQ/L (ref 135–145)
TROPONIN, HIGH SENSITIVITY: 13 NG/L (ref 0–12)
TROPONIN, HIGH SENSITIVITY: 13 NG/L (ref 0–12)
WBC # BLD AUTO: 7.6 THOU/MM3 (ref 4.8–10.8)

## 2024-02-27 PROCEDURE — 85610 PROTHROMBIN TIME: CPT

## 2024-02-27 PROCEDURE — 99285 EMERGENCY DEPT VISIT HI MDM: CPT

## 2024-02-27 PROCEDURE — 71046 X-RAY EXAM CHEST 2 VIEWS: CPT

## 2024-02-27 PROCEDURE — 87636 SARSCOV2 & INF A&B AMP PRB: CPT

## 2024-02-27 PROCEDURE — 84484 ASSAY OF TROPONIN QUANT: CPT

## 2024-02-27 PROCEDURE — 80076 HEPATIC FUNCTION PANEL: CPT

## 2024-02-27 PROCEDURE — 85379 FIBRIN DEGRADATION QUANT: CPT

## 2024-02-27 PROCEDURE — 93005 ELECTROCARDIOGRAM TRACING: CPT | Performed by: EMERGENCY MEDICINE

## 2024-02-27 PROCEDURE — 6370000000 HC RX 637 (ALT 250 FOR IP): Performed by: STUDENT IN AN ORGANIZED HEALTH CARE EDUCATION/TRAINING PROGRAM

## 2024-02-27 PROCEDURE — 83880 ASSAY OF NATRIURETIC PEPTIDE: CPT

## 2024-02-27 PROCEDURE — 85025 COMPLETE CBC W/AUTO DIFF WBC: CPT

## 2024-02-27 PROCEDURE — 80048 BASIC METABOLIC PNL TOTAL CA: CPT

## 2024-02-27 PROCEDURE — 36415 COLL VENOUS BLD VENIPUNCTURE: CPT

## 2024-02-27 PROCEDURE — 85730 THROMBOPLASTIN TIME PARTIAL: CPT

## 2024-02-27 RX ORDER — ASPIRIN 81 MG/1
324 TABLET, CHEWABLE ORAL ONCE
Status: COMPLETED | OUTPATIENT
Start: 2024-02-27 | End: 2024-02-27

## 2024-02-27 RX ADMIN — ASPIRIN 81 MG 324 MG: 81 TABLET ORAL at 14:46

## 2024-02-27 ASSESSMENT — HEART SCORE: ECG: 0

## 2024-02-27 NOTE — ED PROVIDER NOTES
[facility] EMERGENCY DEPARTMENT - VISIT NOTE    Patient Name: Chriss Braga  MRN: [id number]  YOB: 1955  Date of Evaluation: 2/27/2024  Treating Resident Physician: Sixto Ware MD  Supervising Physician: Jonah Aponte DO     CHIEF COMPLAINT       Chief Complaint   Patient presents with    Chest Pain       HISTORY OF PRESENT ILLNESS    HPI    History obtained from chart review and the patient.    Chriss is a 68 y.o. old male who presents to the emergency department by Ambulance chest pain.  Patient reports he felt fine yesterday and reports taking his medications as prescribed.  Has not yet followed up with primary care or cardiology since his last discharge.  He reports that this morning when he got up around 11 AM started having chest pain felt like somebody was standing on his chest, similar to his last heart attack.  He reports that he has been taking his apixaban and clopidogrel but was taken off aspirin.  He received nitroglycerin with EMS and route with minimal improvement in his symptoms.  Patient reports he quit smoking on January 1 of this year, does smoke marijuana cigarette daily but denies other substance use including cocaine and alcohol.    Chart reviewed, notable for recent hospital admission from February 21 to 23 for chest pain likely musculoskeletal, chronic heart failure, paroxysmal atrial fibrillation .  Patient has an extensive coronary artery history with coronary artery disease, dual implantable cardioverter defibrillator, ischemic cardiomyopathy, polysubstance use, recent STEMI with PCI to OM1 on 1/17/24.  Since then has had 3 additional inpatient admissions for chest pain and syncope and collapse.    REVIEW OF SYSTEMS   Review of Systems  Negative unless documented in HPI    PAST MEDICAL AND SURGICAL HISTORY   Chriss  has a past medical history of Anemia, Angina, Arthritis, Atypical chest pain, Blood transfusion (2011), CAD (coronary artery disease), Cardiomyopathy (HCC), Cataract,

## 2024-02-27 NOTE — ED TRIAGE NOTES
Pt arrived to ED via EMS with complaints of chest pain. States symptoms started approx an hour ago Treatment prior to arrival includes x1 nitro. EKG completed and IV access obtained. Telemetry applied. Pt appears to be in stable condition with respirations even and unlabored. Pts call light in reach.

## 2024-02-27 NOTE — ED NOTES
[facility] pacer interrogate is currently being used by another RN for another pt. EKG to come assist with readings. Pt in stable condition

## 2024-02-27 NOTE — ED NOTES
Pt medicated per MAR. Remains in stable condition with call light in reach. Denies any further needs at this time.

## 2024-02-28 LAB
EKG ATRIAL RATE: 82 BPM
EKG P AXIS: 78 DEGREES
EKG P-R INTERVAL: 164 MS
EKG Q-T INTERVAL: 380 MS
EKG QRS DURATION: 72 MS
EKG QTC CALCULATION (BAZETT): 443 MS
EKG R AXIS: 22 DEGREES
EKG T AXIS: -6 DEGREES
EKG VENTRICULAR RATE: 82 BPM

## 2024-02-28 PROCEDURE — 93010 ELECTROCARDIOGRAM REPORT: CPT | Performed by: INTERNAL MEDICINE

## 2024-03-07 LAB — ECHO BSA: 1.9 M2

## 2024-03-22 PROBLEM — R79.89 ELEVATED TROPONIN: Status: RESOLVED | Noted: 2024-02-21 | Resolved: 2024-03-22

## 2024-06-04 ENCOUNTER — APPOINTMENT (OUTPATIENT)
Dept: GENERAL RADIOLOGY | Age: 69
End: 2024-06-04
Payer: MEDICARE

## 2024-06-04 ENCOUNTER — HOSPITAL ENCOUNTER (EMERGENCY)
Age: 69
Discharge: HOME | End: 2024-06-04
Attending: EMERGENCY MEDICINE
Payer: MEDICARE

## 2024-06-04 VITALS
OXYGEN SATURATION: 96 % | HEART RATE: 63 BPM | WEIGHT: 161 LBS | SYSTOLIC BLOOD PRESSURE: 137 MMHG | TEMPERATURE: 98 F | DIASTOLIC BLOOD PRESSURE: 83 MMHG | BODY MASS INDEX: 23.78 KG/M2 | RESPIRATION RATE: 16 BRPM

## 2024-06-04 DIAGNOSIS — R07.9 CHEST PAIN, UNSPECIFIED TYPE: Primary | ICD-10-CM

## 2024-06-04 LAB
ALBUMIN SERPL BCG-MCNC: 4 G/DL (ref 3.5–5.1)
ALP SERPL-CCNC: 77 U/L (ref 38–126)
ALT SERPL W/O P-5'-P-CCNC: 10 U/L (ref 11–66)
ANION GAP SERPL CALC-SCNC: 12 MEQ/L (ref 8–16)
AST SERPL-CCNC: 15 U/L (ref 5–40)
BASOPHILS ABSOLUTE: 0 THOU/MM3 (ref 0–0.1)
BASOPHILS NFR BLD AUTO: 0.5 %
BILIRUB CONJ SERPL-MCNC: < 0.2 MG/DL (ref 0–0.3)
BILIRUB SERPL-MCNC: 0.6 MG/DL (ref 0.3–1.2)
BUN SERPL-MCNC: 12 MG/DL (ref 7–22)
CALCIUM SERPL-MCNC: 9.3 MG/DL (ref 8.5–10.5)
CHLORIDE SERPL-SCNC: 106 MEQ/L (ref 98–111)
CO2 SERPL-SCNC: 22 MEQ/L (ref 23–33)
CREAT SERPL-MCNC: 1.1 MG/DL (ref 0.4–1.2)
DEPRECATED RDW RBC AUTO: 38.5 FL (ref 35–45)
EKG ATRIAL RATE: 72 BPM
EKG P AXIS: 75 DEGREES
EKG P-R INTERVAL: 164 MS
EKG Q-T INTERVAL: 422 MS
EKG QRS DURATION: 90 MS
EKG QTC CALCULATION (BAZETT): 462 MS
EKG R AXIS: -37 DEGREES
EKG T AXIS: 19 DEGREES
EKG VENTRICULAR RATE: 72 BPM
EOSINOPHIL NFR BLD AUTO: 2 %
EOSINOPHILS ABSOLUTE: 0.1 THOU/MM3 (ref 0–0.4)
ERYTHROCYTE [DISTWIDTH] IN BLOOD BY AUTOMATED COUNT: 16.1 % (ref 11.5–14.5)
GFR SERPL CREATININE-BSD FRML MDRD: 73 ML/MIN/1.73M2
GLUCOSE SERPL-MCNC: 124 MG/DL (ref 70–108)
HCT VFR BLD AUTO: 37.9 % (ref 42–52)
HGB BLD-MCNC: 11.7 GM/DL (ref 14–18)
IMM GRANULOCYTES # BLD AUTO: 0.04 THOU/MM3 (ref 0–0.07)
IMM GRANULOCYTES NFR BLD AUTO: 0.6 %
LYMPHOCYTES ABSOLUTE: 1.5 THOU/MM3 (ref 1–4.8)
LYMPHOCYTES NFR BLD AUTO: 22.9 %
MCH RBC QN AUTO: 21.5 PG (ref 26–33)
MCHC RBC AUTO-ENTMCNC: 30.9 GM/DL (ref 32.2–35.5)
MCV RBC AUTO: 69.8 FL (ref 80–94)
MICROCYTES BLD QL SMEAR: PRESENT
MONOCYTES ABSOLUTE: 0.4 THOU/MM3 (ref 0.4–1.3)
MONOCYTES NFR BLD AUTO: 6.5 %
NEUTROPHILS ABSOLUTE: 4.5 THOU/MM3 (ref 1.8–7.7)
NEUTROPHILS NFR BLD AUTO: 67.5 %
NRBC BLD AUTO-RTO: 0 /100 WBC
NT-PROBNP SERPL IA-MCNC: 894.9 PG/ML (ref 0–124)
OSMOLALITY SERPL CALC.SUM OF ELEC: 280.6 MOSMOL/KG (ref 275–300)
PLATELET # BLD AUTO: 208 THOU/MM3 (ref 130–400)
PLATELET BLD QL SMEAR: ADEQUATE
PMV BLD AUTO: 9.9 FL (ref 9.4–12.4)
POIKILOCYTES: ABNORMAL
POTASSIUM SERPL-SCNC: 3.9 MEQ/L (ref 3.5–5.2)
PROT SERPL-MCNC: 6.9 G/DL (ref 6.1–8)
RBC # BLD AUTO: 5.43 MILL/MM3 (ref 4.7–6.1)
SCAN OF BLOOD SMEAR: NORMAL
SODIUM SERPL-SCNC: 140 MEQ/L (ref 135–145)
TROPONIN, HIGH SENSITIVITY: 7 NG/L (ref 0–12)
TROPONIN, HIGH SENSITIVITY: 8 NG/L (ref 0–12)
WBC # BLD AUTO: 6.6 THOU/MM3 (ref 4.8–10.8)

## 2024-06-04 PROCEDURE — 80053 COMPREHEN METABOLIC PANEL: CPT

## 2024-06-04 PROCEDURE — 71045 X-RAY EXAM CHEST 1 VIEW: CPT

## 2024-06-04 PROCEDURE — 6370000000 HC RX 637 (ALT 250 FOR IP): Performed by: STUDENT IN AN ORGANIZED HEALTH CARE EDUCATION/TRAINING PROGRAM

## 2024-06-04 PROCEDURE — 93010 ELECTROCARDIOGRAM REPORT: CPT | Performed by: NUCLEAR MEDICINE

## 2024-06-04 PROCEDURE — 84484 ASSAY OF TROPONIN QUANT: CPT

## 2024-06-04 PROCEDURE — 85025 COMPLETE CBC W/AUTO DIFF WBC: CPT

## 2024-06-04 PROCEDURE — 93005 ELECTROCARDIOGRAM TRACING: CPT | Performed by: STUDENT IN AN ORGANIZED HEALTH CARE EDUCATION/TRAINING PROGRAM

## 2024-06-04 PROCEDURE — 99285 EMERGENCY DEPT VISIT HI MDM: CPT

## 2024-06-04 PROCEDURE — 96374 THER/PROPH/DIAG INJ IV PUSH: CPT

## 2024-06-04 PROCEDURE — 36415 COLL VENOUS BLD VENIPUNCTURE: CPT

## 2024-06-04 PROCEDURE — 6360000002 HC RX W HCPCS: Performed by: STUDENT IN AN ORGANIZED HEALTH CARE EDUCATION/TRAINING PROGRAM

## 2024-06-04 PROCEDURE — 82248 BILIRUBIN DIRECT: CPT

## 2024-06-04 PROCEDURE — 83880 ASSAY OF NATRIURETIC PEPTIDE: CPT

## 2024-06-04 RX ORDER — MORPHINE SULFATE 4 MG/ML
4 INJECTION, SOLUTION INTRAMUSCULAR; INTRAVENOUS ONCE
Status: COMPLETED | OUTPATIENT
Start: 2024-06-04 | End: 2024-06-04

## 2024-06-04 RX ORDER — ASPIRIN 81 MG/1
162 TABLET, CHEWABLE ORAL ONCE
Status: COMPLETED | OUTPATIENT
Start: 2024-06-04 | End: 2024-06-04

## 2024-06-04 RX ADMIN — MORPHINE SULFATE 4 MG: 4 INJECTION, SOLUTION INTRAMUSCULAR; INTRAVENOUS at 18:43

## 2024-06-04 RX ADMIN — ASPIRIN 81 MG 162 MG: 81 TABLET ORAL at 18:42

## 2024-06-04 ASSESSMENT — PAIN - FUNCTIONAL ASSESSMENT
PAIN_FUNCTIONAL_ASSESSMENT: NONE - DENIES PAIN
PAIN_FUNCTIONAL_ASSESSMENT: NONE - DENIES PAIN
PAIN_FUNCTIONAL_ASSESSMENT: 0-10

## 2024-06-04 ASSESSMENT — PAIN DESCRIPTION - LOCATION: LOCATION: CHEST

## 2024-06-04 ASSESSMENT — PAIN DESCRIPTION - DESCRIPTORS: DESCRIPTORS: ACHING

## 2024-06-04 ASSESSMENT — HEART SCORE: ECG: NON-SPECIFC REPOLARIZATION DISTURBANCE/LBTB/PM

## 2024-06-04 ASSESSMENT — PAIN DESCRIPTION - PAIN TYPE: TYPE: ACUTE PAIN

## 2024-06-04 ASSESSMENT — PAIN SCALES - GENERAL: PAINLEVEL_OUTOF10: 8

## 2024-06-04 NOTE — ED NOTES
Patient to ED with chest pain. Patient states he had sudden onset of chest pain after paying bills at home. He states that he became diaphoretic at that time. He states his pain has improved and his diaphoresis subsided. He states he has extensive heart disease history. Patient is resting in bed with easy and unlabored respirations. Call light in reach. Side rails up x2. Patient denies further complaints or concerns. Will monitor.

## 2024-06-04 NOTE — ED PROVIDER NOTES
[facility] EMERGENCY DEPT     Pt Name: Chriss Braga  MRN: [id number]  Birthdate 1955  Date of evaluation: 6/4/2024  Resident Physician: Mo Evangelista MD  Attending Physician: Jonah Aponte DO      CHIEF COMPLAINT       Chief Complaint   Patient presents with    Chest Pain     HISTORY OF PRESENT ILLNESS   Chriss Braga is a 68 y.o. male with PMHx of CAD  who presents to the emergency department for evaluation of chest pain.  Patient reports that he was paying bills at rest when he developed sudden onset substernal pressure associated with diaphoresis.  He denies any nausea, vomiting or syncope.  Denies any fevers, chills or cough or leg swelling.  Patient is anticoagulated on Eliquis.  States that he took 2 baby aspirin without any relief.  He denies any radiation.  States that the diaphoresis has gotten better.    The patient has no other acute complaints at this time.  PASTMEDICAL HISTORY     Past Medical History:   Diagnosis Date    Anemia     Microcytic anemia.     Angina     Arthritis     Atypical chest pain     Question if this is secondary to his uncontrolled hypertension or if this is secondary to exacerbation of COPD or secondary to his recent ICD implantation.    Blood transfusion 2011    CAD (coronary artery disease)     Cardiomyopathy (HCC)     Cataract     bilateral    Chest discomfort     Depression     Dizziness     Patient complains of dizziness with movement.     Erectile dysfunction     Possible erectile dysfunction symptoms.     Family history of hypertension     Frequent nocturnal awakening     Frequent nocturnal awakenings in a patient with a history of CHF, S/P defibrillator placement and excessive daytime sleepiness, rule out sleep apnea versus central sleep apnea.     GERD (gastroesophageal reflux disease)     Currently controlled.     Glaucoma     Headache(784.0)     Homeless     Social issue with the patient currently being homeless.     Hyperlipidemia     Treated medically.

## 2024-06-05 NOTE — DISCHARGE INSTRUCTIONS
Go to your scheduled cardiology appointment on Thursday at 9 AM    Return to the Emergency Department immediately if you are getting worse or if you develop any new or concerning symptoms.

## 2024-06-23 ENCOUNTER — HOSPITAL ENCOUNTER (EMERGENCY)
Age: 69
Discharge: HOME | End: 2024-06-23
Attending: EMERGENCY MEDICINE
Payer: MEDICARE

## 2024-06-23 ENCOUNTER — APPOINTMENT (OUTPATIENT)
Dept: GENERAL RADIOLOGY | Age: 69
End: 2024-06-23
Payer: MEDICARE

## 2024-06-23 VITALS
OXYGEN SATURATION: 99 % | DIASTOLIC BLOOD PRESSURE: 103 MMHG | SYSTOLIC BLOOD PRESSURE: 141 MMHG | TEMPERATURE: 98.3 F | WEIGHT: 161 LBS | BODY MASS INDEX: 23.78 KG/M2 | RESPIRATION RATE: 13 BRPM | HEART RATE: 68 BPM

## 2024-06-23 DIAGNOSIS — R07.9 CHEST PAIN, UNSPECIFIED TYPE: Primary | ICD-10-CM

## 2024-06-23 LAB
ANION GAP SERPL CALC-SCNC: 10 MEQ/L (ref 8–16)
BASOPHILS ABSOLUTE: 0 THOU/MM3 (ref 0–0.1)
BASOPHILS NFR BLD AUTO: 0.4 %
BUN SERPL-MCNC: 18 MG/DL (ref 7–22)
CALCIUM SERPL-MCNC: 9 MG/DL (ref 8.5–10.5)
CHLORIDE SERPL-SCNC: 108 MEQ/L (ref 98–111)
CO2 SERPL-SCNC: 23 MEQ/L (ref 23–33)
CREAT SERPL-MCNC: 1.2 MG/DL (ref 0.4–1.2)
DEPRECATED RDW RBC AUTO: 37.9 FL (ref 35–45)
EKG ATRIAL RATE: 78 BPM
EKG P AXIS: 76 DEGREES
EKG P-R INTERVAL: 156 MS
EKG Q-T INTERVAL: 414 MS
EKG QRS DURATION: 86 MS
EKG QTC CALCULATION (BAZETT): 471 MS
EKG R AXIS: -2 DEGREES
EKG T AXIS: 36 DEGREES
EKG VENTRICULAR RATE: 78 BPM
EOSINOPHIL NFR BLD AUTO: 1.5 %
EOSINOPHILS ABSOLUTE: 0.1 THOU/MM3 (ref 0–0.4)
ERYTHROCYTE [DISTWIDTH] IN BLOOD BY AUTOMATED COUNT: 16.4 % (ref 11.5–14.5)
GFR SERPL CREATININE-BSD FRML MDRD: 66 ML/MIN/1.73M2
GLUCOSE SERPL-MCNC: 104 MG/DL (ref 70–108)
HCT VFR BLD AUTO: 38.9 % (ref 42–52)
HGB BLD-MCNC: 12.5 GM/DL (ref 14–18)
IMM GRANULOCYTES # BLD AUTO: 0.05 THOU/MM3 (ref 0–0.07)
IMM GRANULOCYTES NFR BLD AUTO: 0.7 %
LYMPHOCYTES ABSOLUTE: 1.4 THOU/MM3 (ref 1–4.8)
LYMPHOCYTES NFR BLD AUTO: 19.9 %
MCH RBC QN AUTO: 22.1 PG (ref 26–33)
MCHC RBC AUTO-ENTMCNC: 32.1 GM/DL (ref 32.2–35.5)
MCV RBC AUTO: 68.8 FL (ref 80–94)
MONOCYTES ABSOLUTE: 0.5 THOU/MM3 (ref 0.4–1.3)
MONOCYTES NFR BLD AUTO: 7.7 %
NEUTROPHILS ABSOLUTE: 4.8 THOU/MM3 (ref 1.8–7.7)
NEUTROPHILS NFR BLD AUTO: 69.8 %
NRBC BLD AUTO-RTO: 0 /100 WBC
NT-PROBNP SERPL IA-MCNC: 381.8 PG/ML (ref 0–124)
OSMOLALITY SERPL CALC.SUM OF ELEC: 283.5 MOSMOL/KG (ref 275–300)
PLATELET # BLD AUTO: 206 THOU/MM3 (ref 130–400)
PMV BLD AUTO: 9.9 FL (ref 9.4–12.4)
POTASSIUM SERPL-SCNC: 3.9 MEQ/L (ref 3.5–5.2)
RBC # BLD AUTO: 5.65 MILL/MM3 (ref 4.7–6.1)
SODIUM SERPL-SCNC: 141 MEQ/L (ref 135–145)
TROPONIN, HIGH SENSITIVITY: 7 NG/L (ref 0–12)
TROPONIN, HIGH SENSITIVITY: 9 NG/L (ref 0–12)
WBC # BLD AUTO: 6.9 THOU/MM3 (ref 4.8–10.8)

## 2024-06-23 PROCEDURE — 36415 COLL VENOUS BLD VENIPUNCTURE: CPT

## 2024-06-23 PROCEDURE — 93005 ELECTROCARDIOGRAM TRACING: CPT | Performed by: EMERGENCY MEDICINE

## 2024-06-23 PROCEDURE — 84484 ASSAY OF TROPONIN QUANT: CPT

## 2024-06-23 PROCEDURE — 71045 X-RAY EXAM CHEST 1 VIEW: CPT

## 2024-06-23 PROCEDURE — 99285 EMERGENCY DEPT VISIT HI MDM: CPT

## 2024-06-23 PROCEDURE — 93010 ELECTROCARDIOGRAM REPORT: CPT | Performed by: INTERNAL MEDICINE

## 2024-06-23 PROCEDURE — 83880 ASSAY OF NATRIURETIC PEPTIDE: CPT

## 2024-06-23 PROCEDURE — 85025 COMPLETE CBC W/AUTO DIFF WBC: CPT

## 2024-06-23 PROCEDURE — 80048 BASIC METABOLIC PNL TOTAL CA: CPT

## 2024-06-23 RX ORDER — NITROGLYCERIN 20 MG/100ML
5-200 INJECTION INTRAVENOUS CONTINUOUS
Status: DISCONTINUED | OUTPATIENT
Start: 2024-06-23 | End: 2024-06-23

## 2024-06-23 RX ORDER — ACETAMINOPHEN 325 MG/1
650 TABLET ORAL ONCE
Status: DISCONTINUED | OUTPATIENT
Start: 2024-06-23 | End: 2024-06-23 | Stop reason: HOSPADM

## 2024-06-23 RX ORDER — NITROGLYCERIN 0.4 MG/1
0.4 TABLET SUBLINGUAL EVERY 5 MIN PRN
Status: DISCONTINUED | OUTPATIENT
Start: 2024-06-23 | End: 2024-06-23 | Stop reason: HOSPADM

## 2024-06-23 ASSESSMENT — PAIN DESCRIPTION - ORIENTATION: ORIENTATION: LEFT

## 2024-06-23 ASSESSMENT — PAIN - FUNCTIONAL ASSESSMENT
PAIN_FUNCTIONAL_ASSESSMENT: 0-10
PAIN_FUNCTIONAL_ASSESSMENT: 0-10

## 2024-06-23 ASSESSMENT — PAIN SCALES - GENERAL
PAINLEVEL_OUTOF10: 8
PAINLEVEL_OUTOF10: 0
PAINLEVEL_OUTOF10: 8

## 2024-06-23 ASSESSMENT — PAIN DESCRIPTION - ONSET: ONSET: SUDDEN

## 2024-06-23 ASSESSMENT — PAIN DESCRIPTION - PAIN TYPE: TYPE: ACUTE PAIN

## 2024-06-23 ASSESSMENT — PAIN DESCRIPTION - FREQUENCY: FREQUENCY: CONTINUOUS

## 2024-06-23 ASSESSMENT — PAIN DESCRIPTION - DESCRIPTORS: DESCRIPTORS: ACHING

## 2024-06-23 ASSESSMENT — PAIN DESCRIPTION - LOCATION: LOCATION: CHEST

## 2024-06-23 NOTE — ED NOTES
Patient to the ED with chest pain. Patient states he was out walking today when he had sudden onset of chest pain. He states that his pain radiates down his left arm. He denies shortness of breath. Patient was given 324 ASA and x2 nitro without relief en route via EMS.Patient is resting in bed with easy and unlabored respirations. Call light in reach. Side rails up x2. Patient denies further complaints or concerns. Will monitor.

## 2024-06-23 NOTE — ED PROVIDER NOTES
Pastora HAVEN, Scott TP, van kristine Dowell F, Scott EG, Av SH, van Sheila RM, Wilner PA. Crit Pathw Cardiol. 2010 Sep; 9(3): 164-169.  \"A prospective validation of the HEART score for chest pain patients at the emergency department.\" Int J Cardiol. 2013 Oct 3;168(3):2153-8. doi: 10.1016/j.ijcard.2013.01.255. Epub 2013 Mar 7.     Available laboratory and imaging results were independently reviewed and clinically correlated.    CRITICAL CARE:   None    CONSULTS:  None    PROCEDURES:  None    Shared decision making was performed with the patient and/or present family.   Goals of care were discussed with patient and/or present family.      The results of pertinent diagnostic studies and exam findings were discussed. The patient’s provisional diagnosis and plan of care were discussed with the patient and present family. The patient and/or present family expressed understanding of the diagnosis and plan.     The nurse was instructed to provide written instructions and appropriate follow-up information. The patient understands their need and responsibility to obtain additional follow-up as instructed. The patient is comfortable with the plan and discharge. The risks of medications administered and prescribed were discussed with the patient and family present.      MEDICATION CHANGES     New Prescriptions    No medications on file       CLINICAL IMPRESSION      1. Chest pain, unspecified type          DISPOSITION/PLAN     Final diagnoses:   Chest pain, unspecified type       Dispo: Discharge to home     PATIENT REFERRED TO:  No follow-up provider specified.    DISCHARGE MEDICATIONS:  New Prescriptions    No medications on file       (Please note that portions of this note were completed with a voice recognition program.  Efforts were made to edit the dictations but occasionally words are mis-transcribed.)    Provider:  I personally performed the services described in the documentation, reviewed and edited the documentation

## 2024-06-24 ENCOUNTER — TELEPHONE (OUTPATIENT)
Dept: CARDIOLOGY CLINIC | Age: 69
End: 2024-06-24

## 2024-06-24 NOTE — TELEPHONE ENCOUNTER
[facility] home health Tammy called      Patient admitted to [facility] for chest pain. Home health was ordered during hospital stay. No pcp. Asking will Dr. Jaime sign for home home?

## 2024-07-23 ENCOUNTER — HOSPITAL ENCOUNTER (EMERGENCY)
Age: 69
Discharge: HOME | End: 2024-07-24
Attending: EMERGENCY MEDICINE
Payer: MEDICARE

## 2024-07-23 ENCOUNTER — APPOINTMENT (OUTPATIENT)
Dept: GENERAL RADIOLOGY | Age: 69
End: 2024-07-23
Payer: MEDICARE

## 2024-07-23 DIAGNOSIS — R07.9 CHEST PAIN, UNSPECIFIED TYPE: Primary | ICD-10-CM

## 2024-07-23 DIAGNOSIS — R51.9 ACUTE NONINTRACTABLE HEADACHE, UNSPECIFIED HEADACHE TYPE: ICD-10-CM

## 2024-07-23 LAB
ANION GAP SERPL CALC-SCNC: 15 MEQ/L (ref 8–16)
BUN SERPL-MCNC: 16 MG/DL (ref 7–22)
CALCIUM SERPL-MCNC: 8.8 MG/DL (ref 8.5–10.5)
CHLORIDE SERPL-SCNC: 104 MEQ/L (ref 98–111)
CO2 SERPL-SCNC: 21 MEQ/L (ref 23–33)
CREAT SERPL-MCNC: 1.1 MG/DL (ref 0.4–1.2)
GFR SERPL CREATININE-BSD FRML MDRD: 73 ML/MIN/1.73M2
GLUCOSE SERPL-MCNC: 105 MG/DL (ref 70–108)
LIPASE SERPL-CCNC: 18.8 U/L (ref 5.6–51.3)
NT-PROBNP SERPL IA-MCNC: 591.6 PG/ML (ref 0–124)
OSMOLALITY SERPL CALC.SUM OF ELEC: 280.9 MOSMOL/KG (ref 275–300)
POTASSIUM SERPL-SCNC: 3.7 MEQ/L (ref 3.5–5.2)
SODIUM SERPL-SCNC: 140 MEQ/L (ref 135–145)
TROPONIN, HIGH SENSITIVITY: 7 NG/L (ref 0–12)

## 2024-07-23 PROCEDURE — 99285 EMERGENCY DEPT VISIT HI MDM: CPT

## 2024-07-23 PROCEDURE — 83880 ASSAY OF NATRIURETIC PEPTIDE: CPT

## 2024-07-23 PROCEDURE — 80048 BASIC METABOLIC PNL TOTAL CA: CPT

## 2024-07-23 PROCEDURE — 93005 ELECTROCARDIOGRAM TRACING: CPT | Performed by: EMERGENCY MEDICINE

## 2024-07-23 PROCEDURE — 71045 X-RAY EXAM CHEST 1 VIEW: CPT

## 2024-07-23 PROCEDURE — 85025 COMPLETE CBC W/AUTO DIFF WBC: CPT

## 2024-07-23 PROCEDURE — 84484 ASSAY OF TROPONIN QUANT: CPT

## 2024-07-23 PROCEDURE — 36415 COLL VENOUS BLD VENIPUNCTURE: CPT

## 2024-07-23 PROCEDURE — 83690 ASSAY OF LIPASE: CPT

## 2024-07-24 ENCOUNTER — APPOINTMENT (OUTPATIENT)
Dept: CT IMAGING | Age: 69
End: 2024-07-24
Payer: MEDICARE

## 2024-07-24 VITALS
HEART RATE: 67 BPM | DIASTOLIC BLOOD PRESSURE: 95 MMHG | HEIGHT: 69 IN | WEIGHT: 172 LBS | BODY MASS INDEX: 25.48 KG/M2 | TEMPERATURE: 98.1 F | OXYGEN SATURATION: 99 % | RESPIRATION RATE: 18 BRPM | SYSTOLIC BLOOD PRESSURE: 144 MMHG

## 2024-07-24 LAB
BASOPHILS ABSOLUTE: 0 THOU/MM3 (ref 0–0.1)
BASOPHILS NFR BLD AUTO: 0.4 %
DEPRECATED RDW RBC AUTO: 37.3 FL (ref 35–45)
EKG ATRIAL RATE: 78 BPM
EKG P AXIS: 78 DEGREES
EKG P-R INTERVAL: 160 MS
EKG Q-T INTERVAL: 414 MS
EKG QRS DURATION: 88 MS
EKG QTC CALCULATION (BAZETT): 471 MS
EKG R AXIS: -45 DEGREES
EKG T AXIS: 35 DEGREES
EKG VENTRICULAR RATE: 78 BPM
EOSINOPHIL NFR BLD AUTO: 1.1 %
EOSINOPHILS ABSOLUTE: 0.1 THOU/MM3 (ref 0–0.4)
ERYTHROCYTE [DISTWIDTH] IN BLOOD BY AUTOMATED COUNT: 15.8 % (ref 11.5–14.5)
HCT VFR BLD AUTO: 36.2 % (ref 42–52)
HGB BLD-MCNC: 11.6 GM/DL (ref 14–18)
IMM GRANULOCYTES # BLD AUTO: 0.05 THOU/MM3 (ref 0–0.07)
IMM GRANULOCYTES NFR BLD AUTO: 0.7 %
LYMPHOCYTES ABSOLUTE: 1.8 THOU/MM3 (ref 1–4.8)
LYMPHOCYTES NFR BLD AUTO: 25.1 %
MCH RBC QN AUTO: 21.7 PG (ref 26–33)
MCHC RBC AUTO-ENTMCNC: 32 GM/DL (ref 32.2–35.5)
MCV RBC AUTO: 67.8 FL (ref 80–94)
MICROCYTES BLD QL SMEAR: PRESENT
MONOCYTES ABSOLUTE: 0.4 THOU/MM3 (ref 0.4–1.3)
MONOCYTES NFR BLD AUTO: 6.3 %
NEUTROPHILS ABSOLUTE: 4.6 THOU/MM3 (ref 1.8–7.7)
NEUTROPHILS NFR BLD AUTO: 66.4 %
NRBC BLD AUTO-RTO: 0 /100 WBC
PATHOLOGIST REVIEW: ABNORMAL
PLATELET # BLD AUTO: 204 THOU/MM3 (ref 130–400)
PLATELET BLD QL SMEAR: ADEQUATE
PMV BLD AUTO: 10 FL (ref 9.4–12.4)
RBC # BLD AUTO: 5.34 MILL/MM3 (ref 4.7–6.1)
SCAN OF BLOOD SMEAR: NORMAL
SCHISTOCYTES BLD QL SMEAR: ABNORMAL
SPHEROCYTES BLD QL SMEAR: ABNORMAL
TROPONIN, HIGH SENSITIVITY: 6 NG/L (ref 0–12)
WBC # BLD AUTO: 7 THOU/MM3 (ref 4.8–10.8)

## 2024-07-24 PROCEDURE — 70450 CT HEAD/BRAIN W/O DYE: CPT

## 2024-07-24 PROCEDURE — 96374 THER/PROPH/DIAG INJ IV PUSH: CPT

## 2024-07-24 PROCEDURE — 6360000002 HC RX W HCPCS: Performed by: EMERGENCY MEDICINE

## 2024-07-24 PROCEDURE — 93010 ELECTROCARDIOGRAM REPORT: CPT | Performed by: INTERNAL MEDICINE

## 2024-07-24 PROCEDURE — 84484 ASSAY OF TROPONIN QUANT: CPT

## 2024-07-24 RX ORDER — KETOROLAC TROMETHAMINE 30 MG/ML
30 INJECTION, SOLUTION INTRAMUSCULAR; INTRAVENOUS ONCE
Status: COMPLETED | OUTPATIENT
Start: 2024-07-24 | End: 2024-07-24

## 2024-07-24 RX ADMIN — KETOROLAC TROMETHAMINE 30 MG: 30 INJECTION, SOLUTION INTRAMUSCULAR at 00:39

## 2024-07-24 ASSESSMENT — ENCOUNTER SYMPTOMS
ABDOMINAL PAIN: 0
RHINORRHEA: 0
EYE PAIN: 0
SHORTNESS OF BREATH: 0
COUGH: 0
SORE THROAT: 0

## 2024-07-24 NOTE — ED NOTES
Pt and VS reassessed at this time. Pt resting on cot with eyes closed. Call light in reach. Respirations even and unlabored. Pt denies any current needs at this time.

## 2024-07-24 NOTE — ED PROVIDER NOTES
[facility] EMERGENCY DEPT  EMERGENCY DEPARTMENT ENCOUNTER      Pt Name: Chriss Braga  MRN: [id number]  Birthdate 1955  Date of evaluation: 7/23/2024  Provider: Davion Dove DO  1:37 AM    CHIEF COMPLAINT       Chief Complaint   Patient presents with    Chest Pain         HISTORY OF PRESENT ILLNESS    Chriss Braga is a 68 y.o. male who presents to the emergency department with a chief complaint of chest pain.  This started an hour prior to arrival.  Patient states sharp pain in the left side of his chest.  He cannot identify an inciting factor, there is no aggravating or alleviating factors.  Timing constant.  Patient states that he has vomited 1 time.  Patient also complains of a headache and states he has a history of a pituitary tumor.    HPI    Nursing Notes were reviewed.    REVIEW OF SYSTEMS       Review of Systems   Constitutional:  Negative for chills and fever.   HENT:  Negative for rhinorrhea and sore throat.    Eyes:  Negative for pain and visual disturbance.   Respiratory:  Negative for cough and shortness of breath.    Cardiovascular:  Positive for chest pain.   Gastrointestinal:  Negative for abdominal pain.   Endocrine: Negative for polydipsia and polyuria.   Genitourinary:  Negative for dysuria and flank pain.   Musculoskeletal:  Negative for arthralgias and myalgias.   Skin:  Negative for rash and wound.   Neurological:  Positive for headaches. Negative for dizziness, weakness and light-headedness.   Psychiatric/Behavioral:  Negative for suicidal ideas.        Except as noted above the remainder of the review of systems was reviewed and negative.       PAST MEDICAL HISTORY     Past Medical History:   Diagnosis Date    Anemia     Microcytic anemia.     Angina     Arthritis     Atypical chest pain     Question if this is secondary to his uncontrolled hypertension or if this is secondary to exacerbation of COPD or secondary to his recent ICD implantation.    Blood transfusion 2011    CAD

## 2024-07-24 NOTE — ED NOTES
Pt and VS reassessed at this time. Pt resting on cot with eyes closed and TV on. Respirations even and unlabored. Call light in reach. Pt denies any current needs at this time.

## 2024-07-24 NOTE — ED NOTES
ED nurse-to-nurse bedside report    Chief Complaint   Patient presents with    Chest Pain      LOC: alert and orientated to name, place, date  Vital signs   Vitals:    07/23/24 2200 07/23/24 2310 07/23/24 2354 07/24/24 0036   BP: (!) 150/85 (!) 143/96 (!) 150/112 (!) 153/100   Pulse: 78 70 71 67   Resp: 18 12 13 18   Temp: 98.1 °F (36.7 °C)      SpO2: 98% 98% 98% 97%   Weight: 78 kg (172 lb)      Height: 1.753 m (5' 9\")         Pain:    Pain Interventions: Toradol   Pain Goal: 0  Oxygen: No    Current needs required RA   Telemetry: Yes  LDAs:   Peripheral IV 07/23/24 Posterior;Right Wrist (Active)   Site Assessment Clean, dry & intact 07/23/24 2354   Line Status Normal saline locked 07/23/24 2354   Line Care Connections checked and tightened 07/23/24 2354   Phlebitis Assessment No symptoms 07/23/24 2354   Infiltration Assessment 0 07/23/24 2354     Continuous Infusions:   Mobility: Requires assistance * 1  Nelson Fall Risk Score:        No data to display              Fall Interventions: bed in lowest position, call light in reach, wheels locked, side rails x2  Report given to: Cheryl JO

## 2024-07-24 NOTE — ED NOTES
PT resting in bed watching TV. VS assessed. Call light in reach. Respirations easy and unlabored. Call light in reach.

## 2024-07-24 NOTE — ED TRIAGE NOTES
Pt to the Ed via EMS with complaints of chest pain. Pt states that he was waking to the [place] from his house when the chest pain started. PT states that chest pain feels like pressure that is mostly on the left side and radiates down his left arm. Pt also states he feels short of breath and dizzy. EMS states that patient received 4 baby aspirin in route. Pt denies any relief with the aspirin. Pt states he had a heart attack in February and had 2 stents placed in February. Pt does not follow up with a cardiologist. Pt alert and oriented on arrival. EKG completed.

## 2024-09-04 ENCOUNTER — APPOINTMENT (OUTPATIENT)
Dept: GENERAL RADIOLOGY | Age: 69
End: 2024-09-04
Payer: MEDICARE

## 2024-09-04 ENCOUNTER — HOSPITAL ENCOUNTER (OUTPATIENT)
Age: 69
Setting detail: OBSERVATION
Discharge: HOME | End: 2024-09-06
Attending: STUDENT IN AN ORGANIZED HEALTH CARE EDUCATION/TRAINING PROGRAM
Payer: MEDICARE

## 2024-09-04 ENCOUNTER — APPOINTMENT (OUTPATIENT)
Dept: CT IMAGING | Age: 69
End: 2024-09-04
Payer: MEDICARE

## 2024-09-04 DIAGNOSIS — I20.89 OTHER FORMS OF ANGINA PECTORIS (HCC): ICD-10-CM

## 2024-09-04 DIAGNOSIS — R79.89 ELEVATED TROPONIN: Primary | ICD-10-CM

## 2024-09-04 LAB
ALBUMIN SERPL BCG-MCNC: 3.9 G/DL (ref 3.5–5.1)
ALP SERPL-CCNC: 71 U/L (ref 38–126)
ALT SERPL W/O P-5'-P-CCNC: 11 U/L (ref 11–66)
ANION GAP SERPL CALC-SCNC: 10 MEQ/L (ref 8–16)
AST SERPL-CCNC: 15 U/L (ref 5–40)
BASOPHILS ABSOLUTE: 0 THOU/MM3 (ref 0–0.1)
BASOPHILS NFR BLD AUTO: 0.4 %
BILIRUB CONJ SERPL-MCNC: 0.3 MG/DL (ref 0.1–13.8)
BILIRUB SERPL-MCNC: 0.9 MG/DL (ref 0.3–1.2)
BUN SERPL-MCNC: 12 MG/DL (ref 7–22)
CALCIUM SERPL-MCNC: 9 MG/DL (ref 8.5–10.5)
CHLORIDE SERPL-SCNC: 107 MEQ/L (ref 98–111)
CO2 SERPL-SCNC: 23 MEQ/L (ref 23–33)
CREAT SERPL-MCNC: 1 MG/DL (ref 0.4–1.2)
DEPRECATED RDW RBC AUTO: 40.1 FL (ref 35–45)
EKG ATRIAL RATE: 87 BPM
EKG P AXIS: 72 DEGREES
EKG P-R INTERVAL: 156 MS
EKG Q-T INTERVAL: 368 MS
EKG QRS DURATION: 90 MS
EKG QTC CALCULATION (BAZETT): 442 MS
EKG R AXIS: -18 DEGREES
EKG T AXIS: 26 DEGREES
EKG VENTRICULAR RATE: 87 BPM
EOSINOPHIL NFR BLD AUTO: 0.9 %
EOSINOPHILS ABSOLUTE: 0.1 THOU/MM3 (ref 0–0.4)
ERYTHROCYTE [DISTWIDTH] IN BLOOD BY AUTOMATED COUNT: 16.4 % (ref 11.5–14.5)
GFR SERPL CREATININE-BSD FRML MDRD: 82 ML/MIN/1.73M2
GLUCOSE SERPL-MCNC: 84 MG/DL (ref 70–108)
HCT VFR BLD AUTO: 36.8 % (ref 42–52)
HGB BLD-MCNC: 11.6 GM/DL (ref 14–18)
IMM GRANULOCYTES # BLD AUTO: 0.05 THOU/MM3 (ref 0–0.07)
IMM GRANULOCYTES NFR BLD AUTO: 0.6 %
INR PPP: 1.12 (ref 0.85–1.13)
LIPASE SERPL-CCNC: 21.6 U/L (ref 5.6–51.3)
LYMPHOCYTES ABSOLUTE: 1.3 THOU/MM3 (ref 1–4.8)
LYMPHOCYTES NFR BLD AUTO: 15.5 %
MCH RBC QN AUTO: 21.9 PG (ref 26–33)
MCHC RBC AUTO-ENTMCNC: 31.5 GM/DL (ref 32.2–35.5)
MCV RBC AUTO: 69.4 FL (ref 80–94)
MICROCYTES BLD QL SMEAR: PRESENT
MONOCYTES ABSOLUTE: 0.6 THOU/MM3 (ref 0.4–1.3)
MONOCYTES NFR BLD AUTO: 7.6 %
NEUTROPHILS ABSOLUTE: 6.4 THOU/MM3 (ref 1.8–7.7)
NEUTROPHILS NFR BLD AUTO: 75 %
NRBC BLD AUTO-RTO: 0 /100 WBC
OSMOLALITY SERPL CALC.SUM OF ELEC: 278.4 MOSMOL/KG (ref 275–300)
PLATELET # BLD AUTO: 233 THOU/MM3 (ref 130–400)
PMV BLD AUTO: 9.6 FL (ref 9.4–12.4)
POTASSIUM SERPL-SCNC: 4 MEQ/L (ref 3.5–5.2)
PROT SERPL-MCNC: 6.9 G/DL (ref 6.1–8)
RBC # BLD AUTO: 5.3 MILL/MM3 (ref 4.7–6.1)
SCAN OF BLOOD SMEAR: NORMAL
SODIUM SERPL-SCNC: 140 MEQ/L (ref 135–145)
TARGETS BLD QL SMEAR: ABNORMAL
TROPONIN, HIGH SENSITIVITY: 46 NG/L (ref 0–12)
TROPONIN, HIGH SENSITIVITY: 46 NG/L (ref 0–12)
WBC # BLD AUTO: 8.5 THOU/MM3 (ref 4.8–10.8)

## 2024-09-04 PROCEDURE — 80053 COMPREHEN METABOLIC PANEL: CPT

## 2024-09-04 PROCEDURE — 84484 ASSAY OF TROPONIN QUANT: CPT

## 2024-09-04 PROCEDURE — 93005 ELECTROCARDIOGRAM TRACING: CPT | Performed by: STUDENT IN AN ORGANIZED HEALTH CARE EDUCATION/TRAINING PROGRAM

## 2024-09-04 PROCEDURE — 6370000000 HC RX 637 (ALT 250 FOR IP): Performed by: STUDENT IN AN ORGANIZED HEALTH CARE EDUCATION/TRAINING PROGRAM

## 2024-09-04 PROCEDURE — 83690 ASSAY OF LIPASE: CPT

## 2024-09-04 PROCEDURE — 82248 BILIRUBIN DIRECT: CPT

## 2024-09-04 PROCEDURE — 96376 TX/PRO/DX INJ SAME DRUG ADON: CPT

## 2024-09-04 PROCEDURE — 96372 THER/PROPH/DIAG INJ SC/IM: CPT

## 2024-09-04 PROCEDURE — 96375 TX/PRO/DX INJ NEW DRUG ADDON: CPT

## 2024-09-04 PROCEDURE — 6360000002 HC RX W HCPCS: Performed by: PHYSICIAN ASSISTANT

## 2024-09-04 PROCEDURE — 85610 PROTHROMBIN TIME: CPT

## 2024-09-04 PROCEDURE — 96374 THER/PROPH/DIAG INJ IV PUSH: CPT

## 2024-09-04 PROCEDURE — 2580000003 HC RX 258: Performed by: PHYSICIAN ASSISTANT

## 2024-09-04 PROCEDURE — 99223 1ST HOSP IP/OBS HIGH 75: CPT | Performed by: PHYSICIAN ASSISTANT

## 2024-09-04 PROCEDURE — 71046 X-RAY EXAM CHEST 2 VIEWS: CPT

## 2024-09-04 PROCEDURE — 96365 THER/PROPH/DIAG IV INF INIT: CPT

## 2024-09-04 PROCEDURE — 6360000002 HC RX W HCPCS: Performed by: STUDENT IN AN ORGANIZED HEALTH CARE EDUCATION/TRAINING PROGRAM

## 2024-09-04 PROCEDURE — 6370000000 HC RX 637 (ALT 250 FOR IP)

## 2024-09-04 PROCEDURE — G0378 HOSPITAL OBSERVATION PER HR: HCPCS

## 2024-09-04 PROCEDURE — 6360000002 HC RX W HCPCS

## 2024-09-04 PROCEDURE — 70450 CT HEAD/BRAIN W/O DYE: CPT

## 2024-09-04 PROCEDURE — 99285 EMERGENCY DEPT VISIT HI MDM: CPT

## 2024-09-04 PROCEDURE — 93010 ELECTROCARDIOGRAM REPORT: CPT | Performed by: INTERNAL MEDICINE

## 2024-09-04 PROCEDURE — 6370000000 HC RX 637 (ALT 250 FOR IP): Performed by: PHYSICIAN ASSISTANT

## 2024-09-04 PROCEDURE — 85025 COMPLETE CBC W/AUTO DIFF WBC: CPT

## 2024-09-04 PROCEDURE — 36415 COLL VENOUS BLD VENIPUNCTURE: CPT

## 2024-09-04 RX ORDER — RANOLAZINE 500 MG/1
1000 TABLET, EXTENDED RELEASE ORAL DAILY
Status: DISCONTINUED | OUTPATIENT
Start: 2024-09-04 | End: 2024-09-06 | Stop reason: HOSPADM

## 2024-09-04 RX ORDER — CLOPIDOGREL BISULFATE 75 MG/1
75 TABLET ORAL DAILY
Status: DISCONTINUED | OUTPATIENT
Start: 2024-09-04 | End: 2024-09-06 | Stop reason: HOSPADM

## 2024-09-04 RX ORDER — ACETAMINOPHEN 325 MG/1
650 TABLET ORAL EVERY 6 HOURS PRN
Status: DISCONTINUED | OUTPATIENT
Start: 2024-09-04 | End: 2024-09-06 | Stop reason: HOSPADM

## 2024-09-04 RX ORDER — PANTOPRAZOLE SODIUM 40 MG/1
40 TABLET, DELAYED RELEASE ORAL
Status: DISCONTINUED | OUTPATIENT
Start: 2024-09-05 | End: 2024-09-06 | Stop reason: HOSPADM

## 2024-09-04 RX ORDER — NITROGLYCERIN 0.4 MG/1
0.4 TABLET SUBLINGUAL EVERY 5 MIN PRN
Status: DISCONTINUED | OUTPATIENT
Start: 2024-09-04 | End: 2024-09-06 | Stop reason: HOSPADM

## 2024-09-04 RX ORDER — POTASSIUM CHLORIDE 1500 MG/1
40 TABLET, EXTENDED RELEASE ORAL PRN
Status: DISCONTINUED | OUTPATIENT
Start: 2024-09-04 | End: 2024-09-06 | Stop reason: HOSPADM

## 2024-09-04 RX ORDER — ASPIRIN 81 MG/1
324 TABLET, CHEWABLE ORAL ONCE
Status: DISCONTINUED | OUTPATIENT
Start: 2024-09-04 | End: 2024-09-04

## 2024-09-04 RX ORDER — ONDANSETRON 4 MG/1
4 TABLET, ORALLY DISINTEGRATING ORAL EVERY 8 HOURS PRN
Status: DISCONTINUED | OUTPATIENT
Start: 2024-09-04 | End: 2024-09-06 | Stop reason: HOSPADM

## 2024-09-04 RX ORDER — ATORVASTATIN CALCIUM 80 MG/1
80 TABLET, FILM COATED ORAL DAILY
Status: DISCONTINUED | OUTPATIENT
Start: 2024-09-04 | End: 2024-09-06 | Stop reason: HOSPADM

## 2024-09-04 RX ORDER — SODIUM CHLORIDE 0.9 % (FLUSH) 0.9 %
5-40 SYRINGE (ML) INJECTION PRN
Status: DISCONTINUED | OUTPATIENT
Start: 2024-09-04 | End: 2024-09-06 | Stop reason: HOSPADM

## 2024-09-04 RX ORDER — MORPHINE SULFATE 4 MG/ML
4 INJECTION, SOLUTION INTRAMUSCULAR; INTRAVENOUS
Status: DISCONTINUED | OUTPATIENT
Start: 2024-09-04 | End: 2024-09-06 | Stop reason: HOSPADM

## 2024-09-04 RX ORDER — MAGNESIUM SULFATE IN WATER 40 MG/ML
2000 INJECTION, SOLUTION INTRAVENOUS PRN
Status: DISCONTINUED | OUTPATIENT
Start: 2024-09-04 | End: 2024-09-06 | Stop reason: HOSPADM

## 2024-09-04 RX ORDER — ENOXAPARIN SODIUM 100 MG/ML
1 INJECTION SUBCUTANEOUS 2 TIMES DAILY
Status: DISCONTINUED | OUTPATIENT
Start: 2024-09-04 | End: 2024-09-05

## 2024-09-04 RX ORDER — ONDANSETRON 2 MG/ML
4 INJECTION INTRAMUSCULAR; INTRAVENOUS EVERY 6 HOURS PRN
Status: DISCONTINUED | OUTPATIENT
Start: 2024-09-04 | End: 2024-09-06 | Stop reason: HOSPADM

## 2024-09-04 RX ORDER — ENOXAPARIN SODIUM 100 MG/ML
40 INJECTION SUBCUTANEOUS EVERY 24 HOURS
Status: DISCONTINUED | OUTPATIENT
Start: 2024-09-04 | End: 2024-09-04

## 2024-09-04 RX ORDER — MORPHINE SULFATE 2 MG/ML
2 INJECTION, SOLUTION INTRAMUSCULAR; INTRAVENOUS
Status: DISCONTINUED | OUTPATIENT
Start: 2024-09-04 | End: 2024-09-06 | Stop reason: HOSPADM

## 2024-09-04 RX ORDER — MORPHINE SULFATE 4 MG/ML
4 INJECTION, SOLUTION INTRAMUSCULAR; INTRAVENOUS ONCE
Status: COMPLETED | OUTPATIENT
Start: 2024-09-04 | End: 2024-09-04

## 2024-09-04 RX ORDER — NITROGLYCERIN 20 MG/100ML
5-200 INJECTION INTRAVENOUS CONTINUOUS
Status: DISCONTINUED | OUTPATIENT
Start: 2024-09-04 | End: 2024-09-06

## 2024-09-04 RX ORDER — SODIUM CHLORIDE 9 MG/ML
INJECTION, SOLUTION INTRAVENOUS PRN
Status: DISCONTINUED | OUTPATIENT
Start: 2024-09-04 | End: 2024-09-06 | Stop reason: HOSPADM

## 2024-09-04 RX ORDER — SODIUM CHLORIDE 9 MG/ML
INJECTION, SOLUTION INTRAVENOUS CONTINUOUS
Status: DISCONTINUED | OUTPATIENT
Start: 2024-09-04 | End: 2024-09-06

## 2024-09-04 RX ORDER — POLYETHYLENE GLYCOL 3350 17 G/17G
17 POWDER, FOR SOLUTION ORAL DAILY PRN
Status: DISCONTINUED | OUTPATIENT
Start: 2024-09-04 | End: 2024-09-06 | Stop reason: HOSPADM

## 2024-09-04 RX ORDER — POTASSIUM CHLORIDE 7.45 MG/ML
10 INJECTION INTRAVENOUS PRN
Status: DISCONTINUED | OUTPATIENT
Start: 2024-09-04 | End: 2024-09-06 | Stop reason: HOSPADM

## 2024-09-04 RX ORDER — ACETAMINOPHEN 650 MG/1
650 SUPPOSITORY RECTAL EVERY 6 HOURS PRN
Status: DISCONTINUED | OUTPATIENT
Start: 2024-09-04 | End: 2024-09-06 | Stop reason: HOSPADM

## 2024-09-04 RX ORDER — SODIUM CHLORIDE 0.9 % (FLUSH) 0.9 %
5-40 SYRINGE (ML) INJECTION EVERY 12 HOURS SCHEDULED
Status: DISCONTINUED | OUTPATIENT
Start: 2024-09-04 | End: 2024-09-06 | Stop reason: HOSPADM

## 2024-09-04 RX ORDER — METOPROLOL SUCCINATE 25 MG/1
25 TABLET, EXTENDED RELEASE ORAL DAILY
Status: DISCONTINUED | OUTPATIENT
Start: 2024-09-04 | End: 2024-09-06 | Stop reason: HOSPADM

## 2024-09-04 RX ADMIN — ATORVASTATIN CALCIUM 80 MG: 80 TABLET, FILM COATED ORAL at 21:38

## 2024-09-04 RX ADMIN — CLOPIDOGREL BISULFATE 75 MG: 75 TABLET ORAL at 21:38

## 2024-09-04 RX ADMIN — ENOXAPARIN SODIUM 70 MG: 100 INJECTION SUBCUTANEOUS at 21:38

## 2024-09-04 RX ADMIN — RANOLAZINE 1000 MG: 500 TABLET, EXTENDED RELEASE ORAL at 21:38

## 2024-09-04 RX ADMIN — SODIUM CHLORIDE: 9 INJECTION, SOLUTION INTRAVENOUS at 20:47

## 2024-09-04 RX ADMIN — MORPHINE SULFATE 4 MG: 4 INJECTION, SOLUTION INTRAMUSCULAR; INTRAVENOUS at 22:45

## 2024-09-04 RX ADMIN — LIDOCAINE HYDROCHLORIDE: 20 SOLUTION ORAL at 22:09

## 2024-09-04 RX ADMIN — MORPHINE SULFATE 4 MG: 4 INJECTION, SOLUTION INTRAMUSCULAR; INTRAVENOUS at 18:16

## 2024-09-04 RX ADMIN — MORPHINE SULFATE 4 MG: 4 INJECTION, SOLUTION INTRAMUSCULAR; INTRAVENOUS at 20:51

## 2024-09-04 RX ADMIN — METOPROLOL SUCCINATE 25 MG: 25 TABLET, FILM COATED, EXTENDED RELEASE ORAL at 21:38

## 2024-09-04 RX ADMIN — TICAGRELOR 90 MG: 90 TABLET ORAL at 19:08

## 2024-09-04 RX ADMIN — NITROGLYCERIN 5 MCG/MIN: 20 INJECTION INTRAVENOUS at 23:11

## 2024-09-04 RX ADMIN — SODIUM CHLORIDE, PRESERVATIVE FREE 10 ML: 5 INJECTION INTRAVENOUS at 20:51

## 2024-09-04 ASSESSMENT — PAIN DESCRIPTION - LOCATION
LOCATION: CHEST

## 2024-09-04 ASSESSMENT — PAIN SCALES - GENERAL
PAINLEVEL_OUTOF10: 7
PAINLEVEL_OUTOF10: 9
PAINLEVEL_OUTOF10: 9
PAINLEVEL_OUTOF10: 8
PAINLEVEL_OUTOF10: 7
PAINLEVEL_OUTOF10: 8
PAINLEVEL_OUTOF10: 7
PAINLEVEL_OUTOF10: 7

## 2024-09-04 ASSESSMENT — PAIN - FUNCTIONAL ASSESSMENT
PAIN_FUNCTIONAL_ASSESSMENT: 0-10
PAIN_FUNCTIONAL_ASSESSMENT: PREVENTS OR INTERFERES WITH MANY ACTIVE NOT PASSIVE ACTIVITIES
PAIN_FUNCTIONAL_ASSESSMENT: 0-10
PAIN_FUNCTIONAL_ASSESSMENT: PREVENTS OR INTERFERES SOME ACTIVE ACTIVITIES AND ADLS
PAIN_FUNCTIONAL_ASSESSMENT: 0-10

## 2024-09-04 ASSESSMENT — PAIN DESCRIPTION - ORIENTATION
ORIENTATION: MID;LEFT;LOWER
ORIENTATION: MID;LOWER;LEFT

## 2024-09-04 ASSESSMENT — PAIN DESCRIPTION - DIRECTION
RADIATING_TOWARDS: DOWN LEFT ARM
RADIATING_TOWARDS: DOWN LEFT ARM

## 2024-09-04 ASSESSMENT — LIFESTYLE VARIABLES: HOW OFTEN DO YOU HAVE A DRINK CONTAINING ALCOHOL: NEVER

## 2024-09-04 ASSESSMENT — ENCOUNTER SYMPTOMS
NAUSEA: 0
SHORTNESS OF BREATH: 1
VOMITING: 0

## 2024-09-04 ASSESSMENT — PAIN DESCRIPTION - DESCRIPTORS
DESCRIPTORS: PRESSURE;CRUSHING
DESCRIPTORS: STABBING
DESCRIPTORS: PRESSURE;CRUSHING
DESCRIPTORS: STABBING
DESCRIPTORS: SHARP;PRESSURE

## 2024-09-04 ASSESSMENT — PAIN DESCRIPTION - ONSET
ONSET: ON-GOING
ONSET: ON-GOING

## 2024-09-04 ASSESSMENT — PAIN DESCRIPTION - PAIN TYPE
TYPE: ACUTE PAIN
TYPE: ACUTE PAIN

## 2024-09-04 ASSESSMENT — PAIN DESCRIPTION - FREQUENCY
FREQUENCY: CONTINUOUS
FREQUENCY: CONTINUOUS

## 2024-09-04 NOTE — ED PROVIDER NOTES
states, \"he smoked 5 cigarettes per day and had done so for the past 5 years.\"   Vaping Use    Vaping status: Never Used   Substance Use Topics    Alcohol use: Yes     Comment: occasionally    Drug use: Yes     Types: Marijuana (Weed)       PHYSICAL EXAM       ED Triage Vitals [09/04/24 1444]   BP Systolic BP Percentile Diastolic BP Percentile Temp Temp Source Pulse Respirations SpO2   (!) 152/92 -- -- 98.5 °F (36.9 °C) Oral 83 19 98 %      Height Weight - Scale         1.753 m (5' 9\") 77.1 kg (170 lb)             Additional Vital Signs:  Vitals:    09/04/24 2136   BP: (!) 171/94   Pulse: 69   Resp:    Temp:    SpO2:      Physical Exam  Constitutional:       General: He is not in acute distress.     Appearance: He is not ill-appearing, toxic-appearing or diaphoretic.   HENT:      Head: Normocephalic and atraumatic.      Right Ear: External ear normal.      Left Ear: External ear normal.      Nose: Nose normal. No congestion or rhinorrhea.      Mouth/Throat:      Mouth: Mucous membranes are moist.      Pharynx: No oropharyngeal exudate or posterior oropharyngeal erythema.   Eyes:      Extraocular Movements: Extraocular movements intact.      Pupils: Pupils are equal, round, and reactive to light.   Cardiovascular:      Rate and Rhythm: Normal rate and regular rhythm.      Pulses: Normal pulses.      Heart sounds: Normal heart sounds. No murmur heard.     No friction rub. No gallop.   Pulmonary:      Effort: Pulmonary effort is normal. No respiratory distress.      Breath sounds: Normal breath sounds. No stridor. No wheezing, rhonchi or rales.   Chest:      Chest wall: No tenderness.   Abdominal:      General: Abdomen is flat. There is no distension.      Palpations: Abdomen is soft.      Tenderness: There is no abdominal tenderness. There is no guarding or rebound.   Musculoskeletal:         General: No swelling, tenderness, deformity or signs of injury. Normal range of motion.      Cervical back: Normal range of  who expressed understanding. Any medications were reviewed and indications and risks of medications were discussed with the patient /family present. Strict verbal and written return precautions, instructions and appropriate follow-up provided to  the patient .     ED Medications administered this visit:  (None if blank)  Medications   nitroGLYCERIN (NITROSTAT) SL tablet 0.4 mg (has no administration in time range)   sodium chloride flush 0.9 % injection 5-40 mL (10 mLs IntraVENous Given 9/4/24 2051)   sodium chloride flush 0.9 % injection 5-40 mL (has no administration in time range)   0.9 % sodium chloride infusion (has no administration in time range)   potassium chloride (KLOR-CON M) extended release tablet 40 mEq (has no administration in time range)     Or   potassium bicarb-citric acid (EFFER-K) effervescent tablet 40 mEq (has no administration in time range)     Or   potassium chloride 10 mEq/100 mL IVPB (Peripheral Line) (has no administration in time range)   magnesium sulfate 2000 mg in 50 mL IVPB premix (has no administration in time range)   ondansetron (ZOFRAN-ODT) disintegrating tablet 4 mg (has no administration in time range)     Or   ondansetron (ZOFRAN) injection 4 mg (has no administration in time range)   polyethylene glycol (GLYCOLAX) packet 17 g (has no administration in time range)   acetaminophen (TYLENOL) tablet 650 mg (has no administration in time range)     Or   acetaminophen (TYLENOL) suppository 650 mg (has no administration in time range)   0.9 % sodium chloride infusion ( IntraVENous New Bag 9/4/24 2047)   enoxaparin (LOVENOX) injection 70 mg (70 mg SubCUTAneous Given 9/4/24 2138)   atorvastatin (LIPITOR) tablet 80 mg (80 mg Oral Given 9/4/24 2138)   clopidogrel (PLAVIX) tablet 75 mg (75 mg Oral Given 9/4/24 2138)   metoprolol succinate (TOPROL XL) extended release tablet 25 mg (25 mg Oral Given 9/4/24 2138)   pantoprazole (PROTONIX) tablet 40 mg (has no administration in time range)

## 2024-09-04 NOTE — ED NOTES
Pt resting on cot w/ eyes open watching tv upon entrance. Respirations even and unlabored. Warm blanket provided for pt comfort.

## 2024-09-04 NOTE — ED TRIAGE NOTES
Pt presents to ED from home w/ c/o chest pain. Pt reports was at home laying down when symptom onset began. Pt reports taking 1 nitro at home w/ no symptom relief prior to calling EMS. Pt states was just released on Monday after having 5 stents placed. Pt reports have felt fatigued since Monday. EMS states giving 1 nitro  and 324 mg baby aspirin prior to arrival. Pt reports chest pain 7/10 on arrival and describes as sharp and pressure. Pt states he had no pain relief with either Nitro tablet. EKG complete.

## 2024-09-04 NOTE — ED NOTES
ED to inpatient nurses report    Chief Complaint   Patient presents with    Chest Pain    Fatigue      Present to ED from home  LOC: alert and orientated to name, place, date  Vital signs   Vitals:    09/04/24 1624 09/04/24 1810 09/04/24 1816 09/04/24 1910   BP: (!) 149/78 (!) 148/80  (!) 169/108   Pulse: 67 76  71   Resp: 16 14 19 15   Temp:       TempSrc:       SpO2: 98% 95%  99%   Weight:       Height:          Oxygen Baseline room air    Current needs required room air Bipap/Cpap No  LDAs:   Peripheral IV 09/04/24 Left Forearm (Active)   Site Assessment Clean, dry & intact 09/04/24 1819   Line Status Flushed 09/04/24 1819     Mobility: assistance X1  Pending ED orders: complete  Present condition: stable      Electronically signed by Alondra Armenta RN on 9/4/2024 at 7:21 PM

## 2024-09-04 NOTE — H&P
History & Physical        Patient:  Chriss Braga  YOB: 1955    MRN: [id number]     Acct: [id number]    PCP: No primary care provider on file.    Date of Admission: 9/4/2024    Date of Service: Pt seen/examined on 09/04/24  and Admitted to Observation with expected LOS less than two midnights due to medical therapy.     ASSESSMENT/PLAN:    Chest pain, unspecified: History of CAD, CABG, s/p 5 stent placement reportedly Monday: Patient recently discharged from [facility]  Reportedly not able to take any of the medication prescribed to him since discharge  Cardiology consulted  Received nitroglycerin sublingual tablets in the ER x 2 and morphine  N.P.O. at midnight  Troponin elevated but flat trend  Analgesics and antiemetics as needed  Continue metoprolol, Plavix, Lipitor, Ranexa  1 time dose of Brilinta ordered by ER    Paroxysmal atrial fibrillation: [id number] 5.  History  On Eliquis outpatient, holding  Continue Lovenox, consider heparin gtt as indicated  EKG with T wave inversions in lateral leads     Chronic Systolic Heart Failure: Echo 2/9/24 with estimated EF 25-30%  Continue Metoprolol  Cardiology consulted  Consider repeat Echo    Tobacco and polysubstance abuse history:  Nicotine patch as needed  Consider addiction services consultation    Essential HTN:  Continue Metoprolol    GERD:  PPI inpatient    History of homelessness:  Social work consult    Chief Complaint: Chest pain      History Of Present Illness:    69 y.o. male who presented to [facility] with chest pain.  Patient reports he got his haircut this morning and then developed the pain.  He reports he was just sitting around at home when he developed pain.  He also reports feeling weak and dizzy.  Patient endorses a headache.  He denies nausea or vomiting.  He reports more shortness of breath than usual.  Patient smokes marijuana but does not smoke cigarettes anymore.  Patient's high-sensitivity

## 2024-09-04 NOTE — ED NOTES
Pt resting on cot w/ eyes closed upon entrance. Pt alert to voice. Respirations even and unlabored. Dr. Ware at beside.

## 2024-09-05 LAB
ALBUMIN SERPL BCG-MCNC: 3.9 G/DL (ref 3.5–5.1)
ALP SERPL-CCNC: 70 U/L (ref 38–126)
ALT SERPL W/O P-5'-P-CCNC: 10 U/L (ref 11–66)
ANION GAP SERPL CALC-SCNC: 10 MEQ/L (ref 8–16)
ANION GAP SERPL CALC-SCNC: 7 MEQ/L (ref 8–16)
APTT PPP: 104.2 SECONDS (ref 22–38)
APTT PPP: 56.2 SECONDS (ref 22–38)
APTT PPP: 83.4 SECONDS (ref 22–38)
AST SERPL-CCNC: 14 U/L (ref 5–40)
BASOPHILS ABSOLUTE: 0 THOU/MM3 (ref 0–0.1)
BASOPHILS NFR BLD AUTO: 0.4 %
BILIRUB CONJ SERPL-MCNC: 0.4 MG/DL (ref 0.1–13.8)
BILIRUB SERPL-MCNC: 1.2 MG/DL (ref 0.3–1.2)
BUN SERPL-MCNC: 11 MG/DL (ref 7–22)
BUN SERPL-MCNC: 12 MG/DL (ref 7–22)
CALCIUM SERPL-MCNC: 8.6 MG/DL (ref 8.5–10.5)
CALCIUM SERPL-MCNC: 8.9 MG/DL (ref 8.5–10.5)
CHLORIDE SERPL-SCNC: 103 MEQ/L (ref 98–111)
CHLORIDE SERPL-SCNC: 106 MEQ/L (ref 98–111)
CO2 SERPL-SCNC: 23 MEQ/L (ref 23–33)
CO2 SERPL-SCNC: 24 MEQ/L (ref 23–33)
CREAT SERPL-MCNC: 0.9 MG/DL (ref 0.4–1.2)
CREAT SERPL-MCNC: 0.9 MG/DL (ref 0.4–1.2)
DEPRECATED RDW RBC AUTO: 38.7 FL (ref 35–45)
EKG ATRIAL RATE: 73 BPM
EKG P AXIS: 64 DEGREES
EKG P-R INTERVAL: 180 MS
EKG Q-T INTERVAL: 432 MS
EKG QRS DURATION: 94 MS
EKG QTC CALCULATION (BAZETT): 475 MS
EKG R AXIS: 13 DEGREES
EKG T AXIS: -19 DEGREES
EKG VENTRICULAR RATE: 73 BPM
EOSINOPHIL NFR BLD AUTO: 1.2 %
EOSINOPHILS ABSOLUTE: 0.1 THOU/MM3 (ref 0–0.4)
ERYTHROCYTE [DISTWIDTH] IN BLOOD BY AUTOMATED COUNT: 16 % (ref 11.5–14.5)
GFR SERPL CREATININE-BSD FRML MDRD: > 90 ML/MIN/1.73M2
GFR SERPL CREATININE-BSD FRML MDRD: > 90 ML/MIN/1.73M2
GLUCOSE SERPL-MCNC: 100 MG/DL (ref 70–108)
GLUCOSE SERPL-MCNC: 95 MG/DL (ref 70–108)
HCT VFR BLD AUTO: 33.3 % (ref 42–52)
HGB BLD-MCNC: 10.8 GM/DL (ref 14–18)
IMM GRANULOCYTES # BLD AUTO: 0.07 THOU/MM3 (ref 0–0.07)
IMM GRANULOCYTES NFR BLD AUTO: 0.9 %
LYMPHOCYTES ABSOLUTE: 1.7 THOU/MM3 (ref 1–4.8)
LYMPHOCYTES NFR BLD AUTO: 20.8 %
MCH RBC QN AUTO: 22.2 PG (ref 26–33)
MCHC RBC AUTO-ENTMCNC: 32.4 GM/DL (ref 32.2–35.5)
MCV RBC AUTO: 68.5 FL (ref 80–94)
MICROCYTES BLD QL SMEAR: PRESENT
MONOCYTES ABSOLUTE: 0.7 THOU/MM3 (ref 0.4–1.3)
MONOCYTES NFR BLD AUTO: 8.8 %
NEUTROPHILS ABSOLUTE: 5.5 THOU/MM3 (ref 1.8–7.7)
NEUTROPHILS NFR BLD AUTO: 67.9 %
NRBC BLD AUTO-RTO: 0 /100 WBC
PLATELET # BLD AUTO: 222 THOU/MM3 (ref 130–400)
PMV BLD AUTO: 9.7 FL (ref 9.4–12.4)
POTASSIUM SERPL-SCNC: 3.9 MEQ/L (ref 3.5–5.2)
POTASSIUM SERPL-SCNC: 4.2 MEQ/L (ref 3.5–5.2)
PROT SERPL-MCNC: 6.6 G/DL (ref 6.1–8)
RBC # BLD AUTO: 4.86 MILL/MM3 (ref 4.7–6.1)
REASON FOR REJECTION: NORMAL
REJECTED TEST: NORMAL
SODIUM SERPL-SCNC: 136 MEQ/L (ref 135–145)
SODIUM SERPL-SCNC: 137 MEQ/L (ref 135–145)
TROPONIN, HIGH SENSITIVITY: 46 NG/L (ref 0–12)
WBC # BLD AUTO: 8.1 THOU/MM3 (ref 4.8–10.8)

## 2024-09-05 PROCEDURE — 96368 THER/DIAG CONCURRENT INF: CPT

## 2024-09-05 PROCEDURE — 99232 SBSQ HOSP IP/OBS MODERATE 35: CPT | Performed by: PHYSICIAN ASSISTANT

## 2024-09-05 PROCEDURE — 36415 COLL VENOUS BLD VENIPUNCTURE: CPT

## 2024-09-05 PROCEDURE — 93005 ELECTROCARDIOGRAM TRACING: CPT | Performed by: PHYSICIAN ASSISTANT

## 2024-09-05 PROCEDURE — G0378 HOSPITAL OBSERVATION PER HR: HCPCS

## 2024-09-05 PROCEDURE — 96366 THER/PROPH/DIAG IV INF ADDON: CPT

## 2024-09-05 PROCEDURE — 85025 COMPLETE CBC W/AUTO DIFF WBC: CPT

## 2024-09-05 PROCEDURE — 96376 TX/PRO/DX INJ SAME DRUG ADON: CPT

## 2024-09-05 PROCEDURE — 6370000000 HC RX 637 (ALT 250 FOR IP)

## 2024-09-05 PROCEDURE — 80048 BASIC METABOLIC PNL TOTAL CA: CPT

## 2024-09-05 PROCEDURE — 99223 1ST HOSP IP/OBS HIGH 75: CPT | Performed by: NUCLEAR MEDICINE

## 2024-09-05 PROCEDURE — 80076 HEPATIC FUNCTION PANEL: CPT

## 2024-09-05 PROCEDURE — 6360000002 HC RX W HCPCS: Performed by: PHYSICIAN ASSISTANT

## 2024-09-05 PROCEDURE — 84484 ASSAY OF TROPONIN QUANT: CPT

## 2024-09-05 PROCEDURE — 6370000000 HC RX 637 (ALT 250 FOR IP): Performed by: PHYSICIAN ASSISTANT

## 2024-09-05 PROCEDURE — 93010 ELECTROCARDIOGRAM REPORT: CPT | Performed by: INTERNAL MEDICINE

## 2024-09-05 PROCEDURE — 2580000003 HC RX 258: Performed by: PHYSICIAN ASSISTANT

## 2024-09-05 PROCEDURE — 85730 THROMBOPLASTIN TIME PARTIAL: CPT

## 2024-09-05 RX ORDER — LISINOPRIL 10 MG/1
10 TABLET ORAL DAILY
Status: DISCONTINUED | OUTPATIENT
Start: 2024-09-05 | End: 2024-09-06 | Stop reason: HOSPADM

## 2024-09-05 RX ORDER — HEPARIN SODIUM 10000 [USP'U]/100ML
5-30 INJECTION, SOLUTION INTRAVENOUS CONTINUOUS
Status: DISCONTINUED | OUTPATIENT
Start: 2024-09-05 | End: 2024-09-06

## 2024-09-05 RX ORDER — HEPARIN SODIUM 1000 [USP'U]/ML
4000 INJECTION, SOLUTION INTRAVENOUS; SUBCUTANEOUS PRN
Status: DISCONTINUED | OUTPATIENT
Start: 2024-09-05 | End: 2024-09-06

## 2024-09-05 RX ORDER — HEPARIN SODIUM 1000 [USP'U]/ML
2000 INJECTION, SOLUTION INTRAVENOUS; SUBCUTANEOUS PRN
Status: DISCONTINUED | OUTPATIENT
Start: 2024-09-05 | End: 2024-09-06

## 2024-09-05 RX ORDER — HEPARIN SODIUM 1000 [USP'U]/ML
4000 INJECTION, SOLUTION INTRAVENOUS; SUBCUTANEOUS ONCE
Status: COMPLETED | OUTPATIENT
Start: 2024-09-05 | End: 2024-09-05

## 2024-09-05 RX ORDER — BUTALBITAL, ACETAMINOPHEN AND CAFFEINE 50; 325; 40 MG/1; MG/1; MG/1
1 TABLET ORAL ONCE
Status: COMPLETED | OUTPATIENT
Start: 2024-09-05 | End: 2024-09-05

## 2024-09-05 RX ADMIN — MORPHINE SULFATE 4 MG: 4 INJECTION, SOLUTION INTRAMUSCULAR; INTRAVENOUS at 22:51

## 2024-09-05 RX ADMIN — BUTALBITAL, ACETAMINOPHEN, AND CAFFEINE 1 TABLET: 50; 325; 40 TABLET ORAL at 00:35

## 2024-09-05 RX ADMIN — HEPARIN SODIUM 12 UNITS/KG/HR: 10000 INJECTION, SOLUTION INTRAVENOUS at 09:51

## 2024-09-05 RX ADMIN — MORPHINE SULFATE 2 MG: 2 INJECTION, SOLUTION INTRAMUSCULAR; INTRAVENOUS at 01:20

## 2024-09-05 RX ADMIN — LISINOPRIL 10 MG: 10 TABLET ORAL at 19:48

## 2024-09-05 RX ADMIN — RANOLAZINE 1000 MG: 500 TABLET, EXTENDED RELEASE ORAL at 09:11

## 2024-09-05 RX ADMIN — HEPARIN SODIUM 4000 UNITS: 1000 INJECTION INTRAVENOUS; SUBCUTANEOUS at 09:50

## 2024-09-05 RX ADMIN — METOPROLOL SUCCINATE 25 MG: 25 TABLET, FILM COATED, EXTENDED RELEASE ORAL at 09:09

## 2024-09-05 RX ADMIN — ATORVASTATIN CALCIUM 80 MG: 80 TABLET, FILM COATED ORAL at 09:07

## 2024-09-05 RX ADMIN — PANTOPRAZOLE SODIUM 40 MG: 40 TABLET, DELAYED RELEASE ORAL at 06:11

## 2024-09-05 RX ADMIN — CLOPIDOGREL BISULFATE 75 MG: 75 TABLET ORAL at 09:08

## 2024-09-05 RX ADMIN — MORPHINE SULFATE 4 MG: 4 INJECTION, SOLUTION INTRAMUSCULAR; INTRAVENOUS at 19:48

## 2024-09-05 RX ADMIN — SODIUM CHLORIDE: 9 INJECTION, SOLUTION INTRAVENOUS at 22:52

## 2024-09-05 ASSESSMENT — PAIN DESCRIPTION - DESCRIPTORS
DESCRIPTORS: ACHING
DESCRIPTORS: ACHING
DESCRIPTORS: PRESSURE;DISCOMFORT
DESCRIPTORS: ACHING
DESCRIPTORS: ACHING
DESCRIPTORS: SHARP
DESCRIPTORS: SHARP;SHOOTING;SQUEEZING;STABBING
DESCRIPTORS: PRESSURE
DESCRIPTORS: SHARP
DESCRIPTORS: PRESSURE;DISCOMFORT
DESCRIPTORS: SHARP;SHOOTING
DESCRIPTORS: ACHING
DESCRIPTORS_2: ACHING
DESCRIPTORS: ACHING

## 2024-09-05 ASSESSMENT — PAIN DESCRIPTION - LOCATION
LOCATION: CHEST
LOCATION: HEAD
LOCATION: CHEST
LOCATION_2: HEAD
LOCATION: CHEST
LOCATION: HEAD
LOCATION: CHEST

## 2024-09-05 ASSESSMENT — PAIN SCALES - WONG BAKER
WONGBAKER_NUMERICALRESPONSE: NO HURT

## 2024-09-05 ASSESSMENT — PAIN DESCRIPTION - PAIN TYPE
TYPE: ACUTE PAIN

## 2024-09-05 ASSESSMENT — PAIN SCALES - GENERAL
PAINLEVEL_OUTOF10: 6
PAINLEVEL_OUTOF10: 0
PAINLEVEL_OUTOF10: 6
PAINLEVEL_OUTOF10: 7
PAINLEVEL_OUTOF10: 8
PAINLEVEL_OUTOF10: 0
PAINLEVEL_OUTOF10: 7
PAINLEVEL_OUTOF10: 7
PAINLEVEL_OUTOF10: 10
PAINLEVEL_OUTOF10: 6
PAINLEVEL_OUTOF10: 8
PAINLEVEL_OUTOF10: 0
PAINLEVEL_OUTOF10: 8
PAINLEVEL_OUTOF10: 7
PAINLEVEL_OUTOF10: 0
PAINLEVEL_OUTOF10: 0
PAINLEVEL_OUTOF10: 6
PAINLEVEL_OUTOF10: 5

## 2024-09-05 ASSESSMENT — PAIN - FUNCTIONAL ASSESSMENT
PAIN_FUNCTIONAL_ASSESSMENT: PREVENTS OR INTERFERES SOME ACTIVE ACTIVITIES AND ADLS
PAIN_FUNCTIONAL_ASSESSMENT: PREVENTS OR INTERFERES SOME ACTIVE ACTIVITIES AND ADLS
PAIN_FUNCTIONAL_ASSESSMENT: ACTIVITIES ARE NOT PREVENTED
PAIN_FUNCTIONAL_ASSESSMENT_SITE2: PREVENTS OR INTERFERES SOME ACTIVE ACTIVITIES AND ADLS
PAIN_FUNCTIONAL_ASSESSMENT: ACTIVITIES ARE NOT PREVENTED
PAIN_FUNCTIONAL_ASSESSMENT: PREVENTS OR INTERFERES SOME ACTIVE ACTIVITIES AND ADLS

## 2024-09-05 ASSESSMENT — PAIN DESCRIPTION - ORIENTATION
ORIENTATION: MID;LOWER;LEFT
ORIENTATION_2: INNER
ORIENTATION: MID;LOWER;LEFT
ORIENTATION: MID;LOWER;LEFT
ORIENTATION: LEFT
ORIENTATION: INNER
ORIENTATION: LEFT
ORIENTATION: LEFT
ORIENTATION: RIGHT;LEFT
ORIENTATION: MID;LOWER;LEFT
ORIENTATION: INNER

## 2024-09-05 ASSESSMENT — PAIN DESCRIPTION - DIRECTION
RADIATING_TOWARDS: LEFT ARM
RADIATING_TOWARDS: LEFT ARM

## 2024-09-05 ASSESSMENT — PAIN DESCRIPTION - FREQUENCY
FREQUENCY: CONTINUOUS

## 2024-09-05 ASSESSMENT — PAIN DESCRIPTION - ONSET
ONSET: ON-GOING

## 2024-09-05 ASSESSMENT — PAIN DESCRIPTION - INTENSITY: RATING_2: 8

## 2024-09-05 NOTE — FLOWSHEET NOTE
During vital signs, pt reported chest pain rated 7/10 described as sharp. Primary RN Sandra notified right away.

## 2024-09-05 NOTE — PROCEDURES
PROCEDURE NOTE  Date: 9/5/2024   Name: Chriss Braga  YOB: 1955    Procedures  12 lead EKG completed. Results handed to Suzanne JO. Brook CLEMENTE

## 2024-09-05 NOTE — CONSULTS
[facility]'s  Consult    Patient's Name/Date of Birth: Chriss Braga / 1955 (69 y.o.)    Date: September 5, 2024     Referring Provider: Jennifer Lubin PA-C    CHIEF COMPLAINT: Chest Pain      HPI: This is a pleasant 69 y.o. male with a PMH of CAD s/p Stents x 5 at [facility] (9/1/24) and s/p CABG x 3 (15 years ago), hx STEMI s/p PCI w/ stent x1 (1/17/24), HFrEF (echo 2/9/2024 with EF 25-30%), AICD, medication non-compliance, transient housing, multiple ED visits for chest pain, who presents for chest pain. He states that since discharge from [facility] s/p stents x5 on 9/1/24 he has not taken any of his prescribed medications as he was unable to obtain them.  He was at rest when he began having chest pain described as left upper thorax discomfort that was sharp, pulsatile, with radiation down his left arm.  Endorses mild shortness of breath, generalized weakness, headache. Denies nausea/vomiting, lightheadedness. Denies exacerbation or ameliorating factors. In the ED he received Nitro, morphine, and brillinta. Cardiology consulted for chest pain in the setting of recent coronary stent placement with medication non-compliance.    Exam limited as patient is a poor historian.  Per nursing, this morning he complained of worsening chest pain so she increased his nitro gtt from 60 to 80 without significant change in his chest pain. Stat repeat EKG showed sinus rhythm. However, when I visited the patient after his complaint of chest pain to his nurse, he denied experiencing any chest pain this morning.       EKG (9/5/24): NSR, q-waves in leads III and AVF (present on previous EKGs)  hsTroponin (9/5/24): 46 --> 46 --> 46  CT head (9/5/24): stable ischemic changes  TTE (2/9/24): EF 25 to 30%.  Normal wall thickness with severe global hypokinesis present.Left ventricle mildly dilated    All labs, EKG's, diagnostic testing and images as well as cardiac cath, stress testing were reviewed during this  left lower lung base, No rales or rhonchi  Abdomen: positive bowel sounds, soft, non-tender, non-distended, no bruits, no masses  Extremities:no clubbing, cyanosis or edema  Neurologic: alert and oriented x 3, cranial nerves 2-12 grossly intact, motor and sensory intact, moving all extremities  Skin: No rashes  Psych: AO x 3, no depression/bairon, no pressured speech, normal affect  Lymph: No obvious LAD    Assessment:  Chest pain      Plan:    Chest Pain  Chest Pain was atypical, suspect Anxiety vs medication non-compliance vs PAF vs GERD vs non-cardiac cause  No changes on EKG concerning for ACS  Mild stable elevation in hsTroponin without continued rise is likely 2/2 recent C on 9/1/24  No invasive cardiac intervention recommended at this time  Continue Ranexa 1,000 mg qd, Nitro gtt  Outpatient f/u with cardiology with either [facility] or [facility] cardiologist who recently performed his PCI    CAD s/p recent PCI with stents x 5 at [facility] (9/1/24)  Also hx STEMI s/p PCI w/ stent x1 (1/17/24) and s/p CABG x 3 (15 years ago)  Pt has not taken prescribed medications since PCI. It is recommended that pt resume anticoagulation to reduce risk of developing stent thrombosis in the future.   No invasive cardiac intervention recommended at this time  Continue Heparin gtt, Plavix 75 mg qd, Metoprolol succinate 25 mg qd, Atorvastatin 80 mg qd   Outpatient f/u with cardiology with either [facility] or [facility] cardiologist who recently performed his PCI    Hx Paroxsymal A. Fib  [id number] 5.   No episodes of A. Fib noted during this admission  Continue Heparin gtt, Plavix 75 mg qd, Metoprolol succinate 25 mg qd      HFrEF (echo 2/9/2024 with EF 25-30%)  AICD in place  No acute CHF exacerbation at this time  Previously appears to have tolerated ACE inhib in the past  Continue Metoprolol succinate 25 mg qd  Start Lisinopril 10 mg qd. Recommend BMP in 14 days to monitor potassium.  Consider starting a potassium sparing diuretic in the future if BP

## 2024-09-05 NOTE — PLAN OF CARE
Problem: ABCDS Injury Assessment  Goal: Absence of physical injury  9/5/2024 1126 by Suzanne Lockett RN  Outcome: Progressing     Problem: Discharge Planning  Goal: Discharge to home or other facility with appropriate resources  9/5/2024 1126 by Suzanne Lockett RN  Outcome: Progressing  Flowsheets (Taken 9/5/2024 0830)  Discharge to home or other facility with appropriate resources:   Identify barriers to discharge with patient and caregiver   Arrange for needed discharge resources and transportation as appropriate   Identify discharge learning needs (meds, wound care, etc)     Problem: Pain  Goal: Verbalizes/displays adequate comfort level or baseline comfort level  9/5/2024 1126 by Suzanne Lockett RN  Outcome: Progressing  Flowsheets  Taken 9/5/2024 0830 by Suzanne Lockett RN  Verbalizes/displays adequate comfort level or baseline comfort level:   Encourage patient to monitor pain and request assistance   Assess pain using appropriate pain scale   Administer analgesics based on type and severity of pain and evaluate response   Implement non-pharmacological measures as appropriate and evaluate response      Problem: Safety - Adult  Goal: Free from fall injury  9/5/2024 1126 by Suzanne Lockett RN  Outcome: Progressing     Problem: Neurosensory - Adult  Goal: Achieves stable or improved neurological status  Outcome: Progressing  Flowsheets (Taken 9/5/2024 0830)  Achieves stable or improved neurological status:   Assess for and report changes in neurological status   Initiate measures to prevent increased intracranial pressure   Maintain blood pressure and fluid volume within ordered parameters to optimize cerebral perfusion and minimize risk of hemorrhage   Monitor temperature, glucose, and sodium. Initiate appropriate interventions as ordered     Problem: Neurosensory - Adult  Goal: Achieves maximal functionality and self care  Outcome: Progressing  Flowsheets (Taken 9/5/2024  nutritional intake:   Monitor percentage of each meal consumed   Identify factors contributing to decreased intake, treat as appropriate   Assist with meals as needed   Monitor intake and output, weight and lab values     Problem: Infection - Adult  Goal: Absence of infection during hospitalization  Outcome: Progressing  Flowsheets (Taken 9/5/2024 0830)  Absence of infection during hospitalization:   Assess and monitor for signs and symptoms of infection   Monitor lab/diagnostic results   Monitor all insertion sites i.e., indwelling lines, tubes and drains   Instruct and encourage patient and family to use good hand hygiene technique   Administer medications as ordered     Problem: Hematologic - Adult  Goal: Maintains hematologic stability  Outcome: Progressing  Flowsheets (Taken 9/5/2024 0830)  Maintains hematologic stability:   Assess for signs and symptoms of bleeding or hemorrhage   Monitor labs for bleeding or clotting disorders     Care plan reviewed with patient and family.  Patient and family verbalize understanding of the plan of care and contribute to goal setting.

## 2024-09-05 NOTE — PLAN OF CARE
Problem: Pain  Goal: Verbalizes/displays adequate comfort level or baseline comfort level  9/5/2024 0002 by Sofiya Martinez RN  Outcome: Not Progressing  9/4/2024 2008 by Sofiya Martinez RN  Outcome: Not Progressing     Problem: ABCDS Injury Assessment  Goal: Absence of physical injury  9/5/2024 0002 by Sofiya Martinez RN  Outcome: Progressing  9/4/2024 2008 by Sofiya Martinez RN  Outcome: Progressing     Problem: Discharge Planning  Goal: Discharge to home or other facility with appropriate resources  9/5/2024 0002 by Sofiya Martinez RN  Outcome: Progressing  9/4/2024 2008 by Sofiya Martinez RN  Outcome: Progressing     Problem: Safety - Adult  Goal: Free from fall injury  9/5/2024 0002 by Sofiya Martinez RN  Outcome: Progressing  9/4/2024 2008 by Sofiya Martinez RN  Outcome: Progressing     Problem: Pain  Goal: Verbalizes/displays adequate comfort level or baseline comfort level  9/5/2024 0002 by Sofiya Martinez RN  Outcome: Not Progressing  9/4/2024 2008 by Sofiya Martinez RN  Outcome: Not Progressing

## 2024-09-05 NOTE — PROGRESS NOTES
Hospitalist Progress Note      Patient:  Chriss Braga 69 y.o. male     : 1955  Unit/Bed:[facility]10/010-A  Date of Admission: 2024        ASSESSMENT AND PLAN    Active Problems  Chest pain, unspecified: History of CAD, CABG, s/p 5 stent placement reportedly Monday: Patient recently discharged from [facility]  Reportedly not able to take any of the medication prescribed to him since discharge- unclear if he was able to get them from the pharmacy- poor historian  Cardiology consulted  Currently on heparin and nitro drip  N.P.O  Troponin elevated but flat trend  Analgesics and antiemetics as needed  Continue metoprolol, Plavix, Lipitor, Ranexa    Paroxysmal atrial fibrillation: [id number] 5.  History  On Eliquis outpatient, holding  On heparin drip      Chronic Systolic Heart Failure: Echo 24 with estimated EF 25-30%  Continue Metoprolol  Cardiology consulted  Consider repeat Echo    Tobacco and polysubstance abuse history:  Nicotine patch as needed  Consider addiction services consultation    Essential HTN:  Continue Metoprolol     GERD:  PPI inpatient     History of homelessness:  Social work consult        LDA: []CVC / []PICC / []Midline / []Lucero / []Drains / []Mediport / [x]None  Antibiotics: none  Steroids: none   Labs (still needed?): [x]Yes / []No  IVF (still needed?): []Yes / [x]No    Level of care: []Step Down / [x]Med-Surg  Bed Status: []Inpatient / [x]Observation  Telemetry: [x]Yes / []No  PT/OT: []Yes / [x]No    DVT Prophylaxis: [] Lovenox / [x] Heparin / [] SCDs / [] Already on Systemic Anticoagulation / [] None     Expected discharge date:  1-2 days   Disposition: home      Code status: Full Code     ===================================================================    Chief Complaint: Chest pain  Subjective (past 24 hours):   Patient currently on nitro and heparin drip.  Currently denying any chest pain.  States that the chest pain \"comes and goes\" he states he was never given  dry. No rashes or lesions.  Neurologic:  No focal sensory/motor deficits in the upper or lower extremities. Cranial nerves:  grossly non-focal 2-12.     Psychiatric: Alert and oriented, normal insight and thought content.   Capillary Refill: Brisk,< 3 seconds.  Peripheral Pulses: +2 palpable, equal bilaterally.       Labs/Radiology: See chart or assessment above.     Electronically signed by Jennifer Lubin PA-C on 9/5/2024 at 2:44 PM

## 2024-09-05 NOTE — PROGRESS NOTES
Pt admitted to floor at 2000 with BP's in 160's/110's and chest pain 10/10 lower left chest with radiation into neck and left arm.  Inquired with internal medicine group about starting nitro gtt at 2030.  Following multiple doses of morphine IVP and a GI cocktail with [id number] relief, nitro gtt ordered around 2330.  Following steady titration up to 60mcg/min, pt BP's 120's/80's and resting comfortably with eyes closed, chest pain 5/10, and minimal radiation up to next only.

## 2024-09-05 NOTE — FLOWSHEET NOTE
Report received from primary RN, Sandra. LOC x4, speech is clear and appropriate. Pupils are equal, round, and react to light 5 mm to 3 mm bilaterally. Upper extremities are pink, warm, dry, and pt reports numbness, pain, and tingling in left arm (primary RN Sandra notified). Hand grasp is equal and strong with no arm drift. IV site is clean, dry, and patent. Apical heart rate has a normal rate and rhythm. Respiratory rate is 14 with moderate depth and symmetrical chest movement. Abdomen is round with hypoactive bowel sounds and is non tender. Lower extremities are warm, pink, and dry with no numbness or tingling. No edema is present with moderate pedal push and pull. Pt is resting in bed with call light in reach.

## 2024-09-05 NOTE — PLAN OF CARE
Problem: ABCDS Injury Assessment  Goal: Absence of physical injury  9/5/2024 1951 by Dayna Nguyen RN  Outcome: Progressing  9/5/2024 1126 by Suzanne Lockett RN  Outcome: Progressing     Problem: Discharge Planning  Goal: Discharge to home or other facility with appropriate resources  9/5/2024 1951 by Dayna Nguyen RN  Outcome: Progressing  9/5/2024 1126 by Suzanne Lockett RN  Outcome: Progressing  Flowsheets (Taken 9/5/2024 0830)  Discharge to home or other facility with appropriate resources:   Identify barriers to discharge with patient and caregiver   Arrange for needed discharge resources and transportation as appropriate   Identify discharge learning needs (meds, wound care, etc)     Problem: Pain  Goal: Verbalizes/displays adequate comfort level or baseline comfort level  9/5/2024 1951 by Dayna Nguyen RN  Outcome: Progressing  9/5/2024 1126 by Suzanne Lockett RN  Outcome: Progressing  Flowsheets  Taken 9/5/2024 0830 by Suzanne Lockett RN  Verbalizes/displays adequate comfort level or baseline comfort level:   Encourage patient to monitor pain and request assistance   Assess pain using appropriate pain scale   Administer analgesics based on type and severity of pain and evaluate response   Implement non-pharmacological measures as appropriate and evaluate response  Taken 9/5/2024 0015 by Sofiya Martinez RN  Verbalizes/displays adequate comfort level or baseline comfort level:   Encourage patient to monitor pain and request assistance   Assess pain using appropriate pain scale   Administer analgesics based on type and severity of pain and evaluate response   Implement non-pharmacological measures as appropriate and evaluate response   Consider cultural and social influences on pain and pain management   Notify Licensed Independent Practitioner if interventions unsuccessful or patient reports new pain     Problem: Safety - Adult  Goal: Free from fall injury  9/5/2024 1951 by Wendy  Adult  Goal: Minimal or absence of nausea and vomiting  9/5/2024 1951 by Dayna Nguyen RN  Outcome: Progressing  9/5/2024 1126 by Suzanne Lockett RN  Outcome: Progressing  Flowsheets (Taken 9/5/2024 0830)  Minimal or absence of nausea and vomiting:   Administer ordered antiemetic medications as needed   Provide nonpharmacologic comfort measures as appropriate  Goal: Maintains or returns to baseline bowel function  9/5/2024 1951 by Dayna Nguyen RN  Outcome: Progressing  9/5/2024 1126 by Suzanne Lockett RN  Outcome: Progressing  Flowsheets (Taken 9/5/2024 0830)  Maintains or returns to baseline bowel function:   Assess bowel function   Encourage oral fluids to ensure adequate hydration   Administer IV fluids as ordered to ensure adequate hydration   Administer ordered medications as needed   Encourage mobilization and activity  Goal: Maintains adequate nutritional intake  9/5/2024 1951 by Dayna Nguyen RN  Outcome: Progressing  9/5/2024 1126 by Suzanne Lockett RN  Outcome: Progressing  Flowsheets (Taken 9/5/2024 0830)  Maintains adequate nutritional intake:   Monitor percentage of each meal consumed   Identify factors contributing to decreased intake, treat as appropriate   Assist with meals as needed   Monitor intake and output, weight and lab values     Problem: Infection - Adult  Goal: Absence of infection during hospitalization  9/5/2024 1951 by Dayna Nguyen RN  Outcome: Progressing  9/5/2024 1126 by Suzanne Lockett RN  Outcome: Progressing  Flowsheets (Taken 9/5/2024 0830)  Absence of infection during hospitalization:   Assess and monitor for signs and symptoms of infection   Monitor lab/diagnostic results   Monitor all insertion sites i.e., indwelling lines, tubes and drains   Instruct and encourage patient and family to use good hand hygiene technique   Administer medications as ordered     Problem: Hematologic - Adult  Goal: Maintains hematologic stability  9/5/2024 1951 by

## 2024-09-05 NOTE — CARE COORDINATION
Case Management Assessment Initial Evaluation    Date/Time of Evaluation: 2024 3:06 PM  Assessment Completed by: Veronica Saez RN    If patient is discharged prior to next notation, then this note serves as note for discharge by case management.    Patient Name: Chriss Braga                   YOB: 1955  Diagnosis: Chest pain [R07.9]  Elevated troponin [R79.89]  Other forms of angina pectoris (HCC) [I20.89]                   Date / Time: 2024  2:35 PM  Location[facility]10/010     Patient Admission Status: Observation   Readmission Risk Low 0-14, Mod 15-19), High > 20: Readmission Risk Score: 24.9    Current PCP: No primary care provider on file.  Health Care Decision Makers:   Primary Decision Maker: Willie Braga - Child - [phone]    Secondary Decision Maker: Teena Arteaga - Niece/Nephew - [phone]    Additional Case Management Notes: Admit from ER with chest pain. Recent cath with stents at [facility]. Hx CABG. Cardiology consulted. Heparin and Nitro gtt. Trop 46, 46.     Procedures: none    Imagin/4 CXR: 1. No acute intrathoracic process.   2. Mild stable cardiomegaly.    CT Head WO: . Stable CT scan of the brain, no interval change since previous study dated   2024     Patient Goals/Plan/Treatment Preferences: Planing to return home with his brother. He is recently . Emergency contacts updated. Would like to follow up with the resident's clinic. Denies any needs at this time.            24 1458   Service Assessment   Patient Orientation Alert and Oriented   Cognition Alert   History Provided By Patient   Primary Caregiver Self   Accompanied By/Relationship alone   Support Systems Children;Family Members   Patient's Healthcare Decision Maker is: Patient Declined (Legal Next of Kin Remains as Decision Maker)  (emergency contacts updated)   PCP Verified by CM No  (agreeable to getting scheduled with residents clinic again)   Prior Functional Level Independent in

## 2024-09-06 VITALS
HEIGHT: 69 IN | RESPIRATION RATE: 17 BRPM | OXYGEN SATURATION: 97 % | WEIGHT: 157.19 LBS | HEART RATE: 78 BPM | SYSTOLIC BLOOD PRESSURE: 150 MMHG | TEMPERATURE: 98.5 F | DIASTOLIC BLOOD PRESSURE: 96 MMHG | BODY MASS INDEX: 23.28 KG/M2

## 2024-09-06 LAB
ANION GAP SERPL CALC-SCNC: 10 MEQ/L (ref 8–16)
APTT PPP: 60.4 SECONDS (ref 22–38)
APTT PPP: 69.4 SECONDS (ref 22–38)
BUN SERPL-MCNC: 11 MG/DL (ref 7–22)
CALCIUM SERPL-MCNC: 8.3 MG/DL (ref 8.5–10.5)
CHLORIDE SERPL-SCNC: 104 MEQ/L (ref 98–111)
CO2 SERPL-SCNC: 22 MEQ/L (ref 23–33)
CREAT SERPL-MCNC: 0.9 MG/DL (ref 0.4–1.2)
GFR SERPL CREATININE-BSD FRML MDRD: > 90 ML/MIN/1.73M2
GLUCOSE SERPL-MCNC: 90 MG/DL (ref 70–108)
POTASSIUM SERPL-SCNC: 4.3 MEQ/L (ref 3.5–5.2)
SODIUM SERPL-SCNC: 136 MEQ/L (ref 135–145)

## 2024-09-06 PROCEDURE — 96366 THER/PROPH/DIAG IV INF ADDON: CPT

## 2024-09-06 PROCEDURE — 6370000000 HC RX 637 (ALT 250 FOR IP)

## 2024-09-06 PROCEDURE — 99232 SBSQ HOSP IP/OBS MODERATE 35: CPT | Performed by: PHYSICIAN ASSISTANT

## 2024-09-06 PROCEDURE — 36415 COLL VENOUS BLD VENIPUNCTURE: CPT

## 2024-09-06 PROCEDURE — 99239 HOSP IP/OBS DSCHRG MGMT >30: CPT | Performed by: PHYSICIAN ASSISTANT

## 2024-09-06 PROCEDURE — 6370000000 HC RX 637 (ALT 250 FOR IP): Performed by: PHYSICIAN ASSISTANT

## 2024-09-06 PROCEDURE — 80048 BASIC METABOLIC PNL TOTAL CA: CPT

## 2024-09-06 PROCEDURE — G0378 HOSPITAL OBSERVATION PER HR: HCPCS

## 2024-09-06 PROCEDURE — 85730 THROMBOPLASTIN TIME PARTIAL: CPT

## 2024-09-06 RX ORDER — ISOSORBIDE MONONITRATE 30 MG/1
30 TABLET, EXTENDED RELEASE ORAL DAILY
Qty: 90 TABLET | Refills: 0 | Status: SHIPPED | OUTPATIENT
Start: 2024-09-07

## 2024-09-06 RX ORDER — LISINOPRIL 10 MG/1
10 TABLET ORAL DAILY
Qty: 90 TABLET | Refills: 0 | Status: SHIPPED | OUTPATIENT
Start: 2024-09-07

## 2024-09-06 RX ORDER — CLOPIDOGREL BISULFATE 75 MG/1
75 TABLET ORAL DAILY
Qty: 90 TABLET | Refills: 0 | Status: SHIPPED | OUTPATIENT
Start: 2024-09-06

## 2024-09-06 RX ORDER — RANOLAZINE 1000 MG/1
1000 TABLET, EXTENDED RELEASE ORAL DAILY
Qty: 90 TABLET | Refills: 0 | Status: SHIPPED | OUTPATIENT
Start: 2024-09-07

## 2024-09-06 RX ORDER — PANTOPRAZOLE SODIUM 40 MG/1
40 TABLET, DELAYED RELEASE ORAL
Qty: 90 TABLET | Refills: 0 | Status: SHIPPED | OUTPATIENT
Start: 2024-09-07

## 2024-09-06 RX ORDER — ATORVASTATIN CALCIUM 80 MG/1
80 TABLET, FILM COATED ORAL DAILY
Qty: 90 TABLET | Refills: 0 | Status: SHIPPED | OUTPATIENT
Start: 2024-09-06

## 2024-09-06 RX ORDER — FERROUS SULFATE 325(65) MG
325 TABLET ORAL EVERY OTHER DAY
Qty: 45 TABLET | Refills: 0 | Status: SHIPPED | OUTPATIENT
Start: 2024-09-06

## 2024-09-06 RX ORDER — METOPROLOL SUCCINATE 25 MG/1
25 TABLET, EXTENDED RELEASE ORAL DAILY
Qty: 90 TABLET | Refills: 0 | Status: SHIPPED | OUTPATIENT
Start: 2024-09-07

## 2024-09-06 RX ORDER — NITROGLYCERIN 0.4 MG/1
0.4 TABLET SUBLINGUAL EVERY 5 MIN PRN
Qty: 25 TABLET | Refills: 0 | Status: SHIPPED | OUTPATIENT
Start: 2024-09-06

## 2024-09-06 RX ORDER — ISOSORBIDE MONONITRATE 30 MG/1
30 TABLET, EXTENDED RELEASE ORAL DAILY
Status: DISCONTINUED | OUTPATIENT
Start: 2024-09-06 | End: 2024-09-06 | Stop reason: HOSPADM

## 2024-09-06 RX ADMIN — PANTOPRAZOLE SODIUM 40 MG: 40 TABLET, DELAYED RELEASE ORAL at 08:27

## 2024-09-06 RX ADMIN — ATORVASTATIN CALCIUM 80 MG: 80 TABLET, FILM COATED ORAL at 08:27

## 2024-09-06 RX ADMIN — LISINOPRIL 10 MG: 10 TABLET ORAL at 08:27

## 2024-09-06 RX ADMIN — ONDANSETRON 4 MG: 4 TABLET, ORALLY DISINTEGRATING ORAL at 14:21

## 2024-09-06 RX ADMIN — METOPROLOL SUCCINATE 25 MG: 25 TABLET, FILM COATED, EXTENDED RELEASE ORAL at 08:27

## 2024-09-06 RX ADMIN — CLOPIDOGREL BISULFATE 75 MG: 75 TABLET ORAL at 08:27

## 2024-09-06 RX ADMIN — ISOSORBIDE MONONITRATE 30 MG: 30 TABLET, EXTENDED RELEASE ORAL at 12:26

## 2024-09-06 RX ADMIN — RANOLAZINE 1000 MG: 500 TABLET, EXTENDED RELEASE ORAL at 08:27

## 2024-09-06 NOTE — PROGRESS NOTES
Cardiology Progress Note      Patient:  Chriss Braga  YOB: 1955  MRN: [id number]   Acct: [id number]  Admit Date:  9/4/2024  Primary Cardiologist:  Was following with [facility], wants to now follow with [facility] cardiology.    CHIEF COMPLAINT: Chest Pain        HPI: This is a pleasant 69 y.o. male with a PMH of CAD s/p Stents x 5 at [facility] (9/1/24) and s/p CABG x 3 (15 years ago), hx STEMI s/p PCI w/ stent x1 (1/17/24), HFrEF (echo 2/9/2024 with EF 25-30%), AICD, medication non-compliance, transient housing, multiple ED visits for chest pain, who presents for chest pain. He states that since discharge from [facility] s/p stents x5 on 9/1/24 he has not taken any of his prescribed medications as he was unable to obtain them.  He was at rest when he began having chest pain described as left upper thorax discomfort that was sharp, pulsatile, with radiation down his left arm.  Endorses mild shortness of breath, generalized weakness, headache. Denies nausea/vomiting, lightheadedness. Denies exacerbation or ameliorating factors. In the ED he received Nitro, morphine, and brillinta. Cardiology consulted for chest pain in the setting of recent coronary stent placement with medication non-compliance.     Exam limited as patient is a poor historian.  Per nursing, this morning he complained of worsening chest pain so she increased his nitro gtt from 60 to 80 without significant change in his chest pain. Stat repeat EKG showed sinus rhythm. However, when I visited the patient after his complaint of chest pain to his nurse, he denied experiencing any chest pain this morning.         EKG (9/5/24): NSR, q-waves in leads III and AVF (present on previous EKGs)  hsTroponin (9/5/24): 46 --> 46 --> 46  CT head (9/5/24): stable ischemic changes  TTE (2/9/24): EF 25 to 30%.  Normal wall thickness with severe global hypokinesis present.Left ventricle mildly dilated    Subjective (Events in last 24 hours):   Pt denies any CP or SOB.  Remains on  89.6 cm/s   E/E' lateral: 13.75     [email or web address]         Lab Data:    Cardiac Enzymes:  No results for input(s): \"CKTOTAL\", \"CKMB\", \"CKMBINDEX\", \"TROPONINI\" in the last 72 hours.    CBC:   Lab Results   Component Value Date/Time    WBC 8.1 09/05/2024 05:56 AM    RBC 4.86 09/05/2024 05:56 AM    RBC 4.70 03/14/2012 06:03 AM    HGB 10.8 09/05/2024 05:56 AM    HGB 10.6 03/14/2012 06:03 AM    HCT 33.3 09/05/2024 05:56 AM     09/05/2024 05:56 AM       CMP:    Lab Results   Component Value Date/Time     09/06/2024 05:35 AM    K 4.3 09/06/2024 05:35 AM     09/06/2024 05:35 AM    CO2 22 09/06/2024 05:35 AM    BUN 11 09/06/2024 05:35 AM    CREATININE 0.9 09/06/2024 05:35 AM    LABGLOM > 90 09/06/2024 05:35 AM    LABGLOM >60 02/27/2024 12:24 PM    GLUCOSE 90 09/06/2024 05:35 AM    GLUCOSE 85 03/14/2012 06:03 AM    CALCIUM 8.3 09/06/2024 05:35 AM       Hepatic Function Panel:    Lab Results   Component Value Date/Time    ALKPHOS 70 09/05/2024 05:56 AM    ALT 10 09/05/2024 05:56 AM    AST 14 09/05/2024 05:56 AM    BILITOT 1.2 09/05/2024 05:56 AM    BILIDIR 0.4 09/05/2024 05:56 AM       Magnesium:    Lab Results   Component Value Date/Time    MG 2.1 02/23/2024 08:02 AM       PT/INR:    Lab Results   Component Value Date/Time    PROTIME 1.01 12/23/2011 08:33 AM    INR 1.12 09/04/2024 03:58 PM       HgBA1c:    Lab Results   Component Value Date/Time    LABA1C 5.6 02/10/2024 03:57 AM       FLP:    Lab Results   Component Value Date/Time    TRIG 81 02/10/2024 03:57 AM    HDL 31 02/10/2024 03:57 AM       TSH:    Lab Results   Component Value Date/Time    TSH 3.160 02/09/2024 11:36 AM         Assessment:  Atypical chest pain  S/P PCI 1/2024 and 9/2024  H/O 3V CABG  Poor compliance with medications  pAfib  HFrEF  S/P ICD    Plan:  Discussed the importance of compliance with medications, he states

## 2024-09-06 NOTE — PROGRESS NOTES
Discussed discharge summary with patient. Instructed patient about medications & follow up appointments at length. Patient denies any additional questions. Patient was discharged with all belongings and new prescription medications. No distress noted. Patient discharged to home. Taken down to the vehicle by transporter per wheelchair.

## 2024-09-06 NOTE — PROGRESS NOTES
1345 called meds to beds about patient prescriptions and estimated timeline in order to coordinate transport for patient discharge.     1530 meds to beds delivered. Cab form faxed to [facility] at this time. [facility] called and notified this RN they would call back with an ETA for pickup.

## 2024-09-06 NOTE — CARE COORDINATION
9/6/24, 12:54 PM EDT    Patient goals/plan/ treatment preferences discussed by  and .  Patient goals/plan/ treatment preferences reviewed with patient/ family.  Patient/ family verbalize understanding of discharge plan and are in agreement with goal/plan/treatment preferences.  Understanding was demonstrated using the teach back method.  AVS provided by RN at time of discharge, which includes all necessary medical information pertaining to the patients current course of illness, treatment, post-discharge goals of care, and treatment preferences.     Services At/After Discharge: Home Health, In cab, and Nursing service    Met w/ Chriss. He is agreeable with discharge today. Plans to return home w/ brother. Expressed need for [facility] home- cab paper filled out and on chart, primary RN Nerissa to fax once ready.     New orders for  for nursing and . Chriss prefers [facility] for services. Spoke w/ Deepthi, they are able to accept after Chriss goes to his initial PCP appointment with Dr. Solares on 9/18. Updated Chriss and attending, Jennifer Lubin. No further needs.

## 2024-09-06 NOTE — DISCHARGE SUMMARY
Hospital Medicine Discharge Summary      Patient Identification:   Chriss Braga   : 1955  MRN: [id number]   Account: [id number]      Patient's PCP: No primary care provider on file.    Admit Date: 2024     Discharge Date:   2024    Admitting Physician: No admitting provider for patient encounter.     Discharging Physician Assistant: Jennifer Lubin PA-C     Discharge Diagnoses with Assessment/Plan:    Chest pain, unspecified: History of CAD, CABG, s/p 5 stent placement reportedly Monday: Patient recently discharged from [facility]  Did not  any of his medications after discharge from [facility] on   Cardiology was consulted and did not recommend for any intervention.  Continue with medical management and compliance counseling  Was on heparin and nitro drip which have been weaned-patient still without chest pain  Troponin elevated but flat trend  Continue metoprolol, Plavix, Lipitor, Ranexa-90-day supply of medications have been sent and brought to patient room prior to discharge  Home health nursing and medical social worker ordered     Paroxysmal atrial fibrillation: [id number] 5.  History  Continue Eliquis and metoprolol      Chronic Systolic Heart Failure: Echo 24 with estimated EF 25-30%  Continue home meds     Tobacco and polysubstance abuse history:  Encourage cessation     Essential HTN:  Continue home meds    GERD:  Continue home meds      The patient was seen and examined on day of discharge and this discharge summary is in conjunction with any daily progress note from day of discharge.    Hospital Course:   Patient is a 69-year-old male who presented to [facility] with chest pain.  He was reportedly discharged from [facility] on  after undergoing placement of 5 stents.  He did not begin to take any of the medications he was discharged with and subsequently developed chest pain and worsening shortness of breath.    Patient was placed on  every morning (before breakfast)  Start taking on: September 7, 2024     ranolazine 1000 MG extended release tablet  Commonly known as: RANEXA  Take 1 tablet by mouth daily  Start taking on: September 7, 2024     therapeutic multivitamin-minerals tablet  Take 1 tablet by mouth daily               Where to Get Your Medications        These medications were sent to [facility] - [place], [place] - [address] -  [phone] - F [phone]  [address] [address]      Phone: [phone]   apixaban 5 MG Tabs tablet  atorvastatin 80 MG tablet  clopidogrel 75 MG tablet  cyanocobalamin 1000 MCG tablet  ferrous sulfate 325 (65 Fe) MG tablet  isosorbide mononitrate 30 MG extended release tablet  lisinopril 10 MG tablet  metoprolol succinate 25 MG extended release tablet  nitroGLYCERIN 0.4 MG SL tablet  pantoprazole 40 MG tablet  ranolazine 1000 MG extended release tablet         Time Spent on discharge is more than 1 hour in the examination, evaluation, counseling and review of medications and discharge plan.        Signed:    Thank you No primary care provider on file. for the opportunity to be involved in this patient's care.    Electronically signed by Jennifer Lubin PA-C on 9/6/2024 at 12:48 PM

## 2024-09-17 ENCOUNTER — TELEPHONE (OUTPATIENT)
Dept: CARDIAC REHAB | Age: 69
End: 2024-09-17

## 2024-10-05 PROBLEM — R79.89 ELEVATED TROPONIN: Status: RESOLVED | Noted: 2024-02-21 | Resolved: 2024-10-05

## 2024-11-14 ENCOUNTER — APPOINTMENT (OUTPATIENT)
Dept: GENERAL RADIOLOGY | Age: 69
DRG: 057 | End: 2024-11-14
Payer: MEDICARE

## 2024-11-14 ENCOUNTER — OFFICE VISIT (OUTPATIENT)
Dept: INTERNAL MEDICINE CLINIC | Age: 69
End: 2024-11-14

## 2024-11-14 ENCOUNTER — APPOINTMENT (OUTPATIENT)
Dept: CT IMAGING | Age: 69
DRG: 057 | End: 2024-11-14
Payer: MEDICARE

## 2024-11-14 ENCOUNTER — HOSPITAL ENCOUNTER (INPATIENT)
Age: 69
LOS: 1 days | Discharge: HOME | DRG: 057 | End: 2024-11-16
Attending: EMERGENCY MEDICINE | Admitting: STUDENT IN AN ORGANIZED HEALTH CARE EDUCATION/TRAINING PROGRAM
Payer: MEDICARE

## 2024-11-14 VITALS
DIASTOLIC BLOOD PRESSURE: 102 MMHG | SYSTOLIC BLOOD PRESSURE: 162 MMHG | HEART RATE: 84 BPM | OXYGEN SATURATION: 97 % | WEIGHT: 170.2 LBS | HEIGHT: 69 IN | BODY MASS INDEX: 25.21 KG/M2

## 2024-11-14 DIAGNOSIS — D35.2 PITUITARY ADENOMA (HCC): ICD-10-CM

## 2024-11-14 DIAGNOSIS — G81.92 HEMIPARESIS OF LEFT DOMINANT SIDE, UNSPECIFIED HEMIPARESIS ETIOLOGY (HCC): ICD-10-CM

## 2024-11-14 DIAGNOSIS — K21.9 GASTROESOPHAGEAL REFLUX DISEASE WITHOUT ESOPHAGITIS: ICD-10-CM

## 2024-11-14 DIAGNOSIS — I10 PRIMARY HYPERTENSION: ICD-10-CM

## 2024-11-14 DIAGNOSIS — R29.90 STROKE-LIKE SYMPTOMS: Primary | ICD-10-CM

## 2024-11-14 DIAGNOSIS — I25.10 CORONARY ARTERY DISEASE INVOLVING NATIVE HEART, UNSPECIFIED VESSEL OR LESION TYPE, UNSPECIFIED WHETHER ANGINA PRESENT: ICD-10-CM

## 2024-11-14 DIAGNOSIS — I50.22 CHRONIC SYSTOLIC HEART FAILURE (HCC): ICD-10-CM

## 2024-11-14 DIAGNOSIS — Z86.73 HISTORY OF STROKE: ICD-10-CM

## 2024-11-14 DIAGNOSIS — R29.90 STROKE-LIKE SYMPTOM: ICD-10-CM

## 2024-11-14 DIAGNOSIS — I51.9 LEFT VENTRICULAR SYSTOLIC DYSFUNCTION: ICD-10-CM

## 2024-11-14 DIAGNOSIS — I63.9 CEREBROVASCULAR ACCIDENT (CVA), UNSPECIFIED MECHANISM (HCC): ICD-10-CM

## 2024-11-14 DIAGNOSIS — G89.29 CHRONIC LOW BACK PAIN WITHOUT SCIATICA, UNSPECIFIED BACK PAIN LATERALITY: ICD-10-CM

## 2024-11-14 DIAGNOSIS — M54.50 CHRONIC LOW BACK PAIN WITHOUT SCIATICA, UNSPECIFIED BACK PAIN LATERALITY: ICD-10-CM

## 2024-11-14 DIAGNOSIS — I48.0 PAROXYSMAL ATRIAL FIBRILLATION (HCC): ICD-10-CM

## 2024-11-14 DIAGNOSIS — I48.91 ATRIAL FIBRILLATION, UNSPECIFIED TYPE (HCC): ICD-10-CM

## 2024-11-14 DIAGNOSIS — Z91.81 AT HIGH RISK FOR FALLS: ICD-10-CM

## 2024-11-14 DIAGNOSIS — I25.5 ISCHEMIC CARDIOMYOPATHY: ICD-10-CM

## 2024-11-14 DIAGNOSIS — R29.90 STROKE-LIKE SYMPTOM: Primary | ICD-10-CM

## 2024-11-14 DIAGNOSIS — F12.10 MARIJUANA ABUSE: ICD-10-CM

## 2024-11-14 LAB
ALBUMIN SERPL BCG-MCNC: 4.1 G/DL (ref 3.5–5.1)
ALP SERPL-CCNC: 79 U/L (ref 38–126)
ALT SERPL W/O P-5'-P-CCNC: 14 U/L (ref 11–66)
AMPHETAMINES UR QL SCN: NEGATIVE
ANION GAP SERPL CALC-SCNC: 11 MEQ/L (ref 8–16)
APTT PPP: 32.8 SECONDS (ref 22–38)
AST SERPL-CCNC: 19 U/L (ref 5–40)
BARBITURATES UR QL SCN: NEGATIVE
BASE EXCESS BLDA CALC-SCNC: 1.8 MMOL/L (ref -2–3)
BASOPHILS ABSOLUTE: 0 THOU/MM3 (ref 0–0.1)
BASOPHILS NFR BLD AUTO: 0.4 %
BENZODIAZ UR QL SCN: NEGATIVE
BILIRUB SERPL-MCNC: 0.7 MG/DL (ref 0.3–1.2)
BILIRUB UR QL STRIP.AUTO: NEGATIVE
BUN SERPL-MCNC: 17 MG/DL (ref 7–22)
BZE UR QL SCN: NEGATIVE
CALCIUM SERPL-MCNC: 9 MG/DL (ref 8.5–10.5)
CANNABINOIDS UR QL SCN: POSITIVE
CHARACTER UR: CLEAR
CHLORIDE SERPL-SCNC: 107 MEQ/L (ref 98–111)
CO2 SERPL-SCNC: 24 MEQ/L (ref 23–33)
COLLECTED BY:: NORMAL
COLOR, UA: YELLOW
CREAT SERPL-MCNC: 0.9 MG/DL (ref 0.4–1.2)
DEPRECATED RDW RBC AUTO: 39.6 FL (ref 35–45)
DEVICE: NORMAL
EOSINOPHIL NFR BLD AUTO: 1.6 %
EOSINOPHILS ABSOLUTE: 0.1 THOU/MM3 (ref 0–0.4)
ERYTHROCYTE [DISTWIDTH] IN BLOOD BY AUTOMATED COUNT: 16 % (ref 11.5–14.5)
FENTANYL: NEGATIVE
GFR SERPL CREATININE-BSD FRML MDRD: > 90 ML/MIN/1.73M2
GLUCOSE BLD STRIP.AUTO-MCNC: 96 MG/DL (ref 70–108)
GLUCOSE SERPL-MCNC: 93 MG/DL (ref 70–108)
GLUCOSE UR QL STRIP.AUTO: NEGATIVE MG/DL
HCO3 BLDA-SCNC: 28 MMOL/L (ref 23–28)
HCT VFR BLD AUTO: 40.1 % (ref 42–52)
HGB BLD-MCNC: 12.4 GM/DL (ref 14–18)
HGB UR QL STRIP.AUTO: NEGATIVE
IMM GRANULOCYTES # BLD AUTO: 0.06 THOU/MM3 (ref 0–0.07)
IMM GRANULOCYTES NFR BLD AUTO: 0.8 %
INR PPP: 0.97 (ref 0.85–1.13)
KETONES UR QL STRIP.AUTO: NEGATIVE
LYMPHOCYTES ABSOLUTE: 1.8 THOU/MM3 (ref 1–4.8)
LYMPHOCYTES NFR BLD AUTO: 23.2 %
MCH RBC QN AUTO: 21.8 PG (ref 26–33)
MCHC RBC AUTO-ENTMCNC: 30.9 GM/DL (ref 32.2–35.5)
MCV RBC AUTO: 70.5 FL (ref 80–94)
MONOCYTES ABSOLUTE: 0.5 THOU/MM3 (ref 0.4–1.3)
MONOCYTES NFR BLD AUTO: 6.9 %
NEUTROPHILS ABSOLUTE: 5.1 THOU/MM3 (ref 1.8–7.7)
NEUTROPHILS NFR BLD AUTO: 67.1 %
NITRITE UR QL STRIP: NEGATIVE
NRBC BLD AUTO-RTO: 0 /100 WBC
OPIATES UR QL SCN: NEGATIVE
OSMOLALITY SERPL CALC.SUM OF ELEC: 284.4 MOSMOL/KG (ref 275–300)
OXYCODONE: NEGATIVE
P2Y12 ASSAY: 233 PRU (ref 180–376)
PCO2 TEMP ADJ BLDMV: 50 MMHG (ref 41–51)
PCP UR QL SCN: NEGATIVE
PH BLDMV: 7.36 [PH] (ref 7.31–7.41)
PH UR STRIP.AUTO: 6.5 [PH] (ref 5–9)
PLATELET # BLD AUTO: 225 THOU/MM3 (ref 130–400)
PMV BLD AUTO: 9.9 FL (ref 9.4–12.4)
PO2 BLDMV: 25 MMHG (ref 25–40)
POC CREATININE WHOLE BLOOD: 1 MG/DL (ref 0.5–1.2)
POTASSIUM SERPL-SCNC: 4.4 MEQ/L (ref 3.5–5.2)
PROT SERPL-MCNC: 7.3 G/DL (ref 6.1–8)
PROT UR STRIP.AUTO-MCNC: NEGATIVE MG/DL
RBC # BLD AUTO: 5.69 MILL/MM3 (ref 4.7–6.1)
SAO2 % BLDMV: 43 %
SITE: NORMAL
SODIUM SERPL-SCNC: 142 MEQ/L (ref 135–145)
SP GR UR REFRACT.AUTO: > 1.03 (ref 1–1.03)
TROPONIN, HIGH SENSITIVITY: 7 NG/L (ref 0–12)
UROBILINOGEN, URINE: 1 EU/DL (ref 0–1)
VENTILATION MODE VENT: NORMAL
WBC # BLD AUTO: 7.6 THOU/MM3 (ref 4.8–10.8)
WBC #/AREA URNS HPF: NEGATIVE /[HPF]

## 2024-11-14 PROCEDURE — 80053 COMPREHEN METABOLIC PANEL: CPT

## 2024-11-14 PROCEDURE — G0378 HOSPITAL OBSERVATION PER HR: HCPCS

## 2024-11-14 PROCEDURE — 70498 CT ANGIOGRAPHY NECK: CPT

## 2024-11-14 PROCEDURE — 85610 PROTHROMBIN TIME: CPT

## 2024-11-14 PROCEDURE — 84484 ASSAY OF TROPONIN QUANT: CPT

## 2024-11-14 PROCEDURE — 71045 X-RAY EXAM CHEST 1 VIEW: CPT

## 2024-11-14 PROCEDURE — 83036 HEMOGLOBIN GLYCOSYLATED A1C: CPT

## 2024-11-14 PROCEDURE — 6370000000 HC RX 637 (ALT 250 FOR IP): Performed by: STUDENT IN AN ORGANIZED HEALTH CARE EDUCATION/TRAINING PROGRAM

## 2024-11-14 PROCEDURE — 93005 ELECTROCARDIOGRAM TRACING: CPT | Performed by: EMERGENCY MEDICINE

## 2024-11-14 PROCEDURE — 36415 COLL VENOUS BLD VENIPUNCTURE: CPT

## 2024-11-14 PROCEDURE — 80307 DRUG TEST PRSMV CHEM ANLYZR: CPT

## 2024-11-14 PROCEDURE — 6360000004 HC RX CONTRAST MEDICATION: Performed by: EMERGENCY MEDICINE

## 2024-11-14 PROCEDURE — 82948 REAGENT STRIP/BLOOD GLUCOSE: CPT

## 2024-11-14 PROCEDURE — 70496 CT ANGIOGRAPHY HEAD: CPT

## 2024-11-14 PROCEDURE — 2580000003 HC RX 258: Performed by: EMERGENCY MEDICINE

## 2024-11-14 PROCEDURE — 70450 CT HEAD/BRAIN W/O DYE: CPT

## 2024-11-14 PROCEDURE — 85025 COMPLETE CBC W/AUTO DIFF WBC: CPT

## 2024-11-14 PROCEDURE — 85730 THROMBOPLASTIN TIME PARTIAL: CPT

## 2024-11-14 PROCEDURE — 82565 ASSAY OF CREATININE: CPT

## 2024-11-14 PROCEDURE — 82803 BLOOD GASES ANY COMBINATION: CPT

## 2024-11-14 PROCEDURE — 85576 BLOOD PLATELET AGGREGATION: CPT

## 2024-11-14 PROCEDURE — 81003 URINALYSIS AUTO W/O SCOPE: CPT

## 2024-11-14 PROCEDURE — 2580000003 HC RX 258: Performed by: STUDENT IN AN ORGANIZED HEALTH CARE EDUCATION/TRAINING PROGRAM

## 2024-11-14 PROCEDURE — 99291 CRITICAL CARE FIRST HOUR: CPT

## 2024-11-14 PROCEDURE — 99285 EMERGENCY DEPT VISIT HI MDM: CPT

## 2024-11-14 RX ORDER — CLOPIDOGREL BISULFATE 75 MG/1
75 TABLET ORAL DAILY
Status: DISCONTINUED | OUTPATIENT
Start: 2024-11-15 | End: 2024-11-16 | Stop reason: HOSPADM

## 2024-11-14 RX ORDER — SODIUM CHLORIDE 9 MG/ML
INJECTION, SOLUTION INTRAVENOUS CONTINUOUS
Status: DISCONTINUED | OUTPATIENT
Start: 2024-11-14 | End: 2024-11-15

## 2024-11-14 RX ORDER — POTASSIUM CHLORIDE 1500 MG/1
40 TABLET, EXTENDED RELEASE ORAL PRN
Status: DISCONTINUED | OUTPATIENT
Start: 2024-11-14 | End: 2024-11-16 | Stop reason: HOSPADM

## 2024-11-14 RX ORDER — BUTALBITAL, ACETAMINOPHEN AND CAFFEINE 50; 325; 40 MG/1; MG/1; MG/1
1 TABLET ORAL ONCE
Status: COMPLETED | OUTPATIENT
Start: 2024-11-14 | End: 2024-11-14

## 2024-11-14 RX ORDER — ISOSORBIDE MONONITRATE 30 MG/1
30 TABLET, EXTENDED RELEASE ORAL DAILY
Status: DISCONTINUED | OUTPATIENT
Start: 2024-11-15 | End: 2024-11-16 | Stop reason: HOSPADM

## 2024-11-14 RX ORDER — MAGNESIUM SULFATE IN WATER 40 MG/ML
2000 INJECTION, SOLUTION INTRAVENOUS PRN
Status: DISCONTINUED | OUTPATIENT
Start: 2024-11-14 | End: 2024-11-16 | Stop reason: HOSPADM

## 2024-11-14 RX ORDER — SODIUM CHLORIDE 0.9 % (FLUSH) 0.9 %
5-40 SYRINGE (ML) INJECTION PRN
Status: DISCONTINUED | OUTPATIENT
Start: 2024-11-14 | End: 2024-11-16 | Stop reason: HOSPADM

## 2024-11-14 RX ORDER — PANTOPRAZOLE SODIUM 40 MG/1
40 TABLET, DELAYED RELEASE ORAL
Status: DISCONTINUED | OUTPATIENT
Start: 2024-11-15 | End: 2024-11-16 | Stop reason: HOSPADM

## 2024-11-14 RX ORDER — SODIUM CHLORIDE 0.9 % (FLUSH) 0.9 %
5-40 SYRINGE (ML) INJECTION EVERY 12 HOURS SCHEDULED
Status: DISCONTINUED | OUTPATIENT
Start: 2024-11-14 | End: 2024-11-16 | Stop reason: HOSPADM

## 2024-11-14 RX ORDER — IOPAMIDOL 755 MG/ML
80 INJECTION, SOLUTION INTRAVASCULAR
Status: COMPLETED | OUTPATIENT
Start: 2024-11-14 | End: 2024-11-14

## 2024-11-14 RX ORDER — NITROGLYCERIN 0.4 MG/1
0.4 TABLET SUBLINGUAL EVERY 5 MIN PRN
Status: DISCONTINUED | OUTPATIENT
Start: 2024-11-14 | End: 2024-11-16 | Stop reason: HOSPADM

## 2024-11-14 RX ORDER — ACETAMINOPHEN 325 MG/1
650 TABLET ORAL EVERY 6 HOURS PRN
Status: DISCONTINUED | OUTPATIENT
Start: 2024-11-14 | End: 2024-11-16 | Stop reason: HOSPADM

## 2024-11-14 RX ORDER — ACETAMINOPHEN 650 MG/1
650 SUPPOSITORY RECTAL EVERY 6 HOURS PRN
Status: DISCONTINUED | OUTPATIENT
Start: 2024-11-14 | End: 2024-11-16 | Stop reason: HOSPADM

## 2024-11-14 RX ORDER — ONDANSETRON 4 MG/1
4 TABLET, ORALLY DISINTEGRATING ORAL EVERY 8 HOURS PRN
Status: DISCONTINUED | OUTPATIENT
Start: 2024-11-14 | End: 2024-11-16 | Stop reason: HOSPADM

## 2024-11-14 RX ORDER — LISINOPRIL 10 MG/1
10 TABLET ORAL DAILY
Status: DISCONTINUED | OUTPATIENT
Start: 2024-11-15 | End: 2024-11-16 | Stop reason: HOSPADM

## 2024-11-14 RX ORDER — ATORVASTATIN CALCIUM 80 MG/1
80 TABLET, FILM COATED ORAL DAILY
Status: DISCONTINUED | OUTPATIENT
Start: 2024-11-15 | End: 2024-11-16 | Stop reason: HOSPADM

## 2024-11-14 RX ORDER — 0.9 % SODIUM CHLORIDE 0.9 %
500 INTRAVENOUS SOLUTION INTRAVENOUS ONCE
Status: COMPLETED | OUTPATIENT
Start: 2024-11-14 | End: 2024-11-14

## 2024-11-14 RX ORDER — FERROUS SULFATE 325(65) MG
325 TABLET ORAL EVERY OTHER DAY
Status: DISCONTINUED | OUTPATIENT
Start: 2024-11-16 | End: 2024-11-16 | Stop reason: HOSPADM

## 2024-11-14 RX ORDER — ONDANSETRON 2 MG/ML
4 INJECTION INTRAMUSCULAR; INTRAVENOUS EVERY 6 HOURS PRN
Status: DISCONTINUED | OUTPATIENT
Start: 2024-11-14 | End: 2024-11-16 | Stop reason: HOSPADM

## 2024-11-14 RX ORDER — ACETAMINOPHEN 500 MG
1000 TABLET ORAL ONCE
Status: DISCONTINUED | OUTPATIENT
Start: 2024-11-14 | End: 2024-11-16 | Stop reason: HOSPADM

## 2024-11-14 RX ORDER — POTASSIUM CHLORIDE 7.45 MG/ML
10 INJECTION INTRAVENOUS PRN
Status: DISCONTINUED | OUTPATIENT
Start: 2024-11-14 | End: 2024-11-14 | Stop reason: RX

## 2024-11-14 RX ORDER — METOPROLOL SUCCINATE 25 MG/1
25 TABLET, EXTENDED RELEASE ORAL DAILY
Status: DISCONTINUED | OUTPATIENT
Start: 2024-11-15 | End: 2024-11-15

## 2024-11-14 RX ORDER — POLYETHYLENE GLYCOL 3350 17 G/17G
17 POWDER, FOR SOLUTION ORAL DAILY PRN
Status: DISCONTINUED | OUTPATIENT
Start: 2024-11-14 | End: 2024-11-16 | Stop reason: HOSPADM

## 2024-11-14 RX ORDER — SODIUM CHLORIDE 9 MG/ML
INJECTION, SOLUTION INTRAVENOUS PRN
Status: DISCONTINUED | OUTPATIENT
Start: 2024-11-14 | End: 2024-11-16 | Stop reason: HOSPADM

## 2024-11-14 RX ORDER — RANOLAZINE 500 MG/1
1000 TABLET, EXTENDED RELEASE ORAL DAILY
Status: DISCONTINUED | OUTPATIENT
Start: 2024-11-15 | End: 2024-11-16 | Stop reason: HOSPADM

## 2024-11-14 RX ADMIN — APIXABAN 5 MG: 5 TABLET, FILM COATED ORAL at 23:14

## 2024-11-14 RX ADMIN — SODIUM CHLORIDE: 9 INJECTION, SOLUTION INTRAVENOUS at 21:26

## 2024-11-14 RX ADMIN — IOPAMIDOL 80 ML: 755 INJECTION, SOLUTION INTRAVENOUS at 16:35

## 2024-11-14 RX ADMIN — BUTALBITAL, ACETAMINOPHEN AND CAFFEINE 1 TABLET: 325; 50; 40 TABLET ORAL at 21:58

## 2024-11-14 RX ADMIN — SODIUM CHLORIDE 500 ML: 9 INJECTION, SOLUTION INTRAVENOUS at 16:49

## 2024-11-14 RX ADMIN — SODIUM CHLORIDE, PRESERVATIVE FREE 10 ML: 5 INJECTION INTRAVENOUS at 21:26

## 2024-11-14 SDOH — ECONOMIC STABILITY: FOOD INSECURITY: WITHIN THE PAST 12 MONTHS, THE FOOD YOU BOUGHT JUST DIDN'T LAST AND YOU DIDN'T HAVE MONEY TO GET MORE.: NEVER TRUE

## 2024-11-14 SDOH — ECONOMIC STABILITY: FOOD INSECURITY: WITHIN THE PAST 12 MONTHS, YOU WORRIED THAT YOUR FOOD WOULD RUN OUT BEFORE YOU GOT MONEY TO BUY MORE.: NEVER TRUE

## 2024-11-14 SDOH — ECONOMIC STABILITY: INCOME INSECURITY: HOW HARD IS IT FOR YOU TO PAY FOR THE VERY BASICS LIKE FOOD, HOUSING, MEDICAL CARE, AND HEATING?: NOT HARD AT ALL

## 2024-11-14 ASSESSMENT — PAIN SCALES - GENERAL
PAINLEVEL_OUTOF10: 8
PAINLEVEL_OUTOF10: 5

## 2024-11-14 ASSESSMENT — PAIN DESCRIPTION - FREQUENCY: FREQUENCY: INTERMITTENT

## 2024-11-14 ASSESSMENT — PAIN DESCRIPTION - PAIN TYPE: TYPE: ACUTE PAIN

## 2024-11-14 ASSESSMENT — PATIENT HEALTH QUESTIONNAIRE - PHQ9
2. FEELING DOWN, DEPRESSED OR HOPELESS: NOT AT ALL
SUM OF ALL RESPONSES TO PHQ9 QUESTIONS 1 & 2: 0
SUM OF ALL RESPONSES TO PHQ QUESTIONS 1-9: 0
SUM OF ALL RESPONSES TO PHQ QUESTIONS 1-9: 0
1. LITTLE INTEREST OR PLEASURE IN DOING THINGS: NOT AT ALL
SUM OF ALL RESPONSES TO PHQ QUESTIONS 1-9: 0
SUM OF ALL RESPONSES TO PHQ QUESTIONS 1-9: 0

## 2024-11-14 ASSESSMENT — PAIN - FUNCTIONAL ASSESSMENT
PAIN_FUNCTIONAL_ASSESSMENT: ACTIVITIES ARE NOT PREVENTED
PAIN_FUNCTIONAL_ASSESSMENT: 0-10

## 2024-11-14 ASSESSMENT — ENCOUNTER SYMPTOMS: BACK PAIN: 1

## 2024-11-14 ASSESSMENT — PAIN DESCRIPTION - LOCATION
LOCATION: CHEST
LOCATION: HEAD

## 2024-11-14 ASSESSMENT — PAIN DESCRIPTION - ONSET: ONSET: GRADUAL

## 2024-11-14 ASSESSMENT — PAIN DESCRIPTION - ORIENTATION: ORIENTATION: MID

## 2024-11-14 ASSESSMENT — PAIN DESCRIPTION - DESCRIPTORS: DESCRIPTORS: ACHING

## 2024-11-14 NOTE — PROGRESS NOTES
as needed for Chest pain up to max of 3 total doses. If no relief after 1 dose, call 911. 25 tablet 0    ranolazine (RANEXA) 1000 MG extended release tablet Take 1 tablet by mouth daily 90 tablet 0    pantoprazole (PROTONIX) 40 MG tablet Take 1 tablet by mouth every morning (before breakfast) 90 tablet 0    apixaban (ELIQUIS) 5 MG TABS tablet Take 1 tablet by mouth 2 times daily 180 tablet 0    clopidogrel (PLAVIX) 75 MG tablet Take 1 tablet by mouth daily 90 tablet 0    ferrous sulfate (IRON 325) 325 (65 Fe) MG tablet Take 1 tablet by mouth every other day 45 tablet 0     Facility-Administered Medications Ordered in Other Visits   Medication Dose Route Frequency Provider Last Rate Last Admin    sodium chloride 0.9 % bolus 500 mL  500 mL IntraVENous Once Artur Arce DO        0.9 % sodium chloride infusion   IntraVENous Continuous Artur Arce DO        iopamidol (ISOVUE-370) 76 % injection 80 mL  80 mL IntraVENous ONCE PRN Artur Arce DO           Allergies   Allergen Reactions    Tuna Oil [Fish Oil] Anaphylaxis       Social History     Socioeconomic History    Marital status:      Spouse name: Holly    Number of children: 2    Years of education: Not on file    Highest education level: Not on file   Occupational History    Not on file   Tobacco Use    Smoking status: Light Smoker     Current packs/day: 0.00     Average packs/day: 0.5 packs/day for 4.0 years (2.0 ttl pk-yrs)     Types: Cigars, Cigarettes     Start date: 2007     Last attempt to quit: 2011     Years since quittin.5    Smokeless tobacco: Never    Tobacco comments:     Patient states, \"he smoked 5 cigarettes per day and had done so for the past 5 years.\"   Vaping Use    Vaping status: Never Used   Substance and Sexual Activity    Alcohol use: Yes     Comment: occasionally    Drug use: Yes     Types: Marijuana (Weed)    Sexual activity: Not on file   Other Topics Concern    Not on file   Social History Narrative    Not
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
## 2024-11-14 NOTE — ED PROVIDER NOTES
changes to his vision.  Initial NIH of 4.  Code stroke called.  CT head and CTA head and neck obtained and negative for acute process.  Neurology recommended admission and UDS.  No TNK as patient was outside the 10.  No MRI recommended as the patient is unable to have one secondary to pacemaker.  Overall lab workup is unremarkable here.  Stable anemia.  Negative troponin.  EKG did not show any acute ischemic changes.  UDS and UA pending.  Given the patient had worsening of his already previously left-sided deficit and neurology's recommendation admission requested.  Results discussed with patient. Patient verbalized understanding and agreement to plan -patient in stable condition. Refer to ED course for additional information.      ED COURSE   ED Medications administered this visit (None if left blank):   Medications   sodium chloride 0.9 % bolus 500 mL (has no administration in time range)   0.9 % sodium chloride infusion (has no administration in time range)   iopamidol (ISOVUE-370) 76 % injection 80 mL (has no administration in time range)       ED Course as of 11/14/24 1751   Thu Nov 14, 2024   1651 Code stroke was called.  CT head and CTA head and neck obtained.  Negative for acute process.  Neurology recommends admission for strokelike symptoms.  Patient is unable to have an MRI as he has a pacemaker. [EP]   1728 Comprehensive Metabolic Panel w/ Reflex to MG:    Glucose 93   Creatinine 0.9   BUN,BUNPL 17   Sodium 142   Potassium 4.4   Chloride 107   CARBON DIOXIDE 24   Calcium 9.0   AST 19   Alkaline Phosphatase 79   Total Protein 7.3   Albumin 4.1   Total Bilirubin 0.7   ALT 14  No LFT elevations.  No electrolyte derangements.  Kidney function is within normal limits. [EP]   1729 CBC with Auto Differential(!):    WBC 7.6   RBC 5.69   Hemoglobin Quant 12.4(!)   Hematocrit 40.1(!)   MCV 70.5(!)   MCH 21.8(!)   MCHC 30.9(!)   RDW-CV 16.0(!)   RDW-SD 39.6   Platelet Count 225   MPV 9.9   Seg Neutrophils 67.1    Lymphocytes 23.2   Monocytes % 6.9   Eosinophils 1.6   Basophils 0.4   Immature Granulocytes % 0.8   Neutrophils Absolute 5.1   Lymphocytes Absolute 1.8   Monocytes Absolute 0.5   Eosinophils Absolute 0.1   Basophils Absolute 0.0   Immature Grans (Abs) 0.06   Nucleated Red Blood Cells 0  No leukocytosis.  Stable anemia.  When compared to prior CBCs on record review. [EP]   1729 Blood Gas, Venous:    PH MIXED 7.36   PCO2, MIXED VENOUS 50   PO2, Mixed 25   HCO3, Mixed 28   Base Exc, Mixed 1.8   O2 Sat, Mixed 43   COLLECTED BY: [id number]   DEVICE Not entered   Site Not entered   Mode Not entered  Within normal limits [EP]   1729 Protime-INR:    INR 0.97  Within normal limits [EP]   1730 APTT:    aPTT 32.8  Within normal limits [EP]   1734 XR CHEST PORTABLE  No acute process. [EP]   1748 Consulted hospitalist to request admission for strokelike symptoms. [EP]   1751 Rechecked patient and he stable.  Patient relates that he is hungry.  Updated him with results and plan for admission.  Patient understands and agrees with plan.  All questions answered. [EP]      ED Course User Index  [EP] Linda Russell MD       PROCEDURES: (None if blank)  Procedures:     CRITICAL CARE:  None    MEDICATION CHANGES     New Prescriptions    No medications on file       FINAL DISPOSITION   Shared Decision-Making was performed, disposition discussed with the patient/family and questions answered.    Outpatient follow up (If applicable):  No follow-up provider specified.  Not applicable          FINAL DIAGNOSES:  Final diagnoses:   Stroke-like symptoms       Condition: condition: stable  Dispo: Admit to hospitalist  DISPOSITION             This transcription was electronically signed. It was dictated by use of voice recognition software and electronically transcribed. The transcription may contain errors not detected in proofreading.

## 2024-11-14 NOTE — ED TRIAGE NOTES
Patient presents to the ed with c/o chest pain and generalized weakness. Pt states that this started around 1130 this am. Pt rating pain 5/10 at this time.

## 2024-11-15 ENCOUNTER — APPOINTMENT (OUTPATIENT)
Age: 69
DRG: 057 | End: 2024-11-15
Attending: STUDENT IN AN ORGANIZED HEALTH CARE EDUCATION/TRAINING PROGRAM
Payer: MEDICARE

## 2024-11-15 LAB
ALBUMIN SERPL BCG-MCNC: 3.4 G/DL (ref 3.5–5.1)
ALP SERPL-CCNC: 69 U/L (ref 38–126)
ALT SERPL W/O P-5'-P-CCNC: 14 U/L (ref 11–66)
ANION GAP SERPL CALC-SCNC: 8 MEQ/L (ref 8–16)
AST SERPL-CCNC: 19 U/L (ref 5–40)
BASOPHILS ABSOLUTE: 0 THOU/MM3 (ref 0–0.1)
BASOPHILS NFR BLD AUTO: 0.3 %
BILIRUB SERPL-MCNC: 1.1 MG/DL (ref 0.3–1.2)
BUN SERPL-MCNC: 13 MG/DL (ref 7–22)
CALCIUM SERPL-MCNC: 8.5 MG/DL (ref 8.5–10.5)
CHLORIDE SERPL-SCNC: 108 MEQ/L (ref 98–111)
CHOLEST SERPL-MCNC: 163 MG/DL (ref 100–199)
CO2 SERPL-SCNC: 21 MEQ/L (ref 23–33)
CREAT SERPL-MCNC: 0.8 MG/DL (ref 0.4–1.2)
DEPRECATED MEAN GLUCOSE BLD GHB EST-ACNC: 99 MG/DL (ref 70–126)
DEPRECATED RDW RBC AUTO: 38.5 FL (ref 35–45)
ECHO AV CUSP MM: 1.6 CM
ECHO AV PEAK GRADIENT: 5 MMHG
ECHO AV PEAK VELOCITY: 1.1 M/S
ECHO AV VELOCITY RATIO: 0.73
ECHO BSA: 1.94 M2
ECHO LA AREA 2C: 13.4 CM2
ECHO LA AREA 4C: 11.5 CM2
ECHO LA DIAMETER INDEX: 1.81 CM/M2
ECHO LA DIAMETER: 3.5 CM
ECHO LA MAJOR AXIS: 5.6 CM
ECHO LA MINOR AXIS: 5 CM
ECHO LA VOL BP: 25 ML (ref 18–58)
ECHO LA VOL MOD A2C: 29 ML (ref 18–58)
ECHO LA VOL MOD A4C: 19 ML (ref 18–58)
ECHO LA VOL/BSA BIPLANE: 13 ML/M2 (ref 16–34)
ECHO LA VOLUME INDEX MOD A2C: 15 ML/M2 (ref 16–34)
ECHO LA VOLUME INDEX MOD A4C: 10 ML/M2 (ref 16–34)
ECHO LV E' LATERAL VELOCITY: 4.2 CM/S
ECHO LV E' SEPTAL VELOCITY: 3 CM/S
ECHO LV EDV 3D: 207 ML
ECHO LV EDV A4C: 146 ML
ECHO LV EDV INDEX 3D: 107 ML/M2
ECHO LV EDV INDEX A4C: 76 ML/M2
ECHO LV EF PHYSICIAN: 25 %
ECHO LV EJECTION FRACTION A4C: 34 %
ECHO LV ESV 3D: 130 ML
ECHO LV ESV A4C: 98 ML
ECHO LV ESV INDEX 3D: 67 ML/M2
ECHO LV ESV INDEX A4C: 51 ML/M2
ECHO LV FRACTIONAL SHORTENING: 13 % (ref 28–44)
ECHO LV INTERNAL DIMENSION DIASTOLE INDEX: 2.8 CM/M2
ECHO LV INTERNAL DIMENSION DIASTOLIC: 5.4 CM (ref 4.2–5.9)
ECHO LV INTERNAL DIMENSION SYSTOLIC INDEX: 2.44 CM/M2
ECHO LV INTERNAL DIMENSION SYSTOLIC: 4.7 CM
ECHO LV IVSD: 1 CM (ref 0.6–1)
ECHO LV MASS 2D: 249.4 G (ref 88–224)
ECHO LV MASS 3D INDEX: 128.5 G/M2
ECHO LV MASS 3D: 248 G
ECHO LV MASS INDEX 2D: 129.2 G/M2 (ref 49–115)
ECHO LV POSTERIOR WALL DIASTOLIC: 1.3 CM (ref 0.6–1)
ECHO LV RELATIVE WALL THICKNESS RATIO: 0.48
ECHO LVOT PEAK GRADIENT: 3 MMHG
ECHO LVOT PEAK VELOCITY: 0.8 M/S
ECHO MV A VELOCITY: 0.94 M/S
ECHO MV E DECELERATION TIME (DT): 236 MS
ECHO MV E VELOCITY: 0.51 M/S
ECHO MV E/A RATIO: 0.54
ECHO MV E/E' LATERAL: 12.14
ECHO MV E/E' RATIO (AVERAGED): 14.57
ECHO MV E/E' SEPTAL: 17
ECHO PULMONARY ARTERY END DIASTOLIC PRESSURE: 6 MMHG
ECHO PV MAX VELOCITY: 0.5 M/S
ECHO PV PEAK GRADIENT: 1 MMHG
ECHO PV REGURGITANT MAX VELOCITY: 1.3 M/S
ECHO RV INTERNAL DIMENSION: 2.6 CM
ECHO TV E WAVE: 0.4 M/S
ECHO TV REGURGITANT MAX VELOCITY: 1.88 M/S
ECHO TV REGURGITANT PEAK GRADIENT: 14 MMHG
EOSINOPHIL NFR BLD AUTO: 1.7 %
EOSINOPHILS ABSOLUTE: 0.1 THOU/MM3 (ref 0–0.4)
ERYTHROCYTE [DISTWIDTH] IN BLOOD BY AUTOMATED COUNT: 15.7 % (ref 11.5–14.5)
GFR SERPL CREATININE-BSD FRML MDRD: > 90 ML/MIN/1.73M2
GLUCOSE SERPL-MCNC: 86 MG/DL (ref 70–108)
HBA1C MFR BLD HPLC: 5.3 % (ref 4.4–6.4)
HCT VFR BLD AUTO: 35.2 % (ref 42–52)
HDLC SERPL-MCNC: 48 MG/DL
HGB BLD-MCNC: 11.2 GM/DL (ref 14–18)
IMM GRANULOCYTES # BLD AUTO: 0.05 THOU/MM3 (ref 0–0.07)
IMM GRANULOCYTES NFR BLD AUTO: 0.8 %
LDLC SERPL CALC-MCNC: 102 MG/DL
LEFT VENTRICULAR EJECTION FRACTION MODE: NORMAL
LV EF: 25 %
LYMPHOCYTES ABSOLUTE: 1.5 THOU/MM3 (ref 1–4.8)
LYMPHOCYTES NFR BLD AUTO: 22.2 %
MAGNESIUM SERPL-MCNC: 2 MG/DL (ref 1.6–2.4)
MCH RBC QN AUTO: 22.1 PG (ref 26–33)
MCHC RBC AUTO-ENTMCNC: 31.8 GM/DL (ref 32.2–35.5)
MCV RBC AUTO: 69.6 FL (ref 80–94)
MICROCYTES BLD QL SMEAR: PRESENT
MONOCYTES ABSOLUTE: 0.5 THOU/MM3 (ref 0.4–1.3)
MONOCYTES NFR BLD AUTO: 6.9 %
NEUTROPHILS ABSOLUTE: 4.5 THOU/MM3 (ref 1.8–7.7)
NEUTROPHILS NFR BLD AUTO: 68.1 %
NRBC BLD AUTO-RTO: 0 /100 WBC
OSMOLALITY SERPL CALC.SUM OF ELEC: 273.2 MOSMOL/KG (ref 275–300)
PLATELET # BLD AUTO: 177 THOU/MM3 (ref 130–400)
PMV BLD AUTO: 10 FL (ref 9.4–12.4)
POTASSIUM SERPL-SCNC: 4 MEQ/L (ref 3.5–5.2)
PROT SERPL-MCNC: 6.3 G/DL (ref 6.1–8)
RBC # BLD AUTO: 5.06 MILL/MM3 (ref 4.7–6.1)
REASON FOR REJECTION: NORMAL
REJECTED TEST: NORMAL
SODIUM SERPL-SCNC: 137 MEQ/L (ref 135–145)
TRIGL SERPL-MCNC: 67 MG/DL (ref 0–199)
WBC # BLD AUTO: 6.6 THOU/MM3 (ref 4.8–10.8)

## 2024-11-15 PROCEDURE — 36415 COLL VENOUS BLD VENIPUNCTURE: CPT

## 2024-11-15 PROCEDURE — 97535 SELF CARE MNGMENT TRAINING: CPT

## 2024-11-15 PROCEDURE — 80061 LIPID PANEL: CPT

## 2024-11-15 PROCEDURE — 99223 1ST HOSP IP/OBS HIGH 75: CPT | Performed by: STUDENT IN AN ORGANIZED HEALTH CARE EDUCATION/TRAINING PROGRAM

## 2024-11-15 PROCEDURE — 6360000002 HC RX W HCPCS: Performed by: STUDENT IN AN ORGANIZED HEALTH CARE EDUCATION/TRAINING PROGRAM

## 2024-11-15 PROCEDURE — 97166 OT EVAL MOD COMPLEX 45 MIN: CPT

## 2024-11-15 PROCEDURE — 6370000000 HC RX 637 (ALT 250 FOR IP)

## 2024-11-15 PROCEDURE — G0378 HOSPITAL OBSERVATION PER HR: HCPCS

## 2024-11-15 PROCEDURE — 92610 EVALUATE SWALLOWING FUNCTION: CPT

## 2024-11-15 PROCEDURE — 93306 TTE W/DOPPLER COMPLETE: CPT

## 2024-11-15 PROCEDURE — 2580000003 HC RX 258: Performed by: STUDENT IN AN ORGANIZED HEALTH CARE EDUCATION/TRAINING PROGRAM

## 2024-11-15 PROCEDURE — 6370000000 HC RX 637 (ALT 250 FOR IP): Performed by: STUDENT IN AN ORGANIZED HEALTH CARE EDUCATION/TRAINING PROGRAM

## 2024-11-15 PROCEDURE — 97116 GAIT TRAINING THERAPY: CPT

## 2024-11-15 PROCEDURE — 80053 COMPREHEN METABOLIC PANEL: CPT

## 2024-11-15 PROCEDURE — G0378 HOSPITAL OBSERVATION PER HR: HCPCS | Performed by: STUDENT IN AN ORGANIZED HEALTH CARE EDUCATION/TRAINING PROGRAM

## 2024-11-15 PROCEDURE — 97162 PT EVAL MOD COMPLEX 30 MIN: CPT

## 2024-11-15 PROCEDURE — 96374 THER/PROPH/DIAG INJ IV PUSH: CPT

## 2024-11-15 PROCEDURE — 85025 COMPLETE CBC W/AUTO DIFF WBC: CPT

## 2024-11-15 PROCEDURE — 99232 SBSQ HOSP IP/OBS MODERATE 35: CPT

## 2024-11-15 PROCEDURE — 83735 ASSAY OF MAGNESIUM: CPT

## 2024-11-15 PROCEDURE — 93306 TTE W/DOPPLER COMPLETE: CPT | Performed by: INTERNAL MEDICINE

## 2024-11-15 RX ORDER — BUTALBITAL, ACETAMINOPHEN AND CAFFEINE 50; 325; 40 MG/1; MG/1; MG/1
1 TABLET ORAL ONCE
Status: COMPLETED | OUTPATIENT
Start: 2024-11-15 | End: 2024-11-15

## 2024-11-15 RX ORDER — KETOROLAC TROMETHAMINE 30 MG/ML
30 INJECTION, SOLUTION INTRAMUSCULAR; INTRAVENOUS ONCE
Status: COMPLETED | OUTPATIENT
Start: 2024-11-16 | End: 2024-11-16

## 2024-11-15 RX ORDER — METOPROLOL SUCCINATE 25 MG/1
25 TABLET, EXTENDED RELEASE ORAL 2 TIMES DAILY
Status: DISCONTINUED | OUTPATIENT
Start: 2024-11-15 | End: 2024-11-16 | Stop reason: HOSPADM

## 2024-11-15 RX ORDER — MORPHINE SULFATE 2 MG/ML
1 INJECTION, SOLUTION INTRAMUSCULAR; INTRAVENOUS ONCE
Status: COMPLETED | OUTPATIENT
Start: 2024-11-15 | End: 2024-11-15

## 2024-11-15 RX ORDER — BUTALBITAL, ACETAMINOPHEN AND CAFFEINE 50; 325; 40 MG/1; MG/1; MG/1
1 TABLET ORAL EVERY 4 HOURS PRN
Status: DISCONTINUED | OUTPATIENT
Start: 2024-11-15 | End: 2024-11-16 | Stop reason: HOSPADM

## 2024-11-15 RX ADMIN — BUTALBITAL, ACETAMINOPHEN AND CAFFEINE 1 TABLET: 325; 50; 40 TABLET ORAL at 20:47

## 2024-11-15 RX ADMIN — CLOPIDOGREL BISULFATE 75 MG: 75 TABLET ORAL at 07:56

## 2024-11-15 RX ADMIN — ISOSORBIDE MONONITRATE 30 MG: 30 TABLET, EXTENDED RELEASE ORAL at 07:55

## 2024-11-15 RX ADMIN — METOPROLOL SUCCINATE 25 MG: 25 TABLET, EXTENDED RELEASE ORAL at 07:54

## 2024-11-15 RX ADMIN — LISINOPRIL 10 MG: 10 TABLET ORAL at 07:54

## 2024-11-15 RX ADMIN — PANTOPRAZOLE SODIUM 40 MG: 40 TABLET, DELAYED RELEASE ORAL at 05:43

## 2024-11-15 RX ADMIN — APIXABAN 5 MG: 5 TABLET, FILM COATED ORAL at 07:55

## 2024-11-15 RX ADMIN — METOPROLOL SUCCINATE 25 MG: 25 TABLET, EXTENDED RELEASE ORAL at 19:57

## 2024-11-15 RX ADMIN — BUTALBITAL, ACETAMINOPHEN AND CAFFEINE 1 TABLET: 325; 50; 40 TABLET ORAL at 07:54

## 2024-11-15 RX ADMIN — SODIUM CHLORIDE: 9 INJECTION, SOLUTION INTRAVENOUS at 08:00

## 2024-11-15 RX ADMIN — SODIUM CHLORIDE, PRESERVATIVE FREE 10 ML: 5 INJECTION INTRAVENOUS at 19:58

## 2024-11-15 RX ADMIN — APIXABAN 5 MG: 5 TABLET, FILM COATED ORAL at 19:57

## 2024-11-15 RX ADMIN — MORPHINE SULFATE 1 MG: 2 INJECTION, SOLUTION INTRAMUSCULAR; INTRAVENOUS at 15:26

## 2024-11-15 RX ADMIN — ATORVASTATIN CALCIUM 80 MG: 80 TABLET, FILM COATED ORAL at 07:56

## 2024-11-15 RX ADMIN — RANOLAZINE 1000 MG: 500 TABLET, EXTENDED RELEASE ORAL at 07:54

## 2024-11-15 ASSESSMENT — PAIN DESCRIPTION - DESCRIPTORS
DESCRIPTORS: ACHING;DISCOMFORT;POUNDING
DESCRIPTORS: POUNDING
DESCRIPTORS: ACHING;DISCOMFORT;POUNDING
DESCRIPTORS: POUNDING
DESCRIPTORS: POUNDING
DESCRIPTORS: ACHING;DISCOMFORT;POUNDING

## 2024-11-15 ASSESSMENT — PAIN DESCRIPTION - PAIN TYPE
TYPE: ACUTE PAIN;CHRONIC PAIN
TYPE: ACUTE PAIN;CHRONIC PAIN
TYPE: CHRONIC PAIN
TYPE: ACUTE PAIN
TYPE: CHRONIC PAIN
TYPE: ACUTE PAIN;CHRONIC PAIN

## 2024-11-15 ASSESSMENT — PAIN DESCRIPTION - ONSET
ONSET: ON-GOING
ONSET: ON-GOING
ONSET: GRADUAL
ONSET: ON-GOING

## 2024-11-15 ASSESSMENT — PAIN DESCRIPTION - ORIENTATION
ORIENTATION: MID;ANTERIOR
ORIENTATION: MID;ANTERIOR
ORIENTATION: LEFT;MID
ORIENTATION: MID;ANTERIOR
ORIENTATION: MID
ORIENTATION: MID

## 2024-11-15 ASSESSMENT — PAIN DESCRIPTION - FREQUENCY
FREQUENCY: CONTINUOUS
FREQUENCY: CONTINUOUS
FREQUENCY: INTERMITTENT
FREQUENCY: CONTINUOUS

## 2024-11-15 ASSESSMENT — PAIN DESCRIPTION - LOCATION
LOCATION: HEAD

## 2024-11-15 ASSESSMENT — PAIN SCALES - GENERAL
PAINLEVEL_OUTOF10: 7
PAINLEVEL_OUTOF10: 8
PAINLEVEL_OUTOF10: 10
PAINLEVEL_OUTOF10: 10
PAINLEVEL_OUTOF10: 9
PAINLEVEL_OUTOF10: 8
PAINLEVEL_OUTOF10: 10
PAINLEVEL_OUTOF10: 10

## 2024-11-15 ASSESSMENT — ENCOUNTER SYMPTOMS
SORE THROAT: 0
COUGH: 0
TROUBLE SWALLOWING: 0
SHORTNESS OF BREATH: 0
ABDOMINAL PAIN: 0
NAUSEA: 0
VOMITING: 0
RHINORRHEA: 0
PHOTOPHOBIA: 0

## 2024-11-15 ASSESSMENT — PAIN - FUNCTIONAL ASSESSMENT
PAIN_FUNCTIONAL_ASSESSMENT: ACTIVITIES ARE NOT PREVENTED

## 2024-11-15 NOTE — PLAN OF CARE
Problem: Chronic Conditions and Co-morbidities  Goal: Patient's chronic conditions and co-morbidity symptoms are monitored and maintained or improved  Outcome: Progressing  Flowsheets (Taken 11/15/2024 1303)  Care Plan - Patient's Chronic Conditions and Co-Morbidity Symptoms are Monitored and Maintained or Improved:   Monitor and assess patient's chronic conditions and comorbid symptoms for stability, deterioration, or improvement   Collaborate with multidisciplinary team to address chronic and comorbid conditions and prevent exacerbation or deterioration   Update acute care plan with appropriate goals if chronic or comorbid symptoms are exacerbated and prevent overall improvement and discharge     Problem: Discharge Planning  Goal: Discharge to home or other facility with appropriate resources  Outcome: Progressing  Flowsheets (Taken 11/15/2024 1303)  Discharge to home or other facility with appropriate resources:   Identify barriers to discharge with patient and caregiver   Identify discharge learning needs (meds, wound care, etc)   Refer to discharge planning if patient needs post-hospital services based on physician order or complex needs related to functional status, cognitive ability or social support system   Arrange for needed discharge resources and transportation as appropriate   Arrange for interpreters to assist at discharge as needed     Problem: Pain  Goal: Verbalizes/displays adequate comfort level or baseline comfort level  Outcome: Progressing  Flowsheets (Taken 11/15/2024 1303)  Verbalizes/displays adequate comfort level or baseline comfort level:   Encourage patient to monitor pain and request assistance   Administer analgesics based on type and severity of pain and evaluate response   Assess pain using appropriate pain scale   Implement non-pharmacological measures as appropriate and evaluate response   Notify Licensed Independent Practitioner if interventions unsuccessful or patient reports

## 2024-11-15 NOTE — PROGRESS NOTES
[facility]  INPATIENT OCCUPATIONAL THERAPY  STR NEUROSCIENCES 4A  EVALUATION      Discharge Recommendations: Continue to assess pending progress, Patient would benefit from continued therapy after discharge  Equipment Recommendations:   monitor pending progress, may need RW       Time In: 906  Time Out: 925  Timed Code Treatment Minutes: 9 Minutes  Minutes: 19          Date: 11/15/2024  Patient Name: Chriss Braga,   Gender: male      MRN: [id number]  : 1955  (69 y.o.)  Referring Practitioner: Gen Alejandro MD  Diagnosis: Stroke-like symptom  Additional Pertinent Hx: Per H & P: 69 y.o. 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  Patient was brought into our emergency department around 4:22 PM.  His last known well time was 4 AM this morning when he went to bed.  After waking up he started experiencing the left-sided symptoms.  Patient was brought into our ED and stroke alert was called but no TNK was given due to lack of LVO on CTA.  MRI was not able to be performed as patient has an ICD in place.    Restrictions/Precautions:  Restrictions/Precautions: Fall Risk  Position Activity Restriction  Other position/activity restrictions: tendency to lean anteriorly    Subjective  Chart Reviewed: Yes, Orders, Progress Notes, History and Physical  Patient assessed for rehabilitation services?: Yes  Family / Caregiver Present: No    Subjective: cooperative, quiet    Pain: 0/10: no c/o pain during session    Vitals: Vitals not assessed per clinical judgement, see nursing flowsheet    Social/Functional History:  Lives  stay is anticipated.    Goals:  Patient goals : Pt did not state  Short Term Goals  Time Frame for Short Term Goals: until discharge  Short Term Goal 1: Pt will complete various t/fs including toilet with S & 0-2 vcs for safety  Short Term Goal 2: Pt will complete mobility to/from bathroom with cane vs RW, S, & 0-2 vcs for safety  Short Term Goal 3: Pt will complete LE dressing with S & 0-2 vcs for safety  Short Term Goal 4: Pt will complete grooming tasks in standing at sink with S & 0 vcs for safety & sequencing  Short Term Goal 5: Pt will tolerate participation in LUE WB/GMC/FMC tasks to increase LUE GMC & FMC for ease of dressing & grooming tasks  Long Term Goals  Time Frame for Long Term Goals : No LTG set d/t short ELOS    AM-PAC Inpatient Daily Activity Raw Score: 18  AM-PAC Inpatient ADL T-Scale Score : 38.66    Following session, patient left in safe position with all fall risk precautions in place.

## 2024-11-15 NOTE — PROGRESS NOTES
Neurology Progress Note    Date:11/15/2024       Room:[address] [place] [address]  Patient Name:Chriss Braga     YOB: 1955     Age:69 y.o.  Chief Complaint   Patient presents with    Chest Pain    Fatigue        Requesting Physician: Rafal John MD     Reason for Consult:  Evaluate for stroke alert    Subjective       HPI: Chriss Braga is a 69 y.o. male with a history of CAD, atrial fibrillation-on Eliquis, GERD, glaucoma, hyperlipidemia, hypertension, ICD, NSVT, orthostatic hypotension, polysubstance abuse -cocaine and marijuana, pulmonary nodule, syncopal episodes, right MCA CVA, CHF, pituitary tumor, who presented to [facility] ED for evaluation of left-sided upper and lower extremity numbness and weakness.  Stroke alert activated at 1622.  Last known well reportedly 0400 this morning, as patient reports he went to bed normal at this time.  He states he woke up with left-sided extremity weakness and numbness.  Initial blood pressure 179/111. POC glucose 96.  Initial NIH of 4 for left facial droop, left arm drift, left leg drift, left-sided sensory deficit of face, arm, leg.  Denies any speech disturbance currently, stating dysarthria at baseline.  Noncontrasted CT of the head negative for any intracranial abnormalities.  Patient not a candidate for thrombolytics due to current anticoagulation and presentation outside the window for administration.  Creatinine 1.0.  CTA of the head and neck negative for LVO or hemodynamically significant stenosis.  Patient not a candidate for endovascular intervention due to lack of LVO on CTA.  Patient unable to undergo MRI due to ICD.  Recommend admission for further workup.  In addition to Eliquis 5 mg twice daily, patient on Plavix 75 mg daily.  Patient admits that he missed at least 2 doses of Eliquis, yesterday morning and this morning. He states he took last night's dose.     Last known well: Reported as 0400, however documentation from [facility] IM 11/14/24 reports since

## 2024-11-15 NOTE — PROGRESS NOTES
Stroke Folder given.   What is Stroke/CVA  Signs and Symptoms of stroke (BEFAST)  Treatments for Stroke  Personal Risk Factors for Stroke discussed  Education--Call 911         Patient/family has been educated on their personal risk factors of:  (X) Hypertension  (X) A. Fib  (X) Previous stroke  (X) Hyperlipidemia  (X) smoking cessation  (X) Carotid Artery stenosis  (X) Drug abuse    They have been given hand outs on the following medications  (X)Plavix  (X) statins: Lipitor  (X) Eliquis    Treatment for stroke includes:  Risk factor modifications  Following the medication regime prescribed by physician      Educated on BEFAST-Balance-Eyesight-Face-Arm-Speech-Time    A stroke is a brain attack.Stroke is a brain injury. It occurs when the brain's blood supply is interrupted. Blood carries oxygen and nutrients to the brain. Without oxygen and nutrients from blood, brain tissue starts to die rapidly. This can happen in less than 10 minutes.    A stroke occurs when blood flow to the brain is blocked (called ischemic stroke). This is caused by one of the following:   Sudden decreased blood flow   Damage to a blood vessel supplying blood to the brain can occur suddenly from either:   Injury   A clot that forms and breaks off from another part of the body (such as the heart or neck)   There are certain conditions which predispose people to form blood clots, such as:   Cancer   Pregnancy   Atrial fibrillation   Certain autoimmune diseases   Local blood clot   A build-up of fatty substances ( atherosclerotic plaque ) along the inner lining of the artery causes:   Narrowing of artery   Reduced elasticity   Local inflammation   Blood protein defects leading to increased clotting tendency   Decreased blood flow in the artery   Clot in an artery supplying the brain   Inflammatory conditions in the blood vessels (vasculitis)   A stroke may also occur if a blood vessel breaks and bleeds into or around the brain. This is called

## 2024-11-15 NOTE — CARE COORDINATION
Case Management Assessment Initial Evaluation    Date/Time of Evaluation: 11/15/2024 8:12 AM  Assessment Completed by: Gina Chun RN    If patient is discharged prior to next notation, then this note serves as note for discharge by case management.    Patient Name: Chriss Braga                   YOB: 1955  Diagnosis: Stroke-like symptoms [R29.90]  Stroke-like symptom [R29.90]  Cerebrovascular accident (CVA), unspecified mechanism (HCC) [I63.9]  Atrial fibrillation, unspecified type (HCC) [I48.91]                   Date / Time: 2024  4:09 PM  Location: [address] [place] [address]     Patient Admission Status: Observation   Readmission Risk Low 0-14, Mod 15-19), High > 20: Readmission Risk Score: 24.9    Current PCP: No primary care provider on file.  Health Care Decision Makers:   Primary Decision Maker: Willie Braga - Child - [phone]    Secondary Decision Maker: Teena Arteaga - Niece/Nephew - [phone]    Additional Case Management Notes: From ED, Neurology consult, PT/OT/SLP, telemetry, IVF, Plavix.     Procedures:   11/15 ECHO    Imagin/14 CT Head WO Contrast 1. Stable CT scan of the brain, no interval change since previous study dated   2024.   2. Results called to the stroke team at 4:40 p.m.      CTA Head Neck W WO Contrast 1. Negative CTA of the head and neck.   2. Diffusion MRI scan of the brain would be helpful for better evaluation in   this patient.      CXR 1. No acute findings.       Patient Goals/Plan/Treatment Preferences: Met with Chriss. He currently lives at home with his brother. He uses a cane. Plan is to return home at discharge. He denies need for DME and declines .        11/15/24 1119   Service Assessment   Patient Orientation Alert and Oriented   Cognition Alert   History Provided By Patient   Primary Caregiver Self   Support Systems Family Members   Patient's Healthcare Decision Maker is: Patient Declined (Legal Next of Kin Remains as Decision

## 2024-11-15 NOTE — PROCEDURES
PROCEDURE NOTE  Date: 11/15/2024   Name: Chriss Braga  YOB: 1955    Procedures  Report for Pacemaker was handed to Yesika JO. Brook CLEMENTE

## 2024-11-15 NOTE — PROGRESS NOTES
Hospitalist Progress Note  Internal Medicine Resident      Patient: Chriss Braga 69 y.o. male      Unit/Bed: 4A-04/004-A    Admit Date: 11/14/2024    ASSESSMENT AND PLAN  Active Problems  Strokelike symptoms: New CVA vs Recrudescence. POA. NIHSS, 4. Old deficits from prior stroke, left-sided facial droop, dysarthria, left-sided sensory deficit, left upper and lower extremity weakness. LKW - 7 days ago.  CT head, CTA head and neck negative.  Unable to undergo MRI secondary to ICD.  No obvious infectious etiology  - On Plavix, Eliquis  - Neurology following  - PT/OT/SLP  - High intensity statin  -- Educated patient on compliance medication.  Patient has a known history of noncompliance  - TTE ordered  - On telemetry    Chronic Conditions (reviewed and stable unless otherwise stated)  Nonsustained Ventricular Tachycardia: Chronic condition.  Patient has ICD in place.  Monitor electrolytes.  Noted 16 beat of NSVT, 11/14/2024.  Asymptomatic.  Will interrogate pacemaker.  Metoprolol 25 increased to Toprol 25 twice daily.  TTE ordered.  Hx CVA: Residual left-sided weakness of upper and lower extremity  Obstructive CAD: Status post CABG x 5.  Left heart cath with LESTER.  On Eliquis and Plavix.  Home medications, atorvastatin, Imdur, Toprol, Ranexa, Plavix and nitro.  Patient follows with Dr. Jaime as his cardiologist  Hypertension: Continuing home medications, lisinopril metoprolol.  Hold parameters in place.  HFrEF: S/p ICD placement.  TTE 2/9/2024 showed EF 25 to 30%.  Home medication lisinopril metoprolol.  Repeat echo ordered  History of polysubstance drug abuse.  History of both marijuana and cocaine use. UDS positive for cannabinoids  Paroxysmal atrial fibrillation: [id number] 5.  On Eliquis, anticoagulation.  Rate controlled metoprolol.  GERD: PPI  Pituitary adenoma: Repeat imaging on admission showed no changes. Diagnosed 6/24/24 by Dr. Crespo at [facility] in [facility] [place]. Referral to Dr. Clark
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
## 2024-11-15 NOTE — ED PROVIDER NOTES
[facility]  EMERGENCY DEPARTMENT ENCOUNTER   PHYSICIAN ATTESTATION    Pt Name: Chriss Braga  MRN: [id number]  Birthdate 1955  Date of evaluation: 9/12/20      I personally saw and examined the patient. I have reviewed and agree with the Resident findings, including all diagnostic interpretations and treatment plans as written. I was present for the key portion of any procedures performed and the inclusive time noted in any critical care statement.       History:     Seen with Dr Russell, resident physician      This 69-year-old male presented through triage with symptoms in the left side of numbness.  Feeling off balance.  Basically was called out as a stroke alert although his score was very low.  Symptoms are well over 12 hours.  Was seen by neurology.  They feel that tPA is not indicated.  He also had some chest pain.  We are unable to get an MRI due to his pacemaker.  He is being admitted for further evaluation.                DO Claude Desouza Lawrence, DO  11/14/24 4634

## 2024-11-15 NOTE — PROGRESS NOTES
[facility]  SPEECH THERAPY  [facility] 4A  Clinical Swallow Evaluation    Discharge Recommendations: Continue to Assess Pending Progress  DIET ORDER RECOMMENDATIONS AFTER EVALUATION: Regular diet, thin liquids   Strategies:  Full Upright Position, Small Bite/Sip, and Medications Whole with Thin     SLP Individual Minutes  Time In: 1135  Time Out: 1149  Minutes: 14  Timed Code Treatment Minutes: 0 Minutes       Date: 11/15/2024  Patient Name: Chriss Braga      CSN: [id number]   : 1955  (69 y.o.)  Gender: male   Referring Physician:  Gen Alejandro MD     Diagnosis: Stroke-like symptom    History of Present Illness/Injury: Patient admitted with above diagnosis. Per chart review, \"Chriss Braga is a 69 y.o. 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  Patient was brought into our emergency department around 4:22 PM.  His last known well time was 4 AM this morning when he went to bed.  After waking up he started experiencing the left-sided symptoms.  Patient was brought into our ED and stroke alert was called but no TNK was given due to lack of LVO on CTA.  MRI was not able to be performed as patient has an ICD in place.     On physical examination in the emergency department patient is sitting upright at bedside appears tired and weak but in no acute distress.  Patient denies any complaints of any nausea, vomiting, chest pain, shortness of breath or abdominal pain.  Plan is for patient to be admitted for overnight observation with neurochecks and urine studies.\"    CT Head 2024  IMPRESSION:     1.  discharge. Blood pressures remaining stable.     Polysubstance abuse (HCC)     History of polysubstance abuse with the patient's urine drug screen being positive for both cocaine and marijuana on admission.     Pulmonary nodule     Followed by Dr. Márquez as outpatient.     Syncopal episodes     Tiredness     Tiredness, probably due to cardiomyopathy.    Ventricular hypertrophy     asymmetrical left       SUBJECTIVE:  SANDRO Napoles approved completion of clinical swallow evaluation. Patient awake in recliner upon ST arrival and agreeable to evaluation. Pleasant and cooperative throughout.     OBJECTIVE:    Pain:  10/10 - Pain location: headache - RN aware    Current Diet: NPO    Respiratory Status:  Room Air    Behavioral Observation:  Alert and Oriented    CRANIAL NERVE ASSESSMENT   CN V (Trigeminal) Closes and Opens Mandible WFL    Rotary Jaw Movement Impaired      CN VII (Facial) Cheeks Hold Food out of Sulci WFL    Opens, Closes/Seals, Protrudes, Retracts Lips WFL    General Appearance WFL    Sensation WFL      CN X (Vagus - Pharyngeal) Raises Back of Tongue WFL      CN XI (Accessory) Lifts Soft Palate WFL      CN XII (Hypoglossal) Elevates Tongue Up and Back WFL    Protrusion   WFL    Lateralizes Tongue WFL    Sensation WFL      Other Observations Dentition Missing upper dentition, natural lower dentition    Vocal Quality WFL    Cough WFL     Patient Evaluated Using: Ice Chips, Thin Liquids, Puree, and Coarse Solids    Oral Phase:  Impaired:  Impaired Mastication    Pharyngeal Phase: WFL:  Pharyngeal phase appears WFL but cannot rule out pharyngeal phase deficits from a bedside swallowing evaluation alone.    Signs and Symptoms of Laryngeal Penetration/Aspiration: No signs/symptoms of aspiration evident in this evaluation, but cannot rule out silent aspiration.    Aspiration Pneumonia Predictors:  Chronic Medical Issues/Pertinent Co-Morbidities    Functional Oral Intake Scale: Total Oral Intake: 7.  Total oral

## 2024-11-15 NOTE — PROGRESS NOTES
[facility]  INPATIENT PHYSICAL THERAPY  EVALUATION  [facility] 4A - 4A-04/004-A    Discharge Recommendations: Therapy recommended at discharge (Pt would benefit from therapy stay although anticipate pt noncompliant.)  Equipment Recommendations: No               Time In: 1041  Time Out: 1100  Timed Code Treatment Minutes: 11 Minutes  Minutes: 19          Date: 11/15/2024  Patient Name: Chriss Braga,  Gender:  male        MRN: [id number]  : 1955  (69 y.o.)      Referring Practitioner: Gen Alejandro MD  Diagnosis: Stroke-like symptom  Additional Pertinent Hx: 69 y.o. 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  Patient was brought into our emergency department around 4:22 PM.  His last known well time was 4 AM this morning when he went to bed.  After waking up he started experiencing the left-sided symptoms.  Patient was brought into our ED and stroke alert was called but no TNK was given due to lack of LVO on CTA.  MRI was not able to be performed as patient has an ICD in place.     Restrictions/Precautions:  Restrictions/Precautions: Fall Risk       Other position/activity restrictions: tendency to lean anteriorly    Required Braces or Orthoses?: No      Subjective:  Chart Reviewed: Yes  Patient assessed for rehabilitation services?: Yes  Subjective: RN approved session. Pt agreeable to treatment.    General:  Overall Orientation Status: Within Functional Limits  Vision: Within Functional Limits  Hearing: Within functional limits       Pain: 8/10: headache    Vitals: Vitals not assessed per clinical

## 2024-11-15 NOTE — PROGRESS NOTES
Patient admitted to 4A Room 4 in a wheelchair  Complaint upon arrival to the room: headache  Vital signs obtained. Assessment and data collection initiated. Oriented to room. Policies and procedures for 4a explained All questions answered with no further questions at this time. Fall prevention and safety brochure discussed with patient. 2 person skin check completed.    Was swallow screen completed on admission? [x] YES or [] NO  If patient failed swallow test, obtain order for speech therapy consult and keep NPO.

## 2024-11-15 NOTE — PLAN OF CARE
Problem: Chronic Conditions and Co-morbidities  Goal: Patient's chronic conditions and co-morbidity symptoms are monitored and maintained or improved  Outcome: Not Progressing  Flowsheets (Taken 11/14/2024 2213)  Care Plan - Patient's Chronic Conditions and Co-Morbidity Symptoms are Monitored and Maintained or Improved:   Monitor and assess patient's chronic conditions and comorbid symptoms for stability, deterioration, or improvement   Collaborate with multidisciplinary team to address chronic and comorbid conditions and prevent exacerbation or deterioration   Update acute care plan with appropriate goals if chronic or comorbid symptoms are exacerbated and prevent overall improvement and discharge     Problem: Discharge Planning  Goal: Discharge to home or other facility with appropriate resources  Outcome: Not Progressing  Flowsheets (Taken 11/14/2024 2213)  Discharge to home or other facility with appropriate resources:   Identify barriers to discharge with patient and caregiver   Identify discharge learning needs (meds, wound care, etc)   Arrange for needed discharge resources and transportation as appropriate     Problem: Pain  Goal: Verbalizes/displays adequate comfort level or baseline comfort level  Outcome: Not Progressing  Flowsheets (Taken 11/14/2024 2213)  Verbalizes/displays adequate comfort level or baseline comfort level:   Encourage patient to monitor pain and request assistance   Administer analgesics based on type and severity of pain and evaluate response   Consider cultural and social influences on pain and pain management   Assess pain using appropriate pain scale   Implement non-pharmacological measures as appropriate and evaluate response   Notify Licensed Independent Practitioner if interventions unsuccessful or patient reports new pain     Problem: Safety - Adult  Goal: Free from fall injury  Outcome: Not Progressing  Flowsheets (Taken 11/14/2024 2213)  Free From Fall Injury: Instruct  family/caregiver on patient safety     Problem: ABCDS Injury Assessment  Goal: Absence of physical injury  Outcome: Not Progressing  Flowsheets (Taken 11/14/2024 2213)  Absence of Physical Injury: Implement safety measures based on patient assessment     Problem: Neurosensory - Adult  Goal: Achieves stable or improved neurological status  Outcome: Not Progressing  Flowsheets (Taken 11/14/2024 2213)  Achieves stable or improved neurological status: Assess for and report changes in neurological status     Problem: Neurosensory - Adult  Goal: Achieves maximal functionality and self care  Outcome: Not Progressing  Flowsheets (Taken 11/14/2024 2213)  Achieves maximal functionality and self care: Monitor swallowing and airway patency with patient fatigue and changes in neurological status     Problem: Cardiovascular - Adult  Goal: Maintains optimal cardiac output and hemodynamic stability  Outcome: Not Progressing  Flowsheets (Taken 11/14/2024 2213)  Maintains optimal cardiac output and hemodynamic stability:   Monitor blood pressure and heart rate   Assess for signs of decreased cardiac output     Problem: Skin/Tissue Integrity - Adult  Goal: Skin integrity remains intact  Outcome: Not Progressing  Flowsheets (Taken 11/14/2024 2213)  Skin Integrity Remains Intact: Monitor for areas of redness and/or skin breakdown     Problem: Skin/Tissue Integrity  Goal: Absence of new skin breakdown  Description: 1.  Monitor for areas of redness and/or skin breakdown  2.  Assess vascular access sites hourly  3.  Every 4-6 hours minimum:  Change oxygen saturation probe site  4.  Every 4-6 hours:  If on nasal continuous positive airway pressure, respiratory therapy assess nares and determine need for appliance change or resting period.  Outcome: Not Progressing  Note: No signs of new skin breakdown.  Skin warm, dry, and intact.  Mucous membranes pink and moist.  Assistance with turns/ambulation provided PRN.  Will continue to monitor.

## 2024-11-15 NOTE — H&P
was mildly reduced. EF was estimated in range of 45-45%. Wall thickness was mildly increased. There was mild concentric hypertrophy.     TRANSTHORACIC ECHOCARDIOGRAM  2011    LV mildly dilated. Systolic function markedly reduced. EF estimated range 30-35%. Moderate diffuse hypokinesis. Wall thickness mildly increased. Mild assymetrical hypertrophy. Doppler parameters consistent with abnormal LV relaxation (grade 1 diastolic dysfunction.) Aortic valve area at least 1.94 cm2. Atrial septum bows from left-to-right, consistent with increased left atrial pressure.     VASCULAR SURGERY      cabg harvests --right leg         Problem Relation Age of Onset    High Blood Pressure Mother     Arthritis Mother     Cancer Mother         lung breast    Stroke Mother     Heart Attack Father         Father passed away from myocardial infarction.    Arthritis Father     High Blood Pressure Maternal Aunt     High Cholesterol Maternal Aunt     Stroke Maternal Aunt      Social History     Socioeconomic History    Marital status:      Spouse name: Holly    Number of children: 2    Years of education: None    Highest education level: None   Tobacco Use    Smoking status: Light Smoker     Current packs/day: 0.00     Average packs/day: 0.5 packs/day for 4.0 years (2.0 ttl pk-yrs)     Types: Cigars, Cigarettes     Start date: 2007     Last attempt to quit: 2011     Years since quittin.5    Smokeless tobacco: Never    Tobacco comments:     Patient states, \"he smoked 5 cigarettes per day and had done so for the past 5 years.\"   Vaping Use    Vaping status: Never Used   Substance and Sexual Activity    Alcohol use: Yes     Comment: occasionally    Drug use: Yes     Types: Marijuana (Weed)     Social Determinants of Health     Financial Resource Strain: Low Risk  (2024)    Overall Financial Resource Strain (CARDIA)     Difficulty of Paying Living Expenses: Not hard at all   Food Insecurity: No Food Insecurity  (11/14/2024)    Hunger Vital Sign     Worried About Running Out of Food in the Last Year: Never true     Ran Out of Food in the Last Year: Never true   Transportation Needs: No Transportation Needs (11/14/2024)    PRAPARE - Transportation     Lack of Transportation (Medical): No     Lack of Transportation (Non-Medical): No   Physical Activity: Inactive (3/29/2023)    [facility] from [facility]    Exercise Vital Sign     Days of Exercise per Week: 0 days     Minutes of Exercise per Session: 0 min   Housing Stability: Low Risk  (11/14/2024)    Housing Stability Vital Sign     Unable to Pay for Housing in the Last Year: No     Number of Times Moved in the Last Year: 1     Homeless in the Last Year: No        Code status: Full Code     Electronically signed by Gen Alejandro MD on 11/14/2024 at 8:18 PM.   Case was discussed with the Attending, Dr. Rafael John

## 2024-11-16 VITALS
BODY MASS INDEX: 25.34 KG/M2 | DIASTOLIC BLOOD PRESSURE: 97 MMHG | TEMPERATURE: 98.4 F | WEIGHT: 171.08 LBS | SYSTOLIC BLOOD PRESSURE: 159 MMHG | HEART RATE: 69 BPM | OXYGEN SATURATION: 95 % | RESPIRATION RATE: 16 BRPM | HEIGHT: 69 IN

## 2024-11-16 LAB
ANION GAP SERPL CALC-SCNC: 8 MEQ/L (ref 8–16)
BUN SERPL-MCNC: 16 MG/DL (ref 7–22)
CALCIUM SERPL-MCNC: 8.7 MG/DL (ref 8.5–10.5)
CHLORIDE SERPL-SCNC: 111 MEQ/L (ref 98–111)
CO2 SERPL-SCNC: 22 MEQ/L (ref 23–33)
CREAT SERPL-MCNC: 1 MG/DL (ref 0.4–1.2)
DEPRECATED RDW RBC AUTO: 39.2 FL (ref 35–45)
EKG ATRIAL RATE: 73 BPM
EKG P AXIS: 72 DEGREES
EKG P-R INTERVAL: 166 MS
EKG Q-T INTERVAL: 424 MS
EKG QRS DURATION: 88 MS
EKG QTC CALCULATION (BAZETT): 467 MS
EKG R AXIS: -36 DEGREES
EKG T AXIS: 37 DEGREES
EKG VENTRICULAR RATE: 73 BPM
ERYTHROCYTE [DISTWIDTH] IN BLOOD BY AUTOMATED COUNT: 15.9 % (ref 11.5–14.5)
GFR SERPL CREATININE-BSD FRML MDRD: 82 ML/MIN/1.73M2
GLUCOSE SERPL-MCNC: 89 MG/DL (ref 70–108)
HCT VFR BLD AUTO: 34.9 % (ref 42–52)
HGB BLD-MCNC: 11 GM/DL (ref 14–18)
MCH RBC QN AUTO: 22.1 PG (ref 26–33)
MCHC RBC AUTO-ENTMCNC: 31.5 GM/DL (ref 32.2–35.5)
MCV RBC AUTO: 70.1 FL (ref 80–94)
PLATELET # BLD AUTO: 182 THOU/MM3 (ref 130–400)
PMV BLD AUTO: 9.4 FL (ref 9.4–12.4)
POTASSIUM SERPL-SCNC: 4.4 MEQ/L (ref 3.5–5.2)
RBC # BLD AUTO: 4.98 MILL/MM3 (ref 4.7–6.1)
SODIUM SERPL-SCNC: 141 MEQ/L (ref 135–145)
WBC # BLD AUTO: 6.8 THOU/MM3 (ref 4.8–10.8)

## 2024-11-16 PROCEDURE — 2580000003 HC RX 258: Performed by: STUDENT IN AN ORGANIZED HEALTH CARE EDUCATION/TRAINING PROGRAM

## 2024-11-16 PROCEDURE — 85027 COMPLETE CBC AUTOMATED: CPT

## 2024-11-16 PROCEDURE — 6360000002 HC RX W HCPCS

## 2024-11-16 PROCEDURE — 6370000000 HC RX 637 (ALT 250 FOR IP)

## 2024-11-16 PROCEDURE — 4A03X5D MEASUREMENT OF ARTERIAL FLOW, INTRACRANIAL, EXTERNAL APPROACH: ICD-10-PCS | Performed by: STUDENT IN AN ORGANIZED HEALTH CARE EDUCATION/TRAINING PROGRAM

## 2024-11-16 PROCEDURE — 96375 TX/PRO/DX INJ NEW DRUG ADDON: CPT

## 2024-11-16 PROCEDURE — 1200000000 HC SEMI PRIVATE

## 2024-11-16 PROCEDURE — G0378 HOSPITAL OBSERVATION PER HR: HCPCS

## 2024-11-16 PROCEDURE — 80048 BASIC METABOLIC PNL TOTAL CA: CPT

## 2024-11-16 PROCEDURE — 93010 ELECTROCARDIOGRAM REPORT: CPT | Performed by: INTERNAL MEDICINE

## 2024-11-16 PROCEDURE — 99239 HOSP IP/OBS DSCHRG MGMT >30: CPT | Performed by: STUDENT IN AN ORGANIZED HEALTH CARE EDUCATION/TRAINING PROGRAM

## 2024-11-16 PROCEDURE — 36415 COLL VENOUS BLD VENIPUNCTURE: CPT

## 2024-11-16 RX ORDER — METOPROLOL SUCCINATE 25 MG/1
25 TABLET, EXTENDED RELEASE ORAL 2 TIMES DAILY
Qty: 30 TABLET | Refills: 3 | Status: SHIPPED | OUTPATIENT
Start: 2024-11-16

## 2024-11-16 RX ADMIN — PANTOPRAZOLE SODIUM 40 MG: 40 TABLET, DELAYED RELEASE ORAL at 05:50

## 2024-11-16 RX ADMIN — ISOSORBIDE MONONITRATE 30 MG: 30 TABLET, EXTENDED RELEASE ORAL at 08:56

## 2024-11-16 RX ADMIN — Medication 325 MG: at 08:56

## 2024-11-16 RX ADMIN — ATORVASTATIN CALCIUM 80 MG: 80 TABLET, FILM COATED ORAL at 08:56

## 2024-11-16 RX ADMIN — BUTALBITAL, ACETAMINOPHEN AND CAFFEINE 1 TABLET: 325; 50; 40 TABLET ORAL at 04:08

## 2024-11-16 RX ADMIN — SODIUM CHLORIDE, PRESERVATIVE FREE 10 ML: 5 INJECTION INTRAVENOUS at 08:57

## 2024-11-16 RX ADMIN — APIXABAN 5 MG: 5 TABLET, FILM COATED ORAL at 08:56

## 2024-11-16 RX ADMIN — KETOROLAC TROMETHAMINE 30 MG: 30 INJECTION, SOLUTION INTRAMUSCULAR at 00:15

## 2024-11-16 RX ADMIN — RANOLAZINE 1000 MG: 500 TABLET, EXTENDED RELEASE ORAL at 08:56

## 2024-11-16 RX ADMIN — LISINOPRIL 10 MG: 10 TABLET ORAL at 08:56

## 2024-11-16 RX ADMIN — METOPROLOL SUCCINATE 25 MG: 25 TABLET, EXTENDED RELEASE ORAL at 08:56

## 2024-11-16 RX ADMIN — CLOPIDOGREL BISULFATE 75 MG: 75 TABLET ORAL at 08:56

## 2024-11-16 RX ADMIN — BUTALBITAL, ACETAMINOPHEN AND CAFFEINE 1 TABLET: 325; 50; 40 TABLET ORAL at 08:56

## 2024-11-16 ASSESSMENT — PAIN DESCRIPTION - PAIN TYPE
TYPE: ACUTE PAIN;CHRONIC PAIN
TYPE: CHRONIC PAIN
TYPE: ACUTE PAIN;CHRONIC PAIN

## 2024-11-16 ASSESSMENT — PAIN - FUNCTIONAL ASSESSMENT
PAIN_FUNCTIONAL_ASSESSMENT: ACTIVITIES ARE NOT PREVENTED

## 2024-11-16 ASSESSMENT — PAIN DESCRIPTION - DESCRIPTORS
DESCRIPTORS: ACHING
DESCRIPTORS: ACHING;DISCOMFORT;POUNDING
DESCRIPTORS: ACHING;DISCOMFORT;POUNDING

## 2024-11-16 ASSESSMENT — PAIN SCALES - GENERAL
PAINLEVEL_OUTOF10: 7
PAINLEVEL_OUTOF10: 7
PAINLEVEL_OUTOF10: 6
PAINLEVEL_OUTOF10: 8
PAINLEVEL_OUTOF10: 9

## 2024-11-16 ASSESSMENT — PAIN DESCRIPTION - ONSET
ONSET: ON-GOING
ONSET: ON-GOING

## 2024-11-16 ASSESSMENT — PAIN DESCRIPTION - LOCATION
LOCATION: HEAD

## 2024-11-16 ASSESSMENT — PAIN DESCRIPTION - ORIENTATION
ORIENTATION: MID;ANTERIOR
ORIENTATION: LEFT
ORIENTATION: MID;ANTERIOR

## 2024-11-16 ASSESSMENT — PAIN DESCRIPTION - FREQUENCY
FREQUENCY: CONTINUOUS
FREQUENCY: CONTINUOUS

## 2024-11-16 NOTE — DISCHARGE SUMMARY
Discharge Summary     Patient Identification:  Chriss Braga  : 1955  MRN: [id number]   Account: [id number]     Admit date: 2024  Discharge date: 24   Attending provider: Dr. Rafal John MD     Primary care provider: Newman-Waterhouse, Aundrea N, DO     Discharge Diagnoses:   Principal Problem:    Stroke-like symptom  Resolved Problems:    * No resolved hospital problems. *    Strokelike symptoms: New CVA vs Recrudescence. POA. NIHSS, 4. Old deficits from prior stroke, left-sided facial droop, dysarthria, left-sided sensory deficit, left upper and lower extremity weakness. LKW - 7 days ago.  CT head, CTA head and neck negative.  Unable to undergo MRI secondary to ICD.  No obvious infectious etiology.  No acute findings on echo.  Neurology was following during stroke workup but has signed off at this time.  Continue high intensity statin.  - Patient to follow-up with neurology outpatient  - Continue Eliquis and Plavix at time of discharge  - Follow-up with primary care physician  - Educated patient on compliance medication.  Patient has a known history of noncompliance  - Referral to PT     Chronic Conditions (reviewed and stable unless otherwise stated)  Nonsustained Ventricular Tachycardia: Chronic condition.  Patient has ICD in place.  Monitor electrolytes.  Noted 16 beat of NSVT, 2024.  Asymptomatic.  Will interrogate pacemaker.  Metoprolol 25 increased to Toprol 25 twice daily.  Echo showed no change in EF or new regional wall motion abnormalities  - Patient to follow-up with cardiologist outpatient  Hx CVA: Residual left-sided weakness of upper and lower extremity  Obstructive CAD: Status post CABG x 5.  Left heart cath with LESTER.  On Eliquis and Plavix.  Home medications, atorvastatin, Imdur, Toprol, Ranexa, Plavix and nitro.  Patient follows with Dr. Jaime as his cardiologist  Hypertension: Continuing home medications, lisinopril metoprolol.  Hold parameters in place.  HFrEF: S/p ICD  placement.  TTE 2/9/2024 showed EF 25 to 30%.  Home medication lisinopril metoprolol.  Repeat echo ordered  History of polysubstance drug abuse.  History of both marijuana and cocaine use. UDS positive for cannabinoids  Paroxysmal atrial fibrillation: [id number] 5.  On Eliquis, anticoagulation.  Rate controlled metoprolol.  GERD: PPI  Pituitary adenoma: Repeat imaging on admission showed no changes. Diagnosed 6/24/24 by Dr. Crespo at [facility] in [place], [place]. Referral to Dr. Clark for treatment management.   - Referral to neurosurgery outpatient     Hospital Course:   Chriss Braga is a 69-year-old male with a past medical history pertinent for coronary artery disease status post CABG and left heart cath with stenting, atrial fibrillation on Eliquis, GERD, glaucoma, hyperlipidemia, hypertension, HFrEF with ICD placement, NSVT, orthostatic hypotension, polysubstance abuse, prior CVAs, pituitary tumor who presented to the emergency department on 10/14/2024 for evaluation of left sided upper and lower extremity numbness and weakness.  Patient was brought to the emergency department from the internal medicine residency clinic after he was trying to establish care there but had expressed new onset of symptoms.  Patient states that his symptoms started on 11 7/24 and that he has been having difficulty walking following the onset of the symptoms.  Patient states he has been compliant with his Plavix but has missed 2 doses of Eliquis in the past 24 hours.  On arrival to the emergency department a code stroke was called and patient underwent CT scan for CT head and CTA.  No TNK was given and MRI was unable to be performed due to known ICD placement.  Patient is UDS was positive for cannabinoids.  Neurology was consulted and following at this time.  Patient worked with physical therapy, Occupational Therapy as well as speech.  Echo was obtained showed no acute findings.  Neurology has signed off at this

## 2024-11-16 NOTE — PROGRESS NOTES
Discharge instructions reviewed with patient. Med changes and follow up appointments reviewed. Patient verbalized understanding. Patient discharged with all belongings via [facility] cab.

## 2024-11-16 NOTE — PLAN OF CARE
safety     Problem: ABCDS Injury Assessment  Goal: Absence of physical injury  11/15/2024 2143 by Jonah Newell, RN  Outcome: Progressing  Flowsheets (Taken 11/15/2024 1950)  Absence of Physical Injury: Implement safety measures based on patient assessment     Problem: Neurosensory - Adult  Goal: Achieves stable or improved neurological status  11/15/2024 2143 by Jonah Newell, RN  Outcome: Progressing  Flowsheets (Taken 11/15/2024 1950)  Achieves stable or improved neurological status: Assess for and report changes in neurological status     Problem: Neurosensory - Adult  Goal: Achieves maximal functionality and self care  11/15/2024 2143 by Jonah Newell, RN  Outcome: Progressing  Flowsheets (Taken 11/15/2024 1950)  Achieves maximal functionality and self care:   Monitor swallowing and airway patency with patient fatigue and changes in neurological status   Encourage and assist patient to increase activity and self care with guidance from physical therapy/occupational therapy   Encourage visually impaired, hearing impaired and aphasic patients to use assistive/communication devices     Problem: Cardiovascular - Adult  Goal: Maintains optimal cardiac output and hemodynamic stability  11/15/2024 2143 by Jonah Newell, RN  Outcome: Progressing  Flowsheets (Taken 11/15/2024 1950)  Maintains optimal cardiac output and hemodynamic stability: Monitor blood pressure and heart rate     Problem: Skin/Tissue Integrity - Adult  Goal: Skin integrity remains intact  11/15/2024 2143 by Jonah Newell, RN  Outcome: Progressing  Flowsheets (Taken 11/15/2024 1950)  Skin Integrity Remains Intact: Monitor for areas of redness and/or skin breakdown     Problem: Skin/Tissue Integrity  Goal: Absence of new skin breakdown  Description: 1.  Monitor for areas of redness and/or skin breakdown  2.  Assess vascular access sites hourly  3.  Every 4-6 hours minimum:  Change oxygen saturation probe site  4.  Every 4-6 hours:  If on  nasal continuous positive airway pressure, respiratory therapy assess nares and determine need for appliance change or resting period.  11/15/2024 2143 by Jonah Newell, RN  Outcome: Progressing    Careplan reviewed with patient and family. Patient and family verbalized understanding of the plan of care.

## 2024-12-03 ENCOUNTER — APPOINTMENT (OUTPATIENT)
Dept: INTERVENTIONAL RADIOLOGY/VASCULAR | Age: 69
End: 2024-12-03
Payer: MEDICARE

## 2024-12-03 ENCOUNTER — HOSPITAL ENCOUNTER (EMERGENCY)
Age: 69
Discharge: HOME | End: 2024-12-03
Payer: MEDICARE

## 2024-12-03 ENCOUNTER — APPOINTMENT (OUTPATIENT)
Dept: CT IMAGING | Age: 69
End: 2024-12-03
Payer: MEDICARE

## 2024-12-03 ENCOUNTER — APPOINTMENT (OUTPATIENT)
Dept: GENERAL RADIOLOGY | Age: 69
End: 2024-12-03
Payer: MEDICARE

## 2024-12-03 VITALS
RESPIRATION RATE: 15 BRPM | HEIGHT: 69 IN | OXYGEN SATURATION: 98 % | BODY MASS INDEX: 25.48 KG/M2 | HEART RATE: 79 BPM | SYSTOLIC BLOOD PRESSURE: 174 MMHG | TEMPERATURE: 98 F | WEIGHT: 172 LBS | DIASTOLIC BLOOD PRESSURE: 115 MMHG

## 2024-12-03 DIAGNOSIS — M79.604 BILATERAL LEG PAIN: ICD-10-CM

## 2024-12-03 DIAGNOSIS — R07.89 ATYPICAL CHEST PAIN: Primary | ICD-10-CM

## 2024-12-03 DIAGNOSIS — M79.605 BILATERAL LEG PAIN: ICD-10-CM

## 2024-12-03 LAB
ANION GAP SERPL CALC-SCNC: 10 MEQ/L (ref 8–16)
BASOPHILS ABSOLUTE: 0 THOU/MM3 (ref 0–0.1)
BASOPHILS NFR BLD AUTO: 0.4 %
BUN SERPL-MCNC: 12 MG/DL (ref 7–22)
CALCIUM SERPL-MCNC: 9.1 MG/DL (ref 8.5–10.5)
CHLORIDE SERPL-SCNC: 104 MEQ/L (ref 98–111)
CO2 SERPL-SCNC: 24 MEQ/L (ref 23–33)
CREAT SERPL-MCNC: 1 MG/DL (ref 0.4–1.2)
D DIMER PPP IA.FEU-MCNC: 624 NG/ML FEU (ref 0–500)
DEPRECATED RDW RBC AUTO: 38.8 FL (ref 35–45)
ECHO BSA: 1.95 M2
EKG ATRIAL RATE: 83 BPM
EKG P AXIS: 79 DEGREES
EKG P-R INTERVAL: 160 MS
EKG Q-T INTERVAL: 406 MS
EKG QRS DURATION: 90 MS
EKG QTC CALCULATION (BAZETT): 477 MS
EKG R AXIS: -34 DEGREES
EKG T AXIS: 35 DEGREES
EKG VENTRICULAR RATE: 83 BPM
EOSINOPHIL NFR BLD AUTO: 1.5 %
EOSINOPHILS ABSOLUTE: 0.1 THOU/MM3 (ref 0–0.4)
ERYTHROCYTE [DISTWIDTH] IN BLOOD BY AUTOMATED COUNT: 15.7 % (ref 11.5–14.5)
GFR SERPL CREATININE-BSD FRML MDRD: 82 ML/MIN/1.73M2
GLUCOSE SERPL-MCNC: 95 MG/DL (ref 70–108)
HCT VFR BLD AUTO: 41.5 % (ref 42–52)
HGB BLD-MCNC: 12.7 GM/DL (ref 14–18)
IMM GRANULOCYTES # BLD AUTO: 0.04 THOU/MM3 (ref 0–0.07)
IMM GRANULOCYTES NFR BLD AUTO: 0.5 %
INR PPP: 1.06 (ref 0.85–1.13)
LYMPHOCYTES ABSOLUTE: 1.4 THOU/MM3 (ref 1–4.8)
LYMPHOCYTES NFR BLD AUTO: 18.9 %
MCH RBC QN AUTO: 21.6 PG (ref 26–33)
MCHC RBC AUTO-ENTMCNC: 30.6 GM/DL (ref 32.2–35.5)
MCV RBC AUTO: 70.7 FL (ref 80–94)
MONOCYTES ABSOLUTE: 0.5 THOU/MM3 (ref 0.4–1.3)
MONOCYTES NFR BLD AUTO: 6.9 %
NEUTROPHILS ABSOLUTE: 5.5 THOU/MM3 (ref 1.8–7.7)
NEUTROPHILS NFR BLD AUTO: 71.8 %
NRBC BLD AUTO-RTO: 0 /100 WBC
OSMOLALITY SERPL CALC.SUM OF ELEC: 275.2 MOSMOL/KG (ref 275–300)
PLATELET # BLD AUTO: 208 THOU/MM3 (ref 130–400)
PMV BLD AUTO: 9.8 FL (ref 9.4–12.4)
POTASSIUM SERPL-SCNC: 3.8 MEQ/L (ref 3.5–5.2)
RBC # BLD AUTO: 5.87 MILL/MM3 (ref 4.7–6.1)
SODIUM SERPL-SCNC: 138 MEQ/L (ref 135–145)
TROPONIN, HIGH SENSITIVITY: 7 NG/L (ref 0–12)
TROPONIN, HIGH SENSITIVITY: 8 NG/L (ref 0–12)
WBC # BLD AUTO: 7.6 THOU/MM3 (ref 4.8–10.8)

## 2024-12-03 PROCEDURE — 6360000002 HC RX W HCPCS: Performed by: NURSE PRACTITIONER

## 2024-12-03 PROCEDURE — 93010 ELECTROCARDIOGRAM REPORT: CPT | Performed by: INTERNAL MEDICINE

## 2024-12-03 PROCEDURE — 6360000004 HC RX CONTRAST MEDICATION: Performed by: PHYSICIAN ASSISTANT

## 2024-12-03 PROCEDURE — 6370000000 HC RX 637 (ALT 250 FOR IP): Performed by: NURSE PRACTITIONER

## 2024-12-03 PROCEDURE — 80048 BASIC METABOLIC PNL TOTAL CA: CPT

## 2024-12-03 PROCEDURE — 85025 COMPLETE CBC W/AUTO DIFF WBC: CPT

## 2024-12-03 PROCEDURE — 99285 EMERGENCY DEPT VISIT HI MDM: CPT

## 2024-12-03 PROCEDURE — 93005 ELECTROCARDIOGRAM TRACING: CPT | Performed by: PHYSICIAN ASSISTANT

## 2024-12-03 PROCEDURE — 71046 X-RAY EXAM CHEST 2 VIEWS: CPT

## 2024-12-03 PROCEDURE — 93970 EXTREMITY STUDY: CPT

## 2024-12-03 PROCEDURE — 71275 CT ANGIOGRAPHY CHEST: CPT

## 2024-12-03 PROCEDURE — 36415 COLL VENOUS BLD VENIPUNCTURE: CPT

## 2024-12-03 PROCEDURE — 96374 THER/PROPH/DIAG INJ IV PUSH: CPT

## 2024-12-03 PROCEDURE — 85379 FIBRIN DEGRADATION QUANT: CPT

## 2024-12-03 PROCEDURE — 85610 PROTHROMBIN TIME: CPT

## 2024-12-03 PROCEDURE — 84484 ASSAY OF TROPONIN QUANT: CPT

## 2024-12-03 RX ORDER — KETOROLAC TROMETHAMINE 30 MG/ML
15 INJECTION, SOLUTION INTRAMUSCULAR; INTRAVENOUS ONCE
Status: COMPLETED | OUTPATIENT
Start: 2024-12-03 | End: 2024-12-03

## 2024-12-03 RX ORDER — IOPAMIDOL 755 MG/ML
80 INJECTION, SOLUTION INTRAVASCULAR
Status: COMPLETED | OUTPATIENT
Start: 2024-12-03 | End: 2024-12-03

## 2024-12-03 RX ORDER — ASPIRIN 81 MG/1
324 TABLET, CHEWABLE ORAL ONCE
Status: COMPLETED | OUTPATIENT
Start: 2024-12-03 | End: 2024-12-03

## 2024-12-03 RX ADMIN — KETOROLAC TROMETHAMINE 15 MG: 30 INJECTION, SOLUTION INTRAMUSCULAR at 14:13

## 2024-12-03 RX ADMIN — LIDOCAINE HYDROCHLORIDE: 20 SOLUTION ORAL at 14:12

## 2024-12-03 RX ADMIN — IOPAMIDOL 80 ML: 755 INJECTION, SOLUTION INTRAVENOUS at 13:20

## 2024-12-03 RX ADMIN — ASPIRIN 324 MG: 81 TABLET, CHEWABLE ORAL at 14:11

## 2024-12-03 ASSESSMENT — PAIN - FUNCTIONAL ASSESSMENT: PAIN_FUNCTIONAL_ASSESSMENT: 0-10

## 2024-12-03 ASSESSMENT — PAIN SCALES - GENERAL: PAINLEVEL_OUTOF10: 8

## 2024-12-03 ASSESSMENT — PAIN DESCRIPTION - LOCATION: LOCATION: CHEST

## 2024-12-03 NOTE — ED TRIAGE NOTES
Presents to ED with c/o chest pain that started about a half hour ago after eating breakfast. Patient states he has been having leg pain for the past couple days. Patient also c/o headache. Alert and oriented. Respirations easy and unlabored.

## 2024-12-04 ENCOUNTER — CARE COORDINATION (OUTPATIENT)
Dept: CARE COORDINATION | Age: 69
End: 2024-12-04

## 2024-12-04 NOTE — CARE COORDINATION
Ambulatory Care Coordination Note     12/4/2024 9:46 AM     Patient outreach attempt by this ACM today to offer care management services. ACM was unable to reach the patient by telephone today;   left voice message requesting a return phone call to this ACM.     ACM: Elana Pierre RN     Care Summary Note: Chriss was referred to care coordination following recent ED visit for CP and bilat leg pain.  Pt has h/o: CHF, CAD, paroxysmal a-fib, CVA, HTN, HLD, GERD, pituitary adenoma.      PCP/Specialist follow up:       Follow Up:   Plan for next ACM outreach in approximately 1-2 days  to complete:  - outreach attempt to offer care management services.

## 2024-12-04 NOTE — ED PROVIDER NOTES
13 mm in size. There are inflammatory changes in the right sphenoid sinus and to a lesser extent in the ethmoid air cells bilaterally. The paranasal sinuses and mastoid air cells are normally aerated.  There is no suspicious calvarial abnormality.     1. Stable CT scan of the brain, no interval change since previous study dated 9/4/2024. 2. Results called to the stroke team at 4:40 p.m. **This report has been created using voice recognition software. It may contain minor errors which are inherent in voice recognition technology.** Electronically signed by Dr. Larissa Solares        DIAGNOSIS  1. Atypical chest pain    2. Bilateral leg pain           DISPOSITION/PLAN  PATIENT REFERRED TO:  Newman-Waterhouse, Aundrea N,   [address] [place] [address]  [phone]    Schedule an appointment as soon as possible for a visit in 2 days  For follow up    DISCHARGE MEDICATIONS:  Discharge Medication List as of 12/3/2024  3:35 PM            CHEN Stahl CNP, Kristy J, APRN - CNP  12/03/24 2149

## 2024-12-05 ENCOUNTER — TELEPHONE (OUTPATIENT)
Dept: INTERNAL MEDICINE CLINIC | Age: 69
End: 2024-12-05

## 2024-12-05 NOTE — TELEPHONE ENCOUNTER
----- Message from ANTONIO JOSE MA sent at 12/5/2024 10:57 AM EST -----  Regarding: FW: ECC Escalation To Practice  Attempted to reach patient, voicemail left to call office and schedule appt. Phone was cutting in and out.  ----- Message -----  From: Sita Tatum  Sent: 12/4/2024   4:33 PM EST  To: [facility] Clinical Staff  Subject: ECC Escalation To Practice                       ECC Escalation To Practice      Type of Escalation: Red Flag Symptom  --------------------------------------------------------------------------------------------------------------------------    Information for Provider:  Patient is looking for appointment for: Symptom tiredness , difficulty speaking, breathing and walking  Reasons for Message: Unable to reach practice     Additional Information Patient is having tiredness , difficulty speaking, breathing and walking for few days.  --------------------------------------------------------------------------------------------------------------------------    Relationship to Patient: Self     Call Back Info: OK to leave message on voicemail  Preferred Call Back Number: Phone [phone] (mobile)

## 2024-12-06 ENCOUNTER — APPOINTMENT (OUTPATIENT)
Dept: GENERAL RADIOLOGY | Age: 69
DRG: 101 | End: 2024-12-06
Payer: MEDICARE

## 2024-12-06 ENCOUNTER — HOSPITAL ENCOUNTER (INPATIENT)
Age: 69
LOS: 2 days | Discharge: HOME | DRG: 101 | End: 2024-12-09
Attending: EMERGENCY MEDICINE
Payer: MEDICARE

## 2024-12-06 ENCOUNTER — APPOINTMENT (OUTPATIENT)
Dept: CT IMAGING | Age: 69
DRG: 101 | End: 2024-12-06
Payer: MEDICARE

## 2024-12-06 DIAGNOSIS — R41.0 CONFUSION: ICD-10-CM

## 2024-12-06 DIAGNOSIS — R55 SYNCOPE, UNSPECIFIED SYNCOPE TYPE: ICD-10-CM

## 2024-12-06 DIAGNOSIS — R07.9 CHEST PAIN, UNSPECIFIED TYPE: Primary | ICD-10-CM

## 2024-12-06 DIAGNOSIS — R55 SYNCOPE AND COLLAPSE: ICD-10-CM

## 2024-12-06 DIAGNOSIS — D35.2 PITUITARY ADENOMA (HCC): ICD-10-CM
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
## 2024-12-06 LAB
ALBUMIN SERPL BCG-MCNC: 4.5 G/DL (ref 3.5–5.1)
ALP SERPL-CCNC: 86 U/L (ref 38–126)
ALT SERPL W/O P-5'-P-CCNC: 11 U/L (ref 11–66)
ANION GAP SERPL CALC-SCNC: 11 MEQ/L (ref 8–16)
AST SERPL-CCNC: 17 U/L (ref 5–40)
BASOPHILS ABSOLUTE: 0 THOU/MM3 (ref 0–0.1)
BASOPHILS NFR BLD AUTO: 0.5 %
BILIRUB SERPL-MCNC: 0.5 MG/DL (ref 0.3–1.2)
BUN SERPL-MCNC: 17 MG/DL (ref 7–22)
CALCIUM SERPL-MCNC: 9.4 MG/DL (ref 8.5–10.5)
CHLORIDE SERPL-SCNC: 104 MEQ/L (ref 98–111)
CO2 SERPL-SCNC: 25 MEQ/L (ref 23–33)
CREAT SERPL-MCNC: 0.9 MG/DL (ref 0.4–1.2)
DEPRECATED RDW RBC AUTO: 37.7 FL (ref 35–45)
EOSINOPHIL NFR BLD AUTO: 1.2 %
EOSINOPHILS ABSOLUTE: 0.1 THOU/MM3 (ref 0–0.4)
ERYTHROCYTE [DISTWIDTH] IN BLOOD BY AUTOMATED COUNT: 15.6 % (ref 11.5–14.5)
GFR SERPL CREATININE-BSD FRML MDRD: > 90 ML/MIN/1.73M2
GLUCOSE SERPL-MCNC: 99 MG/DL (ref 70–108)
HCT VFR BLD AUTO: 42.4 % (ref 42–52)
HGB BLD-MCNC: 12.8 GM/DL (ref 14–18)
IMM GRANULOCYTES # BLD AUTO: 0.02 THOU/MM3 (ref 0–0.07)
IMM GRANULOCYTES NFR BLD AUTO: 0.2 %
LYMPHOCYTES ABSOLUTE: 2.1 THOU/MM3 (ref 1–4.8)
LYMPHOCYTES NFR BLD AUTO: 25.7 %
MCH RBC QN AUTO: 21.2 PG (ref 26–33)
MCHC RBC AUTO-ENTMCNC: 30.2 GM/DL (ref 32.2–35.5)
MCV RBC AUTO: 70.3 FL (ref 80–94)
MONOCYTES ABSOLUTE: 0.5 THOU/MM3 (ref 0.4–1.3)
MONOCYTES NFR BLD AUTO: 6 %
NEUTROPHILS ABSOLUTE: 5.3 THOU/MM3 (ref 1.8–7.7)
NEUTROPHILS NFR BLD AUTO: 66.4 %
NRBC BLD AUTO-RTO: 0 /100 WBC
OSMOLALITY SERPL CALC.SUM OF ELEC: 281 MOSMOL/KG (ref 275–300)
PLATELET # BLD AUTO: 214 THOU/MM3 (ref 130–400)
PMV BLD AUTO: 10 FL (ref 9.4–12.4)
POTASSIUM SERPL-SCNC: 4.3 MEQ/L (ref 3.5–5.2)
PROT SERPL-MCNC: 7.8 G/DL (ref 6.1–8)
RBC # BLD AUTO: 6.03 MILL/MM3 (ref 4.7–6.1)
SODIUM SERPL-SCNC: 140 MEQ/L (ref 135–145)
TROPONIN, HIGH SENSITIVITY: 9 NG/L (ref 0–12)
WBC # BLD AUTO: 8 THOU/MM3 (ref 4.8–10.8)

## 2024-12-06 PROCEDURE — 85025 COMPLETE CBC W/AUTO DIFF WBC: CPT

## 2024-12-06 PROCEDURE — 70450 CT HEAD/BRAIN W/O DYE: CPT

## 2024-12-06 PROCEDURE — 70496 CT ANGIOGRAPHY HEAD: CPT

## 2024-12-06 PROCEDURE — 36415 COLL VENOUS BLD VENIPUNCTURE: CPT

## 2024-12-06 PROCEDURE — 93005 ELECTROCARDIOGRAM TRACING: CPT | Performed by: STUDENT IN AN ORGANIZED HEALTH CARE EDUCATION/TRAINING PROGRAM

## 2024-12-06 PROCEDURE — 99285 EMERGENCY DEPT VISIT HI MDM: CPT

## 2024-12-06 PROCEDURE — 6360000002 HC RX W HCPCS

## 2024-12-06 PROCEDURE — 72125 CT NECK SPINE W/O DYE: CPT

## 2024-12-06 PROCEDURE — 71045 X-RAY EXAM CHEST 1 VIEW: CPT

## 2024-12-06 PROCEDURE — 70498 CT ANGIOGRAPHY NECK: CPT

## 2024-12-06 PROCEDURE — 6360000004 HC RX CONTRAST MEDICATION

## 2024-12-06 PROCEDURE — 99223 1ST HOSP IP/OBS HIGH 75: CPT | Performed by: NURSE PRACTITIONER

## 2024-12-06 PROCEDURE — 96374 THER/PROPH/DIAG INJ IV PUSH: CPT

## 2024-12-06 PROCEDURE — 84484 ASSAY OF TROPONIN QUANT: CPT

## 2024-12-06 PROCEDURE — 71275 CT ANGIOGRAPHY CHEST: CPT

## 2024-12-06 PROCEDURE — 80053 COMPREHEN METABOLIC PANEL: CPT

## 2024-12-06 PROCEDURE — 96375 TX/PRO/DX INJ NEW DRUG ADDON: CPT

## 2024-12-06 RX ORDER — ONDANSETRON 2 MG/ML
4 INJECTION INTRAMUSCULAR; INTRAVENOUS ONCE
Status: COMPLETED | OUTPATIENT
Start: 2024-12-06 | End: 2024-12-06

## 2024-12-06 RX ORDER — IOPAMIDOL 755 MG/ML
80 INJECTION, SOLUTION INTRAVASCULAR
Status: COMPLETED | OUTPATIENT
Start: 2024-12-06 | End: 2024-12-06

## 2024-12-06 RX ORDER — MORPHINE SULFATE 4 MG/ML
4 INJECTION, SOLUTION INTRAMUSCULAR; INTRAVENOUS ONCE
Status: COMPLETED | OUTPATIENT
Start: 2024-12-06 | End: 2024-12-06

## 2024-12-06 RX ADMIN — MORPHINE SULFATE 4 MG: 4 INJECTION, SOLUTION INTRAMUSCULAR; INTRAVENOUS at 22:44

## 2024-12-06 RX ADMIN — IOPAMIDOL 80 ML: 755 INJECTION, SOLUTION INTRAVENOUS at 22:21

## 2024-12-06 RX ADMIN — ONDANSETRON 4 MG: 2 INJECTION INTRAMUSCULAR; INTRAVENOUS at 22:44

## 2024-12-06 ASSESSMENT — PAIN SCALES - GENERAL
PAINLEVEL_OUTOF10: 8
PAINLEVEL_OUTOF10: 6
PAINLEVEL_OUTOF10: 8

## 2024-12-06 ASSESSMENT — PAIN - FUNCTIONAL ASSESSMENT
PAIN_FUNCTIONAL_ASSESSMENT: 0-10

## 2024-12-06 ASSESSMENT — PAIN DESCRIPTION - LOCATION: LOCATION: CHEST

## 2024-12-07 PROBLEM — I63.9 ACUTE CVA (CEREBROVASCULAR ACCIDENT) (HCC): Status: ACTIVE | Noted: 2024-12-07

## 2024-12-07 PROBLEM — I63.9 ACUTE CVA (CEREBROVASCULAR ACCIDENT) (HCC): Status: RESOLVED | Noted: 2024-12-07 | Resolved: 2024-12-07

## 2024-12-07 PROBLEM — R55 SYNCOPE: Status: ACTIVE | Noted: 2024-12-07

## 2024-12-07 LAB
AMPHETAMINES UR QL SCN: NEGATIVE
ANION GAP SERPL CALC-SCNC: 11 MEQ/L (ref 8–16)
BARBITURATES UR QL SCN: NEGATIVE
BENZODIAZ UR QL SCN: NEGATIVE
BILIRUB UR QL STRIP: NEGATIVE
BUN SERPL-MCNC: 18 MG/DL (ref 7–22)
BZE UR QL SCN: NEGATIVE
CALCIUM SERPL-MCNC: 8.9 MG/DL (ref 8.5–10.5)
CANNABINOIDS UR QL SCN: POSITIVE
CHARACTER UR: CLEAR
CHLORIDE SERPL-SCNC: 107 MEQ/L (ref 98–111)
CO2 SERPL-SCNC: 22 MEQ/L (ref 23–33)
COLOR UR: YELLOW
CREAT SERPL-MCNC: 0.8 MG/DL (ref 0.4–1.2)
EKG ATRIAL RATE: 78 BPM
EKG P AXIS: 71 DEGREES
EKG P-R INTERVAL: 164 MS
EKG Q-T INTERVAL: 392 MS
EKG QRS DURATION: 82 MS
EKG QTC CALCULATION (BAZETT): 446 MS
EKG R AXIS: -18 DEGREES
EKG T AXIS: 45 DEGREES
EKG VENTRICULAR RATE: 78 BPM
FENTANYL: NEGATIVE
FERRITIN SERPL IA-MCNC: 221 NG/ML (ref 22–322)
GFR SERPL CREATININE-BSD FRML MDRD: > 90 ML/MIN/1.73M2
GLUCOSE SERPL-MCNC: 91 MG/DL (ref 70–108)
GLUCOSE UR QL STRIP.AUTO: NEGATIVE MG/DL
HGB UR QL STRIP.AUTO: NEGATIVE
IRON SATN MFR SERPL: 37 % (ref 20–50)
IRON SERPL-MCNC: 77 UG/DL (ref 65–195)
KETONES UR QL STRIP.AUTO: NEGATIVE
LEUKOCYTE ESTERASE UR QL STRIP.AUTO: NEGATIVE
MAGNESIUM SERPL-MCNC: 2.1 MG/DL (ref 1.6–2.4)
NITRITE UR QL STRIP.AUTO: NEGATIVE
OPIATES UR QL SCN: POSITIVE
OXYCODONE: NEGATIVE
PCP UR QL SCN: NEGATIVE
PH UR STRIP.AUTO: 5.5 [PH] (ref 5–9)
POTASSIUM SERPL-SCNC: 4.3 MEQ/L (ref 3.5–5.2)
PROLACTIN SERPL-MCNC: 15.4 NG/ML
PROT UR STRIP.AUTO-MCNC: NEGATIVE MG/DL
SODIUM SERPL-SCNC: 140 MEQ/L (ref 135–145)
SP GR UR REFRACT.AUTO: > 1.03 (ref 1–1.03)
T4 FREE SERPL-MCNC: 1.04 NG/DL (ref 0.93–1.68)
TIBC SERPL-MCNC: 208 UG/DL (ref 171–450)
TSH SERPL DL<=0.005 MIU/L-ACNC: 6.32 UIU/ML (ref 0.4–4.2)
UROBILINOGEN UR QL STRIP.AUTO: 0.2 EU/DL (ref 0–1)

## 2024-12-07 PROCEDURE — 36415 COLL VENOUS BLD VENIPUNCTURE: CPT

## 2024-12-07 PROCEDURE — 84146 ASSAY OF PROLACTIN: CPT

## 2024-12-07 PROCEDURE — 83550 IRON BINDING TEST: CPT

## 2024-12-07 PROCEDURE — 83735 ASSAY OF MAGNESIUM: CPT

## 2024-12-07 PROCEDURE — 2060000000 HC ICU INTERMEDIATE R&B

## 2024-12-07 PROCEDURE — 97116 GAIT TRAINING THERAPY: CPT

## 2024-12-07 PROCEDURE — 93010 ELECTROCARDIOGRAM REPORT: CPT | Performed by: INTERNAL MEDICINE

## 2024-12-07 PROCEDURE — 80307 DRUG TEST PRSMV CHEM ANLYZR: CPT

## 2024-12-07 PROCEDURE — 6370000000 HC RX 637 (ALT 250 FOR IP): Performed by: STUDENT IN AN ORGANIZED HEALTH CARE EDUCATION/TRAINING PROGRAM

## 2024-12-07 PROCEDURE — 80048 BASIC METABOLIC PNL TOTAL CA: CPT

## 2024-12-07 PROCEDURE — 81003 URINALYSIS AUTO W/O SCOPE: CPT

## 2024-12-07 PROCEDURE — 97165 OT EVAL LOW COMPLEX 30 MIN: CPT

## 2024-12-07 PROCEDURE — 84439 ASSAY OF FREE THYROXINE: CPT

## 2024-12-07 PROCEDURE — 99223 1ST HOSP IP/OBS HIGH 75: CPT | Performed by: NUCLEAR MEDICINE

## 2024-12-07 PROCEDURE — 84443 ASSAY THYROID STIM HORMONE: CPT

## 2024-12-07 PROCEDURE — 97535 SELF CARE MNGMENT TRAINING: CPT

## 2024-12-07 PROCEDURE — 82728 ASSAY OF FERRITIN: CPT

## 2024-12-07 PROCEDURE — 83540 ASSAY OF IRON: CPT

## 2024-12-07 PROCEDURE — 97161 PT EVAL LOW COMPLEX 20 MIN: CPT

## 2024-12-07 RX ORDER — ACETAMINOPHEN 325 MG/1
650 TABLET ORAL EVERY 6 HOURS PRN
Status: DISCONTINUED | OUTPATIENT
Start: 2024-12-07 | End: 2024-12-09 | Stop reason: HOSPADM

## 2024-12-07 RX ORDER — LISINOPRIL 10 MG/1
10 TABLET ORAL DAILY
Status: DISCONTINUED | OUTPATIENT
Start: 2024-12-08 | End: 2024-12-09 | Stop reason: HOSPADM

## 2024-12-07 RX ORDER — POLYETHYLENE GLYCOL 3350 17 G/17G
17 POWDER, FOR SOLUTION ORAL DAILY PRN
Status: DISCONTINUED | OUTPATIENT
Start: 2024-12-07 | End: 2024-12-09 | Stop reason: HOSPADM

## 2024-12-07 RX ORDER — METOPROLOL SUCCINATE 25 MG/1
25 TABLET, EXTENDED RELEASE ORAL 2 TIMES DAILY
Status: DISCONTINUED | OUTPATIENT
Start: 2024-12-07 | End: 2024-12-09 | Stop reason: HOSPADM

## 2024-12-07 RX ORDER — RANOLAZINE 500 MG/1
1000 TABLET, EXTENDED RELEASE ORAL DAILY
Status: DISCONTINUED | OUTPATIENT
Start: 2024-12-08 | End: 2024-12-09 | Stop reason: HOSPADM

## 2024-12-07 RX ORDER — ONDANSETRON 4 MG/1
4 TABLET, ORALLY DISINTEGRATING ORAL EVERY 8 HOURS PRN
Status: DISCONTINUED | OUTPATIENT
Start: 2024-12-07 | End: 2024-12-09 | Stop reason: HOSPADM

## 2024-12-07 RX ORDER — FERROUS SULFATE 325(65) MG
325 TABLET ORAL EVERY OTHER DAY
Status: DISCONTINUED | OUTPATIENT
Start: 2024-12-09 | End: 2024-12-09 | Stop reason: HOSPADM

## 2024-12-07 RX ORDER — ACETAMINOPHEN 650 MG/1
650 SUPPOSITORY RECTAL EVERY 6 HOURS PRN
Status: DISCONTINUED | OUTPATIENT
Start: 2024-12-07 | End: 2024-12-09 | Stop reason: HOSPADM

## 2024-12-07 RX ORDER — ATORVASTATIN CALCIUM 80 MG/1
80 TABLET, FILM COATED ORAL DAILY
Status: DISCONTINUED | OUTPATIENT
Start: 2024-12-08 | End: 2024-12-09 | Stop reason: HOSPADM

## 2024-12-07 RX ORDER — CLOPIDOGREL BISULFATE 75 MG/1
75 TABLET ORAL DAILY
Status: DISCONTINUED | OUTPATIENT
Start: 2024-12-08 | End: 2024-12-09 | Stop reason: HOSPADM

## 2024-12-07 RX ORDER — ISOSORBIDE MONONITRATE 30 MG/1
30 TABLET, EXTENDED RELEASE ORAL DAILY
Status: DISCONTINUED | OUTPATIENT
Start: 2024-12-08 | End: 2024-12-09 | Stop reason: HOSPADM

## 2024-12-07 RX ORDER — MORPHINE SULFATE 2 MG/ML
1 INJECTION, SOLUTION INTRAMUSCULAR; INTRAVENOUS EVERY 4 HOURS PRN
Status: DISCONTINUED | OUTPATIENT
Start: 2024-12-07 | End: 2024-12-09 | Stop reason: HOSPADM

## 2024-12-07 RX ORDER — ONDANSETRON 2 MG/ML
4 INJECTION INTRAMUSCULAR; INTRAVENOUS EVERY 6 HOURS PRN
Status: DISCONTINUED | OUTPATIENT
Start: 2024-12-07 | End: 2024-12-09 | Stop reason: HOSPADM

## 2024-12-07 RX ORDER — PANTOPRAZOLE SODIUM 40 MG/1
40 TABLET, DELAYED RELEASE ORAL
Status: DISCONTINUED | OUTPATIENT
Start: 2024-12-08 | End: 2024-12-09 | Stop reason: HOSPADM

## 2024-12-07 RX ADMIN — APIXABAN 5 MG: 5 TABLET, FILM COATED ORAL at 20:34

## 2024-12-07 RX ADMIN — METOPROLOL SUCCINATE 25 MG: 25 TABLET, FILM COATED, EXTENDED RELEASE ORAL at 20:34

## 2024-12-07 ASSESSMENT — PAIN DESCRIPTION - LOCATION: LOCATION: HEAD;CHEST

## 2024-12-07 ASSESSMENT — PAIN SCALES - GENERAL
PAINLEVEL_OUTOF10: 0
PAINLEVEL_OUTOF10: 8
PAINLEVEL_OUTOF10: 0
PAINLEVEL_OUTOF10: 6
PAINLEVEL_OUTOF10: 6
PAINLEVEL_OUTOF10: 0
PAINLEVEL_OUTOF10: 0
PAINLEVEL_OUTOF10: 6

## 2024-12-07 ASSESSMENT — PAIN - FUNCTIONAL ASSESSMENT
PAIN_FUNCTIONAL_ASSESSMENT: ACTIVITIES ARE NOT PREVENTED
PAIN_FUNCTIONAL_ASSESSMENT: 0-10

## 2024-12-07 ASSESSMENT — PAIN DESCRIPTION - DESCRIPTORS: DESCRIPTORS: ACHING

## 2024-12-07 ASSESSMENT — HEART SCORE: ECG: NON-SPECIFC REPOLARIZATION DISTURBANCE/LBTB/PM

## 2024-12-07 ASSESSMENT — PAIN DESCRIPTION - FREQUENCY: FREQUENCY: INTERMITTENT

## 2024-12-07 ASSESSMENT — PAIN DESCRIPTION - ONSET: ONSET: ON-GOING

## 2024-12-07 ASSESSMENT — PAIN DESCRIPTION - PAIN TYPE: TYPE: ACUTE PAIN

## 2024-12-07 NOTE — CARE COORDINATION
12/07/24 1213   Readmission Assessment   Number of Days since last admission? 8-30 days   Previous Disposition Home with Family   Who is being Interviewed Patient   What was the patient's/caregiver's perception as to why they think they needed to return back to the hospital? Other (Comment)  (passed out)   Did you visit your Primary Care Physician after you left the hospital, before you returned this time? Yes   Did you see a specialist, such as Cardiac, Pulmonary, Orthopedic Physician, etc. after you left the hospital? No   Who advised the patient to return to the hospital? Self-referral   Does the patient report anything that got in the way of taking their medications? No   In our efforts to provide the best possible care to you and others like you, can you think of anything that we could have done to help you after you left the hospital the first time, so that you might not have needed to return so soon? Other (Comment)  (\"sent me home too soon\")

## 2024-12-07 NOTE — PLAN OF CARE
Patient with history of noncompliance, presented to hospital with complaint of passing out and being worked up for syncope.  Patient was recently discharged from the hospital for strokelike symptoms, was unable to obtain MRI.  Patient does not follow-up with PCP, cardiology or neurology as an outpatient.  Noncompliance.  Unable to obtain MRI it is not compatible.  Symptoms started right after he used marijuana.  I have discussed with cardiology will do AICD interrogation.  His EF is 25% likely his cause of syncope is cardiogenic will consider adding amiodarone.  Monitor daily.  Keep magnesium above 2 and potassium above 4.  Counseled on the marijuana use.

## 2024-12-07 NOTE — PLAN OF CARE
Problem: Chronic Conditions and Co-morbidities  Goal: Patient's chronic conditions and co-morbidity symptoms are monitored and maintained or improved  12/7/2024 1102 by Zenaida Rudd RN  Outcome: Progressing  Flowsheets (Taken 12/7/2024 0802)  Care Plan - Patient's Chronic Conditions and Co-Morbidity Symptoms are Monitored and Maintained or Improved: Monitor and assess patient's chronic conditions and comorbid symptoms for stability, deterioration, or improvement     Problem: Discharge Planning  Goal: Discharge to home or other facility with appropriate resources  12/7/2024 1102 by Zenaida Rudd RN  Outcome: Progressing  Flowsheets (Taken 12/7/2024 0802)  Discharge to home or other facility with appropriate resources: Identify barriers to discharge with patient and caregiver     Problem: Pain  Goal: Verbalizes/displays adequate comfort level or baseline comfort level  12/7/2024 1102 by Zenaida Rudd RN  Outcome: Progressing  Flowsheets (Taken 12/7/2024 0802)  Verbalizes/displays adequate comfort level or baseline comfort level: Encourage patient to monitor pain and request assistance     Problem: ABCDS Injury Assessment  Goal: Absence of physical injury  12/7/2024 1102 by Zenaida Rudd RN  Outcome: Progressing  Flowsheets (Taken 12/7/2024 1102)  Absence of Physical Injury: Implement safety measures based on patient assessment     Problem: Safety - Adult  Goal: Free from fall injury  12/7/2024 1102 by Zenaida Rudd RN  Flowsheets (Taken 12/7/2024 1102)  Free From Fall Injury: Instruct family/caregiver on patient safety  Note: Patient remains free from falls this shift. Fall precautions in place with bed/chair exit alarmed. Fall sign posted and fall armband in place. Nonskid footwear used with transferring. Educated patient to use call light when in need of staff assistance with transferring, ambulating, and other activities of daily living. Patient appropriately uses call light this shift.        Problem:

## 2024-12-07 NOTE — ED PROVIDER NOTES
[facility] EMERGENCY DEPT  EMERGENCY DEPARTMENT ENCOUNTER          Pt Name: Chriss Braga  MRN: [id number]  Birthdate 1955  Date of evaluation: 12/6/2024  Physician: Laurie Pinon MD  Supervising Attending Physician: Linda Castillo MD       CHIEF COMPLAINT       Chief Complaint   Patient presents with    Chest Pain         HISTORY OF PRESENT ILLNESS    HPI  Chriss Braga is a 69 y.o. male past medical history of A-fib, ST with elevation myocardial infarction, confusion, dyslipidemia, fall from ground, marijuana use, neoplasm of brain, strokelike symptoms, congestive heart failure, pulmonary nodules, polysubstance abuse, orthostatic hypotension who presents to the emergency department from home for evaluation of chest pain.  The patient stated that the chest pain started today, located in the left side, sharp, no radiation, rated 6/10.  The patient also stated had syncopal episode that happened about an hour ago associated with headache, shortness of breath, lightheadedness, and dizziness.  The patient does not remember passing out.  Patient does not know if he hit his head or not either.  The patient reports dizziness and some recent weakness as well.  The patient also reports a headache.  The patient received 324 of aspirin and 2 nitro in route to ED with no relief.  The patient smoked some marijuana before he passed out, but he does not think there is a correlation to why he is here today.  The patient reporting left side numbness and some blurry vision.  The patient states these symptoms started when he got stents placed 4-5 months ago.   The patient denies fever, chills, tinnitus, cough, congestion, sore throat, neck pain/stiffness, , abdominal pain, nausea, vomiting, constipation, diarrhea, dysuria, blood in the urine or stool, numbness/tingling/weakness in extremities.    The patient has no other acute complaints at this time.      PAST MEDICAL AND SURGICAL HISTORY     Past
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
## 2024-12-07 NOTE — ED TRIAGE NOTES
Pt presents to ED with chest pain and a syncopal episode that happened about an hour ago. Pt does not remember passing out. Pt does not know if he hit his head or not either. Pt reports dizziness and some recent weakness as well. Pt also reports a headache. Pt received 324 of aspirin and 2 nitro in route to ED with no relief. Denies N/V/D. Pt smoked some marijuana before he passed out, but he does not think there is a correlation to why he is here today. Pt reporting left side numbness and some blurry vision. Pt states these symptoms started when he got stents placed 4-5 months ago.

## 2024-12-07 NOTE — PROCEDURES
PROCEDURE NOTE  Date: 12/7/2024   Name: Chriss Braga  YOB: 1955    Procedures  Report for Pacemaker was handed to Zenaida JO. Brook CLEMENTE

## 2024-12-07 NOTE — CARE COORDINATION
12/07/24 1209   Service Assessment   Patient Orientation Alert and Oriented   Cognition Alert   History Provided By Patient   Primary Caregiver Self   Support Systems Family Members   Patient's Healthcare Decision Maker is: Legal Next of Kin   PCP Verified by CM Yes   Prior Functional Level Independent in ADLs/IADLs   Current Functional Level Independent in ADLs/IADLs   Can patient return to prior living arrangement Yes   Ability to make needs known: Good   Family able to assist with home care needs: Yes   Would you like for me to discuss the discharge plan with any other family members/significant others, and if so, who? No   Financial Resources Medicare   Community Resources None   Discharge Planning   Type of Residence House   Living Arrangements Family Members   Current Services Prior To Admission Durable Medical Equipment;Other (Comment)  (RPM)   Current DME Prior to Arrival Cane   Potential Assistance Needed N/A   DME Ordered? No   Potential Assistance Purchasing Medications No   Type of Home Care Services None   Patient expects to be discharged to: House   Condition of Participation: Discharge Planning   The Plan for Transition of Care is related to the following treatment goals: feel better before going home     Patient Goals/Plan/Treatment Preferences: Met with Chriss, he plans to return home with his brother at discharge. DME as below. He states being independent, and denies all discharge needs.     If patient is discharged prior to next notation, then this note serves as note for discharge by case management.

## 2024-12-07 NOTE — PLAN OF CARE
Problem: Chronic Conditions and Co-morbidities  Goal: Patient's chronic conditions and co-morbidity symptoms are monitored and maintained or improved  Outcome: Progressing  Flowsheets (Taken 12/7/2024 0434)  Care Plan - Patient's Chronic Conditions and Co-Morbidity Symptoms are Monitored and Maintained or Improved:   Monitor and assess patient's chronic conditions and comorbid symptoms for stability, deterioration, or improvement   Collaborate with multidisciplinary team to address chronic and comorbid conditions and prevent exacerbation or deterioration   Update acute care plan with appropriate goals if chronic or comorbid symptoms are exacerbated and prevent overall improvement and discharge     Problem: Discharge Planning  Goal: Discharge to home or other facility with appropriate resources  Outcome: Progressing  Flowsheets (Taken 12/7/2024 0434)  Discharge to home or other facility with appropriate resources:   Identify barriers to discharge with patient and caregiver   Arrange for needed discharge resources and transportation as appropriate   Identify discharge learning needs (meds, wound care, etc)   Arrange for interpreters to assist at discharge as needed   Refer to discharge planning if patient needs post-hospital services based on physician order or complex needs related to functional status, cognitive ability or social support system     Problem: Pain  Goal: Verbalizes/displays adequate comfort level or baseline comfort level  Outcome: Progressing  Flowsheets (Taken 12/7/2024 0434)  Verbalizes/displays adequate comfort level or baseline comfort level:   Encourage patient to monitor pain and request assistance   Assess pain using appropriate pain scale   Administer analgesics based on type and severity of pain and evaluate response   Implement non-pharmacological measures as appropriate and evaluate response   Consider cultural and social influences on pain and pain management   Notify Licensed

## 2024-12-08 LAB
ANION GAP SERPL CALC-SCNC: 11 MEQ/L (ref 8–16)
BUN SERPL-MCNC: 15 MG/DL (ref 7–22)
CALCIUM SERPL-MCNC: 9.3 MG/DL (ref 8.5–10.5)
CHLORIDE SERPL-SCNC: 105 MEQ/L (ref 98–111)
CO2 SERPL-SCNC: 24 MEQ/L (ref 23–33)
CREAT SERPL-MCNC: 1.1 MG/DL (ref 0.4–1.2)
DEPRECATED RDW RBC AUTO: 37.6 FL (ref 35–45)
ERYTHROCYTE [DISTWIDTH] IN BLOOD BY AUTOMATED COUNT: 15.3 % (ref 11.5–14.5)
GFR SERPL CREATININE-BSD FRML MDRD: 73 ML/MIN/1.73M2
GLUCOSE SERPL-MCNC: 96 MG/DL (ref 70–108)
HCT VFR BLD AUTO: 39.1 % (ref 42–52)
HGB BLD-MCNC: 12.2 GM/DL (ref 14–18)
MCH RBC QN AUTO: 21.8 PG (ref 26–33)
MCHC RBC AUTO-ENTMCNC: 31.2 GM/DL (ref 32.2–35.5)
MCV RBC AUTO: 69.8 FL (ref 80–94)
PLATELET # BLD AUTO: 199 THOU/MM3 (ref 130–400)
PMV BLD AUTO: 10.1 FL (ref 9.4–12.4)
POTASSIUM SERPL-SCNC: 4.2 MEQ/L (ref 3.5–5.2)
RBC # BLD AUTO: 5.6 MILL/MM3 (ref 4.7–6.1)
SCAN OF BLOOD SMEAR: NORMAL
SODIUM SERPL-SCNC: 140 MEQ/L (ref 135–145)
WBC # BLD AUTO: 7.4 THOU/MM3 (ref 4.8–10.8)

## 2024-12-08 PROCEDURE — 94761 N-INVAS EAR/PLS OXIMETRY MLT: CPT

## 2024-12-08 PROCEDURE — 6370000000 HC RX 637 (ALT 250 FOR IP): Performed by: NURSE PRACTITIONER

## 2024-12-08 PROCEDURE — 36415 COLL VENOUS BLD VENIPUNCTURE: CPT

## 2024-12-08 PROCEDURE — 6360000002 HC RX W HCPCS: Performed by: NURSE PRACTITIONER

## 2024-12-08 PROCEDURE — 99232 SBSQ HOSP IP/OBS MODERATE 35: CPT | Performed by: NURSE PRACTITIONER

## 2024-12-08 PROCEDURE — 94669 MECHANICAL CHEST WALL OSCILL: CPT

## 2024-12-08 PROCEDURE — 80048 BASIC METABOLIC PNL TOTAL CA: CPT

## 2024-12-08 PROCEDURE — 6370000000 HC RX 637 (ALT 250 FOR IP): Performed by: STUDENT IN AN ORGANIZED HEALTH CARE EDUCATION/TRAINING PROGRAM

## 2024-12-08 PROCEDURE — 85027 COMPLETE CBC AUTOMATED: CPT

## 2024-12-08 PROCEDURE — 99232 SBSQ HOSP IP/OBS MODERATE 35: CPT | Performed by: STUDENT IN AN ORGANIZED HEALTH CARE EDUCATION/TRAINING PROGRAM

## 2024-12-08 PROCEDURE — 2060000000 HC ICU INTERMEDIATE R&B

## 2024-12-08 RX ORDER — HYDRALAZINE HYDROCHLORIDE 20 MG/ML
5 INJECTION INTRAMUSCULAR; INTRAVENOUS EVERY 6 HOURS PRN
Status: DISCONTINUED | OUTPATIENT
Start: 2024-12-08 | End: 2024-12-09 | Stop reason: HOSPADM

## 2024-12-08 RX ORDER — AMIODARONE HYDROCHLORIDE 200 MG/1
200 TABLET ORAL DAILY
Status: DISCONTINUED | OUTPATIENT
Start: 2024-12-15 | End: 2024-12-09 | Stop reason: HOSPADM

## 2024-12-08 RX ORDER — AMIODARONE HYDROCHLORIDE 200 MG/1
200 TABLET ORAL 2 TIMES DAILY
Status: DISCONTINUED | OUTPATIENT
Start: 2024-12-08 | End: 2024-12-09 | Stop reason: HOSPADM

## 2024-12-08 RX ORDER — LEVETIRACETAM 500 MG/1
500 TABLET ORAL 2 TIMES DAILY
Status: DISCONTINUED | OUTPATIENT
Start: 2024-12-08 | End: 2024-12-09 | Stop reason: HOSPADM

## 2024-12-08 RX ADMIN — PANTOPRAZOLE SODIUM 40 MG: 40 TABLET, DELAYED RELEASE ORAL at 08:18

## 2024-12-08 RX ADMIN — LEVETIRACETAM 500 MG: 500 TABLET, FILM COATED ORAL at 21:53

## 2024-12-08 RX ADMIN — AMIODARONE HYDROCHLORIDE 200 MG: 200 TABLET ORAL at 21:01

## 2024-12-08 RX ADMIN — APIXABAN 5 MG: 5 TABLET, FILM COATED ORAL at 21:01

## 2024-12-08 RX ADMIN — LEVETIRACETAM 500 MG: 500 TABLET, FILM COATED ORAL at 11:30

## 2024-12-08 RX ADMIN — METOPROLOL SUCCINATE 25 MG: 25 TABLET, FILM COATED, EXTENDED RELEASE ORAL at 08:18

## 2024-12-08 RX ADMIN — ISOSORBIDE MONONITRATE 30 MG: 30 TABLET, EXTENDED RELEASE ORAL at 08:18

## 2024-12-08 RX ADMIN — ONDANSETRON 4 MG: 4 TABLET, ORALLY DISINTEGRATING ORAL at 13:22

## 2024-12-08 RX ADMIN — AMIODARONE HYDROCHLORIDE 200 MG: 200 TABLET ORAL at 13:51

## 2024-12-08 RX ADMIN — ONDANSETRON 4 MG: 2 INJECTION INTRAMUSCULAR; INTRAVENOUS at 21:04

## 2024-12-08 RX ADMIN — LISINOPRIL 10 MG: 10 TABLET ORAL at 08:18

## 2024-12-08 RX ADMIN — CLOPIDOGREL BISULFATE 75 MG: 75 TABLET ORAL at 08:18

## 2024-12-08 RX ADMIN — RANOLAZINE 1000 MG: 500 TABLET, FILM COATED, EXTENDED RELEASE ORAL at 08:18

## 2024-12-08 RX ADMIN — APIXABAN 5 MG: 5 TABLET, FILM COATED ORAL at 08:18

## 2024-12-08 RX ADMIN — METOPROLOL SUCCINATE 25 MG: 25 TABLET, FILM COATED, EXTENDED RELEASE ORAL at 21:02

## 2024-12-08 RX ADMIN — ATORVASTATIN CALCIUM 80 MG: 80 TABLET, FILM COATED ORAL at 08:18

## 2024-12-08 ASSESSMENT — PAIN SCALES - GENERAL: PAINLEVEL_OUTOF10: 0

## 2024-12-08 NOTE — FLOWSHEET NOTE
12/08/24 0503   Treatment Team Notification   Reason for Communication Evaluate  (18 beat run of vtach)   Name of Team Member Notified Eduardo Marcano   Treatment Team Role Physician Assistant   Method of Communication Secure Message   Response No new orders   Notification Time 0504

## 2024-12-08 NOTE — PLAN OF CARE
Problem: Chronic Conditions and Co-morbidities  Goal: Patient's chronic conditions and co-morbidity symptoms are monitored and maintained or improved  Outcome: Progressing  Flowsheets (Taken 12/8/2024 0131)  Care Plan - Patient's Chronic Conditions and Co-Morbidity Symptoms are Monitored and Maintained or Improved:   Monitor and assess patient's chronic conditions and comorbid symptoms for stability, deterioration, or improvement   Collaborate with multidisciplinary team to address chronic and comorbid conditions and prevent exacerbation or deterioration   Update acute care plan with appropriate goals if chronic or comorbid symptoms are exacerbated and prevent overall improvement and discharge     Problem: Discharge Planning  Goal: Discharge to home or other facility with appropriate resources  Outcome: Progressing  Flowsheets (Taken 12/8/2024 0131)  Discharge to home or other facility with appropriate resources:   Identify barriers to discharge with patient and caregiver   Arrange for needed discharge resources and transportation as appropriate   Identify discharge learning needs (meds, wound care, etc)   Arrange for interpreters to assist at discharge as needed   Refer to discharge planning if patient needs post-hospital services based on physician order or complex needs related to functional status, cognitive ability or social support system     Problem: Pain  Goal: Verbalizes/displays adequate comfort level or baseline comfort level  Outcome: Progressing  Flowsheets (Taken 12/8/2024 0131)  Verbalizes/displays adequate comfort level or baseline comfort level:   Encourage patient to monitor pain and request assistance   Assess pain using appropriate pain scale   Administer analgesics based on type and severity of pain and evaluate response   Implement non-pharmacological measures as appropriate and evaluate response   Consider cultural and social influences on pain and pain management   Notify Licensed

## 2024-12-09 VITALS
OXYGEN SATURATION: 98 % | RESPIRATION RATE: 16 BRPM | HEIGHT: 69 IN | WEIGHT: 160.72 LBS | TEMPERATURE: 97.5 F | BODY MASS INDEX: 23.8 KG/M2 | DIASTOLIC BLOOD PRESSURE: 91 MMHG | HEART RATE: 62 BPM | SYSTOLIC BLOOD PRESSURE: 129 MMHG

## 2024-12-09 LAB
ANION GAP SERPL CALC-SCNC: 8 MEQ/L (ref 8–16)
BUN SERPL-MCNC: 19 MG/DL (ref 7–22)
CALCIUM SERPL-MCNC: 8.9 MG/DL (ref 8.5–10.5)
CHLORIDE SERPL-SCNC: 104 MEQ/L (ref 98–111)
CO2 SERPL-SCNC: 25 MEQ/L (ref 23–33)
CREAT SERPL-MCNC: 1.2 MG/DL (ref 0.4–1.2)
DEPRECATED RDW RBC AUTO: 36.9 FL (ref 35–45)
ERYTHROCYTE [DISTWIDTH] IN BLOOD BY AUTOMATED COUNT: 15 % (ref 11.5–14.5)
GFR SERPL CREATININE-BSD FRML MDRD: 66 ML/MIN/1.73M2
GLUCOSE SERPL-MCNC: 90 MG/DL (ref 70–108)
HCT VFR BLD AUTO: 36.7 % (ref 42–52)
HGB BLD-MCNC: 11.7 GM/DL (ref 14–18)
MAGNESIUM SERPL-MCNC: 2 MG/DL (ref 1.6–2.4)
MCH RBC QN AUTO: 22.2 PG (ref 26–33)
MCHC RBC AUTO-ENTMCNC: 31.9 GM/DL (ref 32.2–35.5)
MCV RBC AUTO: 69.5 FL (ref 80–94)
P2Y12 ASSAY: 174 PRU (ref 180–376)
PLATELET # BLD AUTO: 165 THOU/MM3 (ref 130–400)
PMV BLD AUTO: 10.5 FL (ref 9.4–12.4)
POTASSIUM SERPL-SCNC: 4.2 MEQ/L (ref 3.5–5.2)
RBC # BLD AUTO: 5.28 MILL/MM3 (ref 4.7–6.1)
SCAN OF BLOOD SMEAR: NORMAL
SODIUM SERPL-SCNC: 137 MEQ/L (ref 135–145)
WBC # BLD AUTO: 8.1 THOU/MM3 (ref 4.8–10.8)

## 2024-12-09 PROCEDURE — 85027 COMPLETE CBC AUTOMATED: CPT

## 2024-12-09 PROCEDURE — 85576 BLOOD PLATELET AGGREGATION: CPT

## 2024-12-09 PROCEDURE — 97530 THERAPEUTIC ACTIVITIES: CPT

## 2024-12-09 PROCEDURE — 95819 EEG AWAKE AND ASLEEP: CPT

## 2024-12-09 PROCEDURE — 36415 COLL VENOUS BLD VENIPUNCTURE: CPT

## 2024-12-09 PROCEDURE — 99239 HOSP IP/OBS DSCHRG MGMT >30: CPT | Performed by: STUDENT IN AN ORGANIZED HEALTH CARE EDUCATION/TRAINING PROGRAM

## 2024-12-09 PROCEDURE — 6370000000 HC RX 637 (ALT 250 FOR IP): Performed by: STUDENT IN AN ORGANIZED HEALTH CARE EDUCATION/TRAINING PROGRAM

## 2024-12-09 PROCEDURE — 80048 BASIC METABOLIC PNL TOTAL CA: CPT

## 2024-12-09 PROCEDURE — 83735 ASSAY OF MAGNESIUM: CPT

## 2024-12-09 PROCEDURE — 6370000000 HC RX 637 (ALT 250 FOR IP): Performed by: NURSE PRACTITIONER

## 2024-12-09 PROCEDURE — 97116 GAIT TRAINING THERAPY: CPT

## 2024-12-09 PROCEDURE — 95819 EEG AWAKE AND ASLEEP: CPT | Performed by: PSYCHIATRY & NEUROLOGY

## 2024-12-09 RX ORDER — AMIODARONE HYDROCHLORIDE 200 MG/1
200 TABLET ORAL 2 TIMES DAILY
Qty: 11 TABLET | Refills: 0 | Status: SHIPPED | OUTPATIENT
Start: 2024-12-09 | End: 2024-12-09

## 2024-12-09 RX ORDER — LEVETIRACETAM 500 MG/1
500 TABLET ORAL 2 TIMES DAILY
Qty: 60 TABLET | Refills: 3 | Status: SHIPPED | OUTPATIENT
Start: 2024-12-09

## 2024-12-09 RX ORDER — AMIODARONE HYDROCHLORIDE 200 MG/1
200 TABLET ORAL DAILY
Qty: 30 TABLET | Refills: 0 | Status: SHIPPED | OUTPATIENT
Start: 2024-12-15 | End: 2024-12-09 | Stop reason: HOSPADM

## 2024-12-09 RX ORDER — AMIODARONE HYDROCHLORIDE 200 MG/1
200 TABLET ORAL 2 TIMES DAILY
Qty: 11 TABLET | Refills: 0 | Status: SHIPPED | OUTPATIENT
Start: 2024-12-09 | End: 2024-12-15

## 2024-12-09 RX ADMIN — LEVETIRACETAM 500 MG: 500 TABLET, FILM COATED ORAL at 08:05

## 2024-12-09 RX ADMIN — ATORVASTATIN CALCIUM 80 MG: 80 TABLET, FILM COATED ORAL at 08:05

## 2024-12-09 RX ADMIN — CLOPIDOGREL BISULFATE 75 MG: 75 TABLET ORAL at 08:06

## 2024-12-09 RX ADMIN — METOPROLOL SUCCINATE 25 MG: 25 TABLET, FILM COATED, EXTENDED RELEASE ORAL at 08:05

## 2024-12-09 RX ADMIN — PANTOPRAZOLE SODIUM 40 MG: 40 TABLET, DELAYED RELEASE ORAL at 06:39

## 2024-12-09 RX ADMIN — APIXABAN 5 MG: 5 TABLET, FILM COATED ORAL at 08:06

## 2024-12-09 RX ADMIN — AMIODARONE HYDROCHLORIDE 200 MG: 200 TABLET ORAL at 08:05

## 2024-12-09 RX ADMIN — FERROUS SULFATE TAB 325 MG (65 MG ELEMENTAL FE) 325 MG: 325 (65 FE) TAB at 08:05

## 2024-12-09 RX ADMIN — ISOSORBIDE MONONITRATE 30 MG: 30 TABLET, EXTENDED RELEASE ORAL at 08:06

## 2024-12-09 RX ADMIN — LISINOPRIL 10 MG: 10 TABLET ORAL at 08:06

## 2024-12-09 RX ADMIN — RANOLAZINE 1000 MG: 500 TABLET, FILM COATED, EXTENDED RELEASE ORAL at 08:05

## 2024-12-09 ASSESSMENT — PAIN SCALES - GENERAL: PAINLEVEL_OUTOF10: 0

## 2024-12-09 NOTE — PROCEDURES
PROCEDURE NOTE  Date: 12/9/2024   Name: Chriss Braga  YOB: 1955    Procedures            Date: 12/9/2024  Referring physician: Dr. Castle    Indication  Patient aged 69 y with encephalopathy. EEG done to assess for epileptiform activity.    Introduction  This routine 20-minute EEG was recorded using the International 10-20 System on a [employer] workstation at 256 samples/s. Automated spike and seizure detection algorithms were applied.    Description  During the maximal alert state, a well-regulated, symmetric, and reactive 9 Hz posterior dominant rhythm was seen. No consistent focal slowing or interhemispheric asymmetry was noted. Stage I and stage II sleep were observed. There were no interictal epileptiform discharges or electrographic seizures.    Activations  Hyperventilation was not performed. Intermittent photic stimulation was performed and demonstrated no posterior driving response.    Impression  Normal awake and sleep EEG.       EKG lead did not show clear arrhythmia, if still in concern consider formal EKG or correlation with telemetry.       No epileptiform discharges were identified. Please note the absence of such activity on this record cannot conclusively rule out an epileptic disorder. If such is still clinically suspected, a repeat study with sleep deprivation and/or prolonged sampling may be helpful.    Zacarias Su MD  Epilepsy Board Certified.  Neurology Board Certified.    Electronically Signed

## 2024-12-09 NOTE — CARE COORDINATION
12/9/24, 10:57 AM EST    Patient goals/plan/ treatment preferences discussed by  and .  Patient goals/plan/ treatment preferences reviewed with patient/ family.  Patient/ family verbalize understanding of discharge plan and are in agreement with goal/plan/treatment preferences.  Understanding was demonstrated using the teach back method.  AVS provided by RN at time of discharge, which includes all necessary medical information pertaining to the patients current course of illness, treatment, post-discharge goals of care, and treatment preferences.     Services At/After Discharge: In cab  Met w/ Chriss. He is agreeable with discharge today. Plans to return home w/ his brother. Denies needs for DME or HH. Expressed need for cab. Cab form on chart. Updated antonella Storey RN.

## 2024-12-09 NOTE — PLAN OF CARE
Neurology following peripherally for EEG. EEG completed, patient discharged prior to final read. Outpatient neurology follow up placed. P2Y12 revealed inhibitor effect. Continue Eliquis and Plavix. Continue Keppra. Please refer to neurology note dated 12/8/24 for further recommendations.      Inpatient neurology work-up completed at this time. Neurology will sign off. Please call with any additional questions or concerns. Thank you for this consult.     This case was discussed with Dr. Lawrence and he is in agreement with the assessment and plan.    Electronically signed by Cuauhtemoc Galvan PA-C on 12/9/24 at 2:46 PM EST

## 2024-12-09 NOTE — PLAN OF CARE
Problem: Chronic Conditions and Co-morbidities  Goal: Patient's chronic conditions and co-morbidity symptoms are monitored and maintained or improved  Outcome: Progressing  Flowsheets (Taken 12/8/2024 1955)  Care Plan - Patient's Chronic Conditions and Co-Morbidity Symptoms are Monitored and Maintained or Improved: Monitor and assess patient's chronic conditions and comorbid symptoms for stability, deterioration, or improvement     Problem: Discharge Planning  Goal: Discharge to home or other facility with appropriate resources  Outcome: Progressing  Flowsheets (Taken 12/8/2024 1955)  Discharge to home or other facility with appropriate resources:   Identify barriers to discharge with patient and caregiver   Arrange for needed discharge resources and transportation as appropriate   Refer to discharge planning if patient needs post-hospital services based on physician order or complex needs related to functional status, cognitive ability or social support system     Problem: Pain  Goal: Verbalizes/displays adequate comfort level or baseline comfort level  Outcome: Progressing     Problem: ABCDS Injury Assessment  Goal: Absence of physical injury  Outcome: Progressing  Flowsheets (Taken 12/8/2024 1955)  Absence of Physical Injury: Implement safety measures based on patient assessment     Problem: Safety - Adult  Goal: Free from fall injury  Outcome: Progressing  Flowsheets (Taken 12/8/2024 1955)  Free From Fall Injury: Instruct family/caregiver on patient safety     Problem: Neurosensory - Adult  Goal: Achieves stable or improved neurological status  Outcome: Progressing  Flowsheets (Taken 12/8/2024 1955)  Achieves stable or improved neurological status:   Assess for and report changes in neurological status   Initiate measures to prevent increased intracranial pressure   Maintain blood pressure and fluid volume within ordered parameters to optimize cerebral perfusion and minimize risk of hemorrhage  Goal: Achieves

## 2024-12-09 NOTE — DISCHARGE SUMMARY
3.5 - 5.2 meq/L    Chloride 104 98 - 111 meq/L    CO2 25 23 - 33 meq/L    Glucose 90 70 - 108 mg/dL    BUN 19 7 - 22 mg/dL    Creatinine 1.2 0.4 - 1.2 mg/dL    Calcium 8.9 8.5 - 10.5 mg/dL   Magnesium    Collection Time: 12/09/24  3:27 AM   Result Value Ref Range    Magnesium 2.0 1.6 - 2.4 mg/dL   CBC    Collection Time: 12/09/24  3:27 AM   Result Value Ref Range    WBC 8.1 4.8 - 10.8 thou/mm3    RBC 5.28 4.70 - 6.10 mill/mm3    Hemoglobin 11.7 (L) 14.0 - 18.0 gm/dl    Hematocrit 36.7 (L) 42.0 - 52.0 %    MCV 69.5 (L) 80.0 - 94.0 fL    MCH 22.2 (L) 26.0 - 33.0 pg    MCHC 31.9 (L) 32.2 - 35.5 gm/dl    RDW-CV 15.0 (H) 11.5 - 14.5 %    RDW-SD 36.9 35.0 - 45.0 fL    Platelets 165 130 - 400 thou/mm3    MPV 10.5 9.4 - 12.4 fL   Anion Gap    Collection Time: 12/09/24  3:27 AM   Result Value Ref Range    Anion Gap 8.0 8.0 - 16.0 meq/L   Glomerular Filtration Rate, Estimated    Collection Time: 12/09/24  3:27 AM   Result Value Ref Range    Est, Glom Filt Rate 66 >60 ml/min/1.73m2   Scan of Blood Smear    Collection Time: 12/09/24  3:27 AM   Result Value Ref Range    SCAN OF BLOOD SMEAR see below    Platelet P2Y12 Inhibition    Collection Time: 12/09/24  8:19 AM   Result Value Ref Range    P2Y12 Assay 174 (L) 180 - 376 PRU       Discharge condition: fair  Disposition: Home  Time spent on discharge: 30 mins    Electronically signed by Mitchel Castle DO on 12/9/2024 at 9:26 AM

## 2024-12-09 NOTE — CONSULTS
[address] [address]                              CONSULTATION      PATIENT NAME: ERIKA HICKS                 : 1955  MED REC NO: [id number]                       ROOM: [facility]  ACCOUNT NO: [id number]                       ADMIT DATE: 2024  PROVIDER: Anand Lisa MD    CARDIOLOGY CONSULT    CONSULT DATE: 2024    REFERRING PHYSICIAN:  Hospitalist Service      REASON FOR THE CONSULTATION:  Syncope.    HISTORY OF PRESENT ILLNESS:  This is a pleasant 69-year-old gentleman who unfortunately has not been followed on a regular basis, who does have extensive cardiac history including history of bypass surgery in , history of cardiomyopathy with ischemic heart disease and defibrillator, who also has not had a check in a while.  It looks like the patient has been in and out of the hospital, especially the emergency room multiple times including [facility].  He comes in with an episode of palpitation followed by syncope for a few minutes, was not witnessed, he was by himself, woke up, found himself on the floor.  He denies having any shock from the defibrillator.  Denies any significant chest pain.  However, he does have baseline intermittent angina and shortness of breath, and has had multiple admissions for that.  So far, initial workup does not reveal any obvious neurological deficits or significant EKG changes and/or significant elevation in the troponin.  We were consulted to assist in the management.    REVIEW OF SYSTEMS:  1. No fever, no chills, no weight loss.  2. No hematuria or dysuria.  3. No abdominal pain, nausea, vomiting, diarrhea.  4. No obvious suicidal ideation or active psych problems.  5. No skin rashes.   6. The patient has had some intermittent dizziness.   7. No recent trauma.  8. No bleeding problem.  9. Chronic shortness of breath and chest pain.    PAST MEDICAL HISTORY:    1. Coronary 
Please see original consult note by Dr Lisa 12/7/24.    Electronically Signed by  Norman Suarez DO, MBA  PGY-2 Internal Medicine Resident  [facility]  On 12/9/2024 at 6:15 AM  
neck revealed Calcified plaque in the bilateral intracranial internal carotid and vertebral arteries causing up to moderate stenosis. . No need for carotid ultrasound.  Unable to undergo MRI of the brain due to ICD placement.   2D echocardiogram ordered.  EEG pending   Stat CT head is needed if the patient develops new-onset altered mental status, a severe headache, or new-onset neurologic deficit  Risk factors and medications  Hx of Right MCA CVA with residual dysarthria, left facial weakness, left sided weakness   Home plavix and Eliquis resumed   Home lipitor resumed   Essential Hypertension: Hx orthostatic hypotension   Blood pressure goal: less than 130/80.  Orthostatic VS pending   Home lisinopril 10mg daily resumed   Afib:   EKG: NSR.Telemetry to monitor for atrial fibrillation  Home Toprol XL 25mg daily resumed   Home eliquis 5mg BID resumed   CAD; Hx of CABGx3 (2011) [facility] with LESTER 9/1/2024.   Follows with Dr. Jaime  Home plavix 75mg daily resumed   Home Imdur resumed   Home ranexa resumd  HFrEF: Pacer insitu  TTE 11/15/2024 EF 25%  Pacer interrogation pending   Bilateral Carotid stenosis   Home plavix resumed   Hyperlipidemia   , 11/15/2024. LDL goal of 45-70.   Home Lipitor 80mg daily resumed  Polysubstance Abuse   Smoking marijuana cessation advised    Provide stroke eduction for individualized risk factors.  Labs:  Hgb A1c 5.3  P2Y12 (11/14/24) 233  Core stroke metrics  Dysphagia screen prior to oral intake  PT/OT/SLP consult. IPR consult if applicable.  DVT prophylaxis: Pre-existing anticoagulation: Eliquis  NIHSS every shift. Neuro checks per unit unless otherwise specified.  Pre-morbid Modified [place] Scale: 2 - Slight disability:  unable to carry out all previous activities, but able to look after own affairs without assistance.           This case was discussed with Dr. Lawrence and he is in agreement with the assessment and plan.    Electronically signed by CHEN MONK CNP on 12/7/24

## 2024-12-10 ENCOUNTER — CARE COORDINATION (OUTPATIENT)
Dept: CASE MANAGEMENT | Age: 69
End: 2024-12-10

## 2024-12-10 NOTE — CARE COORDINATION
Care Transitions Note    Initial Call - Call within 2 business days of discharge: Yes-1st attempt    Attempted to reach patient for transitions of care follow up. Unable to reach patient.    Outreach Attempts:   HIPAA compliant voicemail left for patient, friend, Maddison.     Patient: Chriss Braga    Patient : 1955   MRN: [id number]    Reason for Admission: chest pain  Discharge Date: 24  RURS: Readmission Risk Score: 22.2    Last Discharge Facility       Date Complaint Diagnosis Description Type Department Provider    24 Chest Pain Chest pain, unspecified type ... ED to Hosp-Admission (Discharged) (ADMITTED) Rafal Lee MD; Ramos Jasso...            Was this an external facility discharge? No    Follow Up Appointment:   Patient has hospital follow up appointment scheduled within 7 days of discharge.    Future Appointments         Provider Specialty Dept Phone    2024 2:20 PM Cecilio Camilo DO Internal Medicine [phone]    2025 10:15 AM Jean Carlos Smith, PA-C Cardiology [phone]            Plan for follow-up on next business day.      Augustina Salazar RN

## 2024-12-10 NOTE — PROGRESS NOTES
Patient:  Chriss Braga  YOB: 1955  Date of Service: 12/9/2024  Admission Date:12/6/2024  Code Status: Full Code  NIHSS: Yes    Assessment / Plan     Syncope: Seizure versus cardiac.  Patient noted to have loss of consciousness with presyncopal chest pain that radiated down the left arm.  Unable to undergo MRI due to ICD.  On Plavix Eliquis and Lipitor.  Orthostats negative.  Cardiology following patient.  AICD interrogation shows no events.  Will initiate amiodarone 200 mg daily.  Started on Ranexa 1000 mg daily. Emphasize importance of medical therapy.  Neurology and cardiology following.  Started on antiepileptics.  Seizure likely originating from prior CVA versus pituitary in origin.  EEG pending.  CVA: Noted to have left-sided weakness in the upper and lower extremity which is residual from prior CVA in 2021  Pituitary adenoma: Diagnosed on June 2024.  Patient to follow-up with endocrinology in the outpatient setting for further evaluation and workup.  Can consider transphenoidal resection with ENT/neurosurgery at some point.  Per neurology, low possibility for adenoma to cause seizure-like activity.  CAD: Low suspicion for ACS at this time.  Recent left heart cath on 1/17/2024 showed LESTER.  On Eliquis and Plavix..  Patient has not follow-up with outpatient cardiology since then.  Patient is status post AICD with NSVT.  Currently on metoprolol 25 twice daily.  Prior echo shows no change in EF or wall motion abnormality.  Hypertension: On antihypertensives.  Lisinopril metoprolol.  Continue.  Subclinical hypothyroidism: TSH 6.3 with normal T4.  Likely in the setting of acute process.  PCP to follow with repeat TSH in 6 weeks.  HFrEF: Status post AICD.  Echo 25 to 30%.  Unchanged on last admission.  On GDMT medication.  Polysubstance abuse: Endorses marijuana and cocaine use.  Paroxysmal atrial fibrillation not in RVR: Chads 5.  On Eliquis.  Rate controlled with metoprolol.        LDA: []CVC 
  Physician Progress Note      PATIENT:               ERIKA HICKS  CSN #:                  [id number]  :                       1955  ADMIT DATE:       2024 8:58 PM  DISCH DATE:        2024 2:29 PM  RESPONDING  PROVIDER #:        JUNIOR SINGH          QUERY TEXT:    Patient admitted with syncope. noted to have History of polysubstance drug   abuse.  History of both marijuana and cocaine use. UDS positive for   cannabinoids. Also noted 2-8 Syncope: Seizure versus cardiac.  If possible,   please document in progress notes and discharge summary after study the   etiology of the syncope:    The medical record reflects the following:  Risk Factors: History of polysubstance drug abuse.  History of both marijuana   and cocaine use. UDS positive for cannabinoids  Clinical Indicators: Patient noted to have loss of consciousness with   presyncopal chest pain that radiated down the left arm. Orthostats negative.  AICD interrogation shows no events  Treatment: Emphasize importance of medical therapy.  Neurology and cardiology   following.  Started on antiepileptics.  Seizure likely originating from prior   CVA versus pituitary in origin.    Thank you.  Laura Maldonado RN, Clinical Documentation Integrity, [place]   [place], [facility], CRCR  Options provided:  -- Syncope due to polysubstance drug abuse  -- Syncope due to Seizure  -- Other - I will add my own diagnosis  -- Disagree - Not applicable / Not valid  -- Disagree - Clinically unable to determine / Unknown  -- Refer to Clinical Documentation Reviewer    PROVIDER RESPONSE TEXT:    The syncope is due to Seizure    Query created by: Laura Maldonado on 2024 11:00 AM      Electronically signed by:  JUNIOR SINGH 12/10/2024 2:48 PM          
 [facility]  INPATIENT PHYSICAL THERAPY  DAILY NOTE  [facility] NEUROSCIENCES 4A - 4A-04/004-A      Discharge Recommendations: Continue to assess pending progress and Patient would benefit from continued PT at discharge; (anticipate home with brother)   Equipment Recommendations: No               Time In: 1054  Time Out: 1117  Timed Code Treatment Minutes: 23 Minutes  Minutes: 23          Date: 2024  Patient Name: Chriss Braga,  Gender:  male        MRN: [id number]  : 1955  (69 y.o.)     Referring Practitioner: Dr. LASHAUN John  Diagnosis: Acute CVA (cerebrovascular accident) (HCC)  Additional Pertinent Hx: Chriss Braga is a 69 year old male presented to [facility] 24 with complaint of syncope and chest pain that occurred approximately 1 hour prior to ER evaluation.  Patient complaint of dizziness and generalized weakness.  Patient reports headache, left-sided paresthesia, blurring of vision and loss of consciousness.  \"I do not remember what happened\".  Patient received 324 of aspirin and 2 SL NTG en route to ER with no relief. Chriss identifies smoking marijuana before he passed out. CT head-no acute findings.     Prior Level of Function:  Lives With: Family (brother)  Type of Home: House  Home Layout: Two level, Bed/Bath upstairs  Home Access: Stairs to enter with rails  Entrance Stairs - Number of Steps: 4  Home Equipment: Cane   Bathroom Shower/Tub: Tub/Shower unit, Shower chair with back  Bathroom Toilet: Standard  Bathroom Equipment: None    Prior Level of Assist for ADLs: Independent  Prior Level of Assist for Homemaking: Independent  Homemaking Responsibilities: Yes  Prior Level of Assist for Transfers: Independent  Active : Yes  Prior Level of Assist for Ambulation: Independent household ambulator, with or without device  Has the patient had two or more falls in the past year or any fall with injury in the past year?: Yes (2)    Restrictions/Precautions:  Restrictions/Precautions: Fall 
Cardiology Progress Note      Patient:  Chriss Braga  YOB: 1955  MRN: [id number]   Acct: [id number]  Admit Date:  12/6/2024  Primary Cardiologist: Lance  Consulting Cardiologist:  Anand Lisa MD    Rationale for Cardiology consult: Syncope.     HPI/PMH: Per Dr. Jaime consult on 12/7/2024  HISTORY OF PRESENT ILLNESS: This is a pleasant 69-year-old gentleman who unfortunately has not been followed on a regular basis, who does have extensive cardiac history including history of bypass surgery in 2013, history of cardiomyopathy with ischemic heart disease and defibrillator, who also has not had a check in a while. It looks like the patient has been in and out of the hospital, especially the emergency room multiple times including [facility]. He comes in with an episode of palpitation followed by syncope for a few minutes, was not witnessed, he was by himself, woke up, found himself on the floor. He denies having any shock from the defibrillator. Denies any significant chest pain. However, he does have baseline intermittent angina and shortness of breath, and has had multiple admissions for that. So far, initial workup does not reveal any obvious neurological deficits or significant EKG changes and/or significant elevation in the troponin. We were consulted to assist in the management.       Chief Complaint: \"feeling better today\"    Subjective (Events in last 24 hours):   Up in chair at bedside; no distress  Denies chest discomfort or dyspnea  No shocks from ICD  Denies feeling lightheadedness or dizziness; denies palpitations  Sinus rhythm on monitor with 1 9 beat run of VT captured; VT then paced back to sinus rhythm and no evidence of shock      Objective:   /86   Pulse 68   Temp 98.2 °F (36.8 °C) (Oral)   Resp 16   Ht 1.753 m (5' 9\")   Wt 72.5 kg (159 lb 13.3 oz)   SpO2 96%   BMI 23.60 kg/m²        TELEMETRY: SR 60's - 9 beats VT converted out with his pacemaker - no 
Discharge instructions reviewed with patient. Importance of medication compliance and follow up appointments. Patient verbalized understanding. Patient discharged via cab with all belongings.  
[facility]  INPATIENT OCCUPATIONAL THERAPY  [facility] NEUROSCIENCES 4A  EVALUATION      Discharge Recommendations: Home with assist PRN  Equipment Recommendations: No        Time In: 1316  Time Out: 1332  Timed Code Treatment Minutes: 8 Minutes  Minutes: 16          Date: 2024  Patient Name: Chriss Braga,   Gender: male      MRN: [id number]  : 1955  (69 y.o.)  Referring Practitioner: Rafal John MD  Diagnosis: Acute CVA (cerebrovascular accident) (HCC)  Additional Pertinent Hx: Chriss Braga is a 69 year old male presented to [facility] 24 with complaint of syncope and chest pain that occurred approximately 1 hour prior to ER evaluation.  Patient complaint of dizziness and generalized weakness.  Patient reports headache, left-sided paresthesia, blurring of vision and loss of consciousness.  \"I do not remember what happened\".  Patient received 324 of aspirin and 2 SL NTG en route to ER with no relief. Chriss identifies smoking marijuana before he passed out. MRI not able to be completed    Restrictions/Precautions:       Subjective  Chart Reviewed: Yes, Progress Notes, Orders, History and Physical  Patient assessed for rehabilitation services?: Yes  Family / Caregiver Present: No    Subjective: pt met in bed. agreed to evaluation.RN okayed session    Pain: 8/10: headache    Vitals: Vitals not assessed per clinical judgement, see nursing flowsheet    Social/Functional History:  Lives With: Family (brother)  Type of Home: House  Home Layout: Two level, Bed/Bath upstairs  Home Access: Stairs to enter without rails, Stairs to enter with rails  Entrance Stairs - Number of Steps: 4  Home Equipment: Cane   Bathroom Shower/Tub: Tub/Shower unit, Shower chair with back  Bathroom Toilet: Standard  Bathroom Equipment: None       Prior Level of Assist for ADLs: Independent  Prior Level of Assist for Homemaking: Independent  Homemaking Responsibilities: Yes  Prior Level of Assist for Transfers: 
[facility]  INPATIENT PHYSICAL THERAPY  EVALUATION  [facility] NEUROSCIENCES 4A - 4A-04/004-A    Discharge Recommendations: Continue to assess pending progress (anticipate return home)  Equipment Recommendations: No               Time In: 0943  Time Out: 1003  Timed Code Treatment Minutes: 10 Minutes  Minutes: 20          Date: 2024  Patient Name: Chriss Braga,  Gender:  male        MRN: [id number]  : 1955  (69 y.o.)      Referring Practitioner: Dr. LASHAUN John  Diagnosis: Acute CVA (cerebrovascular accident) (HCC)  Additional Pertinent Hx: Chriss Braga is a 69 year old male presented to [facility] 24 with complaint of syncope and chest pain that occurred approximately 1 hour prior to ER evaluation.  Patient complaint of dizziness and generalized weakness.  Patient reports headache, left-sided paresthesia, blurring of vision and loss of consciousness.  \"I do not remember what happened\".  Patient received 324 of aspirin and 2 SL NTG en route to ER with no relief. Chriss identifies smoking marijuana before he passed out. CT head-no acute findings.     Restrictions/Precautions:  Restrictions/Precautions: Fall Risk                   Subjective:  Chart Reviewed: Yes  Patient assessed for rehabilitation services?: Yes  Subjective: pleasant and cooperative, per pt feels min weak and lightheaded, BP checked with RN aware and stated pt's BP has been around 160's/100 today.    General:        Hearing: Within functional limits       Pain: no pain per pt    Vitals: after amb, pt on room air with O2 sat at 100%, HR 71 bpm, /103, RN aware.    Social/Functional History:    Lives With: Family (brother)  Type of Home: House  Home Layout: Two level, Bed/Bath upstairs  Home Access: Stairs to enter with rails  Entrance Stairs - Number of Steps: 4  Home Equipment: Cane     Bathroom Shower/Tub: Tub/Shower unit, Shower chair with back  Bathroom Toilet: Standard  Bathroom Equipment: None       Prior Level of Assist for 
Orthostatic vital signs obtained per neurology team.     Supine x15 minutes: /98 HR 68  Standing immediately after: /104 HR 82  Standing x1 minute: /101 HR 82  Standing x2 minutes: /103 HR 74  Standing x3 minutes: /100 HR 80  Standing x4 minutes: /98 HR 81  Standing x5 minutes: /98 HR 82  Standing after 5 minutes: /96 HR 75  
Patient admitted to 4A Room 04 from ED  Complaint upon arrival to the room acute CVA  IV site free of s/s of infection or infiltration.   Vital signs obtained. Assessment and data collection initiated. Oriented to room. Policies and procedures for  explained All questions answered with no further questions at this time. Fall prevention and safety brochure discussed with patient. 2 person skin check completed.    Name & number of designated person to update during visitor restriction times:     Was swallow screen completed on admission? [x] YES or [] NO  If patient failed swallow test, obtain order for speech therapy consult and keep NPO.    
Patient did acapella on own  
Patient's ICD is not compatible with MRI.   
Stroke Folder given.   What is Stroke/CVA  Signs and Symptoms of stroke (BEFAST)  Treatments for Stroke  Personal Risk Factors for Stroke discussed  Education--Call [phone]   
needs to be monitored closely. Be sure to keep all appointments. Have exams and blood tests done as directed.     Call 911 If Any of the Following Occurs   It is important that you and those around you know the warning signs for stroke. CALL 911 immediately if you have any of the following which may suggest a new stroke:   Sudden weakness or numbness of face, arm, or leg, especially on one side of the body   Sudden confusion   Sudden trouble speaking or understanding   Sudden trouble seeing in one or both eyes   Sudden dizziness, trouble walking, loss of balance, or coordination   Sudden severe headache with no known cause   If you think you have an emergency, CALL 911       To help reduce your risk of stroke, take the following steps:   Eat a well-balanced diet. The DASH diet rich in fruits, vegetables and low-fat dairy foods, and low in saturated fat, total fat, and cholesterolmay help keep your blood pressure in the healthy range.   Exercise regularly.   Maintain a healthy weight. (Your body mass index should be below 25.)   If you smoke, quit .   Drink alcohol in moderation. Moderate is two or fewer drinks per day for men and one or fewer drinks per day for women and older adults.  Control your diabetes    All patient/family questions were answered and teach back method was utilized.   
for Chest pain up to max of 3 total doses. If no relief after 1 dose, call 911. 9/6/24  Yes Samples, LACY Rodriguez   ranolazine (RANEXA) 1000 MG extended release tablet Take 1 tablet by mouth daily 9/7/24  Yes Samples, LACY Rodriguez   pantoprazole (PROTONIX) 40 MG tablet Take 1 tablet by mouth every morning (before breakfast) 9/7/24  Yes Samples, LACY Rodriguez   clopidogrel (PLAVIX) 75 MG tablet Take 1 tablet by mouth daily 9/6/24  Yes Samples, LACY Rodriguez   ferrous sulfate (IRON 325) 325 (65 Fe) MG tablet Take 1 tablet by mouth every other day 9/6/24  Yes Samples, LACY Rodriguez       Hand Dominance: Right   Sedation: No   H.V. Completed: No, age protocol   Photic: Yes   Sleep: Yes   Drowsy: Yes   Sleep Deprived: Yes   Seizures Observed: No   Mentality: alert     Technician: Briana Marti 12/9/2024        
Comparison: CT/SR - CTA NECK W WO CONTRAST - 11/14/2024 04:36 PM EST Findings: Bilateral cervical carotid and vertebral arteries are well opacified without plaque. Mild plaque in the aortic arch. No aneurysm, dissection, or occlusion. The visualized thyroid gland is unremarkable. No cervical mass or fluid collection. Lung apices clear. Intact anterior C3-5 fusion.     Patent neck CTA. No significant stenosis. This document has been electronically signed by: Thom Kirk MD on 12/06/2024 11:28 PM All CTs at this facility use dose modulation techniques and iterative reconstructions, and/or weight-based dosing when appropriate to reduce radiation to a low as reasonably achievable. Carotid stenosis and measurements are in accordance with NASCET criteria. 3D Post-processing was performed on this study.    CT CSpine W/O Contrast    Result Date: 12/6/2024  CT cervical spine without contrast Comparison: CT/KO - CT SPINE CERVICAL WO CONTR - 11/28/2016 06:20 PM EST Findings: Straightening of the cervical spine may be due to muscle spasm or external collar. Intact anterior C3-5 interbody fusion. No acute fractures or dislocations. Disc osteophyte complexes and facet hypertrophy, causing bilateral neural foraminal stenosis at C3-4, C4-5, and C6-7, right neural foraminal stenosis at C5-6. Central canal stenosis at C3-4 and C4-5. Visualized intracranial contents are unremarkable. Soft tissues of the neck are normal. Lung apices are clear.     No acute findings. This document has been electronically signed by: Thom Kirk MD on 12/06/2024 11:25 PM All CTs at this facility use dose modulation techniques and iterative reconstructions, and/or weight-based dosing when appropriate to reduce radiation to a low as reasonably achievable.    CTA Chest W W/O Aortic Dissection    Result Date: 12/6/2024  CT angiography chest with contrast. 3D Postprocessing. Comparison: CT/SR - CTA CHEST W WO CONTRAST - 12/03/2024 01:01 PM EST Findings: The 
(ELIQUIS) 5 MG TABS tablet Take 1 tablet by mouth 2 times daily 11/14/24  Yes Phu-Waterhouse, Aundrea N,    atorvastatin (LIPITOR) 80 MG tablet Take 1 tablet by mouth daily 9/6/24  Yes Samples, LACY Rodriguez   isosorbide mononitrate (IMDUR) 30 MG extended release tablet Take 1 tablet by mouth daily 9/7/24  Yes Samples, LACY Rodriguez   lisinopril (PRINIVIL;ZESTRIL) 10 MG tablet Take 1 tablet by mouth daily 9/7/24  Yes Samples, LACY Rodriguez   nitroGLYCERIN (NITROSTAT) 0.4 MG SL tablet Place 1 tablet under the tongue every 5 minutes as needed for Chest pain up to max of 3 total doses. If no relief after 1 dose, call 911. 9/6/24  Yes SamplesJennifer PA-C   ranolazine (RANEXA) 1000 MG extended release tablet Take 1 tablet by mouth daily 9/7/24  Yes SamplesJennifer PA-C   pantoprazole (PROTONIX) 40 MG tablet Take 1 tablet by mouth every morning (before breakfast) 9/7/24  Yes Samples, LACY Rodriguez   clopidogrel (PLAVIX) 75 MG tablet Take 1 tablet by mouth daily 9/6/24  Yes SamplesJennifer PA-C   ferrous sulfate (IRON 325) 325 (65 Fe) MG tablet Take 1 tablet by mouth every other day 9/6/24  Yes SamplesJennifer PA-C           Signed:    Mitchel Castle D.O.   Internal Medicine Resident PGY 2  Thank you for allowing me to be part of your care.

## 2024-12-11 ENCOUNTER — CARE COORDINATION (OUTPATIENT)
Dept: CASE MANAGEMENT | Age: 69
End: 2024-12-11

## 2024-12-11 ENCOUNTER — OFFICE VISIT (OUTPATIENT)
Dept: INTERNAL MEDICINE CLINIC | Age: 69
End: 2024-12-11

## 2024-12-11 VITALS
HEART RATE: 80 BPM | SYSTOLIC BLOOD PRESSURE: 145 MMHG | BODY MASS INDEX: 25.09 KG/M2 | DIASTOLIC BLOOD PRESSURE: 80 MMHG | HEIGHT: 69 IN | TEMPERATURE: 97.5 F | WEIGHT: 169.4 LBS

## 2024-12-11 DIAGNOSIS — R53.81 PHYSICAL DECONDITIONING: Primary | ICD-10-CM

## 2024-12-11 DIAGNOSIS — Z09 HOSPITAL DISCHARGE FOLLOW-UP: ICD-10-CM

## 2024-12-11 DIAGNOSIS — R56.9 SEIZURE (HCC): ICD-10-CM

## 2024-12-11 DIAGNOSIS — R29.90 STROKE-LIKE SYMPTOM: ICD-10-CM

## 2024-12-11 DIAGNOSIS — Z86.73 HISTORY OF STROKE: ICD-10-CM

## 2024-12-11 ASSESSMENT — ENCOUNTER SYMPTOMS
WHEEZING: 0
CHOKING: 0
STRIDOR: 0
COUGH: 0
CHEST TIGHTNESS: 0
SHORTNESS OF BREATH: 0

## 2024-12-11 NOTE — PROGRESS NOTES
Post-Discharge Transitional Care Follow Up      Chriss Braga   YOB: 1955    Date of Office Visit:  12/11/2024  Date of Hospital Admission: 12/6/24  Date of Hospital Discharge: 12/9/24  Readmission Risk Score (high >=14%. Medium >=10%):Readmission Risk Score: 22.2      Care management risk score Rising risk (score 2-5) and Complex Care (Scores >=6): No Risk Score On File     Non face to face  following discharge, date last encounter closed (first attempt may have been earlier): 12/10/2024     Call initiated 2 business days of discharge: Yes     Physical deconditioning  Stroke-like symptom      Medical Decision Making: high complexity  No follow-ups on file.           Subjective:   HPI    Patient is a 61-year-old male past medical history of A-fib, CABG, HTN, HFrEF, CVA who presented with syncopal episode with chest pain to the ER.  He had blurry vision while in the ED which has now improved.  There was a concern for seizures at the hospital he was started on Keppra 500 mg twice daily.  There was low suspicion for ACS while in the hospital.  He had a left heart cath done 1/17/2024 showed patent arteries.  Takes Eliquis and Plavix at home.    Inpatient course: Discharge summary reviewed- see chart.    Interval history/Current status: Stable    Patient Active Problem List   Diagnosis    Hyperlipidemia    Hypertension    CAD (coronary artery disease)    Dual implantable cardioverter-defibrillator in situ    Erectile dysfunction    Cardiomyopathy (HCC)    Nonsustained ventricular tachycardia    Anemia    Pulmonary nodule    GERD (gastroesophageal reflux disease)    S/P CABG x 3, 1/11    Left sided numbness    Hemiparesis of left dominant side (HCC)    Iron deficiency anemia    Ischemic cerebrovascular accident (CVA) (HCC)    Stroke-like symptoms    Pituitary adenoma (HCC)    Migraine    Acute cerebrovascular accident (CVA) due to ischemia (HCC)    Ischemic cardiomyopathy    Chest pain    Paroxysmal atrial

## 2024-12-11 NOTE — CARE COORDINATION
Care Transitions Note    Initial Call - Call within 2 business days of discharge: Yes-2nd attempt    Attempted to reach patient for transitions of care follow up. Unable to reach patient.  If no return call, CTN will sign off-2nd attempt.  Will hand to PHIL Sanchez for possible enrollment.    Outreach Attempts:   HIPAA compliant voicemail left for patient, friend Maddison.   Attempted to  on HIPAA form.     Patient: Chriss Braga    Patient : 1955   MRN: [id number]    Reason for Admission: chest pain  Discharge Date: 24  RURS: Readmission Risk Score: 22.2    Last Discharge Facility       Date Complaint Diagnosis Description Type Department Provider    24 Chest Pain Chest pain, unspecified type ... ED to Hosp-Admission (Discharged) (ADMITTED) Rafal Lee MD; Ramos Jasso...            Was this an external facility discharge? No    Follow Up Appointment:   Patient has hospital follow up appointment scheduled within 7 days of discharge.    Future Appointments         Provider Specialty Dept Phone    2024 2:20 PM Cecilio Camilo DO Internal Medicine [phone]    2025 10:15 AM Jean Carlos Smith PA-C Cardiology [phone]            No further follow-up call indicated     Augustina Salazar RN

## 2024-12-15 ENCOUNTER — APPOINTMENT (OUTPATIENT)
Dept: CT IMAGING | Age: 69
End: 2024-12-15
Payer: MEDICARE

## 2024-12-15 ENCOUNTER — APPOINTMENT (OUTPATIENT)
Dept: GENERAL RADIOLOGY | Age: 69
End: 2024-12-15
Payer: MEDICARE

## 2024-12-15 ENCOUNTER — HOSPITAL ENCOUNTER (EMERGENCY)
Age: 69
Discharge: HOME | End: 2024-12-15
Attending: STUDENT IN AN ORGANIZED HEALTH CARE EDUCATION/TRAINING PROGRAM
Payer: MEDICARE

## 2024-12-15 VITALS
OXYGEN SATURATION: 98 % | SYSTOLIC BLOOD PRESSURE: 158 MMHG | HEIGHT: 69 IN | WEIGHT: 169 LBS | BODY MASS INDEX: 25.03 KG/M2 | TEMPERATURE: 99.4 F | HEART RATE: 83 BPM | DIASTOLIC BLOOD PRESSURE: 101 MMHG | RESPIRATION RATE: 16 BRPM

## 2024-12-15 DIAGNOSIS — R51.9 ACUTE NONINTRACTABLE HEADACHE, UNSPECIFIED HEADACHE TYPE: ICD-10-CM

## 2024-12-15 DIAGNOSIS — R19.7 DIARRHEA, UNSPECIFIED TYPE: ICD-10-CM

## 2024-12-15 DIAGNOSIS — R11.2 NAUSEA AND VOMITING, UNSPECIFIED VOMITING TYPE: ICD-10-CM

## 2024-12-15 DIAGNOSIS — R07.9 CHEST PAIN, UNSPECIFIED TYPE: Primary | ICD-10-CM

## 2024-12-15 DIAGNOSIS — R10.9 ABDOMINAL PAIN, UNSPECIFIED ABDOMINAL LOCATION: ICD-10-CM

## 2024-12-15 LAB
ALBUMIN SERPL BCG-MCNC: 4 G/DL (ref 3.5–5.1)
ALP SERPL-CCNC: 83 U/L (ref 38–126)
ALT SERPL W/O P-5'-P-CCNC: 10 U/L (ref 11–66)
ANION GAP SERPL CALC-SCNC: 15 MEQ/L (ref 8–16)
APTT PPP: 31.3 SECONDS (ref 22–38)
AST SERPL-CCNC: 14 U/L (ref 5–40)
BACTERIA URNS QL MICRO: ABNORMAL /HPF
BASOPHILS ABSOLUTE: 0 THOU/MM3 (ref 0–0.1)
BASOPHILS NFR BLD AUTO: 0.2 %
BILIRUB CONJ SERPL-MCNC: 0.3 MG/DL (ref 0.1–13.8)
BILIRUB SERPL-MCNC: 1.3 MG/DL (ref 0.3–1.2)
BILIRUB UR QL STRIP.AUTO: NEGATIVE
BUN SERPL-MCNC: 16 MG/DL (ref 7–22)
CALCIUM SERPL-MCNC: 9.1 MG/DL (ref 8.5–10.5)
CASTS #/AREA URNS LPF: ABNORMAL /LPF
CASTS 2: ABNORMAL /LPF
CHARACTER UR: CLEAR
CHLORIDE SERPL-SCNC: 106 MEQ/L (ref 98–111)
CO2 SERPL-SCNC: 19 MEQ/L (ref 23–33)
COLOR, UA: YELLOW
CREAT SERPL-MCNC: 1.1 MG/DL (ref 0.4–1.2)
CRYSTALS URNS MICRO: ABNORMAL
DEPRECATED RDW RBC AUTO: 37.5 FL (ref 35–45)
EOSINOPHIL NFR BLD AUTO: 0.4 %
EOSINOPHILS ABSOLUTE: 0 THOU/MM3 (ref 0–0.4)
EPITHELIAL CELLS, UA: ABNORMAL /HPF
ERYTHROCYTE [DISTWIDTH] IN BLOOD BY AUTOMATED COUNT: 15.9 % (ref 11.5–14.5)
GFR SERPL CREATININE-BSD FRML MDRD: 73 ML/MIN/1.73M2
GLUCOSE SERPL-MCNC: 96 MG/DL (ref 70–108)
GLUCOSE UR QL STRIP.AUTO: NEGATIVE MG/DL
HCT VFR BLD AUTO: 41.6 % (ref 42–52)
HGB BLD-MCNC: 13 GM/DL (ref 14–18)
HGB UR QL STRIP.AUTO: ABNORMAL
IMM GRANULOCYTES # BLD AUTO: 0.05 THOU/MM3 (ref 0–0.07)
IMM GRANULOCYTES NFR BLD AUTO: 0.5 %
INR PPP: 1.13 (ref 0.85–1.13)
KETONES UR QL STRIP.AUTO: NEGATIVE
LIPASE SERPL-CCNC: 13.3 U/L (ref 5.6–51.3)
LYMPHOCYTES ABSOLUTE: 0.4 THOU/MM3 (ref 1–4.8)
LYMPHOCYTES NFR BLD AUTO: 4.2 %
MCH RBC QN AUTO: 21.7 PG (ref 26–33)
MCHC RBC AUTO-ENTMCNC: 31.3 GM/DL (ref 32.2–35.5)
MCV RBC AUTO: 69.3 FL (ref 80–94)
MICROCYTES BLD QL SMEAR: PRESENT
MISCELLANEOUS 2: ABNORMAL
MONOCYTES ABSOLUTE: 0.4 THOU/MM3 (ref 0.4–1.3)
MONOCYTES NFR BLD AUTO: 3.5 %
NEUTROPHILS ABSOLUTE: 9.4 THOU/MM3 (ref 1.8–7.7)
NEUTROPHILS NFR BLD AUTO: 91.2 %
NITRITE UR QL STRIP: NEGATIVE
NRBC BLD AUTO-RTO: 0 /100 WBC
NT-PROBNP SERPL IA-MCNC: 930.9 PG/ML (ref 0–124)
OSMOLALITY SERPL CALC.SUM OF ELEC: 280.4 MOSMOL/KG (ref 275–300)
PH UR STRIP.AUTO: 5 [PH] (ref 5–9)
PLATELET # BLD AUTO: 198 THOU/MM3 (ref 130–400)
PMV BLD AUTO: 9.7 FL (ref 9.4–12.4)
POTASSIUM SERPL-SCNC: 4.2 MEQ/L (ref 3.5–5.2)
PROT SERPL-MCNC: 6.9 G/DL (ref 6.1–8)
PROT UR STRIP.AUTO-MCNC: ABNORMAL MG/DL
RBC # BLD AUTO: 6 MILL/MM3 (ref 4.7–6.1)
RBC URINE: ABNORMAL /HPF
RENAL EPI CELLS #/AREA URNS HPF: ABNORMAL /[HPF]
SODIUM SERPL-SCNC: 140 MEQ/L (ref 135–145)
SP GR UR REFRACT.AUTO: 1.02 (ref 1–1.03)
TROPONIN, HIGH SENSITIVITY: 8 NG/L (ref 0–12)
TROPONIN, HIGH SENSITIVITY: 9 NG/L (ref 0–12)
UROBILINOGEN, URINE: 0.2 EU/DL (ref 0–1)
WBC # BLD AUTO: 10.3 THOU/MM3 (ref 4.8–10.8)
WBC #/AREA URNS HPF: ABNORMAL /HPF
WBC #/AREA URNS HPF: NEGATIVE /[HPF]
YEAST LIKE FUNGI URNS QL MICRO: ABNORMAL

## 2024-12-15 PROCEDURE — 80048 BASIC METABOLIC PNL TOTAL CA: CPT

## 2024-12-15 PROCEDURE — 99285 EMERGENCY DEPT VISIT HI MDM: CPT

## 2024-12-15 PROCEDURE — 85025 COMPLETE CBC W/AUTO DIFF WBC: CPT

## 2024-12-15 PROCEDURE — 71045 X-RAY EXAM CHEST 1 VIEW: CPT

## 2024-12-15 PROCEDURE — 83690 ASSAY OF LIPASE: CPT

## 2024-12-15 PROCEDURE — 93005 ELECTROCARDIOGRAM TRACING: CPT | Performed by: STUDENT IN AN ORGANIZED HEALTH CARE EDUCATION/TRAINING PROGRAM

## 2024-12-15 PROCEDURE — 85730 THROMBOPLASTIN TIME PARTIAL: CPT

## 2024-12-15 PROCEDURE — 85610 PROTHROMBIN TIME: CPT

## 2024-12-15 PROCEDURE — 36415 COLL VENOUS BLD VENIPUNCTURE: CPT

## 2024-12-15 PROCEDURE — 6360000004 HC RX CONTRAST MEDICATION: Performed by: STUDENT IN AN ORGANIZED HEALTH CARE EDUCATION/TRAINING PROGRAM

## 2024-12-15 PROCEDURE — 6370000000 HC RX 637 (ALT 250 FOR IP): Performed by: STUDENT IN AN ORGANIZED HEALTH CARE EDUCATION/TRAINING PROGRAM

## 2024-12-15 PROCEDURE — 6360000002 HC RX W HCPCS: Performed by: STUDENT IN AN ORGANIZED HEALTH CARE EDUCATION/TRAINING PROGRAM

## 2024-12-15 PROCEDURE — 96375 TX/PRO/DX INJ NEW DRUG ADDON: CPT

## 2024-12-15 PROCEDURE — 81001 URINALYSIS AUTO W/SCOPE: CPT

## 2024-12-15 PROCEDURE — 80076 HEPATIC FUNCTION PANEL: CPT

## 2024-12-15 PROCEDURE — 74177 CT ABD & PELVIS W/CONTRAST: CPT

## 2024-12-15 PROCEDURE — 84484 ASSAY OF TROPONIN QUANT: CPT

## 2024-12-15 PROCEDURE — 96374 THER/PROPH/DIAG INJ IV PUSH: CPT

## 2024-12-15 PROCEDURE — 83880 ASSAY OF NATRIURETIC PEPTIDE: CPT

## 2024-12-15 RX ORDER — IOPAMIDOL 755 MG/ML
80 INJECTION, SOLUTION INTRAVASCULAR
Status: COMPLETED | OUTPATIENT
Start: 2024-12-15 | End: 2024-12-15

## 2024-12-15 RX ORDER — ONDANSETRON 2 MG/ML
4 INJECTION INTRAMUSCULAR; INTRAVENOUS ONCE
Status: COMPLETED | OUTPATIENT
Start: 2024-12-15 | End: 2024-12-15

## 2024-12-15 RX ORDER — ACETAMINOPHEN 500 MG
1000 TABLET ORAL ONCE
Status: COMPLETED | OUTPATIENT
Start: 2024-12-15 | End: 2024-12-15

## 2024-12-15 RX ORDER — METOCLOPRAMIDE HYDROCHLORIDE 5 MG/ML
10 INJECTION INTRAMUSCULAR; INTRAVENOUS ONCE
Status: COMPLETED | OUTPATIENT
Start: 2024-12-15 | End: 2024-12-15

## 2024-12-15 RX ORDER — NITROGLYCERIN 0.4 MG/1
0.4 TABLET SUBLINGUAL EVERY 5 MIN PRN
Status: COMPLETED | OUTPATIENT
Start: 2024-12-15 | End: 2024-12-15

## 2024-12-15 RX ORDER — KETOROLAC TROMETHAMINE 30 MG/ML
15 INJECTION, SOLUTION INTRAMUSCULAR; INTRAVENOUS ONCE
Status: DISCONTINUED | OUTPATIENT
Start: 2024-12-15 | End: 2024-12-15

## 2024-12-15 RX ORDER — PANTOPRAZOLE SODIUM 40 MG/10ML
40 INJECTION, POWDER, LYOPHILIZED, FOR SOLUTION INTRAVENOUS DAILY
Status: DISCONTINUED | OUTPATIENT
Start: 2024-12-15 | End: 2024-12-16 | Stop reason: HOSPADM

## 2024-12-15 RX ADMIN — ALUMINUM HYDROXIDE, MAGNESIUM HYDROXIDE, AND SIMETHICONE: 1200; 120; 1200 SUSPENSION ORAL at 21:18

## 2024-12-15 RX ADMIN — METOCLOPRAMIDE HYDROCHLORIDE 10 MG: 5 INJECTION, SOLUTION INTRAMUSCULAR; INTRAVENOUS at 22:32

## 2024-12-15 RX ADMIN — IOPAMIDOL 80 ML: 755 INJECTION, SOLUTION INTRAVENOUS at 19:56

## 2024-12-15 RX ADMIN — ACETAMINOPHEN 1000 MG: 500 TABLET ORAL at 19:22

## 2024-12-15 RX ADMIN — ONDANSETRON 4 MG: 2 INJECTION INTRAMUSCULAR; INTRAVENOUS at 19:21

## 2024-12-15 RX ADMIN — PANTOPRAZOLE SODIUM 40 MG: 40 INJECTION, POWDER, FOR SOLUTION INTRAVENOUS at 21:22

## 2024-12-15 RX ADMIN — NITROGLYCERIN 0.4 MG: 0.4 TABLET, ORALLY DISINTEGRATING SUBLINGUAL at 19:32

## 2024-12-15 RX ADMIN — NITROGLYCERIN 0.4 MG: 0.4 TABLET, ORALLY DISINTEGRATING SUBLINGUAL at 19:22

## 2024-12-15 ASSESSMENT — PAIN SCALES - GENERAL
PAINLEVEL_OUTOF10: 8
PAINLEVEL_OUTOF10: 8
PAINLEVEL_OUTOF10: 7

## 2024-12-15 ASSESSMENT — PAIN - FUNCTIONAL ASSESSMENT: PAIN_FUNCTIONAL_ASSESSMENT: 0-10

## 2024-12-15 ASSESSMENT — PAIN DESCRIPTION - LOCATION
LOCATION: CHEST
LOCATION: CHEST

## 2024-12-15 ASSESSMENT — HEART SCORE: ECG: NORMAL

## 2024-12-15 ASSESSMENT — PAIN DESCRIPTION - ORIENTATION: ORIENTATION: LEFT

## 2024-12-15 NOTE — ED TRIAGE NOTES
Patient presents to ED with chief complaint of chest pain, nausea, vomiting that started today. Patient reports chest pain 8/10. EMS reports administering 324 baby ASA and 1 nitro en route to hospital. EKG completed.

## 2024-12-16 ENCOUNTER — CARE COORDINATION (OUTPATIENT)
Dept: CARE COORDINATION | Age: 69
End: 2024-12-16

## 2024-12-16 LAB
EKG ATRIAL RATE: 107 BPM
EKG P AXIS: 73 DEGREES
EKG P-R INTERVAL: 158 MS
EKG Q-T INTERVAL: 352 MS
EKG QRS DURATION: 80 MS
EKG QTC CALCULATION (BAZETT): 469 MS
EKG R AXIS: -10 DEGREES
EKG T AXIS: 52 DEGREES
EKG VENTRICULAR RATE: 107 BPM

## 2024-12-16 PROCEDURE — 93010 ELECTROCARDIOGRAM REPORT: CPT | Performed by: NUCLEAR MEDICINE

## 2024-12-16 NOTE — CARE COORDINATION
Ambulatory Care Coordination Note     12/16/2024 9:56 AM     Patient outreach attempt by this ACM today to offer care management services. ACM was unable to reach the patient by telephone today;   left voice message requesting a return phone call to this ACM.     ACM: Elana Pierre RN     Care Summary Note: Chriss was referred to care coordination by CTN following recent hospitalization.      PCP/Specialist follow up:   Future Appointments         Provider Specialty Dept Phone    1/7/2025 10:15 AM Jean Carlos Smith PA-C Cardiology [phone]    1/15/2025 1:40 PM Cecilio Camilo DO Internal Medicine [phone]            Follow Up:   Plan for next ACM outreach in approximately 1-2 days  to complete:  - outreach attempt to offer care management services.
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
## 2024-12-16 NOTE — DISCHARGE INSTRUCTIONS
You are seen today for multiple complaints including chest pain, vomiting, diarrhea, headache.  Your labs are reassuring.  You are treated symptomatically and felt better after her interventions.    Recommend you follow-up with your primary care physician in the internal medicine residency clinic, please call them to make an appointment.    If symptoms are worse or other new concerns please come back to the Emergency Department for re-evaluation.

## 2024-12-16 NOTE — ED NOTES
Dr. Ware at bedside speaking with patient. Urine collected and sent to lab. Patient given 2nd nitro.

## 2024-12-18 ENCOUNTER — CARE COORDINATION (OUTPATIENT)
Dept: CARE COORDINATION | Age: 69
End: 2024-12-18

## 2024-12-18 NOTE — CARE COORDINATION
Ambulatory Care Coordination Note     12/18/2024 9:56 AM     Patient outreach attempt by this ACM today to offer care management services. ACM was unable to reach the patient by telephone today;   left voice message requesting a return phone call to this ACM.     ACM: Elana Pierre RN     Care Summary Note: Chriss was referred to care coordination by CTN following recent hospitalization.      PCP/Specialist follow up:   Future Appointments         Provider Specialty Dept Phone    1/7/2025 10:15 AM Jean Carlos Smith PA-C Cardiology [phone]    1/15/2025 1:40 PM Cecilio Camilo DO Internal Medicine [phone]            Follow Up:   Plan for next ACM outreach in approximately 1-2 days  to complete:  - outreach attempt to offer care management services.

## 2024-12-20 ENCOUNTER — CARE COORDINATION (OUTPATIENT)
Dept: CARE COORDINATION | Age: 69
End: 2024-12-20

## 2024-12-20 NOTE — CARE COORDINATION
Ambulatory Care Coordination Note     12/20/2024 3:16 PM     patient outreach attempt by this ACM today to offer care management services. ACM was unable to reach the patient by telephone today;   No answer     Patient closed (unable to reach patient) from the High Risk Care Management program on 12/20/2024.   No further Ambulatory Care Manager follow up scheduled.

## 2024-12-26 ENCOUNTER — HOSPITAL ENCOUNTER (EMERGENCY)
Age: 69
Discharge: HOME | End: 2024-12-26
Attending: EMERGENCY MEDICINE
Payer: MEDICARE

## 2024-12-26 ENCOUNTER — APPOINTMENT (OUTPATIENT)
Dept: GENERAL RADIOLOGY | Age: 69
End: 2024-12-26
Payer: MEDICARE

## 2024-12-26 ENCOUNTER — APPOINTMENT (OUTPATIENT)
Dept: CT IMAGING | Age: 69
End: 2024-12-26
Payer: MEDICARE

## 2024-12-26 VITALS
HEART RATE: 73 BPM | DIASTOLIC BLOOD PRESSURE: 101 MMHG | TEMPERATURE: 97.8 F | HEIGHT: 69 IN | BODY MASS INDEX: 25.92 KG/M2 | SYSTOLIC BLOOD PRESSURE: 159 MMHG | OXYGEN SATURATION: 100 % | WEIGHT: 175 LBS | RESPIRATION RATE: 19 BRPM

## 2024-12-26 DIAGNOSIS — Z91.148 NON COMPLIANCE W MEDICATION REGIMEN: Primary | ICD-10-CM

## 2024-12-26 DIAGNOSIS — G43.809 OTHER MIGRAINE WITHOUT STATUS MIGRAINOSUS, NOT INTRACTABLE: ICD-10-CM

## 2024-12-26 DIAGNOSIS — I10 HYPERTENSION, UNSPECIFIED TYPE: ICD-10-CM

## 2024-12-26 LAB
ALBUMIN SERPL BCG-MCNC: 3.9 G/DL (ref 3.5–5.1)
ALP SERPL-CCNC: 80 U/L (ref 38–126)
ALT SERPL W/O P-5'-P-CCNC: 13 U/L (ref 11–66)
AMPHETAMINES UR QL SCN: NEGATIVE
ANION GAP SERPL CALC-SCNC: 10 MEQ/L (ref 8–16)
AST SERPL-CCNC: 17 U/L (ref 5–40)
BARBITURATES UR QL SCN: NEGATIVE
BASOPHILS ABSOLUTE: 0 THOU/MM3 (ref 0–0.1)
BASOPHILS NFR BLD AUTO: 0.4 %
BENZODIAZ UR QL SCN: NEGATIVE
BILIRUB SERPL-MCNC: 0.8 MG/DL (ref 0.3–1.2)
BUN SERPL-MCNC: 12 MG/DL (ref 7–22)
BZE UR QL SCN: NEGATIVE
CALCIUM SERPL-MCNC: 8.9 MG/DL (ref 8.5–10.5)
CANNABINOIDS UR QL SCN: POSITIVE
CHLORIDE SERPL-SCNC: 102 MEQ/L (ref 98–111)
CO2 SERPL-SCNC: 24 MEQ/L (ref 23–33)
CREAT SERPL-MCNC: 0.9 MG/DL (ref 0.4–1.2)
DEPRECATED RDW RBC AUTO: 37.2 FL (ref 35–45)
EKG ATRIAL RATE: 76 BPM
EKG P AXIS: 77 DEGREES
EKG P-R INTERVAL: 174 MS
EKG Q-T INTERVAL: 412 MS
EKG QRS DURATION: 86 MS
EKG QTC CALCULATION (BAZETT): 463 MS
EKG R AXIS: -4 DEGREES
EKG T AXIS: 22 DEGREES
EKG VENTRICULAR RATE: 76 BPM
EOSINOPHIL NFR BLD AUTO: 1.2 %
EOSINOPHILS ABSOLUTE: 0.1 THOU/MM3 (ref 0–0.4)
ERYTHROCYTE [DISTWIDTH] IN BLOOD BY AUTOMATED COUNT: 15.7 % (ref 11.5–14.5)
FENTANYL: NEGATIVE
GFR SERPL CREATININE-BSD FRML MDRD: > 90 ML/MIN/1.73M2
GLUCOSE BLD STRIP.AUTO-MCNC: 84 MG/DL (ref 70–108)
GLUCOSE SERPL-MCNC: 86 MG/DL (ref 70–108)
HCT VFR BLD AUTO: 39.4 % (ref 42–52)
HGB BLD-MCNC: 12.4 GM/DL (ref 14–18)
IMM GRANULOCYTES # BLD AUTO: 0.04 THOU/MM3 (ref 0–0.07)
IMM GRANULOCYTES NFR BLD AUTO: 0.5 %
INR PPP: 1.07 (ref 0.85–1.13)
LYMPHOCYTES ABSOLUTE: 1.6 THOU/MM3 (ref 1–4.8)
LYMPHOCYTES NFR BLD AUTO: 20 %
MCH RBC QN AUTO: 21.5 PG (ref 26–33)
MCHC RBC AUTO-ENTMCNC: 31.5 GM/DL (ref 32.2–35.5)
MCV RBC AUTO: 68.3 FL (ref 80–94)
MICROCYTES BLD QL SMEAR: PRESENT
MONOCYTES ABSOLUTE: 0.5 THOU/MM3 (ref 0.4–1.3)
MONOCYTES NFR BLD AUTO: 6.2 %
NEUTROPHILS ABSOLUTE: 5.8 THOU/MM3 (ref 1.8–7.7)
NEUTROPHILS NFR BLD AUTO: 71.7 %
NRBC BLD AUTO-RTO: 0 /100 WBC
NT-PROBNP SERPL IA-MCNC: 1376 PG/ML (ref 0–124)
OPIATES UR QL SCN: NEGATIVE
OSMOLALITY SERPL CALC.SUM OF ELEC: 271 MOSMOL/KG (ref 275–300)
OXYCODONE: NEGATIVE
PCP UR QL SCN: NEGATIVE
PLATELET # BLD AUTO: 211 THOU/MM3 (ref 130–400)
PMV BLD AUTO: 9.3 FL (ref 9.4–12.4)
POTASSIUM SERPL-SCNC: 4.4 MEQ/L (ref 3.5–5.2)
PROT SERPL-MCNC: 7 G/DL (ref 6.1–8)
RBC # BLD AUTO: 5.77 MILL/MM3 (ref 4.7–6.1)
REASON FOR REJECTION: NORMAL
REJECTED TEST: NORMAL
SODIUM SERPL-SCNC: 136 MEQ/L (ref 135–145)
TROPONIN, HIGH SENSITIVITY: 7 NG/L (ref 0–12)
WBC # BLD AUTO: 8.1 THOU/MM3 (ref 4.8–10.8)

## 2024-12-26 PROCEDURE — 85610 PROTHROMBIN TIME: CPT

## 2024-12-26 PROCEDURE — 93010 ELECTROCARDIOGRAM REPORT: CPT | Performed by: INTERNAL MEDICINE

## 2024-12-26 PROCEDURE — 85025 COMPLETE CBC W/AUTO DIFF WBC: CPT

## 2024-12-26 PROCEDURE — 71045 X-RAY EXAM CHEST 1 VIEW: CPT

## 2024-12-26 PROCEDURE — 82948 REAGENT STRIP/BLOOD GLUCOSE: CPT

## 2024-12-26 PROCEDURE — 96374 THER/PROPH/DIAG INJ IV PUSH: CPT

## 2024-12-26 PROCEDURE — 36415 COLL VENOUS BLD VENIPUNCTURE: CPT

## 2024-12-26 PROCEDURE — 70450 CT HEAD/BRAIN W/O DYE: CPT

## 2024-12-26 PROCEDURE — 6370000000 HC RX 637 (ALT 250 FOR IP)

## 2024-12-26 PROCEDURE — 93005 ELECTROCARDIOGRAM TRACING: CPT | Performed by: EMERGENCY MEDICINE

## 2024-12-26 PROCEDURE — 80053 COMPREHEN METABOLIC PANEL: CPT

## 2024-12-26 PROCEDURE — 6360000002 HC RX W HCPCS: Performed by: EMERGENCY MEDICINE

## 2024-12-26 PROCEDURE — 70496 CT ANGIOGRAPHY HEAD: CPT

## 2024-12-26 PROCEDURE — 80307 DRUG TEST PRSMV CHEM ANLYZR: CPT

## 2024-12-26 PROCEDURE — 70498 CT ANGIOGRAPHY NECK: CPT

## 2024-12-26 PROCEDURE — 99285 EMERGENCY DEPT VISIT HI MDM: CPT

## 2024-12-26 PROCEDURE — 84484 ASSAY OF TROPONIN QUANT: CPT

## 2024-12-26 PROCEDURE — 83880 ASSAY OF NATRIURETIC PEPTIDE: CPT

## 2024-12-26 PROCEDURE — 6360000004 HC RX CONTRAST MEDICATION: Performed by: EMERGENCY MEDICINE

## 2024-12-26 RX ORDER — PROCHLORPERAZINE EDISYLATE 5 MG/ML
10 INJECTION INTRAMUSCULAR; INTRAVENOUS ONCE
Status: COMPLETED | OUTPATIENT
Start: 2024-12-26 | End: 2024-12-26

## 2024-12-26 RX ORDER — METOCLOPRAMIDE HYDROCHLORIDE 5 MG/ML
10 INJECTION INTRAMUSCULAR; INTRAVENOUS ONCE
Status: DISCONTINUED | OUTPATIENT
Start: 2024-12-26 | End: 2024-12-26

## 2024-12-26 RX ORDER — LISINOPRIL 10 MG/1
10 TABLET ORAL ONCE
Status: COMPLETED | OUTPATIENT
Start: 2024-12-26 | End: 2024-12-26

## 2024-12-26 RX ORDER — IOPAMIDOL 755 MG/ML
80 INJECTION, SOLUTION INTRAVASCULAR
Status: COMPLETED | OUTPATIENT
Start: 2024-12-26 | End: 2024-12-26

## 2024-12-26 RX ORDER — METOPROLOL TARTRATE 50 MG
25 TABLET ORAL ONCE
Status: COMPLETED | OUTPATIENT
Start: 2024-12-26 | End: 2024-12-26

## 2024-12-26 RX ADMIN — IOPAMIDOL 80 ML: 755 INJECTION, SOLUTION INTRAVENOUS at 15:53

## 2024-12-26 RX ADMIN — LISINOPRIL 10 MG: 10 TABLET ORAL at 18:04

## 2024-12-26 RX ADMIN — PROCHLORPERAZINE EDISYLATE 10 MG: 5 INJECTION INTRAMUSCULAR; INTRAVENOUS at 16:36

## 2024-12-26 RX ADMIN — METOPROLOL TARTRATE 25 MG: 50 TABLET, FILM COATED ORAL at 16:35

## 2024-12-26 ASSESSMENT — PAIN SCALES - GENERAL: PAINLEVEL_OUTOF10: 8

## 2024-12-26 ASSESSMENT — PAIN - FUNCTIONAL ASSESSMENT: PAIN_FUNCTIONAL_ASSESSMENT: 0-10

## 2024-12-26 NOTE — ED PROVIDER NOTES
[facility]  EMERGENCY MEDICINE ATTENDING ATTESTATION      Evaluation of Chriss Braga.   Case discussed and care plan developed with resident physician.   I agree with the resident physician documentation and plan as documented by him, except if my documentation differs.   Patient seen, interviewed and examined by me.  I reviewed the medical, surgical, family and social history, medications and allergies.   I have reviewed and interpreted all available lab, radiology and ekg results available at the moment.  I have reviewed the nursing documentation.     Please see the resident physician completed note for final disposition except as documented on this attestation.   I have reviewed and interpreted all available lab, radiology and ekg results available at the moment.  Diagnosis, treatment and disposition plans were discussed and agreed upon by patient.   This transcription was electronically signed. It was dictated by use of voice recognition software and electronically transcribed. The transcription may contain errors not detected in proofreading.     I performed direct supervision and was present for the critical portion following procedures: None  Critical care time on this case: See critical care addendum below    CRITICAL CARE ADDENDUM:  This patient was identified as critically ill on my evaluation due to complex migraine versus acute stroke, in the context of medication noncompliance, requiring stroke team activation, in addition to discussions with consultants, evaluation of patient's response to treatment, examination of patient, obtaining history from patient or surrogate, ordering and performing treatments and interventions, ordering and review of laboratory studies, ordering and review of radiographic studies, pulse oximetry, re-evaluation of patient's condition and review of old charts.  There is a high probability of imminent or life-threatening deterioration in the 
versus complex migraines  Please see ED course and MDM sections below for continuation and resolution of this initial assessment if applicable.    Initial plan:   Code stroke alert  CT scan head  CTA head and neck  Labs  EKG  Chest x-ray       Comorbid conditions pertinent to this ED encounter:  Complex migraines, hypertension, CVA      Differential Diagnosis includes but is not limited to:  Stroke/ TIA, Vascular, Infectious/inflammatory, Neoplastic/neurological, Drug induced, Iatrogenic, Cardiopulmonary, Autoimmune, Endocrine, Degenerative, Psychogenic/psychiatric, Musculoskeletal        Decision Rules/Clinical Scores utilized:  Not Applicable       Code Status:  Not addressed during this ED visit    Social determinants of health impacting treatment or disposition:  Considered and not applicable     MIPS documentation:  N/A    Medical Decision Making    Code stroke alert over this patient related to left upper extremity left lower extremity weakness and paresthesias to left upper extremity.  Neuroteam met primary team and CT scanner.  Imaging on this patient did not reveal any acute pathology contributing to patient's symptomology, so decision was made to treat this patient as a complex migraine and medication noncompliance related to hypertensive urgency.  Patient was giving home dose of lisinopril metoprolol which resulted in reduction of his blood pressure to a systolic in the 170s and diastolic in the 90s which is improvement from his exertional blood pressure of systolic in the 200s over diastolic in the 130s.  The patient also was given medication for migraine, which resulted improvement of his symptomology here in the ED.  Patient was able to ambulate in the ED without weakness and without issue.  Patient was discharged home with follow-up recommendations for his PCP and instructed on the importance of continuing medication compliance.  Patient verbalized understanding this without concern    ED COURSE   ED

## 2024-12-26 NOTE — CONSULTS
Neurology Stroke Alert Note    Date:12/26/2024       Room:[address] [place] [address]  Patient Name:Chriss Braga     YOB: 1955     Age:69 y.o.  Chief Complaint   Patient presents with    Hypertension               Requesting Physician: Al Michelle MD     Reason for Consult:  Evaluate for stroke alert    Subjective     HPI: Chriss Braga is a 69 y.o. male with a history of CAD, atrial fibrillation-on Eliquis, GERD, glaucoma, hyperlipidemia, hypertension, ICD, NSVT, orthostatic hypotension, polysubstance abuse -cocaine and marijuana, pulmonary nodule, syncopal episodes, right MCA CVA, CHF, pituitary tumor, who presented to [facility] today from his optometrist appointment due to hypertension, bilateral blurred vision, and left sided headache and weakness. Patient recently seen on the inpatient setting by our service 12/7 and 12/8/24 and has been evaluated by our service for similar complaints in the past. He has a history of medication non-compliance and denies taking his Eliquis or Plavix since the day of discharge from his previous admission (12/9/24). States he has not taken his antihypertensives today and his systolic BP was 190 prior to presentation. He reports 8/10 left temporal headache. He has a history of R MCA stroke with residual left sided deficits, as well as complex migraines with left sided symptoms. Stroke alert activated at 1547. Last known well reportedly 1450 while at the optometrist. Initial /108, 160/110 in CT. POC glucose 84. Initial NIH2 for mild left lower extremity drift and inconsistent sensory deficit to light touch (at the time of NIH, reported left forehead, right upper extremity and left lower extremity - lateralization inconsistent throughout repeat sensory examinations in CT). Non-contrasted CTH negative for acute intracranial abnormalities. On-call neuro interventionalist, Dr. Ragsdale, contacted and recommended cancelling the stroke alert and treating his uncontrolled

## 2024-12-26 NOTE — ED NOTES
Pt and vs assessed. RR easy and unlabored. Pt resting in bed alert and medicated per MAR. Pt stable at this time

## 2024-12-26 NOTE — ED TRIAGE NOTES
Pt presents to the ED through [facility] with c/o hypertension. Pt states his systolic BP was 190. States he does take BP medication but he was running late to his eye appt today and has not taken his medication. Pt states he now has a headache that is 8 out of 10. Denies taking any medication for his headache. Pt states he is currently having blurred vision in both eyes. States blurred vision started while he was at the eye doctor. States he is \"seeing spots\". Pt reprots history of tumor on his pituitary gland. Also reports history of migraine and states he has had weakness on his left side when he has migraines. Pt initially reported the right side \"feeling lighter\" but upon further assessment states the left side \"feels lighter and the other side feels heavy\". denies chest pain. EKG complete

## 2024-12-27 NOTE — ED NOTES
Pt and vs reassessed. RR easy and unlabored. Pt resting in bed alert and stable at this time. Provided with box lunch

## 2024-12-27 NOTE — DISCHARGE INSTRUCTIONS
Your evaluated today for complex migraine that created strokelike symptoms.  CT imaging and neuro consult revealed that you have no ischemic changes associated with stroke, low decision made to treat your migraine and hypocalciuria in the ED, treatment resulted in improvement of your symptomology.  He will be discharged home with no new medications but are recommended to follow-up with your PCP regarding medication compliance and dosage adjustment.  Please come back to the ED should you develop similar symptoms that do not resolve.

## 2024-12-27 NOTE — ED NOTES
ED nurse-to-nurse bedside report    Chief Complaint   Patient presents with    Hypertension             LOC: alert and orientated to name, place, date  Vital signs   Vitals:    12/26/24 1635 12/26/24 1637 12/26/24 1803 12/26/24 1903   BP: (!) 181/119 (!) 181/119 (!) 194/118 (!) 181/113   Pulse: 75 72 78 87   Resp:  18 18 18   Temp:       TempSrc:       SpO2:  98% 98% 99%   Weight:       Height:          Pain:    Pain Interventions: none  Pain Goal: 3  Oxygen: No    Current needs required RA   Telemetry: Yes  LDAs:   Peripheral IV 12/26/24 Left;Posterior Hand (Active)   Site Assessment Clean, dry & intact 12/26/24 1804   Line Status Normal saline locked 12/26/24 1804   Phlebitis Assessment No symptoms 12/26/24 1804   Infiltration Assessment 0 12/26/24 1804   Alcohol Cap Used Yes 12/26/24 1804   Dressing Status Clean, dry & intact 12/26/24 1804   Dressing Type Transparent 12/26/24 1804   Dressing Intervention New 12/26/24 1528       Peripheral IV 12/26/24 Right Antecubital (Active)   Site Assessment Clean, dry & intact 12/26/24 1804   Line Status Normal saline locked 12/26/24 1804   Phlebitis Assessment No symptoms 12/26/24 1804   Infiltration Assessment 0 12/26/24 1804   Alcohol Cap Used Yes 12/26/24 1804   Dressing Status Clean, dry & intact 12/26/24 1804   Dressing Type Transparent 12/26/24 1804     Continuous Infusions:   Mobility: Requires assistance * 1  Nelson Fall Risk Score:       11/14/2024     2:43 PM   Fall Risk   Do you feel unsteady or are you worried about falling?  yes   2 or more falls in past year? yes   Fall with injury in past year? no     Fall Interventions: side rails up, call l ight in reach  Report given to: Adria JO

## 2025-01-26 ENCOUNTER — APPOINTMENT (OUTPATIENT)
Dept: CT IMAGING | Age: 70
DRG: 057 | End: 2025-01-26
Payer: MEDICARE

## 2025-01-26 ENCOUNTER — APPOINTMENT (OUTPATIENT)
Dept: GENERAL RADIOLOGY | Age: 70
DRG: 057 | End: 2025-01-26
Payer: MEDICARE

## 2025-01-26 ENCOUNTER — HOSPITAL ENCOUNTER (INPATIENT)
Age: 70
LOS: 3 days | Discharge: HOME | DRG: 057 | End: 2025-01-29
Attending: STUDENT IN AN ORGANIZED HEALTH CARE EDUCATION/TRAINING PROGRAM
Payer: MEDICARE

## 2025-01-26 DIAGNOSIS — I63.9 CEREBROVASCULAR ACCIDENT (CVA), UNSPECIFIED MECHANISM (HCC): ICD-10-CM

## 2025-01-26 DIAGNOSIS — S09.90XA INJURY OF HEAD, INITIAL ENCOUNTER: Primary | ICD-10-CM
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
## 2025-01-26 DIAGNOSIS — R55 SYNCOPE, UNSPECIFIED SYNCOPE TYPE: ICD-10-CM

## 2025-01-26 DIAGNOSIS — R29.90 STROKE-LIKE SYMPTOM: ICD-10-CM

## 2025-01-26 DIAGNOSIS — I50.20 HFREF (HEART FAILURE WITH REDUCED EJECTION FRACTION) (HCC): ICD-10-CM

## 2025-01-26 DIAGNOSIS — N17.9 AKI (ACUTE KIDNEY INJURY) (HCC): ICD-10-CM

## 2025-01-26 LAB
ALBUMIN SERPL BCG-MCNC: 4.3 G/DL (ref 3.5–5.1)
ALP SERPL-CCNC: 87 U/L (ref 38–126)
ALT SERPL W/O P-5'-P-CCNC: 11 U/L (ref 11–66)
AMPHETAMINES UR QL SCN: NEGATIVE
ANION GAP SERPL CALC-SCNC: 13 MEQ/L (ref 8–16)
APTT PPP: 30.8 SECONDS (ref 22–38)
AST SERPL-CCNC: 18 U/L (ref 5–40)
BARBITURATES UR QL SCN: NEGATIVE
BASOPHILS ABSOLUTE: 0 THOU/MM3 (ref 0–0.1)
BASOPHILS NFR BLD AUTO: 0.4 %
BENZODIAZ UR QL SCN: NEGATIVE
BILIRUB SERPL-MCNC: 1.3 MG/DL (ref 0.3–1.2)
BILIRUB UR QL STRIP.AUTO: NEGATIVE
BUN SERPL-MCNC: 16 MG/DL (ref 7–22)
BZE UR QL SCN: NEGATIVE
CALCIUM SERPL-MCNC: 9.6 MG/DL (ref 8.5–10.5)
CANNABINOIDS UR QL SCN: POSITIVE
CHARACTER UR: CLEAR
CHLORIDE SERPL-SCNC: 101 MEQ/L (ref 98–111)
CO2 SERPL-SCNC: 22 MEQ/L (ref 23–33)
COLOR, UA: YELLOW
CREAT SERPL-MCNC: 1 MG/DL (ref 0.4–1.2)
DEPRECATED RDW RBC AUTO: 37.4 FL (ref 35–45)
EOSINOPHIL NFR BLD AUTO: 0.5 %
EOSINOPHILS ABSOLUTE: 0 THOU/MM3 (ref 0–0.4)
ERYTHROCYTE [DISTWIDTH] IN BLOOD BY AUTOMATED COUNT: 17.6 % (ref 11.5–14.5)
ETHANOL SERPL-MCNC: < 0.01 % (ref 0–0.08)
FENTANYL: NEGATIVE
GFR SERPL CREATININE-BSD FRML MDRD: 82 ML/MIN/1.73M2
GLUCOSE SERPL-MCNC: 93 MG/DL (ref 70–108)
GLUCOSE UR QL STRIP.AUTO: NEGATIVE MG/DL
HCT VFR BLD AUTO: 43.2 % (ref 42–52)
HGB BLD-MCNC: 13.6 GM/DL (ref 14–18)
HGB UR QL STRIP.AUTO: NEGATIVE
IMM GRANULOCYTES # BLD AUTO: 0.04 THOU/MM3 (ref 0–0.07)
IMM GRANULOCYTES NFR BLD AUTO: 0.5 %
INR PPP: 1.07 (ref 0.85–1.13)
KETONES UR QL STRIP.AUTO: NEGATIVE
LYMPHOCYTES ABSOLUTE: 1.2 THOU/MM3 (ref 1–4.8)
LYMPHOCYTES NFR BLD AUTO: 14.3 %
MCH RBC QN AUTO: 21.3 PG (ref 26–33)
MCHC RBC AUTO-ENTMCNC: 31.5 GM/DL (ref 32.2–35.5)
MCV RBC AUTO: 67.6 FL (ref 80–94)
MICROCYTES BLD QL SMEAR: PRESENT
MONOCYTES ABSOLUTE: 0.5 THOU/MM3 (ref 0.4–1.3)
MONOCYTES NFR BLD AUTO: 6.2 %
NEUTROPHILS ABSOLUTE: 6.6 THOU/MM3 (ref 1.8–7.7)
NEUTROPHILS NFR BLD AUTO: 78.1 %
NITRITE UR QL STRIP: NEGATIVE
NRBC BLD AUTO-RTO: 0 /100 WBC
OPIATES UR QL SCN: NEGATIVE
OSMOLALITY SERPL CALC.SUM OF ELEC: 272.8 MOSMOL/KG (ref 275–300)
OXYCODONE: NEGATIVE
PCP UR QL SCN: NEGATIVE
PH UR STRIP.AUTO: 6 [PH] (ref 5–9)
PLATELET # BLD AUTO: 225 THOU/MM3 (ref 130–400)
PMV BLD AUTO: 9.8 FL (ref 9.4–12.4)
POC CREATININE WHOLE BLOOD: 1.1 MG/DL (ref 0.5–1.2)
POIKILOCYTES: ABNORMAL
POTASSIUM SERPL-SCNC: 4.3 MEQ/L (ref 3.5–5.2)
PROT SERPL-MCNC: 7.8 G/DL (ref 6.1–8)
PROT UR STRIP.AUTO-MCNC: NEGATIVE MG/DL
RBC # BLD AUTO: 6.39 MILL/MM3 (ref 4.7–6.1)
SCAN OF BLOOD SMEAR: NORMAL
SODIUM SERPL-SCNC: 136 MEQ/L (ref 135–145)
SP GR UR REFRACT.AUTO: > 1.03 (ref 1–1.03)
TARGETS BLD QL SMEAR: ABNORMAL
TROPONIN, HIGH SENSITIVITY: 8 NG/L (ref 0–12)
UROBILINOGEN, URINE: 1 EU/DL (ref 0–1)
WBC # BLD AUTO: 8.5 THOU/MM3 (ref 4.8–10.8)
WBC #/AREA URNS HPF: NEGATIVE /[HPF]

## 2025-01-26 PROCEDURE — 36415 COLL VENOUS BLD VENIPUNCTURE: CPT

## 2025-01-26 PROCEDURE — 85025 COMPLETE CBC W/AUTO DIFF WBC: CPT

## 2025-01-26 PROCEDURE — 80053 COMPREHEN METABOLIC PANEL: CPT

## 2025-01-26 PROCEDURE — 99223 1ST HOSP IP/OBS HIGH 75: CPT | Performed by: NURSE PRACTITIONER

## 2025-01-26 PROCEDURE — 84484 ASSAY OF TROPONIN QUANT: CPT

## 2025-01-26 PROCEDURE — 72128 CT CHEST SPINE W/O DYE: CPT

## 2025-01-26 PROCEDURE — 81003 URINALYSIS AUTO W/O SCOPE: CPT

## 2025-01-26 PROCEDURE — 6360000004 HC RX CONTRAST MEDICATION: Performed by: STUDENT IN AN ORGANIZED HEALTH CARE EDUCATION/TRAINING PROGRAM

## 2025-01-26 PROCEDURE — 82077 ASSAY SPEC XCP UR&BREATH IA: CPT

## 2025-01-26 PROCEDURE — 99285 EMERGENCY DEPT VISIT HI MDM: CPT

## 2025-01-26 PROCEDURE — 82565 ASSAY OF CREATININE: CPT

## 2025-01-26 PROCEDURE — 71045 X-RAY EXAM CHEST 1 VIEW: CPT

## 2025-01-26 PROCEDURE — 2500000003 HC RX 250 WO HCPCS

## 2025-01-26 PROCEDURE — 70498 CT ANGIOGRAPHY NECK: CPT

## 2025-01-26 PROCEDURE — 70496 CT ANGIOGRAPHY HEAD: CPT

## 2025-01-26 PROCEDURE — 2060000000 HC ICU INTERMEDIATE R&B

## 2025-01-26 PROCEDURE — 76376 3D RENDER W/INTRP POSTPROCES: CPT

## 2025-01-26 PROCEDURE — 80307 DRUG TEST PRSMV CHEM ANLYZR: CPT

## 2025-01-26 PROCEDURE — 70450 CT HEAD/BRAIN W/O DYE: CPT

## 2025-01-26 PROCEDURE — 85730 THROMBOPLASTIN TIME PARTIAL: CPT

## 2025-01-26 PROCEDURE — 72131 CT LUMBAR SPINE W/O DYE: CPT

## 2025-01-26 PROCEDURE — 6360000002 HC RX W HCPCS

## 2025-01-26 PROCEDURE — 93005 ELECTROCARDIOGRAM TRACING: CPT | Performed by: STUDENT IN AN ORGANIZED HEALTH CARE EDUCATION/TRAINING PROGRAM

## 2025-01-26 PROCEDURE — 85610 PROTHROMBIN TIME: CPT

## 2025-01-26 RX ORDER — SODIUM CHLORIDE 0.9 % (FLUSH) 0.9 %
5-40 SYRINGE (ML) INJECTION EVERY 12 HOURS SCHEDULED
Status: DISCONTINUED | OUTPATIENT
Start: 2025-01-26 | End: 2025-01-29 | Stop reason: HOSPADM

## 2025-01-26 RX ORDER — ASPIRIN 81 MG/1
81 TABLET, CHEWABLE ORAL DAILY
Status: DISCONTINUED | OUTPATIENT
Start: 2025-01-26 | End: 2025-01-29 | Stop reason: HOSPADM

## 2025-01-26 RX ORDER — KETOROLAC TROMETHAMINE 30 MG/ML
15 INJECTION, SOLUTION INTRAMUSCULAR; INTRAVENOUS ONCE
Status: COMPLETED | OUTPATIENT
Start: 2025-01-26 | End: 2025-01-26

## 2025-01-26 RX ORDER — ONDANSETRON 4 MG/1
4 TABLET, ORALLY DISINTEGRATING ORAL EVERY 8 HOURS PRN
Status: DISCONTINUED | OUTPATIENT
Start: 2025-01-26 | End: 2025-01-29 | Stop reason: HOSPADM

## 2025-01-26 RX ORDER — ONDANSETRON 2 MG/ML
4 INJECTION INTRAMUSCULAR; INTRAVENOUS EVERY 6 HOURS PRN
Status: DISCONTINUED | OUTPATIENT
Start: 2025-01-26 | End: 2025-01-29 | Stop reason: HOSPADM

## 2025-01-26 RX ORDER — ACETAMINOPHEN 500 MG
1000 TABLET ORAL ONCE
Status: DISCONTINUED | OUTPATIENT
Start: 2025-01-26 | End: 2025-01-29 | Stop reason: HOSPADM

## 2025-01-26 RX ORDER — IOPAMIDOL 755 MG/ML
80 INJECTION, SOLUTION INTRAVASCULAR
Status: COMPLETED | OUTPATIENT
Start: 2025-01-26 | End: 2025-01-26

## 2025-01-26 RX ORDER — SODIUM CHLORIDE 0.9 % (FLUSH) 0.9 %
5-40 SYRINGE (ML) INJECTION PRN
Status: DISCONTINUED | OUTPATIENT
Start: 2025-01-26 | End: 2025-01-29 | Stop reason: HOSPADM

## 2025-01-26 RX ORDER — ASPIRIN 300 MG/1
300 SUPPOSITORY RECTAL DAILY
Status: DISCONTINUED | OUTPATIENT
Start: 2025-01-26 | End: 2025-01-29 | Stop reason: HOSPADM

## 2025-01-26 RX ORDER — POLYETHYLENE GLYCOL 3350 17 G/17G
17 POWDER, FOR SOLUTION ORAL DAILY PRN
Status: DISCONTINUED | OUTPATIENT
Start: 2025-01-26 | End: 2025-01-29 | Stop reason: HOSPADM

## 2025-01-26 RX ORDER — ACETAMINOPHEN 325 MG/1
650 TABLET ORAL EVERY 6 HOURS PRN
Status: DISCONTINUED | OUTPATIENT
Start: 2025-01-26 | End: 2025-01-29 | Stop reason: HOSPADM

## 2025-01-26 RX ORDER — SODIUM CHLORIDE 9 MG/ML
INJECTION, SOLUTION INTRAVENOUS PRN
Status: DISCONTINUED | OUTPATIENT
Start: 2025-01-26 | End: 2025-01-29 | Stop reason: HOSPADM

## 2025-01-26 RX ADMIN — IOPAMIDOL 80 ML: 755 INJECTION, SOLUTION INTRAVENOUS at 15:17

## 2025-01-26 RX ADMIN — KETOROLAC TROMETHAMINE 15 MG: 30 INJECTION, SOLUTION INTRAMUSCULAR at 22:51

## 2025-01-26 RX ADMIN — SODIUM CHLORIDE, PRESERVATIVE FREE 10 ML: 5 INJECTION INTRAVENOUS at 22:56

## 2025-01-26 ASSESSMENT — PAIN SCALES - GENERAL
PAINLEVEL_OUTOF10: 8

## 2025-01-26 ASSESSMENT — PAIN DESCRIPTION - ORIENTATION: ORIENTATION: POSTERIOR

## 2025-01-26 ASSESSMENT — PAIN DESCRIPTION - ONSET: ONSET: ON-GOING

## 2025-01-26 ASSESSMENT — PAIN - FUNCTIONAL ASSESSMENT
PAIN_FUNCTIONAL_ASSESSMENT: 0-10
PAIN_FUNCTIONAL_ASSESSMENT: ACTIVITIES ARE NOT PREVENTED
PAIN_FUNCTIONAL_ASSESSMENT: 0-10

## 2025-01-26 ASSESSMENT — PAIN DESCRIPTION - LOCATION
LOCATION: HEAD
LOCATION: BACK;HEAD
LOCATION: HEAD

## 2025-01-26 ASSESSMENT — PAIN DESCRIPTION - PAIN TYPE: TYPE: ACUTE PAIN

## 2025-01-26 ASSESSMENT — PAIN DESCRIPTION - FREQUENCY: FREQUENCY: CONTINUOUS

## 2025-01-26 ASSESSMENT — PAIN DESCRIPTION - DESCRIPTORS: DESCRIPTORS: SORE

## 2025-01-26 NOTE — ED TRIAGE NOTES
Pt presents to the ED via EMS after a fall. Pt states he slipped on ice in his driveway hitting his head. Pt denies LOC. Pt states after the fall he was unable to move both legs. Pt states \"every time I walk I collapse\". Upon arrival to the ED pt is alert and oriented. Pt's has slurred speech, tingling and decreased sensation on the left. Pt is able to move all extremities.

## 2025-01-26 NOTE — PROCEDURES
PROCEDURE NOTE  Date: 1/26/2025   Name: Chriss Braga  YOB: 1955    Procedures EKG completed, given to

## 2025-01-26 NOTE — CONSULTS
last 720 hours.    Invalid input(s): \"LDLCALC\"   Imaging Last 24 Hours:  XR CHEST PORTABLE    Result Date: 1/26/2025  PROCEDURE: XR CHEST PORTABLE CLINICAL INFORMATION: stroke symptoms COMPARISON: 12/26/2024 TECHNIQUE: AP mobile chest single FINDINGS: There is a left-sided AICD. There is evidence of prior median sternotomy and CABG. The heart size is normal. No focal consolidation, pleural effusion or pneumothorax is seen.     1. No acute cardiopulmonary findings. **This report has been created using voice recognition software.  It may contain minor errors which are inherent in voice recognition technology.** Electronically signed by Dr. Chris Cope    CTA HEAD W WO CONTRAST (CODE STROKE)    Result Date: 1/26/2025  WET READ: The anterior, middle and posterior cerebral arteries appear patent. The carotid bifurcations appear patent with no significant flow-limiting stenosis. The common carotid arteries appear patent. The vertebral arteries and basilar artery appear patent. This examination will be formally read by one of the neuroradiologists tomorrow.  Please refer to that report.  THIS WET READ WAS INTERPRETED BY Dr. Chris Cope ON 1/26/2025 3:46 PM. **This report has been created using voice recognition software.  It may contain minor errors which are inherent in voice recognition technology.**     CTA NECK W WO CONTRAST (CODE STROKE)    Result Date: 1/26/2025  WET READ: The anterior, middle and posterior cerebral arteries appear patent. The carotid bifurcations appear patent with no significant flow-limiting stenosis. The common carotid arteries appear patent. The vertebral arteries and basilar artery appear patent. This examination will be formally read by one of the neuroradiologists tomorrow.  Please refer to that report.  THIS WET READ WAS INTERPRETED BY Dr. Chris Cope ON 1/26/2025 3:46 PM. **This report has been created using voice recognition software.  It may contain minor errors which are inherent

## 2025-01-26 NOTE — ED NOTES
ED to inpatient nurses report      Chief Complaint:  Chief Complaint   Patient presents with    Fall     Present to ED from: home    MOA:     LOC: alert and orientated to name, place, date  Mobility: Requires assistance * 2  Oxygen Baseline: room air    Current needs required: room air     Code Status:   Prior    What abnormal results were found and what did you give/do to treat them? Slurred speech  Any procedures or intervention occur? ct    Mental Status:  Level of Consciousness: Alert (0)    Psych Assessment:        Vitals:  Patient Vitals for the past 24 hrs:   BP Temp Temp src Pulse Resp SpO2 Weight   01/26/25 1854 (!) 170/104 -- -- 81 18 100 % --   01/26/25 1726 (!) 170/100 -- -- 70 18 100 % --   01/26/25 1636 (!) 177/118 -- -- 76 18 99 % --   01/26/25 1521 -- 98.5 °F (36.9 °C) Oral 77 18 99 % --   01/26/25 1500 (!) 177/116 -- -- -- -- -- 77.4 kg (170 lb 9.6 oz)        LDAs:      Ambulatory Status:  No data recorded    Diagnosis:  DISPOSITION Decision To Admit 01/26/2025 06:55:09 PM   Final diagnoses:   Injury of head, initial encounter   Fall, initial encounter   Stroke-like symptom        Consults:  IP CONSULT TO STROKE TEAM     Pain Score:  Pain Assessment  Pain Assessment: 0-10  Pain Level: 8  Pain Location: Head    C-SSRS:        Sepsis Screening:       [place] Fall Risk:       Swallow Screening        Preferred Language:   English      ALLERGIES     Tuna oil [fish oil]    SURGICAL HISTORY       Past Surgical History:   Procedure Laterality Date    APPENDECTOMY      BACK SURGERY      BRONCHOSCOPY N/A 2/5/2024    BRONCHOSCOPY FOR AIRWAY EXAMINATION performed by Dylan Ayala MD at [facility] ENDOSCOPY    CARDIAC CATHETERIZATION  8/2012    CARDIAC DEFIBRILLATOR PLACEMENT  5 24 2011    Successful dual-chamber AICD implantation using [employer] device Sarah ZHANG, serial # [id number]. Left axillary and left subclavian venogram. Defibrillation threshold testing. Device programming. Of note, the patient

## 2025-01-26 NOTE — ED PROVIDER NOTES
this visit (None if left blank):   Medications   acetaminophen (TYLENOL) tablet 1,000 mg (1,000 mg Oral Not Given 1/26/25 1731)   iopamidol (ISOVUE-370) 76 % injection 80 mL (80 mLs IntraVENous Given 1/26/25 1517)       ED Course as of 01/26/25 1855   Sun Jan 26, 2025   1509 [facility].  Sinus rhythm rate of 81, NY 170s, QRS 70, QTc 471.  Normal axis.  ST and T wave abnormality inferior laterally. [EM]   1528 CT HEAD WO CONTRAST  Patient aware of pituitary findings [EM]   1547 Patient presented with a last known well of 2:15 PM.  Charge nurse called Physician to EMS bay. Reported slip and fall on ice.  Family and EMS noted that the patient was slurring his speech and dizzy with some arm weakness.  This was consistent with what we found on initial assessment in the triage area.  Some minimal left facial droop with dysarthria and left upper extremity drift that did not hit the bed.  Stroke alert paged.  No trauma criteria met.  Primary concern at this time would be hemorrhagic stroke. [EM]   1644 Total Bilirubin(!): 1.3  Minimal nonspecific elevation [EM]   1711 Spoke with patient roommate Yobany Rockwell. Patient give verbal consent to speak to remain.  Yobany reported that patient at baseline speaks with no difficulty and is able to ambulate.  Today patient had a fall and after fall he became slurring of the speech and he had facial droop coming to the ER. [SJ]   1834 No acute traumatic injury of the spine [EM]      ED Course User Index  [EM] David Huber DO  [SJ] Niyah Pantoja DO       Procedures: (None if left blank)  Procedures:     Consultants:  IP CONSULT TO STROKE TEAM    Decision Rules/Clinical Scores utilized:  Not Applicable     CRITICAL CARE:  None    MEDICATION CHANGES     New Prescriptions    No medications on file       FINAL IMPRESSION     Final diagnoses:   Injury of head, initial encounter   Fall, initial encounter   Stroke-like symptom       DISPOSITION   Condition:

## 2025-01-27 PROBLEM — E80.6 HYPERBILIRUBINEMIA: Status: ACTIVE | Noted: 2025-01-27

## 2025-01-27 PROBLEM — I50.20 HFREF (HEART FAILURE WITH REDUCED EJECTION FRACTION) (HCC): Status: ACTIVE | Noted: 2025-01-27
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
## 2025-01-27 LAB
ALBUMIN SERPL BCG-MCNC: 4 G/DL (ref 3.5–5.1)
ALP SERPL-CCNC: 80 U/L (ref 38–126)
ALT SERPL W/O P-5'-P-CCNC: 9 U/L (ref 11–66)
ANION GAP SERPL CALC-SCNC: 13 MEQ/L (ref 8–16)
ANION GAP SERPL CALC-SCNC: 9 MEQ/L (ref 8–16)
AST SERPL-CCNC: 15 U/L (ref 5–40)
BASOPHILS ABSOLUTE: 0 THOU/MM3 (ref 0–0.1)
BASOPHILS NFR BLD AUTO: 0.4 %
BILIRUB CONJ SERPL-MCNC: 0.3 MG/DL (ref 0.1–13.8)
BILIRUB INDIRECT SERPL-MCNC: 1 MG/DL (ref 0–0.6)
BILIRUB SERPL-MCNC: 1.3 MG/DL (ref 0.3–1.2)
BUN SERPL-MCNC: 16 MG/DL (ref 7–22)
BUN SERPL-MCNC: 20 MG/DL (ref 7–22)
CALCIUM SERPL-MCNC: 9.1 MG/DL (ref 8.5–10.5)
CALCIUM SERPL-MCNC: 9.2 MG/DL (ref 8.5–10.5)
CHLORIDE SERPL-SCNC: 104 MEQ/L (ref 98–111)
CHLORIDE SERPL-SCNC: 105 MEQ/L (ref 98–111)
CHOLEST SERPL-MCNC: 188 MG/DL (ref 100–199)
CO2 SERPL-SCNC: 21 MEQ/L (ref 23–33)
CO2 SERPL-SCNC: 24 MEQ/L (ref 23–33)
CREAT SERPL-MCNC: 1 MG/DL (ref 0.4–1.2)
CREAT SERPL-MCNC: 1.1 MG/DL (ref 0.4–1.2)
DEPRECATED MEAN GLUCOSE BLD GHB EST-ACNC: 108 MG/DL (ref 70–126)
DEPRECATED RDW RBC AUTO: 38.1 FL (ref 35–45)
EKG ATRIAL RATE: 70 BPM
EKG ATRIAL RATE: 81 BPM
EKG P AXIS: 64 DEGREES
EKG P AXIS: 82 DEGREES
EKG P-R INTERVAL: 162 MS
EKG P-R INTERVAL: 176 MS
EKG Q-T INTERVAL: 406 MS
EKG Q-T INTERVAL: 442 MS
EKG QRS DURATION: 78 MS
EKG QRS DURATION: 94 MS
EKG QTC CALCULATION (BAZETT): 471 MS
EKG QTC CALCULATION (BAZETT): 477 MS
EKG R AXIS: 110 DEGREES
EKG R AXIS: 122 DEGREES
EKG T AXIS: -2 DEGREES
EKG T AXIS: -30 DEGREES
EKG VENTRICULAR RATE: 70 BPM
EKG VENTRICULAR RATE: 81 BPM
EOSINOPHIL NFR BLD AUTO: 1.3 %
EOSINOPHILS ABSOLUTE: 0.1 THOU/MM3 (ref 0–0.4)
ERYTHROCYTE [DISTWIDTH] IN BLOOD BY AUTOMATED COUNT: 17.2 % (ref 11.5–14.5)
GFR SERPL CREATININE-BSD FRML MDRD: 73 ML/MIN/1.73M2
GFR SERPL CREATININE-BSD FRML MDRD: 82 ML/MIN/1.73M2
GLUCOSE SERPL-MCNC: 87 MG/DL (ref 70–108)
GLUCOSE SERPL-MCNC: 99 MG/DL (ref 70–108)
HBA1C MFR BLD HPLC: 5.6 % (ref 4.4–6.4)
HCT VFR BLD AUTO: 41.8 % (ref 42–52)
HDLC SERPL-MCNC: 42 MG/DL
HGB BLD-MCNC: 13 GM/DL (ref 14–18)
IMM GRANULOCYTES # BLD AUTO: 0.05 THOU/MM3 (ref 0–0.07)
IMM GRANULOCYTES NFR BLD AUTO: 0.6 %
KEPPRA: < 2 UG/ML
LDLC SERPL CALC-MCNC: 132 MG/DL
LYMPHOCYTES ABSOLUTE: 1.4 THOU/MM3 (ref 1–4.8)
LYMPHOCYTES NFR BLD AUTO: 18.3 %
MAGNESIUM SERPL-MCNC: 2.3 MG/DL (ref 1.6–2.4)
MCH RBC QN AUTO: 21.3 PG (ref 26–33)
MCHC RBC AUTO-ENTMCNC: 31.1 GM/DL (ref 32.2–35.5)
MCV RBC AUTO: 68.5 FL (ref 80–94)
MICROCYTES BLD QL SMEAR: PRESENT
MONOCYTES ABSOLUTE: 0.6 THOU/MM3 (ref 0.4–1.3)
MONOCYTES NFR BLD AUTO: 8 %
NEUTROPHILS ABSOLUTE: 5.6 THOU/MM3 (ref 1.8–7.7)
NEUTROPHILS NFR BLD AUTO: 71.4 %
NRBC BLD AUTO-RTO: 0 /100 WBC
OSMOLALITY SERPL CALC.SUM OF ELEC: 276.2 MOSMOL/KG (ref 275–300)
PLATELET # BLD AUTO: 214 THOU/MM3 (ref 130–400)
PMV BLD AUTO: 9.7 FL (ref 9.4–12.4)
POTASSIUM SERPL-SCNC: 3.7 MEQ/L (ref 3.5–5.2)
POTASSIUM SERPL-SCNC: 4.2 MEQ/L (ref 3.5–5.2)
PROLACTIN SERPL-MCNC: 15.4 NG/ML
PROT SERPL-MCNC: 7.2 G/DL (ref 6.1–8)
RBC # BLD AUTO: 6.1 MILL/MM3 (ref 4.7–6.1)
SODIUM SERPL-SCNC: 138 MEQ/L (ref 135–145)
SODIUM SERPL-SCNC: 138 MEQ/L (ref 135–145)
TRIGL SERPL-MCNC: 71 MG/DL (ref 0–199)
WBC # BLD AUTO: 7.9 THOU/MM3 (ref 4.8–10.8)

## 2025-01-27 PROCEDURE — 97129 THER IVNTJ 1ST 15 MIN: CPT

## 2025-01-27 PROCEDURE — 82248 BILIRUBIN DIRECT: CPT

## 2025-01-27 PROCEDURE — 80053 COMPREHEN METABOLIC PANEL: CPT

## 2025-01-27 PROCEDURE — 93010 ELECTROCARDIOGRAM REPORT: CPT | Performed by: INTERNAL MEDICINE

## 2025-01-27 PROCEDURE — 2060000000 HC ICU INTERMEDIATE R&B

## 2025-01-27 PROCEDURE — 93005 ELECTROCARDIOGRAM TRACING: CPT

## 2025-01-27 PROCEDURE — 80061 LIPID PANEL: CPT

## 2025-01-27 PROCEDURE — 6360000002 HC RX W HCPCS

## 2025-01-27 PROCEDURE — 83735 ASSAY OF MAGNESIUM: CPT

## 2025-01-27 PROCEDURE — 97163 PT EVAL HIGH COMPLEX 45 MIN: CPT

## 2025-01-27 PROCEDURE — 85025 COMPLETE CBC W/AUTO DIFF WBC: CPT

## 2025-01-27 PROCEDURE — 97166 OT EVAL MOD COMPLEX 45 MIN: CPT

## 2025-01-27 PROCEDURE — 97116 GAIT TRAINING THERAPY: CPT

## 2025-01-27 PROCEDURE — 97530 THERAPEUTIC ACTIVITIES: CPT

## 2025-01-27 PROCEDURE — 6370000000 HC RX 637 (ALT 250 FOR IP)

## 2025-01-27 PROCEDURE — 92523 SPEECH SOUND LANG COMPREHEN: CPT

## 2025-01-27 PROCEDURE — 83036 HEMOGLOBIN GLYCOSYLATED A1C: CPT

## 2025-01-27 PROCEDURE — 84146 ASSAY OF PROLACTIN: CPT

## 2025-01-27 PROCEDURE — 80177 DRUG SCRN QUAN LEVETIRACETAM: CPT

## 2025-01-27 PROCEDURE — 82247 BILIRUBIN TOTAL: CPT

## 2025-01-27 PROCEDURE — 36415 COLL VENOUS BLD VENIPUNCTURE: CPT

## 2025-01-27 PROCEDURE — 2500000003 HC RX 250 WO HCPCS

## 2025-01-27 PROCEDURE — 99223 1ST HOSP IP/OBS HIGH 75: CPT

## 2025-01-27 PROCEDURE — 92610 EVALUATE SWALLOWING FUNCTION: CPT

## 2025-01-27 RX ORDER — ATORVASTATIN CALCIUM 80 MG/1
80 TABLET, FILM COATED ORAL DAILY
Status: DISCONTINUED | OUTPATIENT
Start: 2025-01-27 | End: 2025-01-29 | Stop reason: HOSPADM

## 2025-01-27 RX ORDER — RANOLAZINE 500 MG/1
1000 TABLET, EXTENDED RELEASE ORAL DAILY
Status: DISCONTINUED | OUTPATIENT
Start: 2025-01-27 | End: 2025-01-29 | Stop reason: HOSPADM

## 2025-01-27 RX ORDER — PANTOPRAZOLE SODIUM 40 MG/1
40 TABLET, DELAYED RELEASE ORAL
Status: DISCONTINUED | OUTPATIENT
Start: 2025-01-27 | End: 2025-01-29 | Stop reason: HOSPADM

## 2025-01-27 RX ORDER — CLOPIDOGREL BISULFATE 75 MG/1
75 TABLET ORAL DAILY
Status: DISCONTINUED | OUTPATIENT
Start: 2025-01-27 | End: 2025-01-29 | Stop reason: HOSPADM

## 2025-01-27 RX ORDER — METOPROLOL SUCCINATE 25 MG/1
25 TABLET, EXTENDED RELEASE ORAL 2 TIMES DAILY
Status: DISCONTINUED | OUTPATIENT
Start: 2025-01-27 | End: 2025-01-29 | Stop reason: HOSPADM

## 2025-01-27 RX ORDER — ISOSORBIDE MONONITRATE 30 MG/1
30 TABLET, EXTENDED RELEASE ORAL DAILY
Status: DISCONTINUED | OUTPATIENT
Start: 2025-01-27 | End: 2025-01-29 | Stop reason: HOSPADM

## 2025-01-27 RX ORDER — AMIODARONE HYDROCHLORIDE 200 MG/1
200 TABLET ORAL 2 TIMES DAILY
Status: DISCONTINUED | OUTPATIENT
Start: 2025-01-27 | End: 2025-01-29 | Stop reason: HOSPADM

## 2025-01-27 RX ORDER — KETOROLAC TROMETHAMINE 30 MG/ML
15 INJECTION, SOLUTION INTRAMUSCULAR; INTRAVENOUS ONCE
Status: COMPLETED | OUTPATIENT
Start: 2025-01-27 | End: 2025-01-27

## 2025-01-27 RX ORDER — FERROUS SULFATE 325(65) MG
325 TABLET ORAL EVERY OTHER DAY
Status: DISCONTINUED | OUTPATIENT
Start: 2025-01-27 | End: 2025-01-29 | Stop reason: HOSPADM

## 2025-01-27 RX ORDER — LISINOPRIL 10 MG/1
10 TABLET ORAL DAILY
Status: DISCONTINUED | OUTPATIENT
Start: 2025-01-27 | End: 2025-01-29 | Stop reason: HOSPADM

## 2025-01-27 RX ORDER — LEVETIRACETAM 500 MG/1
500 TABLET ORAL 2 TIMES DAILY
Status: DISCONTINUED | OUTPATIENT
Start: 2025-01-27 | End: 2025-01-29 | Stop reason: HOSPADM

## 2025-01-27 RX ADMIN — KETOROLAC TROMETHAMINE 15 MG: 30 INJECTION, SOLUTION INTRAMUSCULAR at 23:44

## 2025-01-27 RX ADMIN — KETOROLAC TROMETHAMINE 15 MG: 30 INJECTION, SOLUTION INTRAMUSCULAR at 10:32

## 2025-01-27 RX ADMIN — ASPIRIN 81 MG: 81 TABLET, CHEWABLE ORAL at 10:27

## 2025-01-27 RX ADMIN — SODIUM CHLORIDE, PRESERVATIVE FREE 10 ML: 5 INJECTION INTRAVENOUS at 10:35

## 2025-01-27 RX ADMIN — PANTOPRAZOLE SODIUM 40 MG: 40 TABLET, DELAYED RELEASE ORAL at 06:22

## 2025-01-27 RX ADMIN — AMIODARONE HYDROCHLORIDE 200 MG: 200 TABLET ORAL at 20:53

## 2025-01-27 RX ADMIN — APIXABAN 5 MG: 5 TABLET, FILM COATED ORAL at 20:53

## 2025-01-27 RX ADMIN — LEVETIRACETAM 500 MG: 500 TABLET, FILM COATED ORAL at 10:29

## 2025-01-27 RX ADMIN — SODIUM CHLORIDE, PRESERVATIVE FREE 10 ML: 5 INJECTION INTRAVENOUS at 20:53

## 2025-01-27 RX ADMIN — FERROUS SULFATE TAB 325 MG (65 MG ELEMENTAL FE) 325 MG: 325 (65 FE) TAB at 10:27

## 2025-01-27 RX ADMIN — RANOLAZINE 1000 MG: 500 TABLET, EXTENDED RELEASE ORAL at 10:29

## 2025-01-27 RX ADMIN — POTASSIUM BICARBONATE 40 MEQ: 782 TABLET, EFFERVESCENT ORAL at 10:30

## 2025-01-27 RX ADMIN — ATORVASTATIN CALCIUM 80 MG: 80 TABLET, FILM COATED ORAL at 10:27

## 2025-01-27 RX ADMIN — APIXABAN 5 MG: 5 TABLET, FILM COATED ORAL at 10:27

## 2025-01-27 RX ADMIN — ISOSORBIDE MONONITRATE 30 MG: 30 TABLET, EXTENDED RELEASE ORAL at 10:27

## 2025-01-27 RX ADMIN — LISINOPRIL 10 MG: 10 TABLET ORAL at 10:29

## 2025-01-27 RX ADMIN — CLOPIDOGREL BISULFATE 75 MG: 75 TABLET ORAL at 10:27

## 2025-01-27 RX ADMIN — AMIODARONE HYDROCHLORIDE 200 MG: 200 TABLET ORAL at 10:27

## 2025-01-27 RX ADMIN — METOPROLOL SUCCINATE 25 MG: 25 TABLET, FILM COATED, EXTENDED RELEASE ORAL at 10:29

## 2025-01-27 RX ADMIN — LEVETIRACETAM 500 MG: 500 TABLET, FILM COATED ORAL at 20:53

## 2025-01-27 RX ADMIN — METOPROLOL SUCCINATE 25 MG: 25 TABLET, FILM COATED, EXTENDED RELEASE ORAL at 20:53

## 2025-01-27 ASSESSMENT — PAIN DESCRIPTION - DESCRIPTORS
DESCRIPTORS: POUNDING
DESCRIPTORS: SORE
DESCRIPTORS: POUNDING

## 2025-01-27 ASSESSMENT — PAIN DESCRIPTION - LOCATION
LOCATION: HEAD

## 2025-01-27 ASSESSMENT — PAIN SCALES - GENERAL
PAINLEVEL_OUTOF10: 8
PAINLEVEL_OUTOF10: 10
PAINLEVEL_OUTOF10: 10

## 2025-01-27 ASSESSMENT — PAIN DESCRIPTION - FREQUENCY
FREQUENCY: CONTINUOUS

## 2025-01-27 ASSESSMENT — PAIN DESCRIPTION - PAIN TYPE
TYPE: ACUTE PAIN

## 2025-01-27 ASSESSMENT — PAIN DESCRIPTION - ONSET
ONSET: ON-GOING

## 2025-01-27 ASSESSMENT — PAIN DESCRIPTION - ORIENTATION
ORIENTATION: POSTERIOR
ORIENTATION: LEFT
ORIENTATION: LEFT

## 2025-01-27 ASSESSMENT — PAIN - FUNCTIONAL ASSESSMENT
PAIN_FUNCTIONAL_ASSESSMENT: ACTIVITIES ARE NOT PREVENTED

## 2025-01-27 NOTE — CARE COORDINATION
Case Management Assessment Initial Evaluation    Date/Time of Evaluation: 2025 8:12 AM  Assessment Completed by: Gina Chun RN    If patient is discharged prior to next notation, then this note serves as note for discharge by case management.    Patient Name: Chriss Braga                   YOB: 1955  Diagnosis: Stroke-like symptoms [R29.90]  Stroke-like symptom [R29.90]  Injury of head, initial encounter [S09.90XA]  Fall, initial encounter [W19.XXXA]  Cerebrovascular accident (CVA), unspecified mechanism (HCC) [I63.9]                   Date / Time: 2025  2:50 PM  Location: [facility]     Patient Admission Status: Inpatient   Readmission Risk Low 0-14, Mod 15-19), High > 20: Readmission Risk Score: 23.4    Current PCP: Newman-Waterhouse, Aundrea N,   Health Care Decision Makers:   Primary Decision Maker: Willie Braga - Child - [phone]    Secondary Decision Maker: Teena Arteaga - Niece/Nephew - [phone]    Additional Case Management Notes: From ED, PT/OT/SLP, telemetry, Neurology consult, Eliquis, Asa, Plavix, Keppra, Zofran prn.     Procedures:    ECHO   Pacer interrogation    Imagin/26 CT Head WO Contrast 1. No acute intracranial hemorrhage or mass effect.   2. There is a prominent appearing pituitary gland. Further evaluation could be   obtained with a nonemergent pituitary protocol MRI.   3. Moderate diffuse atrophy and moderate chronic small vessel ischemic changes   are seen.      CXR 1. No acute cardiopulmonary findings.   Thoracic spine:   1. No acute fracture or malalignment.      CT Lumbar Spine Thoracic spine:   1. No acute fracture or malalignment.     Lumbar spine:   1. No acute fracture or malalignment.         Patient Goals/Plan/Treatment Preferences: Met with Chriss. He currently lives at home with his brother. He uses a cane. Plan is to return home at discharge. He denies need for DME and declines HH. Will follow          25

## 2025-01-27 NOTE — PROCEDURES
PROCEDURE NOTE  Date: 1/27/2025   Name: Chriss Braga  YOB: 1955    Procedures        EKG was completed and handed to RN

## 2025-01-27 NOTE — H&P
Hospitalist History & Physical    Patient:  Chriss Braga    Unit/Bed:09/009A  YOB: 1955  MRN: [id number]   Acct: [id number]   PCP: Newman-Waterhouse, Aundrea N, DO  Code Status: Prior    Date of Service: Pt seen/examined on 01/26/25 and admitted to Inpatient with expected LOS greater than two midnights due to medical therapy.     Chief Complaint: Fall    Assessment/Plan:    Stroke like symptoms: Reports a fall and hit his head and after having slurred speech and facial droop. LKW 1415pm. NIH 3, dysarthria, facial asymmetry, left upper extremity drift without hitting the bed. Code stroke called. Neuro team evaluated and no further intervention recommended.   CT Head no acute intracranial hemorrhage or mass effect, moderate diffuse atrophy and moderate chronic small vessel ischemic changes seen-Obtain STAT CTH for worsening symptoms or confusion  MRI Brain-unable to undergo MRI due to a noncompatible implanted cardiac device.   CTA H&N No LVO on WET READ  Risk Factor modification: Continue ASA, Plavix, statin  Lipid panel in AM, LDL goal 45-70  Check A1c, goal <7  Allow permissive HTN with SBP up to 220/120 for 24 hrs. After 24hrs, BP goal <130/80  Neuro checks Q4H  Echo with bubble study  Tele to monitor for AF  Event monitor at DC  PT/OT/SLP  Neurology consulted    Mechanical fall: CT Thoracic and Lumbar Spine no acute fracture or malalignment. Cspine cleared by ED provider. CXR no acute cardiopulmonary findings.    Fall precautions  PT/OT to eval and treat    Hyperbilirubinemia: Total Bili 1.3. Uncertain etiology. Does not appear jaundice on exam.   CMP in AM  Direct Bili in AM    Enlarged pituitary gland: Incidental finding on CTH: there is a prominent appearing pituitary gland, further evaluation could be obtained with a nonemergent pituitary protocol MRI. History of pituitary adenoma diagnosed in June 2024.   Follow up with Endocrinology as OP    CAD: S/p CABG x3 in 2011. Denies chest

## 2025-01-27 NOTE — CARE COORDINATION
1/27/25, 3:22 PM EST    DISCHARGE PLANNING EVALUATION    Met with Chriss to make him aware he is a potential discharge for tomorrow.  He told RUTH that he would like [facility] for PT and OT.  Made referral.

## 2025-01-27 NOTE — PLAN OF CARE
Problem: Chronic Conditions and Co-morbidities  Goal: Patient's chronic conditions and co-morbidity symptoms are monitored and maintained or improved  Outcome: Progressing  Flowsheets (Taken 1/27/2025 0451)  Care Plan - Patient's Chronic Conditions and Co-Morbidity Symptoms are Monitored and Maintained or Improved:   Monitor and assess patient's chronic conditions and comorbid symptoms for stability, deterioration, or improvement   Collaborate with multidisciplinary team to address chronic and comorbid conditions and prevent exacerbation or deterioration   Update acute care plan with appropriate goals if chronic or comorbid symptoms are exacerbated and prevent overall improvement and discharge     Problem: Discharge Planning  Goal: Discharge to home or other facility with appropriate resources  Outcome: Progressing  Flowsheets (Taken 1/27/2025 0451)  Discharge to home or other facility with appropriate resources:   Identify barriers to discharge with patient and caregiver   Identify discharge learning needs (meds, wound care, etc)     Problem: Pain  Goal: Verbalizes/displays adequate comfort level or baseline comfort level  Outcome: Progressing  Flowsheets (Taken 1/27/2025 0451)  Verbalizes/displays adequate comfort level or baseline comfort level:   Encourage patient to monitor pain and request assistance   Administer analgesics based on type and severity of pain and evaluate response   Consider cultural and social influences on pain and pain management   Notify Licensed Independent Practitioner if interventions unsuccessful or patient reports new pain   Implement non-pharmacological measures as appropriate and evaluate response   Assess pain using appropriate pain scale     Problem: Safety - Adult  Goal: Free from fall injury  Outcome: Progressing  Flowsheets (Taken 1/27/2025 0451)  Free From Fall Injury:   Instruct family/caregiver on patient safety   Based on caregiver fall risk screen, instruct

## 2025-01-27 NOTE — PROCEDURES
Patient admitted to  Room 5 in a wheelchair  Complaint upon arrival to the room 8/10 head pain, headache  Vital signs obtained. Assessment and data collection initiated. Oriented to room. Policies and procedures for  explained All questions answered with no further questions at this time. Fall prevention and safety brochure discussed with patient. 2 person skin check completed.    Was swallow screen completed on admission? [x] YES or [] NO  If patient failed swallow test, obtain order for speech therapy consult and keep NPO.

## 2025-01-28 ENCOUNTER — APPOINTMENT (OUTPATIENT)
Age: 70
DRG: 057 | End: 2025-01-28
Payer: MEDICARE

## 2025-01-28 LAB
ANION GAP SERPL CALC-SCNC: 11 MEQ/L (ref 8–16)
BUN SERPL-MCNC: 21 MG/DL (ref 7–22)
CALCIUM SERPL-MCNC: 9.4 MG/DL (ref 8.5–10.5)
CHLORIDE SERPL-SCNC: 104 MEQ/L (ref 98–111)
CO2 SERPL-SCNC: 23 MEQ/L (ref 23–33)
CREAT SERPL-MCNC: 1.2 MG/DL (ref 0.4–1.2)
DEPRECATED RDW RBC AUTO: 40.6 FL (ref 35–45)
ECHO AO ASC DIAM: 3.2 CM
ECHO AO ASCENDING AORTA INDEX: 1.68 CM/M2
ECHO AO SINUS VALSALVA DIAM: 3.1 CM
ECHO AO SINUS VALSALVA INDEX: 1.63 CM/M2
ECHO AO ST JNCT DIAM: 2.6 CM
ECHO AV CUSP MM: 1.9 CM
ECHO BSA: 1.95 M2
ECHO LA DIAMETER INDEX: 1.58 CM/M2
ECHO LA DIAMETER: 3 CM
ECHO LV EDV A2C: 132 ML
ECHO LV EDV A4C: 160 ML
ECHO LV EDV INDEX A4C: 84 ML/M2
ECHO LV EDV NDEX A2C: 69 ML/M2
ECHO LV EF PHYSICIAN: 20 %
ECHO LV EJECTION FRACTION A2C: 22 %
ECHO LV EJECTION FRACTION A4C: 16 %
ECHO LV EJECTION FRACTION BIPLANE: 20 % (ref 55–100)
ECHO LV ESV A2C: 102 ML
ECHO LV ESV A4C: 134 ML
ECHO LV ESV INDEX A2C: 54 ML/M2
ECHO LV ESV INDEX A4C: 71 ML/M2
ECHO LV FRACTIONAL SHORTENING: 9 % (ref 28–44)
ECHO LV INTERNAL DIMENSION DIASTOLE INDEX: 2.89 CM/M2
ECHO LV INTERNAL DIMENSION DIASTOLIC: 5.5 CM (ref 4.2–5.9)
ECHO LV INTERNAL DIMENSION SYSTOLIC INDEX: 2.63 CM/M2
ECHO LV INTERNAL DIMENSION SYSTOLIC: 5 CM
ECHO LV IVSD: 1 CM (ref 0.6–1)
ECHO LV MASS 2D: 227.4 G (ref 88–224)
ECHO LV MASS INDEX 2D: 119.7 G/M2 (ref 49–115)
ECHO LV POSTERIOR WALL DIASTOLIC: 1.1 CM (ref 0.6–1)
ECHO LV RELATIVE WALL THICKNESS RATIO: 0.4
ECHO RV INTERNAL DIMENSION: 2.8 CM
ECHO RV TAPSE: 1.4 CM (ref 1.7–?)
EKG ATRIAL RATE: 75 BPM
EKG P AXIS: 68 DEGREES
EKG P-R INTERVAL: 176 MS
EKG Q-T INTERVAL: 436 MS
EKG QRS DURATION: 90 MS
EKG QTC CALCULATION (BAZETT): 486 MS
EKG R AXIS: 22 DEGREES
EKG T AXIS: -5 DEGREES
EKG VENTRICULAR RATE: 75 BPM
ERYTHROCYTE [DISTWIDTH] IN BLOOD BY AUTOMATED COUNT: 17.7 % (ref 11.5–14.5)
GFR SERPL CREATININE-BSD FRML MDRD: 66 ML/MIN/1.73M2
GLUCOSE SERPL-MCNC: 119 MG/DL (ref 70–108)
HCT VFR BLD AUTO: 42.2 % (ref 42–52)
HGB BLD-MCNC: 12.8 GM/DL (ref 14–18)
MAGNESIUM SERPL-MCNC: 2.3 MG/DL (ref 1.6–2.4)
MCH RBC QN AUTO: 21.3 PG (ref 26–33)
MCHC RBC AUTO-ENTMCNC: 30.3 GM/DL (ref 32.2–35.5)
MCV RBC AUTO: 70.1 FL (ref 80–94)
PLATELET # BLD AUTO: 217 THOU/MM3 (ref 130–400)
PMV BLD AUTO: 9.4 FL (ref 9.4–12.4)
POTASSIUM SERPL-SCNC: 4.1 MEQ/L (ref 3.5–5.2)
RBC # BLD AUTO: 6.02 MILL/MM3 (ref 4.7–6.1)
SODIUM SERPL-SCNC: 138 MEQ/L (ref 135–145)
WBC # BLD AUTO: 7.1 THOU/MM3 (ref 4.8–10.8)

## 2025-01-28 PROCEDURE — 93308 TTE F-UP OR LMTD: CPT

## 2025-01-28 PROCEDURE — 93005 ELECTROCARDIOGRAM TRACING: CPT | Performed by: STUDENT IN AN ORGANIZED HEALTH CARE EDUCATION/TRAINING PROGRAM

## 2025-01-28 PROCEDURE — 36415 COLL VENOUS BLD VENIPUNCTURE: CPT

## 2025-01-28 PROCEDURE — 99233 SBSQ HOSP IP/OBS HIGH 50: CPT | Performed by: STUDENT IN AN ORGANIZED HEALTH CARE EDUCATION/TRAINING PROGRAM

## 2025-01-28 PROCEDURE — 97116 GAIT TRAINING THERAPY: CPT

## 2025-01-28 PROCEDURE — 93308 TTE F-UP OR LMTD: CPT | Performed by: INTERNAL MEDICINE

## 2025-01-28 PROCEDURE — 6370000000 HC RX 637 (ALT 250 FOR IP)

## 2025-01-28 PROCEDURE — 85027 COMPLETE CBC AUTOMATED: CPT

## 2025-01-28 PROCEDURE — 97535 SELF CARE MNGMENT TRAINING: CPT

## 2025-01-28 PROCEDURE — 83735 ASSAY OF MAGNESIUM: CPT

## 2025-01-28 PROCEDURE — 2060000000 HC ICU INTERMEDIATE R&B

## 2025-01-28 PROCEDURE — 80048 BASIC METABOLIC PNL TOTAL CA: CPT

## 2025-01-28 PROCEDURE — 2500000003 HC RX 250 WO HCPCS

## 2025-01-28 PROCEDURE — 93325 DOPPLER ECHO COLOR FLOW MAPG: CPT | Performed by: INTERNAL MEDICINE

## 2025-01-28 RX ORDER — MECLIZINE HCL 12.5 MG 12.5 MG/1
12.5 TABLET ORAL ONCE
Status: COMPLETED | OUTPATIENT
Start: 2025-01-28 | End: 2025-01-28

## 2025-01-28 RX ADMIN — AMIODARONE HYDROCHLORIDE 200 MG: 200 TABLET ORAL at 20:08

## 2025-01-28 RX ADMIN — APIXABAN 5 MG: 5 TABLET, FILM COATED ORAL at 20:08

## 2025-01-28 RX ADMIN — LISINOPRIL 10 MG: 10 TABLET ORAL at 10:47

## 2025-01-28 RX ADMIN — METOPROLOL SUCCINATE 25 MG: 25 TABLET, FILM COATED, EXTENDED RELEASE ORAL at 20:08

## 2025-01-28 RX ADMIN — LEVETIRACETAM 500 MG: 500 TABLET, FILM COATED ORAL at 10:47

## 2025-01-28 RX ADMIN — PANTOPRAZOLE SODIUM 40 MG: 40 TABLET, DELAYED RELEASE ORAL at 05:58

## 2025-01-28 RX ADMIN — SODIUM CHLORIDE, PRESERVATIVE FREE 10 ML: 5 INJECTION INTRAVENOUS at 20:09

## 2025-01-28 RX ADMIN — AMIODARONE HYDROCHLORIDE 200 MG: 200 TABLET ORAL at 10:47

## 2025-01-28 RX ADMIN — SODIUM CHLORIDE, PRESERVATIVE FREE 10 ML: 5 INJECTION INTRAVENOUS at 10:48

## 2025-01-28 RX ADMIN — ASPIRIN 81 MG: 81 TABLET, CHEWABLE ORAL at 10:47

## 2025-01-28 RX ADMIN — CLOPIDOGREL BISULFATE 75 MG: 75 TABLET ORAL at 10:47

## 2025-01-28 RX ADMIN — ACETAMINOPHEN, ASPIRIN, CAFFEINE 1 TABLET: 250; 65; 250 TABLET, FILM COATED ORAL at 03:31

## 2025-01-28 RX ADMIN — ATORVASTATIN CALCIUM 80 MG: 80 TABLET, FILM COATED ORAL at 10:47

## 2025-01-28 RX ADMIN — MECLIZINE 12.5 MG: 12.5 TABLET ORAL at 16:33

## 2025-01-28 RX ADMIN — ISOSORBIDE MONONITRATE 30 MG: 30 TABLET, EXTENDED RELEASE ORAL at 10:48

## 2025-01-28 RX ADMIN — APIXABAN 5 MG: 5 TABLET, FILM COATED ORAL at 10:47

## 2025-01-28 RX ADMIN — LEVETIRACETAM 500 MG: 500 TABLET, FILM COATED ORAL at 20:08

## 2025-01-28 RX ADMIN — METOPROLOL SUCCINATE 25 MG: 25 TABLET, FILM COATED, EXTENDED RELEASE ORAL at 10:47

## 2025-01-28 RX ADMIN — RANOLAZINE 1000 MG: 500 TABLET, EXTENDED RELEASE ORAL at 10:47

## 2025-01-28 ASSESSMENT — PAIN DESCRIPTION - DESCRIPTORS: DESCRIPTORS: POUNDING

## 2025-01-28 ASSESSMENT — PAIN SCALES - GENERAL
PAINLEVEL_OUTOF10: 10
PAINLEVEL_OUTOF10: 10
PAINLEVEL_OUTOF10: 0
PAINLEVEL_OUTOF10: 0

## 2025-01-28 ASSESSMENT — PAIN DESCRIPTION - ORIENTATION: ORIENTATION: LEFT

## 2025-01-28 ASSESSMENT — PAIN DESCRIPTION - LOCATION: LOCATION: HEAD

## 2025-01-28 ASSESSMENT — PAIN - FUNCTIONAL ASSESSMENT: PAIN_FUNCTIONAL_ASSESSMENT: ACTIVITIES ARE NOT PREVENTED

## 2025-01-28 NOTE — CARE COORDINATION
1/28/25, 2:02 PM EST    DISCHARGE ON GOING EVALUATION    Chriss Braga       [facility] day: 2  Location: -05/005-A Reason for admit: Stroke-like symptoms [R29.90]  Stroke-like symptom [R29.90]  Injury of head, initial encounter [S09.90XA]  Fall, initial encounter [W19.XXXA]  Cerebrovascular accident (CVA), unspecified mechanism (HCC) [I63.9]     Procedures:   1/27 ECHO EF 15-20%  1/27 Pacer interrogation    Imaging since last note: none    Barriers to Discharge: PT/OT/SLP,  Neurology consult, Eliquis, Asa, Plavix, Keppra, Zofran prn. BRIA hose    PCP: Newman-Waterhouse, Aundrea N, DO  Readmission Risk Score: 23.6    Patient Goals/Plan/Treatment Preferences: Plans home with brother, new HH. SW following. Refer to SW note

## 2025-01-28 NOTE — PROCEDURES
PROCEDURE NOTE  Date: 1/28/2025   Name: Chriss Braga  YOB: 1955    Procedures  EKG completed

## 2025-01-28 NOTE — PLAN OF CARE
Problem: Chronic Conditions and Co-morbidities  Goal: Patient's chronic conditions and co-morbidity symptoms are monitored and maintained or improved  Outcome: Progressing  Flowsheets (Taken 1/27/2025 2214)  Care Plan - Patient's Chronic Conditions and Co-Morbidity Symptoms are Monitored and Maintained or Improved: Monitor and assess patient's chronic conditions and comorbid symptoms for stability, deterioration, or improvement     Problem: Discharge Planning  Goal: Discharge to home or other facility with appropriate resources  Outcome: Progressing  Flowsheets (Taken 1/27/2025 2214)  Discharge to home or other facility with appropriate resources:   Identify barriers to discharge with patient and caregiver   Arrange for needed discharge resources and transportation as appropriate     Problem: Pain  Goal: Verbalizes/displays adequate comfort level or baseline comfort level  Outcome: Progressing  Flowsheets (Taken 1/27/2025 2214)  Verbalizes/displays adequate comfort level or baseline comfort level:   Assess pain using appropriate pain scale   Encourage patient to monitor pain and request assistance     Problem: Safety - Adult  Goal: Free from fall injury  Outcome: Progressing  Flowsheets (Taken 1/27/2025 2214)  Free From Fall Injury: Instruct family/caregiver on patient safety     Problem: Neurosensory - Adult  Goal: Achieves stable or improved neurological status  Outcome: Progressing  Flowsheets (Taken 1/27/2025 2214)  Achieves stable or improved neurological status:   Initiate measures to prevent increased intracranial pressure   Assess for and report changes in neurological status     Problem: Neurosensory - Adult  Goal: Achieves maximal functionality and self care  Outcome: Progressing  Flowsheets (Taken 1/27/2025 2214)  Achieves maximal functionality and self care: Monitor swallowing and airway patency with patient fatigue and changes in neurological status     Problem: Skin/Tissue Integrity  Goal: Skin

## 2025-01-28 NOTE — CARE COORDINATION
DISCHARGE PLANNING EVALUATION  1/28/25, 8:29 AM EST    Reason for Referral: Wants   Decision Maker: Patient is able to make his own decisions.    Current Services: None  New Services Requested: [facility]  Family/ Social/ Home environment: From home with his brother.  His brother is able to provide transport at times.  Brother works 12 hour days, so he is not always available.    Payment Source:[employer]  Transportation at Discharge: cab  Post-acute (PAC) provider list was provided to patient. Patient was informed of their freedom to choose PAC provider. Discussed and offered to show the patient the relevant PAC Providers quality and resource use measures on Medicare Compare web site via computer based on patient's goals of care and treatment preferences. Questions regarding selection process were answered.      Teach Back Method used with Chriss regarding care plan and discharge planning.   Patient verbalized understanding of the plan of care and contribute to goal setting.       Patient preferences and discharge plan: Chriss will return home with his brother and new [facility] for PT and OT.      Electronically signed by BUBBA Villarreal on 1/28/2025 at 8:29 AM            Universal Safety Interventions

## 2025-01-29 ENCOUNTER — HOSPITAL ENCOUNTER (INPATIENT)
Age: 70
Discharge: HOME | DRG: 057 | End: 2025-01-31
Payer: MEDICARE

## 2025-01-29 VITALS
OXYGEN SATURATION: 99 % | RESPIRATION RATE: 16 BRPM | TEMPERATURE: 97.5 F | DIASTOLIC BLOOD PRESSURE: 78 MMHG | BODY MASS INDEX: 24 KG/M2 | HEIGHT: 69 IN | SYSTOLIC BLOOD PRESSURE: 114 MMHG | WEIGHT: 162.04 LBS | HEART RATE: 64 BPM

## 2025-01-29 LAB
ANION GAP SERPL CALC-SCNC: 12 MEQ/L (ref 8–16)
ANION GAP SERPL CALC-SCNC: 12 MEQ/L (ref 8–16)
BACTERIA: ABNORMAL
BILIRUB UR QL STRIP: NEGATIVE
BUN SERPL-MCNC: 22 MG/DL (ref 7–22)
BUN SERPL-MCNC: 26 MG/DL (ref 7–22)
CALCIUM SERPL-MCNC: 8.8 MG/DL (ref 8.5–10.5)
CALCIUM SERPL-MCNC: 8.9 MG/DL (ref 8.5–10.5)
CASTS #/AREA URNS LPF: ABNORMAL /LPF
CASTS #/AREA URNS LPF: ABNORMAL /LPF
CHARACTER UR: CLEAR
CHARCOAL URNS QL MICRO: ABNORMAL
CHLORIDE 24H UR-SRATE: 21 MEQ/L
CHLORIDE SERPL-SCNC: 104 MEQ/L (ref 98–111)
CHLORIDE SERPL-SCNC: 104 MEQ/L (ref 98–111)
CO2 SERPL-SCNC: 20 MEQ/L (ref 23–33)
CO2 SERPL-SCNC: 21 MEQ/L (ref 23–33)
COLOR UR: ABNORMAL
CREAT SERPL-MCNC: 1.2 MG/DL (ref 0.4–1.2)
CREAT SERPL-MCNC: 1.4 MG/DL (ref 0.4–1.2)
CREAT UR-MCNC: 253.8 MG/DL
CRYSTALS URNS QL MICRO: ABNORMAL
DEPRECATED RDW RBC AUTO: 38.4 FL (ref 35–45)
ECHO BSA: 1.95 M2
EPITHELIAL CELLS, UA: ABNORMAL /HPF
ERYTHROCYTE [DISTWIDTH] IN BLOOD BY AUTOMATED COUNT: 16.1 % (ref 11.5–14.5)
GFR SERPL CREATININE-BSD FRML MDRD: 55 ML/MIN/1.73M2
GFR SERPL CREATININE-BSD FRML MDRD: 66 ML/MIN/1.73M2
GLUCOSE SERPL-MCNC: 119 MG/DL (ref 70–108)
GLUCOSE SERPL-MCNC: 96 MG/DL (ref 70–108)
GLUCOSE UR QL STRIP.AUTO: NEGATIVE MG/DL
HCT VFR BLD AUTO: 38.1 % (ref 42–52)
HGB BLD-MCNC: 11.8 GM/DL (ref 14–18)
HGB UR QL STRIP.AUTO: NEGATIVE
KETONES UR QL STRIP.AUTO: NEGATIVE
LEUKOCYTE ESTERASE UR QL STRIP.AUTO: NEGATIVE
MAGNESIUM SERPL-MCNC: 2.1 MG/DL (ref 1.6–2.4)
MCH RBC QN AUTO: 21.2 PG (ref 26–33)
MCHC RBC AUTO-ENTMCNC: 31 GM/DL (ref 32.2–35.5)
MCV RBC AUTO: 68.5 FL (ref 80–94)
NITRITE UR QL STRIP.AUTO: NEGATIVE
ORIGINAL SAMPLE NUMBER: NORMAL
OSMOLALITY SERPL: NORMAL MOSMOL/KG (ref 275–295)
OSMOLALITY UR: NORMAL MOSMOL/KG (ref 250–750)
PH UR STRIP.AUTO: 5.5 [PH] (ref 5–9)
PLATELET # BLD AUTO: 210 THOU/MM3 (ref 130–400)
PMV BLD AUTO: 9.8 FL (ref 9.4–12.4)
POTASSIUM SERPL-SCNC: 3.9 MEQ/L (ref 3.5–5.2)
POTASSIUM SERPL-SCNC: 3.9 MEQ/L (ref 3.5–5.2)
POTASSIUM UR-SCNC: 35.3 MEQ/L
PROT UR STRIP.AUTO-MCNC: ABNORMAL MG/DL
RBC # BLD AUTO: 5.56 MILL/MM3 (ref 4.7–6.1)
RBC #/AREA URNS HPF: ABNORMAL /HPF
REFERENCE LOCATION: NORMAL
REFERENCE RANGE: NORMAL
RENAL EPI CELLS #/AREA URNS HPF: ABNORMAL /[HPF]
SODIUM SERPL-SCNC: 136 MEQ/L (ref 135–145)
SODIUM SERPL-SCNC: 137 MEQ/L (ref 135–145)
SODIUM UR-SCNC: 32 MEQ/L
SPECIFIC GRAVITY UA: 1.03 (ref 1–1.03)
TEST RESULTS WITH UNITS: NORMAL
TEST(S) BEING PERFORMED: NORMAL
UROBILINOGEN, URINE: 1 EU/DL (ref 0–1)
WBC # BLD AUTO: 7 THOU/MM3 (ref 4.8–10.8)
WBC #/AREA URNS HPF: ABNORMAL /HPF
YEAST LIKE FUNGI URNS QL MICRO: ABNORMAL

## 2025-01-29 PROCEDURE — 97530 THERAPEUTIC ACTIVITIES: CPT

## 2025-01-29 PROCEDURE — 83735 ASSAY OF MAGNESIUM: CPT

## 2025-01-29 PROCEDURE — 84300 ASSAY OF URINE SODIUM: CPT

## 2025-01-29 PROCEDURE — 81001 URINALYSIS AUTO W/SCOPE: CPT

## 2025-01-29 PROCEDURE — 6370000000 HC RX 637 (ALT 250 FOR IP)

## 2025-01-29 PROCEDURE — 97116 GAIT TRAINING THERAPY: CPT

## 2025-01-29 PROCEDURE — 80048 BASIC METABOLIC PNL TOTAL CA: CPT

## 2025-01-29 PROCEDURE — 93270 REMOTE 30 DAY ECG REV/REPORT: CPT

## 2025-01-29 PROCEDURE — 84133 ASSAY OF URINE POTASSIUM: CPT

## 2025-01-29 PROCEDURE — 85027 COMPLETE CBC AUTOMATED: CPT

## 2025-01-29 PROCEDURE — 99239 HOSP IP/OBS DSCHRG MGMT >30: CPT | Performed by: STUDENT IN AN ORGANIZED HEALTH CARE EDUCATION/TRAINING PROGRAM

## 2025-01-29 PROCEDURE — 36415 COLL VENOUS BLD VENIPUNCTURE: CPT

## 2025-01-29 PROCEDURE — 82570 ASSAY OF URINE CREATININE: CPT

## 2025-01-29 PROCEDURE — 2580000003 HC RX 258

## 2025-01-29 PROCEDURE — 82436 ASSAY OF URINE CHLORIDE: CPT

## 2025-01-29 PROCEDURE — 83935 ASSAY OF URINE OSMOLALITY: CPT

## 2025-01-29 PROCEDURE — 83930 ASSAY OF BLOOD OSMOLALITY: CPT

## 2025-01-29 PROCEDURE — 97110 THERAPEUTIC EXERCISES: CPT

## 2025-01-29 RX ORDER — ASPIRIN 81 MG/1
81 TABLET, CHEWABLE ORAL DAILY
Qty: 30 TABLET | Refills: 3 | Status: ON HOLD | OUTPATIENT
Start: 2025-01-29

## 2025-01-29 RX ORDER — MECLIZINE HYDROCHLORIDE 25 MG/1
25 TABLET ORAL 3 TIMES DAILY PRN
Qty: 30 TABLET | Refills: 0 | Status: ON HOLD | OUTPATIENT
Start: 2025-01-29 | End: 2025-02-08

## 2025-01-29 RX ORDER — SODIUM CHLORIDE, SODIUM LACTATE, POTASSIUM CHLORIDE, AND CALCIUM CHLORIDE .6; .31; .03; .02 G/100ML; G/100ML; G/100ML; G/100ML
500 INJECTION, SOLUTION INTRAVENOUS ONCE
Status: COMPLETED | OUTPATIENT
Start: 2025-01-29 | End: 2025-01-29

## 2025-01-29 RX ADMIN — ASPIRIN 81 MG: 81 TABLET, CHEWABLE ORAL at 10:07

## 2025-01-29 RX ADMIN — RANOLAZINE 1000 MG: 500 TABLET, EXTENDED RELEASE ORAL at 10:58

## 2025-01-29 RX ADMIN — LEVETIRACETAM 500 MG: 500 TABLET, FILM COATED ORAL at 10:58

## 2025-01-29 RX ADMIN — APIXABAN 5 MG: 5 TABLET, FILM COATED ORAL at 10:58

## 2025-01-29 RX ADMIN — PANTOPRAZOLE SODIUM 40 MG: 40 TABLET, DELAYED RELEASE ORAL at 06:02

## 2025-01-29 RX ADMIN — CLOPIDOGREL BISULFATE 75 MG: 75 TABLET ORAL at 10:58

## 2025-01-29 RX ADMIN — ATORVASTATIN CALCIUM 80 MG: 80 TABLET, FILM COATED ORAL at 11:00

## 2025-01-29 RX ADMIN — FERROUS SULFATE TAB 325 MG (65 MG ELEMENTAL FE) 325 MG: 325 (65 FE) TAB at 10:58

## 2025-01-29 RX ADMIN — AMIODARONE HYDROCHLORIDE 200 MG: 200 TABLET ORAL at 10:58

## 2025-01-29 RX ADMIN — SODIUM CHLORIDE, POTASSIUM CHLORIDE, SODIUM LACTATE AND CALCIUM CHLORIDE 500 ML: 600; 310; 30; 20 INJECTION, SOLUTION INTRAVENOUS at 10:08

## 2025-01-29 NOTE — DISCHARGE SUMMARY
Resident Discharge Summary (Hospitalist)      Patient: Chriss Braga 69 y.o. male  : 1955  MRN: [id number]   Account: [id number]   Patient's PCP: Newman-Waterhouse, Aundrea N, DO    Admit Date: 2025   Discharge Date:   25     Admitting Physician: Tay Lancaster MD  Discharge Physician: Emma Kapadia MD       Discharge Diagnoses:  Dizziness, Recent syncopal episode: on chart review, pt admitted  -  after LOC and presyncopal CP. Uncertain etiology, thought to be seizure vs cardiac in nature; started on Amio, Ranexa, Keppra. PRL WNL. He endorses dizziness sitting in chair, feels like the room is spinning. Sx not improved with meclizine.   Continue meds  Orthostatic vitals negative   Cardiac event monitor at DC  Will give referral to cardiology   Stroke like-sx, improving: Last known well: 1415 on . Reports a fall w/ head trama, with slurred speech and L facial droop following. Initial NIHSS: 3. CT Head w/o IVC: no acute intracranial hemorrhage or mass effect, moderate diffuse atrophy and moderate chronic small vessel ischemic changes. CTA H+N: negative for LVO or significant stenoses. MRI brain unable to be obtained d/t incompatible ICD. Risk factors: HTN, afib, medication non-compliance. Uncertain etiology at this time, suspect possible recrudescence of prior stroke-sx iso head trauma.   Neurology consulted in ED, signed off stating low suspicion for CVA or TIA  Continue ASA, Plavix, Lipitor 80mg   Limited TTE : 15-20%, mildly increased wall thickness, no RWMA  Home health for PT/OT  Cardiac event monitor at DC  JASMYN: Cr  AM up to 1.4, baseline appears 0.9 - 1.0. Given 500cc LR bolus and encouraged adequate oral hydration. Repeat BMP this afternoon w/ improved Cr 1.2.   Repeat BMP in 1 week  Hold home Lisinopril until after lab obtained   Mechanical Fall: denies LOC, reported head trauma. CT thoracic and lumbar spine negative for acute fracture or malalignment. CXR

## 2025-01-29 NOTE — PLAN OF CARE
Name: Chriss Braga  YOB: 1955  MRN: [id number]  Date: 01/29/25     Plan of Care      Chriss Braga will require the following home care treatments or therapies: PT / OT.  Home care will be necessary because of recurrent falls of unknown etiology with history.  The patient is in agreement to receiving home care.    Electronically signed by Emma Kapadia MD on 1/29/2025 at 1:19 PM

## 2025-01-29 NOTE — PLAN OF CARE
Problem: Chronic Conditions and Co-morbidities  Goal: Patient's chronic conditions and co-morbidity symptoms are monitored and maintained or improved  Outcome: Progressing  Care Plan - Patient's Chronic Conditions and Co-Morbidity Symptoms are Monitored and Maintained or Improved: Monitor and assess patient's chronic conditions and comorbid symptoms for stability, deterioration, or improvement     Problem: Discharge Planning  Goal: Discharge to home or other facility with appropriate resources  Outcome: Progressing  Discharge to home or other facility with appropriate resources:   Identify barriers to discharge with patient and caregiver   Arrange for needed discharge resources and transportation as appropriate     Problem: Pain  Goal: Verbalizes/displays adequate comfort level or baseline comfort level  Outcome: Progressing  Verbalizes/displays adequate comfort level or baseline comfort level:   Assess pain using appropriate pain scale   Encourage patient to monitor pain and request assistance     Problem: Safety - Adult  Goal: Free from fall injury  Outcome: Progressing  Free From Fall Injury: Instruct family/caregiver on patient safety     Problem: Neurosensory - Adult  Goal: Achieves stable or improved neurological status  Outcome: Progressing  Achieves stable or improved neurological status:   Initiate measures to prevent increased intracranial pressure   Assess for and report changes in neurological status     Problem: Neurosensory - Adult  Goal: Achieves maximal functionality and self care  Outcome: Progressing  Achieves maximal functionality and self care: Monitor swallowing and airway patency with patient fatigue and changes in neurological status     Problem: Skin/Tissue Integrity  Goal: Skin integrity remains intact  Description: 1.  Monitor for areas of redness and/or skin breakdown  2.  Assess vascular access sites hourly  3.  Every 4-6 hours minimum:  Change oxygen saturation probe site  4.  Every

## 2025-01-30 ENCOUNTER — APPOINTMENT (OUTPATIENT)
Dept: CT IMAGING | Age: 70
End: 2025-01-30
Payer: MEDICARE

## 2025-01-30 ENCOUNTER — CARE COORDINATION (OUTPATIENT)
Dept: CASE MANAGEMENT | Age: 70
End: 2025-01-30

## 2025-01-30 ENCOUNTER — TELEPHONE (OUTPATIENT)
Dept: CARDIOLOGY CLINIC | Age: 70
End: 2025-01-30

## 2025-01-30 ENCOUNTER — HOSPITAL ENCOUNTER (INPATIENT)
Age: 70
LOS: 1 days | Discharge: HOME | End: 2025-02-01
Attending: EMERGENCY MEDICINE | Admitting: STUDENT IN AN ORGANIZED HEALTH CARE EDUCATION/TRAINING PROGRAM
Payer: MEDICARE

## 2025-01-30 ENCOUNTER — APPOINTMENT (OUTPATIENT)
Dept: GENERAL RADIOLOGY | Age: 70
End: 2025-01-30
Payer: MEDICARE

## 2025-01-30 DIAGNOSIS — D50.9 IRON DEFICIENCY ANEMIA, UNSPECIFIED IRON DEFICIENCY ANEMIA TYPE: ICD-10-CM

## 2025-01-30 DIAGNOSIS — E55.9 VITAMIN D DEFICIENCY: ICD-10-CM

## 2025-01-30 DIAGNOSIS — R55 NEAR SYNCOPE: Primary | ICD-10-CM

## 2025-01-30 DIAGNOSIS — R29.90 STROKE-LIKE SYMPTOMS: ICD-10-CM

## 2025-01-30 DIAGNOSIS — E87.8 ELECTROLYTE DISTURBANCE: ICD-10-CM

## 2025-01-30 LAB
ALBUMIN SERPL BCG-MCNC: 4.2 G/DL (ref 3.5–5.1)
ALP SERPL-CCNC: 76 U/L (ref 38–126)
ALT SERPL W/O P-5'-P-CCNC: 10 U/L (ref 11–66)
ANION GAP SERPL CALC-SCNC: 11 MEQ/L (ref 8–16)
APTT PPP: 30.5 SECONDS (ref 22–38)
AST SERPL-CCNC: 18 U/L (ref 5–40)
BASOPHILS ABSOLUTE: 0 THOU/MM3 (ref 0–0.1)
BASOPHILS NFR BLD AUTO: 0.3 %
BILIRUB SERPL-MCNC: 1 MG/DL (ref 0.3–1.2)
BUN SERPL-MCNC: 17 MG/DL (ref 7–22)
CALCIUM SERPL-MCNC: 9.1 MG/DL (ref 8.5–10.5)
CHLORIDE SERPL-SCNC: 105 MEQ/L (ref 98–111)
CO2 SERPL-SCNC: 23 MEQ/L (ref 23–33)
CREAT SERPL-MCNC: 1.2 MG/DL (ref 0.4–1.2)
DEPRECATED RDW RBC AUTO: 40.7 FL (ref 35–45)
EOSINOPHIL NFR BLD AUTO: 0.8 %
EOSINOPHILS ABSOLUTE: 0.1 THOU/MM3 (ref 0–0.4)
ERYTHROCYTE [DISTWIDTH] IN BLOOD BY AUTOMATED COUNT: 17.8 % (ref 11.5–14.5)
GFR SERPL CREATININE-BSD FRML MDRD: 66 ML/MIN/1.73M2
GLUCOSE BLD STRIP.AUTO-MCNC: 117 MG/DL (ref 70–108)
GLUCOSE SERPL-MCNC: 115 MG/DL (ref 70–108)
HCT VFR BLD AUTO: 42.5 % (ref 42–52)
HGB BLD-MCNC: 13.1 GM/DL (ref 14–18)
IMM GRANULOCYTES # BLD AUTO: 0.05 THOU/MM3 (ref 0–0.07)
IMM GRANULOCYTES NFR BLD AUTO: 0.6 %
INR PPP: 1.19 (ref 0.85–1.13)
LYMPHOCYTES ABSOLUTE: 1.9 THOU/MM3 (ref 1–4.8)
LYMPHOCYTES NFR BLD AUTO: 23.5 %
MCH RBC QN AUTO: 21.7 PG (ref 26–33)
MCHC RBC AUTO-ENTMCNC: 30.8 GM/DL (ref 32.2–35.5)
MCV RBC AUTO: 70.2 FL (ref 80–94)
MONOCYTES ABSOLUTE: 0.5 THOU/MM3 (ref 0.4–1.3)
MONOCYTES NFR BLD AUTO: 5.8 %
NEUTROPHILS ABSOLUTE: 5.5 THOU/MM3 (ref 1.8–7.7)
NEUTROPHILS NFR BLD AUTO: 69 %
NRBC BLD AUTO-RTO: 0 /100 WBC
ORIGINAL SAMPLE NUMBER: NORMAL
OSMOLALITY SERPL CALC.SUM OF ELEC: 280 MOSMOL/KG (ref 275–300)
PLATELET # BLD AUTO: 214 THOU/MM3 (ref 130–400)
PMV BLD AUTO: 9.8 FL (ref 9.4–12.4)
POC CREATININE WHOLE BLOOD: 1.2 MG/DL (ref 0.5–1.2)
POTASSIUM SERPL-SCNC: 4.5 MEQ/L (ref 3.5–5.2)
PROT SERPL-MCNC: 7.4 G/DL (ref 6.1–8)
RBC # BLD AUTO: 6.05 MILL/MM3 (ref 4.7–6.1)
REFERENCE LOCATION: NORMAL
REFERENCE RANGE: NORMAL
SODIUM SERPL-SCNC: 139 MEQ/L (ref 135–145)
TEST RESULTS WITH UNITS: NORMAL
TEST(S) BEING PERFORMED: NORMAL
TROPONIN, HIGH SENSITIVITY: 8 NG/L (ref 0–12)
WBC # BLD AUTO: 8 THOU/MM3 (ref 4.8–10.8)

## 2025-01-30 PROCEDURE — 70496 CT ANGIOGRAPHY HEAD: CPT

## 2025-01-30 PROCEDURE — 36415 COLL VENOUS BLD VENIPUNCTURE: CPT

## 2025-01-30 PROCEDURE — 6360000004 HC RX CONTRAST MEDICATION: Performed by: EMERGENCY MEDICINE

## 2025-01-30 PROCEDURE — 82565 ASSAY OF CREATININE: CPT

## 2025-01-30 PROCEDURE — 99285 EMERGENCY DEPT VISIT HI MDM: CPT

## 2025-01-30 PROCEDURE — 85025 COMPLETE CBC W/AUTO DIFF WBC: CPT

## 2025-01-30 PROCEDURE — 85730 THROMBOPLASTIN TIME PARTIAL: CPT

## 2025-01-30 PROCEDURE — 70450 CT HEAD/BRAIN W/O DYE: CPT

## 2025-01-30 PROCEDURE — 71045 X-RAY EXAM CHEST 1 VIEW: CPT

## 2025-01-30 PROCEDURE — 80053 COMPREHEN METABOLIC PANEL: CPT

## 2025-01-30 PROCEDURE — 82948 REAGENT STRIP/BLOOD GLUCOSE: CPT

## 2025-01-30 PROCEDURE — 84484 ASSAY OF TROPONIN QUANT: CPT

## 2025-01-30 PROCEDURE — 93005 ELECTROCARDIOGRAM TRACING: CPT | Performed by: EMERGENCY MEDICINE

## 2025-01-30 PROCEDURE — 70498 CT ANGIOGRAPHY NECK: CPT

## 2025-01-30 PROCEDURE — 85610 PROTHROMBIN TIME: CPT

## 2025-01-30 RX ORDER — SODIUM CHLORIDE 9 MG/ML
INJECTION, SOLUTION INTRAVENOUS CONTINUOUS
Status: DISCONTINUED | OUTPATIENT
Start: 2025-01-30 | End: 2025-02-01

## 2025-01-30 RX ORDER — IOPAMIDOL 755 MG/ML
80 INJECTION, SOLUTION INTRAVASCULAR
Status: COMPLETED | OUTPATIENT
Start: 2025-01-30 | End: 2025-01-30

## 2025-01-30 RX ORDER — 0.9 % SODIUM CHLORIDE 0.9 %
500 INTRAVENOUS SOLUTION INTRAVENOUS ONCE
Status: DISCONTINUED | OUTPATIENT
Start: 2025-01-30 | End: 2025-02-01 | Stop reason: HOSPADM

## 2025-01-30 RX ADMIN — IOPAMIDOL 80 ML: 755 INJECTION, SOLUTION INTRAVENOUS at 22:10

## 2025-01-30 NOTE — CARE COORDINATION
1/30/25, 7:15 AM EST    Patient goals/plan/ treatment preferences discussed by  and .  Patient goals/plan/ treatment preferences reviewed with patient/ family.  Patient/ family verbalize understanding of discharge plan and are in agreement with goal/plan/treatment preferences.  Understanding was demonstrated using the teach back method.  AVS provided by RN at time of discharge, which includes all necessary medical information pertaining to the patients current course of illness, treatment, post-discharge goals of care, and treatment preferences.     Services At/After Discharge: Home Health, OT, and PT       Patient was discharged to home yesterday where he lives with his brother.  He will have [facility] for PT and OT.

## 2025-01-30 NOTE — PROGRESS NOTES
Hospitalist Progress Note  Internal Medicine Resident      Patient: Chriss Braga 69 y.o. male      Unit/Bed: 4A-05/005-A    Admit Date: 1/26/2025      ASSESSMENT AND PLAN  Active Problems  Dizziness, Recent syncopal episode: on chart review, pt admitted 12/6 - 12/9 after LOC and presyncopal CP. Uncertain etiology, thought to be seizure vs cardiac in nature; started on Amio, Ranexa, Keppra. PRL WNL. He endorses dizziness sitting in chair, feels like the room is spinning. Sx not improved with meclizine.   Continue meds  Orthostatic vitals negative   Cardiac event monitor at DC  Will give referral to cardiology at discharge to r/o cardiac cause  Stroke like-sx, improving: Last known well: 1415 on 1/26. Reports a fall w/ head trama, with slurred speech and L facial droop following. Initial NIHSS: 3. CT Head w/o IVC: no acute intracranial hemorrhage or mass effect, moderate diffuse atrophy and moderate chronic small vessel ischemic changes. CTA H+N: negative for LVO or significant stenoses. MRI brain unable to be obtained d/t incompatible ICD. Risk factors: HTN, afib, medication non-compliance. Uncertain etiology at this time, suspect possible recrudescence of prior stroke-sx iso head trauma.   Neurology consulted in ED, signed off stating low suspicion for CVA or TIA  Continue ASA, Plavix, Lipitor 80mg   Limited TTE : 15-20%, mildly increased wall thickness, no RWMA  PT/OT/SLP  Cardiac event monitor at DC  Fall precautions  Mechanical Fall: denies LOC, reported head trauma. CT thoracic and lumbar spine negative for acute fracture or malalignment. CXR without acute findings. Given dose of IV Toradol for HA.   PT/OT, fall precautions   Isolated Unconjugated-Hyperbilirubinemia: unclear etiology. Tbili 1.3, indirect bili elevated at 1.0, direct bili 0.3. Asx, no jaundice appreciated on exam. Rest of LFTs unremarkable, INR WNL. Ddx includes gilbert syndrome vs reactive/benign elevation.   Rec f/u with PCP to 
    Hospitalist Progress Note  Internal Medicine Resident      Patient: Chriss Braga 69 y.o. male      Unit/Bed: 4A-05/005-A    Admit Date: 1/26/2025      ASSESSMENT AND PLAN  Active Problems  Stroke like-sx, improving: Last known well: 1415 on 1/26. Reports a fall w/ head trama, with slurred speech and L facial droop following. Initial NIHSS: 3. CT Head w/o IVC: no acute intracranial hemorrhage or mass effect, moderate diffuse atrophy and moderate chronic small vessel ischemic changes. CTA H+N: negative for LVO or significant stenoses. MRI brain unable to be obtained d/t incompatible ICD. Risk factors: HTN, afib, medication non-compliance. Uncertain etiology at this time, suspect possible recrudescence of prior stroke-sx iso head trauma.   Neurology consulted in ED, signed off stating low suspicion for CVA or TIA  Continue ASA, Plavix, Lipitor 80mg   Limited TTE ordered  PT/OT/SLP  Cardiac event monitor at [place]  Fall precautions  Mechanical Fall: denies LOC, reported head trauma. CT thoracic and lumbar spine negative for acute fracture or malalignment. CXR without acute findings. Given dose of IV Toradol for HA.   PT/OT, fall precautions   Isolated Unconjugated-Hyperbilirubinemia: unclear etiology. Tbili 1.3, indirect bili elevated at 1.0, direct bili 0.3. Asx, no jaundice appreciated on exam. Rest of LFTs unremarkable, INR WNL. Ddx includes gilbert syndrome vs reactive/benign elevation.   Rec f/u with PCP to monitor    Chronic Conditions (reviewed and stable unless otherwise stated)  Recent syncopal episode: on chart review, pt admitted 12/6 - 12/9 after LOC and presyncopal CP. Uncertain etiology, thought to be seizure vs cardiac in nature; started on Amio, Ranexa, Keppra. PRL WNL.   Continue meds  Orthostatic vitals ordered  Cardiac event monitor at HI  Enlarged pituitary gland: incidental finding on CT head - prominent appearing pituitary gland. 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 PRL WNL at 15.4.  F/u 
 [facility]  INPATIENT PHYSICAL THERAPY  DAILY NOTE  [facility] NEUROSCIENCES 4A - 4A-05/005-A      Discharge Recommendations: Continue to assess pending progress and Home with Home Health PT  Equipment Recommendations:    pt stated his walker is very old. if needs walker at discharge may benefit from a new one, cont to monitor pt progress            Time In: 826  Time Out: 838  Timed Code Treatment Minutes: 12 Minutes  Minutes: 12          Date: 2025  Patient Name: Chriss Braga,  Gender:  male        MRN: [id number]  : 1955  (69 y.o.)     Referring Practitioner: GABRIEL Calixto CNP  Diagnosis: Stroke-like symptoms  Additional Pertinent Hx: 69 y.o. male with PMHx of CVA, PAF, CAD who presented to [facility] with chief complaint of fall. Patient reports that he was ambulating at home when his legs just gave out from under him. States he fell backward on the carpet and hit his head. Reports unable to use his legs and was unable to get up. Reports living with his brother and he helped him up and then he developed slurred speech, left facial droop and left-sided weakness. Reports the weakness has since mildly improved but his speech remains slurred. Reports some rhinorrhea but otherwise denies any other symptoms.CT Head no acute intracranial hemorrhage or mass effect, moderate diffuse atrophy and moderate chronic small vessel ischemic changes seen.MRI Brain-unable to undergo MRI due to a noncompatible implanted cardiac device.CTA H&N No LVO on WET READ.     Prior Level of Function:  Lives With: Family (with brother.)  Home Layout: Two level, Bed/Bath upstairs  Home Access: Stairs to enter with rails  Entrance Stairs - Number of Steps: 4  Home Equipment: Cane   Bathroom Shower/Tub: Tub/Shower unit, Shower chair with back  Bathroom Toilet: Handicap height  Bathroom Equipment: Shower chair    Prior Level of Assist for ADLs: Independent  Prior Level of Assist for Homemaking: Independent  Prior Level of Assist 
 [facility]  INPATIENT PHYSICAL THERAPY  DAILY NOTE  [facility] NEUROSCIENCES 4A - 4A-05/005-A      Discharge Recommendations: Continue to assess pending progress, 24 hour assistance or supervision, and Patient would benefit from continued PT at discharge  Equipment Recommendations:    pt stated his walker is very old. if needs walker at discharge may benefit from a new one, cont to monitor pt progress          Time In: 958  Time Out: 1036  Timed Code Treatment Minutes: 38 Minutes  Minutes: 38          Date: 2025  Patient Name: Chriss Braga,  Gender:  male        MRN: [id number]  : 1955  (69 y.o.)     Referring Practitioner: GABRIEL Calixto CNP  Diagnosis: Stroke-like symptoms  Additional Pertinent Hx: 69 y.o. male with PMHx of CVA, PAF, CAD who presented to [facility] with chief complaint of fall. Patient reports that he was ambulating at home when his legs just gave out from under him. States he fell backward on the carpet and hit his head. Reports unable to use his legs and was unable to get up. Reports living with his brother and he helped him up and then he developed slurred speech, left facial droop and left-sided weakness. Reports the weakness has since mildly improved but his speech remains slurred. Reports some rhinorrhea but otherwise denies any other symptoms.CT Head no acute intracranial hemorrhage or mass effect, moderate diffuse atrophy and moderate chronic small vessel ischemic changes seen.MRI Brain-unable to undergo MRI due to a noncompatible implanted cardiac device.CTA H&N No LVO on WET READ.     Prior Level of Function:  Lives With: Family (with brother.)  Home Layout: Two level, Bed/Bath upstairs  Home Access: Stairs to enter with rails  Entrance Stairs - Number of Steps: 4  Home Equipment: Cane   Bathroom Shower/Tub: Tub/Shower unit, Shower chair with back  Bathroom Toilet: Handicap height  Bathroom Equipment: Shower chair    Prior Level of Assist for ADLs: Independent  Prior Level 
[facility]  OCCUPATIONAL THERAPY MISSED TREATMENT NOTE  [facility] NEUROSCIENCES 4A  4A-05/005-A      Date: 2025  Patient Name: Chriss Braga        CSN: [id number]   : 1955  (69 y.o.)  Gender: male   Referring Practitioner: Katina Calixto APRN - CNP            REASON FOR MISSED TREATMENT: Patient Refused.  Patient laying in bed upon arrival; declines at this time requesting for this writer to return later today.  Will attempt as time allows.                
Discharge teaching and instructions for diagnosis/procedure of stroke-like symptoms completed with patient using teachback method. AVS reviewed. Printed prescriptions given to patient. Patient voiced understanding regarding prescriptions, follow up appointments, and care of self at home. Discharged in a wheelchair to  home with support per family.  Cardiac event monitor in place at time of discharge.      
[facility]  INPATIENT OCCUPATIONAL THERAPY  [facility] NEUROSCIENCES 4A  EVALUATION      Discharge Recommendations: Continue to assess pending progress, Home with Home health OT, Patient would benefit from continued therapy after discharge  Equipment Recommendations:   Pt has shower chair and cane- reccomend grab bars in shower. Monitor for reacher.      Time In: 928  Time Out: 944  Timed Code Treatment Minutes: 8 Minutes  Minutes: 16          Date: 2025  Patient Name: Chriss Braga,   Gender: male      MRN: [id number]  : 1955  (69 y.o.)  Referring Practitioner: Katina Calixto APRN - CNP  Diagnosis: Stroke-like symptoms  Additional Pertinent Hx: 69 y.o. male with PMHx of CVA, PAF, CAD who presented to [facility] with chief complaint of fall. Patient reports that he was ambulating at home when his legs just gave out from under him. States he fell backward on the carpet and hit his head. Reports unable to use his legs and was unable to get up. Reports living with his brother and he helped him up and then he developed slurred speech, left facial droop and left-sided weakness. Reports the weakness has since mildly improved but his speech remains slurred. Reports some rhinorrhea but otherwise denies any other symptoms.CT Head no acute intracranial hemorrhage or mass effect, moderate diffuse atrophy and moderate chronic small vessel ischemic changes seen.MRI Brain-unable to undergo MRI due to a noncompatible implanted cardiac device.CTA H&N No LVO on WET READ.    Restrictions/Precautions:  Restrictions/Precautions: Fall Risk    Subjective  Chart Reviewed: Yes, Orders, Progress Notes, History and Physical, Imaging  Patient assessed for rehabilitation services?: Yes    Subjective: Pt seated upright in bed upon arrival, agreeable to OT session. RN okayed therapy.    Pain: 8/10: HA- resident physician in during session and aware.    Vitals: Oxygen: 98% on RA  Heart Rate: 94 bpm    Social/Functional 
[facility]  SPEECH THERAPY  [facility] NEUROSCIENCES 4A  Speech - Language - Cognitive Evaluation + Clinical Swallow Evaluation + Cognitive Therapy    Discharge Recommendations: Continue to Assess Pending Progress  DIET ORDER RECOMMENDATIONS AFTER EVALUATION: Regular solids/thin liquids  STRATEGIES: Full Upright Position, Small Bite/Sip, Pulmonary Monitoring, Medications Whole with Thin, Medications Whole with Puree, Alternate Solids and Liquids, Limit Distractions, and Monitor for Fatigue     SLP Individual Minutes  Time In: 0825  Time Out: 0902  Minutes: 37  Timed Code Treatment Minutes: 9 Minutes     Speech, Language, Cognitive Evaluation: 20 minutes  Clinical Swallow Evaluation: 9 minutes  Cognitive Therapy: 8 minutes    Date: 2025  Patient Name: Chriss Braga      CSN: [id number]   : 1955  (69 y.o.)  Gender: male   Referring Physician:  Katina Calixto APRN - CNP   Diagnosis: Stroke-like symptoms  Precautions: Fall risk, aspiration precautions  History of Present Illness/Injury:   Patient presented to [facility] with the above medical dx. Refer to physician H&P: \"a 69 y.o. male with PMHx of CVA, PAF, CAD who presented to [facility] with chief complaint of fall. Patient reports that he was ambulating at home when his legs just gave out from under him. States he fell backward on the carpet and hit his head. Reports unable to use his legs and was unable to get up. Reports living with his brother and he helped him up and then he developed slurred speech, left facial droop and left-sided weakness. Reports the weakness has since mildly improved but his speech remains slurred. Reports some rhinorrhea but otherwise denies any other symptoms. Denies any pain. Denies headache, dizziness, or lightheadedness. Denies fever or chills. Denies chest pain or SOB. Denies abdominal pain or nausea. Denies dysuria or hematuria.\"     [facility] 2025  Impression:  1. Negative CTA of the head and neck.  2. 
Limited echo with bubble study completed, ROW Dr Velez to read.  
[facility]  [facility] NEUROSCIENCES 4A  Occupational Therapy  Daily Note    Discharge Recommendations: Home with Home Health OT  Equipment Recommendations:   Pt has shower chair and cane- reccomend grab bars in shower. Monitor for reacher.      Time In: 1040  Time Out: 1104  Timed Code Treatment Minutes: 24 Minutes  Minutes: 24          Date: 2025  Patient Name: Chriss Braga,   Gender: male      Room: [facility]005-A  MRN: [id number]  : 1955  (69 y.o.)  Referring Practitioner: Katina Calixto APRN - CNP  Diagnosis: Stroke-like symptoms  Additional Pertinent Hx: 69 y.o. male with PMHx of CVA, PAF, CAD who presented to [facility] with chief complaint of fall. Patient reports that he was ambulating at home when his legs just gave out from under him. States he fell backward on the carpet and hit his head. Reports unable to use his legs and was unable to get up. Reports living with his brother and he helped him up and then he developed slurred speech, left facial droop and left-sided weakness. Reports the weakness has since mildly improved but his speech remains slurred. Reports some rhinorrhea but otherwise denies any other symptoms.CT Head no acute intracranial hemorrhage or mass effect, moderate diffuse atrophy and moderate chronic small vessel ischemic changes seen.MRI Brain-unable to undergo MRI due to a noncompatible implanted cardiac device.CTA H&N No LVO on WET READ.    Restrictions/Precautions:  Restrictions/Precautions: Fall Risk     Social/Functional History:  Lives With: Family (with brother.)  Home Layout: Two level, Bed/Bath upstairs  Home Access: Stairs to enter with rails  Entrance Stairs - Number of Steps: 4  Home Equipment: Cane   Bathroom Shower/Tub: Tub/Shower unit, Shower chair with back  Bathroom Toilet: Handicap height  Bathroom Equipment: Shower chair       Prior Level of Assist for ADLs: Independent  Prior Level of Assist for Homemaking: Independent  Prior Level of Assist for 
Pharmacy Medication History Note    List of current medications patient is taking is complete.    Source of information: Patient and external fill history    Changes made to medication list:  Medications removed:  None    Medications added/doses adjusted:  None    Other notes:  The patient is a poor historian in regard to his medication list. He seems to just nod along with whatever I read off of the med report (even when told the wrong instructions on a few different medications). External fill history appears to generally align with what he is supposed to be taking.   Denies use of other OTC or herbal medications.    Allergies reviewed    Electronically signed by Alonzo Wells on 1/27/2025 at 11:43 AM      
Received report from Maria Luz (RN). Patient in bed and denies any pain at this time. Unlabored breathing. Call light within reach bed in lowest position.   
Report given to Jair (RN). Bed in lowest position and call light within reach. Patient denies any concerns at this time.   
Spiritual Health History and Assessment/Progress Note  [facility]    (P) Initial Encounter,  ,  ,      Name: Chriss Braga MRN: [id number]    Age: 69 y.o.     Sex: male   Language: English   Christianity: Denominational   Stroke-like symptoms     Date: 1/28/2025            Total Time Calculated: (P) 6 min              Spiritual Assessment continued in [facility] NEUROSCIENCES 4A        Referral/Consult From: (P) Rounding   Encounter Overview/Reason: (P) Initial Encounter  Service Provided For: (P) Patient    Heather, Belief, Meaning:   Patient identifies as spiritual  Family/Friends No family/friends present      Importance and Influence:  Patient has spiritual/personal beliefs that influence decisions regarding their health  Family/Friends No family/friends present    Community:  Patient feels well-supported. Support system includes: Children  Family/Friends feel well-supported. Support system includes: Extended family    Assessment and Plan of Care:     Patient Interventions include: Other: not known  Family/Friends Interventions include: No family/friends present    Patient Plan of Care: No spiritual needs identified for follow-up  Family/Friends Plan of Care: No family/friends present    Electronically signed by MALLORIE Colin on 1/28/2025 at 2:19 PM   
Stroke Folder given.   What is Stroke/CVA  Signs and Symptoms of stroke (BEFAST)  Treatments for Stroke  Personal Risk Factors for Stroke discussed  Education--Call 911       Patient/family has been educated on their personal risk factors of:  (X)Hypertension  (X)Previous stroke  (X)Hyperlipidemia  (X)Carotid Artery stenosis    They have been given hand outs on the following medications:  (X)Plavix  (X)statins      Treatment for stroke includes:  Risk factor modifications  Following the medication regime prescribed by physician      Educated on BEFAST-Balance-Eyesight-Face-Arm-Speech-Time    A stroke is a brain attack.Stroke is a brain injury. It occurs when the brain's blood supply is interrupted. Blood carries oxygen and nutrients to the brain. Without oxygen and nutrients from blood, brain tissue starts to die rapidly. This can happen in less than 10 minutes.    A stroke occurs when blood flow to the brain is blocked (called ischemic stroke). This is caused by one of the following:   Sudden decreased blood flow   Damage to a blood vessel supplying blood to the brain can occur suddenly from either:   Injury   A clot that forms and breaks off from another part of the body (such as the heart or neck)   There are certain conditions which predispose people to form blood clots, such as:   Cancer   Pregnancy   Atrial fibrillation   Certain autoimmune diseases   Local blood clot   A build-up of fatty substances ( atherosclerotic plaque ) along the inner lining of the artery causes:   Narrowing of artery   Reduced elasticity   Local inflammation   Blood protein defects leading to increased clotting tendency   Decreased blood flow in the artery   Clot in an artery supplying the brain   Inflammatory conditions in the blood vessels (vasculitis)   A stroke may also occur if a blood vessel breaks and bleeds into or around the brain. This is called hemorrhagic stroke.      This condition needs to be monitored closely. Be sure 
decrease fall risk and return pt to PLOF.   Therapy Prognosis: Good    Requires PT Follow-Up: Yes    Patient Education:      .    Patient Education  Education Given To: Patient  Education Provided: Role of Therapy, Plan of Care  Education Provided Comments: cont use of AD and staff assist for safe mobility with cont amb throughout the day  Education Method: Verbal  Barriers to Learning: None  Education Outcome: Verbalized understanding       Plan:  Current Treatment Recommendations: Strengthening, Balance training, Gait training, Functional mobility training, Stair training, Transfer training, Endurance training, Equipment evaluation, education, & procurement, Patient/Caregiver education & training, Safety education & training, Therapeutic activities, Home exercise program  General Plan:  (5X C)    Goals:  Patient Goals : feel stronger  Short Term Goals  Time Frame for Short Term Goals: by discharge  Short Term Goal 1: bed mobility with I to get in/out of bed  Short Term Goal 2: transfer with I to get in/out of chairs  Short Term Goal 3: amb >150'x1 with LRAD and S to walk safely in home  Short Term Goal 4: negotiate flight of steps with HR and S to get to second floor bed/bathroom safely in home  Long Term Goals  Time Frame for Long Term Goals : no LTGs set secondary to short ELOS    Following session, patient left in safe position with all fall risk precautions in place.

## 2025-01-30 NOTE — CARE COORDINATION
Care Transitions Note    Initial Call - Call within 2 business days of discharge: Yes    Attempted to reach patient for transitions of care follow up. Unable to reach patient.    Outreach Attempts:   HIPAA compliant voicemail left for patient.     1st attempt to contact pt for initial care transition follow up.  Unable to reach pt.  Left message with contact information and request for call back.      Contacted [facility].  Spoke with Deepthi in intake.  She confirmed referral was received.        Patient: Chriss Braga    Patient : 1955   MRN: <[id number]>    Reason for Admission: Fall, Injury of head  Discharge Date: 25  RURS: Readmission Risk Score: 25.1    Last Discharge Facility       Date Complaint Diagnosis Description Type Department Provider    25 Fall Injury of head, initial encounter ... ED to Hosp-Admission (Discharged) (ADMITTED) Aron Kumar MD; Love Huber...            Was this an external facility discharge? No    Follow Up Appointment:   Patient does not have a follow up appointment scheduled at time of call.       Plan for follow-up on next business day.      Kayla Alva RN

## 2025-01-30 NOTE — TELEPHONE ENCOUNTER
Referral in [facility].   Looks like Dr. Jaime saw patient in hospital, consult note from 12/7/2024.  Recently in hospital 11/14/24, 12/3/24, 12/6/24, 12/15/24, 12/26/24 and 1/26/25.   Pt had a hospital follow-up on 1/7/25 with Jean Carlos that was a no show.     LM for patient to call back.

## 2025-01-31 LAB
AMPHETAMINES UR QL SCN: NEGATIVE
BARBITURATES UR QL SCN: NEGATIVE
BENZODIAZ UR QL SCN: NEGATIVE
BZE UR QL SCN: NEGATIVE
CANNABINOIDS UR QL SCN: POSITIVE
EKG ATRIAL RATE: 65 BPM
EKG P AXIS: 79 DEGREES
EKG P-R INTERVAL: 176 MS
EKG Q-T INTERVAL: 470 MS
EKG QRS DURATION: 88 MS
EKG QTC CALCULATION (BAZETT): 488 MS
EKG R AXIS: 30 DEGREES
EKG T AXIS: 21 DEGREES
EKG VENTRICULAR RATE: 65 BPM
FENTANYL: NEGATIVE
OPIATES UR QL SCN: NEGATIVE
OXYCODONE: NEGATIVE
PCP UR QL SCN: NEGATIVE

## 2025-01-31 PROCEDURE — 2580000003 HC RX 258: Performed by: EMERGENCY MEDICINE

## 2025-01-31 PROCEDURE — 2060000000 HC ICU INTERMEDIATE R&B

## 2025-01-31 PROCEDURE — 2500000003 HC RX 250 WO HCPCS

## 2025-01-31 PROCEDURE — 95819 EEG AWAKE AND ASLEEP: CPT | Performed by: PSYCHIATRY & NEUROLOGY

## 2025-01-31 PROCEDURE — 95816 EEG AWAKE AND DROWSY: CPT

## 2025-01-31 PROCEDURE — 99223 1ST HOSP IP/OBS HIGH 75: CPT | Performed by: STUDENT IN AN ORGANIZED HEALTH CARE EDUCATION/TRAINING PROGRAM

## 2025-01-31 PROCEDURE — 6370000000 HC RX 637 (ALT 250 FOR IP)

## 2025-01-31 PROCEDURE — 80307 DRUG TEST PRSMV CHEM ANLYZR: CPT

## 2025-01-31 RX ORDER — ATORVASTATIN CALCIUM 80 MG/1
80 TABLET, FILM COATED ORAL DAILY
Status: DISCONTINUED | OUTPATIENT
Start: 2025-01-31 | End: 2025-02-01 | Stop reason: HOSPADM

## 2025-01-31 RX ORDER — SODIUM CHLORIDE 9 MG/ML
INJECTION, SOLUTION INTRAVENOUS PRN
Status: DISCONTINUED | OUTPATIENT
Start: 2025-01-31 | End: 2025-02-01 | Stop reason: HOSPADM

## 2025-01-31 RX ORDER — AMIODARONE HYDROCHLORIDE 200 MG/1
200 TABLET ORAL DAILY
Status: DISCONTINUED | OUTPATIENT
Start: 2025-01-31 | End: 2025-02-01 | Stop reason: HOSPADM

## 2025-01-31 RX ORDER — ISOSORBIDE MONONITRATE 30 MG/1
30 TABLET, EXTENDED RELEASE ORAL DAILY
Status: DISCONTINUED | OUTPATIENT
Start: 2025-01-31 | End: 2025-02-01 | Stop reason: HOSPADM

## 2025-01-31 RX ORDER — ENOXAPARIN SODIUM 100 MG/ML
40 INJECTION SUBCUTANEOUS DAILY
Status: DISCONTINUED | OUTPATIENT
Start: 2025-01-31 | End: 2025-01-31

## 2025-01-31 RX ORDER — ACETAMINOPHEN 650 MG/1
650 SUPPOSITORY RECTAL EVERY 6 HOURS PRN
Status: DISCONTINUED | OUTPATIENT
Start: 2025-01-31 | End: 2025-02-01 | Stop reason: HOSPADM

## 2025-01-31 RX ORDER — LEVETIRACETAM 500 MG/1
500 TABLET ORAL 2 TIMES DAILY
Status: DISCONTINUED | OUTPATIENT
Start: 2025-01-31 | End: 2025-02-01 | Stop reason: HOSPADM

## 2025-01-31 RX ORDER — RANOLAZINE 500 MG/1
1000 TABLET, EXTENDED RELEASE ORAL DAILY
Status: DISCONTINUED | OUTPATIENT
Start: 2025-01-31 | End: 2025-02-01 | Stop reason: HOSPADM

## 2025-01-31 RX ORDER — FERROUS SULFATE 325(65) MG
325 TABLET ORAL EVERY OTHER DAY
Status: DISCONTINUED | OUTPATIENT
Start: 2025-01-31 | End: 2025-02-01 | Stop reason: HOSPADM

## 2025-01-31 RX ORDER — CLOPIDOGREL BISULFATE 75 MG/1
75 TABLET ORAL DAILY
Status: DISCONTINUED | OUTPATIENT
Start: 2025-01-31 | End: 2025-02-01 | Stop reason: HOSPADM

## 2025-01-31 RX ORDER — GLUCAGON 1 MG/ML
1 KIT INJECTION PRN
Status: DISCONTINUED | OUTPATIENT
Start: 2025-01-31 | End: 2025-02-01 | Stop reason: HOSPADM

## 2025-01-31 RX ORDER — POTASSIUM CHLORIDE 1500 MG/1
40 TABLET, EXTENDED RELEASE ORAL PRN
Status: DISCONTINUED | OUTPATIENT
Start: 2025-01-31 | End: 2025-02-01 | Stop reason: HOSPADM

## 2025-01-31 RX ORDER — PANTOPRAZOLE SODIUM 40 MG/1
40 TABLET, DELAYED RELEASE ORAL
Status: DISCONTINUED | OUTPATIENT
Start: 2025-02-01 | End: 2025-02-01 | Stop reason: HOSPADM

## 2025-01-31 RX ORDER — POLYETHYLENE GLYCOL 3350 17 G/17G
17 POWDER, FOR SOLUTION ORAL DAILY PRN
Status: DISCONTINUED | OUTPATIENT
Start: 2025-01-31 | End: 2025-02-01 | Stop reason: HOSPADM

## 2025-01-31 RX ORDER — SODIUM CHLORIDE 0.9 % (FLUSH) 0.9 %
5-40 SYRINGE (ML) INJECTION EVERY 12 HOURS SCHEDULED
Status: DISCONTINUED | OUTPATIENT
Start: 2025-01-31 | End: 2025-02-01 | Stop reason: HOSPADM

## 2025-01-31 RX ORDER — MAGNESIUM SULFATE IN WATER 40 MG/ML
2000 INJECTION, SOLUTION INTRAVENOUS PRN
Status: DISCONTINUED | OUTPATIENT
Start: 2025-01-31 | End: 2025-02-01 | Stop reason: HOSPADM

## 2025-01-31 RX ORDER — ONDANSETRON 4 MG/1
4 TABLET, ORALLY DISINTEGRATING ORAL EVERY 8 HOURS PRN
Status: DISCONTINUED | OUTPATIENT
Start: 2025-01-31 | End: 2025-02-01 | Stop reason: HOSPADM

## 2025-01-31 RX ORDER — MECLIZINE HCL 12.5 MG 12.5 MG/1
25 TABLET ORAL 3 TIMES DAILY PRN
Status: DISCONTINUED | OUTPATIENT
Start: 2025-01-31 | End: 2025-02-01 | Stop reason: HOSPADM

## 2025-01-31 RX ORDER — POTASSIUM CHLORIDE 7.45 MG/ML
10 INJECTION INTRAVENOUS PRN
Status: DISCONTINUED | OUTPATIENT
Start: 2025-01-31 | End: 2025-02-01 | Stop reason: HOSPADM

## 2025-01-31 RX ORDER — ASPIRIN 81 MG/1
81 TABLET, CHEWABLE ORAL DAILY
Status: DISCONTINUED | OUTPATIENT
Start: 2025-01-31 | End: 2025-02-01 | Stop reason: HOSPADM

## 2025-01-31 RX ORDER — LISINOPRIL 10 MG/1
10 TABLET ORAL DAILY
Status: DISCONTINUED | OUTPATIENT
Start: 2025-01-31 | End: 2025-02-01 | Stop reason: HOSPADM

## 2025-01-31 RX ORDER — ONDANSETRON 2 MG/ML
4 INJECTION INTRAMUSCULAR; INTRAVENOUS EVERY 6 HOURS PRN
Status: DISCONTINUED | OUTPATIENT
Start: 2025-01-31 | End: 2025-02-01 | Stop reason: HOSPADM

## 2025-01-31 RX ORDER — SODIUM CHLORIDE 0.9 % (FLUSH) 0.9 %
5-40 SYRINGE (ML) INJECTION PRN
Status: DISCONTINUED | OUTPATIENT
Start: 2025-01-31 | End: 2025-02-01 | Stop reason: HOSPADM

## 2025-01-31 RX ORDER — ACETAMINOPHEN 325 MG/1
650 TABLET ORAL EVERY 6 HOURS PRN
Status: DISCONTINUED | OUTPATIENT
Start: 2025-01-31 | End: 2025-02-01 | Stop reason: HOSPADM

## 2025-01-31 RX ORDER — DEXTROSE MONOHYDRATE 100 MG/ML
INJECTION, SOLUTION INTRAVENOUS CONTINUOUS PRN
Status: DISCONTINUED | OUTPATIENT
Start: 2025-01-31 | End: 2025-02-01 | Stop reason: HOSPADM

## 2025-01-31 RX ORDER — METOPROLOL SUCCINATE 25 MG/1
25 TABLET, EXTENDED RELEASE ORAL 2 TIMES DAILY
Status: DISCONTINUED | OUTPATIENT
Start: 2025-01-31 | End: 2025-02-01 | Stop reason: HOSPADM

## 2025-01-31 RX ADMIN — RANOLAZINE 1000 MG: 500 TABLET, EXTENDED RELEASE ORAL at 10:34

## 2025-01-31 RX ADMIN — ATORVASTATIN CALCIUM 80 MG: 80 TABLET, FILM COATED ORAL at 10:34

## 2025-01-31 RX ADMIN — ISOSORBIDE MONONITRATE 30 MG: 30 TABLET, EXTENDED RELEASE ORAL at 10:34

## 2025-01-31 RX ADMIN — SODIUM CHLORIDE: 9 INJECTION, SOLUTION INTRAVENOUS at 21:22

## 2025-01-31 RX ADMIN — APIXABAN 5 MG: 5 TABLET, FILM COATED ORAL at 19:59

## 2025-01-31 RX ADMIN — AMIODARONE HYDROCHLORIDE 200 MG: 200 TABLET ORAL at 10:34

## 2025-01-31 RX ADMIN — FERROUS SULFATE TAB 325 MG (65 MG ELEMENTAL FE) 325 MG: 325 (65 FE) TAB at 10:34

## 2025-01-31 RX ADMIN — LEVETIRACETAM 500 MG: 500 TABLET, FILM COATED ORAL at 19:59

## 2025-01-31 RX ADMIN — LEVETIRACETAM 500 MG: 500 TABLET, FILM COATED ORAL at 10:41

## 2025-01-31 RX ADMIN — METOPROLOL SUCCINATE 25 MG: 25 TABLET, FILM COATED, EXTENDED RELEASE ORAL at 10:34

## 2025-01-31 RX ADMIN — SODIUM CHLORIDE, PRESERVATIVE FREE 10 ML: 5 INJECTION INTRAVENOUS at 10:41

## 2025-01-31 RX ADMIN — SODIUM CHLORIDE: 9 INJECTION, SOLUTION INTRAVENOUS at 10:45

## 2025-01-31 RX ADMIN — CLOPIDOGREL BISULFATE 75 MG: 75 TABLET ORAL at 10:34

## 2025-01-31 RX ADMIN — ASPIRIN 81 MG: 81 TABLET, CHEWABLE ORAL at 10:35

## 2025-01-31 RX ADMIN — METOPROLOL SUCCINATE 25 MG: 25 TABLET, FILM COATED, EXTENDED RELEASE ORAL at 19:59

## 2025-01-31 RX ADMIN — APIXABAN 5 MG: 5 TABLET, FILM COATED ORAL at 10:34

## 2025-01-31 RX ADMIN — LISINOPRIL 10 MG: 10 TABLET ORAL at 10:35

## 2025-01-31 ASSESSMENT — PAIN SCALES - GENERAL
PAINLEVEL_OUTOF10: 8
PAINLEVEL_OUTOF10: 0

## 2025-01-31 ASSESSMENT — PAIN DESCRIPTION - LOCATION: LOCATION: HEAD

## 2025-01-31 ASSESSMENT — PAIN DESCRIPTION - ONSET: ONSET: GRADUAL

## 2025-01-31 ASSESSMENT — PAIN DESCRIPTION - PAIN TYPE: TYPE: CHRONIC PAIN

## 2025-01-31 ASSESSMENT — PAIN DESCRIPTION - FREQUENCY: FREQUENCY: CONTINUOUS

## 2025-01-31 ASSESSMENT — PAIN DESCRIPTION - DESCRIPTORS: DESCRIPTORS: ACHING

## 2025-01-31 ASSESSMENT — PAIN - FUNCTIONAL ASSESSMENT: PAIN_FUNCTIONAL_ASSESSMENT: ACTIVITIES ARE NOT PREVENTED

## 2025-01-31 ASSESSMENT — PAIN DESCRIPTION - ORIENTATION: ORIENTATION: MID

## 2025-01-31 NOTE — ED NOTES
Pt resting on cot comfortably at this time. Pt states that he feels about the same as when he came in. Pt updated on poc. No needs expressed.

## 2025-01-31 NOTE — H&P
History & Physical  Internal Medicine Resident         Patient: Chriss Braga 69 y.o. male      : 1955  Date of Admission: 2025  Date of Service: Pt seen/examined on 25 and Admitted to Inpatient with expected LOS greater than two midnights due to medical therapy.       ASSESSMENT AND PLAN  Suspected Vasovagal  Pre-Syncope: H/o recent/recurrent admissions for similar complaints. Prodrome: Nausea, Diaphoresis, and Lightheadedness and Palpitations. LoC: no. Exam on admit: slurred speech, left sided weakness. EKG on admit: NSR. Relevant imaging on admit: CT head negative, CTA H&N negative, CXR negative. Possible Trigger: marijuana use (used ~20 prior to sx onset).   Continuous Telemetry; will try to get cardiac event monitor readout from the event  Neurology consulted - signed off  Pacer interrogation   EEG negative  Fall precautions  PT/OT  Enlarged pituitary gland: incidental finding on CT head - prominent appearing pituitary gland. 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 PRL WNL at 15.4  Obstructive CAD s/p LESTER (24) & CABG x3 (): Last Cardiac Stress test on 23: negative for ischemia. Last LHC on 24: successful PCI OM1 STEMI via SVG x1 LESTER. On ASA 81 mg.   Referral for cardiology given at last discharge   Continue ASA, Statin, Plavix  Chronic Systolic & Diastolic HFrEF, ICM s/p ICD (): no s/sx exacerbation. EF of 215-20%, mildly increased wall thickness, no RWMA on Echo 25. NYHA II.   Daily weights + Strict I/Os  2g Na and 2L water restriction   GDMT: Toprol XL, Lisinopril, Imdur  Paroxysmal Atrial Fibrillation: On Toprol XL, Amio, Eliquis outpatient. Currently in normal sinus rhythm. [id number] Score 6.   Continue Eliquis, Toprol Xl, Amio  Keep K >4 and Mg >2  Continue telemetry  Chronic Microcytic Anemia: likely 2/2 ACD. Baseline Hgb 11 - 12, Hgb OA 13.1. No s/sx overt bleeding. Prior iron studies on 24 - Ferritin 221, iron 77, TIBC 208, iron sat 37.   Difficulty of Paying Living Expenses: Not hard at all   Food Insecurity: Patient Declined (1/31/2025)    Hunger Vital Sign     Worried About Running Out of Food in the Last Year: Patient declined     Ran Out of Food in the Last Year: Patient declined   Transportation Needs: Patient Declined (1/31/2025)    PRAPARE - Transportation     Lack of Transportation (Medical): Patient declined     Lack of Transportation (Non-Medical): Patient declined   Physical Activity: Inactive (3/29/2023)    Received from [facility]    Exercise Vital Sign     Days of Exercise per Week: 0 days     Minutes of Exercise per Session: 0 min   Housing Stability: Patient Declined (1/31/2025)    Housing Stability Vital Sign     Unable to Pay for Housing in the Last Year: Patient declined     Number of Times Moved in the Last Year: 1     Homeless in the Last Year: Patient declined        Code status: Full Code     Electronically signed by Emma Kapadia MD on 1/31/2025 at 9:21 AM.   Case was discussed with the Attending, Dr. Canales.

## 2025-01-31 NOTE — ED NOTES
Pt transported to room 24. Report given to SANDRO Bond. Pt updated on admission status and expresses understanding. Pt informed of possibility of staying in the ER tonight due to low amount of beds upstairs and expresses understanding. No other needs expressed. Call light in reach.

## 2025-01-31 NOTE — PROGRESS NOTES
Patient admitted to 4A Room 19   Complaint upon arrival to the room: dizziness  IV site free of s/s of infection or infiltration.   Vital signs obtained. Assessment and data collection initiated. Oriented to room. Policies and procedures for  explained All questions answered with no further questions at this time. Fall prevention and safety brochure discussed with patient. 2 person skin check completed Jennifer Martin RN      Was swallow screen completed on admission? [x] YES or [] NO  If patient failed swallow test, obtain order for speech therapy consult and keep NPO.

## 2025-01-31 NOTE — PLAN OF CARE
Problem: Chronic Conditions and Co-morbidities  Goal: Patient's chronic conditions and co-morbidity symptoms are monitored and maintained or improved  Outcome: Progressing  Flowsheets (Taken 1/31/2025 1123)  Care Plan - Patient's Chronic Conditions and Co-Morbidity Symptoms are Monitored and Maintained or Improved:   Monitor and assess patient's chronic conditions and comorbid symptoms for stability, deterioration, or improvement   Collaborate with multidisciplinary team to address chronic and comorbid conditions and prevent exacerbation or deterioration   Update acute care plan with appropriate goals if chronic or comorbid symptoms are exacerbated and prevent overall improvement and discharge     Problem: Discharge Planning  Goal: Discharge to home or other facility with appropriate resources  Outcome: Progressing  Flowsheets (Taken 1/31/2025 1123)  Discharge to home or other facility with appropriate resources:   Identify barriers to discharge with patient and caregiver   Identify discharge learning needs (meds, wound care, etc)   Refer to discharge planning if patient needs post-hospital services based on physician order or complex needs related to functional status, cognitive ability or social support system   Arrange for needed discharge resources and transportation as appropriate     Problem: ABCDS Injury Assessment  Goal: Absence of physical injury  Outcome: Progressing  Flowsheets (Taken 1/31/2025 1123)  Absence of Physical Injury: Implement safety measures based on patient assessment     Problem: Safety - Adult  Goal: Free from fall injury  Outcome: Progressing  Flowsheets (Taken 1/31/2025 1123)  Free From Fall Injury: Instruct family/caregiver on patient safety     Problem: Pain  Goal: Verbalizes/displays adequate comfort level or baseline comfort level  Outcome: Progressing  Flowsheets (Taken 1/31/2025 1123)  Verbalizes/displays adequate comfort level or baseline comfort level:   Encourage patient to

## 2025-01-31 NOTE — PROCEDURES
PROCEDURE NOTE  Date: 1/31/2025   Name: Chriss Braga  YOB: 1955    Procedures  Report for Pacemaker was handed to Jacqueline JO. Brook CLEMENTE

## 2025-01-31 NOTE — ED TRIAGE NOTES
Pt presents to the ED with complaints of a near syncopal episode that happened about an hour ago. Pt states that he was just sitting in his chair when he had a sudden onset of dizziness. Pt states that he did not pass out and did not fall out of his chair. Pt states he was just discharged from the hospital today and was told that his heart is only working at 30%. Pt states that he does have a pacemaker and doesn't endorse any firing of the pacemaker. Upon assessment pt hard to understand due to slurring words. Neuro assessment completed and pt noted to be significantly weak on his left side. NIH completed and scoring a 5. Dr. Duval asked to come to the room due to concern about pt having a stroke.

## 2025-01-31 NOTE — CARE COORDINATION
01/31/25 1407   Readmission Assessment   Number of Days since last admission? 1-7 days   Previous Disposition Home with Home Health   Who is being Interviewed Patient   What was the patient's/caregiver's perception as to why they think they needed to return back to the hospital? Other (Comment)  (I passed out again)   Did you visit your Primary Care Physician after you left the hospital, before you returned this time? No  (pt just discharged)   Why weren't you able to visit your PCP? Other (Comment)  (pt just discharged)   Did you see a specialist, such as Cardiac, Pulmonary, Orthopedic Physician, etc. after you left the hospital? No   Who advised the patient to return to the hospital? Self-referral   Does the patient report anything that got in the way of taking their medications? No   In our efforts to provide the best possible care to you and others like you, can you think of anything that we could have done to help you after you left the hospital the first time, so that you might not have needed to return so soon? Other (Comment)  (I was ready to go home)

## 2025-01-31 NOTE — PROCEDURES
PROCEDURE NOTE  Date: 1/30/2025   Name: Chriss Braga  YOB: 1955    Procedures  12 lead EKG completed. Results handed to Dr. Morley.

## 2025-01-31 NOTE — ED NOTES
Patient transported to [facility] on cart and in stable condition. Contacted floor before transport.

## 2025-01-31 NOTE — ED NOTES
ED to inpatient nurses report      Chief Complaint:  Chief Complaint   Patient presents with    Near Syncope     Present to ED from: home    MOA:     LOC: alert and orientated to name, place, date  Mobility: Requires assistance * 1  Oxygen Baseline: room air    Current needs required: none     Code Status:   Prior    What abnormal results were found and what did you give/do to treat them? Left side weakness  Any procedures or intervention occur? Admisson    Mental Status:  Level of Consciousness: Alert (0)    Psych Assessment:        Vitals:  Patient Vitals for the past 24 hrs:   BP Temp Temp src Pulse Resp SpO2   01/31/25 0716 (!) 178/107 -- -- 67 20 97 %   01/31/25 0611 (!) 179/106 -- -- 64 19 100 %   01/31/25 0601 (!) 172/103 -- -- 62 17 98 %   01/31/25 0535 -- -- -- 60 12 94 %   01/31/25 0515 (!) 159/98 -- -- 60 16 97 %   01/31/25 0500 (!) 158/92 -- -- 60 11 96 %   01/31/25 0459 -- -- -- 62 14 96 %   01/31/25 0415 (!) 166/101 -- -- 60 14 96 %   01/31/25 0316 -- -- -- 60 11 98 %   01/31/25 0315 (!) 157/100 -- -- 62 12 --   01/31/25 0245 (!) 147/97 -- -- 60 12 97 %   01/31/25 0208 (!) 140/87 -- -- 61 10 96 %   01/31/25 0123 (!) 130/99 -- -- 63 17 96 %   01/31/25 0059 139/83 -- -- 64 12 97 %   01/31/25 0008 132/85 -- -- 62 11 95 %   01/30/25 2328 137/86 -- -- 66 16 97 %   01/30/25 2307 133/86 -- -- 62 15 97 %   01/30/25 2218 128/85 -- -- 65 16 98 %   01/30/25 2200 107/77 97.6 °F (36.4 °C) Oral 62 16 --        LDAs:   Peripheral IV 01/30/25 Left;Posterior Wrist (Active)   Site Assessment Clean, dry & intact 01/30/25 2202   Line Status Blood return noted;Flushed;Normal saline locked;Specimen collected 01/30/25 2202   Phlebitis Assessment No symptoms 01/30/25 2202   Infiltration Assessment 0 01/30/25 2202   Dressing Status Clean, dry & intact 01/30/25 2202       Peripheral IV 01/30/25 Right Antecubital (Active)   Site Assessment Clean, dry & intact 01/30/25 2205   Line Status Blood return noted;Flushed;Normal saline

## 2025-01-31 NOTE — ED NOTES
Pt resting on cot comfortably at this time. Pt requesting this RN to talk to the provider due to the patient not feeling like he is able to go home. Pt states that he feels like there is truly something wrong and that he just doesn't feel like he can go home. Provider made aware. NIH reassessed and scoring 4. Pt having persistent weakness on the left side.

## 2025-01-31 NOTE — CARE COORDINATION
1/31/25, 1:59 PM EST    Patient goals/plan/ treatment preferences discussed by  and .  Patient goals/plan/ treatment preferences reviewed with patient/ family.  Patient/ family verbalize understanding of discharge plan and are in agreement with goal/plan/treatment preferences.  Understanding was demonstrated using the teach back method.  AVS provided by RN at time of discharge, which includes all necessary medical information pertaining to the patients current course of illness, treatment, post-discharge goals of care, and treatment preferences.     Services At/After Discharge: Home Health, OT, and PT       Patient will be discharged today back to home.  He just started with [facility]  for PT and OT.  Sent a message to Raquel in admissions for [facility]  to make her aware he will be discharged to home today .

## 2025-01-31 NOTE — ED PROVIDER NOTES
[facility]  EMERGENCY DEPARTMENT ENCOUNTER          Pt Name: Chriss Braga  MRN: [id number]  Birthdate 1955  Date of evaluation: 1/30/2025  Treating Resident Physician: Jose Cordova MD  Supervising Physician: Saurabh Morley DO    History obtained from chart review and the patient.       CODE STROKE   Activation By:  ED  Arrived By: Private vehicle    NIHSS, POC glucose, rapid physical and neurologic exam, vitals including weight obtained in the ED before transfer to CT.     Last known well: 2100  Time of stroke alert: 2209  Vascular risk factors: CHF  Initial POC glucose: 117  Old deficits from prior stroke: NA  INR in ED if anticoagulated:  NA  Initial blood pressure: 122/78  Initial NIHSS: 5  Time of initial imaging read: 2220  Thrombolytic administered: No  Thrombectomy performed: No    The CODE Stroke Team met the patient on arrival to CT.    Initial NIHSS: 5 (from Screenings)  NIH Stroke Scale  Interval: Baseline  Level of Consciousness (1a): Alert  LOC Questions (1b): Answers both correctly  LOC Commands (1c): Performs both tasks correctly  Best Gaze (2): Normal  Visual (3): No visual loss  Facial Palsy (4): Normal symmetrical movement  Motor Arm, Left (5a): Drift, but does not hit bed  Motor Arm, Right (5b): No drift  Motor Leg, Left (6a): Drift, but does not hit bed  Motor Leg, Right (6b): No drift  Limb Ataxia (7): (!) Present in one limb  Sensory (8): (!) Mild to Moderate  Best Language (9): No aphasia  Dysarthria (10): Mild to moderate, slurs some words  Extinction and Inattention (11): No abnormality  Total: 5    CT Head Without Contrast: No acute intracranial hemorrhage    ECG, IV inserted, and blood obtained between CT non-contrast and CTA.    ECG:     CTA Head and Neck: No large vessel occlusion.    Communicated with Neurointerventional Team at 2220 who recommended canceling CODE STROKE as patient is known to have repeated similar episodes with  (has no administration in time range)   ondansetron (ZOFRAN-ODT) disintegrating tablet 4 mg (has no administration in time range)     Or   ondansetron (ZOFRAN) injection 4 mg (has no administration in time range)   polyethylene glycol (GLYCOLAX) packet 17 g (has no administration in time range)   acetaminophen (TYLENOL) tablet 650 mg (has no administration in time range)     Or   acetaminophen (TYLENOL) suppository 650 mg (has no administration in time range)   glucose chewable tablet 16 g (has no administration in time range)   dextrose bolus 10% 125 mL (has no administration in time range)     Or   dextrose bolus 10% 250 mL (has no administration in time range)   glucagon injection 1 mg (has no administration in time range)   dextrose 10 % infusion (has no administration in time range)   amiodarone (CORDARONE) tablet 200 mg (200 mg Oral Given 1/31/25 1034)   apixaban (ELIQUIS) tablet 5 mg (5 mg Oral Given 1/31/25 1034)   aspirin chewable tablet 81 mg (81 mg Oral Given 1/31/25 1035)   atorvastatin (LIPITOR) tablet 80 mg (80 mg Oral Given 1/31/25 1034)   clopidogrel (PLAVIX) tablet 75 mg (75 mg Oral Given 1/31/25 1034)   ferrous sulfate (IRON 325) tablet 325 mg (325 mg Oral Given 1/31/25 1034)   isosorbide mononitrate (IMDUR) extended release tablet 30 mg (30 mg Oral Given 1/31/25 1034)   levETIRAcetam (KEPPRA) tablet 500 mg (500 mg Oral Given 1/31/25 1041)   lisinopril (PRINIVIL;ZESTRIL) tablet 10 mg (10 mg Oral Given 1/31/25 1035)   meclizine (ANTIVERT) tablet 25 mg (has no administration in time range)   metoprolol succinate (TOPROL XL) extended release tablet 25 mg (25 mg Oral Given 1/31/25 1034)   pantoprazole (PROTONIX) tablet 40 mg (has no administration in time range)   ranolazine (RANEXA) extended release tablet 1,000 mg (1,000 mg Oral Given 1/31/25 1034)   iopamidol (ISOVUE-370) 76 % injection 80 mL (80 mLs IntraVENous Given 1/30/25 7405)         Procedures: (None if blank)    Differential

## 2025-01-31 NOTE — CARE COORDINATION
Case Management Assessment Initial Evaluation    Date/Time of Evaluation: 2025 1:41 PM  Assessment Completed by: Gina Chun RN    If patient is discharged prior to next notation, then this note serves as note for discharge by case management.    Patient Name: Chriss Braga                   YOB: 1955  Diagnosis: Near syncope [R55]  Stroke-like symptoms [R29.90]                   Date / Time: 2025  9:49 PM  Location: [address] [place] [address]     Patient Admission Status: Inpatient   Readmission Risk Low 0-14, Mod 15-19), High > 20: Readmission Risk Score: 27.4    Current PCP: Newman-Waterhouse, Aundrea N, DO  Health Care Decision Makers:   Primary Decision Maker: Willie Braga - Child - [phone]    Secondary Decision Maker: Teena Arteaga - Niece/Nephew - [phone]    Additional Case Management Notes: From ED, PT/OT, diabetes management, IVF, Eliquis, Asa, Plavix, Keppra.     Procedures:    EEG   pacer interrogation    Imagin/30 CT Head WO Contrast 1. No acute intracranial abnormality.   2. Unchanged prominent pituitary gland. Nonemergent MRI utilizing   pituitary protocol may be helpful.      CT Neck W WO Contrast Patent neck CTA.      CTA Head W WO Contrast Patent head CTA.      CXR No acute cardiopulmonary disease.     Patient Goals/Plan/Treatment Preferences: Met with Chriss He currently resides at home with his brother. He has a cane and is current with [facility]. Plan is to return home at discharge and resume HH.  consult in place. He denies any other needs.        25 0356   Service Assessment   Patient Orientation Alert and Oriented   Cognition Alert   History Provided By Patient   Primary Caregiver Self   Support Systems Family Members   Patient's Healthcare Decision Maker is: Patient Declined (Legal Next of Kin Remains as Decision Maker)   PCP Verified by CM Yes   Last Visit to PCP Within last 6 months   Prior Functional Level Independent

## 2025-01-31 NOTE — PROCEDURES
PROCEDURE NOTE  Date: 1/31/2025   Name: Chriss Braga  YOB: 1955    Procedures    EEG REPORT     CLINICAL NEUROPHYSIOLOGY LABORATORY  DEPARTMENT OF NEUROLOGY  [facility]       Patient: Chriss Braga Age: 69 y.o.  MRN: [id number]      Referring Provider: No ref. provider found    History: This routine 30 minute scalp EEG was recorded with video- monitoring for a 69 y.o.. male  who presented with encephalopathy. This EEG was performed to evaluate for focal and epileptiform abnormalities.     Chriss Braga   Current Facility-Administered Medications   Medication Dose Route Frequency Provider Last Rate Last Admin    sodium chloride flush 0.9 % injection 5-40 mL  5-40 mL IntraVENous 2 times per day Emma Kapadia MD   10 mL at 01/31/25 1041    sodium chloride flush 0.9 % injection 5-40 mL  5-40 mL IntraVENous PRN Emma Kapadia MD        0.9 % sodium chloride infusion   IntraVENous PRN Emma Kapadia MD        potassium chloride (KLOR-CON M) extended release tablet 40 mEq  40 mEq Oral PRN Emma Kapadia MD        Or    potassium bicarb-citric acid (EFFER-K) effervescent tablet 40 mEq  40 mEq Oral PRN Emma Kapadia MD        Or    potassium chloride 10 mEq/100 mL IVPB (Peripheral Line)  10 mEq IntraVENous PRN Emma Kapadia MD        magnesium sulfate 2000 mg in 50 mL IVPB premix  2,000 mg IntraVENous PRN Emma Kapadia MD        ondansetron (ZOFRAN-ODT) disintegrating tablet 4 mg  4 mg Oral Q8H PRN Emma Kapadia MD        Or    ondansetron (ZOFRAN) injection 4 mg  4 mg IntraVENous Q6H PRN Emma Kapadia MD        polyethylene glycol (GLYCOLAX) packet 17 g  17 g Oral Daily PRN Emma Kapadia MD        acetaminophen (TYLENOL) tablet 650 mg  650 mg Oral Q6H PRN Emma Kapadia MD        Or    acetaminophen (TYLENOL) suppository 650 mg  650 mg Rectal Q6H PRN Emma Kapadia MD        glucose chewable tablet 16 g  4 tablet Oral PRN Emma Kapadia MD

## 2025-01-31 NOTE — CONSULTS
Neurology Consult Note    Date:1/31/2025       Room:[address] [place] [address]  Patient Name:Chriss Braga     YOB: 1955     Age:69 y.o.    Requesting Physician: Aron Canales MD     Reason for Consult:  Evaluate for stroke       Chief Complaint:   Chief Complaint   Patient presents with    Near Syncope       Subjective     Chriss Braga is a 69 y.o. 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 Patient reports that he was sitting in his chair when all of a sudden he felt hot, diaphoretic, palpitations and blurry vision followed by a brief loss of consciousness that lasted about 1-2mins that occurred  approximately 20mins after smoking marijuana. When he came to he reports that he felt dizzy like the room was spinning, soaked in sweat and is unsure but thinks he may have had urinary incontinence during this episode. He denied any tongue biting or abnormal body movements as this episode was witnessed by his family. He denied any cough, chest pain, shortness of breath, focal weakness or numbness.  He reports that this episode of syncope is somewhat similar to previous episodes.    Review of Systems   Review of Systems   All other systems reviewed and are negative.    Medications   Scheduled Meds:    sodium chloride  500 mL IntraVENous Once     Continuous Infusions:    sodium chloride Stopped (01/30/25 4304)     PRN Meds:   Medications Prior to Admission:   No current facility-administered medications on file prior to encounter.     Current Outpatient Medications on File Prior to Encounter   Medication Sig Dispense Refill    aspirin 81 MG chewable tablet Take 1 tablet by mouth daily 30 tablet 3    meclizine  NSVT (nonsustained ventricular tachycardia) (HCC), Numbness and tingling, Orthostatic hypotension, Polysubstance abuse (HCC), Pulmonary nodule, Syncopal episodes, Tiredness, and Ventricular hypertrophy.    Social History:   reports that he has been smoking cigars and cigarettes. He started smoking about 17 years ago. He has a 2 pack-year smoking history. He has never used smokeless tobacco. He reports that he does not currently use alcohol. He reports current drug use. Drug: Marijuana (Weed).     Family History:   Family History   Problem Relation Age of Onset    High Blood Pressure Mother     Arthritis Mother     Cancer Mother         lung breast    Stroke Mother     Heart Attack Father         Father passed away from myocardial infarction.    Arthritis Father     High Blood Pressure Maternal Aunt     High Cholesterol Maternal Aunt     Stroke Maternal Aunt        Physical Examination      Vitals:  BP (!) 178/107   Pulse 67   Temp 97.6 °F (36.4 °C) (Oral)   Resp 20   SpO2 97%   Temp (24hrs), Av.6 °F (36.4 °C), Min:97.6 °F (36.4 °C), Max:97.6 °F (36.4 °C)      I/O (24Hr):    Intake/Output Summary (Last 24 hours) at 2025 0759  Last data filed at 2025 0245  Gross per 24 hour   Intake --   Output 200 ml   Net -200 ml         Physical Exam  Vitals reviewed.   Constitutional:       Appearance: Normal appearance.   HENT:      Head: Normocephalic and atraumatic.      Mouth/Throat:      Mouth: Mucous membranes are moist.   Eyes:      Extraocular Movements: Extraocular movements intact and EOM normal.      Pupils: Pupils are equal, round, and reactive to light.   Musculoskeletal:         General: Normal range of motion.      Cervical back: Normal range of motion.   Skin:     General: Skin is warm and dry.   Neurological:      Mental Status: He is alert and oriented to person, place, and time.      Motor: Motor strength is normal.     Coordination: Finger-Nose-Finger Test and Heel to Shin Test normal.

## 2025-01-31 NOTE — TELEPHONE ENCOUNTER
Cordelia from [facility]'s Inpatient called and pt needs a follow up appt from hospital. Pt is still admitted and Cordelia did not leave a call back number for her. She left pt's number and I left pt a voicemail to call our office to schedule an appt.    Nurse states that pt's EF is 15-20% and he is having syncope and dizziness.
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
## 2025-02-01 VITALS
RESPIRATION RATE: 17 BRPM | BODY MASS INDEX: 24.16 KG/M2 | HEIGHT: 69 IN | OXYGEN SATURATION: 100 % | SYSTOLIC BLOOD PRESSURE: 160 MMHG | WEIGHT: 163.14 LBS | DIASTOLIC BLOOD PRESSURE: 96 MMHG | HEART RATE: 66 BPM | TEMPERATURE: 98.4 F

## 2025-02-01 PROBLEM — E55.9 VITAMIN D DEFICIENCY: Status: ACTIVE | Noted: 2025-02-01

## 2025-02-01 PROBLEM — E87.8 ELECTROLYTE DISTURBANCE: Status: ACTIVE | Noted: 2025-02-01

## 2025-02-01 LAB
25(OH)D3 SERPL-MCNC: 8 NG/ML (ref 30–100)
ANION GAP SERPL CALC-SCNC: 10 MEQ/L (ref 8–16)
ANION GAP SERPL CALC-SCNC: 10 MEQ/L (ref 8–16)
BUN SERPL-MCNC: 12 MG/DL (ref 7–22)
BUN SERPL-MCNC: 14 MG/DL (ref 7–22)
CA-I BLD ISE-SCNC: 1.17 MMOL/L (ref 1.12–1.32)
CALCIUM SERPL-MCNC: 6 MG/DL (ref 8.5–10.5)
CALCIUM SERPL-MCNC: 8.8 MG/DL (ref 8.5–10.5)
CHLORIDE SERPL-SCNC: 106 MEQ/L (ref 98–111)
CHLORIDE SERPL-SCNC: 120 MEQ/L (ref 98–111)
CO2 SERPL-SCNC: 16 MEQ/L (ref 23–33)
CO2 SERPL-SCNC: 21 MEQ/L (ref 23–33)
CREAT SERPL-MCNC: 0.6 MG/DL (ref 0.4–1.2)
CREAT SERPL-MCNC: 0.9 MG/DL (ref 0.4–1.2)
DEPRECATED RDW RBC AUTO: 40.5 FL (ref 35–45)
EKG ATRIAL RATE: 66 BPM
EKG P AXIS: 40 DEGREES
EKG P-R INTERVAL: 186 MS
EKG Q-T INTERVAL: 440 MS
EKG QRS DURATION: 96 MS
EKG QTC CALCULATION (BAZETT): 461 MS
EKG R AXIS: 70 DEGREES
EKG T AXIS: -7 DEGREES
EKG VENTRICULAR RATE: 66 BPM
ERYTHROCYTE [DISTWIDTH] IN BLOOD BY AUTOMATED COUNT: 16.2 % (ref 11.5–14.5)
GFR SERPL CREATININE-BSD FRML MDRD: > 90 ML/MIN/1.73M2
GFR SERPL CREATININE-BSD FRML MDRD: > 90 ML/MIN/1.73M2
GLUCOSE SERPL-MCNC: 119 MG/DL (ref 70–108)
GLUCOSE SERPL-MCNC: 63 MG/DL (ref 70–108)
HCT VFR BLD AUTO: 28.3 % (ref 42–52)
HCT VFR BLD AUTO: 36.2 % (ref 42–52)
HGB BLD-MCNC: 11.2 GM/DL (ref 14–18)
HGB BLD-MCNC: 8.7 GM/DL (ref 14–18)
MAGNESIUM SERPL-MCNC: 1.4 MG/DL (ref 1.6–2.4)
MAGNESIUM SERPL-MCNC: 1.9 MG/DL (ref 1.6–2.4)
MCH RBC QN AUTO: 21.4 PG (ref 26–33)
MCHC RBC AUTO-ENTMCNC: 30.7 GM/DL (ref 32.2–35.5)
MCV RBC AUTO: 69.5 FL (ref 80–94)
PLATELET # BLD AUTO: 143 THOU/MM3 (ref 130–400)
PMV BLD AUTO: 10.2 FL (ref 9.4–12.4)
POTASSIUM SERPL-SCNC: 3.2 MEQ/L (ref 3.5–5.2)
POTASSIUM SERPL-SCNC: 4.2 MEQ/L (ref 3.5–5.2)
PTH-INTACT SERPL-MCNC: 52.2 PG/ML (ref 15–65)
RBC # BLD AUTO: 4.07 MILL/MM3 (ref 4.7–6.1)
SODIUM SERPL-SCNC: 137 MEQ/L (ref 135–145)
SODIUM SERPL-SCNC: 146 MEQ/L (ref 135–145)
WBC # BLD AUTO: 5.5 THOU/MM3 (ref 4.8–10.8)

## 2025-02-01 PROCEDURE — 83970 ASSAY OF PARATHORMONE: CPT

## 2025-02-01 PROCEDURE — 93005 ELECTROCARDIOGRAM TRACING: CPT | Performed by: STUDENT IN AN ORGANIZED HEALTH CARE EDUCATION/TRAINING PROGRAM

## 2025-02-01 PROCEDURE — 2500000003 HC RX 250 WO HCPCS

## 2025-02-01 PROCEDURE — 85014 HEMATOCRIT: CPT

## 2025-02-01 PROCEDURE — 97165 OT EVAL LOW COMPLEX 30 MIN: CPT

## 2025-02-01 PROCEDURE — 36415 COLL VENOUS BLD VENIPUNCTURE: CPT

## 2025-02-01 PROCEDURE — 6370000000 HC RX 637 (ALT 250 FOR IP)

## 2025-02-01 PROCEDURE — 97161 PT EVAL LOW COMPLEX 20 MIN: CPT

## 2025-02-01 PROCEDURE — 85018 HEMOGLOBIN: CPT

## 2025-02-01 PROCEDURE — 97530 THERAPEUTIC ACTIVITIES: CPT

## 2025-02-01 PROCEDURE — 99239 HOSP IP/OBS DSCHRG MGMT >30: CPT | Performed by: STUDENT IN AN ORGANIZED HEALTH CARE EDUCATION/TRAINING PROGRAM

## 2025-02-01 PROCEDURE — 97116 GAIT TRAINING THERAPY: CPT

## 2025-02-01 PROCEDURE — 6360000002 HC RX W HCPCS: Performed by: STUDENT IN AN ORGANIZED HEALTH CARE EDUCATION/TRAINING PROGRAM

## 2025-02-01 PROCEDURE — 80048 BASIC METABOLIC PNL TOTAL CA: CPT

## 2025-02-01 PROCEDURE — 83735 ASSAY OF MAGNESIUM: CPT

## 2025-02-01 PROCEDURE — 82330 ASSAY OF CALCIUM: CPT

## 2025-02-01 PROCEDURE — 6370000000 HC RX 637 (ALT 250 FOR IP): Performed by: STUDENT IN AN ORGANIZED HEALTH CARE EDUCATION/TRAINING PROGRAM

## 2025-02-01 PROCEDURE — 85027 COMPLETE CBC AUTOMATED: CPT

## 2025-02-01 PROCEDURE — 82306 VITAMIN D 25 HYDROXY: CPT

## 2025-02-01 PROCEDURE — 2580000003 HC RX 258: Performed by: STUDENT IN AN ORGANIZED HEALTH CARE EDUCATION/TRAINING PROGRAM

## 2025-02-01 RX ORDER — AMIODARONE HYDROCHLORIDE 200 MG/1
200 TABLET ORAL DAILY
Qty: 30 TABLET | Refills: 0 | Status: ON HOLD | OUTPATIENT
Start: 2025-02-02 | End: 2025-03-04

## 2025-02-01 RX ORDER — ERGOCALCIFEROL 1.25 MG/1
50000 CAPSULE ORAL WEEKLY
Qty: 4 CAPSULE | Refills: 0 | Status: ON HOLD | OUTPATIENT
Start: 2025-02-01 | End: 2025-02-23

## 2025-02-01 RX ORDER — ERGOCALCIFEROL 1.25 MG/1
50000 CAPSULE, LIQUID FILLED ORAL WEEKLY
Status: DISCONTINUED | OUTPATIENT
Start: 2025-02-01 | End: 2025-02-01 | Stop reason: HOSPADM

## 2025-02-01 RX ADMIN — POTASSIUM BICARBONATE 20 MEQ: 782 TABLET, EFFERVESCENT ORAL at 09:49

## 2025-02-01 RX ADMIN — ATORVASTATIN CALCIUM 80 MG: 80 TABLET, FILM COATED ORAL at 09:50

## 2025-02-01 RX ADMIN — PANTOPRAZOLE SODIUM 40 MG: 40 TABLET, DELAYED RELEASE ORAL at 05:36

## 2025-02-01 RX ADMIN — AMIODARONE HYDROCHLORIDE 200 MG: 200 TABLET ORAL at 09:50

## 2025-02-01 RX ADMIN — RANOLAZINE 1000 MG: 500 TABLET, EXTENDED RELEASE ORAL at 09:51

## 2025-02-01 RX ADMIN — LISINOPRIL 10 MG: 10 TABLET ORAL at 09:49

## 2025-02-01 RX ADMIN — ASPIRIN 81 MG: 81 TABLET, CHEWABLE ORAL at 09:49

## 2025-02-01 RX ADMIN — CLOPIDOGREL BISULFATE 75 MG: 75 TABLET ORAL at 09:49

## 2025-02-01 RX ADMIN — ISOSORBIDE MONONITRATE 30 MG: 30 TABLET, EXTENDED RELEASE ORAL at 09:50

## 2025-02-01 RX ADMIN — ERGOCALCIFEROL 50000 UNITS: 1.25 CAPSULE ORAL at 17:06

## 2025-02-01 RX ADMIN — SODIUM CHLORIDE, PRESERVATIVE FREE 10 ML: 5 INJECTION INTRAVENOUS at 09:52

## 2025-02-01 RX ADMIN — METOPROLOL SUCCINATE 25 MG: 25 TABLET, FILM COATED, EXTENDED RELEASE ORAL at 09:49

## 2025-02-01 RX ADMIN — MAGNESIUM SULFATE 3000 MG: 500 INJECTION, SOLUTION INTRAMUSCULAR; INTRAVENOUS at 13:05

## 2025-02-01 RX ADMIN — APIXABAN 5 MG: 5 TABLET, FILM COATED ORAL at 09:50

## 2025-02-01 RX ADMIN — LEVETIRACETAM 500 MG: 500 TABLET, FILM COATED ORAL at 09:49

## 2025-02-01 ASSESSMENT — PAIN DESCRIPTION - LOCATION: LOCATION: CHEST

## 2025-02-01 ASSESSMENT — PAIN DESCRIPTION - DESCRIPTORS: DESCRIPTORS: SHARP;TENDER

## 2025-02-01 ASSESSMENT — PAIN SCALES - GENERAL: PAINLEVEL_OUTOF10: 8

## 2025-02-01 ASSESSMENT — PAIN DESCRIPTION - ORIENTATION: ORIENTATION: LEFT

## 2025-02-01 NOTE — DISCHARGE SUMMARY
Hospital Medicine Discharge Summary      Patient Identification:   Chriss Braga   : 1955  MRN: [id number]   Account: [id number]      Patient's PCP: Newman-Waterhouse, Aundrea N, DO    Admit Date: 2025     Discharge Date:   2025    Admitting Physician: Aron Canales MD     Discharge Physician: Aron Canales MD     Discharge Diagnoses:    Suspected Vasovagal  Pre-Syncope: H/o recent/recurrent admissions for similar complaints. Prodrome: Nausea, Diaphoresis, and Lightheadedness and Palpitations. LoC: no. Exam on admit: slurred speech, left sided weakness. EKG on admit: NSR. Relevant imaging on admit: CT head negative, CTA H&N negative, CXR negative. Possible Trigger: marijuana use (used ~20 prior to sx onset). Advised to stay hydrated. Fall precautions. Cardiac event monitor in place from prior admission. Follow up with PCP and Cardiology. Asymptomatic at this time.    Enlarged pituitary gland: incidental finding on CT head - prominent appearing pituitary gland. 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 PRL WNL at 15.4.   Vitamin D deficiency: Vitamin 25-OH low at 8. Started on weekly supplementation x 4 weeks. Follow up outpatient with repeat levels. PTH level normal 52.     Obstructive CAD s/p LESTER (24) & CABG x3 (): Last Cardiac Stress test on 23: negative for ischemia. Last LHC on 24: successful PCI OM1 STEMI via SVG x1 LESTER. On ASA 81 mg.   Referral for cardiology given at last discharge   Continue ASA, Statin, Plavix  Chronic Systolic & Diastolic HFrEF, ICM s/p ICD (): no s/sx exacerbation. EF of 215-20%, mildly increased wall thickness, no RWMA on Echo 25. NYHA II.   Daily weights at home  2g Na and 2L water restriction   GDMT: Toprol XL, Lisinopril, Imdur  Paroxysmal Atrial Fibrillation: On Toprol XL, Amio, Eliquis outpatient. Currently in normal sinus rhythm. [id number] Score 6.   Continue Eliquis, Toprol Xl, Amio  Keep K >4 and Mg >2  Chronic Microcytic

## 2025-02-01 NOTE — PROCEDURES
PROCEDURE NOTE  Date: 2/1/2025   Name: Chriss Braga  YOB: 1955    Procedures  EKG completed

## 2025-02-01 NOTE — PROGRESS NOTES
[facility]  INPATIENT OCCUPATIONAL THERAPY  STRZ NEUROSCIENCES 4A  EVALUATION      Discharge Recommendations: Home with assist PRN  Equipment Recommendations: No        Time In: 1140  Time Out: 1150  Timed Code Treatment Minutes: 10 Minutes  Minutes: 10          Date: 2025  Patient Name: Chriss Braga,   Gender: male      MRN: [id number]  : 1955  (69 y.o.)  Referring Practitioner: Emma Kapadia MD  Diagnosis: Stroke-like symptoms  Additional Pertinent Hx: Per chart: 69 y.o. male with PMHx of prior CVA () w/ residual left-sided deficits, CAD s/p CABG x3 (), depression, GERD, HTN, HLD, afib, CHF/ICM s/p ICD () who presents to [facility] with near syncope. Pt was discharged on  for similar sx. Benign workup at that time. Pt states that while he was resting at home he began to feel very lightheaded/dizzy, nauseous, diaphoretic, palpitations, and had tunnel vision. States he was very close to passing out, denies actual LOC. Presented to ED for further evaluation, where code stroke was called for slurred speech and left sided weakness. Initial NIHSS 5. Imaging and w/u negative for stroke, neurology consulted, who ordered EEG. After further discussion with pt, he states that about 20 min prior to his sx he had smoked marijuana. He denies any CP, SOB, abdominal pain. His slurred speech and left sided weakness have improved at time of evaluation. Suspect that his recrudescence of his prior stroke sx is marijuana-induced in conjunction with his very low EF (15%. Discussed with pt the importance of abstaining from marijuana usage.    Restrictions/Precautions:  Restrictions/Precautions: Fall Risk    Subjective  Chart Reviewed: Yes, Orders, Progress Notes, History and Physical, Imaging  Patient assessed for rehabilitation services?: Yes  Family / Caregiver Present: No    Subjective: pt met in chair, RN approved session.    Pain: 0/10    Vitals: Vitals not assessed per

## 2025-02-01 NOTE — PROGRESS NOTES
procurement  General Plan:  (3x GM)    Goals:  Patient Goals : none stated  Short Term Goals  Time Frame for Short Term Goals: by discharge  Short Term Goal 1: bed mobility with HOB flat, no rails, mod I for increased functional ind  Short Term Goal 2: sit <>stand from various surfaces with LRD mod I for safe transfers  Short Term Goal 3: ambulate 100' with LRD Mod I for safe household distances  Short Term Goal 4: navigate 13 steps with LRD mod I for safe access of home  Long Term Goals  Time Frame for Long Term Goals : NA d/t short ELOS    Following session, patient left in safe position with all fall risk precautions in place.

## 2025-02-01 NOTE — PROGRESS NOTES
Discharge teaching and instructions for diagnosis of stroke like symptoms completed with patient using teachback method. Patient voiced understanding regarding prescriptions, follow up appointments, and care of self at home. Discharged in a wheelchair to  home with support per EMS transportation.  AVS reviewed.   Printed prescriptions given to patient.    All questions answered and belongings sent with the patient.

## 2025-02-01 NOTE — PLAN OF CARE
Problem: Chronic Conditions and Co-morbidities  Goal: Patient's chronic conditions and co-morbidity symptoms are monitored and maintained or improved  2/1/2025 1531 by Gali Mackenzie RN  Outcome: Adequate for Discharge  2/1/2025 0408 by Godwin Nolasco RN  Outcome: Progressing     Problem: Discharge Planning  Goal: Discharge to home or other facility with appropriate resources  2/1/2025 1531 by Gali Mackenzie RN  Outcome: Adequate for Discharge  2/1/2025 0408 by Godwin Nolasco RN  Outcome: Progressing     Problem: ABCDS Injury Assessment  Goal: Absence of physical injury  2/1/2025 1531 by Gali Mackenzie RN  Outcome: Adequate for Discharge  2/1/2025 0408 by Godwin Nolasco RN  Outcome: Progressing     Problem: Safety - Adult  Goal: Free from fall injury  2/1/2025 1531 by Gali Mackenzie RN  Outcome: Adequate for Discharge  2/1/2025 0408 by Godwin Nolasco RN  Outcome: Progressing     Problem: Pain  Goal: Verbalizes/displays adequate comfort level or baseline comfort level  2/1/2025 1531 by Gali Mackenzie RN  Outcome: Adequate for Discharge  2/1/2025 0408 by Godwin Nolasco RN  Outcome: Progressing     Problem: Neurosensory - Adult  Goal: Achieves stable or improved neurological status  2/1/2025 1531 by Gali Mackenzie RN  Outcome: Adequate for Discharge  2/1/2025 0408 by Godwin Nolasco RN  Outcome: Progressing  Goal: Absence of seizures  2/1/2025 1531 by Gali Mackenzie RN  Outcome: Adequate for Discharge  2/1/2025 0408 by Godwin Nolasco RN  Outcome: Progressing  Goal: Achieves maximal functionality and self care  2/1/2025 1531 by Gali Mackenzie RN  Outcome: Adequate for Discharge  2/1/2025 0408 by Godwin Nolasco RN  Outcome: Progressing     Problem: Cardiovascular - Adult  Goal: Maintains optimal cardiac output and hemodynamic stability  2/1/2025 1531 by Zalar, Gali, RN  Outcome: Adequate for Discharge  2/1/2025 0408 by Constance,

## 2025-02-02 ENCOUNTER — APPOINTMENT (OUTPATIENT)
Dept: GENERAL RADIOLOGY | Age: 70
DRG: 884 | End: 2025-02-02
Payer: MEDICARE

## 2025-02-02 ENCOUNTER — HOSPITAL ENCOUNTER (INPATIENT)
Age: 70
LOS: 4 days | Discharge: NURSING, REHAB OR LONG-TERM CARE | DRG: 884 | End: 2025-02-06
Attending: EMERGENCY MEDICINE
Payer: MEDICARE

## 2025-02-02 DIAGNOSIS — R06.02 SHORTNESS OF BREATH: Primary | ICD-10-CM

## 2025-02-02 DIAGNOSIS — I20.9 ANGINA PECTORIS (HCC): ICD-10-CM

## 2025-02-02 PROBLEM — R53.1 GENERALIZED WEAKNESS: Status: ACTIVE | Noted: 2025-02-02

## 2025-02-02 PROBLEM — S09.90XA HEAD INJURY: Status: ACTIVE | Noted: 2025-02-02

## 2025-02-02 LAB
ALBUMIN SERPL BCG-MCNC: 4.2 G/DL (ref 3.5–5.1)
ALP SERPL-CCNC: 77 U/L (ref 38–126)
ALT SERPL W/O P-5'-P-CCNC: 12 U/L (ref 11–66)
AMPHETAMINES UR QL SCN: NEGATIVE
ANION GAP SERPL CALC-SCNC: 12 MEQ/L (ref 8–16)
AST SERPL-CCNC: 18 U/L (ref 5–40)
BACTERIA: ABNORMAL
BARBITURATES UR QL SCN: NEGATIVE
BENZODIAZ UR QL SCN: NEGATIVE
BILIRUB SERPL-MCNC: 0.8 MG/DL (ref 0.3–1.2)
BILIRUB UR QL STRIP: NEGATIVE
BUN SERPL-MCNC: 15 MG/DL (ref 7–22)
BZE UR QL SCN: NEGATIVE
CALCIUM SERPL-MCNC: 9 MG/DL (ref 8.5–10.5)
CANNABINOIDS UR QL SCN: POSITIVE
CASTS #/AREA URNS LPF: ABNORMAL /LPF
CASTS #/AREA URNS LPF: ABNORMAL /LPF
CHARACTER UR: CLEAR
CHARCOAL URNS QL MICRO: ABNORMAL
CHLORIDE SERPL-SCNC: 102 MEQ/L (ref 98–111)
CO2 SERPL-SCNC: 23 MEQ/L (ref 23–33)
COLOR UR: YELLOW
CREAT SERPL-MCNC: 1.2 MG/DL (ref 0.4–1.2)
CRYSTALS URNS QL MICRO: ABNORMAL
EKG ATRIAL RATE: 74 BPM
EKG P AXIS: 83 DEGREES
EKG P-R INTERVAL: 164 MS
EKG Q-T INTERVAL: 402 MS
EKG QRS DURATION: 88 MS
EKG QTC CALCULATION (BAZETT): 446 MS
EKG R AXIS: -76 DEGREES
EKG T AXIS: 41 DEGREES
EKG VENTRICULAR RATE: 74 BPM
EPITHELIAL CELLS, UA: ABNORMAL /HPF
FENTANYL: NEGATIVE
FLUAV RNA RESP QL NAA+PROBE: NOT DETECTED
FLUBV RNA RESP QL NAA+PROBE: NOT DETECTED
GFR SERPL CREATININE-BSD FRML MDRD: 66 ML/MIN/1.73M2
GLUCOSE SERPL-MCNC: 95 MG/DL (ref 70–108)
GLUCOSE UR QL STRIP.AUTO: NEGATIVE MG/DL
HGB UR QL STRIP.AUTO: NEGATIVE
KETONES UR QL STRIP.AUTO: NEGATIVE
LEUKOCYTE ESTERASE UR QL STRIP.AUTO: NEGATIVE
NITRITE UR QL STRIP.AUTO: NEGATIVE
OPIATES UR QL SCN: NEGATIVE
OSMOLALITY SERPL CALC.SUM OF ELEC: 274.5 MOSMOL/KG (ref 275–300)
OXYCODONE: NEGATIVE
PCP UR QL SCN: NEGATIVE
PH UR STRIP.AUTO: 6 [PH] (ref 5–9)
POTASSIUM SERPL-SCNC: 4.2 MEQ/L (ref 3.5–5.2)
PROT SERPL-MCNC: 7.4 G/DL (ref 6.1–8)
PROT UR STRIP.AUTO-MCNC: ABNORMAL MG/DL
RBC #/AREA URNS HPF: ABNORMAL /HPF
RENAL EPI CELLS #/AREA URNS HPF: ABNORMAL /[HPF]
SARS-COV-2 RNA RESP QL NAA+PROBE: NOT DETECTED
SCAN OF BLOOD SMEAR: NORMAL
SODIUM SERPL-SCNC: 137 MEQ/L (ref 135–145)
SPECIFIC GRAVITY UA: 1.02 (ref 1–1.03)
TROPONIN, HIGH SENSITIVITY: 11 NG/L (ref 0–12)
TROPONIN, HIGH SENSITIVITY: 7 NG/L (ref 0–12)
TROPONIN, HIGH SENSITIVITY: 8 NG/L (ref 0–12)
UROBILINOGEN, URINE: 1 EU/DL (ref 0–1)
WBC #/AREA URNS HPF: ABNORMAL /HPF
YEAST LIKE FUNGI URNS QL MICRO: ABNORMAL

## 2025-02-02 PROCEDURE — 81001 URINALYSIS AUTO W/SCOPE: CPT

## 2025-02-02 PROCEDURE — 85025 COMPLETE CBC W/AUTO DIFF WBC: CPT

## 2025-02-02 PROCEDURE — 93005 ELECTROCARDIOGRAM TRACING: CPT | Performed by: EMERGENCY MEDICINE

## 2025-02-02 PROCEDURE — 99285 EMERGENCY DEPT VISIT HI MDM: CPT

## 2025-02-02 PROCEDURE — 71045 X-RAY EXAM CHEST 1 VIEW: CPT

## 2025-02-02 PROCEDURE — 2140000000 HC CCU INTERMEDIATE R&B

## 2025-02-02 PROCEDURE — 80307 DRUG TEST PRSMV CHEM ANLYZR: CPT

## 2025-02-02 PROCEDURE — 87636 SARSCOV2 & INF A&B AMP PRB: CPT

## 2025-02-02 PROCEDURE — 36415 COLL VENOUS BLD VENIPUNCTURE: CPT

## 2025-02-02 PROCEDURE — 99223 1ST HOSP IP/OBS HIGH 75: CPT | Performed by: STUDENT IN AN ORGANIZED HEALTH CARE EDUCATION/TRAINING PROGRAM

## 2025-02-02 PROCEDURE — 6370000000 HC RX 637 (ALT 250 FOR IP)

## 2025-02-02 PROCEDURE — 6360000002 HC RX W HCPCS

## 2025-02-02 PROCEDURE — 84484 ASSAY OF TROPONIN QUANT: CPT

## 2025-02-02 PROCEDURE — 80053 COMPREHEN METABOLIC PANEL: CPT

## 2025-02-02 PROCEDURE — 93005 ELECTROCARDIOGRAM TRACING: CPT

## 2025-02-02 RX ORDER — DIPHENHYDRAMINE HCL 25 MG
25 TABLET ORAL ONCE
Status: COMPLETED | OUTPATIENT
Start: 2025-02-02 | End: 2025-02-02

## 2025-02-02 RX ORDER — ONDANSETRON 4 MG/1
4 TABLET, ORALLY DISINTEGRATING ORAL EVERY 8 HOURS PRN
Status: DISCONTINUED | OUTPATIENT
Start: 2025-02-02 | End: 2025-02-06 | Stop reason: HOSPADM

## 2025-02-02 RX ORDER — SODIUM CHLORIDE 9 MG/ML
INJECTION, SOLUTION INTRAVENOUS PRN
Status: DISCONTINUED | OUTPATIENT
Start: 2025-02-02 | End: 2025-02-06 | Stop reason: HOSPADM

## 2025-02-02 RX ORDER — ACETAMINOPHEN 650 MG/1
650 SUPPOSITORY RECTAL EVERY 6 HOURS PRN
Status: DISCONTINUED | OUTPATIENT
Start: 2025-02-02 | End: 2025-02-06 | Stop reason: HOSPADM

## 2025-02-02 RX ORDER — SODIUM CHLORIDE 0.9 % (FLUSH) 0.9 %
5-40 SYRINGE (ML) INJECTION PRN
Status: DISCONTINUED | OUTPATIENT
Start: 2025-02-02 | End: 2025-02-06 | Stop reason: HOSPADM

## 2025-02-02 RX ORDER — METOPROLOL SUCCINATE 25 MG/1
25 TABLET, EXTENDED RELEASE ORAL 2 TIMES DAILY
Status: DISCONTINUED | OUTPATIENT
Start: 2025-02-02 | End: 2025-02-06 | Stop reason: HOSPADM

## 2025-02-02 RX ORDER — LISINOPRIL 10 MG/1
10 TABLET ORAL DAILY
Status: DISCONTINUED | OUTPATIENT
Start: 2025-02-03 | End: 2025-02-06 | Stop reason: HOSPADM

## 2025-02-02 RX ORDER — KETOROLAC TROMETHAMINE 30 MG/ML
15 INJECTION, SOLUTION INTRAMUSCULAR; INTRAVENOUS ONCE
Status: COMPLETED | OUTPATIENT
Start: 2025-02-02 | End: 2025-02-02

## 2025-02-02 RX ORDER — ISOSORBIDE MONONITRATE 30 MG/1
30 TABLET, EXTENDED RELEASE ORAL DAILY
Status: DISCONTINUED | OUTPATIENT
Start: 2025-02-03 | End: 2025-02-06 | Stop reason: HOSPADM

## 2025-02-02 RX ORDER — ASPIRIN 81 MG/1
81 TABLET, CHEWABLE ORAL DAILY
Status: DISCONTINUED | OUTPATIENT
Start: 2025-02-03 | End: 2025-02-04

## 2025-02-02 RX ORDER — MECLIZINE HCL 12.5 MG 12.5 MG/1
25 TABLET ORAL 3 TIMES DAILY PRN
Status: DISCONTINUED | OUTPATIENT
Start: 2025-02-02 | End: 2025-02-06 | Stop reason: HOSPADM

## 2025-02-02 RX ORDER — NITROGLYCERIN 0.4 MG/1
0.4 TABLET SUBLINGUAL EVERY 5 MIN PRN
Status: DISCONTINUED | OUTPATIENT
Start: 2025-02-02 | End: 2025-02-06 | Stop reason: HOSPADM

## 2025-02-02 RX ORDER — ATORVASTATIN CALCIUM 80 MG/1
80 TABLET, FILM COATED ORAL DAILY
Status: DISCONTINUED | OUTPATIENT
Start: 2025-02-03 | End: 2025-02-06 | Stop reason: HOSPADM

## 2025-02-02 RX ORDER — FERROUS SULFATE 325(65) MG
325 TABLET ORAL EVERY OTHER DAY
Status: DISCONTINUED | OUTPATIENT
Start: 2025-02-03 | End: 2025-02-06 | Stop reason: HOSPADM

## 2025-02-02 RX ORDER — PANTOPRAZOLE SODIUM 40 MG/1
40 TABLET, DELAYED RELEASE ORAL
Status: DISCONTINUED | OUTPATIENT
Start: 2025-02-03 | End: 2025-02-06 | Stop reason: HOSPADM

## 2025-02-02 RX ORDER — SODIUM CHLORIDE 0.9 % (FLUSH) 0.9 %
5-40 SYRINGE (ML) INJECTION EVERY 12 HOURS SCHEDULED
Status: DISCONTINUED | OUTPATIENT
Start: 2025-02-02 | End: 2025-02-06 | Stop reason: HOSPADM

## 2025-02-02 RX ORDER — AMIODARONE HYDROCHLORIDE 200 MG/1
200 TABLET ORAL DAILY
Status: DISCONTINUED | OUTPATIENT
Start: 2025-02-03 | End: 2025-02-06 | Stop reason: HOSPADM

## 2025-02-02 RX ORDER — RANOLAZINE 500 MG/1
1000 TABLET, EXTENDED RELEASE ORAL DAILY
Status: DISCONTINUED | OUTPATIENT
Start: 2025-02-03 | End: 2025-02-06 | Stop reason: HOSPADM

## 2025-02-02 RX ORDER — ONDANSETRON 2 MG/ML
4 INJECTION INTRAMUSCULAR; INTRAVENOUS EVERY 6 HOURS PRN
Status: DISCONTINUED | OUTPATIENT
Start: 2025-02-02 | End: 2025-02-06 | Stop reason: HOSPADM

## 2025-02-02 RX ORDER — CLOPIDOGREL BISULFATE 75 MG/1
75 TABLET ORAL DAILY
Status: DISCONTINUED | OUTPATIENT
Start: 2025-02-03 | End: 2025-02-06 | Stop reason: HOSPADM

## 2025-02-02 RX ORDER — POTASSIUM CHLORIDE 7.45 MG/ML
10 INJECTION INTRAVENOUS PRN
Status: DISCONTINUED | OUTPATIENT
Start: 2025-02-02 | End: 2025-02-06 | Stop reason: HOSPADM

## 2025-02-02 RX ORDER — POTASSIUM CHLORIDE 1500 MG/1
40 TABLET, EXTENDED RELEASE ORAL PRN
Status: DISCONTINUED | OUTPATIENT
Start: 2025-02-02 | End: 2025-02-06 | Stop reason: HOSPADM

## 2025-02-02 RX ORDER — MAGNESIUM SULFATE IN WATER 40 MG/ML
2000 INJECTION, SOLUTION INTRAVENOUS PRN
Status: DISCONTINUED | OUTPATIENT
Start: 2025-02-02 | End: 2025-02-06 | Stop reason: HOSPADM

## 2025-02-02 RX ORDER — LEVETIRACETAM 500 MG/1
500 TABLET ORAL 2 TIMES DAILY
Status: DISCONTINUED | OUTPATIENT
Start: 2025-02-02 | End: 2025-02-06 | Stop reason: HOSPADM

## 2025-02-02 RX ORDER — ENOXAPARIN SODIUM 100 MG/ML
40 INJECTION SUBCUTANEOUS DAILY
Status: DISCONTINUED | OUTPATIENT
Start: 2025-02-02 | End: 2025-02-02

## 2025-02-02 RX ORDER — ACETAMINOPHEN 325 MG/1
650 TABLET ORAL EVERY 6 HOURS PRN
Status: DISCONTINUED | OUTPATIENT
Start: 2025-02-02 | End: 2025-02-06 | Stop reason: HOSPADM

## 2025-02-02 RX ORDER — PROCHLORPERAZINE EDISYLATE 5 MG/ML
5 INJECTION INTRAMUSCULAR; INTRAVENOUS ONCE
Status: DISCONTINUED | OUTPATIENT
Start: 2025-02-02 | End: 2025-02-06 | Stop reason: HOSPADM

## 2025-02-02 RX ORDER — POLYETHYLENE GLYCOL 3350 17 G/17G
17 POWDER, FOR SOLUTION ORAL DAILY PRN
Status: DISCONTINUED | OUTPATIENT
Start: 2025-02-02 | End: 2025-02-06 | Stop reason: HOSPADM

## 2025-02-02 RX ADMIN — KETOROLAC TROMETHAMINE 15 MG: 30 INJECTION, SOLUTION INTRAMUSCULAR at 23:01

## 2025-02-02 RX ADMIN — DIPHENHYDRAMINE HYDROCHLORIDE 25 MG: 25 TABLET ORAL at 23:01

## 2025-02-02 RX ADMIN — METOPROLOL SUCCINATE 25 MG: 25 TABLET, FILM COATED, EXTENDED RELEASE ORAL at 22:43

## 2025-02-02 RX ADMIN — LEVETIRACETAM 500 MG: 500 TABLET, FILM COATED ORAL at 23:01

## 2025-02-02 RX ADMIN — MECLIZINE 25 MG: 12.5 TABLET ORAL at 22:43

## 2025-02-02 RX ADMIN — APIXABAN 5 MG: 5 TABLET, FILM COATED ORAL at 22:42

## 2025-02-02 ASSESSMENT — PAIN SCALES - GENERAL: PAINLEVEL_OUTOF10: 8

## 2025-02-02 ASSESSMENT — PAIN DESCRIPTION - ORIENTATION: ORIENTATION: MID

## 2025-02-02 ASSESSMENT — PAIN DESCRIPTION - ONSET: ONSET: ON-GOING

## 2025-02-02 ASSESSMENT — PAIN DESCRIPTION - FREQUENCY: FREQUENCY: CONTINUOUS

## 2025-02-02 ASSESSMENT — PAIN DESCRIPTION - DESCRIPTORS: DESCRIPTORS: ACHING

## 2025-02-02 ASSESSMENT — PAIN - FUNCTIONAL ASSESSMENT: PAIN_FUNCTIONAL_ASSESSMENT: ACTIVITIES ARE NOT PREVENTED

## 2025-02-02 ASSESSMENT — PAIN DESCRIPTION - LOCATION: LOCATION: HEAD

## 2025-02-02 ASSESSMENT — PAIN DESCRIPTION - PAIN TYPE: TYPE: CHRONIC PAIN

## 2025-02-02 NOTE — ED PROVIDER NOTES
[facility] EMERGENCY DEPARTMENT      EMERGENCY MEDICINE     Pt Name: Chriss Braga  MRN: [id number]  Birthdate 1955  Date of evaluation: 2/2/2025  Provider: Saurabh Hahn DO  Supervising Physician: Doyle Echeverria DO    CHIEF COMPLAINT       Chief Complaint   Patient presents with    Chest Pain    Shortness of Breath     HISTORY OF PRESENT ILLNESS   Chriss Braga is a 69 y.o. male with a history of PAF on Eliquis, CAD, ICD, HFrEF, polysubstance use, CABG who presents to the emergency department from home for evaluation of SOB that started at rest shortly PTA. It lasted one hour resolved, no chest pain associated with it.  Patient was discharged yesterday.  His daughter is concerned with his chronic memory issues and recurrent falls.  Patient had a fall a few days ago but was scanned in the hospital and admitted, he has not had any falls since then.  She is very concerned because he has difficulty finding soda at [facility]. Pt denies sx at this time, new fall, fever.     PASTMEDICAL HISTORY     Past Medical History:   Diagnosis Date    Anemia     Microcytic anemia.     Angina     Arthritis     Atypical chest pain     Question if this is secondary to his uncontrolled hypertension or if this is secondary to exacerbation of COPD or secondary to his recent ICD implantation.    Blood transfusion 2011    CAD (coronary artery disease)     Cardiomyopathy (HCC)     Cataract     bilateral    Chest discomfort     Depression     Dizziness     Patient complains of dizziness with movement.     Erectile dysfunction     Possible erectile dysfunction symptoms.     Family history of hypertension     Frequent nocturnal awakening     Frequent nocturnal awakenings in a patient with a history of CHF, S/P defibrillator placement and excessive daytime sleepiness, rule out sleep apnea versus central sleep apnea.     GERD (gastroesophageal reflux disease)     Currently controlled.     Glaucoma     Headache(784.0)     Homeless

## 2025-02-02 NOTE — ED NOTES
Pt family member tells this RN that pt has been falling more often lately. She believes pt has hit their head recently causing them to be confused.

## 2025-02-02 NOTE — ED NOTES
Pt to ED via intake with c/o SOB and chest pain. Pt reports he was d/c lastnight. He reports he woke up today and was going to the store and began feeling SOB. He reports SOB is worse with exertion. Pt denies taking any medications today. EKG completed.

## 2025-02-03 PROBLEM — R06.02 SHORTNESS OF BREATH: Status: ACTIVE | Noted: 2025-02-03

## 2025-02-03 LAB
ANION GAP SERPL CALC-SCNC: 12 MEQ/L (ref 8–16)
BASOPHILS ABSOLUTE: 0 THOU/MM3 (ref 0–0.1)
BASOPHILS NFR BLD AUTO: 0.2 %
BUN SERPL-MCNC: 17 MG/DL (ref 7–22)
CALCIUM SERPL-MCNC: 8.7 MG/DL (ref 8.5–10.5)
CHLORIDE SERPL-SCNC: 105 MEQ/L (ref 98–111)
CK SERPL-CCNC: 83 U/L (ref 55–170)
CO2 SERPL-SCNC: 22 MEQ/L (ref 23–33)
CREAT SERPL-MCNC: 1.1 MG/DL (ref 0.4–1.2)
CRP SERPL-MCNC: < 0.3 MG/DL (ref 0–1)
DEPRECATED RDW RBC AUTO: 38 FL (ref 35–45)
DEPRECATED RDW RBC AUTO: 38.5 FL (ref 35–45)
EKG ATRIAL RATE: 62 BPM
EKG ATRIAL RATE: 69 BPM
EKG P AXIS: 70 DEGREES
EKG P AXIS: 73 DEGREES
EKG P-R INTERVAL: 184 MS
EKG P-R INTERVAL: 184 MS
EKG Q-T INTERVAL: 412 MS
EKG Q-T INTERVAL: 454 MS
EKG QRS DURATION: 84 MS
EKG QRS DURATION: 90 MS
EKG QTC CALCULATION (BAZETT): 441 MS
EKG QTC CALCULATION (BAZETT): 460 MS
EKG R AXIS: 44 DEGREES
EKG R AXIS: 51 DEGREES
EKG T AXIS: -6 DEGREES
EKG T AXIS: 0 DEGREES
EKG VENTRICULAR RATE: 62 BPM
EKG VENTRICULAR RATE: 69 BPM
EOSINOPHIL NFR BLD AUTO: 0.7 %
EOSINOPHILS ABSOLUTE: 0.1 THOU/MM3 (ref 0–0.4)
ERYTHROCYTE [DISTWIDTH] IN BLOOD BY AUTOMATED COUNT: 16.8 % (ref 11.5–14.5)
ERYTHROCYTE [DISTWIDTH] IN BLOOD BY AUTOMATED COUNT: 17.2 % (ref 11.5–14.5)
ERYTHROCYTE [SEDIMENTATION RATE] IN BLOOD BY WESTERGREN METHOD: 6 MM/HR (ref 0–10)
GFR SERPL CREATININE-BSD FRML MDRD: 73 ML/MIN/1.73M2
GLUCOSE SERPL-MCNC: 88 MG/DL (ref 70–108)
HCT VFR BLD AUTO: 38.7 % (ref 42–52)
HCT VFR BLD AUTO: 39.6 % (ref 42–52)
HGB BLD-MCNC: 12.3 GM/DL (ref 14–18)
HGB BLD-MCNC: 12.6 GM/DL (ref 14–18)
IMM GRANULOCYTES # BLD AUTO: 0.05 THOU/MM3 (ref 0–0.07)
IMM GRANULOCYTES NFR BLD AUTO: 0.6 %
LYMPHOCYTES ABSOLUTE: 1.4 THOU/MM3 (ref 1–4.8)
LYMPHOCYTES NFR BLD AUTO: 17.1 %
MAGNESIUM SERPL-MCNC: 2.1 MG/DL (ref 1.6–2.4)
MCH RBC QN AUTO: 21.5 PG (ref 26–33)
MCH RBC QN AUTO: 21.6 PG (ref 26–33)
MCHC RBC AUTO-ENTMCNC: 31.8 GM/DL (ref 32.2–35.5)
MCHC RBC AUTO-ENTMCNC: 31.8 GM/DL (ref 32.2–35.5)
MCV RBC AUTO: 67.5 FL (ref 80–94)
MCV RBC AUTO: 67.9 FL (ref 80–94)
MICROCYTES BLD QL SMEAR: PRESENT
MONOCYTES ABSOLUTE: 0.5 THOU/MM3 (ref 0.4–1.3)
MONOCYTES NFR BLD AUTO: 5.8 %
NEUTROPHILS ABSOLUTE: 6.4 THOU/MM3 (ref 1.8–7.7)
NEUTROPHILS NFR BLD AUTO: 75.6 %
NRBC BLD AUTO-RTO: 0 /100 WBC
OSMOLALITY SERPL CALC.SUM OF ELEC: 278.5 MOSMOL/KG (ref 275–300)
PATHOLOGIST REVIEW: ABNORMAL
PHOSPHATE SERPL-MCNC: 3.3 MG/DL (ref 2.4–4.7)
PLATELET # BLD AUTO: 212 THOU/MM3 (ref 130–400)
PLATELET # BLD AUTO: 224 THOU/MM3 (ref 130–400)
PMV BLD AUTO: 10.1 FL (ref 9.4–12.4)
PMV BLD AUTO: 10.3 FL (ref 9.4–12.4)
POTASSIUM SERPL-SCNC: 3.9 MEQ/L (ref 3.5–5.2)
RBC # BLD AUTO: 5.7 MILL/MM3 (ref 4.7–6.1)
RBC # BLD AUTO: 5.87 MILL/MM3 (ref 4.7–6.1)
SODIUM SERPL-SCNC: 139 MEQ/L (ref 135–145)
T4 FREE SERPL-MCNC: 1.18 NG/DL (ref 0.93–1.68)
TSH SERPL DL<=0.005 MIU/L-ACNC: 4.67 UIU/ML (ref 0.4–4.2)
WBC # BLD AUTO: 6.9 THOU/MM3 (ref 4.8–10.8)
WBC # BLD AUTO: 8.4 THOU/MM3 (ref 4.8–10.8)

## 2025-02-03 PROCEDURE — 82550 ASSAY OF CK (CPK): CPT

## 2025-02-03 PROCEDURE — 36415 COLL VENOUS BLD VENIPUNCTURE: CPT

## 2025-02-03 PROCEDURE — 84443 ASSAY THYROID STIM HORMONE: CPT

## 2025-02-03 PROCEDURE — 2140000000 HC CCU INTERMEDIATE R&B

## 2025-02-03 PROCEDURE — 93005 ELECTROCARDIOGRAM TRACING: CPT | Performed by: STUDENT IN AN ORGANIZED HEALTH CARE EDUCATION/TRAINING PROGRAM

## 2025-02-03 PROCEDURE — 86140 C-REACTIVE PROTEIN: CPT

## 2025-02-03 PROCEDURE — 97162 PT EVAL MOD COMPLEX 30 MIN: CPT

## 2025-02-03 PROCEDURE — 85651 RBC SED RATE NONAUTOMATED: CPT

## 2025-02-03 PROCEDURE — 6370000000 HC RX 637 (ALT 250 FOR IP)

## 2025-02-03 PROCEDURE — 80048 BASIC METABOLIC PNL TOTAL CA: CPT

## 2025-02-03 PROCEDURE — 6370000000 HC RX 637 (ALT 250 FOR IP): Performed by: STUDENT IN AN ORGANIZED HEALTH CARE EDUCATION/TRAINING PROGRAM

## 2025-02-03 PROCEDURE — 84100 ASSAY OF PHOSPHORUS: CPT

## 2025-02-03 PROCEDURE — 97535 SELF CARE MNGMENT TRAINING: CPT

## 2025-02-03 PROCEDURE — 84439 ASSAY OF FREE THYROXINE: CPT

## 2025-02-03 PROCEDURE — 99233 SBSQ HOSP IP/OBS HIGH 50: CPT | Performed by: STUDENT IN AN ORGANIZED HEALTH CARE EDUCATION/TRAINING PROGRAM

## 2025-02-03 PROCEDURE — 97166 OT EVAL MOD COMPLEX 45 MIN: CPT

## 2025-02-03 PROCEDURE — 83735 ASSAY OF MAGNESIUM: CPT

## 2025-02-03 PROCEDURE — 97530 THERAPEUTIC ACTIVITIES: CPT

## 2025-02-03 PROCEDURE — 99223 1ST HOSP IP/OBS HIGH 75: CPT | Performed by: INTERNAL MEDICINE

## 2025-02-03 PROCEDURE — 2500000003 HC RX 250 WO HCPCS

## 2025-02-03 PROCEDURE — 85027 COMPLETE CBC AUTOMATED: CPT

## 2025-02-03 PROCEDURE — 93010 ELECTROCARDIOGRAM REPORT: CPT | Performed by: NUCLEAR MEDICINE

## 2025-02-03 RX ORDER — SODIUM CHLORIDE 9 MG/ML
500 INJECTION, SOLUTION INTRAVENOUS CONTINUOUS PRN
Status: CANCELLED | OUTPATIENT
Start: 2025-02-03 | End: 2025-02-03

## 2025-02-03 RX ORDER — AMINOPHYLLINE 25 MG/ML
50 INJECTION, SOLUTION INTRAVENOUS PRN
Status: CANCELLED | OUTPATIENT
Start: 2025-02-03 | End: 2025-02-03

## 2025-02-03 RX ORDER — SODIUM CHLORIDE 0.9 % (FLUSH) 0.9 %
5-40 SYRINGE (ML) INJECTION PRN
Status: CANCELLED | OUTPATIENT
Start: 2025-02-03 | End: 2025-02-03

## 2025-02-03 RX ORDER — NITROGLYCERIN 0.4 MG/1
0.4 TABLET SUBLINGUAL EVERY 5 MIN PRN
Status: CANCELLED | OUTPATIENT
Start: 2025-02-03 | End: 2025-02-03

## 2025-02-03 RX ORDER — ALBUTEROL SULFATE 90 UG/1
2 INHALANT RESPIRATORY (INHALATION) PRN
Status: CANCELLED | OUTPATIENT
Start: 2025-02-03 | End: 2025-02-03

## 2025-02-03 RX ORDER — POTASSIUM CHLORIDE 1500 MG/1
40 TABLET, EXTENDED RELEASE ORAL ONCE
Status: COMPLETED | OUTPATIENT
Start: 2025-02-03 | End: 2025-02-03

## 2025-02-03 RX ORDER — METOPROLOL TARTRATE 1 MG/ML
5 INJECTION, SOLUTION INTRAVENOUS EVERY 5 MIN PRN
Status: CANCELLED | OUTPATIENT
Start: 2025-02-03 | End: 2025-02-03

## 2025-02-03 RX ORDER — VITAMIN B COMPLEX
1000 TABLET ORAL DAILY
COMMUNITY

## 2025-02-03 RX ORDER — ATROPINE SULFATE 0.1 MG/ML
0.5 INJECTION INTRAVENOUS EVERY 5 MIN PRN
Status: CANCELLED | OUTPATIENT
Start: 2025-02-03 | End: 2025-02-03

## 2025-02-03 RX ORDER — REGADENOSON 0.08 MG/ML
0.4 INJECTION, SOLUTION INTRAVENOUS
Status: CANCELLED | OUTPATIENT
Start: 2025-02-03

## 2025-02-03 RX ADMIN — RANOLAZINE 1000 MG: 500 TABLET, EXTENDED RELEASE ORAL at 08:52

## 2025-02-03 RX ADMIN — PANTOPRAZOLE SODIUM 40 MG: 40 TABLET, DELAYED RELEASE ORAL at 08:52

## 2025-02-03 RX ADMIN — METOPROLOL SUCCINATE 25 MG: 25 TABLET, FILM COATED, EXTENDED RELEASE ORAL at 08:52

## 2025-02-03 RX ADMIN — FERROUS SULFATE TAB 325 MG (65 MG ELEMENTAL FE) 325 MG: 325 (65 FE) TAB at 08:52

## 2025-02-03 RX ADMIN — LISINOPRIL 10 MG: 10 TABLET ORAL at 08:52

## 2025-02-03 RX ADMIN — SODIUM CHLORIDE, PRESERVATIVE FREE 10 ML: 5 INJECTION INTRAVENOUS at 19:49

## 2025-02-03 RX ADMIN — ISOSORBIDE MONONITRATE 30 MG: 30 TABLET, EXTENDED RELEASE ORAL at 08:52

## 2025-02-03 RX ADMIN — LEVETIRACETAM 500 MG: 500 TABLET, FILM COATED ORAL at 08:52

## 2025-02-03 RX ADMIN — CLOPIDOGREL BISULFATE 75 MG: 75 TABLET ORAL at 08:52

## 2025-02-03 RX ADMIN — POTASSIUM CHLORIDE 40 MEQ: 1500 TABLET, EXTENDED RELEASE ORAL at 13:43

## 2025-02-03 RX ADMIN — MECLIZINE 25 MG: 12.5 TABLET ORAL at 19:48

## 2025-02-03 RX ADMIN — ATORVASTATIN CALCIUM 80 MG: 80 TABLET, FILM COATED ORAL at 08:52

## 2025-02-03 RX ADMIN — METOPROLOL SUCCINATE 25 MG: 25 TABLET, FILM COATED, EXTENDED RELEASE ORAL at 19:49

## 2025-02-03 RX ADMIN — AMIODARONE HYDROCHLORIDE 200 MG: 200 TABLET ORAL at 08:52

## 2025-02-03 RX ADMIN — LEVETIRACETAM 500 MG: 500 TABLET, FILM COATED ORAL at 19:49

## 2025-02-03 RX ADMIN — APIXABAN 5 MG: 5 TABLET, FILM COATED ORAL at 19:48

## 2025-02-03 RX ADMIN — ASPIRIN 81 MG: 81 TABLET, CHEWABLE ORAL at 08:52

## 2025-02-03 RX ADMIN — APIXABAN 5 MG: 5 TABLET, FILM COATED ORAL at 08:52

## 2025-02-03 ASSESSMENT — PAIN SCALES - GENERAL: PAINLEVEL_OUTOF10: 4

## 2025-02-03 ASSESSMENT — PAIN DESCRIPTION - LOCATION: LOCATION: HEAD

## 2025-02-03 NOTE — PLAN OF CARE
CC: Generalized weakness and chest pain    HPI:  The patient is a 69-year-old male with a PMH of obstructive CAD, HFrEF s/p ICD, P A-fib, HTN, HLD, CVA with recent recurrence, pituitary macroadenoma, anemia, GERD and medication noncompliance who presented to [facility] ED for complaints of generalized weakness and chest pain.  Per report, the patient was out of breath and shopping when he felt his legs suddenly give out.  He denied any falls or loss of consciousness.  This was followed by some substernal chest discomfort, nonradiating with no other associated symptoms.  The patient was assisted to home with noted resolution of his chest pain, but persistence of his weakness.  He states that every time he attempts to ambulate his weakness worsens, but no further episodes of chest pain.  He presented to ED for this complaint.    In the ED, vitals were hypertensive but otherwise WNL.  Labs significant for Hgb 12.3, HCT 39.6.  Influenza A/B and COVID-19 negative.  UDS positive for cannabinoids.  Vitamin D 25-hydroxy low.  HST 11/7/8.  EKG NSR, TWI in V5-6, VT 74, QTc 446.  CXR no acute cardiopulmonary process.  The patient admitted for further management evaluation.    Exam:  BP (!) 129/92   Pulse 63   Temp 98.5 °F (36.9 °C) (Oral)   Resp 14   Wt 72.4 kg (159 lb 9.8 oz)   SpO2 99%   BMI 23.57 kg/m²   General appearance: Acute on chronically ill-appearing, lethargic/weak, mild apparent distress, appears stated age and cooperative.  HEENT: Pupils equal, round, and reactive to light. Conjunctivae/corneas clear.  Neck: Supple, with full range of motion. No jugular venous distention. Trachea midline.  Respiratory:  Normal respiratory effort. Clear to auscultation, bilaterally without Rales/Wheezes/Rhonchi.  Cardiovascular: Regular rate and rhythm with normal S1/S2 without murmurs, rubs or gallops.  Abdomen: Soft, non-tender, non-distended with normal bowel sounds.  Musculoskeletal: passive and active ROM x 4

## 2025-02-03 NOTE — ED NOTES
ED to inpatient nurses report      Chief Complaint:  Chief Complaint   Patient presents with    Chest Pain    Shortness of Breath     Present to ED from: home by private vehicle  Family member Tito - [phone]      MOA:  LOC: alert and orientated to name, place, date  Mobility: Independent  Oxygen Baseline: room air    Current needs required: room air     Code Status:   Prior    What abnormal results were found and what did you give/do to treat them? Family concerned for confusion - pt recently admitted and discharged on 2/1/25 from . Family states pt has been falling more frequently as well.     Any procedures or intervention occur? Labs, covid/flu, chest XR    Mental Status:  Level of Consciousness: Alert (0)    Psych Assessment:   No Risk    Vitals:  Patient Vitals for the past 24 hrs:   BP Temp Temp src Pulse Resp SpO2   02/02/25 1947 (!) 167/105 -- -- 72 13 95 %   02/02/25 1837 (!) 165/99 -- -- 67 14 97 %   02/02/25 1700 (!) 163/100 -- -- -- -- --   02/02/25 1658 -- 98 °F (36.7 °C) Oral 69 19 97 %        LDAs:   Peripheral IV 02/02/25 Left;Proximal Forearm (Active)       Ambulatory Status:  Presents to emergency department  because of falls (Syncope, seizure, or loss of consciousness): No, Age > 70: No, Altered Mental Status, Intoxication with alcohol or substance confusion (Disorientation, impaired judgment, poor safety awaremess, or inability to follow instructions): No, Impaired Mobility: Ambulates or transfers with assistive devices or assistance; Unable to ambulate or transer.: Yes, Nursing Judgement: Yes    Diagnosis:  DISPOSITION Admitted 02/02/2025 08:05:48 PM   Final diagnoses:   Shortness of breath        Consults:  None       C-SSRS:   Risk of Suicide: No Risk    Sepsis Screening:   Low Risk    [place] Fall Risk:  [place] 1 Fall Risk  Presents to emergency department  because of falls (Syncope, seizure, or loss of consciousness): No  Age > 70: No  Altered Mental Status, Intoxication with

## 2025-02-03 NOTE — PROCEDURES
PROCEDURE NOTE  Date: 2/2/2025   Name: Chriss Braga  YOB: 1955    Procedures    12 lead EKG completed. Results handed to Patsy JO.

## 2025-02-03 NOTE — CARE COORDINATION
02/03/25 1117   Readmission Assessment   Number of Days since last admission? 1-7 days   Previous Disposition Home with Home Health   Who is being Interviewed Patient   What was the patient's/caregiver's perception as to why they think they needed to return back to the hospital? Other (Comment)  (became sob while shopping)   Did you visit your Primary Care Physician after you left the hospital, before you returned this time? No   Why weren't you able to visit your PCP? Other (Comment)  (admitted next day)   Did you see a specialist, such as Cardiac, Pulmonary, Orthopedic Physician, etc. after you left the hospital? No   Who advised the patient to return to the hospital? Self-referral   Does the patient report anything that got in the way of taking their medications? No   In our efforts to provide the best possible care to you and others like you, can you think of anything that we could have done to help you after you left the hospital the first time, so that you might not have needed to return so soon? Other (Comment)  (Reports he was ready to discharge.)

## 2025-02-03 NOTE — PROCEDURES
PROCEDURE NOTE  Date: 2/3/2025   Name: Chriss Braga  YOB: 1955    Procedures  EKG completed, given to Christin JO

## 2025-02-03 NOTE — H&P
History & Physical  Internal Medicine Resident         Patient: Chriss Braga 69 y.o. male      : 1955  Date of Admission: 2025  Date of Service: Pt seen/examined on 25 and Admitted to Inpatient with expected LOS greater than two midnights due to medical therapy.       ASSESSMENT AND PLAN  Generalized weakness likely due to chronic microcytic anemia versus recent recrudescence stroke versus dehydration versus ACS: Presented with worsening weakness in the lower, exertional.  Denies any loss of consciousness/nausea/vomiting, spinning sensation.  Associated minor discomfort located in the left substernal region nonradiating, resolved with rest.  Has not had similar chest pain for the rest of the day.   Continue monitoring for neurological symptoms, if patient develops any etiologies of symptom will get a stat CT head.  Will consult cardiology for ruling out any arrhythmia.   Continue telemonitoring.   Seizure/fall precaution in place.  Stable angina: Patient reports worsening chest pain with exertion, reports only 1 episode in the past 24 hours.  No associated weakness.  EKG showed normal sinus rhythm.  Trop 8<7<11. Echo 2025 reported EF 15 to 20%.  Patient is on nitroglycerin and ranolazine at home.  Home dose nitro and hydralazine ordered.  Consulted cardiology for optimization of anginal medication.  Continue telemonitoring.   Recent Stroke recrudescence: History of CVA in , residual left-sided weakness in the upper and lower extremities.  Patient denies feeling similar symptoms, reports generalized weakness without any focal deficit.  NIH 1 for loss of sensation on the left lower extremity.   Continue monitoring for neurological symptoms.  Fall/seizure precaution in place.   Ruling out hypothyroidism: TSH 6.320, free T4 1.04 (2024).  Ordered TSH and T4.     Chronic Conditions (reviewed and stable unless otherwise stated)   Enlarged pituitary gland: Incidental finding on CT head: I

## 2025-02-03 NOTE — CARE COORDINATION
DISCHARGE PLANNING EVALUATION  2/3/25, 11:08 AM EST    Reason for Referral: Current [facility]   Decision Maker: Chriss makes his own decisions.    Current Services: Had been set up for PT and OT with [facility] by [facility].  They had not started yet so will need new orders.   New Services Requested: Would like to go to an ECF for rehab.    Family/ Social/ Home environment: From home with his brother.  He has been in the hospital 3 times in the last week.  His brother works during the day so he has to find transport to appointments.    Payment Source:[employer]  Transportation at Discharge: Patients brother vs ambulette  Post-acute (PAC) provider list was provided to patient. Patient was informed of their freedom to choose PAC provider. Discussed and offered to show the patient the relevant PAC Providers quality and resource use measures on Medicare Compare web site via computer based on patient's goals of care and treatment preferences. Questions regarding selection process were answered.      Teach Back Method used with Chriss regarding care plan and discharge planning.   Patient verbalized understanding of the plan of care and contribute to goal setting.       Patient preferences and discharge plan: Gave patient a nursing home list.  He agreed to look it over and pick his top choices for SW to make referrals.  He is aware SW will be back in the afternoon for those choices.  He will be a precert.     Electronically signed by BUBBA Villarreal on 2/3/2025 at 11:08 AM
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
## 2025-02-03 NOTE — ED NOTES
Patient transported to  [facility] by cart in stable condition.   Patient monitored on cardiac telemetry.   IV line is patent.

## 2025-02-03 NOTE — CARE COORDINATION
Case Management Assessment Initial Evaluation    Date/Time of Evaluation: 2/3/2025 7:36 AM  Assessment Completed by: Saranya Joy RN    If patient is discharged prior to next notation, then this note serves as note for discharge by case management.    Patient Name: Chriss Braga                   YOB: 1955  Diagnosis: Shortness of breath [R06.02]  Generalized weakness [R53.1]                   Date / Time: 2025  4:50 PM  Location: [address] [place] [address]     Patient Admission Status: Inpatient   Readmission Risk Low 0-14, Mod 15-19), High > 20: Readmission Risk Score: 31.7    Current PCP: Newman-Waterhouse, Aundrea N, DO  Health Care Decision Makers:   Primary Decision Maker: Willie Braga - Child - [phone]    Secondary Decision Maker: Teena Arteaga - Niece/Nephew - [phone]    Additional Case Management Notes: Presented with sob and chest pain. Family concerned r/t falls and confusion. Hospitalist and cardio following. Amio. Eliquis. ASA. Lipitor. Plavix. Imdur. Keppra. Lisinopril. Metoprolol.     Procedures: none    Imagin/2 CXR: 1. No acute cardiopulmonary finding..     Patient Goals/Plan/Treatment Preferences: Spoke with Chriss, he plans to return home with his brother. Current with [facility]. Has a cane. Declined further services or dme. Await therapy recommendations.          25 1114   Service Assessment   Patient Orientation Alert and Oriented   Cognition Alert   History Provided By Patient   Primary Caregiver Self   Support Systems Family Members;Children   Patient's Healthcare Decision Maker is: Patient Declined (Legal Next of Kin Remains as Decision Maker)   PCP Verified by CM Yes   Last Visit to PCP Within last 6 months   Prior Functional Level Assistance with the following:;Housework;Shopping   Current Functional Level Assistance with the following:;Housework;Shopping   Can patient return to prior living arrangement Unknown at present   Ability to make needs known: Good   Family

## 2025-02-04 ENCOUNTER — APPOINTMENT (OUTPATIENT)
Dept: NUCLEAR MEDICINE | Age: 70
DRG: 884 | End: 2025-02-04
Payer: MEDICARE

## 2025-02-04 ENCOUNTER — HOSPITAL ENCOUNTER (INPATIENT)
Age: 70
Discharge: HOME | DRG: 884 | End: 2025-02-06
Payer: MEDICARE

## 2025-02-04 VITALS
BODY MASS INDEX: 24.16 KG/M2 | SYSTOLIC BLOOD PRESSURE: 160 MMHG | RESPIRATION RATE: 17 BRPM | OXYGEN SATURATION: 100 % | TEMPERATURE: 98.4 F | WEIGHT: 163.14 LBS | DIASTOLIC BLOOD PRESSURE: 96 MMHG | HEIGHT: 69 IN | HEART RATE: 66 BPM

## 2025-02-04 PROBLEM — Z91.148 H/O MEDICATION NONCOMPLIANCE: Status: ACTIVE | Noted: 2025-02-04

## 2025-02-04 LAB
ANION GAP SERPL CALC-SCNC: 7 MEQ/L (ref 8–16)
BUN SERPL-MCNC: 23 MG/DL (ref 7–22)
CALCIUM SERPL-MCNC: 8.8 MG/DL (ref 8.5–10.5)
CHLORIDE SERPL-SCNC: 109 MEQ/L (ref 98–111)
CO2 SERPL-SCNC: 21 MEQ/L (ref 23–33)
CREAT SERPL-MCNC: 1.2 MG/DL (ref 0.4–1.2)
DEPRECATED RDW RBC AUTO: 38.6 FL (ref 35–45)
ERYTHROCYTE [DISTWIDTH] IN BLOOD BY AUTOMATED COUNT: 17.2 % (ref 11.5–14.5)
GFR SERPL CREATININE-BSD FRML MDRD: 66 ML/MIN/1.73M2
GLUCOSE SERPL-MCNC: 87 MG/DL (ref 70–108)
HCT VFR BLD AUTO: 36.7 % (ref 42–52)
HGB BLD-MCNC: 11.8 GM/DL (ref 14–18)
MCH RBC QN AUTO: 21.7 PG (ref 26–33)
MCHC RBC AUTO-ENTMCNC: 32.2 GM/DL (ref 32.2–35.5)
MCV RBC AUTO: 67.6 FL (ref 80–94)
NUC STRESS EJECTION FRACTION: 24 %
PLATELET # BLD AUTO: 197 THOU/MM3 (ref 130–400)
PMV BLD AUTO: 10 FL (ref 9.4–12.4)
POTASSIUM SERPL-SCNC: 4.7 MEQ/L (ref 3.5–5.2)
RBC # BLD AUTO: 5.43 MILL/MM3 (ref 4.7–6.1)
SODIUM SERPL-SCNC: 137 MEQ/L (ref 135–145)
STRESS BASELINE DIAS BP: 90 MMHG
STRESS BASELINE HR: 74 BPM
STRESS BASELINE ST DEPRESSION: 0 MM
STRESS BASELINE SYS BP: 188 MMHG
STRESS ESTIMATED WORKLOAD: 1 METS
STRESS PEAK DIAS BP: 90 MMHG
STRESS PEAK SYS BP: 188 MMHG
STRESS PERCENT HR ACHIEVED: 66 %
STRESS POST PEAK HR: 101 BPM
STRESS RATE PRESSURE PRODUCT: NORMAL BPM*MMHG
STRESS ST DEPRESSION: 0 MM
STRESS STAGE 1 BP: NORMAL MMHG
STRESS STAGE 1 DURATION: 1 MIN:SEC
STRESS STAGE 1 HR: 76 BPM
STRESS STAGE 2 BP: NORMAL MMHG
STRESS STAGE 2 DURATION: 1 MIN:SEC
STRESS STAGE 2 HR: 80 BPM
STRESS STAGE 3 BP: NORMAL MMHG
STRESS STAGE 3 DURATION: 1 MIN:SEC
STRESS STAGE 3 HR: 100 BPM
STRESS STAGE RECOVERY 1 BP: NORMAL MMHG
STRESS STAGE RECOVERY 1 DURATION: 1 MIN:SEC
STRESS STAGE RECOVERY 1 HR: 101 BPM
STRESS STAGE RECOVERY 2 BP: NORMAL MMHG
STRESS STAGE RECOVERY 2 DURATION: 1 MIN:SEC
STRESS STAGE RECOVERY 2 HR: 99 BPM
STRESS STAGE RECOVERY 3 BP: NORMAL MMHG
STRESS STAGE RECOVERY 3 DURATION: 1 MIN:SEC
STRESS STAGE RECOVERY 3 HR: 99 BPM
STRESS STAGE RECOVERY 4 BP: NORMAL MMHG
STRESS STAGE RECOVERY 4 DURATION: 1 MIN:SEC
STRESS STAGE RECOVERY 4 HR: 98 BPM
STRESS TARGET HR: 152 BPM
TID: 1.01
WBC # BLD AUTO: 6.8 THOU/MM3 (ref 4.8–10.8)

## 2025-02-04 PROCEDURE — 6370000000 HC RX 637 (ALT 250 FOR IP)

## 2025-02-04 PROCEDURE — 3430000000 HC RX DIAGNOSTIC RADIOPHARMACEUTICAL: Performed by: INTERNAL MEDICINE

## 2025-02-04 PROCEDURE — 99233 SBSQ HOSP IP/OBS HIGH 50: CPT | Performed by: STUDENT IN AN ORGANIZED HEALTH CARE EDUCATION/TRAINING PROGRAM

## 2025-02-04 PROCEDURE — 2500000003 HC RX 250 WO HCPCS

## 2025-02-04 PROCEDURE — 93017 CV STRESS TEST TRACING ONLY: CPT

## 2025-02-04 PROCEDURE — 99232 SBSQ HOSP IP/OBS MODERATE 35: CPT | Performed by: NURSE PRACTITIONER

## 2025-02-04 PROCEDURE — 2140000000 HC CCU INTERMEDIATE R&B

## 2025-02-04 PROCEDURE — 85027 COMPLETE CBC AUTOMATED: CPT

## 2025-02-04 PROCEDURE — A9500 TC99M SESTAMIBI: HCPCS | Performed by: INTERNAL MEDICINE

## 2025-02-04 PROCEDURE — 6360000002 HC RX W HCPCS

## 2025-02-04 PROCEDURE — 80048 BASIC METABOLIC PNL TOTAL CA: CPT

## 2025-02-04 PROCEDURE — 78452 HT MUSCLE IMAGE SPECT MULT: CPT

## 2025-02-04 PROCEDURE — 36415 COLL VENOUS BLD VENIPUNCTURE: CPT

## 2025-02-04 RX ORDER — TETRAKIS(2-METHOXYISOBUTYLISOCYANIDE)COPPER(I) TETRAFLUOROBORATE 1 MG/ML
10.8 INJECTION, POWDER, LYOPHILIZED, FOR SOLUTION INTRAVENOUS
Status: COMPLETED | OUTPATIENT
Start: 2025-02-04 | End: 2025-02-04

## 2025-02-04 RX ORDER — TETRAKIS(2-METHOXYISOBUTYLISOCYANIDE)COPPER(I) TETRAFLUOROBORATE 1 MG/ML
31.1 INJECTION, POWDER, LYOPHILIZED, FOR SOLUTION INTRAVENOUS
Status: COMPLETED | OUTPATIENT
Start: 2025-02-04 | End: 2025-02-04

## 2025-02-04 RX ORDER — CALCIUM CARBONATE 500 MG/1
500 TABLET, CHEWABLE ORAL 3 TIMES DAILY PRN
Status: DISCONTINUED | OUTPATIENT
Start: 2025-02-04 | End: 2025-02-06 | Stop reason: HOSPADM

## 2025-02-04 RX ORDER — REGADENOSON 0.08 MG/ML
0.4 INJECTION, SOLUTION INTRAVENOUS
Status: COMPLETED | OUTPATIENT
Start: 2025-02-04 | End: 2025-02-04

## 2025-02-04 RX ADMIN — REGADENOSON 0.4 MG: 0.08 INJECTION, SOLUTION INTRAVENOUS at 08:37

## 2025-02-04 RX ADMIN — Medication 31.1 MILLICURIE: at 08:35

## 2025-02-04 RX ADMIN — APIXABAN 5 MG: 5 TABLET, FILM COATED ORAL at 15:19

## 2025-02-04 RX ADMIN — LEVETIRACETAM 500 MG: 500 TABLET, FILM COATED ORAL at 22:36

## 2025-02-04 RX ADMIN — MECLIZINE 25 MG: 12.5 TABLET ORAL at 22:35

## 2025-02-04 RX ADMIN — LISINOPRIL 10 MG: 10 TABLET ORAL at 10:41

## 2025-02-04 RX ADMIN — METOPROLOL SUCCINATE 25 MG: 25 TABLET, FILM COATED, EXTENDED RELEASE ORAL at 22:35

## 2025-02-04 RX ADMIN — Medication 10.8 MILLICURIE: at 07:45

## 2025-02-04 RX ADMIN — ONDANSETRON 4 MG: 2 INJECTION INTRAMUSCULAR; INTRAVENOUS at 21:14

## 2025-02-04 RX ADMIN — ATORVASTATIN CALCIUM 80 MG: 80 TABLET, FILM COATED ORAL at 10:41

## 2025-02-04 RX ADMIN — SODIUM CHLORIDE, PRESERVATIVE FREE 10 ML: 5 INJECTION INTRAVENOUS at 22:38

## 2025-02-04 RX ADMIN — CLOPIDOGREL BISULFATE 75 MG: 75 TABLET ORAL at 10:41

## 2025-02-04 RX ADMIN — SODIUM CHLORIDE, PRESERVATIVE FREE 10 ML: 5 INJECTION INTRAVENOUS at 10:42

## 2025-02-04 RX ADMIN — AMIODARONE HYDROCHLORIDE 200 MG: 200 TABLET ORAL at 10:41

## 2025-02-04 RX ADMIN — LIDOCAINE HYDROCHLORIDE: 20 SOLUTION ORAL at 23:16

## 2025-02-04 RX ADMIN — APIXABAN 5 MG: 5 TABLET, FILM COATED ORAL at 22:35

## 2025-02-04 RX ADMIN — LEVETIRACETAM 500 MG: 500 TABLET, FILM COATED ORAL at 10:41

## 2025-02-04 RX ADMIN — RANOLAZINE 1000 MG: 500 TABLET, EXTENDED RELEASE ORAL at 10:41

## 2025-02-04 RX ADMIN — ISOSORBIDE MONONITRATE 30 MG: 30 TABLET, EXTENDED RELEASE ORAL at 10:41

## 2025-02-04 NOTE — CARE COORDINATION
2/4/25, 8:24 AM EST    DISCHARGE PLANNING EVALUATION    Spoke with patient about ECF choices yesterday.  He asked that SW make referrals to the facilities with the highest star ratings.  Called and made a referral to Dasia at [facility].  They have a bed at [place] and take [employer] Medicare.      Update 9:05 am: Spoke with Dasia from [facility].  They are able to accept at discharge as long as he does not need a life vest.      Update 11:14 am: Precert was started today for [facility].  Patient is aware he has been accepted to the facility.

## 2025-02-04 NOTE — DISCHARGE INSTR - COC
Continuity of Care Form    Patient Name: Chriss Braga   :  1955  MRN:  [id number]    Admit date:  2025  Discharge date:  2025    Code Status Order: Full Code   Advance Directives:   Advance Care Flowsheet Documentation             Admitting Physician:  Anant Barrientos DO  PCP: Newman-Waterhouse, Aundrea N, DO    Discharging Nurse: Sally Epps RN  Discharging Hospital Unit/Room#: 3B-22/022-A  Discharging Unit Phone Number: [phone]    Emergency Contact:   Extended Emergency Contact Information  Primary Emergency Contact: Willie Braga  Mobile Phone: [phone]  Relation: Child  Secondary Emergency Contact: Maddison Ceja  Mobile Phone: [phone]  Relation: Friend    Past Surgical History:  Past Surgical History:   Procedure Laterality Date    APPENDECTOMY      BACK SURGERY      BRONCHOSCOPY N/A 2024    BRONCHOSCOPY FOR AIRWAY EXAMINATION performed by Dylan Ayala MD at [facility] ENDOSCOPY    CARDIAC CATHETERIZATION  2012    CARDIAC DEFIBRILLATOR PLACEMENT  2011    Successful dual-chamber AICD implantation using [employer] device Sarah ZHANG, serial # [id number]. Left axillary and left subclavian venogram. Defibrillation threshold testing. Device programming. Of note, the patient went into severe bradycardiac episode w/drop in BP and heart rate in low 30's while I was suturing leads which required pacing through atrial lead with good AV conduction.     CARDIAC DEFIBRILLATOR PLACEMENT  11    successful placement of Dual chamber AICD implantation using [employer] device Fortify DR serial #[id number]. Lt axillary & lt subclavian venogram.    CARDIAC PROCEDURE N/A 2024    Left heart cath / coronary angiography performed by Gunner Hatch MD at [facility] CARDIAC CATH LAB    CARDIAC PROCEDURE N/A 2024    Percutaneous coronary intervention performed by Gunner Hatch MD at [facility] CARDIAC CATH LAB    CERVICAL SPINE SURGERY  2012    CORONARY ARTERY BYPASS

## 2025-02-04 NOTE — CARE COORDINATION
2/4/25, 2:52 PM EST    DISCHARGE ON GOING EVALUATION    Chriss Braga       [facility] day: 2  Location: [facility]22/022-A Reason for admit: Shortness of breath [R06.02]  Generalized weakness [R53.1]     Procedures:   2/4 Stress Test: pending     Imaging since last note: none     Barriers to Discharge: Hospitalist and Cardiology following. PT/OT. Stress test today; results pending. Cardiology not planning [facility] r/t med non-compliance. Eliquis. Plavix. PO amiodarone. Imdur. Lisinopril     PCP: Newman-Waterhouse, Aundrea N, DO  Readmission Risk Score: 33.7    Patient Goals/Plan/Treatment Preferences: [facility] started today. SW following.

## 2025-02-04 NOTE — PROGRESS NOTES
Physician Progress Note      PATIENT:               ERIKA HICKS  CSN #:                  [id number]  :                       1955  ADMIT DATE:       2025 9:49 PM  DISCH DATE:        2025 6:11 PM  RESPONDING  PROVIDER #:        Ally Sharp MD          QUERY TEXT:    Patient admitted with Syncope and stroke like S/S, noted to have Paroxysmal   atrial fibrillation and is maintained on Eliquis.  If possible, please   document in progress notes and discharge summary if you are evaluating and/or   treating any of the following:?  ?  The medical record reflects the following:  Risk Factors: Patient is an 69-year-old male with hypertension and CHF,   history of Atrial fibrillation  Clinical Indicators: maintained on Eliquis [id number] Score 6.  Treatment: Eliquis management, bleeding precautions    Thank you. Laura Maldonado RN, Clinical Documentation Integrity, [employer]   [employer], [facility], CRCR  Options provided:  -- Secondary hypercoagulable state in a patient with atrial fibrillation  -- Other - I will add my own diagnosis  -- Disagree - Not applicable / Not valid  -- Disagree - Clinically unable to determine / Unknown  -- Refer to Clinical Documentation Reviewer    PROVIDER RESPONSE TEXT:    This patient has secondary hypercoagulable state in a patient with atrial   fibrillation.    Query created by: Laura Maldonado on 2025 10:27 AM      Electronically signed by:  Ally Sharp MD 2025 2:37 PM

## 2025-02-05 ENCOUNTER — APPOINTMENT (OUTPATIENT)
Dept: GENERAL RADIOLOGY | Age: 70
DRG: 884 | End: 2025-02-05
Payer: MEDICARE

## 2025-02-05 LAB
ANION GAP SERPL CALC-SCNC: 10 MEQ/L (ref 8–16)
ANION GAP SERPL CALC-SCNC: 10 MEQ/L (ref 8–16)
BACTERIA: ABNORMAL
BASOPHILS ABSOLUTE: 0.1 THOU/MM3 (ref 0–0.1)
BASOPHILS NFR BLD AUTO: 0.5 %
BILIRUB UR QL STRIP: NEGATIVE
BUN SERPL-MCNC: 17 MG/DL (ref 7–22)
BUN SERPL-MCNC: 18 MG/DL (ref 7–22)
CA-I BLD ISE-SCNC: 1.22 MMOL/L (ref 1.12–1.32)
CALCIUM SERPL-MCNC: 9 MG/DL (ref 8.5–10.5)
CALCIUM SERPL-MCNC: 9.2 MG/DL (ref 8.5–10.5)
CASTS #/AREA URNS LPF: ABNORMAL /LPF
CASTS #/AREA URNS LPF: ABNORMAL /LPF
CHARACTER UR: CLEAR
CHARCOAL URNS QL MICRO: ABNORMAL
CHLORIDE SERPL-SCNC: 103 MEQ/L (ref 98–111)
CHLORIDE SERPL-SCNC: 105 MEQ/L (ref 98–111)
CO2 SERPL-SCNC: 19 MEQ/L (ref 23–33)
CO2 SERPL-SCNC: 25 MEQ/L (ref 23–33)
COLOR UR: YELLOW
CREAT SERPL-MCNC: 1.1 MG/DL (ref 0.4–1.2)
CREAT SERPL-MCNC: 1.2 MG/DL (ref 0.4–1.2)
CRYSTALS URNS QL MICRO: ABNORMAL
DEPRECATED RDW RBC AUTO: 38.2 FL (ref 35–45)
DEPRECATED RDW RBC AUTO: 43.1 FL (ref 35–45)
EKG ATRIAL RATE: 77 BPM
EKG ATRIAL RATE: 80 BPM
EKG P AXIS: 62 DEGREES
EKG P AXIS: 73 DEGREES
EKG P-R INTERVAL: 172 MS
EKG P-R INTERVAL: 172 MS
EKG Q-T INTERVAL: 388 MS
EKG Q-T INTERVAL: 392 MS
EKG QRS DURATION: 86 MS
EKG QRS DURATION: 96 MS
EKG QTC CALCULATION (BAZETT): 439 MS
EKG QTC CALCULATION (BAZETT): 452 MS
EKG R AXIS: 16 DEGREES
EKG R AXIS: 20 DEGREES
EKG T AXIS: 32 DEGREES
EKG T AXIS: 67 DEGREES
EKG VENTRICULAR RATE: 77 BPM
EKG VENTRICULAR RATE: 80 BPM
EOSINOPHIL NFR BLD AUTO: 0.3 %
EOSINOPHILS ABSOLUTE: 0 THOU/MM3 (ref 0–0.4)
EPITHELIAL CELLS, UA: ABNORMAL /HPF
ERYTHROCYTE [DISTWIDTH] IN BLOOD BY AUTOMATED COUNT: 17.4 % (ref 11.5–14.5)
ERYTHROCYTE [DISTWIDTH] IN BLOOD BY AUTOMATED COUNT: 18.2 % (ref 11.5–14.5)
FLUAV RNA RESP QL NAA+PROBE: NOT DETECTED
FLUBV RNA RESP QL NAA+PROBE: NOT DETECTED
GFR SERPL CREATININE-BSD FRML MDRD: 66 ML/MIN/1.73M2
GFR SERPL CREATININE-BSD FRML MDRD: 73 ML/MIN/1.73M2
GLUCOSE SERPL-MCNC: 102 MG/DL (ref 70–108)
GLUCOSE SERPL-MCNC: 97 MG/DL (ref 70–108)
GLUCOSE UR QL STRIP.AUTO: NEGATIVE MG/DL
HCT VFR BLD AUTO: 38.7 % (ref 42–52)
HCT VFR BLD AUTO: 39.4 % (ref 42–52)
HGB BLD-MCNC: 11.8 GM/DL (ref 14–18)
HGB BLD-MCNC: 12.3 GM/DL (ref 14–18)
HGB UR QL STRIP.AUTO: NEGATIVE
IMM GRANULOCYTES # BLD AUTO: 0.07 THOU/MM3 (ref 0–0.07)
IMM GRANULOCYTES NFR BLD AUTO: 0.5 %
KETONES UR QL STRIP.AUTO: ABNORMAL
LACTATE SERPL-SCNC: 1.5 MMOL/L (ref 0.5–2)
LEUKOCYTE ESTERASE UR QL STRIP.AUTO: NEGATIVE
LYMPHOCYTES ABSOLUTE: 1.2 THOU/MM3 (ref 1–4.8)
LYMPHOCYTES NFR BLD AUTO: 7.7 %
MAGNESIUM SERPL-MCNC: 2 MG/DL (ref 1.6–2.4)
MCH RBC QN AUTO: 21.4 PG (ref 26–33)
MCH RBC QN AUTO: 21.7 PG (ref 26–33)
MCHC RBC AUTO-ENTMCNC: 29.9 GM/DL (ref 32.2–35.5)
MCHC RBC AUTO-ENTMCNC: 31.8 GM/DL (ref 32.2–35.5)
MCV RBC AUTO: 67.4 FL (ref 80–94)
MCV RBC AUTO: 72.4 FL (ref 80–94)
MONOCYTES ABSOLUTE: 0.9 THOU/MM3 (ref 0.4–1.3)
MONOCYTES NFR BLD AUTO: 5.7 %
NEUTROPHILS ABSOLUTE: 13.1 THOU/MM3 (ref 1.8–7.7)
NEUTROPHILS NFR BLD AUTO: 85.3 %
NITRITE UR QL STRIP.AUTO: NEGATIVE
NRBC BLD AUTO-RTO: 0 /100 WBC
PH UR STRIP.AUTO: 5 [PH] (ref 5–9)
PHOSPHATE SERPL-MCNC: 2.9 MG/DL (ref 2.4–4.7)
PLATELET # BLD AUTO: 186 THOU/MM3 (ref 130–400)
PLATELET # BLD AUTO: 210 THOU/MM3 (ref 130–400)
PMV BLD AUTO: 10.2 FL (ref 9.4–12.4)
PMV BLD AUTO: 9.7 FL (ref 9.4–12.4)
POTASSIUM SERPL-SCNC: 4.3 MEQ/L (ref 3.5–5.2)
POTASSIUM SERPL-SCNC: 4.4 MEQ/L (ref 3.5–5.2)
PROCALCITONIN SERPL IA-MCNC: 0.07 NG/ML (ref 0.01–0.09)
PROT UR STRIP.AUTO-MCNC: ABNORMAL MG/DL
RBC # BLD AUTO: 5.44 MILL/MM3 (ref 4.7–6.1)
RBC # BLD AUTO: 5.74 MILL/MM3 (ref 4.7–6.1)
RBC #/AREA URNS HPF: ABNORMAL /HPF
REASON FOR REJECTION: NORMAL
REJECTED TEST: NORMAL
RENAL EPI CELLS #/AREA URNS HPF: ABNORMAL /[HPF]
SARS-COV-2 RNA RESP QL NAA+PROBE: NOT DETECTED
SODIUM SERPL-SCNC: 134 MEQ/L (ref 135–145)
SODIUM SERPL-SCNC: 138 MEQ/L (ref 135–145)
SPECIFIC GRAVITY UA: 1.03 (ref 1–1.03)
TROPONIN, HIGH SENSITIVITY: 8 NG/L (ref 0–12)
UROBILINOGEN, URINE: 1 EU/DL (ref 0–1)
WBC # BLD AUTO: 15.4 THOU/MM3 (ref 4.8–10.8)
WBC # BLD AUTO: 17.3 THOU/MM3 (ref 4.8–10.8)
WBC #/AREA URNS HPF: ABNORMAL /HPF
YEAST LIKE FUNGI URNS QL MICRO: ABNORMAL

## 2025-02-05 PROCEDURE — 99232 SBSQ HOSP IP/OBS MODERATE 35: CPT | Performed by: STUDENT IN AN ORGANIZED HEALTH CARE EDUCATION/TRAINING PROGRAM

## 2025-02-05 PROCEDURE — 83605 ASSAY OF LACTIC ACID: CPT

## 2025-02-05 PROCEDURE — 83735 ASSAY OF MAGNESIUM: CPT

## 2025-02-05 PROCEDURE — 36415 COLL VENOUS BLD VENIPUNCTURE: CPT

## 2025-02-05 PROCEDURE — 85027 COMPLETE CBC AUTOMATED: CPT

## 2025-02-05 PROCEDURE — 2140000000 HC CCU INTERMEDIATE R&B

## 2025-02-05 PROCEDURE — 81001 URINALYSIS AUTO W/SCOPE: CPT

## 2025-02-05 PROCEDURE — 93005 ELECTROCARDIOGRAM TRACING: CPT

## 2025-02-05 PROCEDURE — 93005 ELECTROCARDIOGRAM TRACING: CPT | Performed by: PHYSICIAN ASSISTANT

## 2025-02-05 PROCEDURE — 84100 ASSAY OF PHOSPHORUS: CPT

## 2025-02-05 PROCEDURE — 84484 ASSAY OF TROPONIN QUANT: CPT

## 2025-02-05 PROCEDURE — 84145 PROCALCITONIN (PCT): CPT

## 2025-02-05 PROCEDURE — 93010 ELECTROCARDIOGRAM REPORT: CPT | Performed by: NUCLEAR MEDICINE

## 2025-02-05 PROCEDURE — 85025 COMPLETE CBC W/AUTO DIFF WBC: CPT

## 2025-02-05 PROCEDURE — 80048 BASIC METABOLIC PNL TOTAL CA: CPT

## 2025-02-05 PROCEDURE — 6370000000 HC RX 637 (ALT 250 FOR IP)

## 2025-02-05 PROCEDURE — 87636 SARSCOV2 & INF A&B AMP PRB: CPT

## 2025-02-05 PROCEDURE — 82330 ASSAY OF CALCIUM: CPT

## 2025-02-05 PROCEDURE — 71045 X-RAY EXAM CHEST 1 VIEW: CPT

## 2025-02-05 PROCEDURE — 97530 THERAPEUTIC ACTIVITIES: CPT

## 2025-02-05 PROCEDURE — 6360000002 HC RX W HCPCS

## 2025-02-05 PROCEDURE — 99232 SBSQ HOSP IP/OBS MODERATE 35: CPT | Performed by: NURSE PRACTITIONER

## 2025-02-05 PROCEDURE — 2500000003 HC RX 250 WO HCPCS

## 2025-02-05 RX ADMIN — ANTACID TABLETS 500 MG: 500 TABLET, CHEWABLE ORAL at 04:04

## 2025-02-05 RX ADMIN — ATORVASTATIN CALCIUM 80 MG: 80 TABLET, FILM COATED ORAL at 08:29

## 2025-02-05 RX ADMIN — APIXABAN 5 MG: 5 TABLET, FILM COATED ORAL at 08:29

## 2025-02-05 RX ADMIN — SODIUM CHLORIDE, PRESERVATIVE FREE 10 ML: 5 INJECTION INTRAVENOUS at 20:50

## 2025-02-05 RX ADMIN — ISOSORBIDE MONONITRATE 30 MG: 30 TABLET, EXTENDED RELEASE ORAL at 08:29

## 2025-02-05 RX ADMIN — LEVETIRACETAM 500 MG: 500 TABLET, FILM COATED ORAL at 20:50

## 2025-02-05 RX ADMIN — RANOLAZINE 1000 MG: 500 TABLET, EXTENDED RELEASE ORAL at 08:30

## 2025-02-05 RX ADMIN — METOPROLOL SUCCINATE 25 MG: 25 TABLET, FILM COATED, EXTENDED RELEASE ORAL at 20:50

## 2025-02-05 RX ADMIN — PANTOPRAZOLE SODIUM 40 MG: 40 TABLET, DELAYED RELEASE ORAL at 08:30

## 2025-02-05 RX ADMIN — CLOPIDOGREL BISULFATE 75 MG: 75 TABLET ORAL at 08:29

## 2025-02-05 RX ADMIN — AMIODARONE HYDROCHLORIDE 200 MG: 200 TABLET ORAL at 08:30

## 2025-02-05 RX ADMIN — METOPROLOL SUCCINATE 25 MG: 25 TABLET, FILM COATED, EXTENDED RELEASE ORAL at 08:29

## 2025-02-05 RX ADMIN — APIXABAN 5 MG: 5 TABLET, FILM COATED ORAL at 20:50

## 2025-02-05 RX ADMIN — ONDANSETRON 4 MG: 2 INJECTION INTRAMUSCULAR; INTRAVENOUS at 04:04

## 2025-02-05 RX ADMIN — LISINOPRIL 10 MG: 10 TABLET ORAL at 08:30

## 2025-02-05 RX ADMIN — FERROUS SULFATE TAB 325 MG (65 MG ELEMENTAL FE) 325 MG: 325 (65 FE) TAB at 08:30

## 2025-02-05 RX ADMIN — LEVETIRACETAM 500 MG: 500 TABLET, FILM COATED ORAL at 08:30

## 2025-02-05 NOTE — PROCEDURES
PROCEDURE NOTE  Date: 2/5/2025   Name: Chriss Braga  YOB: 1955    Procedures  12 lead EKG completed. Results handed to Patsy JO.

## 2025-02-05 NOTE — CARE COORDINATION
2/5/25, 10:35 AM EST    DISCHARGE PLANNING EVALUATION    Spoke with Dasia at [facility].  Precert was approved and is good until 2/10/2025.  Spoke with Dr. Arora and he told SW that the patient is not ready for discharge today.  Called Dasia back at [facility] and made her aware he is not ready for discharge today.

## 2025-02-06 VITALS
WEIGHT: 165.12 LBS | RESPIRATION RATE: 16 BRPM | SYSTOLIC BLOOD PRESSURE: 131 MMHG | DIASTOLIC BLOOD PRESSURE: 84 MMHG | OXYGEN SATURATION: 95 % | TEMPERATURE: 98.2 F | HEART RATE: 68 BPM | BODY MASS INDEX: 24.38 KG/M2

## 2025-02-06 LAB
ANION GAP SERPL CALC-SCNC: 16 MEQ/L (ref 8–16)
ANISOCYTOSIS BLD QL SMEAR: PRESENT
BASOPHILS ABSOLUTE: 0 THOU/MM3 (ref 0–0.1)
BASOPHILS NFR BLD AUTO: 0.3 %
BUN SERPL-MCNC: 15 MG/DL (ref 7–22)
CALCIUM SERPL-MCNC: 8.8 MG/DL (ref 8.5–10.5)
CHLORIDE SERPL-SCNC: 99 MEQ/L (ref 98–111)
CO2 SERPL-SCNC: 21 MEQ/L (ref 23–33)
CREAT SERPL-MCNC: 1.2 MG/DL (ref 0.4–1.2)
DEPRECATED RDW RBC AUTO: 38.6 FL (ref 35–45)
EOSINOPHIL NFR BLD AUTO: 0.6 %
EOSINOPHILS ABSOLUTE: 0.1 THOU/MM3 (ref 0–0.4)
ERYTHROCYTE [DISTWIDTH] IN BLOOD BY AUTOMATED COUNT: 16.5 % (ref 11.5–14.5)
GFR SERPL CREATININE-BSD FRML MDRD: 66 ML/MIN/1.73M2
GLUCOSE SERPL-MCNC: 96 MG/DL (ref 70–108)
HCT VFR BLD AUTO: 37.6 % (ref 42–52)
HGB BLD-MCNC: 11.8 GM/DL (ref 14–18)
HYPOCHROMIA BLD QL SMEAR: PRESENT
IMM GRANULOCYTES # BLD AUTO: 0.05 THOU/MM3 (ref 0–0.07)
IMM GRANULOCYTES NFR BLD AUTO: 0.4 %
LYMPHOCYTES ABSOLUTE: 1.1 THOU/MM3 (ref 1–4.8)
LYMPHOCYTES NFR BLD AUTO: 9.2 %
MCH RBC QN AUTO: 21.4 PG (ref 26–33)
MCHC RBC AUTO-ENTMCNC: 31.4 GM/DL (ref 32.2–35.5)
MCV RBC AUTO: 68.1 FL (ref 80–94)
MICROCYTES BLD QL SMEAR: PRESENT
MONOCYTES ABSOLUTE: 1.1 THOU/MM3 (ref 0.4–1.3)
MONOCYTES NFR BLD AUTO: 9.3 %
NEUTROPHILS ABSOLUTE: 9.8 THOU/MM3 (ref 1.8–7.7)
NEUTROPHILS NFR BLD AUTO: 80.2 %
NRBC BLD AUTO-RTO: 0 /100 WBC
PLATELET # BLD AUTO: 205 THOU/MM3 (ref 130–400)
PMV BLD AUTO: 10 FL (ref 9.4–12.4)
POIKILOCYTES: ABNORMAL
POTASSIUM SERPL-SCNC: 4.5 MEQ/L (ref 3.5–5.2)
RBC # BLD AUTO: 5.52 MILL/MM3 (ref 4.7–6.1)
SCAN OF BLOOD SMEAR: NORMAL
SODIUM SERPL-SCNC: 136 MEQ/L (ref 135–145)
TARGETS BLD QL SMEAR: ABNORMAL
WBC # BLD AUTO: 12.2 THOU/MM3 (ref 4.8–10.8)

## 2025-02-06 PROCEDURE — 2500000003 HC RX 250 WO HCPCS

## 2025-02-06 PROCEDURE — 80048 BASIC METABOLIC PNL TOTAL CA: CPT

## 2025-02-06 PROCEDURE — 6370000000 HC RX 637 (ALT 250 FOR IP)

## 2025-02-06 PROCEDURE — 99239 HOSP IP/OBS DSCHRG MGMT >30: CPT | Performed by: STUDENT IN AN ORGANIZED HEALTH CARE EDUCATION/TRAINING PROGRAM

## 2025-02-06 PROCEDURE — 85025 COMPLETE CBC W/AUTO DIFF WBC: CPT

## 2025-02-06 PROCEDURE — 36415 COLL VENOUS BLD VENIPUNCTURE: CPT

## 2025-02-06 RX ADMIN — LEVETIRACETAM 500 MG: 500 TABLET, FILM COATED ORAL at 08:41

## 2025-02-06 RX ADMIN — PANTOPRAZOLE SODIUM 40 MG: 40 TABLET, DELAYED RELEASE ORAL at 08:41

## 2025-02-06 RX ADMIN — ATORVASTATIN CALCIUM 80 MG: 80 TABLET, FILM COATED ORAL at 08:41

## 2025-02-06 RX ADMIN — RANOLAZINE 1000 MG: 500 TABLET, EXTENDED RELEASE ORAL at 08:41

## 2025-02-06 RX ADMIN — SODIUM CHLORIDE, PRESERVATIVE FREE 10 ML: 5 INJECTION INTRAVENOUS at 08:42

## 2025-02-06 RX ADMIN — ISOSORBIDE MONONITRATE 30 MG: 30 TABLET, EXTENDED RELEASE ORAL at 08:41

## 2025-02-06 RX ADMIN — AMIODARONE HYDROCHLORIDE 200 MG: 200 TABLET ORAL at 08:42

## 2025-02-06 RX ADMIN — CLOPIDOGREL BISULFATE 75 MG: 75 TABLET ORAL at 08:41

## 2025-02-06 RX ADMIN — APIXABAN 5 MG: 5 TABLET, FILM COATED ORAL at 08:41

## 2025-02-06 RX ADMIN — LISINOPRIL 10 MG: 10 TABLET ORAL at 08:41

## 2025-02-06 RX ADMIN — METOPROLOL SUCCINATE 25 MG: 25 TABLET, FILM COATED, EXTENDED RELEASE ORAL at 08:41

## 2025-02-06 NOTE — PLAN OF CARE
Problem: Chronic Conditions and Co-morbidities  Goal: Patient's chronic conditions and co-morbidity symptoms are monitored and maintained or improved  Outcome: Progressing  Flowsheets (Taken 2/6/2025 0155)  Care Plan - Patient's Chronic Conditions and Co-Morbidity Symptoms are Monitored and Maintained or Improved: Monitor and assess patient's chronic conditions and comorbid symptoms for stability, deterioration, or improvement     Problem: Discharge Planning  Goal: Discharge to home or other facility with appropriate resources  Outcome: Progressing  Flowsheets (Taken 2/6/2025 0155)  Discharge to home or other facility with appropriate resources:   Identify barriers to discharge with patient and caregiver   Identify discharge learning needs (meds, wound care, etc)     Problem: Pain  Goal: Verbalizes/displays adequate comfort level or baseline comfort level  Outcome: Progressing  Flowsheets (Taken 2/6/2025 0155)  Verbalizes/displays adequate comfort level or baseline comfort level:   Encourage patient to monitor pain and request assistance   Assess pain using appropriate pain scale   Administer analgesics based on type and severity of pain and evaluate response   Implement non-pharmacological measures as appropriate and evaluate response     Problem: Safety - Adult  Goal: Free from fall injury  Outcome: Progressing  Flowsheets (Taken 2/6/2025 0155)  Free From Fall Injury: Instruct family/caregiver on patient safety  Note: Bed locked & in low position, call light in reach, side-rails up x2, bed/chair alarm utilized, non-slip socks on when ambulating, reminded patient to use call light to call for assistance.

## 2025-02-06 NOTE — PLAN OF CARE
Problem: Discharge Planning  Goal: Discharge to home or other facility with appropriate resources  2/6/2025 1018 by Sally Epps, RN  Outcome: Progressing  Flowsheets (Taken 2/6/2025 1018)  Discharge to home or other facility with appropriate resources: Identify barriers to discharge with patient and caregiver     Problem: Pain  Goal: Verbalizes/displays adequate comfort level or baseline comfort level  2/6/2025 1018 by Sally Epps, RN  Outcome: Progressing  Flowsheets (Taken 2/6/2025 1018)  Verbalizes/displays adequate comfort level or baseline comfort level:   Encourage patient to monitor pain and request assistance   Assess pain using appropriate pain scale   Administer analgesics based on type and severity of pain and evaluate response   Implement non-pharmacological measures as appropriate and evaluate response     Problem: Safety - Adult  Goal: Free from fall injury  2/6/2025 1018 by Sally Epps, RN  Outcome: Progressing  Flowsheets (Taken 2/6/2025 1018)  Free From Fall Injury: Instruct family/caregiver on patient safety     Care plan reviewed with patient.  Patient verbalizes understanding of the care plan and contributed to goal setting.

## 2025-02-06 NOTE — CARE COORDINATION
2/6/25, 11:03 AM EST    Patient goals/plan/ treatment preferences discussed by  and .  Patient goals/plan/ treatment preferences reviewed with patient/ family.  Patient/ family verbalize understanding of discharge plan and are in agreement with goal/plan/treatment preferences.  Understanding was demonstrated using the teach back method.  AVS provided by RN at time of discharge, which includes all necessary medical information pertaining to the patients current course of illness, treatment, post-discharge goals of care, and treatment preferences.     Services At/After Discharge: Skilled Nursing Facility (SNF), Aide services, In ambulette, Nursing service, OT, and PT       Chriss will be discharged to [facility].  He will be skilled under his [employer] Medicare benefit.  He will be transported by ambulette.   The patient was informed if you agree to an ambulette at discharge there is a charge of $49.61 as a base rate and $3.31 per mile.    Discharge instructions are attached to blue discharge packet.  Spoke with Dasia at the facility and they are ready to accept.  Patient is agreeable to discharge plan.

## 2025-02-06 NOTE — PROGRESS NOTES
Hospitalist Progress Note    Patient:  Chriss Braga      Unit/Bed:3B-22/022-A    YOB: 1955    MRN: [id number]       Acct: [id number]     PCP: Newman-Waterhouse, Aundrea N, DO    Date of Admission: 2/2/2025    Assessment/Plan:    Generalized weakness: Suspect secondary to vasovagal, with presyncopal episode.  Denies LOC or fall.  Endorses sudden onset of leg weakness, recurrent and progressive with ambulation.  No other FND's.  Chronic left-sided deficits, see below.  Recent imaging nonsignificant.  Questionable medication compliance.  Endorses decreased p.o. oral intake.  Electrolytes with vitamin D deficiency, iCal WNL.  Medications reviewed.  EKG with no blocks.  Prior cardiac cardiac event monitor in place. No appreciable murmurs.  Recent echo relatively unchanged but with poor heart function.  Low suspicion for seizure or CVA/TIA.  Fall precautions in place.  PT/OT consulted.  Chest pain: In setting of chronic angina and on numerous antianginal medications, see below.  Denies any current pain.  Single episode of NSVT x 5 2/3.  Cardiology following, plan for Lexiscan nuclear stress test.  History of CVA: Occurring in 2021 with residual left-sided upper and lower extremity deficits.  Recently presented with stroke recrudescence, and associated generalized weakness with no focal deficits.  CT head, CTA H/N and EEG were negative 1/30 - 2/1.  Cardiac event monitor was present from prior admission.  On Keppra 500 mg p.o. twice daily  Paroxysmal atrial fibrillation: [id number] 6.  On amiodarone for rhythm control, metoprolol for rate control and Eliquis 5 coagulation.  Initial EKG not A-fib.  Placed on continue telemetry.  Monitor.  HTN: Continue lisinopril 10 mg p.o. daily and Imdur 30 mg p.o. daily with holding parameters.  HLD: Continue atorvastatin 80 mg p.o. nightly  Obstructive CAD with chronic angina: S/p LESTER 1/17/2024 and CABG x 3 in 2011.  Last stress test negative for ischemia 5/20/2023, 
    Hospitalist Progress Note    Patient:  Chriss Braga      Unit/Bed:3B-22/022-A    YOB: 1955    MRN: [id number]       Acct: [id number]     PCP: Newman-Waterhouse, Aundrea N, DO    Date of Admission: 2/2/2025    Assessment/Plan:    Generalized weakness: Suspect secondary to vasovagal, with presyncopal episode.  Denies LOC or fall.  Endorses sudden onset of leg weakness, recurrent and progressive with ambulation.  No other FND's.  Chronic left-sided deficits, see below.  Recent imaging nonsignificant.  Questionable medication compliance.  Endorses decreased p.o. oral intake.  Electrolytes with vitamin D deficiency, iCal WNL.  Medications reviewed.  EKG with no blocks.  Prior cardiac cardiac event monitor in place. No appreciable murmurs.  Recent echo relatively unchanged but with poor heart function.  Low suspicion for seizure or CVA/TIA.  Fall precautions in place.  PT/OT consulted.  History of CVA: Occurring in 2021 with residual left-sided upper and lower extremity deficits.  Recently presented with stroke recrudescence, and associated generalized weakness with no focal deficits.  CT head, CTA H/N and EEG were negative 1/30 - 2/1.  Cardiac event monitor was present from prior admission.  On Keppra 500 mg p.o. twice daily  Paroxysmal atrial fibrillation: [id number] 6.  On amiodarone for rhythm control, metoprolol for rate control and Eliquis 5 coagulation.  Initial EKG not A-fib.  Placed on continue telemetry.  Monitor.  HTN: Continue lisinopril 10 mg p.o. daily and Imdur 30 mg p.o. daily with holding parameters.  HLD: Continue atorvastatin 80 mg p.o. nightly  Obstructive CAD with chronic angina: S/p LESTER 1/17/2024 and CABG x 3 in 2011.  Last stress test negative for ischemia 5/20/2023, last [facility] with successful PCI to OM1 via SVG x 1 LESTER 1/17/2024.  On ASA, Plavix, metoprolol, Imdur, and Ranexa  Chronic HFrEF with diastolic dysfunction, NYHA III: ICM +/- NICM.  Not exacerbation.  Last ECHO EF 
    Hospitalist Progress Note    Patient:  Chriss Braga      Unit/Bed:3B-22/022-A    YOB: 1955    MRN: [id number]       Acct: [id number]     PCP: Newman-Waterhouse, Aundrea N, DO    Date of Admission: 2/2/2025    Assessment/Plan:    Generalized weakness: Suspect secondary to vasovagal, with presyncopal episode.  Denies LOC or fall.  Endorses sudden onset of leg weakness, recurrent and progressive with ambulation.  No other FND's.  Chronic left-sided deficits, see below.  Recent imaging nonsignificant.  Questionable medication compliance.  Endorses decreased p.o. oral intake.  Electrolytes with vitamin D deficiency, iCal WNL.  Medications reviewed.  EKG with no blocks.  Prior cardiac cardiac event monitor in place. No appreciable murmurs.  Recent echo relatively unchanged but with poor heart function.  Low suspicion for seizure or CVA/TIA.  Fall precautions in place.  PT/OT consulted.  Leukocytosis: Unknown etiology CXR No acute pulmonary disease, denies urinary symptoms, no fever. Infectious workup negative thus far.   Chest pain: In setting of chronic angina and on numerous antianginal medications, see below.  Denies any current pain.  Single episode of NSVT x 5 2/3.  Cardiology following, plan for Lexiscan nuclear stress test.  History of CVA: Occurring in 2021 with residual left-sided upper and lower extremity deficits.  Recently presented with stroke recrudescence, and associated generalized weakness with no focal deficits.  CT head, CTA H/N and EEG were negative 1/30 - 2/1.  Cardiac event monitor was present from prior admission.  On Keppra 500 mg p.o. twice daily  Paroxysmal atrial fibrillation: [id number] 6.  On amiodarone for rhythm control, metoprolol for rate control and Eliquis 5 coagulation.  Initial EKG not A-fib.  Placed on continue telemetry.  Monitor.  HTN: Continue lisinopril 10 mg p.o. daily and Imdur 30 mg p.o. daily with holding parameters.  HLD: Continue atorvastatin 80 mg p.o. 
  Physician Progress Note      PATIENT:               ERIKA HICKS  CSN #:                  [id number]  :                       1955  ADMIT DATE:       2025 4:50 PM  DISCH DATE:  RESPONDING  PROVIDER #:        Antony Arora MD          QUERY TEXT:    Pt admitted with generalized weakness. Pt noted to have sudden onset leg   weakness that is recurrent and progressive with ambulation. If possible,   please document in progress notes and discharge summary the relationship, if   any, between the generalized weakness and the following:    The medical record reflects the following:  Risk Factors: generalized weakness, hx CVA, 69 years old  Clinical Indicators: presented with generalized weakness, noted to have sudden   onset of leg weakness that is recurrent and progressive; hx of CVA with   residual left sided weakness  Treatment: Labs, imaging, monitoring, stress test, Cardiology consult  Options provided:  -- Generalized weakness due to previous CVA  -- Generalized weakness due to age related physical debility  -- Generalized weakness due to, Please document cause of generalized weakness.  -- Other - I will add my own diagnosis  -- Disagree - Not applicable / Not valid  -- Disagree - Clinically unable to determine / Unknown  -- Refer to Clinical Documentation Reviewer    PROVIDER RESPONSE TEXT:    This patient has generalized weakness due to age related physical debility.    Query created by: Deepa Mendiola on 2025 1:24 PM      Electronically signed by:  Antony Arora MD 2025 9:04 PM          
1100 - Attempted to call report to Mary Anderson RN. RN unavailable at the time.    1140 - SANDRO Sheriff from [place] called to obtain report. Report given from this RN.  
Cardiology Progress Note      Patient:  Chriss Braga  YOB: 1955  MRN: [id number]   Acct: [id number]  Admit Date:  2/2/2025  Primary Cardiologist: Anand Lisa MD  Seen by Dr. Corbett     Per prior cardiology consult note-  Reason of consultation:-        HPI: This is a pleasant 69 y.o. male with a PMH of obstructive CAD, HFrEF s/p ICD, P A-fib, HTN, HLD, CVA with recent recurrence, pituitary macroadenoma, anemia, GERD and medication noncompliance who presented to [facility] ED for complaints of generalized weakness and chest pain. Patient reports he was walking in the grocery store when his legs suddenly felt weak. Patient also reports being mildly confused in grocery store. Reports he developed an episode of substernal chest pain; non-radiating lasting for 4 hours and then resolving. Reports being mildly diaphoretic. Patient has not had an episode of chest pain since. Denies and falls or loss of consciousness. Patient states that every time he attempts to ambulate his weakness worsens        Subjective (Events in last 24 hours):   Pt in bed   He denies chest pain today   Chest pain described as sharp and lasting 30 minutes - radiates to /Lt arm - no SOB  or nausea     Tele SR no ectopy   BP stable     Stress pending     Pt positive for cannabinoids     2/5/2025  Pt in bed eating breakfast     Overnight he has Nausea and vomited - treated with GI cocktail -  still with some Nausea this am but eating waffle at present   Upper epigastric area is tender to touch today   EKG subtle ST - T changes     He denies chest pains during this - just abd pains   Tele SR no ectopy BP stable       Objective:   /72   Pulse 75   Temp 98.7 °F (37.1 °C) (Oral)   Resp 16   Wt 74.9 kg (165 lb 2 oz)   SpO2 98%   BMI 24.38 kg/m²        TELEMETRY: SR no ectopy       Physical Exam:  General Appearance: alert and oriented to person, place and time, in no acute distress  Cardiovascular: normal rate, regular rhythm, 
[facility]  STRZ CCU-STEPDOWN 3B  Occupational Therapy  Daily Note    Discharge Recommendations: Subacute/skilled nursing facility, Not safe to return home at this time, and Patient would benefit from continued OT at discharge  Equipment Recommendations: No        Time In: 917  Time Out: 934  Timed Code Treatment Minutes: 17 Minutes  Minutes: 17          Date: 2025  Patient Name: Chriss Braga,   Gender: male      Room: [facility]22/022-A  MRN: [id number]  : 1955  (69 y.o.)  Referring Practitioner: Hector Hoffmann DO  Diagnosis: Generalized weakness  Additional Pertinent Hx: 69-year-old male with a PMH of obstructive CAD, HFrEF s/p ICD, P A-fib, HTN, HLD, CVA with recent recurrence, pituitary macroadenoma, anemia, GERD and medication noncompliance who presented to [facility] ED for complaints of generalized weakness and chest pain.  Per report, the patient was out of breath and shopping when he felt his legs suddenly give out.  He denied any falls or loss of consciousness.  This was followed by some substernal chest discomfort, nonradiating with no other associated symptoms.  The patient was assisted to home with noted resolution of his chest pain, but persistence of his weakness.  He states that every time he attempts to ambulate his weakness worsens, but no further episodes of chest pain.  He presented to ED for this complaint.     In the ED, vitals were hypertensive but otherwise WNL.  Labs significant for Hgb 12.3, HCT 39.6.  Influenza A/B and COVID-19 negative.  UDS positive for cannabinoids.  Vitamin D 25-hydroxy low.  HST .  EKG NSR, TWI in V5-6, VT 74, QTc 446.  CXR no acute cardiopulmonary process.  The patient admitted for further management evaluation.    Restrictions/Precautions:  Restrictions/Precautions: Fall Risk, General Precautions     Social/Functional History:  Lives With: Family  Type of Home: House  Home Layout: Two level, Bed/Bath upstairs ((R) railing)  Home Access: 
[facility]  INPATIENT PHYSICAL THERAPY  EVALUATION  [facility] CCU-STEPDOWN 3B - 3B-22/022-A    Discharge Recommendations: Continue to assess pending progress, Subacute/Skilled Nursing Facility  Equipment Recommendations: No             Time In: 1322  Time Out: 1338  Timed Code Treatment Minutes: 8 Minutes  Minutes: 16      Date: 2/3/2025  Patient Name: Chriss Braga,  Gender:  male        MRN: [id number]  : 1955  (69 y.o.)      Referring Practitioner: Hector Hoffmann DO  Diagnosis: Generalized weakness  Additional Pertinent Hx: per chart review:   69-year-old male with a PMH of obstructive CAD, HFrEF s/p ICD, P A-fib, HTN, HLD, CVA with recent recurrence, pituitary macroadenoma, anemia, GERD and medication noncompliance who presented to [facility] ED for complaints of generalized weakness and chest pain.  Per report, the patient was out of breath and shopping when he felt his legs suddenly give out.  He denied any falls or loss of consciousness.  This was followed by some substernal chest discomfort, nonradiating with no other associated symptoms.  The patient was assisted to home with noted resolution of his chest pain, but persistence of his weakness.  He states that every time he attempts to ambulate his weakness worsens, but no further episodes of chest pain.  He presented to ED for this complaint.     In the ED, vitals were hypertensive but otherwise WNL.  Labs significant for Hgb 12.3, HCT 39.6.  Influenza A/B and COVID-19 negative.  UDS positive for cannabinoids.  Vitamin D 25-hydroxy low.  HST .  EKG NSR, TWI in V5-6, VT 74, QTc 446.  CXR no acute cardiopulmonary process.  The patient admitted for further management evaluation.     Restrictions/Precautions:  Restrictions/Precautions: Fall Risk, General Precautions     Subjective:  Chart Reviewed: Yes  Patient assessed for rehabilitation services?: Yes  Subjective: Nurse approved session.  Patient lying in bed with flat affect, but 
Occupational Therapy  [facility]  INPATIENT OCCUPATIONAL THERAPY  STRZ CCU-STEPDOWN 3B  EVALUATION      Discharge Recommendations: Continue to assess pending progress, Subacute/Skilled Nursing Facility  Equipment Recommendations: No      Time In: 1059  Time Out: 1117  Timed Code Treatment Minutes: 10 Minutes  Minutes: 18          Date: 2/3/2025  Patient Name: Chriss Braga,   Gender: male      MRN: [id number]  : 1955  (69 y.o.)  Referring Practitioner: Hector Hoffmann DO  Diagnosis: Generalized weakness  Additional Pertinent Hx: 69-year-old male with a PMH of obstructive CAD, HFrEF s/p ICD, P A-fib, HTN, HLD, CVA with recent recurrence, pituitary macroadenoma, anemia, GERD and medication noncompliance who presented to [facility] ED for complaints of generalized weakness and chest pain.  Per report, the patient was out of breath and shopping when he felt his legs suddenly give out.  He denied any falls or loss of consciousness.  This was followed by some substernal chest discomfort, nonradiating with no other associated symptoms.  The patient was assisted to home with noted resolution of his chest pain, but persistence of his weakness.  He states that every time he attempts to ambulate his weakness worsens, but no further episodes of chest pain.  He presented to ED for this complaint.     In the ED, vitals were hypertensive but otherwise WNL.  Labs significant for Hgb 12.3, HCT 39.6.  Influenza A/B and COVID-19 negative.  UDS positive for cannabinoids.  Vitamin D 25-hydroxy low.  HST .  EKG NSR, TWI in V5-6, VT 74, QTc 446.  CXR no acute cardiopulmonary process.  The patient admitted for further management evaluation.    Restrictions/Precautions:  Restrictions/Precautions: Fall Risk, General Precautions    Subjective  Chart Reviewed: Yes, Orders, Progress Notes, Imaging, History and Physical  Patient assessed for rehabilitation services?: Yes  Family / Caregiver Present: No         Pain: 
Pharmacy Medication History Note    List of current medications patient is taking is complete.    Source of information: patient and fill history     Changes made to medication list:  Medications removed (include reason, ex. therapy complete or physician discontinued):  Asprin - patient states no longer taking  Meclizine - patient states no longer taking  Pantoprazole - patient states no longer taking    Medications added/doses adjusted:  Added  Vitamin D 25 mcg PO daily (patient unsure of exact dose)  Adjusted   levetiracetam 500 mg from BID to daily per patient  Metoprolol succinate 25 mg 1 tab from BID to daily      Other notes (ex. Recent course of antibiotics, Coumadin dosing):  RX for ergocalciferol 50,000 units once weekly sent in on 2/1, not picked up yet  Denies use of other OTC or herbal medications.    Allergies reviewed    Electronically signed by Nadeen Marshall on 2/3/2025 at 3:28 PM     
[facility]  OCCUPATIONAL THERAPY MISSED TREATMENT NOTE  STRZ CCU-STEPDOWN 3B  3B-22/022-A      Date: 2025  Patient Name: Chriss Braga        CSN: [id number]   : 1955  (69 y.o.)  Gender: male   Referring Practitioner: Hector Hoffmann DO            REASON FOR MISSED TREATMENT: Patient Refused.  In bed upon arrival, encouragement given for session                
Spiritual Health History and Assessment/Progress Note  [facility]    Initial Encounter,  ,  ,      Name: Chriss Braga MRN: [id number]    Age: 69 y.o.     Sex: male   Language: English   Uatsdin: Confucianist   Generalized weakness     Date: 2/5/2025            Total Time Calculated: (P) 7 min              Spiritual Assessment began in STRZ CCU-STEPDOWN 3B        Referral/Consult From: Rounding   Encounter Overview/Reason: Initial Encounter  Service Provided For: Patient    Heather, Belief, Meaning:   Patient identifies as spiritual  Family/Friends No family/friends present      Importance and Influence:  Patient has spiritual/personal beliefs that influence decisions regarding their health  Family/Friends No family/friends present    Community:  Patient is connected with a spiritual community  Family/Friends No family/friends present    Assessment and Plan of Care:     Patient Interventions include: Facilitated expression of thoughts and feelings  Family/Friends Interventions include: No family/friends present    Patient Plan of Care: Spiritual Care available upon further referral  Family/Friends Plan of Care: No family/friends present    Electronically signed by MALLORIE Sawyer on 2/5/2025 at 5:08 PM   
[facility]  PHYSICAL THERAPY MISSED TREATMENT NOTE  STRZ CCU-STEPDOWN 3B    Date: 2025  Patient Name: Chriss Braga        MRN: [id number]   : 1955  (69 y.o.)  Gender: male   Referring Practitioner: Hector Hoffmann DO            REASON FOR MISSED TREATMENT:  Attempted to see patient at 1349. RN approves session. However, patient expresses \"I'm tired\" and refuses PT services this afternoon .      Kimberly Alcantar, PTA  3:13 PM  2025      
successfully placed.   Supplies used: STENT CORONARY YUKI FRONTIER RX 2.5X18 MM ZOTAROLIMUS ELUT   Angioplasty   Post-Intervention Lesion Assessment   There is no pre-intervention ESTELA flow. Post-intervention ESTELA flow is 3. There were no complications.   There is a 0% residual stenosis post intervention.        Coronary Diagram    Diagnostic  Dominance: Right    Intervention             Lab Data:    Cardiac Enzymes:  Recent Labs     02/03/25  0716   CKTOTAL 83       CBC:   Lab Results   Component Value Date/Time    WBC 6.8 02/04/2025 05:25 AM    RBC 5.43 02/04/2025 05:25 AM    RBC 4.70 03/14/2012 06:03 AM    HGB 11.8 02/04/2025 05:25 AM    HGB 10.6 03/14/2012 06:03 AM    HCT 36.7 02/04/2025 05:25 AM     02/04/2025 05:25 AM       CMP:    Lab Results   Component Value Date/Time     02/04/2025 05:25 AM    K 4.7 02/04/2025 05:25 AM    K 4.2 02/02/2025 04:55 PM     02/04/2025 05:25 AM    CO2 21 02/04/2025 05:25 AM    BUN 23 02/04/2025 05:25 AM    CREATININE 1.2 02/04/2025 05:25 AM    LABGLOM 66 02/04/2025 05:25 AM    LABGLOM >60 02/27/2024 12:24 PM    GLUCOSE 87 02/04/2025 05:25 AM    GLUCOSE 85 03/14/2012 06:03 AM    CALCIUM 8.8 02/04/2025 05:25 AM       Hepatic Function Panel:    Lab Results   Component Value Date/Time    ALKPHOS 77 02/02/2025 04:55 PM    ALT 12 02/02/2025 04:55 PM    AST 18 02/02/2025 04:55 PM    BILITOT 0.8 02/02/2025 04:55 PM    BILIDIR 0.3 01/27/2025 05:19 AM    IBILI 1.0 01/27/2025 05:19 AM       Magnesium:    Lab Results   Component Value Date/Time    MG 2.1 02/03/2025 07:16 AM       PT/INR:    Lab Results   Component Value Date/Time    PROTIME 1.01 12/23/2011 08:33 AM    INR 1.19 01/30/2025 10:07 PM       HgBA1c:    Lab Results   Component Value Date/Time    LABA1C 5.6 01/27/2025 05:19 AM       FLP:    Lab Results   Component Value Date/Time    TRIG 71 01/27/2025 05:19 AM    HDL 42 01/27/2025 05:19 AM       TSH:    Lab Results   Component Value Date/Time    TSH 4.670

## 2025-02-10 ENCOUNTER — OFFICE VISIT (OUTPATIENT)
Dept: CARDIOLOGY CLINIC | Age: 70
End: 2025-02-10
Payer: MEDICARE

## 2025-02-10 VITALS
OXYGEN SATURATION: 98 % | WEIGHT: 173 LBS | BODY MASS INDEX: 25.62 KG/M2 | SYSTOLIC BLOOD PRESSURE: 140 MMHG | HEART RATE: 84 BPM | HEIGHT: 69 IN | RESPIRATION RATE: 18 BRPM | DIASTOLIC BLOOD PRESSURE: 88 MMHG

## 2025-02-10 DIAGNOSIS — Z95.820 S/P ANGIOPLASTY WITH STENT: ICD-10-CM

## 2025-02-10 DIAGNOSIS — Z95.1 S/P CABG (CORONARY ARTERY BYPASS GRAFT): ICD-10-CM

## 2025-02-10 DIAGNOSIS — I48.0 PAROXYSMAL ATRIAL FIBRILLATION (HCC): ICD-10-CM

## 2025-02-10 DIAGNOSIS — I50.20 HFREF (HEART FAILURE WITH REDUCED EJECTION FRACTION) (HCC): Primary | ICD-10-CM

## 2025-02-10 PROCEDURE — 1124F ACP DISCUSS-NO DSCNMKR DOCD: CPT | Performed by: PHYSICIAN ASSISTANT

## 2025-02-10 PROCEDURE — 1159F MED LIST DOCD IN RCRD: CPT | Performed by: PHYSICIAN ASSISTANT

## 2025-02-10 PROCEDURE — 1036F TOBACCO NON-USER: CPT | Performed by: PHYSICIAN ASSISTANT

## 2025-02-10 PROCEDURE — G8419 CALC BMI OUT NRM PARAM NOF/U: HCPCS | Performed by: PHYSICIAN ASSISTANT

## 2025-02-10 PROCEDURE — 3017F COLORECTAL CA SCREEN DOC REV: CPT | Performed by: PHYSICIAN ASSISTANT

## 2025-02-10 PROCEDURE — 3077F SYST BP >= 140 MM HG: CPT | Performed by: PHYSICIAN ASSISTANT

## 2025-02-10 PROCEDURE — 1111F DSCHRG MED/CURRENT MED MERGE: CPT | Performed by: PHYSICIAN ASSISTANT

## 2025-02-10 PROCEDURE — 99214 OFFICE O/P EST MOD 30 MIN: CPT | Performed by: PHYSICIAN ASSISTANT

## 2025-02-10 PROCEDURE — G8427 DOCREV CUR MEDS BY ELIG CLIN: HCPCS | Performed by: PHYSICIAN ASSISTANT

## 2025-02-10 PROCEDURE — 3079F DIAST BP 80-89 MM HG: CPT | Performed by: PHYSICIAN ASSISTANT

## 2025-02-10 NOTE — PROGRESS NOTES
limits with normal respiratory phasic changes.     M-Mode/2D Measurements & Calculations      LV Diastolic   LV Systolic Dimension:    AV Cusp Separation: 2.1 cmLA   Dimension: 5.8 4.2 cm                    Dimension: 3.5 cmAO Root   cm             LV Volume Diastolic: 167  Dimension: 3.2 cmLA Area: 25.7   LV FS:27.6 %   ml                        cm^2   LV PW          LV Volume Systolic: 78.6   Diastolic: 1   ml   cm             LV EDV/LV EDV Index: 167   Septum         ml/90 m^2LV ESV/LV ESV    RV Diastolic Dimension: 2.7 cm   Diastolic: 1.1 Index: 78.6 ml/42 m^2   cm             EF Calculated: 52.9 %     LA/Aorta: 1.09                     LV Length: 8.5 cm         LA volume/Index: 87 ml /47m^2      LV Area   Diastolic:   34.9 cm^2   LV Area   Systolic: 22.5   cm^2     Doppler Measurements & Calculations      MV Peak E-Wave: 86.6 cm/s AV Peak Velocity: 132 LVOT Peak Velocity: 98.8   MV Peak A-Wave: 92.7 cm/s cm/s                  cm/s   MV E/A Ratio: 0.93        AV Peak Gradient:     LVOT Peak Gradient: 4 mmHg   MV Peak Gradient: 3 mmHg  6.97 mmHg                                                   TV Peak E-Wave: 48.1 cm/s   MV Deceleration Time: 282                       TV Peak A-Wave: 41.5 cm/s   msec                                                   TV Peak Gradient: 0.93                             IVRT: 81 msec         mmHg   MV E' Septal Velocity:                          TR Velocity:190 cm/s   6.4 cm/s                                        TR Gradient:14.44 mmHg   MV A' Septal Velocity:    AV DVI (Vmax):0.75    PV Peak Velocity: 73.1   6.6 cm/s                                        cm/s   MV E' Lateral Velocity:                         PV Peak Gradient: 2.14   6.3 cm/s                                        mmHg   MV A' Lateral Velocity:   6.7 cm/s   E/E' septal: 13.53                              OR ED Velocity: 89.6 cm/s   E/E' lateral: 13.75   
Brando Alvarez), Pediatrics  [address]  [place] [address]  Phone: [phone]  Fax: [phone]

## 2025-02-12 ENCOUNTER — APPOINTMENT (OUTPATIENT)
Dept: GENERAL RADIOLOGY | Age: 70
End: 2025-02-12
Payer: MEDICARE

## 2025-02-12 ENCOUNTER — HOSPITAL ENCOUNTER (EMERGENCY)
Age: 70
Discharge: NURSING, REHAB OR LONG-TERM CARE | End: 2025-02-13
Attending: EMERGENCY MEDICINE
Payer: MEDICARE

## 2025-02-12 DIAGNOSIS — R07.9 CHEST PAIN, MODERATE CORONARY ARTERY RISK: Primary | ICD-10-CM

## 2025-02-12 LAB
ALBUMIN SERPL BCG-MCNC: 3.6 G/DL (ref 3.5–5.1)
ALP SERPL-CCNC: 72 U/L (ref 38–126)
ALT SERPL W/O P-5'-P-CCNC: 10 U/L (ref 11–66)
ANION GAP SERPL CALC-SCNC: 3 MEQ/L (ref 8–16)
AST SERPL-CCNC: 11 U/L (ref 5–40)
BASOPHILS ABSOLUTE: 0 THOU/MM3 (ref 0–0.1)
BASOPHILS NFR BLD AUTO: 0.4 %
BILIRUB SERPL-MCNC: 0.6 MG/DL (ref 0.3–1.2)
BUN SERPL-MCNC: 15 MG/DL (ref 7–22)
CALCIUM SERPL-MCNC: 8.7 MG/DL (ref 8.5–10.5)
CHLORIDE SERPL-SCNC: 104 MEQ/L (ref 98–111)
CO2 SERPL-SCNC: 30 MEQ/L (ref 23–33)
CREAT SERPL-MCNC: 1.1 MG/DL (ref 0.4–1.2)
DEPRECATED RDW RBC AUTO: 38.8 FL (ref 35–45)
EOSINOPHIL NFR BLD AUTO: 1.1 %
EOSINOPHILS ABSOLUTE: 0.1 THOU/MM3 (ref 0–0.4)
ERYTHROCYTE [DISTWIDTH] IN BLOOD BY AUTOMATED COUNT: 16.1 % (ref 11.5–14.5)
FLUAV RNA RESP QL NAA+PROBE: NOT DETECTED
FLUBV RNA RESP QL NAA+PROBE: NOT DETECTED
GFR SERPL CREATININE-BSD FRML MDRD: 73 ML/MIN/1.73M2
GLUCOSE SERPL-MCNC: 103 MG/DL (ref 70–108)
HCT VFR BLD AUTO: 33.3 % (ref 42–52)
HGB BLD-MCNC: 10.5 GM/DL (ref 14–18)
IMM GRANULOCYTES # BLD AUTO: 0.15 THOU/MM3 (ref 0–0.07)
IMM GRANULOCYTES NFR BLD AUTO: 1.4 %
LYMPHOCYTES ABSOLUTE: 1.7 THOU/MM3 (ref 1–4.8)
LYMPHOCYTES NFR BLD AUTO: 16 %
MCH RBC QN AUTO: 21.5 PG (ref 26–33)
MCHC RBC AUTO-ENTMCNC: 31.5 GM/DL (ref 32.2–35.5)
MCV RBC AUTO: 68.1 FL (ref 80–94)
MICROCYTES BLD QL SMEAR: PRESENT
MONOCYTES ABSOLUTE: 0.7 THOU/MM3 (ref 0.4–1.3)
MONOCYTES NFR BLD AUTO: 7.1 %
NEUTROPHILS ABSOLUTE: 7.8 THOU/MM3 (ref 1.8–7.7)
NEUTROPHILS NFR BLD AUTO: 74 %
NRBC BLD AUTO-RTO: 0 /100 WBC
NT-PROBNP SERPL IA-MCNC: 637 PG/ML (ref 0–124)
OSMOLALITY SERPL CALC.SUM OF ELEC: 274.9 MOSMOL/KG (ref 275–300)
PLATELET # BLD AUTO: 266 THOU/MM3 (ref 130–400)
PMV BLD AUTO: 9.5 FL (ref 9.4–12.4)
POTASSIUM SERPL-SCNC: 4.3 MEQ/L (ref 3.5–5.2)
PROT SERPL-MCNC: 6.7 G/DL (ref 6.1–8)
RBC # BLD AUTO: 4.89 MILL/MM3 (ref 4.7–6.1)
SARS-COV-2 RNA RESP QL NAA+PROBE: NOT DETECTED
SODIUM SERPL-SCNC: 137 MEQ/L (ref 135–145)
TROPONIN, HIGH SENSITIVITY: 6 NG/L (ref 0–12)
WBC # BLD AUTO: 10.5 THOU/MM3 (ref 4.8–10.8)

## 2025-02-12 PROCEDURE — 85025 COMPLETE CBC W/AUTO DIFF WBC: CPT

## 2025-02-12 PROCEDURE — 83880 ASSAY OF NATRIURETIC PEPTIDE: CPT

## 2025-02-12 PROCEDURE — 84484 ASSAY OF TROPONIN QUANT: CPT

## 2025-02-12 PROCEDURE — 80053 COMPREHEN METABOLIC PANEL: CPT

## 2025-02-12 PROCEDURE — 93005 ELECTROCARDIOGRAM TRACING: CPT | Performed by: EMERGENCY MEDICINE

## 2025-02-12 PROCEDURE — 87636 SARSCOV2 & INF A&B AMP PRB: CPT

## 2025-02-12 PROCEDURE — 36415 COLL VENOUS BLD VENIPUNCTURE: CPT

## 2025-02-12 PROCEDURE — 71045 X-RAY EXAM CHEST 1 VIEW: CPT

## 2025-02-12 PROCEDURE — 99285 EMERGENCY DEPT VISIT HI MDM: CPT

## 2025-02-12 RX ORDER — ASPIRIN 81 MG/1
324 TABLET, CHEWABLE ORAL ONCE
Status: DISCONTINUED | OUTPATIENT
Start: 2025-02-12 | End: 2025-02-13 | Stop reason: HOSPADM

## 2025-02-12 ASSESSMENT — PAIN - FUNCTIONAL ASSESSMENT
PAIN_FUNCTIONAL_ASSESSMENT: 0-10
PAIN_FUNCTIONAL_ASSESSMENT: NONE - DENIES PAIN
PAIN_FUNCTIONAL_ASSESSMENT: 0-10

## 2025-02-12 ASSESSMENT — PAIN DESCRIPTION - DESCRIPTORS
DESCRIPTORS: STABBING
DESCRIPTORS: STABBING

## 2025-02-12 ASSESSMENT — PAIN SCALES - GENERAL
PAINLEVEL_OUTOF10: 8
PAINLEVEL_OUTOF10: 8

## 2025-02-12 ASSESSMENT — PAIN DESCRIPTION - LOCATION
LOCATION: CHEST;ARM
LOCATION: CHEST;ARM

## 2025-02-12 ASSESSMENT — PAIN DESCRIPTION - ORIENTATION
ORIENTATION: LEFT;MID
ORIENTATION: LEFT;MID

## 2025-02-13 VITALS
SYSTOLIC BLOOD PRESSURE: 153 MMHG | TEMPERATURE: 98.7 F | HEART RATE: 60 BPM | OXYGEN SATURATION: 96 % | DIASTOLIC BLOOD PRESSURE: 94 MMHG | RESPIRATION RATE: 12 BRPM | WEIGHT: 165 LBS | HEIGHT: 69 IN | BODY MASS INDEX: 24.44 KG/M2

## 2025-02-13 LAB
EKG ATRIAL RATE: 67 BPM
EKG P AXIS: 65 DEGREES
EKG P-R INTERVAL: 178 MS
EKG Q-T INTERVAL: 436 MS
EKG QRS DURATION: 100 MS
EKG QTC CALCULATION (BAZETT): 460 MS
EKG R AXIS: -26 DEGREES
EKG T AXIS: 42 DEGREES
EKG VENTRICULAR RATE: 67 BPM
TROPONIN, HIGH SENSITIVITY: 7 NG/L (ref 0–12)

## 2025-02-13 PROCEDURE — 93010 ELECTROCARDIOGRAM REPORT: CPT | Performed by: INTERNAL MEDICINE

## 2025-02-13 ASSESSMENT — PAIN - FUNCTIONAL ASSESSMENT
PAIN_FUNCTIONAL_ASSESSMENT: NONE - DENIES PAIN
PAIN_FUNCTIONAL_ASSESSMENT: 0-10
PAIN_FUNCTIONAL_ASSESSMENT: NONE - DENIES PAIN

## 2025-02-13 ASSESSMENT — PAIN DESCRIPTION - ORIENTATION: ORIENTATION: LEFT;MID

## 2025-02-13 ASSESSMENT — PAIN SCALES - GENERAL: PAINLEVEL_OUTOF10: 6

## 2025-02-13 ASSESSMENT — PAIN DESCRIPTION - DESCRIPTORS: DESCRIPTORS: STABBING

## 2025-02-13 ASSESSMENT — PAIN DESCRIPTION - LOCATION: LOCATION: CHEST;ARM

## 2025-02-13 ASSESSMENT — HEART SCORE: ECG: NON-SPECIFC REPOLARIZATION DISTURBANCE/LBTB/PM

## 2025-02-13 NOTE — ED NOTES
Pt is laying in bed watching tv, pt asks for results to be gone over by provider. Pt is breathing regular and unlabored on room air at this time. Pt has no signs of distress to note at this time. Pt updated on transportation status. Call light within reach.

## 2025-02-13 NOTE — ED TRIAGE NOTES
Pt presents to the ED via EMS from nursing home with c/o mid to left sided chest pain that goes down the left arm, pt was sitting down when chest pain started about an hour ago, pt rates pain 8/10 described as stabbing and constant. EMS states pt received 2 Nitro at nursing home and states it made pain worse, EMS did not medicate pt with anything, pt statese his PCP has told him he can't have Aspirin. Pt is alert and oriented x4, vitals stable.

## 2025-02-13 NOTE — ED NOTES
This RN attempted to start PIV, pt's skin was tough with scar tissue and this RN was not getting flash of blood, this RN removed IV and pt asks for another person to start PIV. Lab is at bedside getting bloodwork at this time.

## 2025-02-13 NOTE — ED NOTES
Lab states to this RN that she was getting pt's blood and pt pulled arm back removing needle and asking for someone else to get blood. This RN asks SANDRO Brown to attempt PIV at this time.

## 2025-02-13 NOTE — ED NOTES
Pt is laying in bed on his left side, pt asks for warm blanket at this time. Pt is breathing regular and unlabored on room air. Pt states he has mid to left sided chest pain \"little bit\" rated 6/10 at this time and described as stabbing and going into left arm, no other signs of distress to note at this time. Call light within reach.

## 2025-02-13 NOTE — ED NOTES
Pt is resting in bed watching tv, no requests at this time. Pt is breathing regular and unlabored on room air. Pt has pain in mid to left side of chest going down left arm described as stabbing pains rated 8/10 and intermittent pain at this time. No other signs of distress to note at this time. Call light within reach.

## 2025-02-13 NOTE — ED NOTES
Pt is resting in bed and asks for warm blanket, pt is watching tv. Pt is breathing regular and unlabored on room air. Pt has no signs of distress to note at this time. Call light wihtin reach, repeat Troponin collected and sent at this time.
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
## 2025-02-17 ENCOUNTER — OFFICE VISIT (OUTPATIENT)
Dept: CARDIOLOGY CLINIC | Age: 70
End: 2025-02-17
Payer: MEDICARE

## 2025-02-17 VITALS
BODY MASS INDEX: 24.44 KG/M2 | WEIGHT: 165 LBS | HEIGHT: 69 IN | DIASTOLIC BLOOD PRESSURE: 80 MMHG | SYSTOLIC BLOOD PRESSURE: 130 MMHG | HEART RATE: 64 BPM

## 2025-02-17 DIAGNOSIS — I48.0 PAROXYSMAL ATRIAL FIBRILLATION (HCC): ICD-10-CM

## 2025-02-17 DIAGNOSIS — Z95.820 S/P ANGIOPLASTY WITH STENT: ICD-10-CM

## 2025-02-17 DIAGNOSIS — Z95.1 S/P CABG (CORONARY ARTERY BYPASS GRAFT): ICD-10-CM

## 2025-02-17 DIAGNOSIS — I50.20 HFREF (HEART FAILURE WITH REDUCED EJECTION FRACTION) (HCC): Primary | ICD-10-CM

## 2025-02-17 PROCEDURE — 1036F TOBACCO NON-USER: CPT | Performed by: PHYSICIAN ASSISTANT

## 2025-02-17 PROCEDURE — 3017F COLORECTAL CA SCREEN DOC REV: CPT | Performed by: PHYSICIAN ASSISTANT

## 2025-02-17 PROCEDURE — 1124F ACP DISCUSS-NO DSCNMKR DOCD: CPT | Performed by: PHYSICIAN ASSISTANT

## 2025-02-17 PROCEDURE — 3075F SYST BP GE 130 - 139MM HG: CPT | Performed by: PHYSICIAN ASSISTANT

## 2025-02-17 PROCEDURE — 99214 OFFICE O/P EST MOD 30 MIN: CPT | Performed by: PHYSICIAN ASSISTANT

## 2025-02-17 PROCEDURE — 1159F MED LIST DOCD IN RCRD: CPT | Performed by: PHYSICIAN ASSISTANT

## 2025-02-17 PROCEDURE — 1111F DSCHRG MED/CURRENT MED MERGE: CPT | Performed by: PHYSICIAN ASSISTANT

## 2025-02-17 PROCEDURE — 3079F DIAST BP 80-89 MM HG: CPT | Performed by: PHYSICIAN ASSISTANT

## 2025-02-17 PROCEDURE — G8427 DOCREV CUR MEDS BY ELIG CLIN: HCPCS | Performed by: PHYSICIAN ASSISTANT

## 2025-02-17 PROCEDURE — G8420 CALC BMI NORM PARAMETERS: HCPCS | Performed by: PHYSICIAN ASSISTANT

## 2025-02-17 RX ORDER — ASPIRIN 81 MG/1
81 TABLET, CHEWABLE ORAL DAILY
COMMUNITY

## 2025-02-17 RX ORDER — ISOSORBIDE MONONITRATE 60 MG/1
60 TABLET, EXTENDED RELEASE ORAL DAILY
Qty: 30 TABLET | Refills: 3 | Status: SHIPPED | OUTPATIENT
Start: 2025-02-17

## 2025-02-17 NOTE — PROGRESS NOTES
[facility] PHYSICIANS [place] [facility]  [facility] CARDIOLOGY  [address]  [address]  [facility] [address]  Dept: [phone]  Dept Fax: [phone]  Loc: [phone]    Chief Complaint   Patient presents with    Follow-up     [facility] ER chest pain 2-12-25     Office visit 2/10/2025  The patient is a 69-year-old male with a history of CABG, STEMI in January 2024 with subsequent PCI of the OM1, severe residual RCA stenosis medically managed, patent LIMA to LAD, paroxysmal atrial fibrillation, and severe ischemic cardiomyopathy with an ejection fraction of 15 to 20%. He presents for follow-up.     He has been experiencing intermittent episodes of shortness of breath but reports no lower extremity edema.  He monitors his blood pressure at home, which remains consistent with the readings obtained in the clinic. He was recently hospitalized due to generalized weakness and near syncope, which have since improved, although he continues to experience residual weakness. He reports no chest pain and maintains his ability to ambulate for activities such as grocery shopping. He has not sought care at a heart failure clinic. He admits to occasional non-adherence to his medication regimen.  He has a defibrillator in place. He has not been prescribed diuretics.    History of Present Illness  The patient is a 69-year-old male with a history of coronary artery bypass grafting (CABG), ST-elevation myocardial infarction (STEMI) in 01/2024 with subsequent percutaneous coronary intervention (PCI) of the obtuse marginal 1 (OM1), severe residual right coronary artery (RCA) stenosis which was medically managed, patent left internal mammary artery (LIMA) to left anterior descending (LAD), paroxysmal atrial fibrillation, and severe ischemic cardiomyopathy with an ejection fraction of 15 to 20 percent. He presents for follow-up after a recent emergency department visit.    He recently sought emergency care due to

## 2025-02-17 NOTE — PATIENT INSTRUCTIONS
You may receive a survey regarding the care you received during your visit. We encourage you to complete and return your survey, as your input is valuable to us. We hope you will choose us in the future for your healthcare needs. Thank you!    Your Medical Assistant today: Carola  Thank you for coming to our office! It was a pleasure to serve you.     Happy [employer] Month!

## 2025-02-21 ENCOUNTER — APPOINTMENT (OUTPATIENT)
Dept: CT IMAGING | Age: 70
DRG: 897 | End: 2025-02-21
Payer: MEDICARE

## 2025-02-21 ENCOUNTER — APPOINTMENT (OUTPATIENT)
Dept: GENERAL RADIOLOGY | Age: 70
DRG: 897 | End: 2025-02-21
Payer: MEDICARE

## 2025-02-21 ENCOUNTER — HOSPITAL ENCOUNTER (INPATIENT)
Age: 70
LOS: 1 days | Discharge: HOME | DRG: 897 | End: 2025-02-23
Attending: EMERGENCY MEDICINE | Admitting: STUDENT IN AN ORGANIZED HEALTH CARE EDUCATION/TRAINING PROGRAM
Payer: MEDICARE

## 2025-02-21 DIAGNOSIS — R55 SYNCOPE AND COLLAPSE: Primary | ICD-10-CM

## 2025-02-21 DIAGNOSIS — I50.9 CONGESTIVE HEART FAILURE, UNSPECIFIED HF CHRONICITY, UNSPECIFIED HEART FAILURE TYPE (HCC): ICD-10-CM

## 2025-02-21 LAB
NT-PROBNP SERPL IA-MCNC: 736.4 PG/ML (ref 0–124)
TROPONIN, HIGH SENSITIVITY: 7 NG/L (ref 0–12)

## 2025-02-21 PROCEDURE — 36415 COLL VENOUS BLD VENIPUNCTURE: CPT

## 2025-02-21 PROCEDURE — 93005 ELECTROCARDIOGRAM TRACING: CPT | Performed by: EMERGENCY MEDICINE

## 2025-02-21 PROCEDURE — 84484 ASSAY OF TROPONIN QUANT: CPT

## 2025-02-21 PROCEDURE — 83735 ASSAY OF MAGNESIUM: CPT

## 2025-02-21 PROCEDURE — 2580000003 HC RX 258: Performed by: EMERGENCY MEDICINE

## 2025-02-21 PROCEDURE — 80053 COMPREHEN METABOLIC PANEL: CPT

## 2025-02-21 PROCEDURE — 99285 EMERGENCY DEPT VISIT HI MDM: CPT

## 2025-02-21 PROCEDURE — 71045 X-RAY EXAM CHEST 1 VIEW: CPT

## 2025-02-21 PROCEDURE — 83880 ASSAY OF NATRIURETIC PEPTIDE: CPT

## 2025-02-21 PROCEDURE — 85025 COMPLETE CBC W/AUTO DIFF WBC: CPT

## 2025-02-21 PROCEDURE — 87636 SARSCOV2 & INF A&B AMP PRB: CPT

## 2025-02-21 PROCEDURE — 80307 DRUG TEST PRSMV CHEM ANLYZR: CPT

## 2025-02-21 PROCEDURE — 6360000002 HC RX W HCPCS

## 2025-02-21 PROCEDURE — 70450 CT HEAD/BRAIN W/O DYE: CPT

## 2025-02-21 PROCEDURE — 96374 THER/PROPH/DIAG INJ IV PUSH: CPT

## 2025-02-21 RX ORDER — 0.9 % SODIUM CHLORIDE 0.9 %
1000 INTRAVENOUS SOLUTION INTRAVENOUS ONCE
Status: COMPLETED | OUTPATIENT
Start: 2025-02-21 | End: 2025-02-21

## 2025-02-21 RX ORDER — ACETAMINOPHEN 500 MG
1000 TABLET ORAL
Status: DISPENSED | OUTPATIENT
Start: 2025-02-21 | End: 2025-02-22

## 2025-02-21 RX ORDER — MORPHINE SULFATE 4 MG/ML
4 INJECTION, SOLUTION INTRAMUSCULAR; INTRAVENOUS ONCE
Status: COMPLETED | OUTPATIENT
Start: 2025-02-21 | End: 2025-02-21

## 2025-02-21 RX ADMIN — SODIUM CHLORIDE 1000 ML: 9 INJECTION, SOLUTION INTRAVENOUS at 21:08

## 2025-02-21 RX ADMIN — MORPHINE SULFATE 4 MG: 4 INJECTION, SOLUTION INTRAMUSCULAR; INTRAVENOUS at 22:18

## 2025-02-21 ASSESSMENT — PAIN - FUNCTIONAL ASSESSMENT
PAIN_FUNCTIONAL_ASSESSMENT: 0-10

## 2025-02-21 ASSESSMENT — PAIN DESCRIPTION - DESCRIPTORS
DESCRIPTORS: TIGHTNESS
DESCRIPTORS: THROBBING

## 2025-02-21 ASSESSMENT — PAIN SCALES - GENERAL
PAINLEVEL_OUTOF10: 8

## 2025-02-21 ASSESSMENT — PAIN DESCRIPTION - ORIENTATION
ORIENTATION: LEFT
ORIENTATION: LEFT

## 2025-02-21 ASSESSMENT — PAIN DESCRIPTION - LOCATION
LOCATION: HEAD

## 2025-02-22 PROBLEM — R40.20 LOC (LOSS OF CONSCIOUSNESS) (HCC): Status: ACTIVE | Noted: 2025-02-22

## 2025-02-22 PROBLEM — R55 RECURRENT SYNCOPE: Status: ACTIVE | Noted: 2025-02-22

## 2025-02-22 LAB
ALBUMIN SERPL BCG-MCNC: 3.8 G/DL (ref 3.5–5.1)
ALP SERPL-CCNC: 78 U/L (ref 38–126)
ALT SERPL W/O P-5'-P-CCNC: 13 U/L (ref 11–66)
AMPHETAMINES UR QL SCN: NEGATIVE
ANION GAP SERPL CALC-SCNC: 15 MEQ/L (ref 8–16)
ANION GAP SERPL CALC-SCNC: 17 MEQ/L (ref 8–16)
ANISOCYTOSIS BLD QL SMEAR: PRESENT
AST SERPL-CCNC: 14 U/L (ref 5–40)
BACTERIA: NORMAL
BARBITURATES UR QL SCN: NEGATIVE
BASOPHILS ABSOLUTE: 0 THOU/MM3 (ref 0–0.1)
BASOPHILS NFR BLD AUTO: 0.3 %
BENZODIAZ UR QL SCN: NEGATIVE
BILIRUB SERPL-MCNC: 0.5 MG/DL (ref 0.3–1.2)
BILIRUB UR QL STRIP: NEGATIVE
BUN SERPL-MCNC: 20 MG/DL (ref 7–22)
BUN SERPL-MCNC: 20 MG/DL (ref 7–22)
BZE UR QL SCN: NEGATIVE
CA-I BLD ISE-SCNC: 1.25 MMOL/L (ref 1.12–1.32)
CALCIUM SERPL-MCNC: 8.4 MG/DL (ref 8.2–9.6)
CALCIUM SERPL-MCNC: 8.7 MG/DL (ref 8.2–9.6)
CANNABINOIDS UR QL SCN: POSITIVE
CASTS #/AREA URNS LPF: NORMAL /LPF
CASTS #/AREA URNS LPF: NORMAL /LPF
CHARACTER UR: CLEAR
CHARCOAL URNS QL MICRO: NORMAL
CHLORIDE SERPL-SCNC: 102 MEQ/L (ref 98–111)
CHLORIDE SERPL-SCNC: 109 MEQ/L (ref 98–111)
CO2 SERPL-SCNC: 21 MEQ/L (ref 23–33)
CO2 SERPL-SCNC: 23 MEQ/L (ref 23–33)
COLOR UR: YELLOW
CREAT SERPL-MCNC: 1 MG/DL (ref 0.4–1.2)
CREAT SERPL-MCNC: 1.2 MG/DL (ref 0.4–1.2)
CRP SERPL-MCNC: < 0.3 MG/DL (ref 0–0.5)
CRYSTALS URNS QL MICRO: NORMAL
DEPRECATED RDW RBC AUTO: 39.6 FL (ref 35–45)
DEPRECATED RDW RBC AUTO: 39.9 FL (ref 35–45)
EOSINOPHIL NFR BLD AUTO: 0.7 %
EOSINOPHILS ABSOLUTE: 0.1 THOU/MM3 (ref 0–0.4)
EPITHELIAL CELLS, UA: NORMAL /HPF
ERYTHROCYTE [DISTWIDTH] IN BLOOD BY AUTOMATED COUNT: 17.1 % (ref 11.5–14.5)
ERYTHROCYTE [DISTWIDTH] IN BLOOD BY AUTOMATED COUNT: 17.1 % (ref 11.5–14.5)
ERYTHROCYTE [SEDIMENTATION RATE] IN BLOOD BY WESTERGREN METHOD: 13 MM/HR (ref 0–10)
FENTANYL: NEGATIVE
FLUAV RNA RESP QL NAA+PROBE: NOT DETECTED
FLUBV RNA RESP QL NAA+PROBE: NOT DETECTED
FOLATE SERPL-MCNC: 15.1 NG/ML (ref 4.6–34.8)
GFR SERPL CREATININE-BSD FRML MDRD: 66 ML/MIN/1.73M2
GFR SERPL CREATININE-BSD FRML MDRD: 82 ML/MIN/1.73M2
GLUCOSE SERPL-MCNC: 127 MG/DL (ref 70–108)
GLUCOSE SERPL-MCNC: 97 MG/DL (ref 70–108)
GLUCOSE UR QL STRIP.AUTO: NEGATIVE MG/DL
HCT VFR BLD AUTO: 32.4 % (ref 42–52)
HCT VFR BLD AUTO: 33.2 % (ref 42–52)
HGB BLD-MCNC: 10.2 GM/DL (ref 14–18)
HGB BLD-MCNC: 10.3 GM/DL (ref 14–18)
HGB UR QL STRIP.AUTO: NEGATIVE
HYPOCHROMIA BLD QL SMEAR: PRESENT
IMM GRANULOCYTES # BLD AUTO: 0.14 THOU/MM3 (ref 0–0.07)
IMM GRANULOCYTES NFR BLD AUTO: 1.3 %
KETONES UR QL STRIP.AUTO: NEGATIVE
LEUKOCYTE ESTERASE UR QL STRIP.AUTO: NEGATIVE
LYMPHOCYTES ABSOLUTE: 1.3 THOU/MM3 (ref 1–4.8)
LYMPHOCYTES NFR BLD AUTO: 11.9 %
MAGNESIUM SERPL-MCNC: 1.9 MG/DL (ref 1.6–2.4)
MAGNESIUM SERPL-MCNC: 2.1 MG/DL (ref 1.6–2.4)
MCH RBC QN AUTO: 21.3 PG (ref 26–33)
MCH RBC QN AUTO: 21.4 PG (ref 26–33)
MCHC RBC AUTO-ENTMCNC: 31 GM/DL (ref 32.2–35.5)
MCHC RBC AUTO-ENTMCNC: 31.5 GM/DL (ref 32.2–35.5)
MCV RBC AUTO: 67.6 FL (ref 80–94)
MCV RBC AUTO: 68.9 FL (ref 80–94)
MICROCYTES BLD QL SMEAR: PRESENT
MONOCYTES ABSOLUTE: 0.6 THOU/MM3 (ref 0.4–1.3)
MONOCYTES NFR BLD AUTO: 5.4 %
NEUTROPHILS ABSOLUTE: 8.8 THOU/MM3 (ref 1.8–7.7)
NEUTROPHILS NFR BLD AUTO: 80.4 %
NITRITE UR QL STRIP.AUTO: NEGATIVE
NRBC BLD AUTO-RTO: 0 /100 WBC
OPIATES UR QL SCN: POSITIVE
OSMOLALITY SERPL CALC.SUM OF ELEC: 291.2 MOSMOL/KG (ref 275–300)
OXYCODONE: NEGATIVE
PCP UR QL SCN: NEGATIVE
PH UR STRIP.AUTO: 6 [PH] (ref 5–9)
PHOSPHATE SERPL-MCNC: 3.2 MG/DL (ref 2.4–4.7)
PLATELET # BLD AUTO: 272 THOU/MM3 (ref 130–400)
PLATELET # BLD AUTO: 294 THOU/MM3 (ref 130–400)
PLATELET BLD QL SMEAR: ADEQUATE
PMV BLD AUTO: 9.3 FL (ref 9.4–12.4)
PMV BLD AUTO: 9.4 FL (ref 9.4–12.4)
POIKILOCYTES: ABNORMAL
POTASSIUM SERPL-SCNC: 3.7 MEQ/L (ref 3.5–5.2)
POTASSIUM SERPL-SCNC: 4.1 MEQ/L (ref 3.5–5.2)
PROLACTIN SERPL-MCNC: 18 NG/ML
PROT SERPL-MCNC: 6.8 G/DL (ref 6.1–8)
PROT UR STRIP.AUTO-MCNC: NEGATIVE MG/DL
RBC # BLD AUTO: 4.79 MILL/MM3 (ref 4.7–6.1)
RBC # BLD AUTO: 4.82 MILL/MM3 (ref 4.7–6.1)
RBC #/AREA URNS HPF: NORMAL /HPF
RENAL EPI CELLS #/AREA URNS HPF: NORMAL /[HPF]
SARS-COV-2 RNA RESP QL NAA+PROBE: NOT DETECTED
SCAN OF BLOOD SMEAR: NORMAL
SODIUM SERPL-SCNC: 142 MEQ/L (ref 135–145)
SODIUM SERPL-SCNC: 145 MEQ/L (ref 135–145)
SPECIFIC GRAVITY UA: 1.02 (ref 1–1.03)
TARGETS BLD QL SMEAR: ABNORMAL
UROBILINOGEN, URINE: 1 EU/DL (ref 0–1)
VIT B12 SERPL-MCNC: 493 PG/ML (ref 211–911)
WBC # BLD AUTO: 11 THOU/MM3 (ref 4.8–10.8)
WBC # BLD AUTO: 9 THOU/MM3 (ref 4.8–10.8)
WBC #/AREA URNS HPF: NORMAL /HPF
YEAST LIKE FUNGI URNS QL MICRO: NORMAL

## 2025-02-22 PROCEDURE — 80048 BASIC METABOLIC PNL TOTAL CA: CPT

## 2025-02-22 PROCEDURE — 99223 1ST HOSP IP/OBS HIGH 75: CPT | Performed by: STUDENT IN AN ORGANIZED HEALTH CARE EDUCATION/TRAINING PROGRAM

## 2025-02-22 PROCEDURE — 82607 VITAMIN B-12: CPT

## 2025-02-22 PROCEDURE — 84100 ASSAY OF PHOSPHORUS: CPT

## 2025-02-22 PROCEDURE — 36415 COLL VENOUS BLD VENIPUNCTURE: CPT

## 2025-02-22 PROCEDURE — 81001 URINALYSIS AUTO W/SCOPE: CPT

## 2025-02-22 PROCEDURE — 2500000003 HC RX 250 WO HCPCS

## 2025-02-22 PROCEDURE — 82746 ASSAY OF FOLIC ACID SERUM: CPT

## 2025-02-22 PROCEDURE — 99223 1ST HOSP IP/OBS HIGH 75: CPT | Performed by: NURSE PRACTITIONER

## 2025-02-22 PROCEDURE — 83735 ASSAY OF MAGNESIUM: CPT

## 2025-02-22 PROCEDURE — 2060000000 HC ICU INTERMEDIATE R&B

## 2025-02-22 PROCEDURE — 84146 ASSAY OF PROLACTIN: CPT

## 2025-02-22 PROCEDURE — 86140 C-REACTIVE PROTEIN: CPT

## 2025-02-22 PROCEDURE — 6360000002 HC RX W HCPCS

## 2025-02-22 PROCEDURE — 85651 RBC SED RATE NONAUTOMATED: CPT

## 2025-02-22 PROCEDURE — 85027 COMPLETE CBC AUTOMATED: CPT

## 2025-02-22 PROCEDURE — 82330 ASSAY OF CALCIUM: CPT

## 2025-02-22 PROCEDURE — 6370000000 HC RX 637 (ALT 250 FOR IP)

## 2025-02-22 RX ORDER — LEVETIRACETAM 500 MG/1
500 TABLET ORAL DAILY
Status: DISCONTINUED | OUTPATIENT
Start: 2025-02-22 | End: 2025-02-22

## 2025-02-22 RX ORDER — PANTOPRAZOLE SODIUM 40 MG/1
40 TABLET, DELAYED RELEASE ORAL
Status: DISCONTINUED | OUTPATIENT
Start: 2025-02-22 | End: 2025-02-23 | Stop reason: HOSPADM

## 2025-02-22 RX ORDER — SODIUM CHLORIDE 0.9 % (FLUSH) 0.9 %
5-40 SYRINGE (ML) INJECTION EVERY 12 HOURS SCHEDULED
Status: DISCONTINUED | OUTPATIENT
Start: 2025-02-22 | End: 2025-02-23 | Stop reason: HOSPADM

## 2025-02-22 RX ORDER — RANOLAZINE 500 MG/1
1000 TABLET, EXTENDED RELEASE ORAL DAILY
Status: DISCONTINUED | OUTPATIENT
Start: 2025-02-22 | End: 2025-02-23 | Stop reason: HOSPADM

## 2025-02-22 RX ORDER — POTASSIUM CHLORIDE 1500 MG/1
40 TABLET, EXTENDED RELEASE ORAL PRN
Status: DISCONTINUED | OUTPATIENT
Start: 2025-02-22 | End: 2025-02-23 | Stop reason: HOSPADM

## 2025-02-22 RX ORDER — KETOROLAC TROMETHAMINE 30 MG/ML
15 INJECTION, SOLUTION INTRAMUSCULAR; INTRAVENOUS ONCE
Status: COMPLETED | OUTPATIENT
Start: 2025-02-22 | End: 2025-02-22

## 2025-02-22 RX ORDER — ONDANSETRON 4 MG/1
4 TABLET, ORALLY DISINTEGRATING ORAL EVERY 8 HOURS PRN
Status: DISCONTINUED | OUTPATIENT
Start: 2025-02-22 | End: 2025-02-23 | Stop reason: HOSPADM

## 2025-02-22 RX ORDER — ISOSORBIDE MONONITRATE 60 MG/1
60 TABLET, EXTENDED RELEASE ORAL DAILY
Status: DISCONTINUED | OUTPATIENT
Start: 2025-02-22 | End: 2025-02-23 | Stop reason: HOSPADM

## 2025-02-22 RX ORDER — LEVETIRACETAM 500 MG/1
500 TABLET ORAL 2 TIMES DAILY
Status: DISCONTINUED | OUTPATIENT
Start: 2025-02-22 | End: 2025-02-23 | Stop reason: HOSPADM

## 2025-02-22 RX ORDER — ACETAMINOPHEN 325 MG/1
650 TABLET ORAL EVERY 6 HOURS PRN
Status: DISCONTINUED | OUTPATIENT
Start: 2025-02-22 | End: 2025-02-23 | Stop reason: HOSPADM

## 2025-02-22 RX ORDER — ASPIRIN 81 MG/1
81 TABLET, CHEWABLE ORAL DAILY
Status: DISCONTINUED | OUTPATIENT
Start: 2025-02-22 | End: 2025-02-23 | Stop reason: HOSPADM

## 2025-02-22 RX ORDER — ATORVASTATIN CALCIUM 80 MG/1
80 TABLET, FILM COATED ORAL DAILY
Status: DISCONTINUED | OUTPATIENT
Start: 2025-02-22 | End: 2025-02-23 | Stop reason: HOSPADM

## 2025-02-22 RX ORDER — AMIODARONE HYDROCHLORIDE 200 MG/1
200 TABLET ORAL DAILY
Status: DISCONTINUED | OUTPATIENT
Start: 2025-02-22 | End: 2025-02-23 | Stop reason: HOSPADM

## 2025-02-22 RX ORDER — ENOXAPARIN SODIUM 100 MG/ML
40 INJECTION SUBCUTANEOUS DAILY
Status: DISCONTINUED | OUTPATIENT
Start: 2025-02-22 | End: 2025-02-22

## 2025-02-22 RX ORDER — SODIUM CHLORIDE 9 MG/ML
INJECTION, SOLUTION INTRAVENOUS PRN
Status: DISCONTINUED | OUTPATIENT
Start: 2025-02-22 | End: 2025-02-23 | Stop reason: HOSPADM

## 2025-02-22 RX ORDER — POTASSIUM CHLORIDE 7.45 MG/ML
10 INJECTION INTRAVENOUS PRN
Status: DISCONTINUED | OUTPATIENT
Start: 2025-02-22 | End: 2025-02-23 | Stop reason: HOSPADM

## 2025-02-22 RX ORDER — ONDANSETRON 2 MG/ML
4 INJECTION INTRAMUSCULAR; INTRAVENOUS EVERY 6 HOURS PRN
Status: DISCONTINUED | OUTPATIENT
Start: 2025-02-22 | End: 2025-02-23 | Stop reason: HOSPADM

## 2025-02-22 RX ORDER — METOPROLOL SUCCINATE 25 MG/1
25 TABLET, EXTENDED RELEASE ORAL DAILY
Status: DISCONTINUED | OUTPATIENT
Start: 2025-02-22 | End: 2025-02-23 | Stop reason: HOSPADM

## 2025-02-22 RX ORDER — SODIUM CHLORIDE 0.9 % (FLUSH) 0.9 %
5-40 SYRINGE (ML) INJECTION PRN
Status: DISCONTINUED | OUTPATIENT
Start: 2025-02-22 | End: 2025-02-23 | Stop reason: HOSPADM

## 2025-02-22 RX ORDER — LISINOPRIL 10 MG/1
10 TABLET ORAL DAILY
Status: DISCONTINUED | OUTPATIENT
Start: 2025-02-22 | End: 2025-02-23 | Stop reason: HOSPADM

## 2025-02-22 RX ORDER — NITROGLYCERIN 0.4 MG/1
0.4 TABLET SUBLINGUAL EVERY 5 MIN PRN
Status: DISCONTINUED | OUTPATIENT
Start: 2025-02-22 | End: 2025-02-23 | Stop reason: HOSPADM

## 2025-02-22 RX ORDER — CLOPIDOGREL BISULFATE 75 MG/1
75 TABLET ORAL DAILY
Status: DISCONTINUED | OUTPATIENT
Start: 2025-02-22 | End: 2025-02-23 | Stop reason: HOSPADM

## 2025-02-22 RX ORDER — POLYETHYLENE GLYCOL 3350 17 G/17G
17 POWDER, FOR SOLUTION ORAL DAILY PRN
Status: DISCONTINUED | OUTPATIENT
Start: 2025-02-22 | End: 2025-02-23 | Stop reason: HOSPADM

## 2025-02-22 RX ORDER — FERROUS SULFATE 325(65) MG
325 TABLET ORAL EVERY OTHER DAY
Status: DISCONTINUED | OUTPATIENT
Start: 2025-02-22 | End: 2025-02-23 | Stop reason: HOSPADM

## 2025-02-22 RX ORDER — MAGNESIUM SULFATE IN WATER 40 MG/ML
2000 INJECTION, SOLUTION INTRAVENOUS PRN
Status: DISCONTINUED | OUTPATIENT
Start: 2025-02-22 | End: 2025-02-23 | Stop reason: HOSPADM

## 2025-02-22 RX ORDER — ACETAMINOPHEN 650 MG/1
650 SUPPOSITORY RECTAL EVERY 6 HOURS PRN
Status: DISCONTINUED | OUTPATIENT
Start: 2025-02-22 | End: 2025-02-23 | Stop reason: HOSPADM

## 2025-02-22 RX ADMIN — APIXABAN 5 MG: 5 TABLET, FILM COATED ORAL at 20:28

## 2025-02-22 RX ADMIN — RANOLAZINE 1000 MG: 500 TABLET, EXTENDED RELEASE ORAL at 11:27

## 2025-02-22 RX ADMIN — LEVETIRACETAM 500 MG: 500 TABLET, FILM COATED ORAL at 20:28

## 2025-02-22 RX ADMIN — ASPIRIN 81 MG: 81 TABLET, CHEWABLE ORAL at 10:09

## 2025-02-22 RX ADMIN — FERROUS SULFATE TAB 325 MG (65 MG ELEMENTAL FE) 325 MG: 325 (65 FE) TAB at 11:28

## 2025-02-22 RX ADMIN — CLOPIDOGREL BISULFATE 75 MG: 75 TABLET ORAL at 10:09

## 2025-02-22 RX ADMIN — PANTOPRAZOLE SODIUM 40 MG: 40 TABLET, DELAYED RELEASE ORAL at 05:38

## 2025-02-22 RX ADMIN — ATORVASTATIN CALCIUM 80 MG: 80 TABLET, FILM COATED ORAL at 18:12

## 2025-02-22 RX ADMIN — AMIODARONE HYDROCHLORIDE 200 MG: 200 TABLET ORAL at 11:28

## 2025-02-22 RX ADMIN — ISOSORBIDE MONONITRATE 60 MG: 60 TABLET, EXTENDED RELEASE ORAL at 11:28

## 2025-02-22 RX ADMIN — KETOROLAC TROMETHAMINE 15 MG: 30 INJECTION, SOLUTION INTRAMUSCULAR at 05:38

## 2025-02-22 RX ADMIN — SODIUM CHLORIDE, PRESERVATIVE FREE 10 ML: 5 INJECTION INTRAVENOUS at 11:29

## 2025-02-22 RX ADMIN — LEVETIRACETAM 500 MG: 500 TABLET, FILM COATED ORAL at 11:28

## 2025-02-22 RX ADMIN — LISINOPRIL 10 MG: 10 TABLET ORAL at 11:27

## 2025-02-22 RX ADMIN — SODIUM CHLORIDE, PRESERVATIVE FREE 10 ML: 5 INJECTION INTRAVENOUS at 20:28

## 2025-02-22 RX ADMIN — APIXABAN 5 MG: 5 TABLET, FILM COATED ORAL at 10:09

## 2025-02-22 RX ADMIN — METOPROLOL SUCCINATE 25 MG: 25 TABLET, FILM COATED, EXTENDED RELEASE ORAL at 10:09

## 2025-02-22 RX ADMIN — ACETAMINOPHEN 650 MG: 325 TABLET ORAL at 05:38

## 2025-02-22 ASSESSMENT — PAIN SCALES - GENERAL
PAINLEVEL_OUTOF10: 9
PAINLEVEL_OUTOF10: 0
PAINLEVEL_OUTOF10: 8
PAINLEVEL_OUTOF10: 0
PAINLEVEL_OUTOF10: 8
PAINLEVEL_OUTOF10: 9
PAINLEVEL_OUTOF10: 9

## 2025-02-22 ASSESSMENT — PAIN DESCRIPTION - DESCRIPTORS
DESCRIPTORS: POUNDING;THROBBING
DESCRIPTORS: POUNDING
DESCRIPTORS: POUNDING
DESCRIPTORS: THROBBING
DESCRIPTORS: POUNDING;THROBBING

## 2025-02-22 ASSESSMENT — PAIN DESCRIPTION - ORIENTATION
ORIENTATION: LEFT

## 2025-02-22 ASSESSMENT — PAIN DESCRIPTION - ONSET
ONSET: ON-GOING
ONSET: ON-GOING

## 2025-02-22 ASSESSMENT — PAIN DESCRIPTION - LOCATION
LOCATION: HEAD

## 2025-02-22 ASSESSMENT — PAIN - FUNCTIONAL ASSESSMENT
PAIN_FUNCTIONAL_ASSESSMENT: 0-10
PAIN_FUNCTIONAL_ASSESSMENT: 0-10
PAIN_FUNCTIONAL_ASSESSMENT: ACTIVITIES ARE NOT PREVENTED
PAIN_FUNCTIONAL_ASSESSMENT: ACTIVITIES ARE NOT PREVENTED

## 2025-02-22 ASSESSMENT — PAIN DESCRIPTION - FREQUENCY
FREQUENCY: CONTINUOUS
FREQUENCY: CONTINUOUS

## 2025-02-22 ASSESSMENT — PAIN DESCRIPTION - PAIN TYPE
TYPE: ACUTE PAIN
TYPE: ACUTE PAIN

## 2025-02-22 NOTE — ED NOTES
This RN spoke with Carola, EKG in regards to pt's pacer interrogation. EKG states they do not have any readings present and states that the pt had difficulty getting his pacemaker interrogated last time he was here as well, EKG states it looks like possible pacemaker problem.

## 2025-02-22 NOTE — CARE COORDINATION
02/22/25 1144   Readmission Assessment   Number of Days since last admission? 8-30 days   Previous Disposition SNF   Who is being Interviewed Patient   What was the patient's/caregiver's perception as to why they think they needed to return back to the hospital? Other (Comment)  (I passed out again)   Did you visit your Primary Care Physician after you left the hospital, before you returned this time? Yes   Did you see a specialist, such as Cardiac, Pulmonary, Orthopedic Physician, etc. after you left the hospital? No   Who advised the patient to return to the hospital? Self-referral   Does the patient report anything that got in the way of taking their medications? No   In our efforts to provide the best possible care to you and others like you, can you think of anything that we could have done to help you after you left the hospital the first time, so that you might not have needed to return so soon? Other (Comment)  (I was ready to go.)

## 2025-02-22 NOTE — ED NOTES
Pt is laying in bed with nephew at bedside, pt states PIV is burning, this RN flushes IV with no difficulties, no blood return present, pt states he tastes funny taste from flush, pt offered to start new IV pt states \"let's see how it continues to go.\" Pt is breathing regular and unlabored on room air at this time. Pt has headache rated 8/10 at this time, no other signs of distress to note at this time. Pt visiting with nephew at this time. Call llight within reach.

## 2025-02-22 NOTE — ED NOTES
This RN attempted to get blood from pt's IV, unable to get blood from PIV. IV flushes fine and is infusing with no problems.

## 2025-02-22 NOTE — ED NOTES
ED to inpatient nurses report      Chief Complaint:  Chief Complaint   Patient presents with    Loss of Consciousness     Present to ED from: home    MOA:     LOC: alert and orientated to name, place, date  Mobility: Requires assistance * 1  Oxygen Baseline: ra    Current needs required: ra     Code Status:   Prior    What abnormal results were found and what did you give/do to treat them? Pacemaker not Interrogating  Any procedures or intervention occur? See MAR    Mental Status:  Level of Consciousness: Alert (0)    Psych Assessment:        Vitals:  Patient Vitals for the past 24 hrs:   BP Temp Temp src Pulse Resp SpO2 Height Weight   02/22/25 0115 126/86 -- -- 63 10 99 % -- --   02/22/25 0029 124/85 -- -- 62 13 99 % -- --   02/21/25 2316 111/79 -- -- 63 14 99 % -- --   02/21/25 2220 (!) 132/91 -- -- 72 11 97 % -- --   02/21/25 2145 112/78 -- -- 63 12 97 % -- --   02/21/25 2103 115/79 97.6 °F (36.4 °C) Oral 62 14 97 % 1.753 m (5' 9\") 74.8 kg (165 lb)        LDAs:   Peripheral IV 02/21/25 Left;Posterior Forearm (Active)   Site Assessment Clean, dry & intact 02/22/25 0114   Line Status Normal saline locked 02/22/25 0114   Phlebitis Assessment No symptoms 02/22/25 0114   Infiltration Assessment 0 02/22/25 0114   Dressing Status Clean, dry & intact 02/22/25 0114       Ambulatory Status:  Presents to emergency department  because of falls (Syncope, seizure, or loss of consciousness): Yes, Age > 70: No, Altered Mental Status, Intoxication with alcohol or substance confusion (Disorientation, impaired judgment, poor safety awaremess, or inability to follow instructions): No, Impaired Mobility: Ambulates or transfers with assistive devices or assistance; Unable to ambulate or transer.: Yes, Nursing Judgement: Yes    Diagnosis:  DISPOSITION  02/22/2025 01:38:57 AM   Final diagnoses:   None        Consults:  None     Pain Score:  Pain Assessment  Pain Assessment: 0-10  Pain Level: 8  Pain Location: Head  Pain Orientation:

## 2025-02-22 NOTE — ED NOTES
Pt back from CT in stable condition at this time. Pt is breathing regular and unlabored on room air at this time. Pt has headache all over his head rated 8/10 described as throbbing at this time, no other signs of distress to note. Call light within reach, pt alert and oriented x4 at this time.

## 2025-02-22 NOTE — CARE COORDINATION
02/22/25 1133   Service Assessment   Patient Orientation Alert and Oriented   Cognition Alert   History Provided By Patient   Primary Caregiver Self   Support Systems Children;Family Members   Patient's Healthcare Decision Maker is: Named in Scanned ACP Document   PCP Verified by CM Yes   Last Visit to PCP Within last 3 months   Prior Functional Level Assistance with the following:;Cooking;Housework;Shopping   Current Functional Level Assistance with the following:;Mobility   Can patient return to prior living arrangement Yes   Ability to make needs known: Good   Family able to assist with home care needs: Yes   Would you like for me to discuss the discharge plan with any other family members/significant others, and if so, who? No   Financial Resources Medicare   Community Resources None   CM/SW Referral Other (see comment)  (pt requesting HH)   Social/Functional History   Bathroom Equipment None   Home Equipment Walker - Rolling;Cane   Active  Yes   Discharge Planning   Type of Residence House   Living Arrangements Family Members   Current Services Prior To Admission None   Current DME Prior to Arrival Walker;Cane   Potential Assistance Needed Home Care   DME Ordered? No   Potential Assistance Purchasing Medications No   Type of Home Care Services None   Patient expects to be discharged to: House   Services At/After Discharge   Confirm Follow Up Transport Self   Condition of Participation: Discharge Planning   The Plan for Transition of Care is related to the following treatment goals: to return home.     Patient Goals/Plan/Treatment Preferences: Met with Chriss. He currently lives at home with his brother. Has DME. Plan is to return home with family. Chriss is requesting HH at discharge. SW consult in place.     If patient is discharged prior to next notation, then this note serves as note for discharge by case management.

## 2025-02-22 NOTE — H&P
History & Physical  Internal Medicine Resident         Patient: Chriss Braga 69 y.o. male      : 1955  Date of Admission: 2025  Date of Service: Pt seen/examined on 25 and Admitted to Inpatient with expected LOS greater than two midnights due to medical therapy.       ASSESSMENT AND PLAN  Syncope: Patient reports loss of consciousness for about 10min, associated with stiffness.  As per EMS unresponsive to verbal stimuli or painful stimuli. Later became conscious and alert oriented x 4, without any postictal state. CT head no acute finding or changes.  EKG showed atrial paced rhythm at 60.  TSH 4.670, FT4: 1.18 (2/3/25).  Echo 2025, reported EF 15 to 20%.  Patient is not on any antihyperglycemic agent.  UDS positive for cannabinoid and opioids.   Unable to obtain MRI as ICD incompatible, last MRI .  24-hour EEG ordered.   Neurology consulted for further recommendation  Resume Keppra 500 mg/twice daily.  Patient reported only taking once a day.   Pacer interrogation ordered.   Ordered seizures/fall precaution.   Left temporal headache associated with blurry vision: Patient complained of manage and headache on the left side onset during admission of the ED. s/p 1 dose if morphine.  Left side tender to palpation, headache 9/10.   Tylenol as needed. Ordered one time dose of Toradol.   ESR/C-reactive protein, ruling out temporal arteritis.   Leukocytosis: WBC 11.0 on admission, suspected secondary to possible seizure.  Pituitary macroadenoma: CT head on 2025 reported unchanged prominent pituitary gland.    Prolactin ordered.   Unable to perform MRI as ICD and combative.   Obstructive CAD status post LESTER (2024) and CABGx3(): Lexiscan on 2024.  Negative for myocardial ischemia.  Following up with cardiology as an outpatient, Dr. Lisa.   Continue atorvastatin 80 mg daily, Plavix 75 mg/daily, Ranexa 100 mg/BID.   Chronic systolic heart failure (NYHA class III): Limited

## 2025-02-22 NOTE — ED NOTES
Pt is laying in bed watching tv, pt asks for pain medication for headache rated 8/10 on left side of head described as throbbing. Pt is breathing regular and unlabored on room air at this time. Pt call light within reach.

## 2025-02-22 NOTE — ED NOTES
Pt is laying in bed watching tv, no requests at this time. Pt is breathing regular and unlabored on room air. Pt rates pain 8/10 described as pounding on left side of head, no other signs of distress to note at this time. Call light within reach.

## 2025-02-22 NOTE — ED NOTES
Pt is laying in bed with no requests at this time. Pt is breathing regular and unlabored on room air at this time. Pt has pain rated 8/10 on left side of head described as throbbing, no other signs of distress to note at this time. Call light within reach. Pacer interrogation complete at this time.
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
## 2025-02-22 NOTE — ED NOTES
This RN attempted to interrogate pt's [employer] pacemaker, this RN and SANDRO Escobar unable to interrogate pacemaker, EKG called to interrogate pacemaker at this time.

## 2025-02-22 NOTE — ED NOTES
Inpatient provider at pt's bedside talking with pt at this time. Pt laying in bed with HOB elevated, pt is breathing regular and unlabored on room air at this time. Pt is hypertensive, other vitals stable at this time. Pt call light within reach.
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
## 2025-02-22 NOTE — ED PROVIDER NOTES
PATIENT NAME: Chriss Braga  MRN: [id number]  : 1955  CEDENO: 2025    I performed a history and physical examination of the patient and discussed management with the Resident. I reviewed the Resident's note and agree with the documented findings and plan of care. Any areas of disagreement are noted on the chart. I was personally present for the key portions of any procedures and have documented in the chart those procedures where I was not present during the key portions. I have reviewed the emergency nurses triage note and agree with the chief complaint, past medical history, past surgical history, allergies, medications, social and family history as documented unless otherwise noted below.    MEDICAL DEDISION MAKING (MDM)     Chriss Braga is a 69 y.o. male who presents to Emergency Department with Loss of Consciousness     A 69-year-old male presents with unresponsive episode.  EMS states patient was unresponsive with weak pulses and agonal breathing when they arrived and the patient was not arousable to verbal or painful stimuli.    Upon arrival, patient was alert and oriented x 4 with stable vitals.     Patient had a similar episode on 2025 and was admitted for suspected CVA with negative stroke and seizure workup.  Of note a brain MRI was not done then due to patient's AICD status.  Patient is supposed to be on Keppra but apparently not according to the patient.    Patient's PMH is remarkable for CVA in  with residual left-sided weakness, pituitary macroadenoma, paroxysmal atrial fibrillation on Eliquis, HTN, HLD, chronic CHF with EF 15% - 20%, medical noncompliance, and polysubstance abuse.    ED workup is reassuring today.  Vital signs are stable upon reassessment.  Patient was AAOx4.  No new neurological deficits.      No clear etiology of patient's transient unresponsiveness.  This could be convulsive syncope versus seizure and I favor the diagnosis of seizure given patient already has an

## 2025-02-23 ENCOUNTER — APPOINTMENT (OUTPATIENT)
Age: 70
DRG: 897 | End: 2025-02-23
Payer: MEDICARE

## 2025-02-23 VITALS
HEIGHT: 69 IN | SYSTOLIC BLOOD PRESSURE: 134 MMHG | TEMPERATURE: 98.2 F | WEIGHT: 165 LBS | RESPIRATION RATE: 18 BRPM | BODY MASS INDEX: 24.44 KG/M2 | HEART RATE: 64 BPM | DIASTOLIC BLOOD PRESSURE: 76 MMHG | OXYGEN SATURATION: 100 %

## 2025-02-23 PROBLEM — F12.10 MARIJUANA ABUSE: Status: ACTIVE | Noted: 2025-02-23

## 2025-02-23 LAB
ANION GAP SERPL CALC-SCNC: 12 MEQ/L (ref 8–16)
BUN SERPL-MCNC: 15 MG/DL (ref 7–22)
CALCIUM SERPL-MCNC: 8.4 MG/DL (ref 8.2–9.6)
CHLORIDE SERPL-SCNC: 100 MEQ/L (ref 98–111)
CO2 SERPL-SCNC: 26 MEQ/L (ref 23–33)
CREAT SERPL-MCNC: 0.8 MG/DL (ref 0.4–1.2)
DEPRECATED RDW RBC AUTO: 38.8 FL (ref 35–45)
ECHO AO ASC DIAM: 3.3 CM
ECHO AO ASCENDING AORTA INDEX: 1.74 CM/M2
ECHO BSA: 1.91 M2
ECHO LA DIAMETER INDEX: 1.89 CM/M2
ECHO LA DIAMETER: 3.6 CM
ECHO LV EDV A2C: 121 ML
ECHO LV EDV A4C: 157 ML
ECHO LV EDV INDEX A4C: 83 ML/M2
ECHO LV EDV NDEX A2C: 64 ML/M2
ECHO LV EF PHYSICIAN: 20 %
ECHO LV EJECTION FRACTION A2C: 34 %
ECHO LV EJECTION FRACTION A4C: 19 %
ECHO LV EJECTION FRACTION BIPLANE: 23 % (ref 55–100)
ECHO LV ESV A2C: 80 ML
ECHO LV ESV A4C: 128 ML
ECHO LV ESV INDEX A2C: 42 ML/M2
ECHO LV ESV INDEX A4C: 67 ML/M2
ECHO LV FRACTIONAL SHORTENING: 9 % (ref 28–44)
ECHO LV INTERNAL DIMENSION DIASTOLE INDEX: 3 CM/M2
ECHO LV INTERNAL DIMENSION DIASTOLIC: 5.7 CM (ref 4.2–5.9)
ECHO LV INTERNAL DIMENSION SYSTOLIC INDEX: 2.74 CM/M2
ECHO LV INTERNAL DIMENSION SYSTOLIC: 5.2 CM
ECHO LV IVSD: 1.1 CM (ref 0.6–1)
ECHO LV MASS 2D: 256.7 G (ref 88–224)
ECHO LV MASS INDEX 2D: 135.1 G/M2 (ref 49–115)
ECHO LV POSTERIOR WALL DIASTOLIC: 1.1 CM (ref 0.6–1)
ECHO LV RELATIVE WALL THICKNESS RATIO: 0.39
ECHO RV INTERNAL DIMENSION: 2.8 CM
EKG ATRIAL RATE: 60 BPM
EKG P AXIS: 64 DEGREES
EKG P-R INTERVAL: 224 MS
EKG Q-T INTERVAL: 480 MS
EKG QRS DURATION: 90 MS
EKG QTC CALCULATION (BAZETT): 480 MS
EKG R AXIS: 2 DEGREES
EKG T AXIS: 14 DEGREES
EKG VENTRICULAR RATE: 60 BPM
ERYTHROCYTE [DISTWIDTH] IN BLOOD BY AUTOMATED COUNT: 17 % (ref 11.5–14.5)
GFR SERPL CREATININE-BSD FRML MDRD: > 90 ML/MIN/1.73M2
GLUCOSE SERPL-MCNC: 82 MG/DL (ref 70–108)
HCT VFR BLD AUTO: 34.4 % (ref 42–52)
HGB BLD-MCNC: 10.9 GM/DL (ref 14–18)
MCH RBC QN AUTO: 21.4 PG (ref 26–33)
MCHC RBC AUTO-ENTMCNC: 31.7 GM/DL (ref 32.2–35.5)
MCV RBC AUTO: 67.6 FL (ref 80–94)
PLATELET # BLD AUTO: 288 THOU/MM3 (ref 130–400)
PMV BLD AUTO: 9.4 FL (ref 9.4–12.4)
POTASSIUM SERPL-SCNC: 4 MEQ/L (ref 3.5–5.2)
RBC # BLD AUTO: 5.09 MILL/MM3 (ref 4.7–6.1)
SODIUM SERPL-SCNC: 138 MEQ/L (ref 135–145)
WBC # BLD AUTO: 7.2 THOU/MM3 (ref 4.8–10.8)

## 2025-02-23 PROCEDURE — 93325 DOPPLER ECHO COLOR FLOW MAPG: CPT | Performed by: INTERNAL MEDICINE

## 2025-02-23 PROCEDURE — 80048 BASIC METABOLIC PNL TOTAL CA: CPT

## 2025-02-23 PROCEDURE — 2500000003 HC RX 250 WO HCPCS

## 2025-02-23 PROCEDURE — 93308 TTE F-UP OR LMTD: CPT

## 2025-02-23 PROCEDURE — 93010 ELECTROCARDIOGRAM REPORT: CPT | Performed by: INTERNAL MEDICINE

## 2025-02-23 PROCEDURE — 93308 TTE F-UP OR LMTD: CPT | Performed by: INTERNAL MEDICINE

## 2025-02-23 PROCEDURE — 99239 HOSP IP/OBS DSCHRG MGMT >30: CPT | Performed by: STUDENT IN AN ORGANIZED HEALTH CARE EDUCATION/TRAINING PROGRAM

## 2025-02-23 PROCEDURE — 6370000000 HC RX 637 (ALT 250 FOR IP)

## 2025-02-23 PROCEDURE — 85027 COMPLETE CBC AUTOMATED: CPT

## 2025-02-23 PROCEDURE — 36415 COLL VENOUS BLD VENIPUNCTURE: CPT

## 2025-02-23 RX ORDER — LEVETIRACETAM 500 MG/1
500 TABLET ORAL 2 TIMES DAILY
Qty: 14 TABLET | Refills: 0 | Status: SHIPPED | OUTPATIENT
Start: 2025-02-23 | End: 2025-03-02

## 2025-02-23 RX ADMIN — METOPROLOL SUCCINATE 25 MG: 25 TABLET, FILM COATED, EXTENDED RELEASE ORAL at 09:16

## 2025-02-23 RX ADMIN — APIXABAN 5 MG: 5 TABLET, FILM COATED ORAL at 09:16

## 2025-02-23 RX ADMIN — AMIODARONE HYDROCHLORIDE 200 MG: 200 TABLET ORAL at 09:16

## 2025-02-23 RX ADMIN — ISOSORBIDE MONONITRATE 60 MG: 60 TABLET, EXTENDED RELEASE ORAL at 09:16

## 2025-02-23 RX ADMIN — CLOPIDOGREL BISULFATE 75 MG: 75 TABLET ORAL at 09:16

## 2025-02-23 RX ADMIN — PANTOPRAZOLE SODIUM 40 MG: 40 TABLET, DELAYED RELEASE ORAL at 06:45

## 2025-02-23 RX ADMIN — LEVETIRACETAM 500 MG: 500 TABLET, FILM COATED ORAL at 09:16

## 2025-02-23 RX ADMIN — SODIUM CHLORIDE, PRESERVATIVE FREE 10 ML: 5 INJECTION INTRAVENOUS at 09:17

## 2025-02-23 RX ADMIN — RANOLAZINE 1000 MG: 500 TABLET, EXTENDED RELEASE ORAL at 09:16

## 2025-02-23 RX ADMIN — ASPIRIN 81 MG: 81 TABLET, CHEWABLE ORAL at 09:16

## 2025-02-23 RX ADMIN — ATORVASTATIN CALCIUM 80 MG: 80 TABLET, FILM COATED ORAL at 09:16

## 2025-02-23 RX ADMIN — LISINOPRIL 10 MG: 10 TABLET ORAL at 09:16

## 2025-02-23 ASSESSMENT — PAIN SCALES - GENERAL: PAINLEVEL_OUTOF10: 0

## 2025-02-23 NOTE — CONSULTS
may relate to chronic small vessel ischemic changes. Moderate generalized atrophy. Old lacunar infarct in the left thalamus. Mucous retention cyst or polyp in the right sphenoid sinus. Mastoid air cells are clear. The orbits are within normal limits. There is no acute fracture.     No acute intracranial findings. This document has been electronically signed by: Thom Kirk MD on 02/21/2025 10:53 PM All CTs at this facility use dose modulation techniques and iterative reconstructions, and/or weight-based dosing when appropriate to reduce radiation to a low as reasonably achievable.        Assessment and Plan:        Transient Loss of Consciousness - Back to baseline. Low suspicion for seizures. Most likely related to marijuana use. Patient's drug screen was also positive for opiates,which he denies. He did receive morphine about the time of the drug screen but it is documented to be AFTER the drug screen was reported (??).  He should continue on current Keppra regimen - 500 mg bid. No additional neurologic work up noted. Please call us if neurological services are warranted.    Assessment and plan of care completed with Dr. Lawrence and he is in agreement.    CHEN Alba, ANP-BC  Neurology

## 2025-02-23 NOTE — PROGRESS NOTES
Discharge teaching and instructions for diagnosis of loss of consciousness completed with patient using teachback method. Patient voiced understanding regarding prescriptions, follow up appointments, and care of self at home. Discharged in a wheelchair to home address with support per  [employer] .  AVS reviewed.   Patient instructed to  prescription at [facility] pharmacy.   All questions answered and belongings sent with the patient.

## 2025-02-23 NOTE — PLAN OF CARE
Problem: Chronic Conditions and Co-morbidities  Goal: Patient's chronic conditions and co-morbidity symptoms are monitored and maintained or improved  Outcome: Adequate for Discharge  Flowsheets (Taken 2/23/2025 0937)  Care Plan - Patient's Chronic Conditions and Co-Morbidity Symptoms are Monitored and Maintained or Improved: Collaborate with multidisciplinary team to address chronic and comorbid conditions and prevent exacerbation or deterioration     Problem: Pain  Goal: Verbalizes/displays adequate comfort level or baseline comfort level  Outcome: Adequate for Discharge     Problem: Safety - Adult  Goal: Free from fall injury  Outcome: Adequate for Discharge     Problem: ABCDS Injury Assessment  Goal: Absence of physical injury  Outcome: Adequate for Discharge     Problem: Discharge Planning  Goal: Discharge to home or other facility with appropriate resources  Outcome: Adequate for Discharge  Flowsheets (Taken 2/23/2025 0937)  Discharge to home or other facility with appropriate resources: Arrange for needed discharge resources and transportation as appropriate     Problem: Neurosensory - Adult  Goal: Achieves stable or improved neurological status  Outcome: Adequate for Discharge  Flowsheets (Taken 2/23/2025 0937)  Achieves stable or improved neurological status: Maintain blood pressure and fluid volume within ordered parameters to optimize cerebral perfusion and minimize risk of hemorrhage  Goal: Absence of seizures  Outcome: Adequate for Discharge  Flowsheets (Taken 2/23/2025 0937)  Absence of seizures: Administer anticonvulsants as ordered  Goal: Remains free of injury related to seizures activity  Outcome: Adequate for Discharge  Flowsheets (Taken 2/23/2025 0937)  Remains free of injury related to seizure activity: If seizure occurs, turn patient to side and suction secretions as needed  Goal: Achieves maximal functionality and self care  Outcome: Adequate for Discharge  Flowsheets (Taken 2/23/2025 
  Problem: Chronic Conditions and Co-morbidities  Goal: Patient's chronic conditions and co-morbidity symptoms are monitored and maintained or improved  Outcome: Progressing  Flowsheets (Taken 2/22/2025 0430)  Care Plan - Patient's Chronic Conditions and Co-Morbidity Symptoms are Monitored and Maintained or Improved:   Monitor and assess patient's chronic conditions and comorbid symptoms for stability, deterioration, or improvement   Collaborate with multidisciplinary team to address chronic and comorbid conditions and prevent exacerbation or deterioration   Update acute care plan with appropriate goals if chronic or comorbid symptoms are exacerbated and prevent overall improvement and discharge     Problem: Pain  Goal: Verbalizes/displays adequate comfort level or baseline comfort level  Outcome: Progressing  Flowsheets (Taken 2/22/2025 0430)  Verbalizes/displays adequate comfort level or baseline comfort level:   Encourage patient to monitor pain and request assistance   Assess pain using appropriate pain scale   Administer analgesics based on type and severity of pain and evaluate response   Implement non-pharmacological measures as appropriate and evaluate response     Problem: Safety - Adult  Goal: Free from fall injury  Outcome: Progressing  Flowsheets (Taken 2/22/2025 0430)  Free From Fall Injury: Instruct family/caregiver on patient safety     Problem: ABCDS Injury Assessment  Goal: Absence of physical injury  Outcome: Progressing  Flowsheets (Taken 2/22/2025 0430)  Absence of Physical Injury: Implement safety measures based on patient assessment     Problem: Discharge Planning  Goal: Discharge to home or other facility with appropriate resources  Outcome: Progressing  Flowsheets (Taken 2/22/2025 0430)  Discharge to home or other facility with appropriate resources:   Identify barriers to discharge with patient and caregiver   Arrange for needed discharge resources and transportation as appropriate   
  Problem: Chronic Conditions and Co-morbidities  Goal: Patient's chronic conditions and co-morbidity symptoms are monitored and maintained or improved  Outcome: Progressing  Flowsheets (Taken 2/22/2025 2020)  Care Plan - Patient's Chronic Conditions and Co-Morbidity Symptoms are Monitored and Maintained or Improved:   Monitor and assess patient's chronic conditions and comorbid symptoms for stability, deterioration, or improvement   Collaborate with multidisciplinary team to address chronic and comorbid conditions and prevent exacerbation or deterioration   Update acute care plan with appropriate goals if chronic or comorbid symptoms are exacerbated and prevent overall improvement and discharge     Problem: Pain  Goal: Verbalizes/displays adequate comfort level or baseline comfort level  Outcome: Progressing  Flowsheets (Taken 2/22/2025 2020)  Verbalizes/displays adequate comfort level or baseline comfort level:   Encourage patient to monitor pain and request assistance   Assess pain using appropriate pain scale   Administer analgesics based on type and severity of pain and evaluate response   Implement non-pharmacological measures as appropriate and evaluate response     Problem: Safety - Adult  Goal: Free from fall injury  Outcome: Progressing  Flowsheets (Taken 2/22/2025 2020)  Free From Fall Injury: Instruct family/caregiver on patient safety     Problem: ABCDS Injury Assessment  Goal: Absence of physical injury  Outcome: Progressing  Flowsheets (Taken 2/22/2025 2020)  Absence of Physical Injury: Implement safety measures based on patient assessment     Problem: Discharge Planning  Goal: Discharge to home or other facility with appropriate resources  Outcome: Progressing  Flowsheets (Taken 2/22/2025 2020)  Discharge to home or other facility with appropriate resources:   Identify barriers to discharge with patient and caregiver   Arrange for needed discharge resources and transportation as appropriate   
mg Oral Daily    atorvastatin  80 mg Oral Daily    clopidogrel  75 mg Oral Daily    ferrous sulfate  325 mg Oral Every Other Day    isosorbide mononitrate  60 mg Oral Daily    lisinopril  10 mg Oral Daily    metoprolol succinate  25 mg Oral Daily    pantoprazole  40 mg Oral QAM AC    ranolazine  1,000 mg Oral Daily    levETIRAcetam  500 mg Oral BID    acetaminophen  1,000 mg Oral NOW    PRN Meds: sodium chloride flush, sodium chloride, potassium chloride **OR** potassium alternative oral replacement **OR** potassium chloride, magnesium sulfate, ondansetron **OR** ondansetron, polyethylene glycol, acetaminophen **OR** acetaminophen, nitroGLYCERIN    Exam:  BP (!) 145/72   Pulse 60   Temp 97.5 °F (36.4 °C) (Oral)   Resp 16   Ht 1.753 m (5' 9\")   Wt 74.8 kg (165 lb)   SpO2 100%   BMI 24.37 kg/m²   General: No distress, appears stated age.  Eyes:  PERRL. Conjunctivae/corneas clear.  HENT: Head normal appearing. Nares normal. Oral mucosa moist.  Hearing intact.   Neck: Supple, with full range of motion. Trachea midline.  No gross JVD appreciated.  Respiratory:  Normal effort. Clear to auscultation, without rales or wheezes or rhonchi.  Cardiovascular: Normal rate, regular rhythm with normal S1/S2 without murmurs.  No lower extremity edema.   Abdomen: Soft, non-tender, non-distended with normal bowel sounds.  Musculoskeletal: No joint swelling or tenderness. Normal tone. No abnormal movements.   Skin: Warm and dry. No rashes or lesions.  Neurologic:  No focal sensory/motor deficits in the upper or lower extremities.    Psychiatric: Alert and oriented, normal insight and thought content.   Capillary Refill: Brisk,< 3 seconds.  Peripheral Pulses: +2 palpable, equal bilaterally.       Labs/Radiology: See chart or assessment above.     Electronically signed by Gabriel Putnam MD on 2/22/2025 at 7:37 AM  Case was discussed with Attending, Dr. Canales.

## 2025-02-23 NOTE — CARE COORDINATION
DISCHARGE PLANNING EVALUATION  2/23/25, 12:38 PM EST    Reason for Referral: Arrange for HH  Decision Maker: Pt makes own decisions.  Current Services: none reported by pt.  New Services Requested: pt requesting [facility] for RN/Aide/PT  Family/ Social/ Home environment:  Assessment completed with pt. Pt states he lives at home with his brother, pt states he is totally independent at home, cooks, cleans, bathes, etc. Pt states he does use a cane in the home. Pt states he has had [facility] in the past and would like them again.   Payment Source:[employer]  Transportation at Discharge: Pt requesting [facility] at discharge.   Post-acute (PAC) provider list was provided to patient. Patient was informed of their freedom to choose PAC provider. Discussed and offered to show the patient the relevant PAC Providers quality and resource use measures on Medicare Compare web site via computer based on patient's goals of care and treatment preferences. Questions regarding selection process were answered.      Teach Back Method used with pt regarding care plan and discharge planning.  Patient verbalized understanding of the plan of care and contribute to goal setting.       Patient preferences and discharge plan: pt is planning to return home with his brother and new [facility] for RN/Aide/PT.    Electronically signed by BUBBA Vazquez on 2/23/2025 at 12:38 PM

## 2025-02-23 NOTE — DISCHARGE SUMMARY
Hospital Medicine Discharge Summary      Patient Identification:   Chriss Braga   : 1955  MRN: [id number]   Account: [id number]      Patient's PCP: Newman-Waterhouse, Aundrea N, DO    Admit Date: 2025     Discharge Date:   25    Admitting Physician: Tay Lancaster MD     Discharge Physician: Aron Canales MD     Discharge Diagnoses:    Syncope  Patient reports loss of consciousness while sitting on couch at home.  Patient unresponsive to verbal and tactile stimuli from EMS this morning when seen.  CT head negative for intracranial findings.  UDS positive for cannabinoids and opioids.  Unable to obtain MRI / ICD incompatibility.  Of note, patient is noncompliant with home Keppra- taking daily instead of BID reportedly.  Neurology consulted- continue home Keppra as prescribed  Pacer interrogation but unable to access report  EEG cancelled- no indication for it at this time.   Pending results for limited TTE- follow up with Cardiologist outpatient. Patient has known significant HF. Clinically compensated.  Fall and seizure precautions    Left temporal headache  Patient noted for left temporal headache that is worse with palpation.  Per admission note, noted for blurry vision at that time as well, though denies on morning of .  ESR minimally elevated and CRP normal. Has previous hx headache during last admissions. Asymptomatic at this time. Less likely to be GCA. Follow up with PCP.     Seizure disorder, noncompliant with Keppra  No episodes noted during hospital stay. Resumed prior home Keppra dose 500 mg BID.     Paroxysmal A-fib  [id number] 6.  Continue home medications amiodarone 200 mg, Eliquis 5 mg twice daily. Currently NSR on telemetry.     Polysubstance abuse  UDS positive for cannabinoids and opiates. Counseled on marijuana cessation. Continue counseling.     Mild HAGMA resolved  Mild leukocytosis- likely reactive. Resolved.     The patient was seen and examined on day of

## 2025-02-24 ENCOUNTER — CARE COORDINATION (OUTPATIENT)
Dept: CARE COORDINATION | Age: 70
End: 2025-02-24

## 2025-02-24 NOTE — CARE COORDINATION
Care Transitions Note    Initial Call - Call within 2 business days of discharge: Yes    Attempted to reach patient, friend on HIPAA Maddison Ceja  for transitions of care follow up. Unable to reach patientMaddison on HIPAA .    Outreach Attempts:   HIPAA compliant voicemail left for patient, Maddison Ceja.   Spoke w/ Maddison at [facility] [phone] they do not have an order for [facility]. Will check Epic and call CTN back.    Patient: Chriss Braga    Patient : 1955   MRN: [id number]    Reason for Admission: LOC  Discharge Date: 25  RURS: Readmission Risk Score: 35    Last Discharge Facility       Date Complaint Diagnosis Description Type Department Provider    25 Loss of Consciousness Syncope and collapse ... ED to Hosp-Admission (Discharged) (ADMITTED) Aron Kumar MD; Matias Locke MD;...            Was this an external facility discharge? No    Follow Up Appointment:   Patient has hospital follow up appointment scheduled within 7 days of discharge.    Future Appointments         Provider Specialty Dept Phone    2025 1:30 PM Newman-Waterhouse, Aundrea N,  Internal Medicine [phone]    3/10/2025 8:30 AM Kamilah Chu, APRN - CNP Cardiology [phone]    2025 10:15 AM Jean Carlos Smith, PA-C Cardiology [phone]            Plan for follow-up on next business day.      Karla Nicolas RN

## 2025-02-25 ENCOUNTER — CARE COORDINATION (OUTPATIENT)
Dept: CARE COORDINATION | Age: 70
End: 2025-02-25

## 2025-02-25 NOTE — PROGRESS NOTES
Physician Progress Note      PATIENT:               ERIKA HICKS  CSN #:                  [id number]  :                       1955  ADMIT DATE:       2025 8:51 PM  DISCH DATE:        2025 3:50 PM  RESPONDING  PROVIDER #:        Ally Sharp MD          QUERY TEXT:    Patient admitted with syncopal episode .  If possible, please document in   progress notes  the likely etiology of the syncope after study:    The medical record reflects the following:  Risk Factors: Polysubstance abuse, Seizure, noncompliant  Clinical Indicators:  UDS positive for cannabinoids and opioids. Neurology   consulted- continue home Keppra as prescribed. pre syncopal episodes after   using marijuana in the past. neuro consult: patient was found laying on the   couch, noted to be stiff and eyes open.  Patient did not respond to any   verbal/painful stimuli.  While patient was being brought to the ambulance   patient suddenly became conscious and was alert oriented x 4. Transient Loss   of Consciousness - Back to baseline. Low suspicion for seizures. Most likely   related to marijuana use  Treatment: neuro consulted, telemetry monitored , continue on current Keppra   regimen - 500 mg bid.    Thank You, Gwen CDS  Options provided:  -- Syncope likely due to polysubstance abuse  -- Syncope likely due to seizure  -- Syncope event likely due to, Please specify syncope etiology  -- Other - I will add my own diagnosis  -- Disagree - Not applicable / Not valid  -- Disagree - Clinically unable to determine / Unknown  -- Refer to Clinical Documentation Reviewer    PROVIDER RESPONSE TEXT:    The syncope is likely due to polysubstance abuse    Query created by: Xiomara Donato on 2025 3:49 PM      QUERY TEXT:    Patient admitted with Syncope , noted to have Paroxysmal  atrial fibrillation   and is maintained on Eliquis. If possible, please document in progress notes   and discharge summary if you are evaluating and/or treating any of

## 2025-02-25 NOTE — CARE COORDINATION
Care Transitions Note    Initial Call - Call within 2 business days of discharge: Yes    Attempted to reach patientMaddison  for transitions of care follow up. Unable to reach patientMaddison .    Outreach Attempts:   HIPAA compliant voicemail left for patientMaddison.     Patient: Chriss Braga    Patient : 1955   MRN: [id number]    Reason for Admission: LOC  Discharge Date: 25  RURS: Readmission Risk Score: 35    Last Discharge Facility       Date Complaint Diagnosis Description Type Department Provider    25 Loss of Consciousness Syncope and collapse ... ED to Hosp-Admission (Discharged) (ADMITTED) [facility] Aron Canales MD; Matias Locke MD;...            Was this an external facility discharge? No    Follow Up Appointment:   Patient has hospital follow up appointment scheduled within 7 days of discharge.    Future Appointments         Provider Specialty Dept Phone    2025 1:30 PM Newman-Waterhouse, Aundrea N, DO Internal Medicine [phone]    3/10/2025 8:30 AM Kamilah Chu, APRN - CNP Cardiology [phone]    2025 10:15 AM Jean Carlos Smith, PA-C Cardiology [phone]            No further follow-up call indicated     Karla Nicolas RN

## 2025-02-27 ENCOUNTER — OFFICE VISIT (OUTPATIENT)
Dept: INTERNAL MEDICINE CLINIC | Age: 70
End: 2025-02-27
Payer: MEDICARE

## 2025-02-27 VITALS
SYSTOLIC BLOOD PRESSURE: 150 MMHG | DIASTOLIC BLOOD PRESSURE: 76 MMHG | HEART RATE: 77 BPM | HEIGHT: 69 IN | WEIGHT: 164 LBS | OXYGEN SATURATION: 98 % | BODY MASS INDEX: 24.29 KG/M2

## 2025-02-27 DIAGNOSIS — E03.8 SUBCLINICAL HYPOTHYROIDISM: ICD-10-CM

## 2025-02-27 DIAGNOSIS — Z86.73 HISTORY OF CVA (CEREBROVASCULAR ACCIDENT): ICD-10-CM

## 2025-02-27 DIAGNOSIS — I48.0 PAROXYSMAL ATRIAL FIBRILLATION (HCC): ICD-10-CM

## 2025-02-27 DIAGNOSIS — K21.9 GASTROESOPHAGEAL REFLUX DISEASE WITHOUT ESOPHAGITIS: ICD-10-CM

## 2025-02-27 DIAGNOSIS — I50.22 CHRONIC SYSTOLIC HEART FAILURE (HCC): ICD-10-CM

## 2025-02-27 DIAGNOSIS — Z95.810 DUAL IMPLANTABLE CARDIOVERTER-DEFIBRILLATOR IN SITU: ICD-10-CM

## 2025-02-27 DIAGNOSIS — R55 SYNCOPE, UNSPECIFIED SYNCOPE TYPE: Primary | ICD-10-CM

## 2025-02-27 DIAGNOSIS — I10 PRIMARY HYPERTENSION: ICD-10-CM

## 2025-02-27 DIAGNOSIS — E55.9 VITAMIN D DEFICIENCY: ICD-10-CM

## 2025-02-27 DIAGNOSIS — I25.119 CORONARY ARTERY DISEASE INVOLVING NATIVE CORONARY ARTERY OF NATIVE HEART WITH ANGINA PECTORIS: ICD-10-CM

## 2025-02-27 DIAGNOSIS — R56.9 SEIZURE (HCC): ICD-10-CM

## 2025-02-27 DIAGNOSIS — G81.92 HEMIPARESIS OF LEFT DOMINANT SIDE, UNSPECIFIED HEMIPARESIS ETIOLOGY (HCC): ICD-10-CM

## 2025-02-27 PROBLEM — N18.30 STAGE 3 CHRONIC KIDNEY DISEASE (HCC): Status: RESOLVED | Noted: 2024-02-21 | Resolved: 2025-02-27

## 2025-02-27 PROCEDURE — 3017F COLORECTAL CA SCREEN DOC REV: CPT

## 2025-02-27 PROCEDURE — 1111F DSCHRG MED/CURRENT MED MERGE: CPT

## 2025-02-27 PROCEDURE — 3078F DIAST BP <80 MM HG: CPT

## 2025-02-27 PROCEDURE — G8420 CALC BMI NORM PARAMETERS: HCPCS

## 2025-02-27 PROCEDURE — 99214 OFFICE O/P EST MOD 30 MIN: CPT

## 2025-02-27 PROCEDURE — G8427 DOCREV CUR MEDS BY ELIG CLIN: HCPCS

## 2025-02-27 PROCEDURE — 1123F ACP DISCUSS/DSCN MKR DOCD: CPT

## 2025-02-27 PROCEDURE — 3077F SYST BP >= 140 MM HG: CPT

## 2025-02-27 PROCEDURE — 1036F TOBACCO NON-USER: CPT

## 2025-02-27 RX ORDER — METOPROLOL SUCCINATE 25 MG/1
25 TABLET, EXTENDED RELEASE ORAL 2 TIMES DAILY
Qty: 30 TABLET | Refills: 3 | Status: SHIPPED | OUTPATIENT
Start: 2025-02-27 | End: 2025-03-31

## 2025-02-27 RX ORDER — RANOLAZINE 1000 MG/1
1000 TABLET, EXTENDED RELEASE ORAL DAILY
Qty: 90 TABLET | Refills: 0 | Status: SHIPPED | OUTPATIENT
Start: 2025-02-27

## 2025-02-27 RX ORDER — CLOPIDOGREL BISULFATE 75 MG/1
75 TABLET ORAL DAILY
Qty: 90 TABLET | Refills: 0 | Status: ON HOLD | OUTPATIENT
Start: 2025-02-27 | End: 2025-04-08

## 2025-02-27 RX ORDER — ATORVASTATIN CALCIUM 80 MG/1
80 TABLET, FILM COATED ORAL DAILY
Qty: 90 TABLET | Refills: 0 | Status: SHIPPED | OUTPATIENT
Start: 2025-02-27 | End: 2025-04-15 | Stop reason: SDUPTHER

## 2025-02-27 RX ORDER — PANTOPRAZOLE SODIUM 40 MG/1
40 TABLET, DELAYED RELEASE ORAL
Qty: 90 TABLET | Refills: 0 | Status: SHIPPED | OUTPATIENT
Start: 2025-02-27 | End: 2025-04-15 | Stop reason: SDUPTHER

## 2025-02-27 RX ORDER — ISOSORBIDE MONONITRATE 60 MG/1
60 TABLET, EXTENDED RELEASE ORAL DAILY
Qty: 30 TABLET | Refills: 3 | Status: SHIPPED | OUTPATIENT
Start: 2025-02-27 | End: 2025-04-15 | Stop reason: SDUPTHER

## 2025-02-27 RX ORDER — LISINOPRIL 10 MG/1
10 TABLET ORAL DAILY
Qty: 90 TABLET | Refills: 0 | Status: SHIPPED | OUTPATIENT
Start: 2025-02-27 | End: 2025-03-31 | Stop reason: ALTCHOICE

## 2025-02-27 RX ORDER — AMIODARONE HYDROCHLORIDE 200 MG/1
200 TABLET ORAL DAILY
Qty: 30 TABLET | Refills: 0 | Status: ON HOLD | OUTPATIENT
Start: 2025-02-27 | End: 2025-04-03

## 2025-02-27 RX ORDER — LEVETIRACETAM 500 MG/1
500 TABLET ORAL 2 TIMES DAILY
Qty: 60 TABLET | Refills: 3 | Status: SHIPPED | OUTPATIENT
Start: 2025-02-27 | End: 2025-03-31

## 2025-02-27 RX ORDER — ERGOCALCIFEROL 1.25 MG/1
50000 CAPSULE, LIQUID FILLED ORAL WEEKLY
Qty: 4 CAPSULE | Refills: 0 | Status: SHIPPED | OUTPATIENT
Start: 2025-02-27 | End: 2025-04-10

## 2025-02-27 ASSESSMENT — PATIENT HEALTH QUESTIONNAIRE - PHQ9
SUM OF ALL RESPONSES TO PHQ QUESTIONS 1-9: 0
SUM OF ALL RESPONSES TO PHQ QUESTIONS 1-9: 0
2. FEELING DOWN, DEPRESSED OR HOPELESS: NOT AT ALL
1. LITTLE INTEREST OR PLEASURE IN DOING THINGS: NOT AT ALL
SUM OF ALL RESPONSES TO PHQ QUESTIONS 1-9: 0
SUM OF ALL RESPONSES TO PHQ QUESTIONS 1-9: 0

## 2025-02-27 NOTE — PROGRESS NOTES
Medications listed as ordered at the time of discharge from hospital     Medication List            Accurate as of February 27, 2025  8:14 PM. If you have any questions, ask your nurse or doctor.                START taking these medications      vitamin D 1.25 MG (16190 UT) Caps capsule  Commonly known as: ERGOCALCIFEROL  Take 1 capsule by mouth once a week  Started by: Dr. Aundrea Newman-Waterhouse, DO            CHANGE how you take these medications      metoprolol succinate 25 MG extended release tablet  Commonly known as: TOPROL XL  Take 1 tablet by mouth in the morning and at bedtime  What changed: when to take this            CONTINUE taking these medications      amiodarone 200 MG tablet  Commonly known as: CORDARONE  Take 1 tablet by mouth daily     apixaban 5 MG Tabs tablet  Commonly known as: Eliquis  Take 1 tablet by mouth 2 times daily     aspirin 81 MG chewable tablet     atorvastatin 80 MG tablet  Commonly known as: LIPITOR  Take 1 tablet by mouth daily     clopidogrel 75 MG tablet  Commonly known as: PLAVIX  Take 1 tablet by mouth daily     ferrous sulfate 325 (65 Fe) MG tablet  Commonly known as: IRON 325  Take 1 tablet by mouth every other day     isosorbide mononitrate 60 MG extended release tablet  Commonly known as: IMDUR  Take 1 tablet by mouth daily     * levETIRAcetam 500 MG tablet  Commonly known as: Keppra  Take 1 tablet by mouth 2 times daily for 7 days     * levETIRAcetam 500 MG tablet  Commonly known as: KEPPRA  Take 1 tablet by mouth 2 times daily     lisinopril 10 MG tablet  Commonly known as: PRINIVIL;ZESTRIL  Take 1 tablet by mouth daily     nitroGLYCERIN 0.4 MG SL tablet  Commonly known as: NITROSTAT  Place 1 tablet under the tongue every 5 minutes as needed for Chest pain up to max of 3 total doses. If no relief after 1 dose, call 911.     pantoprazole 40 MG tablet  Commonly known as: PROTONIX  Take 1 tablet by mouth every morning (before breakfast)     ranolazine 1000 MG

## 2025-02-27 NOTE — PATIENT INSTRUCTIONS
Your vitamin D is very low. Stop taking the daily Vitamin D 1,000 and start taking the 50,000 units of Vitamin D once a week. We will recheck your vitamin D levels in 3 months.     There are concerns you are having seizures and developing dementia. We will refer you to Dr. Wiley's clinic. If you don't hear anything in 1 week, call her clinic. Her clinic number should be on the sign off document. Continue to take the keppra twice daily.     For your heart failure, we are sending you to the heart failure clinic. You will need to keep all appointments with the heart failure clinic and with Dr. Jaime in Cardiology. Continue taking your medications as prescribed.    For your ICD we need you to follow with the pacemaker clinic. It is important that you get in there so we can get the ICD evaluated and make sure it is working.     We will check your thyroid levels in a month as one of your hormones was elevated.

## 2025-03-04 ENCOUNTER — TELEPHONE (OUTPATIENT)
Dept: CARDIOLOGY CLINIC | Age: 70
End: 2025-03-04

## 2025-03-04 NOTE — TELEPHONE ENCOUNTER
Ref from  PCP-  Newman- Waterhouse    Patient will need Ernst and established with device clinic.   Patient is new to the area  per PCP OV device has not been checked since 2018  What type of device does the patient have? Where was it checked in the past?     1st attempt  LMOM

## 2025-03-12 LAB — ECHO BSA: 1.95 M2

## 2025-03-13 ENCOUNTER — TELEPHONE (OUTPATIENT)
Dept: CARDIOLOGY CLINIC | Age: 70
End: 2025-03-13

## 2025-03-13 NOTE — TELEPHONE ENCOUNTER
1st attempt - LMOM to reschedule  Patient missed the new patient consult apt with Dr. Dukes  on 03.11.2025      Ref Phu- Waterhouse- PCP- has [facility] dual ICD has not been checked since 2018. Syncope with collapse about 10 days ago?     Apt with [facility] Device [facility] 03.31.2025

## 2025-03-17 ENCOUNTER — TELEPHONE (OUTPATIENT)
Dept: CARDIOLOGY CLINIC | Age: 70
End: 2025-03-17

## 2025-03-17 NOTE — TELEPHONE ENCOUNTER
2nd attempt [phone]  LMOM   Patient missed the new patient consult apt with Dr. Dukes  on 03.11.2025        Ref Phu- Waterhouse- PCP- has [facility] dual ICD has not been checked since 2018. Syncope with collapse about 10 days ago?      Apt with [facility] Device Clinic 03.31.2025    Attempt to contact the HIPAA contact - NIS

## 2025-03-17 NOTE — TELEPHONE ENCOUNTER
You had referred patient to Dr. Dukes- EP       Patient no showed to Dr. Dukes's apt on 03.11.2025  Can not reach patient by phone  -  letter sent.   Patient does have apt scheduled with the Device Clinic on 03.31.2025

## 2025-03-19 LAB — ECHO BSA: 1.95 M2

## 2025-03-26 NOTE — PATIENT INSTRUCTIONS
.How was your appointment at the device clinic today?  Did one of our pacemaker nurses make your day?  Is there something we can do to make your appointment better?    Please let us know about it on the survey you receive in the mail or let us know on a [employer] message!    Heck, we even love Post it notes!    We appreciate you and want to make your appointment with us the best we can!       Thank you!  Dunia Melendrez and Morgan

## 2025-03-31 ENCOUNTER — CLINICAL SUPPORT (OUTPATIENT)
Dept: CARDIOLOGY CLINIC | Age: 70
End: 2025-03-31

## 2025-03-31 ENCOUNTER — TELEPHONE (OUTPATIENT)
Dept: CARDIOLOGY CLINIC | Age: 70
End: 2025-03-31

## 2025-03-31 ENCOUNTER — OFFICE VISIT (OUTPATIENT)
Dept: CARDIOLOGY CLINIC | Age: 70
End: 2025-03-31
Payer: MEDICARE

## 2025-03-31 VITALS
HEIGHT: 69 IN | DIASTOLIC BLOOD PRESSURE: 98 MMHG | SYSTOLIC BLOOD PRESSURE: 152 MMHG | OXYGEN SATURATION: 97 % | HEART RATE: 81 BPM | WEIGHT: 160.5 LBS | BODY MASS INDEX: 23.77 KG/M2

## 2025-03-31 DIAGNOSIS — Z95.810 ICD (IMPLANTABLE CARDIOVERTER-DEFIBRILLATOR) IN PLACE: Primary | ICD-10-CM

## 2025-03-31 DIAGNOSIS — I50.22 CHRONIC SYSTOLIC HEART FAILURE (HCC): ICD-10-CM

## 2025-03-31 DIAGNOSIS — I25.10 ATHEROSCLEROSIS OF NATIVE CORONARY ARTERY OF NATIVE HEART WITHOUT ANGINA PECTORIS: ICD-10-CM

## 2025-03-31 DIAGNOSIS — R56.9 SEIZURE (HCC): ICD-10-CM

## 2025-03-31 DIAGNOSIS — I50.22 CHF NYHA CLASS III, CHRONIC, SYSTOLIC (HCC): Primary | ICD-10-CM

## 2025-03-31 DIAGNOSIS — I25.5 ISCHEMIC CARDIOMYOPATHY: ICD-10-CM

## 2025-03-31 PROCEDURE — 3077F SYST BP >= 140 MM HG: CPT | Performed by: NURSE PRACTITIONER

## 2025-03-31 PROCEDURE — 3080F DIAST BP >= 90 MM HG: CPT | Performed by: NURSE PRACTITIONER

## 2025-03-31 PROCEDURE — 1123F ACP DISCUSS/DSCN MKR DOCD: CPT | Performed by: NURSE PRACTITIONER

## 2025-03-31 PROCEDURE — G8427 DOCREV CUR MEDS BY ELIG CLIN: HCPCS | Performed by: NURSE PRACTITIONER

## 2025-03-31 PROCEDURE — 1036F TOBACCO NON-USER: CPT | Performed by: NURSE PRACTITIONER

## 2025-03-31 PROCEDURE — 99215 OFFICE O/P EST HI 40 MIN: CPT | Performed by: NURSE PRACTITIONER

## 2025-03-31 PROCEDURE — G8420 CALC BMI NORM PARAMETERS: HCPCS | Performed by: NURSE PRACTITIONER

## 2025-03-31 PROCEDURE — 3017F COLORECTAL CA SCREEN DOC REV: CPT | Performed by: NURSE PRACTITIONER

## 2025-03-31 PROCEDURE — 1159F MED LIST DOCD IN RCRD: CPT | Performed by: NURSE PRACTITIONER

## 2025-03-31 RX ORDER — METOPROLOL SUCCINATE 50 MG/1
50 TABLET, EXTENDED RELEASE ORAL 2 TIMES DAILY
Qty: 60 TABLET | Refills: 5 | Status: ON HOLD | OUTPATIENT
Start: 2025-03-31

## 2025-03-31 RX ORDER — LEVETIRACETAM 500 MG/1
500 TABLET ORAL 2 TIMES DAILY
Qty: 60 TABLET | Refills: 3 | Status: ON HOLD
Start: 2025-03-31

## 2025-03-31 ASSESSMENT — ENCOUNTER SYMPTOMS
ABDOMINAL DISTENTION: 0
SHORTNESS OF BREATH: 1
COUGH: 0

## 2025-03-31 NOTE — PROGRESS NOTES
Heart Failure Clinic       Visit Date: 3/31/2025  Cardiologist:  Dr. Jaime   Primary Care Physician: Dr. Newman-Waterhouse, Samarai LONGORIA DO  Referred by: Jean Carlos    Chriss Braga is a 69 y.o. male who presents today for:  Chief Complaint   Patient presents with   • Congestive Heart Failure     New to Provider       HPI:   Chriss Braga is a 69 y.o. male who presents to the office for a patient visit in the heart failure clinic.  Accompanied by sister  Lives w/ brother    TYPE HF: HFrEF 15-20%   Cause: NICM  Valves:  mild AI, mild TR  Device: Dual ICD (2013) - hx shock per pt  HX:  CAD - PCI (1/2024), CABG (3V, 2011), Afib (Eliquis), Hx CVA (left sided weakness, 2021), Seizure disorder, Pituitary Adenoma, Polysubstance (Marijuana, opiates), Nicotine abuse (quit 12/2024), memory issues    Dry Wt:  180#    Hospitalization:  Several times past 2 mths      Needs to see Device today - not been checked since 2018  Today 3/2025: 160#  BP elevated today 180/102 - recheck 152/98  Smells of marijuana - smokes QOD  PCP manages seizures- suppose be seeing someone in [facility] per sister  Admits poor compliance w/ meds - has been taking consistently for past month  Had not been taking for years.   More memory issues since stroke  [facility] following = per pt BP been high.  Doing some therapy.   Walked from [facility].     Patient has:  Chest Pain: \"every now then\"  SOB: CEDENO  Orthopnea/PND: no  FIONA: no  Edema: no  Fatigue: yes  Abdominal bloating: no  Cough: no  Appetite: good  Home weight: not check   Home blood pressure: not check     Past Medical History:   Diagnosis Date   • Anemia     Microcytic anemia.    • Angina    • Arthritis    • Atypical chest pain     Question if this is secondary to his uncontrolled hypertension or if this is secondary to exacerbation of COPD or secondary to his recent ICD implantation.   • Blood transfusion 01/01/2011   • CAD (coronary artery disease)    • Cardiomyopathy (HCC)    • Cataract

## 2025-03-31 NOTE — PROGRESS NOTES
PT IS NON-COMPLIANT WITH DEVICE CHECKS   PT TOLD ME HE HAS NOT HAD THIS DEVICE CHECKED SINCE HE HAD IT IMPLANTED IN 2018 IN [place].  HE DOES NOT HAVE MERLIN MONITOR    TOLD PT HE WILL HAVE TO COME TO THE OFFICE EVERY 3 MTHS FOR DEVICE CHECKS    EPISODES OF SVT       BATTERY 2.3-3.1 YRS REMAINING  CORVUE WNL ./ PT SEEN BY YANN IN OFFICE TODAY

## 2025-03-31 NOTE — PATIENT INSTRUCTIONS
You may receive a survey regarding the care you received during your visit.  Your input is valuable to us.  We encourage you to complete and return your survey.  We hope you will choose us in the future for your healthcare needs.    Your nurses today were Magdalene Weeks and Madelyn.  Office hours:   Mon-Thurs 8-4:30  Friday 8-12  Phone: [phone]    Continue:  Continue current medications  Daily weights and record  Fluid restriction of 2 Liters per day  Limit sodium in diet to around 8367-5187 mg/day  Monitor BP    Call the Heart Failure Clinic for any of the following symptoms:   Weight gain of 3 pounds in 1 day or 5 pounds in 1 week  Increased shortness of breath  Shortness of breath while laying down  Chest pain  Swelling in feet, ankles or legs  Bloating in abdomen  Fatigue          STOP Lisinopril    Start Entresto on Wednesday - take morning and night    Will have Home health get Labs in 2 weeks.

## 2025-04-01 NOTE — TELEPHONE ENCOUNTER
Spoke to Gina at [facility]. Patient is active with .   Blood work and updated med list faxed.

## 2025-04-01 NOTE — TELEPHONE ENCOUNTER
Left message to call office   He needs to go on 4/14 to get labs done since he no longer has HH  Also confirm med changes done?

## 2025-04-01 NOTE — TELEPHONE ENCOUNTER
Noelle from [facility] LMOV stating that pt is still using [facility].   If BW is needed, please fax request to [phone]

## 2025-04-03 ENCOUNTER — APPOINTMENT (OUTPATIENT)
Dept: GENERAL RADIOLOGY | Age: 70
DRG: 321 | End: 2025-04-03
Payer: MEDICARE

## 2025-04-03 ENCOUNTER — HOSPITAL ENCOUNTER (INPATIENT)
Age: 70
LOS: 3 days | Discharge: HOME HEALTH | DRG: 321 | End: 2025-04-08
Admitting: NURSE PRACTITIONER
Payer: MEDICARE

## 2025-04-03 DIAGNOSIS — R93.89 ABNORMAL CT OF THE CHEST: ICD-10-CM

## 2025-04-03 DIAGNOSIS — Z86.73 HISTORY OF CVA (CEREBROVASCULAR ACCIDENT): ICD-10-CM

## 2025-04-03 DIAGNOSIS — R07.9 CHEST PAIN, UNSPECIFIED TYPE: ICD-10-CM

## 2025-04-03 DIAGNOSIS — R42 DIZZINESS: Primary | ICD-10-CM

## 2025-04-03 DIAGNOSIS — I50.9 CHRONIC CONGESTIVE HEART FAILURE, UNSPECIFIED HEART FAILURE TYPE (HCC): ICD-10-CM

## 2025-04-03 DIAGNOSIS — I25.119 CORONARY ARTERY DISEASE INVOLVING NATIVE CORONARY ARTERY OF NATIVE HEART WITH ANGINA PECTORIS: ICD-10-CM

## 2025-04-03 DIAGNOSIS — R06.02 SHORTNESS OF BREATH: ICD-10-CM

## 2025-04-03 PROBLEM — I49.9 CARDIAC ARRHYTHMIA: Status: RESOLVED | Noted: 2025-01-31 | Resolved: 2025-04-03

## 2025-04-03 PROBLEM — E80.6 HYPERBILIRUBINEMIA: Status: RESOLVED | Noted: 2025-01-27 | Resolved: 2025-04-03

## 2025-04-03 PROBLEM — R41.0 CONFUSION: Status: RESOLVED | Noted: 2018-07-19 | Resolved: 2025-04-03

## 2025-04-03 PROBLEM — I63.9 ACUTE CEREBROVASCULAR ACCIDENT (CVA) DUE TO ISCHEMIA (HCC): Status: RESOLVED | Noted: 2021-09-10 | Resolved: 2025-04-03

## 2025-04-03 PROBLEM — I95.9 HYPOTENSION: Status: RESOLVED | Noted: 2023-06-13 | Resolved: 2025-04-03

## 2025-04-03 PROBLEM — G81.90 HEMIPLEGIA (HCC): Status: ACTIVE | Noted: 2024-02-09

## 2025-04-03 PROBLEM — R07.2 PRECORDIAL PAIN: Status: RESOLVED | Noted: 2025-02-10 | Resolved: 2025-04-03

## 2025-04-03 PROBLEM — D68.59 THROMBOPHILIA: Status: ACTIVE | Noted: 2025-02-01

## 2025-04-03 PROBLEM — I65.29 CAROTID ARTERY OBSTRUCTION: Status: ACTIVE | Noted: 2025-01-30

## 2025-04-03 PROBLEM — Z87.891 EX-SMOKER: Status: ACTIVE | Noted: 2021-11-09

## 2025-04-03 PROBLEM — I25.2 HISTORY OF ST ELEVATION MYOCARDIAL INFARCTION (STEMI): Status: ACTIVE | Noted: 2024-01-17
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
## 2025-04-03 PROBLEM — E87.8 ELECTROLYTE DISTURBANCE: Status: RESOLVED | Noted: 2025-02-01 | Resolved: 2025-04-03

## 2025-04-03 PROBLEM — S09.90XA HEAD INJURY: Status: RESOLVED | Noted: 2025-02-02 | Resolved: 2025-04-03

## 2025-04-03 PROBLEM — R51.9 HEADACHE: Status: RESOLVED | Noted: 2024-02-21 | Resolved: 2025-04-03

## 2025-04-03 PROBLEM — R52 ACUTE PAIN: Status: RESOLVED | Noted: 2022-12-07 | Resolved: 2025-04-03

## 2025-04-03 PROBLEM — I82.90 THROMBOEMBOLISM OF VEIN: Status: RESOLVED | Noted: 2025-02-04 | Resolved: 2025-04-03

## 2025-04-03 PROBLEM — I16.1 HYPERTENSIVE EMERGENCY: Status: RESOLVED | Noted: 2021-11-08 | Resolved: 2025-04-03

## 2025-04-03 PROBLEM — I50.22 CHRONIC SYSTOLIC HEART FAILURE (HCC): Status: RESOLVED | Noted: 2022-12-07 | Resolved: 2025-04-03

## 2025-04-03 PROBLEM — M54.50 PAIN OF LUMBAR SPINE: Status: RESOLVED | Noted: 2024-02-21 | Resolved: 2025-04-03
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
## 2025-04-03 PROBLEM — R06.89 INEFFECTIVE AIRWAY CLEARANCE: Status: RESOLVED | Noted: 2022-12-07 | Resolved: 2025-04-03

## 2025-04-03 PROBLEM — D72.829 LEUKOCYTOSIS: Status: RESOLVED | Noted: 2025-02-05 | Resolved: 2025-04-03

## 2025-04-03 PROBLEM — R19.7 DIARRHEA: Status: RESOLVED | Noted: 2024-02-21 | Resolved: 2025-04-03

## 2025-04-03 PROBLEM — R55 VASOVAGAL NEAR-SYNCOPE: Status: RESOLVED | Noted: 2024-12-07 | Resolved: 2025-04-03

## 2025-04-03 LAB
ALBUMIN SERPL BCG-MCNC: 4.1 G/DL (ref 3.4–4.9)
ALP SERPL-CCNC: 72 U/L (ref 40–129)
ALT SERPL W/O P-5'-P-CCNC: 9 U/L (ref 10–50)
ANION GAP SERPL CALC-SCNC: 11 MEQ/L (ref 8–16)
AST SERPL-CCNC: 21 U/L (ref 10–50)
BASOPHILS ABSOLUTE: 0 THOU/MM3 (ref 0–0.1)
BASOPHILS NFR BLD AUTO: 0.3 %
BILIRUB CONJ SERPL-MCNC: 0.4 MG/DL (ref 0–0.2)
BILIRUB SERPL-MCNC: 1.1 MG/DL (ref 0.3–1.2)
BUN SERPL-MCNC: 18 MG/DL (ref 8–23)
CALCIUM SERPL-MCNC: 9.7 MG/DL (ref 8.8–10.2)
CHLORIDE SERPL-SCNC: 106 MEQ/L (ref 98–111)
CO2 SERPL-SCNC: 22 MEQ/L (ref 22–29)
CREAT SERPL-MCNC: 1.2 MG/DL (ref 0.7–1.2)
D DIMER PPP IA.FEU-MCNC: 501 NG/ML FEU (ref 0–500)
DEPRECATED RDW RBC AUTO: 42.4 FL (ref 35–45)
EKG ATRIAL RATE: 64 BPM
EKG P AXIS: 74 DEGREES
EKG P-R INTERVAL: 184 MS
EKG Q-T INTERVAL: 428 MS
EKG QRS DURATION: 94 MS
EKG QTC CALCULATION (BAZETT): 441 MS
EKG R AXIS: -40 DEGREES
EKG T AXIS: -35 DEGREES
EKG VENTRICULAR RATE: 64 BPM
EOSINOPHIL NFR BLD AUTO: 0.6 %
EOSINOPHILS ABSOLUTE: 0 THOU/MM3 (ref 0–0.4)
ERYTHROCYTE [DISTWIDTH] IN BLOOD BY AUTOMATED COUNT: 18.6 % (ref 11.5–14.5)
GFR SERPL CREATININE-BSD FRML MDRD: 66 ML/MIN/1.73M2
GLUCOSE SERPL-MCNC: 106 MG/DL (ref 74–109)
HCT VFR BLD AUTO: 41.1 % (ref 42–52)
HGB BLD-MCNC: 13 GM/DL (ref 14–18)
IMM GRANULOCYTES # BLD AUTO: 0.03 THOU/MM3 (ref 0–0.07)
IMM GRANULOCYTES NFR BLD AUTO: 0.4 %
LYMPHOCYTES ABSOLUTE: 1.1 THOU/MM3 (ref 1–4.8)
LYMPHOCYTES NFR BLD AUTO: 16.3 %
MAGNESIUM SERPL-MCNC: 2.2 MG/DL (ref 1.6–2.6)
MCH RBC QN AUTO: 21.9 PG (ref 26–33)
MCHC RBC AUTO-ENTMCNC: 31.6 GM/DL (ref 32.2–35.5)
MCV RBC AUTO: 69.3 FL (ref 80–94)
MICROCYTES BLD QL SMEAR: PRESENT
MONOCYTES ABSOLUTE: 0.5 THOU/MM3 (ref 0.4–1.3)
MONOCYTES NFR BLD AUTO: 7 %
NEUTROPHILS ABSOLUTE: 5.3 THOU/MM3 (ref 1.8–7.7)
NEUTROPHILS NFR BLD AUTO: 75.4 %
NRBC BLD AUTO-RTO: 0 /100 WBC
NT-PROBNP SERPL IA-MCNC: 1774 PG/ML (ref 0–124)
OSMOLALITY SERPL CALC.SUM OF ELEC: 279.9 MOSMOL/KG (ref 275–300)
PLATELET # BLD AUTO: 231 THOU/MM3 (ref 130–400)
PLATELET BLD QL SMEAR: ADEQUATE
PMV BLD AUTO: 9.6 FL (ref 9.4–12.4)
POLYCHROMASIA BLD QL SMEAR: ABNORMAL
POTASSIUM SERPL-SCNC: 4 MEQ/L (ref 3.5–5.2)
PROT SERPL-MCNC: 7.3 G/DL (ref 6.4–8.3)
RBC # BLD AUTO: 5.93 MILL/MM3 (ref 4.7–6.1)
SCAN OF BLOOD SMEAR: NORMAL
SODIUM SERPL-SCNC: 139 MEQ/L (ref 135–145)
TARGETS BLD QL SMEAR: ABNORMAL
TROPONIN, HIGH SENSITIVITY: 6 NG/L (ref 0–12)
TROPONIN, HIGH SENSITIVITY: 7 NG/L (ref 0–12)
TROPONIN, HIGH SENSITIVITY: 8 NG/L (ref 0–12)
WBC # BLD AUTO: 7 THOU/MM3 (ref 4.8–10.8)

## 2025-04-03 PROCEDURE — G0378 HOSPITAL OBSERVATION PER HR: HCPCS

## 2025-04-03 PROCEDURE — 71046 X-RAY EXAM CHEST 2 VIEWS: CPT

## 2025-04-03 PROCEDURE — 80053 COMPREHEN METABOLIC PANEL: CPT

## 2025-04-03 PROCEDURE — 99223 1ST HOSP IP/OBS HIGH 75: CPT

## 2025-04-03 PROCEDURE — 99285 EMERGENCY DEPT VISIT HI MDM: CPT

## 2025-04-03 PROCEDURE — 2500000003 HC RX 250 WO HCPCS

## 2025-04-03 PROCEDURE — 85025 COMPLETE CBC W/AUTO DIFF WBC: CPT

## 2025-04-03 PROCEDURE — 85379 FIBRIN DEGRADATION QUANT: CPT

## 2025-04-03 PROCEDURE — 93005 ELECTROCARDIOGRAM TRACING: CPT

## 2025-04-03 PROCEDURE — 2580000003 HC RX 258

## 2025-04-03 PROCEDURE — 83735 ASSAY OF MAGNESIUM: CPT

## 2025-04-03 PROCEDURE — 82248 BILIRUBIN DIRECT: CPT

## 2025-04-03 PROCEDURE — 83880 ASSAY OF NATRIURETIC PEPTIDE: CPT

## 2025-04-03 PROCEDURE — 84484 ASSAY OF TROPONIN QUANT: CPT

## 2025-04-03 PROCEDURE — 36415 COLL VENOUS BLD VENIPUNCTURE: CPT

## 2025-04-03 PROCEDURE — 6370000000 HC RX 637 (ALT 250 FOR IP)

## 2025-04-03 PROCEDURE — 93010 ELECTROCARDIOGRAM REPORT: CPT | Performed by: INTERNAL MEDICINE

## 2025-04-03 PROCEDURE — 93005 ELECTROCARDIOGRAM TRACING: CPT | Performed by: PHYSICIAN ASSISTANT

## 2025-04-03 RX ORDER — CLOPIDOGREL BISULFATE 75 MG/1
75 TABLET ORAL DAILY
Status: DISCONTINUED | OUTPATIENT
Start: 2025-04-04 | End: 2025-04-08 | Stop reason: HOSPADM

## 2025-04-03 RX ORDER — LEVETIRACETAM 500 MG/1
500 TABLET ORAL 2 TIMES DAILY
Status: DISCONTINUED | OUTPATIENT
Start: 2025-04-03 | End: 2025-04-08 | Stop reason: HOSPADM

## 2025-04-03 RX ORDER — POLYETHYLENE GLYCOL 3350 17 G/17G
17 POWDER, FOR SOLUTION ORAL DAILY PRN
Status: DISCONTINUED | OUTPATIENT
Start: 2025-04-03 | End: 2025-04-08 | Stop reason: HOSPADM

## 2025-04-03 RX ORDER — POTASSIUM CHLORIDE 7.45 MG/ML
10 INJECTION INTRAVENOUS PRN
Status: DISCONTINUED | OUTPATIENT
Start: 2025-04-03 | End: 2025-04-08 | Stop reason: HOSPADM

## 2025-04-03 RX ORDER — FERROUS SULFATE 325(65) MG
325 TABLET ORAL EVERY OTHER DAY
Status: DISCONTINUED | OUTPATIENT
Start: 2025-04-03 | End: 2025-04-08 | Stop reason: HOSPADM

## 2025-04-03 RX ORDER — MAGNESIUM SULFATE IN WATER 40 MG/ML
2000 INJECTION, SOLUTION INTRAVENOUS PRN
Status: DISCONTINUED | OUTPATIENT
Start: 2025-04-03 | End: 2025-04-08 | Stop reason: HOSPADM

## 2025-04-03 RX ORDER — ATORVASTATIN CALCIUM 80 MG/1
80 TABLET, FILM COATED ORAL DAILY
Status: DISCONTINUED | OUTPATIENT
Start: 2025-04-04 | End: 2025-04-08 | Stop reason: HOSPADM

## 2025-04-03 RX ORDER — POTASSIUM CHLORIDE 1500 MG/1
40 TABLET, EXTENDED RELEASE ORAL PRN
Status: DISCONTINUED | OUTPATIENT
Start: 2025-04-03 | End: 2025-04-08 | Stop reason: HOSPADM

## 2025-04-03 RX ORDER — SODIUM CHLORIDE 9 MG/ML
INJECTION, SOLUTION INTRAVENOUS PRN
Status: DISCONTINUED | OUTPATIENT
Start: 2025-04-03 | End: 2025-04-08 | Stop reason: HOSPADM

## 2025-04-03 RX ORDER — ONDANSETRON 4 MG/1
4 TABLET, ORALLY DISINTEGRATING ORAL EVERY 8 HOURS PRN
Status: DISCONTINUED | OUTPATIENT
Start: 2025-04-03 | End: 2025-04-08 | Stop reason: HOSPADM

## 2025-04-03 RX ORDER — SODIUM CHLORIDE 0.9 % (FLUSH) 0.9 %
5-40 SYRINGE (ML) INJECTION PRN
Status: DISCONTINUED | OUTPATIENT
Start: 2025-04-03 | End: 2025-04-08 | Stop reason: HOSPADM

## 2025-04-03 RX ORDER — PANTOPRAZOLE SODIUM 40 MG/1
40 TABLET, DELAYED RELEASE ORAL
Status: DISCONTINUED | OUTPATIENT
Start: 2025-04-04 | End: 2025-04-08 | Stop reason: HOSPADM

## 2025-04-03 RX ORDER — RANOLAZINE 500 MG/1
1000 TABLET, EXTENDED RELEASE ORAL DAILY
Status: DISCONTINUED | OUTPATIENT
Start: 2025-04-04 | End: 2025-04-08 | Stop reason: HOSPADM

## 2025-04-03 RX ORDER — ISOSORBIDE MONONITRATE 60 MG/1
60 TABLET, EXTENDED RELEASE ORAL DAILY
Status: DISCONTINUED | OUTPATIENT
Start: 2025-04-03 | End: 2025-04-08 | Stop reason: HOSPADM

## 2025-04-03 RX ORDER — ACETAMINOPHEN 325 MG/1
650 TABLET ORAL EVERY 6 HOURS PRN
Status: DISCONTINUED | OUTPATIENT
Start: 2025-04-03 | End: 2025-04-08 | Stop reason: HOSPADM

## 2025-04-03 RX ORDER — METOPROLOL SUCCINATE 50 MG/1
50 TABLET, EXTENDED RELEASE ORAL 2 TIMES DAILY
Status: DISCONTINUED | OUTPATIENT
Start: 2025-04-03 | End: 2025-04-08 | Stop reason: HOSPADM

## 2025-04-03 RX ORDER — ACETAMINOPHEN 650 MG/1
650 SUPPOSITORY RECTAL EVERY 6 HOURS PRN
Status: DISCONTINUED | OUTPATIENT
Start: 2025-04-03 | End: 2025-04-08 | Stop reason: HOSPADM

## 2025-04-03 RX ORDER — SODIUM CHLORIDE 9 MG/ML
INJECTION, SOLUTION INTRAVENOUS CONTINUOUS
Status: ACTIVE | OUTPATIENT
Start: 2025-04-03 | End: 2025-04-04

## 2025-04-03 RX ORDER — ONDANSETRON 2 MG/ML
4 INJECTION INTRAMUSCULAR; INTRAVENOUS EVERY 6 HOURS PRN
Status: DISCONTINUED | OUTPATIENT
Start: 2025-04-03 | End: 2025-04-08 | Stop reason: HOSPADM

## 2025-04-03 RX ORDER — SODIUM CHLORIDE 0.9 % (FLUSH) 0.9 %
5-40 SYRINGE (ML) INJECTION EVERY 12 HOURS SCHEDULED
Status: DISCONTINUED | OUTPATIENT
Start: 2025-04-03 | End: 2025-04-08 | Stop reason: HOSPADM

## 2025-04-03 RX ADMIN — APIXABAN 5 MG: 5 TABLET, FILM COATED ORAL at 22:00

## 2025-04-03 RX ADMIN — ISOSORBIDE MONONITRATE 60 MG: 60 TABLET, EXTENDED RELEASE ORAL at 22:00

## 2025-04-03 RX ADMIN — SODIUM CHLORIDE: 0.9 INJECTION, SOLUTION INTRAVENOUS at 22:06

## 2025-04-03 RX ADMIN — SODIUM CHLORIDE, PRESERVATIVE FREE 10 ML: 5 INJECTION INTRAVENOUS at 22:00

## 2025-04-03 RX ADMIN — LEVETIRACETAM 500 MG: 500 TABLET, FILM COATED ORAL at 22:00

## 2025-04-03 RX ADMIN — SACUBITRIL AND VALSARTAN 1 TABLET: 49; 51 TABLET, FILM COATED ORAL at 22:00

## 2025-04-03 RX ADMIN — METOPROLOL SUCCINATE 50 MG: 50 TABLET, EXTENDED RELEASE ORAL at 22:00

## 2025-04-03 RX ADMIN — FERROUS SULFATE TAB 325 MG (65 MG ELEMENTAL FE) 325 MG: 325 (65 FE) TAB at 21:59

## 2025-04-03 ASSESSMENT — PAIN DESCRIPTION - DESCRIPTORS: DESCRIPTORS: SHARP

## 2025-04-03 ASSESSMENT — PAIN DESCRIPTION - ORIENTATION: ORIENTATION: MID;LEFT

## 2025-04-03 ASSESSMENT — PAIN - FUNCTIONAL ASSESSMENT
PAIN_FUNCTIONAL_ASSESSMENT: 0-10
PAIN_FUNCTIONAL_ASSESSMENT: ACTIVITIES ARE NOT PREVENTED

## 2025-04-03 ASSESSMENT — PAIN SCALES - GENERAL
PAINLEVEL_OUTOF10: 8
PAINLEVEL_OUTOF10: 8

## 2025-04-03 ASSESSMENT — PAIN DESCRIPTION - DIRECTION: RADIATING_TOWARDS: L ARM

## 2025-04-03 ASSESSMENT — PAIN DESCRIPTION - LOCATION
LOCATION: CHEST
LOCATION: CHEST

## 2025-04-03 NOTE — ED TRIAGE NOTES
Presents to ER with by VICENTE. Reports he was walking home from the social security office when he began to feel weak and dizzy. Reports he normally feels this way prior to a seizure. He reports pain in chest 8/10 when standing. He reports he is now having the pain in chest while laying in bed. Reports he is compliant with home medications. EKG complete. Placed on cardiac monitor. Will monitor

## 2025-04-03 NOTE — H&P
estimated EF of 15 - 20%. Left ventricle size is normal. Mildly increased wall thickness. Severe global hypokinesis present. Diastolic function exam not performed.  Initial EKG normal sinus rhythm with possible anterior infarct.  However there are concerns for possible lead misplacement.  Repeat EKG ordered.    Continue metoprolol, Eliquis, Echo ordered, Continue telemetry, Cardiology Consulted, and Repeat EKG    HFrEF, no signs of acute decompensation  Patient presents with no evidence of volume overload.  Last TTE 02/21/25: Severely reduced left ventricular systolic function with a visually estimated EF of 15 - 20%. Left ventricle size is normal. Mildly increased wall thickness. Severe global hypokinesis present. Diastolic function exam not performed.  Admission weight 170 pounds  CBC microcytic anemia, CMP unrevealing, BNP 1776, elevated from previous 700  Home med regimen: Entresto, metoprolol,  Continue home meds. Strict I/O, daily standing weights, 2L fluid restriction, Na-resticted diet.  Cardiology consulted, awaiting recommendations    History of CVA with thrombophilia and a history of medication noncompliance  History of medication noncompliance is concerning for noncompliance with Eliquis.  D-dimer obtained today elevated 501  CTA of the chest ordered to rule out PE.  If positive consider heparin drip.  Chronic iron deficient anemia  On admission patient was found to have mild anemia of 13.  Mean corpuscle volume 69.3.  Of note patient has target cells on blood smear.  This appears to be chronic.  Continue iron supplementation.  Patient may benefit from hematologic workup outpatient regarding possible cause of target cells including thalassemia, hemoglobin C, hemoglobin C.  No listed history of splenectomy.    Chronic problems (reviewed and stable unless noted.  Increased case complexity)    Hypertension: Managed with metoprolol, Entresto.    GERD (gastroesophageal reflux disease): Managed with

## 2025-04-03 NOTE — ED NOTES
ED to inpatient nurses report      Chief Complaint:  Chief Complaint   Patient presents with    Dizziness     Present to ED from: walking by EMS    MOA:     LOC: alert and orientated to name, place, date  Mobility: Requires assistance * 1  Oxygen Baseline: 97%    Current needs required: RA     Code Status:   Prior    What abnormal results were found and what did you give/do to treat them? Dizziness. sob  Any procedures or intervention occur? Labs, xray    Mental Status:  Level of Consciousness: Alert (0)    Psych Assessment:        Vitals:  Patient Vitals for the past 24 hrs:   BP Temp Pulse Resp SpO2 Weight   04/03/25 1543 (!) 168/102 98.2 °F (36.8 °C) 66 18 97 % 77.1 kg (170 lb)        LDAs:   Peripheral IV 04/03/25 Right;Dorsal Hand (Active)   Site Assessment Clean, dry & intact 04/03/25 1547   Phlebitis Assessment No symptoms 04/03/25 1547   Infiltration Assessment 0 04/03/25 1547       Ambulatory Status:  Presents to emergency department  because of falls (Syncope, seizure, or loss of consciousness): Yes, Age > 70: No, Altered Mental Status, Intoxication with alcohol or substance confusion (Disorientation, impaired judgment, poor safety awaremess, or inability to follow instructions): No, Impaired Mobility: Ambulates or transfers with assistive devices or assistance; Unable to ambulate or transer.: Yes, Nursing Judgement: Yes    Diagnosis:  DISPOSITION Admitted 04/03/2025 05:39:55 PM   Final diagnoses:   Dizziness   Shortness of breath        Consults:  None     Pain Score:  Pain Assessment  Pain Assessment: 0-10  Pain Level: 8  Pain Location: Chest    C-SSRS:   Risk of Suicide: No Risk    Sepsis Screening:       [place] Fall Risk:  [place] 1 Fall Risk  Presents to emergency department  because of falls (Syncope, seizure, or loss of consciousness): Yes  Age > 70: No  Altered Mental Status, Intoxication with alcohol or substance confusion (Disorientation, impaired judgment, poor safety awaremess, or inability to

## 2025-04-03 NOTE — ED PROVIDER NOTES
[facility]      EMERGENCY MEDICINE     Pt Name: Chriss Braga  MRN: [id number]  Birthdate 1955  Date of evaluation: 4/3/2025  Provider: Jaclyn Hurley PA-C    CHIEF COMPLAINT       Chief Complaint   Patient presents with    Dizziness     HISTORY OF PRESENT ILLNESS   Chriss Braga is a pleasant 69 y.o. male who presents to the emergency department from from home, brought in by EMS for evaluation of dizziness, shortness of breath, and weakness.  Patient states the symptoms have been lingering all day but have gotten significantly worse to the point he did not feel safe walking so he called the squad.  He states that he is feeling this happen commonly to prior to him having a seizure.  He does state he took his Keppra and his medications this morning.  He does have a known history of CHF with an EF of 15 to 20% with severely reduced left ventricular function and severe global hypokinesis.  He does have an implantable ICD in place but is severely noncompliant with checks.    PASTMEDICAL HISTORY     Past Medical History:   Diagnosis Date    Abnormal chest CT 02/05/2024    Accident due to mechanical fall without injury 01/27/2025    Acute cerebrovascular accident (CVA) due to ischemia (HCC) 09/10/2021    Anemia     Microcytic anemia.     Angina     Arthritis     Atypical chest pain     Question if this is secondary to his uncontrolled hypertension or if this is secondary to exacerbation of COPD or secondary to his recent ICD implantation.    Blood transfusion 01/01/2011    CAD (coronary artery disease)     Cardiac arrhythmia 01/31/2025    Cardiomyopathy     Cataract     bilateral    Chest discomfort     Chronic systolic heart failure (HCC) 12/07/2022    Depression     Dizziness     Patient complains of dizziness with movement.     Erectile dysfunction     Possible erectile dysfunction symptoms.     Fall from ground level 11/16/2018    Family history of hypertension     Frequent nocturnal awakening     Frequent

## 2025-04-04 ENCOUNTER — APPOINTMENT (OUTPATIENT)
Age: 70
DRG: 321 | End: 2025-04-04
Payer: MEDICARE

## 2025-04-04 ENCOUNTER — APPOINTMENT (OUTPATIENT)
Dept: CT IMAGING | Age: 70
DRG: 321 | End: 2025-04-04
Payer: MEDICARE

## 2025-04-04 PROBLEM — I20.0 UNSTABLE ANGINA (HCC): Status: ACTIVE | Noted: 2025-04-04

## 2025-04-04 PROBLEM — I50.42 CHRONIC COMBINED SYSTOLIC AND DIASTOLIC HEART FAILURE (HCC): Status: ACTIVE | Noted: 2022-12-07

## 2025-04-04 LAB
ANION GAP SERPL CALC-SCNC: 12 MEQ/L (ref 8–16)
APTT PPP: 108.8 SECONDS (ref 22–38)
APTT PPP: 35.3 SECONDS (ref 22–38)
APTT PPP: 46.4 SECONDS (ref 22–38)
APTT PPP: > 200 SECONDS (ref 22–38)
BUN SERPL-MCNC: 19 MG/DL (ref 8–23)
CALCIUM SERPL-MCNC: 8.7 MG/DL (ref 8.8–10.2)
CHLORIDE SERPL-SCNC: 106 MEQ/L (ref 98–111)
CHOLEST SERPL-MCNC: 178 MG/DL (ref 100–199)
CO2 SERPL-SCNC: 21 MEQ/L (ref 22–29)
CREAT SERPL-MCNC: 1.1 MG/DL (ref 0.7–1.2)
DEPRECATED RDW RBC AUTO: 41.2 FL (ref 35–45)
DEPRECATED RDW RBC AUTO: 42.5 FL (ref 35–45)
ECHO LA AREA 2C: 15.9 CM2
ECHO LA AREA 4C: 11.5 CM2
ECHO LA DIAMETER INDEX: 1.24 CM/M2
ECHO LA DIAMETER: 2.4 CM
ECHO LA MAJOR AXIS: 5.2 CM
ECHO LA MINOR AXIS: 6 CM
ECHO LA VOL BP: 29 ML (ref 18–58)
ECHO LA VOL MOD A2C: 35 ML (ref 18–58)
ECHO LA VOL MOD A4C: 20 ML (ref 18–58)
ECHO LA VOL/BSA BIPLANE: 15 ML/M2 (ref 16–34)
ECHO LA VOLUME INDEX MOD A2C: 18 ML/M2 (ref 16–34)
ECHO LA VOLUME INDEX MOD A4C: 10 ML/M2 (ref 16–34)
ECHO LV EDV A2C: 120 ML
ECHO LV EDV A4C: 136 ML
ECHO LV EDV INDEX A4C: 70 ML/M2
ECHO LV EDV NDEX A2C: 62 ML/M2
ECHO LV EF PHYSICIAN: 20 %
ECHO LV EJECTION FRACTION A2C: 22 %
ECHO LV EJECTION FRACTION A4C: 14 %
ECHO LV EJECTION FRACTION BIPLANE: 18 % (ref 55–100)
ECHO LV ESV A2C: 94 ML
ECHO LV ESV A4C: 116 ML
ECHO LV ESV INDEX A2C: 49 ML/M2
ECHO LV ESV INDEX A4C: 60 ML/M2
ECHO LV FRACTIONAL SHORTENING: 9 % (ref 28–44)
ECHO LV INTERNAL DIMENSION DIASTOLE INDEX: 2.33 CM/M2
ECHO LV INTERNAL DIMENSION DIASTOLIC: 4.5 CM (ref 4.2–5.9)
ECHO LV INTERNAL DIMENSION SYSTOLIC INDEX: 2.12 CM/M2
ECHO LV INTERNAL DIMENSION SYSTOLIC: 4.1 CM
ECHO LV IVSD: 1.2 CM (ref 0.6–1)
ECHO LV MASS 2D: 198.1 G (ref 88–224)
ECHO LV MASS INDEX 2D: 102.6 G/M2 (ref 49–115)
ECHO LV POSTERIOR WALL DIASTOLIC: 1.2 CM (ref 0.6–1)
ECHO LV RELATIVE WALL THICKNESS RATIO: 0.53
ECHO RV INTERNAL DIMENSION: 2.8 CM
EKG ATRIAL RATE: 66 BPM
EKG ATRIAL RATE: 72 BPM
EKG P AXIS: 64 DEGREES
EKG P AXIS: 70 DEGREES
EKG P-R INTERVAL: 178 MS
EKG P-R INTERVAL: 182 MS
EKG Q-T INTERVAL: 426 MS
EKG Q-T INTERVAL: 442 MS
EKG QRS DURATION: 100 MS
EKG QRS DURATION: 92 MS
EKG QTC CALCULATION (BAZETT): 463 MS
EKG QTC CALCULATION (BAZETT): 466 MS
EKG R AXIS: -3 DEGREES
EKG R AXIS: 29 DEGREES
EKG T AXIS: -10 DEGREES
EKG T AXIS: 39 DEGREES
EKG VENTRICULAR RATE: 66 BPM
EKG VENTRICULAR RATE: 72 BPM
ERYTHROCYTE [DISTWIDTH] IN BLOOD BY AUTOMATED COUNT: 17.9 % (ref 11.5–14.5)
ERYTHROCYTE [DISTWIDTH] IN BLOOD BY AUTOMATED COUNT: 18.5 % (ref 11.5–14.5)
GFR SERPL CREATININE-BSD FRML MDRD: 73 ML/MIN/1.73M2
GLUCOSE SERPL-MCNC: 102 MG/DL (ref 74–109)
HCT VFR BLD AUTO: 37.7 % (ref 42–52)
HCT VFR BLD AUTO: 39.5 % (ref 42–52)
HDLC SERPL-MCNC: 44 MG/DL
HEPARIN UNFRACTIONATED: 0.54 U/ML (ref 0.3–0.7)
HGB BLD-MCNC: 11.9 GM/DL (ref 14–18)
HGB BLD-MCNC: 12.5 GM/DL (ref 14–18)
INR PPP: 1.27 (ref 0.85–1.13)
LDLC SERPL CALC-MCNC: 120 MG/DL
MCH RBC QN AUTO: 21.8 PG (ref 26–33)
MCH RBC QN AUTO: 21.9 PG (ref 26–33)
MCHC RBC AUTO-ENTMCNC: 31.6 GM/DL (ref 32.2–35.5)
MCHC RBC AUTO-ENTMCNC: 31.6 GM/DL (ref 32.2–35.5)
MCV RBC AUTO: 68.9 FL (ref 80–94)
MCV RBC AUTO: 69.3 FL (ref 80–94)
PLATELET # BLD AUTO: 210 THOU/MM3 (ref 130–400)
PLATELET # BLD AUTO: 214 THOU/MM3 (ref 130–400)
PMV BLD AUTO: 9.6 FL (ref 9.4–12.4)
PMV BLD AUTO: 9.9 FL (ref 9.4–12.4)
POTASSIUM SERPL-SCNC: 4.1 MEQ/L (ref 3.5–5.2)
RBC # BLD AUTO: 5.47 MILL/MM3 (ref 4.7–6.1)
RBC # BLD AUTO: 5.7 MILL/MM3 (ref 4.7–6.1)
SODIUM SERPL-SCNC: 139 MEQ/L (ref 135–145)
TRIGL SERPL-MCNC: 71 MG/DL (ref 0–199)
WBC # BLD AUTO: 6.5 THOU/MM3 (ref 4.8–10.8)
WBC # BLD AUTO: 7.6 THOU/MM3 (ref 4.8–10.8)

## 2025-04-04 PROCEDURE — 6370000000 HC RX 637 (ALT 250 FOR IP)

## 2025-04-04 PROCEDURE — 96368 THER/DIAG CONCURRENT INF: CPT

## 2025-04-04 PROCEDURE — 93325 DOPPLER ECHO COLOR FLOW MAPG: CPT | Performed by: INTERNAL MEDICINE

## 2025-04-04 PROCEDURE — 93005 ELECTROCARDIOGRAM TRACING: CPT | Performed by: NURSE PRACTITIONER

## 2025-04-04 PROCEDURE — G0378 HOSPITAL OBSERVATION PER HR: HCPCS

## 2025-04-04 PROCEDURE — 85730 THROMBOPLASTIN TIME PARTIAL: CPT

## 2025-04-04 PROCEDURE — 80061 LIPID PANEL: CPT

## 2025-04-04 PROCEDURE — 6360000002 HC RX W HCPCS: Performed by: NURSE PRACTITIONER

## 2025-04-04 PROCEDURE — 93308 TTE F-UP OR LMTD: CPT

## 2025-04-04 PROCEDURE — 80048 BASIC METABOLIC PNL TOTAL CA: CPT

## 2025-04-04 PROCEDURE — 6360000002 HC RX W HCPCS

## 2025-04-04 PROCEDURE — 93010 ELECTROCARDIOGRAM REPORT: CPT | Performed by: INTERNAL MEDICINE

## 2025-04-04 PROCEDURE — 85027 COMPLETE CBC AUTOMATED: CPT

## 2025-04-04 PROCEDURE — 71275 CT ANGIOGRAPHY CHEST: CPT

## 2025-04-04 PROCEDURE — 96366 THER/PROPH/DIAG IV INF ADDON: CPT

## 2025-04-04 PROCEDURE — 99233 SBSQ HOSP IP/OBS HIGH 50: CPT | Performed by: NURSE PRACTITIONER

## 2025-04-04 PROCEDURE — 85610 PROTHROMBIN TIME: CPT

## 2025-04-04 PROCEDURE — 96365 THER/PROPH/DIAG IV INF INIT: CPT

## 2025-04-04 PROCEDURE — 99223 1ST HOSP IP/OBS HIGH 75: CPT | Performed by: INTERNAL MEDICINE

## 2025-04-04 PROCEDURE — 93308 TTE F-UP OR LMTD: CPT | Performed by: INTERNAL MEDICINE

## 2025-04-04 PROCEDURE — 85520 HEPARIN ASSAY: CPT

## 2025-04-04 PROCEDURE — 2500000003 HC RX 250 WO HCPCS

## 2025-04-04 PROCEDURE — 36415 COLL VENOUS BLD VENIPUNCTURE: CPT

## 2025-04-04 PROCEDURE — 6360000004 HC RX CONTRAST MEDICATION: Performed by: NURSE PRACTITIONER

## 2025-04-04 PROCEDURE — 2580000003 HC RX 258

## 2025-04-04 RX ORDER — BUMETANIDE 0.25 MG/ML
1 INJECTION, SOLUTION INTRAMUSCULAR; INTRAVENOUS 2 TIMES DAILY
Status: DISCONTINUED | OUTPATIENT
Start: 2025-04-04 | End: 2025-04-05

## 2025-04-04 RX ORDER — HEPARIN SODIUM 1000 [USP'U]/ML
2000 INJECTION, SOLUTION INTRAVENOUS; SUBCUTANEOUS PRN
Status: DISCONTINUED | OUTPATIENT
Start: 2025-04-04 | End: 2025-04-08

## 2025-04-04 RX ORDER — HEPARIN SODIUM 10000 [USP'U]/100ML
5-30 INJECTION, SOLUTION INTRAVENOUS CONTINUOUS
Status: DISCONTINUED | OUTPATIENT
Start: 2025-04-04 | End: 2025-04-08

## 2025-04-04 RX ORDER — HEPARIN SODIUM 1000 [USP'U]/ML
4000 INJECTION, SOLUTION INTRAVENOUS; SUBCUTANEOUS PRN
Status: DISCONTINUED | OUTPATIENT
Start: 2025-04-04 | End: 2025-04-08

## 2025-04-04 RX ORDER — NITROGLYCERIN 20 MG/100ML
5-200 INJECTION INTRAVENOUS CONTINUOUS
Status: DISCONTINUED | OUTPATIENT
Start: 2025-04-04 | End: 2025-04-08 | Stop reason: HOSPADM

## 2025-04-04 RX ORDER — IOPAMIDOL 755 MG/ML
80 INJECTION, SOLUTION INTRAVASCULAR
Status: COMPLETED | OUTPATIENT
Start: 2025-04-04 | End: 2025-04-04

## 2025-04-04 RX ORDER — HEPARIN SODIUM 1000 [USP'U]/ML
4000 INJECTION, SOLUTION INTRAVENOUS; SUBCUTANEOUS ONCE
Status: COMPLETED | OUTPATIENT
Start: 2025-04-04 | End: 2025-04-04

## 2025-04-04 RX ADMIN — LEVETIRACETAM 500 MG: 500 TABLET, FILM COATED ORAL at 08:47

## 2025-04-04 RX ADMIN — NITROGLYCERIN 5 MCG/MIN: 20 INJECTION INTRAVENOUS at 16:18

## 2025-04-04 RX ADMIN — PANTOPRAZOLE SODIUM 40 MG: 40 TABLET, DELAYED RELEASE ORAL at 06:38

## 2025-04-04 RX ADMIN — LEVETIRACETAM 500 MG: 500 TABLET, FILM COATED ORAL at 20:30

## 2025-04-04 RX ADMIN — SODIUM CHLORIDE: 0.9 INJECTION, SOLUTION INTRAVENOUS at 16:17

## 2025-04-04 RX ADMIN — CLOPIDOGREL BISULFATE 75 MG: 75 TABLET, FILM COATED ORAL at 08:47

## 2025-04-04 RX ADMIN — SODIUM CHLORIDE, PRESERVATIVE FREE 10 ML: 5 INJECTION INTRAVENOUS at 20:30

## 2025-04-04 RX ADMIN — SACUBITRIL AND VALSARTAN 1 TABLET: 49; 51 TABLET, FILM COATED ORAL at 20:30

## 2025-04-04 RX ADMIN — SACUBITRIL AND VALSARTAN 1 TABLET: 49; 51 TABLET, FILM COATED ORAL at 08:47

## 2025-04-04 RX ADMIN — HEPARIN SODIUM 4000 UNITS: 1000 INJECTION INTRAVENOUS; SUBCUTANEOUS at 11:45

## 2025-04-04 RX ADMIN — ISOSORBIDE MONONITRATE 60 MG: 60 TABLET, EXTENDED RELEASE ORAL at 08:47

## 2025-04-04 RX ADMIN — ATORVASTATIN CALCIUM 80 MG: 80 TABLET, FILM COATED ORAL at 08:47

## 2025-04-04 RX ADMIN — HEPARIN SODIUM 12 UNITS/KG/HR: 10000 INJECTION, SOLUTION INTRAVENOUS at 11:46

## 2025-04-04 RX ADMIN — METOPROLOL SUCCINATE 50 MG: 50 TABLET, EXTENDED RELEASE ORAL at 08:47

## 2025-04-04 RX ADMIN — IOPAMIDOL 80 ML: 755 INJECTION, SOLUTION INTRAVENOUS at 18:08

## 2025-04-04 RX ADMIN — RANOLAZINE 1000 MG: 500 TABLET, EXTENDED RELEASE ORAL at 08:47

## 2025-04-04 RX ADMIN — METOPROLOL SUCCINATE 50 MG: 50 TABLET, EXTENDED RELEASE ORAL at 20:30

## 2025-04-04 ASSESSMENT — PAIN DESCRIPTION - DESCRIPTORS
DESCRIPTORS: PRESSURE
DESCRIPTORS: PRESSURE

## 2025-04-04 ASSESSMENT — PAIN DESCRIPTION - FREQUENCY
FREQUENCY: INTERMITTENT
FREQUENCY: INTERMITTENT

## 2025-04-04 ASSESSMENT — PAIN DESCRIPTION - ONSET: ONSET: ON-GOING

## 2025-04-04 ASSESSMENT — PAIN DESCRIPTION - ORIENTATION
ORIENTATION: MID
ORIENTATION: MID

## 2025-04-04 ASSESSMENT — PAIN SCALES - GENERAL
PAINLEVEL_OUTOF10: 6
PAINLEVEL_OUTOF10: 8
PAINLEVEL_OUTOF10: 7

## 2025-04-04 ASSESSMENT — PAIN DESCRIPTION - LOCATION
LOCATION: HEAD;CHEST
LOCATION: CHEST
LOCATION: CHEST

## 2025-04-04 NOTE — CARE COORDINATION
DISCHARGE PLANNING EVALUATION  4/4/25, 3:47 PM EDT    Reason for Referral: Current HH  Decision Maker: Patient makes decisions  Current Services: [facility]; nursing, PT & OT  New Services Requested: none  Family/ Social/ Home environment: Spoke with patient, he and his brother live together.  He states he is independent with ADL's. Stated he is current with HH.  Payment Source:[employer] Medicare  Transportation at Discharge: [facility] need [facility]  Post-acute (PAC) provider list was provided to patient. Patient was informed of their freedom to choose PAC provider. Discussed and offered to show the patient the relevant PAC Providers quality and resource use measures on Medicare Compare web site via computer based on patient's goals of care and treatment preferences. Questions regarding selection process were answered.      Teach Back Method used with patient regarding care plan and discharge planning.  Patient  verbalized understanding of the plan of care and contribute to goal setting.       Patient preferences and discharge plan: Plan home with brother and [facility].    [facility] , she stated he is not with them.    Electronically signed by BUBBA Henson on 4/4/2025 at 3:47 PM

## 2025-04-04 NOTE — PROCEDURES
PROCEDURE NOTE  Date: 4/4/2025   Name: Chriss Braga  YOB: 1955    ProceduresPacer interrogation complete.

## 2025-04-04 NOTE — PROCEDURES
PROCEDURE NOTE  Date: 4/3/2025   Name: Chriss Braga  YOB: 1955    Procedures    Report for pace maker was handed to RN.

## 2025-04-04 NOTE — PROCEDURES
PROCEDURE NOTE  Date: 4/3/2025   Name: Chriss Braga  YOB: 1955    Procedures  EKG was completed and handed to RN

## 2025-04-04 NOTE — CARE COORDINATION
Case Management Assessment Initial Evaluation    Date/Time of Evaluation: 4/4/2025 1:37 PM  Assessment Completed by: Jazzy Lua RN    If patient is discharged prior to next notation, then this note serves as note for discharge by case management.    Patient Name: Chriss Braga                   YOB: 1955  Diagnosis: Shortness of breath [R06.02]  Dizziness [R42]  Chest pain [R07.9]                   Date / Time: 4/3/2025  3:37 PM  Location: [address] [place] [address]     Patient Admission Status: Observation   Readmission Risk Low 0-14, Mod 15-19), High > 20: Readmission Risk Score: 35    Current PCP: Newman-Waterhouse, Aundrea N, DO  Health Care Decision Makers:   Primary Decision Maker: Willie Braga - Child - [phone]    Secondary Decision Maker: Teena Arteaga - Niece/Nephew - [phone]    Additional Case Management Notes: Admitted from ER with SOB, dizziness, weakness. Hx seizures, on Keppra. Hx CHF with reduced EF, has ICD.  Cardiology consulted. PAF, Eliquis now on hold until Cards sees. Bumex started bid. Couple runs of VT noted. Pacer/ICD interrogation ordered. Planning left heart cath.     Procedures: Pending Echo    Imaging: No acute findings.     Patient Goals/Plan/Treatment Preferences: Pt resides with brother. Mostly independent but doesn't drive. Brother sometimes assists with this. Pt has a PCP and insurance.     04/04/25 1421   Service Assessment   Patient Orientation Alert and Oriented   Cognition Alert   History Provided By Patient   Primary Caregiver Self   Support Systems Family Members   Patient's Healthcare Decision Maker is: Legal Next of Kin   PCP Verified by CM Yes   Last Visit to PCP Within last 3 months   Prior Functional Level Independent in ADLs/IADLs   Current Functional Level Independent in ADLs/IADLs   Can patient return to prior living arrangement Yes   Ability to make needs known: Good   Family able to assist with home care needs: Yes   Would you like for me to discuss the

## 2025-04-04 NOTE — CONSULTS
acetaminophen (TYLENOL) suppository 650 mg  650 mg Rectal Q6H PRN Ancelmo Peacock PA-C        polyethylene glycol (GLYCOLAX) packet 17 g  17 g Oral Daily PRN Ancelmo Peacock PA-C        sulfur hexafluoride microspheres (LUMASON) 60.7-25 MG injection 2 mL  2 mL IntraVENous ONCE PRN Ancelmo Peacock PA-C        0.9 % sodium chloride infusion   IntraVENous Continuous Ancelmo Peacock PA-C 50 mL/hr at 04/03/25 2206 New Bag at 04/03/25 2206    ferrous sulfate (IRON 325) tablet 325 mg  325 mg Oral Every Other Day Ancelmo Peacock PA-C   325 mg at 04/03/25 2159     Prior to Admission medications    Medication Sig Start Date End Date Taking? Authorizing Provider   levETIRAcetam (KEPPRA) 500 MG tablet Take 1 tablet by mouth 2 times daily 3/31/25  Yes Kamilah Chu APRN - CNP   metoprolol succinate (TOPROL XL) 50 MG extended release tablet Take 1 tablet by mouth 2 times daily 3/31/25  Yes Kamilah Chu APRN - CNP   sacubitril-valsartan (ENTRESTO) 49-51 MG per tablet Take 1 tablet by mouth 2 times daily 3/31/25  Yes Kamilah Chu APRN - CNP   isosorbide mononitrate (IMDUR) 60 MG extended release tablet Take 1 tablet by mouth daily 2/27/25  Yes Newman-Waterhouse, Aundrea N, DO   atorvastatin (LIPITOR) 80 MG tablet Take 1 tablet by mouth daily 2/27/25  Yes Newman-Waterhouse, Aundrea N, DO   apixaban (ELIQUIS) 5 MG TABS tablet Take 1 tablet by mouth 2 times daily 2/27/25  Yes Newman-Waterhouse, Aundrea N, DO   pantoprazole (PROTONIX) 40 MG tablet Take 1 tablet by mouth every morning (before breakfast) 2/27/25  Yes Newman-Waterhouse, Aundrea N, DO   ranolazine (RANEXA) 1000 MG extended release tablet Take 1 tablet by mouth daily 2/27/25  Yes Newman-Waterhouse, Aundrea N, DO   vitamin D (ERGOCALCIFEROL) 1.25 MG (51890 UT) CAPS capsule Take 1 capsule by mouth once a week 2/27/25   Newman-Waterhouse, Aundrea N,    clopidogrel (PLAVIX) 75 MG tablet Take 1 tablet by mouth daily 2/27/25   Newman-Waterhouse, Aundrea N, DO

## 2025-04-04 NOTE — PROCEDURES
PROCEDURE NOTE  Date: 4/4/2025   Name: Chriss Braga  YOB: 1955    ProceduresEKG completed, given to RN

## 2025-04-04 NOTE — PLAN OF CARE
Problem: Pain  Goal: Verbalizes/displays adequate comfort level or baseline comfort level  Flowsheets (Taken 4/3/2025 2251)  Verbalizes/displays adequate comfort level or baseline comfort level:   Encourage patient to monitor pain and request assistance   Assess pain using appropriate pain scale   Implement non-pharmacological measures as appropriate and evaluate response   Administer analgesics based on type and severity of pain and evaluate response     Problem: Safety - Adult  Goal: Free from fall injury  Flowsheets (Taken 4/3/2025 2251)  Free From Fall Injury: Instruct family/caregiver on patient safety

## 2025-04-05 LAB
APTT PPP: 122.7 SECONDS (ref 22–38)
APTT PPP: 54.7 SECONDS (ref 22–38)
APTT PPP: 55.4 SECONDS (ref 22–38)

## 2025-04-05 PROCEDURE — 6360000002 HC RX W HCPCS

## 2025-04-05 PROCEDURE — 96366 THER/PROPH/DIAG IV INF ADDON: CPT

## 2025-04-05 PROCEDURE — 99232 SBSQ HOSP IP/OBS MODERATE 35: CPT | Performed by: STUDENT IN AN ORGANIZED HEALTH CARE EDUCATION/TRAINING PROGRAM

## 2025-04-05 PROCEDURE — 6370000000 HC RX 637 (ALT 250 FOR IP)

## 2025-04-05 PROCEDURE — 85730 THROMBOPLASTIN TIME PARTIAL: CPT

## 2025-04-05 PROCEDURE — 36415 COLL VENOUS BLD VENIPUNCTURE: CPT

## 2025-04-05 PROCEDURE — 99233 SBSQ HOSP IP/OBS HIGH 50: CPT | Performed by: NURSE PRACTITIONER

## 2025-04-05 PROCEDURE — G0378 HOSPITAL OBSERVATION PER HR: HCPCS

## 2025-04-05 PROCEDURE — 2500000003 HC RX 250 WO HCPCS

## 2025-04-05 PROCEDURE — 6360000002 HC RX W HCPCS: Performed by: NURSE PRACTITIONER

## 2025-04-05 PROCEDURE — 1200000003 HC TELEMETRY R&B

## 2025-04-05 RX ORDER — BUMETANIDE 0.25 MG/ML
1 INJECTION, SOLUTION INTRAMUSCULAR; INTRAVENOUS ONCE
Status: COMPLETED | OUTPATIENT
Start: 2025-04-05 | End: 2025-04-05

## 2025-04-05 RX ADMIN — HEPARIN SODIUM 2000 UNITS: 1000 INJECTION INTRAVENOUS; SUBCUTANEOUS at 00:34

## 2025-04-05 RX ADMIN — HEPARIN SODIUM 11 UNITS/KG/HR: 10000 INJECTION, SOLUTION INTRAVENOUS at 19:55

## 2025-04-05 RX ADMIN — CLOPIDOGREL BISULFATE 75 MG: 75 TABLET, FILM COATED ORAL at 08:30

## 2025-04-05 RX ADMIN — FERROUS SULFATE TAB 325 MG (65 MG ELEMENTAL FE) 325 MG: 325 (65 FE) TAB at 08:29

## 2025-04-05 RX ADMIN — LEVETIRACETAM 500 MG: 500 TABLET, FILM COATED ORAL at 19:56

## 2025-04-05 RX ADMIN — SACUBITRIL AND VALSARTAN 1 TABLET: 49; 51 TABLET, FILM COATED ORAL at 08:29

## 2025-04-05 RX ADMIN — METOPROLOL SUCCINATE 50 MG: 50 TABLET, EXTENDED RELEASE ORAL at 19:56

## 2025-04-05 RX ADMIN — SODIUM CHLORIDE, PRESERVATIVE FREE 10 ML: 5 INJECTION INTRAVENOUS at 19:56

## 2025-04-05 RX ADMIN — RANOLAZINE 1000 MG: 500 TABLET, EXTENDED RELEASE ORAL at 08:29

## 2025-04-05 RX ADMIN — HEPARIN SODIUM 11 UNITS/KG/HR: 10000 INJECTION, SOLUTION INTRAVENOUS at 15:47

## 2025-04-05 RX ADMIN — METOPROLOL SUCCINATE 50 MG: 50 TABLET, EXTENDED RELEASE ORAL at 08:29

## 2025-04-05 RX ADMIN — SACUBITRIL AND VALSARTAN 1 TABLET: 49; 51 TABLET, FILM COATED ORAL at 19:56

## 2025-04-05 RX ADMIN — LEVETIRACETAM 500 MG: 500 TABLET, FILM COATED ORAL at 08:29

## 2025-04-05 RX ADMIN — BUMETANIDE 1 MG: 0.25 INJECTION INTRAMUSCULAR; INTRAVENOUS at 11:13

## 2025-04-05 RX ADMIN — PANTOPRAZOLE SODIUM 40 MG: 40 TABLET, DELAYED RELEASE ORAL at 05:44

## 2025-04-05 RX ADMIN — ATORVASTATIN CALCIUM 80 MG: 80 TABLET, FILM COATED ORAL at 08:29

## 2025-04-05 NOTE — PLAN OF CARE
Problem: Pain  Goal: Verbalizes/displays adequate comfort level or baseline comfort level  4/5/2025 0107 by Hope Vale, RN  Flowsheets (Taken 4/5/2025 0107)  Verbalizes/displays adequate comfort level or baseline comfort level:   Encourage patient to monitor pain and request assistance   Assess pain using appropriate pain scale   Administer analgesics based on type and severity of pain and evaluate response  4/5/2025 0107 by Hope Vale RN  Outcome: Progressing  Flowsheets (Taken 4/5/2025 0107)  Verbalizes/displays adequate comfort level or baseline comfort level:   Encourage patient to monitor pain and request assistance   Assess pain using appropriate pain scale   Administer analgesics based on type and severity of pain and evaluate response

## 2025-04-06 LAB
ANION GAP SERPL CALC-SCNC: 11 MEQ/L (ref 8–16)
APTT PPP: 75.1 SECONDS (ref 22–38)
APTT PPP: 94.5 SECONDS (ref 22–38)
BUN SERPL-MCNC: 17 MG/DL (ref 8–23)
CALCIUM SERPL-MCNC: 9.1 MG/DL (ref 8.8–10.2)
CHLORIDE SERPL-SCNC: 106 MEQ/L (ref 98–111)
CO2 SERPL-SCNC: 20 MEQ/L (ref 22–29)
CREAT SERPL-MCNC: 1.1 MG/DL (ref 0.7–1.2)
DEPRECATED RDW RBC AUTO: 41.8 FL (ref 35–45)
ERYTHROCYTE [DISTWIDTH] IN BLOOD BY AUTOMATED COUNT: 18.6 % (ref 11.5–14.5)
GFR SERPL CREATININE-BSD FRML MDRD: 73 ML/MIN/1.73M2
GLUCOSE SERPL-MCNC: 105 MG/DL (ref 74–109)
HCT VFR BLD AUTO: 41.6 % (ref 42–52)
HGB BLD-MCNC: 13.2 GM/DL (ref 14–18)
MCH RBC QN AUTO: 21.8 PG (ref 26–33)
MCHC RBC AUTO-ENTMCNC: 31.7 GM/DL (ref 32.2–35.5)
MCV RBC AUTO: 68.8 FL (ref 80–94)
PLATELET # BLD AUTO: 223 THOU/MM3 (ref 130–400)
PMV BLD AUTO: 9.7 FL (ref 9.4–12.4)
POTASSIUM SERPL-SCNC: 4.1 MEQ/L (ref 3.5–5.2)
RBC # BLD AUTO: 6.05 MILL/MM3 (ref 4.7–6.1)
SODIUM SERPL-SCNC: 137 MEQ/L (ref 135–145)
WBC # BLD AUTO: 7.4 THOU/MM3 (ref 4.8–10.8)

## 2025-04-06 PROCEDURE — 1200000003 HC TELEMETRY R&B

## 2025-04-06 PROCEDURE — 6370000000 HC RX 637 (ALT 250 FOR IP)

## 2025-04-06 PROCEDURE — 80048 BASIC METABOLIC PNL TOTAL CA: CPT

## 2025-04-06 PROCEDURE — 2500000003 HC RX 250 WO HCPCS

## 2025-04-06 PROCEDURE — 6360000002 HC RX W HCPCS: Performed by: NURSE PRACTITIONER

## 2025-04-06 PROCEDURE — 85730 THROMBOPLASTIN TIME PARTIAL: CPT

## 2025-04-06 PROCEDURE — 85027 COMPLETE CBC AUTOMATED: CPT

## 2025-04-06 PROCEDURE — 36415 COLL VENOUS BLD VENIPUNCTURE: CPT

## 2025-04-06 PROCEDURE — 99233 SBSQ HOSP IP/OBS HIGH 50: CPT | Performed by: NURSE PRACTITIONER

## 2025-04-06 RX ORDER — MORPHINE SULFATE 2 MG/ML
2 INJECTION, SOLUTION INTRAMUSCULAR; INTRAVENOUS EVERY 4 HOURS PRN
Refills: 0 | Status: DISCONTINUED | OUTPATIENT
Start: 2025-04-06 | End: 2025-04-07

## 2025-04-06 RX ADMIN — METOPROLOL SUCCINATE 50 MG: 50 TABLET, EXTENDED RELEASE ORAL at 21:08

## 2025-04-06 RX ADMIN — CLOPIDOGREL BISULFATE 75 MG: 75 TABLET, FILM COATED ORAL at 09:19

## 2025-04-06 RX ADMIN — METOPROLOL SUCCINATE 50 MG: 50 TABLET, EXTENDED RELEASE ORAL at 09:19

## 2025-04-06 RX ADMIN — SACUBITRIL AND VALSARTAN 1 TABLET: 49; 51 TABLET, FILM COATED ORAL at 21:08

## 2025-04-06 RX ADMIN — MORPHINE SULFATE 2 MG: 2 INJECTION, SOLUTION INTRAMUSCULAR; INTRAVENOUS at 21:08

## 2025-04-06 RX ADMIN — ATORVASTATIN CALCIUM 80 MG: 80 TABLET, FILM COATED ORAL at 09:18

## 2025-04-06 RX ADMIN — RANOLAZINE 1000 MG: 500 TABLET, EXTENDED RELEASE ORAL at 09:18

## 2025-04-06 RX ADMIN — LEVETIRACETAM 500 MG: 500 TABLET, FILM COATED ORAL at 21:08

## 2025-04-06 RX ADMIN — SODIUM CHLORIDE, PRESERVATIVE FREE 10 ML: 5 INJECTION INTRAVENOUS at 21:09

## 2025-04-06 RX ADMIN — LEVETIRACETAM 500 MG: 500 TABLET, FILM COATED ORAL at 09:19

## 2025-04-06 RX ADMIN — SACUBITRIL AND VALSARTAN 1 TABLET: 49; 51 TABLET, FILM COATED ORAL at 09:18

## 2025-04-06 RX ADMIN — PANTOPRAZOLE SODIUM 40 MG: 40 TABLET, DELAYED RELEASE ORAL at 05:28

## 2025-04-06 ASSESSMENT — PAIN DESCRIPTION - ORIENTATION
ORIENTATION: MID;ANTERIOR
ORIENTATION: MID
ORIENTATION: MID

## 2025-04-06 ASSESSMENT — PAIN - FUNCTIONAL ASSESSMENT
PAIN_FUNCTIONAL_ASSESSMENT: ACTIVITIES ARE NOT PREVENTED

## 2025-04-06 ASSESSMENT — PAIN DESCRIPTION - LOCATION
LOCATION: CHEST;HEAD
LOCATION: CHEST
LOCATION: HEAD

## 2025-04-06 ASSESSMENT — PAIN DESCRIPTION - ONSET: ONSET: ON-GOING

## 2025-04-06 ASSESSMENT — PAIN SCALES - GENERAL
PAINLEVEL_OUTOF10: 7
PAINLEVEL_OUTOF10: 5
PAINLEVEL_OUTOF10: 8

## 2025-04-06 ASSESSMENT — PAIN DESCRIPTION - FREQUENCY: FREQUENCY: CONTINUOUS

## 2025-04-06 ASSESSMENT — PAIN DESCRIPTION - DESCRIPTORS
DESCRIPTORS: ACHING
DESCRIPTORS: ACHING
DESCRIPTORS: ACHING;DISCOMFORT

## 2025-04-06 ASSESSMENT — PAIN DESCRIPTION - PAIN TYPE
TYPE: ACUTE PAIN

## 2025-04-07 LAB
ABO GROUP BLD: NORMAL
ACTIVATED CLOTTING TIME: 325 SECONDS (ref 1–150)
ANION GAP SERPL CALC-SCNC: 9 MEQ/L (ref 8–16)
APTT PPP: 74.2 SECONDS (ref 22–38)
BUN SERPL-MCNC: 17 MG/DL (ref 8–23)
CALCIUM SERPL-MCNC: 8.6 MG/DL (ref 8.8–10.2)
CHLORIDE SERPL-SCNC: 105 MEQ/L (ref 98–111)
CO2 SERPL-SCNC: 23 MEQ/L (ref 22–29)
CREAT SERPL-MCNC: 1.1 MG/DL (ref 0.7–1.2)
DEPRECATED RDW RBC AUTO: 41.5 FL (ref 35–45)
EKG ATRIAL RATE: 60 BPM
EKG P AXIS: 44 DEGREES
EKG P-R INTERVAL: 224 MS
EKG Q-T INTERVAL: 452 MS
EKG QRS DURATION: 98 MS
EKG QTC CALCULATION (BAZETT): 452 MS
EKG R AXIS: 32 DEGREES
EKG T AXIS: 37 DEGREES
EKG VENTRICULAR RATE: 60 BPM
ERYTHROCYTE [DISTWIDTH] IN BLOOD BY AUTOMATED COUNT: 18.5 % (ref 11.5–14.5)
GFR SERPL CREATININE-BSD FRML MDRD: 73 ML/MIN/1.73M2
GLUCOSE SERPL-MCNC: 98 MG/DL (ref 74–109)
HCT VFR BLD AUTO: 41.5 % (ref 42–52)
HGB BLD-MCNC: 13.2 GM/DL (ref 14–18)
IAT IGG-SP REAG SERPL QL: NORMAL
INR PPP: 1.16 (ref 0.85–1.13)
MCH RBC QN AUTO: 22 PG (ref 26–33)
MCHC RBC AUTO-ENTMCNC: 31.8 GM/DL (ref 32.2–35.5)
MCV RBC AUTO: 69.2 FL (ref 80–94)
PLATELET # BLD AUTO: 200 THOU/MM3 (ref 130–400)
PMV BLD AUTO: 9.9 FL (ref 9.4–12.4)
POTASSIUM SERPL-SCNC: 4 MEQ/L (ref 3.5–5.2)
RBC # BLD AUTO: 6 MILL/MM3 (ref 4.7–6.1)
RH BLD: NORMAL
SODIUM SERPL-SCNC: 137 MEQ/L (ref 135–145)
WBC # BLD AUTO: 7.9 THOU/MM3 (ref 4.8–10.8)

## 2025-04-07 PROCEDURE — 99152 MOD SED SAME PHYS/QHP 5/>YRS: CPT | Performed by: INTERNAL MEDICINE

## 2025-04-07 PROCEDURE — C1887 CATHETER, GUIDING: HCPCS | Performed by: INTERNAL MEDICINE

## 2025-04-07 PROCEDURE — C9600 PERC DRUG-EL COR STENT SING: HCPCS | Performed by: INTERNAL MEDICINE

## 2025-04-07 PROCEDURE — 99232 SBSQ HOSP IP/OBS MODERATE 35: CPT | Performed by: NURSE PRACTITIONER

## 2025-04-07 PROCEDURE — 85610 PROTHROMBIN TIME: CPT

## 2025-04-07 PROCEDURE — 6370000000 HC RX 637 (ALT 250 FOR IP): Performed by: INTERNAL MEDICINE

## 2025-04-07 PROCEDURE — 86885 COOMBS TEST INDIRECT QUAL: CPT

## 2025-04-07 PROCEDURE — 6370000000 HC RX 637 (ALT 250 FOR IP): Performed by: STUDENT IN AN ORGANIZED HEALTH CARE EDUCATION/TRAINING PROGRAM

## 2025-04-07 PROCEDURE — 6370000000 HC RX 637 (ALT 250 FOR IP): Performed by: NURSE PRACTITIONER

## 2025-04-07 PROCEDURE — 6370000000 HC RX 637 (ALT 250 FOR IP)

## 2025-04-07 PROCEDURE — 93459 L HRT ART/GRFT ANGIO: CPT | Performed by: INTERNAL MEDICINE

## 2025-04-07 PROCEDURE — 2709999900 HC NON-CHARGEABLE SUPPLY: Performed by: INTERNAL MEDICINE

## 2025-04-07 PROCEDURE — 86900 BLOOD TYPING SEROLOGIC ABO: CPT

## 2025-04-07 PROCEDURE — 85347 COAGULATION TIME ACTIVATED: CPT

## 2025-04-07 PROCEDURE — 86901 BLOOD TYPING SEROLOGIC RH(D): CPT

## 2025-04-07 PROCEDURE — 2580000003 HC RX 258: Performed by: STUDENT IN AN ORGANIZED HEALTH CARE EDUCATION/TRAINING PROGRAM

## 2025-04-07 PROCEDURE — C1894 INTRO/SHEATH, NON-LASER: HCPCS | Performed by: INTERNAL MEDICINE

## 2025-04-07 PROCEDURE — C1725 CATH, TRANSLUMIN NON-LASER: HCPCS | Performed by: INTERNAL MEDICINE

## 2025-04-07 PROCEDURE — 99153 MOD SED SAME PHYS/QHP EA: CPT | Performed by: INTERNAL MEDICINE

## 2025-04-07 PROCEDURE — 6360000002 HC RX W HCPCS: Performed by: INTERNAL MEDICINE

## 2025-04-07 PROCEDURE — C1769 GUIDE WIRE: HCPCS | Performed by: INTERNAL MEDICINE

## 2025-04-07 PROCEDURE — 2500000003 HC RX 250 WO HCPCS: Performed by: INTERNAL MEDICINE

## 2025-04-07 PROCEDURE — G0269 OCCLUSIVE DEVICE IN VEIN ART: HCPCS | Performed by: INTERNAL MEDICINE

## 2025-04-07 PROCEDURE — C1760 CLOSURE DEV, VASC: HCPCS | Performed by: INTERNAL MEDICINE

## 2025-04-07 PROCEDURE — 85027 COMPLETE CBC AUTOMATED: CPT

## 2025-04-07 PROCEDURE — 027035Z DILATION OF CORONARY ARTERY, ONE ARTERY WITH TWO DRUG-ELUTING INTRALUMINAL DEVICES, PERCUTANEOUS APPROACH: ICD-10-PCS | Performed by: INTERNAL MEDICINE

## 2025-04-07 PROCEDURE — 85730 THROMBOPLASTIN TIME PARTIAL: CPT

## 2025-04-07 PROCEDURE — B2151ZZ FLUOROSCOPY OF LEFT HEART USING LOW OSMOLAR CONTRAST: ICD-10-PCS | Performed by: INTERNAL MEDICINE

## 2025-04-07 PROCEDURE — C1874 STENT, COATED/COV W/DEL SYS: HCPCS | Performed by: INTERNAL MEDICINE

## 2025-04-07 PROCEDURE — 36415 COLL VENOUS BLD VENIPUNCTURE: CPT

## 2025-04-07 PROCEDURE — 6360000002 HC RX W HCPCS

## 2025-04-07 PROCEDURE — B2111ZZ FLUOROSCOPY OF MULTIPLE CORONARY ARTERIES USING LOW OSMOLAR CONTRAST: ICD-10-PCS | Performed by: INTERNAL MEDICINE

## 2025-04-07 PROCEDURE — 1200000003 HC TELEMETRY R&B

## 2025-04-07 PROCEDURE — 93005 ELECTROCARDIOGRAM TRACING: CPT | Performed by: STUDENT IN AN ORGANIZED HEALTH CARE EDUCATION/TRAINING PROGRAM

## 2025-04-07 PROCEDURE — 6360000002 HC RX W HCPCS: Performed by: NURSE PRACTITIONER

## 2025-04-07 PROCEDURE — 4A023N7 MEASUREMENT OF CARDIAC SAMPLING AND PRESSURE, LEFT HEART, PERCUTANEOUS APPROACH: ICD-10-PCS | Performed by: INTERNAL MEDICINE

## 2025-04-07 PROCEDURE — 80048 BASIC METABOLIC PNL TOTAL CA: CPT

## 2025-04-07 PROCEDURE — 6360000004 HC RX CONTRAST MEDICATION: Performed by: INTERNAL MEDICINE

## 2025-04-07 DEVICE — ANGIO-SEAL VIP VASCULAR CLOSURE DEVICE
Type: IMPLANTABLE DEVICE | Status: FUNCTIONAL
Brand: ANGIO-SEAL

## 2025-04-07 DEVICE — STENT ONYXNG27515UX ONYX 2.75X15RX
Type: IMPLANTABLE DEVICE | Status: FUNCTIONAL
Brand: ONYX FRONTIER™

## 2025-04-07 DEVICE — STENT ONYXNG25015UX ONYX 2.50X15RX
Type: IMPLANTABLE DEVICE | Status: FUNCTIONAL
Brand: ONYX FRONTIER™

## 2025-04-07 RX ORDER — SODIUM CHLORIDE 9 MG/ML
INJECTION, SOLUTION INTRAVENOUS CONTINUOUS
Status: DISCONTINUED | OUTPATIENT
Start: 2025-04-07 | End: 2025-04-08 | Stop reason: HOSPADM

## 2025-04-07 RX ORDER — MIDAZOLAM HYDROCHLORIDE 1 MG/ML
INJECTION, SOLUTION INTRAMUSCULAR; INTRAVENOUS PRN
Status: DISCONTINUED | OUTPATIENT
Start: 2025-04-07 | End: 2025-04-07 | Stop reason: HOSPADM

## 2025-04-07 RX ORDER — SODIUM CHLORIDE 0.9 % (FLUSH) 0.9 %
5-40 SYRINGE (ML) INJECTION PRN
Status: DISCONTINUED | OUTPATIENT
Start: 2025-04-07 | End: 2025-04-08 | Stop reason: HOSPADM

## 2025-04-07 RX ORDER — ASPIRIN 81 MG/1
81 TABLET, CHEWABLE ORAL DAILY
Status: DISCONTINUED | OUTPATIENT
Start: 2025-04-08 | End: 2025-04-08 | Stop reason: HOSPADM

## 2025-04-07 RX ORDER — DIPHENHYDRAMINE HYDROCHLORIDE 50 MG/ML
50 INJECTION, SOLUTION INTRAMUSCULAR; INTRAVENOUS ONCE
Status: DISCONTINUED | OUTPATIENT
Start: 2025-04-07 | End: 2025-04-08 | Stop reason: HOSPADM

## 2025-04-07 RX ORDER — ASPIRIN 325 MG
325 TABLET ORAL ONCE
Status: COMPLETED | OUTPATIENT
Start: 2025-04-07 | End: 2025-04-07

## 2025-04-07 RX ORDER — ACETAMINOPHEN 325 MG/1
650 TABLET ORAL EVERY 4 HOURS PRN
Status: DISCONTINUED | OUTPATIENT
Start: 2025-04-07 | End: 2025-04-07 | Stop reason: SDUPTHER

## 2025-04-07 RX ORDER — HYDROCORTISONE SODIUM SUCCINATE 100 MG/2ML
200 INJECTION INTRAMUSCULAR; INTRAVENOUS ONCE
Status: DISCONTINUED | OUTPATIENT
Start: 2025-04-07 | End: 2025-04-08

## 2025-04-07 RX ORDER — SODIUM CHLORIDE 0.9 % (FLUSH) 0.9 %
5-40 SYRINGE (ML) INJECTION EVERY 12 HOURS SCHEDULED
Status: DISCONTINUED | OUTPATIENT
Start: 2025-04-07 | End: 2025-04-08 | Stop reason: HOSPADM

## 2025-04-07 RX ORDER — HEPARIN SODIUM 1000 [USP'U]/ML
INJECTION, SOLUTION INTRAVENOUS; SUBCUTANEOUS PRN
Status: DISCONTINUED | OUTPATIENT
Start: 2025-04-07 | End: 2025-04-07 | Stop reason: HOSPADM

## 2025-04-07 RX ORDER — IOPAMIDOL 755 MG/ML
INJECTION, SOLUTION INTRAVASCULAR PRN
Status: DISCONTINUED | OUTPATIENT
Start: 2025-04-07 | End: 2025-04-07 | Stop reason: HOSPADM

## 2025-04-07 RX ORDER — NITROGLYCERIN 0.4 MG/1
0.4 TABLET SUBLINGUAL EVERY 5 MIN PRN
Status: DISCONTINUED | OUTPATIENT
Start: 2025-04-07 | End: 2025-04-08 | Stop reason: HOSPADM

## 2025-04-07 RX ORDER — CLOPIDOGREL BISULFATE 75 MG/1
TABLET ORAL PRN
Status: DISCONTINUED | OUTPATIENT
Start: 2025-04-07 | End: 2025-04-07 | Stop reason: HOSPADM

## 2025-04-07 RX ORDER — SODIUM CHLORIDE 9 MG/ML
INJECTION, SOLUTION INTRAVENOUS CONTINUOUS
Status: ACTIVE | OUTPATIENT
Start: 2025-04-07 | End: 2025-04-07

## 2025-04-07 RX ORDER — FENTANYL CITRATE 50 UG/ML
INJECTION, SOLUTION INTRAMUSCULAR; INTRAVENOUS PRN
Status: DISCONTINUED | OUTPATIENT
Start: 2025-04-07 | End: 2025-04-07 | Stop reason: HOSPADM

## 2025-04-07 RX ORDER — SODIUM CHLORIDE 9 MG/ML
INJECTION, SOLUTION INTRAVENOUS PRN
Status: DISCONTINUED | OUTPATIENT
Start: 2025-04-07 | End: 2025-04-08 | Stop reason: HOSPADM

## 2025-04-07 RX ORDER — HYDROCODONE BITARTRATE AND ACETAMINOPHEN 5; 325 MG/1; MG/1
1 TABLET ORAL EVERY 6 HOURS PRN
Refills: 0 | Status: DISCONTINUED | OUTPATIENT
Start: 2025-04-07 | End: 2025-04-08 | Stop reason: HOSPADM

## 2025-04-07 RX ADMIN — CLOPIDOGREL BISULFATE 75 MG: 75 TABLET, FILM COATED ORAL at 05:35

## 2025-04-07 RX ADMIN — SACUBITRIL AND VALSARTAN 1 TABLET: 49; 51 TABLET, FILM COATED ORAL at 07:16

## 2025-04-07 RX ADMIN — ASPIRIN 325 MG: 325 TABLET ORAL at 05:35

## 2025-04-07 RX ADMIN — MORPHINE SULFATE 2 MG: 2 INJECTION, SOLUTION INTRAMUSCULAR; INTRAVENOUS at 01:12

## 2025-04-07 RX ADMIN — HYDROCODONE BITARTRATE AND ACETAMINOPHEN 1 TABLET: 5; 325 TABLET ORAL at 20:02

## 2025-04-07 RX ADMIN — ATORVASTATIN CALCIUM 80 MG: 80 TABLET, FILM COATED ORAL at 07:16

## 2025-04-07 RX ADMIN — RANOLAZINE 1000 MG: 500 TABLET, EXTENDED RELEASE ORAL at 07:16

## 2025-04-07 RX ADMIN — FERROUS SULFATE TAB 325 MG (65 MG ELEMENTAL FE) 325 MG: 325 (65 FE) TAB at 07:16

## 2025-04-07 RX ADMIN — MORPHINE SULFATE 2 MG: 2 INJECTION, SOLUTION INTRAMUSCULAR; INTRAVENOUS at 05:21

## 2025-04-07 RX ADMIN — SODIUM CHLORIDE: 0.9 INJECTION, SOLUTION INTRAVENOUS at 05:30

## 2025-04-07 RX ADMIN — LEVETIRACETAM 500 MG: 500 TABLET, FILM COATED ORAL at 19:58

## 2025-04-07 RX ADMIN — MORPHINE SULFATE 2 MG: 2 INJECTION, SOLUTION INTRAMUSCULAR; INTRAVENOUS at 11:11

## 2025-04-07 RX ADMIN — SODIUM CHLORIDE, PRESERVATIVE FREE 10 ML: 5 INJECTION INTRAVENOUS at 19:58

## 2025-04-07 RX ADMIN — METOPROLOL SUCCINATE 50 MG: 50 TABLET, EXTENDED RELEASE ORAL at 07:17

## 2025-04-07 RX ADMIN — PANTOPRAZOLE SODIUM 40 MG: 40 TABLET, DELAYED RELEASE ORAL at 05:35

## 2025-04-07 RX ADMIN — METOPROLOL SUCCINATE 50 MG: 50 TABLET, EXTENDED RELEASE ORAL at 19:58

## 2025-04-07 RX ADMIN — HEPARIN SODIUM 13 UNITS/KG/HR: 10000 INJECTION, SOLUTION INTRAVENOUS at 00:22

## 2025-04-07 RX ADMIN — SACUBITRIL AND VALSARTAN 1 TABLET: 49; 51 TABLET, FILM COATED ORAL at 19:58

## 2025-04-07 RX ADMIN — LEVETIRACETAM 500 MG: 500 TABLET, FILM COATED ORAL at 07:19

## 2025-04-07 ASSESSMENT — PAIN DESCRIPTION - ORIENTATION
ORIENTATION: LEFT
ORIENTATION: ANTERIOR;LEFT
ORIENTATION: ANTERIOR;LEFT

## 2025-04-07 ASSESSMENT — PAIN DESCRIPTION - LOCATION
LOCATION: HEAD

## 2025-04-07 ASSESSMENT — PAIN DESCRIPTION - ONSET: ONSET: ON-GOING

## 2025-04-07 ASSESSMENT — PAIN DESCRIPTION - FREQUENCY: FREQUENCY: CONTINUOUS

## 2025-04-07 ASSESSMENT — PAIN DESCRIPTION - PAIN TYPE: TYPE: ACUTE PAIN

## 2025-04-07 ASSESSMENT — PAIN - FUNCTIONAL ASSESSMENT: PAIN_FUNCTIONAL_ASSESSMENT: ACTIVITIES ARE NOT PREVENTED

## 2025-04-07 ASSESSMENT — PAIN SCALES - GENERAL
PAINLEVEL_OUTOF10: 8

## 2025-04-07 ASSESSMENT — PAIN DESCRIPTION - DESCRIPTORS: DESCRIPTORS: ACHING

## 2025-04-07 NOTE — PROCEDURES
PROCEDURE NOTE  Date: 4/7/2025   Name: Chriss Braga  YOB: 1955    Procedures  12 lead EKG completed. Results handed to Zenaida JO.

## 2025-04-07 NOTE — BRIEF OP NOTE
[facility]  Sedation/Analgesia Post Sedation Record        Pt Name: Chriss Braga  MRN: [id number]  YOB: 1955  Procedure Performed By: Georgi Bee MD MD, FACC, DOMINIC, RPVI  Primary Care Physician: Newman-Waterhouse, Aundrea N, DO    POST-PROCEDURE                                  Sedation/Anesthesia:  Local Anesthesia and IV Conscious Sedation with continuous O2 monitoring    Estimated Blood Loss: 10 cc     Specimens Removed:  [x]None []Other:      Disposition of Specimen:  []Pathology []Other        Complications:   [x]None Immediate []Other:         Procedure Performed:  Left heart cath and PCI to distal RCA with LESTER x 1    Post Procedure Diagnosis/Findings:  Coronary Artery Disease /[place]         Recommendations:   Transfer to Tele unit  DAPT + eliquis for 1 week and d/c aspirin  Lipid lowering therapy  Aggressive risk factor modification  Cardiac rehab  Monitor access site closely for bleeding  IV Fluids    All questions and concerns were addressed and patient is in agreement with plan.                 Georgi Bee MD MD, FACC, FSCHARIKA, RPVI  Electronically signed 4/7/2025 at 9:55 AM

## 2025-04-07 NOTE — PLAN OF CARE
Problem: Chronic Conditions and Co-morbidities  Goal: Patient's chronic conditions and co-morbidity symptoms are monitored and maintained or improved  Outcome: Progressing  Care Plan - Patient's Chronic Conditions and Co-Morbidity Symptoms are Monitored and Maintained or Improved: Monitor and assess patient's chronic conditions and comorbid symptoms for stability, deterioration, or improvement     Problem: Discharge Planning  Goal: Discharge to home or other facility with appropriate resources  Outcome: Progressing  Discharge to home or other facility with appropriate resources: Identify barriers to discharge with patient and caregiver     Problem: Pain  Goal: Verbalizes/displays adequate comfort level or baseline comfort level  Outcome: Progressing  Verbalizes/displays adequate comfort level or baseline comfort level:   Encourage patient to monitor pain and request assistance   Assess pain using appropriate pain scale     Problem: Safety - Adult  Goal: Free from fall injury  Outcome: Progressing  Note: Patient free of falls this shift. Patient on falling star program. Fall band intact. RN visually checks on patient with hourly rounds. Patient tolerates ambulation each shift.

## 2025-04-07 NOTE — CARE COORDINATION
4/7/25, 3:22 PM EDT    DISCHARGE PLANNING EVALUATION     Spoke with patient regarding discharge.  Advised plan for discharge tomorrow 4/8.  Patient requesting HH, has HH list.  Has not chosen HH agency.  Advised to have agency  in AM.  Will need [employer] cab.

## 2025-04-07 NOTE — CARE COORDINATION
4/7/25, 11:27 AM EDT    DISCHARGE ON GOING EVALUATION    Chriss Braga       [facility] day: 2  Location: -/[facility] Reason for admit: Shortness of breath [R06.02]  Dizziness [R42]  Chest pain [R07.9]     Procedures:   4/4 Echo: EF 15-20%. Severe global hypokinesis present. (Has ICD)  4/7 [facility] with Dr. Bee:  Left heart cath and PCI to distal RCA with LESTER x 1.     Imaging since last note:   4/4 CTA chest:   1. No evidence of pulmonary artery embolism.  2. New 1.5 cm nodular focus within the lingula. The appearance suggests   that this may represent an area of atelectasis, but a true nodule is also   in the differential. Multiple additional pulmonary nodules measure up to 7   mm in size and are without change. Recommend three-month follow-up CT.    Barriers to Discharge: Hospitalist and Cardiology following. Had heart cath today, see above. Plan discharge tomorrow if stable.     PCP: Newman-Waterhouse, Aundrea N,   Readmission Risk Score: 29.2    Patient Goals/Plan/Treatment Preferences: From home with brother and HH. SW following.

## 2025-04-08 ENCOUNTER — TELEPHONE (OUTPATIENT)
Dept: CARDIOLOGY CLINIC | Age: 70
End: 2025-04-08

## 2025-04-08 ENCOUNTER — TELEPHONE (OUTPATIENT)
Dept: INTERNAL MEDICINE CLINIC | Age: 70
End: 2025-04-08

## 2025-04-08 VITALS
TEMPERATURE: 97.8 F | HEIGHT: 69 IN | BODY MASS INDEX: 23.91 KG/M2 | HEART RATE: 70 BPM | OXYGEN SATURATION: 99 % | DIASTOLIC BLOOD PRESSURE: 84 MMHG | WEIGHT: 161.4 LBS | SYSTOLIC BLOOD PRESSURE: 144 MMHG | RESPIRATION RATE: 16 BRPM

## 2025-04-08 LAB
ANION GAP SERPL CALC-SCNC: 11 MEQ/L (ref 8–16)
BUN SERPL-MCNC: 15 MG/DL (ref 8–23)
CALCIUM SERPL-MCNC: 9 MG/DL (ref 8.8–10.2)
CHLORIDE SERPL-SCNC: 104 MEQ/L (ref 98–111)
CO2 SERPL-SCNC: 21 MEQ/L (ref 22–29)
CREAT SERPL-MCNC: 1.1 MG/DL (ref 0.7–1.2)
DEPRECATED RDW RBC AUTO: 41.5 FL (ref 35–45)
ERYTHROCYTE [DISTWIDTH] IN BLOOD BY AUTOMATED COUNT: 18.4 % (ref 11.5–14.5)
GFR SERPL CREATININE-BSD FRML MDRD: 73 ML/MIN/1.73M2
GLUCOSE SERPL-MCNC: 98 MG/DL (ref 74–109)
HCT VFR BLD AUTO: 42.2 % (ref 42–52)
HGB BLD-MCNC: 13.4 GM/DL (ref 14–18)
MCH RBC QN AUTO: 21.9 PG (ref 26–33)
MCHC RBC AUTO-ENTMCNC: 31.8 GM/DL (ref 32.2–35.5)
MCV RBC AUTO: 69.1 FL (ref 80–94)
PLATELET # BLD AUTO: 206 THOU/MM3 (ref 130–400)
PMV BLD AUTO: 9.6 FL (ref 9.4–12.4)
POTASSIUM SERPL-SCNC: 4.2 MEQ/L (ref 3.5–5.2)
RBC # BLD AUTO: 6.11 MILL/MM3 (ref 4.7–6.1)
SCAN OF BLOOD SMEAR: NORMAL
SODIUM SERPL-SCNC: 136 MEQ/L (ref 135–145)
WBC # BLD AUTO: 8.2 THOU/MM3 (ref 4.8–10.8)

## 2025-04-08 PROCEDURE — 36415 COLL VENOUS BLD VENIPUNCTURE: CPT

## 2025-04-08 PROCEDURE — 80048 BASIC METABOLIC PNL TOTAL CA: CPT

## 2025-04-08 PROCEDURE — 2500000003 HC RX 250 WO HCPCS: Performed by: INTERNAL MEDICINE

## 2025-04-08 PROCEDURE — 6370000000 HC RX 637 (ALT 250 FOR IP): Performed by: NURSE PRACTITIONER

## 2025-04-08 PROCEDURE — 85027 COMPLETE CBC AUTOMATED: CPT

## 2025-04-08 PROCEDURE — 6370000000 HC RX 637 (ALT 250 FOR IP)

## 2025-04-08 PROCEDURE — 99232 SBSQ HOSP IP/OBS MODERATE 35: CPT | Performed by: PHYSICIAN ASSISTANT

## 2025-04-08 RX ORDER — EVOLOCUMAB 140 MG/ML
140 INJECTION, SOLUTION SUBCUTANEOUS
Qty: 6 ADJUSTABLE DOSE PRE-FILLED PEN SYRINGE | Refills: 3 | Status: SHIPPED | OUTPATIENT
Start: 2025-04-08

## 2025-04-08 RX ORDER — CLOPIDOGREL BISULFATE 75 MG/1
75 TABLET ORAL DAILY
Qty: 90 TABLET | Refills: 0 | Status: SHIPPED | OUTPATIENT
Start: 2025-04-08

## 2025-04-08 RX ORDER — NITROGLYCERIN 0.4 MG/1
0.4 TABLET SUBLINGUAL EVERY 5 MIN PRN
Qty: 25 TABLET | Refills: 3 | Status: SHIPPED | OUTPATIENT
Start: 2025-04-08

## 2025-04-08 RX ORDER — ASPIRIN 81 MG/1
81 TABLET, CHEWABLE ORAL DAILY
Qty: 7 TABLET | Refills: 0 | Status: SHIPPED | OUTPATIENT
Start: 2025-04-08

## 2025-04-08 RX ORDER — FUROSEMIDE 20 MG/1
20 TABLET ORAL DAILY
Qty: 30 TABLET | Refills: 0 | Status: SHIPPED | OUTPATIENT
Start: 2025-04-08

## 2025-04-08 RX ORDER — FERROUS SULFATE 325(65) MG
325 TABLET ORAL EVERY OTHER DAY
COMMUNITY
Start: 2025-04-08

## 2025-04-08 RX ORDER — POTASSIUM CHLORIDE 750 MG/1
10 TABLET, EXTENDED RELEASE ORAL DAILY
Qty: 30 TABLET | Refills: 0 | Status: SHIPPED | OUTPATIENT
Start: 2025-04-08

## 2025-04-08 RX ADMIN — SACUBITRIL AND VALSARTAN 1 TABLET: 49; 51 TABLET, FILM COATED ORAL at 09:05

## 2025-04-08 RX ADMIN — PANTOPRAZOLE SODIUM 40 MG: 40 TABLET, DELAYED RELEASE ORAL at 09:06

## 2025-04-08 RX ADMIN — APIXABAN 5 MG: 5 TABLET, FILM COATED ORAL at 09:10

## 2025-04-08 RX ADMIN — ATORVASTATIN CALCIUM 80 MG: 80 TABLET, FILM COATED ORAL at 09:06

## 2025-04-08 RX ADMIN — LEVETIRACETAM 500 MG: 500 TABLET, FILM COATED ORAL at 09:06

## 2025-04-08 RX ADMIN — RANOLAZINE 1000 MG: 500 TABLET, EXTENDED RELEASE ORAL at 09:06

## 2025-04-08 RX ADMIN — ASPIRIN 81 MG: 81 TABLET, CHEWABLE ORAL at 09:06

## 2025-04-08 RX ADMIN — METOPROLOL SUCCINATE 50 MG: 50 TABLET, EXTENDED RELEASE ORAL at 09:06

## 2025-04-08 RX ADMIN — SODIUM CHLORIDE, PRESERVATIVE FREE 10 ML: 5 INJECTION INTRAVENOUS at 09:06

## 2025-04-08 RX ADMIN — CLOPIDOGREL BISULFATE 75 MG: 75 TABLET, FILM COATED ORAL at 09:05

## 2025-04-08 NOTE — TELEPHONE ENCOUNTER
Pt was seen by Newman Waterhouse in February, you were the attending. They want to know if you will follow? Please advise

## 2025-04-08 NOTE — DISCHARGE INSTRUCTIONS
Normal Observation: You may or may not experience these.    Soreness or tenderness that may last a few weeks.    Possible bruising that could last a few weeks and up to one month.   Formation of a small lump (dime to quarter size) that should last only a few weeks.    Care of your incision  You may shower 24 hours after the procedure. Wet the dressing thoroughly and gently remove the bandage from the hospital during showering. It is easier to remove this way.             Gently clean your site daily using soap and water while standing in the shower. Dry thoroughly.   Do not apply powders or lotions to the site for 2 weeks.   Keep the site clean and dry to prevent infection.    Do not sit in a bathtub or a pool of water for 7 days.    Inspect the site daily.    Activity   You may resume normal activity in 2 days, including driving, letting pain be your     guide.   Limit lifting over 5 pounds (half gallon of milk) to one week or until site heals.  Limit vigorous activity (contact sports) to two weeks time.  You will be able to return to work in 1-3 days.    Call our office immediately if you experience any of the following…  Significant bleeding does not stop after 10 minutes of applying firm pressure directly over incision.   Increased swelling of groin or leg.   Unusual pain at groin or down that leg.   Signs of infection: redness, warmth to touch, drainage, poorly healing incision, fever, or chills.    Stop aspirin in 7 days (April 15)    Please follow post cath cardiac cath instruction    Please follow-up with the heart failure clinic    Follow-up with Dr. Jaime in 2 weeks

## 2025-04-08 NOTE — DISCHARGE SUMMARY
Hospital Medicine Discharge Summary      Patient Identification:   Chriss Braga   : 1955  MRN: [id number]   Account: [id number]      Patient's PCP: Newman-Waterhouse, Aundrea N, DO    Admit Date: 4/3/2025     Discharge Date: 2025      Admitting Physician: CHEN Edmonds CNP     Discharging Nurse Practitioner: CHEN Edmonds CNP     Discharge Diagnoses with Assessment/Plan:  Unstable angina status post PCI to distal RCA with LESTER x 1 on 2025 --cholesterol at 178 and LDL at 120; stress test from 2025 was negative for myocardial ischemia; had catheterization done 2024 with successful PCI of OM1, there was found to be severe residual RCA stenosis which is medically managed and patent LIMA to LAD; appreciate cardiology input; CTA chest from 2024 showed no evidence of pulmonary embolism; morphine 2 mg every 4 hours as needed severe pain; will be on aspirin for 1 week then stop and continue Plavix and Eliquis; on statin, Toprol  PAF with probable secondary hypercoagulable state--Eliquis; Toprol  Acute on chronic systolic heart failure/ischemic cardiomyopathy possibly secondary to #1, #5--BNP at 1774; echo from 2025 shows EF 15 to 20%; on Toprol, Entresto; no effusions are seen on the chest x-ray; had Bumex 1 mg x 1 dose on  and voided 1.2 L, voided 725 mL past 24 hours, monitor output; has ICD; obtain Lasix 20 mg daily along with KCl and reevaluate outpatient  2 runs of nonsustained V. tach at 7 beats each--maintain telemetry, cardiology is on the case; has ICD; potassium at 4.1, magnesium at 2.2 4/3/2025; no further ectopy seen on telemetry  Primary hypertension, uncontrolled--Toprol, Entresto  GERD--on Protonix  Hyperlipidemia--on statin  Chronic iron deficiency anemia  History of CVA with thrombophilia and history of medication noncompliance--Eliquis  Marijuana use  CAD status post PCI of OM1 on 2024--on statin, Plavix,

## 2025-04-08 NOTE — CARE COORDINATION
4/8/25, 11:22 AM EDT    DISCHARGE PLANNING EVALUATION    Spoke with patient regarding HH, he is agreeable, preference is [facility] .    [facility] to [facility] , spoke with Raquel.  Patient accepted.    4/8/25, 11:23 AM EDT    Patient goals/plan/ treatment preferences discussed by  and .  Patient goals/plan/ treatment preferences reviewed with patient/ family.  Patient/ family verbalize understanding of discharge plan and are in agreement with goal/plan/treatment preferences.  Understanding was demonstrated using the teach back method.  AVS provided by RN at time of discharge, which includes all necessary medical information pertaining to the patients current course of illness, treatment, post-discharge goals of care, and treatment preferences.     Services At/After Discharge: Home Health, In cab, Nursing service, OT, and PT       Patient discharged home with brother by [facility] and [facility] .  Raquel at  aware of discharge.   - - -

## 2025-04-08 NOTE — PROGRESS NOTES
Hospitalist Progress Note    Patient:  Chriss Braga      Unit/Bed:8B-36/036-A    YOB: 1955    MRN: [id number]       Acct: [id number]     PCP: Newman-Waterhouse, Aundrea N, DO    Date of Admission: 4/3/2025    Assessment/Plan:    Acute chest pain--3 negative troponins, cholesterol at 178 and LDL at 120; stress test from February 4, 2025 was negative for myocardial ischemia; had catheterization done January 17, 2024 with successful PCI of OM1, there was found to be severe residual RCA stenosis which is medically managed and patent BRADEN to LAD  PAF with probable secondary hypercoagulable state--placed Eliquis on hold until seen by cardiology  Acute on chronic systolic heart failure--BNP at 1774; echo from February 23, 2025 shows EF 15 to 20%; on Toprol, Entresto; no effusions are seen on the chest x-ray; will add Bumex 1 mg twice daily with his complaint of dyspnea on exertion, monitor output; has ICD  2 runs of nonsustained V. tach at 7 beats each--maintain telemetry, cardiology is on the case; has ICD; potassium at 4.1, magnesium at 2.2 1/3/2025; will order interrogation of device  Primary hypertension, uncontrolled--Toprol, Entresto; monitor  GERD--on Protonix  Hyperlipidemia--on statin  Chronic iron deficiency anemia  History of CVA with thrombophilia and history of medication noncompliance  Marijuana use  CAD status post PCI of OM1 on January 17, 2024--on statin, Plavix, Imdur, Toprol, Ranexa  History of seizure disorder      Expected discharge date: Pending clinical course    Disposition:    [x] Home       [] TCU       [] Rehab       [] Psych       [] SNF       [] Long Term Care Facility       [] Other-    Chief Complaint: Chest pain     Hospital Course: Per H&P done 4/3/2025: \" Chriss Braga is a 69 y.o. male with PMHx of CVA, extensive CAD, hypertension, HFrEF, paroxysmal A-fib, seizure disorder, who presents to [facility] with chest pain.  Patient reports that his 
              Hospitalist Progress Note    Patient:  Chriss Braga      Unit/Bed:8B-36/036-A    YOB: 1955    MRN: [id number]       Acct: [id number]     PCP: Newman-Waterhouse, Aundrea N, DO    Date of Admission: 4/3/2025    Assessment/Plan:    Unstable angina status post PCI to distal RCA with LESTER x 1 on 4/7/2025 --cholesterol at 178 and LDL at 120; stress test from February 4, 2025 was negative for myocardial ischemia; had catheterization done January 17, 2024 with successful PCI of OM1, there was found to be severe residual RCA stenosis which is medically managed and patent LIMA to LAD; appreciate cardiology input; CTA chest from 4/4/2024 showed no evidence of pulmonary embolism; morphine 2 mg every 4 hours as needed severe pain; will be on aspirin for 1 week then stop and continue Plavix and Eliquis  PAF with probable secondary hypercoagulable state--Eliquis on hold and will resume tomorrow; on Toprol  Acute on chronic systolic heart failure/ischemic cardiomyopathy possibly secondary to #1, #5--BNP at 1774; echo from February 23, 2025 shows EF 15 to 20%; on Toprol, Entresto; no effusions are seen on the chest x-ray; had Bumex 1 mg x 1 dose on 4/6 and voided 1.2 L, voided 725 mL past 24 hours, monitor output; has ICD  2 runs of nonsustained V. tach at 7 beats each--maintain telemetry, cardiology is on the case; has ICD; potassium at 4.1, magnesium at 2.2 4/3/2025; no further ectopy seen on telemetry  Primary hypertension, uncontrolled--Toprol, Entresto; monitor  GERD--on Protonix  Hyperlipidemia--on statin  Chronic iron deficiency anemia  History of CVA with thrombophilia and history of medication noncompliance--currently on heparin drip as was on Eliquis  Marijuana use  CAD status post PCI of OM1 on January 17, 2024--on statin, Plavix, Imdur, Toprol, Ranexa  History of seizure disorder  New 1.5 cm nodular focus within the lingula--appears an area of atelectasis however true nodules also in the 
              Hospitalist Progress Note    Patient:  Chriss Braga      Unit/Bed:8B-36/036-A    YOB: 1955    MRN: [id number]       Acct: [id number]     PCP: Newman-Waterhouse, Aundrea N, DO    Date of Admission: 4/3/2025    Assessment/Plan:    Acute chest pain--3 negative troponins, cholesterol at 178 and LDL at 120; stress test from February 4, 2025 was negative for myocardial ischemia; had catheterization done January 17, 2024 with successful PCI of OM1, there was found to be severe residual RCA stenosis which is medically managed and patent LIMA to LAD; appreciate cardiology input and plan is heart cath on 4/7, on nitroglycerin and heparin drips; CTA chest from 4/4/2024 showed no evidence of pulmonary embolism; currently complaining of pain and discussed with Brook JO and patient does not want the nitroglycerin drip to high secondary to headache, will add morphine 2 mg every 4 hours as needed severe pain  PAF with probable secondary hypercoagulable state--Eliquis on hold~on heparin, on Toprol  Acute on chronic systolic heart failure--BNP at 1774; echo from February 23, 2025 shows EF 15 to 20%; on Toprol, Entresto; no effusions are seen on the chest x-ray; had Bumex 1 mg x 1 dose on 4/6 and voided 1.2 L, monitor output; has ICD  2 runs of nonsustained V. tach at 7 beats each--maintain telemetry, cardiology is on the case; has ICD; potassium at 4.1, magnesium at 2.2 4/3/2025; no further ectopy seen on telemetry  Primary hypertension, uncontrolled--Toprol, Entresto; monitor  GERD--on Protonix  Hyperlipidemia--on statin  Chronic iron deficiency anemia  History of CVA with thrombophilia and history of medication noncompliance--currently on heparin drip as was on Eliquis  Marijuana use  CAD status post PCI of OM1 on January 17, 2024--on statin, Plavix, Imdur, Toprol, Ranexa  History of seizure disorder  New 1.5 cm nodular focus within the lingula--appears an area of atelectasis however true nodules also 
Cardiology Progress Note      Patient:  Chriss Braga  YOB: 1955  MRN: [id number]   Acct: [id number]  Admit Date:  4/3/2025  Primary Cardiologist: Anand Lisa MD    Note per dr meeks \"CHIEF COMPLAINT: Chest pain    CONSULT FOR: Chest pain     HPI: This is a pleasant 69 y.o. male presents with chest pain.  Past medical history significant for CAD s/p PCI of OM1 STEMI the SVG x 1 LESTER on 1/17/2024, HFrEF, CVA, CAD, paroxysmal atrial fibrillation, hypertension.  Patient reported onset of chest pain roughly 1 hour prior to arrival.  Sharp pain on the left side.  Morphine helped.  No other alleviating/aggravating factors. Dyspnea on exertion reported on arrival.     Patient seen and evaluated this morning at bedside.  Patient has ongoing left-sided chest pain that is not reproducible on palpation.  Still sharp in consistency.  At this time patient denies any alleviating or aggravating factors.  He denies any shortness of breath, show, fever, chills, nausea, vomiting, constipation, diarrhea.  He did state this chest pain feels similar to prior events.\"    Subjective (Events in last 24 hours): pt awake and alert.  NAD. No cp or sob. No edema or orthopnea. No complaints  On RA      Objective:   BP (!) 126/90   Pulse 61   Temp 97.5 °F (36.4 °C) (Oral)   Resp 16   Ht 1.753 m (5' 9\")   Wt 73.2 kg (161 lb 6.4 oz)   SpO2 96%   BMI 23.83 kg/m²        TELEMETRY: nsr 60s    Physical Exam:  General Appearance: alert and oriented to person, place and time, in no acute distress  Cardiovascular: normal rate, regular rhythm, normal S1 and S2, no murmurs, rubs, clicks, or gallops, distal pulses intact, no carotid bruits, no JVD  Pulmonary/Chest: clear to auscultation bilaterally- no wheezes, rales or rhonchi, normal air movement, no respiratory distress  Abdomen: soft, non-tender, non-distended, normal bowel sounds, no masses Extremities: no cyanosis, clubbing or edema, pulse   Skin: warm and dry, no rash or 
Echo completed at bedside. Dr. Velez to read.   
Inpatient Cardiac Rehabilitation Consult    Received consult for Phase II Cardiac Rehabilitation.  Patient needs cardiac rehab due to PCI on 4/7/2025. Pt. not a candidate at this time           
Last dose of eliquis was 4/3/25. Cath lab notified.   
Pt. Expressed 7/10 chest pain and pressure. Hospitalist and cardiology made aware. EKG ordered.   
Spiritual Health History and Assessment/Progress Note  [facility]    Spiritual/Emotional Needs,  ,  ,      Name: Chriss Braga MRN: [id number]    Age: 69 y.o.     Sex: male   Language: English   Alevism: Yazdanism   Chest pain     Date: 4/4/2025            Total Time Calculated: (P) 25 min              Spiritual Assessment began in [facility]        Referral/Consult From: (P) Nurse   Encounter Overview/Reason: Spiritual/Emotional Needs  Service Provided For: Patient    Heather, Belief, Meaning:   Patient identifies as spiritual and has beliefs or practices that help with coping during difficult times  Family/Friends No family/friends present      Importance and Influence:  Patient has spiritual/personal beliefs that influence decisions regarding their health  Family/Friends No family/friends present    Community:  Patient feels well-supported. Support system includes: Friends and Extended family, expresses feelings of isolation: disconnected from family/friends, and Other: The patient shared his recent loss of his wife and how he moves around the country to find good rates on rent. He intends to [place] to [facility] [place].   Family/Friends No family/friends present    Assessment and Plan of Care:     Patient Interventions include: Facilitated expression of thoughts and feelings, Explored spiritual coping/struggle/distress, Engaged in theological reflection, Affirmed coping skills/support systems, Provided sacramental/Tenriism ritual, and Facilitated life review and/ or legacy  Family/Friends Interventions include: No family/friends present    Patient Plan of Care: Spiritual Care available upon further referral  Family/Friends Plan of Care: No family/friends present    Electronically signed by Chaplain Anel on 4/4/2025 at 9:32 AM    
This nurse rounding on patient. Patient states chest discomfort 7/10 at this time. Patient refused pain meds at this time.   
This nurse talked with pharmacy regarding recent aptt level. Pharmacy confirmed that due to time of lab drawl and initial infusion and bolus start time that the increase in aptt is most likely due to bolus and its okay to continue with infusion rate at this time and will access again at 6 hour elvira with next aptt level.      
TCU       [] Rehab       [] Psych       [] SNF       [] Long Term Care Facility       [] Other-    Chief Complaint: Chest pain     Hospital Course: Per H&P done 4/3/2025: \" Chriss Braga is a 69 y.o. male with PMHx of CVA, extensive CAD, hypertension, HFrEF, paroxysmal A-fib, seizure disorder, who presents to [facility] with chest pain.  Patient reports that his chest pain had started about 1 hour prior to arrival.  He describes this as a sharp pain on the left side of his chest midclavicularly.  He reports that as currently an 8/10.  He states that the morphine helped but besides that no aggravating or alleviating factors.  He reports that he had a previous MI last year and it feels similar to at that time.  He also reports that he has dyspnea on exertion as well as weakness on exertion and feels like he is about to have a seizure.  He also reports to ED provider that he had shortness of breath, however when questioned, he denies any shortness of breath at this time.     Hx obtained from: Patient     ED course: Patient was seen and evaluated in the emergency for the symptoms above.  At that time he described weakness, dizziness, shortness of breath.  He was found to have a proBNP of 1773, significantly elevated from his previous 700.  His EKG had found prominent T wave inversions in the inferior leads.  ED consulted Dr. Aguirre with cardiology who requested that he be admitted for cardiac evaluation.\"    4/4--> hemodynamically stable, afebrile and on room air; awaiting cardiology input; will add diuresis to see if it helps with his dyspnea on exertion; telemetry showed nonsustained V. tach x 2 with 7 beats each, will interrogate ICD    4/5--> hemodynamically stable, afebrile and on room air; added nitroglycerin drip last evening secondary to complaints of chest pain with resolution, CTA chest did not reveal anything acute; has some fine crackles in his bases so we will give Bumex 1 mg x 1 dose and 
gallops, distal pulses intact, no carotid bruits, no JVD  Pulmonary/Chest: clear to auscultation bilaterally- no wheezes, rales or rhonchi, normal air movement, no respiratory distress  Abdomen: soft, non-tender, non-distended, normal bowel sounds, no masses   Extremities: no cyanosis, clubbing or edema, pulse   Skin: warm and dry, no rash or erythema  Neurological: alert, oriented, normal speech, no focal findings or movement disorder noted    Medications:    sodium chloride flush  5-40 mL IntraVENous 2 times per day    diphenhydrAMINE  50 mg IntraVENous Once    hydrocortisone sodium succinate PF  200 mg IntraVENous Once    sodium chloride flush  5-40 mL IntraVENous 2 times per day    [Held by provider] apixaban  5 mg Oral BID    atorvastatin  80 mg Oral Daily    clopidogrel  75 mg Oral Daily    [Held by provider] isosorbide mononitrate  60 mg Oral Daily    levETIRAcetam  500 mg Oral BID    metoprolol succinate  50 mg Oral BID    pantoprazole  40 mg Oral QAM AC    ranolazine  1,000 mg Oral Daily    sacubitril-valsartan  1 tablet Oral BID    sodium chloride flush  5-40 mL IntraVENous 2 times per day    ferrous sulfate  325 mg Oral Every Other Day      sodium chloride      sodium chloride 75 mL/hr at 04/07/25 0530    sodium chloride 75 mL/hr at 04/07/25 1019    sodium chloride      heparin (PORCINE) Infusion 13 Units/kg/hr (04/07/25 0022)    nitroGLYCERIN Stopped (04/07/25 0935)    sodium chloride       sodium chloride flush, 5-40 mL, PRN  nitroGLYCERIN, 0.4 mg, Q5 Min PRN  sodium chloride flush, 5-40 mL, PRN  sodium chloride, , PRN  morphine, 2 mg, Q4H PRN  heparin (porcine), 4,000 Units, PRN  heparin (porcine), 2,000 Units, PRN  sodium chloride flush, 5-40 mL, PRN  sodium chloride, , PRN  potassium chloride, 40 mEq, PRN   Or  potassium alternative oral replacement, 40 mEq, PRN   Or  potassium chloride, 10 mEq, PRN  magnesium sulfate, 2,000 mg, PRN  ondansetron, 4 mg, Q8H PRN   Or  ondansetron, 4 mg, Q6H 
Results   Component Value Date/Time     04/04/2025 05:35 AM    K 4.1 04/04/2025 05:35 AM     04/04/2025 05:35 AM    CO2 21 04/04/2025 05:35 AM    BUN 19 04/04/2025 05:35 AM    CREATININE 1.1 04/04/2025 05:35 AM    LABGLOM 73 04/04/2025 05:35 AM    LABGLOM >60 02/27/2024 12:24 PM    GLUCOSE 102 04/04/2025 05:35 AM    GLUCOSE 85 03/14/2012 06:03 AM    CALCIUM 8.7 04/04/2025 05:35 AM       Hepatic Function Panel:    Lab Results   Component Value Date/Time    ALKPHOS 72 04/03/2025 04:09 PM    ALT 9 04/03/2025 04:09 PM    AST 21 04/03/2025 04:09 PM    BILITOT 1.1 04/03/2025 04:09 PM    BILIDIR 0.4 04/03/2025 04:09 PM    IBILI 1.0 01/27/2025 05:19 AM       Magnesium:    Lab Results   Component Value Date/Time    MG 2.2 04/03/2025 04:09 PM       PT/INR:    Lab Results   Component Value Date/Time    PROTIME 1.01 12/23/2011 08:33 AM    INR 1.27 04/04/2025 10:51 AM       HgBA1c:    Lab Results   Component Value Date/Time    LABA1C 5.6 01/27/2025 05:19 AM       FLP:    Lab Results   Component Value Date/Time    TRIG 71 04/04/2025 05:35 AM    HDL 44 04/04/2025 05:35 AM       TSH:    Lab Results   Component Value Date/Time    TSH 4.670 02/03/2025 07:16 AM         Assessment:  Unstable angina  Hx of CAD/CABG x 3, multiple PCI  ICMP s/p ICD 15-20%  Chronic systolic CHF  HTN    Plan:  Plan cath on Monday after eliquis washout.     Cont heparin and nitro gtts.   Cont lipitor, plavix, toprol, entresto, ranexa.     Patient and Practitioner mutually agreed upon goal:   Patient and provider goals: Feel better and have more energy    LHC on Monday. NPO Sunday night after midnight. Orders will be placed to unhold.      Electronically signed by Connor Mortimer, PA-C on 4/5/2025 at 9:14 AM            
325) 325 (65 Fe) MG tablet Take 1 tablet by mouth every other day 9/6/24   Samples, LACY Rodriguez     Additional information:                 Georgi Bee MD MD FACC  Electronically signed 4/7/2025 at 8:47 AM

## 2025-04-08 NOTE — TELEPHONE ENCOUNTER
From Geri's progress note:  Start repatha - sent message to office to initiate process     Rx printed and out for signature along with supporting documentation to be faxed to [facility].

## 2025-04-09 ENCOUNTER — CARE COORDINATION (OUTPATIENT)
Dept: CARE COORDINATION | Age: 70
End: 2025-04-09

## 2025-04-09 NOTE — CARE COORDINATION
Care Transitions Note    Initial Call - Call within 2 business days of discharge: Yes    Patient Current Location:  Home: [address] [place] [id number]    Care Transition Nurse contacted the  friend Maddison  by telephone to perform post hospital discharge assessment, verified name and  as identifiers.  Provided introduction to self, and explanation of the Care Transition Nurse role.    Patient: Chriss Braga    Patient : 1955   MRN: [id number]    Reason for Admission: Dizziness  Discharge Date: 25  RURS: Readmission Risk Score: 28.9      Last Discharge Facility       Date Complaint Diagnosis Description Type Department Provider    4/3/25 Dizziness Dizziness ... ED to Hosp-Admission (Discharged) (ADMITTED) [facility] Lucita [facility], APRN - CNP; K...            Was this an external facility discharge? No    Additional needs identified to be addressed with provider   No needs identified             Method of communication with provider: none.    Patients top risk factors for readmission: functional physical ability, lack of knowledge about disease, medical condition-Unstable angina, PAF, Acute on chronic systolic HF, Primary HTN, HLD, hx of CVA, hx of seizure disorder, CAD s/p PCI, polypharmacy, and support system    Interventions to address risk factors:   Education: discharge instructions, s/s of infection, taking medications as prescribed  Home Health: [facility]     Care Summary Note: Attempted to contact pt for initial care transition follow up.  Unable to reach pt.  Left message with contact information and request for call back.      Contacted pt's friend Maddison (on HIPAA).  Maddison states she lives across the street from pt.  States her brother is living with pt.  She informed CTN that someone either herself or her brother is with pt.  She states pt is moving very slow.  States he is forgetful.  Could not remember is card number which is unlike pt.  She states pt did not have any further c/o

## 2025-04-10 ENCOUNTER — CARE COORDINATION (OUTPATIENT)
Dept: CARE COORDINATION | Age: 70
End: 2025-04-10

## 2025-04-10 DIAGNOSIS — E55.9 VITAMIN D DEFICIENCY: ICD-10-CM

## 2025-04-10 RX ORDER — ERGOCALCIFEROL 1.25 MG/1
50000 CAPSULE, LIQUID FILLED ORAL WEEKLY
Qty: 4 CAPSULE | Refills: 1 | Status: SHIPPED | OUTPATIENT
Start: 2025-04-10

## 2025-04-10 NOTE — CARE COORDINATION
Care Transitions Note    Follow Up Call     Attempted to reach patient, friend  for transitions of care follow up.  Unable to reach patient, friend .      Outreach Attempts:   HIPAA compliant voicemail left for patient.     Care Summary Note: Attempted to contact pt for care transition follow up.  Unable to reach pt.  Left message with contact information and request for call back.  Also attempted to call pt's home number listed which is his friend Maddison's number (on HIPAA).  Unable to reach her.  Phone line rang multiple times with no answer.      Follow Up Appointment:   Future Appointments         Provider Specialty Dept Phone    4/15/2025 2:20 PM Zcaarias Salazar MD Internal Medicine [phone]    4/16/2025 8:30 AM Kamilah Chu APRN - CNP Cardiology [phone]    4/21/2025 9:30 AM Jean Carlos Smith PA-C Cardiology [phone]    7/14/2025 11:00 AM Kamilah Chu APRN - CNP Cardiology [phone]    7/14/2025 11:30 AM SCHEDULE, [facility] PACER NURSE Cardiology [phone]            Plan for follow-up call in 2-5 days based on severity of symptoms and risk factors. Plan for next call:  symptom management-chest pain, SOB, s/s of infection at right groin site, dizziness/lightheadedness, HF s/s, mental status, mobility, any new or worsening symptoms, ACP?    medication management-review medication list if able      Kayla Alva RN

## 2025-04-11 ENCOUNTER — CARE COORDINATION (OUTPATIENT)
Dept: CARE COORDINATION | Age: 70
End: 2025-04-11

## 2025-04-11 NOTE — CARE COORDINATION
Care Transitions Note    Follow Up Call     Attempted to reach patient, friend  for transitions of care follow up.  Unable to reach patient, friend .      Outreach Attempts:   HIPAA compliant voicemail left for patient, friend.     Care Summary Note: 2nd attempt to contact pt for care transition follow up.  Unable to reach pt.  Left message with contact information and request for call back.  Also attempted to contact pt's friend/neighbor Maddison (on HIPAA).  She states home health made a visit yesterday and today.  She verified pt's contact number and it is incorrect in our system.  She informed CTN that his phone number is [phone].  CTN will contact pt at this number.  She states she will let him know that CTN called.      Attempted to call pt at the number that Maddison gave to CTN.  Unable to reach pt.  Left message with contact information and request for call back.  Will make one more attempt.      Follow Up Appointment:   Future Appointments         Provider Specialty Dept Phone    4/15/2025 2:20 PM Zacarias Salazar MD Internal Medicine [phone]    4/16/2025 8:30 AM Kamilah Chu APRN - CNP Cardiology [phone]    4/21/2025 9:30 AM Jean Carlos Smith PA-C Cardiology [phone]    7/14/2025 11:00 AM Kamilah Chu APRN - CNP Cardiology [phone]    7/14/2025 11:30 AM SCHEDULE, [facility] PACER NURSE Cardiology [phone]            Plan for follow-up call in 2-5 days based on severity of symptoms and risk factors. Plan for next call:  symptom management-chest pain, SOB, s/s of infection at right groin site, dizziness/lightheadedness, HF s/s, mental status, mobility, any new or worsening symptoms, ACP?    medication management-review medication list if able      Kayla Alva RN

## 2025-04-14 ENCOUNTER — TELEPHONE (OUTPATIENT)
Dept: CARDIOLOGY CLINIC | Age: 70
End: 2025-04-14

## 2025-04-14 NOTE — TELEPHONE ENCOUNTER
PA started through covermymeds for carlraul Braga (Pagan: [id number])  PA Case ID #: [id number]  Need Help? Call us at [phone]  Status  Sent to Plan today  Drug  Repatha SureClick 140MG/ML auto-injectors    Form  [employer] Medicare Part D Electronic Prior Authorization Form (2017 [place]CPDP)

## 2025-04-14 NOTE — TELEPHONE ENCOUNTER
Chriss Braga (Key: [id number])  PA Case ID #: [id number]  Need Help? Call us at [phone]  Outcome  Approved today by [employer] 2017 NCPDP  Request Reference Number: [id number]. REPATHA SURE INJ 140MG/ML is approved through 10/14/2025. Your patient may now fill this prescription and it will be covered.  Effective Date: 4/14/2025  Authorization Expiration Date: 10/14/2025  Drug  Repatha SureClick 140MG/ML auto-injectors    Form  [employer] Medicare Part D Electronic Prior Authorization Form (2017 NCPDP)

## 2025-04-15 ENCOUNTER — OFFICE VISIT (OUTPATIENT)
Dept: INTERNAL MEDICINE CLINIC | Age: 70
End: 2025-04-15

## 2025-04-15 VITALS
DIASTOLIC BLOOD PRESSURE: 80 MMHG | WEIGHT: 167.2 LBS | TEMPERATURE: 97.9 F | BODY MASS INDEX: 24.76 KG/M2 | HEART RATE: 74 BPM | HEIGHT: 69 IN | SYSTOLIC BLOOD PRESSURE: 130 MMHG

## 2025-04-15 DIAGNOSIS — I48.0 PAROXYSMAL ATRIAL FIBRILLATION (HCC): ICD-10-CM

## 2025-04-15 DIAGNOSIS — Z09 HOSPITAL DISCHARGE FOLLOW-UP: Primary | ICD-10-CM

## 2025-04-15 DIAGNOSIS — E23.7 PITUITARY GLAND DISORDER: ICD-10-CM

## 2025-04-15 DIAGNOSIS — Z86.73 HISTORY OF CVA (CEREBROVASCULAR ACCIDENT): ICD-10-CM

## 2025-04-15 DIAGNOSIS — I25.119 CORONARY ARTERY DISEASE INVOLVING NATIVE CORONARY ARTERY OF NATIVE HEART WITH ANGINA PECTORIS: ICD-10-CM

## 2025-04-15 DIAGNOSIS — K21.9 GASTROESOPHAGEAL REFLUX DISEASE WITHOUT ESOPHAGITIS: ICD-10-CM

## 2025-04-15 RX ORDER — ISOSORBIDE MONONITRATE 60 MG/1
60 TABLET, EXTENDED RELEASE ORAL DAILY
Qty: 90 TABLET | Refills: 1 | Status: SHIPPED | OUTPATIENT
Start: 2025-04-15

## 2025-04-15 RX ORDER — PANTOPRAZOLE SODIUM 40 MG/1
40 TABLET, DELAYED RELEASE ORAL
Qty: 90 TABLET | Refills: 1 | Status: SHIPPED | OUTPATIENT
Start: 2025-04-15 | End: 2025-04-16

## 2025-04-15 RX ORDER — ATORVASTATIN CALCIUM 80 MG/1
80 TABLET, FILM COATED ORAL DAILY
Qty: 90 TABLET | Refills: 1 | Status: SHIPPED | OUTPATIENT
Start: 2025-04-15

## 2025-04-15 NOTE — PROGRESS NOTES
Post-Discharge Transitional Care  Follow Up      Chriss Braga   YOB: 1955    Date of Office Visit:  4/15/2025  Date of Hospital Admission: 4/3/25  Date of Hospital Discharge: 4/8/25  Risk of hospital readmission (high >=14%. Medium >=10%) :Readmission Risk Score: 28.9      Care management risk score Rising risk (score 2-5) and Complex Care (Scores >=6): No Risk Score On File     Non face to face  following discharge, date last encounter closed (first attempt may have been earlier): 04/09/2025    Call initiated 2 business days of discharge: Yes    ASSESSMENT/PLAN:   Hospital discharge follow-up  -     NV DISCHARGE MEDS RECONCILED W/ CURRENT OUTPATIENT MED LIST  Paroxysmal atrial fibrillation (HCC)  -     apixaban (ELIQUIS) 5 MG TABS tablet; Take 1 tablet by mouth 2 times daily, Disp-180 tablet, R-1Normal  Gastroesophageal reflux disease without esophagitis  -     pantoprazole (PROTONIX) 40 MG tablet; Take 1 tablet by mouth every morning (before breakfast), Disp-90 tablet, R-1Normal  Coronary artery disease involving native coronary artery of native heart with angina pectoris  -     isosorbide mononitrate (IMDUR) 60 MG extended release tablet; Take 1 tablet by mouth daily, Disp-90 tablet, R-1Normal  -     atorvastatin (LIPITOR) 80 MG tablet; Take 1 tablet by mouth daily, Disp-90 tablet, R-1Normal  History of CVA (cerebrovascular accident)  -     atorvastatin (LIPITOR) 80 MG tablet; Take 1 tablet by mouth daily, Disp-90 tablet, R-1Normal  Pituitary gland disorder  -     MRI BRAIN W WO CONTRAST; Future      Medical Decision Making: low complexity  Return in 1 month (on 5/15/2025).           Subjective:   HPI:  Follow up of Hospital problems/diagnosis(es):   Unstable angina status post PCI to distal RCA with LESTER x 1 on 4/7/2025   PAF with probable secondary hypercoagulable state  Acute on chronic systolic heart failure/ischemic cardiomyopathy possibly secondary  2 runs of nonsustained V. tach  Primary

## 2025-04-16 ENCOUNTER — OFFICE VISIT (OUTPATIENT)
Dept: CARDIOLOGY CLINIC | Age: 70
End: 2025-04-16
Payer: MEDICARE

## 2025-04-16 ENCOUNTER — TELEPHONE (OUTPATIENT)
Dept: INTERNAL MEDICINE CLINIC | Age: 70
End: 2025-04-16

## 2025-04-16 ENCOUNTER — CARE COORDINATION (OUTPATIENT)
Dept: CARE COORDINATION | Age: 70
End: 2025-04-16

## 2025-04-16 VITALS
SYSTOLIC BLOOD PRESSURE: 146 MMHG | HEIGHT: 69 IN | DIASTOLIC BLOOD PRESSURE: 92 MMHG | RESPIRATION RATE: 18 BRPM | HEART RATE: 83 BPM | BODY MASS INDEX: 24.44 KG/M2 | OXYGEN SATURATION: 98 % | WEIGHT: 165 LBS

## 2025-04-16 DIAGNOSIS — I25.5 ISCHEMIC CARDIOMYOPATHY: ICD-10-CM

## 2025-04-16 DIAGNOSIS — I50.22 CHF NYHA CLASS II, CHRONIC, SYSTOLIC (HCC): Primary | ICD-10-CM

## 2025-04-16 DIAGNOSIS — I25.10 ATHEROSCLEROSIS OF NATIVE CORONARY ARTERY OF NATIVE HEART WITHOUT ANGINA PECTORIS: ICD-10-CM

## 2025-04-16 DIAGNOSIS — I10 ESSENTIAL HYPERTENSION: ICD-10-CM

## 2025-04-16 DIAGNOSIS — I48.0 PAROXYSMAL ATRIAL FIBRILLATION (HCC): ICD-10-CM

## 2025-04-16 PROCEDURE — 1123F ACP DISCUSS/DSCN MKR DOCD: CPT | Performed by: NURSE PRACTITIONER

## 2025-04-16 PROCEDURE — 1159F MED LIST DOCD IN RCRD: CPT | Performed by: NURSE PRACTITIONER

## 2025-04-16 PROCEDURE — 3077F SYST BP >= 140 MM HG: CPT | Performed by: NURSE PRACTITIONER

## 2025-04-16 PROCEDURE — G8420 CALC BMI NORM PARAMETERS: HCPCS | Performed by: NURSE PRACTITIONER

## 2025-04-16 PROCEDURE — 99214 OFFICE O/P EST MOD 30 MIN: CPT | Performed by: NURSE PRACTITIONER

## 2025-04-16 PROCEDURE — 1036F TOBACCO NON-USER: CPT | Performed by: NURSE PRACTITIONER

## 2025-04-16 PROCEDURE — 1160F RVW MEDS BY RX/DR IN RCRD: CPT | Performed by: NURSE PRACTITIONER

## 2025-04-16 PROCEDURE — 3080F DIAST BP >= 90 MM HG: CPT | Performed by: NURSE PRACTITIONER

## 2025-04-16 PROCEDURE — G8427 DOCREV CUR MEDS BY ELIG CLIN: HCPCS | Performed by: NURSE PRACTITIONER

## 2025-04-16 PROCEDURE — 3017F COLORECTAL CA SCREEN DOC REV: CPT | Performed by: NURSE PRACTITIONER

## 2025-04-16 PROCEDURE — 1111F DSCHRG MED/CURRENT MED MERGE: CPT | Performed by: NURSE PRACTITIONER

## 2025-04-16 ASSESSMENT — ENCOUNTER SYMPTOMS
ABDOMINAL DISTENTION: 0
SHORTNESS OF BREATH: 1
COUGH: 0

## 2025-04-16 NOTE — PROGRESS NOTES
Heart Failure Clinic       Visit Date: 4/16/2025  Cardiologist:  Dr. Jaime   Primary Care Physician: Dr. Newman-Waterhouse, Samaria LONGORIA DO  Referred by: Jean Carlos    Chriss Braga is a 69 y.o. male who presents today for:  Chief Complaint   Patient presents with   • Congestive Heart Failure       HPI:   Chriss Braga is a 69 y.o. male who presents to the office for a patient visit in the heart failure clinic.  Accompanied by sister  Lives w/ brother    TYPE HF: HFrEF 15-20%   Cause: NICM  Valves:  mild AI, mild TR  Device: Dual ICD (2013) - hx shock per pt  HX:  CAD - PCI (1/2024), CABG (3V, 2011), Afib (Eliquis), Hx CVA (left sided weakness, 2021), Seizure disorder, Pituitary Adenoma, Polysubstance (Marijuana, opiates), Nicotine abuse (quit 12/2024), memory issues    Dry Wt:  180#    Hospitalization:  Several times past 2 mths      Needs to see Device today - not been checked since 2018  3/2025: 160#  BP elevated today 180/102 - recheck 152/98  Smells of marijuana - smokes QOD  PCP manages seizures- suppose be seeing [facility] per sister  Admits poor compliance w/ meds - has been taking consistently for past month  Had not been taking for years.   More memory issues since stroke  [facility] following = per pt BP been high.  Doing some therapy.   Walked from [facility] - little winded.     TODAY 4/2025 - hospital f/u   Went in for dizziness, CP - cath done w/ PCI RCA x2  Lasix added.   165#  No fluid on exam.   Breathing good - walked from entrance   does meds - doing better at compliance.   BP little high today = hasn't taken meds yet.  States at home around 140.  Groin check today - post PCI, looks good.        Past Medical History:   Diagnosis Date   • Abnormal chest CT 02/05/2024   • Accident due to mechanical fall without injury 01/27/2025   • Acute cerebrovascular accident (CVA) due to ischemia (HCC) 09/10/2021   • Anemia     Microcytic anemia.    • Angina    • Arthritis    • Atypical chest pain

## 2025-04-16 NOTE — PATIENT INSTRUCTIONS
You may receive a survey regarding the care you received during your visit.  Your input is valuable to us.  We encourage you to complete and return your survey.  We hope you will choose us in the future for your healthcare needs.    Your nurses today were Magdalene Weeks and Madelyn.  Office hours:   Mon-Thurs 8-4:30  Friday 8-12  Phone: [phone]    Continue:  Continue current medications  Daily weights and record  Fluid restriction of 2 Liters per day  Limit sodium in diet to around 7251-0572 mg/day  Monitor BP    Call the Heart Failure Clinic for any of the following symptoms:   Weight gain of 3 pounds in 1 day or 5 pounds in 1 week  Increased shortness of breath  Shortness of breath while laying down  Chest pain  Swelling in feet, ankles or legs  Bloating in abdomen  Fatigue

## 2025-04-16 NOTE — TELEPHONE ENCOUNTER
Jaylene from [facility] called. You ordered an MRI for this patient. He has a pacemaker that is not MRI compatible. Please advise

## 2025-04-16 NOTE — CARE COORDINATION
Care Transitions Note    Follow Up Call     Patient Current Location:  Home: [address] [facility] [address]    Care Transition Nurse contacted the patient by telephone. Verified name and  as identifiers.    Additional needs identified to be addressed with provider   No needs identified                 Method of communication with provider: none.    Care Summary Note: Contacted pt for care transition follow up.  Chriss states he is doing okay.  Denies having any further c/o chest pain/discomfort.  Reports that he may get short of breath at times but usually when he is up moving.  He is up and walking around.  Sometimes he will use his cane and other times not.  Denies having any dizziness/lightheadedness, swelling.  Reviewed s/s of HF and when to contact provider.  Pt reports that his nurse sets up his medications and checks his BP.  States his doctor reviewed his medications already.  Not interested in RPM and benefits.   Pt had appt with CHF clinic today.  Pt prescribed Jardiance.  Briefly discussed s/s and when to contact providers.  No questions, concerns or needs at this time.  He is agreeable to follow up calls.      Plan of care updates since last contact:  Education: s/s of HF       Advance Care Planning:   Does patient have an Advance Directive: patient declined education. He states he may be interested later but not right now    Medication Review:  Medications changed since last call, reviewed today.     Remote Patient Monitoring:  Offered patient enrollment in the Remote Patient Monitoring (RPM) program for in-home monitoring: Yes, but did not enroll at this time: declined to enroll in the program because-not interested.   .    Assessments:  Care Transitions Subsequent and Final Call    Subsequent and Final Calls  Do you have any ongoing symptoms?: Yes  Patient-reported symptoms: Shortness of Breath  Have your medications changed?: No  Do you currently have any active services?: Yes  Are you currently

## 2025-04-22 ENCOUNTER — CARE COORDINATION (OUTPATIENT)
Dept: CARE COORDINATION | Age: 70
End: 2025-04-22

## 2025-04-29 ENCOUNTER — OFFICE VISIT (OUTPATIENT)
Dept: CARDIOLOGY CLINIC | Age: 70
End: 2025-04-29
Payer: MEDICARE

## 2025-04-29 VITALS
HEIGHT: 69 IN | BODY MASS INDEX: 23.85 KG/M2 | DIASTOLIC BLOOD PRESSURE: 103 MMHG | SYSTOLIC BLOOD PRESSURE: 168 MMHG | WEIGHT: 161 LBS | HEART RATE: 64 BPM

## 2025-04-29 DIAGNOSIS — Z95.1 S/P CABG (CORONARY ARTERY BYPASS GRAFT): ICD-10-CM

## 2025-04-29 DIAGNOSIS — Z95.810 ICD (IMPLANTABLE CARDIOVERTER-DEFIBRILLATOR) IN PLACE: ICD-10-CM

## 2025-04-29 DIAGNOSIS — I48.0 PAROXYSMAL ATRIAL FIBRILLATION (HCC): ICD-10-CM

## 2025-04-29 DIAGNOSIS — Z95.820 S/P ANGIOPLASTY WITH STENT: ICD-10-CM

## 2025-04-29 DIAGNOSIS — I10 ESSENTIAL HYPERTENSION: ICD-10-CM

## 2025-04-29 DIAGNOSIS — I50.20 HFREF (HEART FAILURE WITH REDUCED EJECTION FRACTION) (HCC): Primary | ICD-10-CM

## 2025-04-29 PROCEDURE — 1123F ACP DISCUSS/DSCN MKR DOCD: CPT | Performed by: PHYSICIAN ASSISTANT

## 2025-04-29 PROCEDURE — 1036F TOBACCO NON-USER: CPT | Performed by: PHYSICIAN ASSISTANT

## 2025-04-29 PROCEDURE — 3080F DIAST BP >= 90 MM HG: CPT | Performed by: PHYSICIAN ASSISTANT

## 2025-04-29 PROCEDURE — G8420 CALC BMI NORM PARAMETERS: HCPCS | Performed by: PHYSICIAN ASSISTANT

## 2025-04-29 PROCEDURE — 99214 OFFICE O/P EST MOD 30 MIN: CPT | Performed by: PHYSICIAN ASSISTANT

## 2025-04-29 PROCEDURE — 1111F DSCHRG MED/CURRENT MED MERGE: CPT | Performed by: PHYSICIAN ASSISTANT

## 2025-04-29 PROCEDURE — 3017F COLORECTAL CA SCREEN DOC REV: CPT | Performed by: PHYSICIAN ASSISTANT

## 2025-04-29 PROCEDURE — 1159F MED LIST DOCD IN RCRD: CPT | Performed by: PHYSICIAN ASSISTANT

## 2025-04-29 PROCEDURE — 3077F SYST BP >= 140 MM HG: CPT | Performed by: PHYSICIAN ASSISTANT

## 2025-04-29 PROCEDURE — G8427 DOCREV CUR MEDS BY ELIG CLIN: HCPCS | Performed by: PHYSICIAN ASSISTANT

## 2025-04-29 RX ORDER — SPIRONOLACTONE 25 MG/1
25 TABLET ORAL DAILY
Qty: 90 TABLET | Refills: 3 | Status: SHIPPED | OUTPATIENT
Start: 2025-04-29

## 2025-04-29 NOTE — PROGRESS NOTES
----------------------------------------------------------------      Findings      Mitral Valve   The mitral valve structure was normal with normal leaflet separation.   DOPPLER: The transmitral velocity was within the normal range with no   evidence for mitral stenosis. There was no evidence of mitral   regurgitation.      Aortic Valve   Trivial aortic regurgitation is noted.      Tricuspid Valve   Mild-to-moderate tricuspid regurgitation.      Pulmonic Valve   The pulmonic valve leaflets exhibited normal thickness, no calcification,   and normal cuspal separation. DOPPLER: The transpulmonic velocity was   within the normal range with no evidence for regurgitation.      Left Atrium   Mildly dilated left atrium.   bubble contrast study with no obvious shunting      Left Ventricle   Ejection fraction is visually estimated at 45%.   There was mild global hypokinesis of the left ventricle.      Right Atrium   Right atrial size was normal.      Right Ventricle   The right ventricular size was normal with normal systolic function and   wall thickness.      Pericardial Effusion   The pericardium was normal in appearance with no evidence of a pericardial   effusion.      Pleural Effusion   No evidence of pleural effusion.      Aorta / Great Vessels   -Aortic root dimension within normal limits.   -The Pulmonary artery is within normal limits.   -IVC size is within normal limits with normal respiratory phasic changes.     M-Mode/2D Measurements & Calculations      LV Diastolic   LV Systolic Dimension:    AV Cusp Separation: 2.1 cmLA   Dimension: 5.8 4.2 cm                    Dimension: 3.5 cmAO Root   cm             LV Volume Diastolic: 167  Dimension: 3.2 cmLA Area: 25.7   LV FS:27.6 %   ml                        cm^2   LV PW          LV Volume Systolic: 78.6   Diastolic: 1   ml   cm             LV EDV/LV EDV Index: 167   Septum         ml/90 m^2LV ESV/LV ESV    RV Diastolic Dimension: 2.7 cm   Diastolic: 1.1 Index: 78.6

## 2025-04-29 NOTE — PATIENT INSTRUCTIONS
You may receive a survey regarding the care you received during your visit. We encourage you to complete and return your survey, as your input is valuable to us. We hope you will choose us in the future for your healthcare needs. Thank you!    Your Medical Assistant today: Elsie Montgomery  Thank you for coming to our office! It was a pleasure to serve you.

## 2025-04-30 ENCOUNTER — CARE COORDINATION (OUTPATIENT)
Dept: CARE COORDINATION | Age: 70
End: 2025-04-30

## 2025-04-30 LAB
ANION GAP SERPL CALCULATED.3IONS-SCNC: 14 MMOL/L (ref 7–16)
BUN BLDV-MCNC: 24 MG/DL (ref 8–23)
CALCIUM SERPL-MCNC: 9.3 MG/DL (ref 8.6–10.5)
CHLORIDE BLD-SCNC: 105 MMOL/L (ref 96–107)
CO2: 21 MMOL/L (ref 18–32)
CREAT SERPL-MCNC: 1.46 MG/DL (ref 0.67–1.3)
EGFR IF NONAFRICAN AMERICAN: 52 ML/MIN/1.73M2
GLUCOSE: 86 MG/DL (ref 65–125)
POTASSIUM SERPL-SCNC: 4.5 MMOL/L (ref 3.5–5.4)
SODIUM BLD-SCNC: 140 MMOL/L (ref 135–148)

## 2025-04-30 NOTE — CARE COORDINATION
Care Transitions Note    Follow Up Call     Attempted to reach patient for transitions of care follow up.  Unable to reach patient.      Outreach Attempts:   HIPAA compliant voicemail left for patient.     Care Summary Note: Attempted to contact pt for care transition follow up.  Unable to reach pt.  Left message with contact information and request for call back.      Follow Up Appointment:   Future Appointments         Provider Specialty Dept Phone    5/15/2025 2:30 PM Newman-Waterhouse, Aundrea N,  Internal Medicine [phone]    7/14/2025 11:00 AM Kamilah Chu, APRN - CNP Cardiology [phone]    7/14/2025 11:30 AM SCHEDULE, SRPX PACER NURSE Cardiology [phone]    10/29/2025 1:30 PM Jean Carlos Smith PA-C Cardiology [phone]    4/30/2026 1:15 PM Anand Lisa MD Cardiology [phone]            Plan for follow-up call in 2-5 days based on severity of symptoms and risk factors. Plan for next call:  symptom management-chest pain, sob, dizziness, swelling, fatigue  self management-BP wt.  follow-up appointment-Cardio 4/29/25->view  medication management-did he  Jardiance and start?      Kayla Alva RN

## 2025-05-01 ENCOUNTER — RESULTS FOLLOW-UP (OUTPATIENT)
Dept: CARDIOLOGY CLINIC | Age: 70
End: 2025-05-01

## 2025-05-01 ENCOUNTER — CARE COORDINATION (OUTPATIENT)
Dept: CARE COORDINATION | Age: 70
End: 2025-05-01

## 2025-05-01 RX ORDER — FUROSEMIDE 20 MG/1
20 TABLET ORAL DAILY PRN
Qty: 30 TABLET | Refills: 1 | Status: SHIPPED | OUTPATIENT
Start: 2025-05-01

## 2025-05-01 NOTE — CARE COORDINATION
Care Transitions Note    Follow Up Call     Patient Current Location:  Home: [address] [facility] [address]    Care Transition Nurse contacted the  friend Maddison  by telephone. Verified name and  as identifiers.    Additional needs identified to be addressed with provider   No needs identified                 Method of communication with provider: none.    Care Summary Note: Contacted pt for care transition follow up.  Unable to reach pt therefore spoke with pt's friend Maddison (on HIPAA).  She is currently at work.  She states that Chriss seems to be doing okay.  She states that he does not really get up and move.  Rarely goes outside.  Still weak but has not worsened.  She reports home health care continues to make visits.  Her brother Yobany lives with pt and told her that he will work with therapy but will not do the exercises on his own that he was given.  Maddison tries to encourage pt to get up and move but he will not.  Pt had a cardiology appt on Tuesday and she was unable to take him but her brother was able to drive him.  Informed her that pt was previously prescribed Jardiance which as of 25 he did not .  Also cardiology prescribed the Spironolactone.  She is unsure of whether they were picked up.   She will pick them up for him.  She requests CTN call her brother Yobany Rockwell who lives with pt.  His number is [phone].  CTN will try to call this number.      Attempted to contact Yobany Rockwell, pt's roommate.  Unable to reach him.  Phone line rang multiple times.  CTN did not leave a message d/t pt not being on HIPAA or listed as EC.      Contacted [facility] on [address]  Spoke with [employer].  She states they still have the Spironolactone and the Jardiance at the pharmacy.  She states the Jardiance shows it would cost $4.80.     Plan of care updates since last contact:  Review of patient management of conditions/medications: importance of picking up Jardiance and

## 2025-05-07 ENCOUNTER — CARE COORDINATION (OUTPATIENT)
Dept: CARE COORDINATION | Age: 70
End: 2025-05-07

## 2025-05-07 NOTE — CARE COORDINATION
Care Transitions Note    Final Call     Patient Current Location:  Home: [address]  [facility] [address]    Care Transition Nurse contacted the patient by telephone. Verified name and  as identifiers.    Patient graduated from the Care Transitions program on 25.  Patient/family reinforced resources provided during this care transition period..      Advance Care Planning:   Does patient have an Advance Directive: reviewed during previous call, see note.  Declined education    Handoff:   Condition management for Chronic HF, PAF, CAD, Primary HTN.     Care Summary Note: Contacted pt for care transition follow up.  Spoke briefly with pt who only responds with mainly one words responses \"yup or nope\".  Denies having any c/o chest pain/discomfort, dizziness/lightheadedness, swelling.  Reports weakness and decreased energy still present.  Pt up and moving around.  He has a walker and a cane.  When asking about weight and BP, he states \"they are alright\".  Could not give any specific readings.  Pt states home health making visit.  Will follow up with [facility].  Pt had follow up with PCP provider, CHF Clinic and cardiology.  Pt was prescribed Jardiance, Spironolactone and now Furosemide as of 25.  Pt has not picked up any of the medications.  Discussed importance of picking up medications and taking them as prescribed.  He informed CTN that he plans to  the medications today.  He confirmed he has transportation to get those medications.  Discussed s/s and when to contact providers.  No questions or needs at this time.  Final call.     Contacted [facility].  Spoke with Gina.  She informed [facility] that per note in system, pt was going to continue with another home health care.      Reviewed chart.  Contacted [facility] to verify whether pt is active with services through them.  Spoke with Rebeka who states pt is not current with their agency.      Attempted to contact pt's friend Maddison (on HIPAA) to

## 2025-05-12 ENCOUNTER — CARE COORDINATION (OUTPATIENT)
Dept: CARE COORDINATION | Age: 70
End: 2025-05-12

## 2025-05-12 ENCOUNTER — TELEPHONE (OUTPATIENT)
Dept: CARDIOLOGY CLINIC | Age: 70
End: 2025-05-12

## 2025-05-12 NOTE — CARE COORDINATION
Ambulatory Care Coordination  RPM Note     5/12/2025 1:47 PM     Patient Current Location:  [place]  [facility] [address]    CCSS received request from M Elana Pierre to assist patient in contacting insurance company for verification of remote patient monitoring coverage.  If patient is agreeable to information provided by insurance company, patient will be enrolled in remote patient monitoring for CHF; condition managed by CHF clinic.  HTN; condition managed by PCP.      Insurance verification complete.  Patient is responsible for a one time payment of $40.  Will notify [facility] at this time.     Reference #: [id number]

## 2025-05-12 NOTE — TELEPHONE ENCOUNTER
[facility] Pablo THOMPSON on nurse line.  They have been trying to reach patient to arrange shipment of Repatha.  Unable to contact patient.    Called primary number in chart-straight to voicemail.  LM for patient to return call.   Called the home number-pts sister answered.   She states the best number to reach patient at is the [phone], which is the first number where message was left.

## 2025-05-12 NOTE — CARE COORDINATION
Ambulatory Care Coordination Note     2025 10:34 AM     Patient Current Location:  Home: [address] [facility] [address]     This patient was received as a referral from Ambulatory Care Manager .    ACM contacted the patient by telephone. Verified name and  with patient as identifiers. Provided introduction to self, and explanation of the ACM role.   Patient accepted care management services at this time.          ACM: Elana Pierre RN     Challenges to be reviewed by the provider   Additional needs identified to be addressed with provider No  none               Method of communication with provider: none.    Utilization: Initial Call - N/A    Care Summary Note: Chriss was referred to care coordination by CTN following recent hospitalization.  Pt has h/o: CHF, CAD, HTN, PAF, CVA, seizures.   Spoke with Chriss today.  Pt reports that he is doing ok.   Reports having a fall over the last couple of wks when he was walking across street with cane.  Pt reports losing his balance.  Denies injury with fall.  Did not hit his head.  Was able to get himself up w/out issue.  No falls since.  Encouraged to always have assistive device with him at all times.   CHF: following with HF clinic.  Has next appt in July.  Has scales.  Reports daily wt monitoring.  Pt recently started on Aldactone.  Was also prescribed Jardiance and PRN furosemide.  Was not able to get those 2 meds at pharmacy.  I spoke with Noelle at [facility] on [address]  D/t pt recently switching ins is the reason why they were not filled.  She ran both through new ins. Both are covered.  They will fill meds today.  Updated provided to pt.   Pt denies edema to ext's or SOB.    Pt reports that he is following 2 gm  low sodium diet and fluid restriction.   Seizures: pt reports that he follows with neuro locally. No appts noted.  On Keppra.    Pt on Eliquis.    Lives with brother.    Not current with  at this time.  Pt states that he is in need of lift chair.

## 2025-05-13 NOTE — TELEPHONE ENCOUNTER
Spoke to patient, he states he was unaware of any missed calls from [facility] [employer].  Provided him with their phone number, [phone].  He states he will reach out the them.  Routing this encounter to [facility] [employer] as an FYI.

## 2025-05-14 ENCOUNTER — TELEPHONE (OUTPATIENT)
Dept: CARDIOLOGY CLINIC | Age: 70
End: 2025-05-14

## 2025-05-19 ENCOUNTER — CARE COORDINATION (OUTPATIENT)
Dept: CARE COORDINATION | Age: 70
End: 2025-05-19

## 2025-05-21 ENCOUNTER — CARE COORDINATION (OUTPATIENT)
Dept: CARE COORDINATION | Age: 70
End: 2025-05-21

## 2025-05-21 NOTE — CARE COORDINATION
Ambulatory Care Coordination Note     2025 1:53 PM     Patient Current Location:  Home: [address] [place] [id number]     ACM contacted the patient by telephone. Verified name and  with patient as identifiers.         ACM: Elana Pierre RN     Challenges to be reviewed by the provider   Additional needs identified to be addressed with provider No  none               Method of communication with provider: none.    Utilization: Patient has not had any utilization since our last call.     Care Summary Note:  Chriss was referred to care coordination by CTN following recent hospitalization.  Pt has h/o: CHF, CAD, HTN, PAF, CVA, seizures.   Spoke with Chriss today.  Pt reports that he is doing ok.   CHF: pt monitoring daily wts.  Reports wts have remained stable.    Denies edema, worsening SOB, or bloating.   Pt has not obtained Lasix or Jardiance at this time.  Pt states that he will obtain today.   Pt no showed to PCP appt on 5/15.  Appt rescheduled for  at 140pm.   Pt has been in contact with [facility] re: Repatha.  Provided pt with their number and advised him to call.   Heart Failure Education outreach Date/Time: 2025 1:53 PM    Ambulatory Care Manager (ACM) contacted the patient by telephone to perform Ambulatory Care Coordination. Verified name and  with patient as identifiers. Provided introduction to self, and explanation of the Ambulatory Care Manager's role.     ACM reviewed that a Heart Healthy tips for the Summer packet has been mailed to the them. ACM reviewed CHF zones, daily weights, fluid restriction, the importance of low sodium diet, and healthy tips packet with the patient. Instructed patient to call their Cardiologist if they have a weight gain of 3 lbs in 2 days or 5 lbs in a week.     Patient reminded that there is a physician on call 24 hours a day / 7 days a week should the patient have questions or concerns. The patient verbalized understanding.    Offered patient

## 2025-05-30 ENCOUNTER — CARE COORDINATION (OUTPATIENT)
Dept: CARE COORDINATION | Age: 70
End: 2025-05-30

## 2025-05-30 NOTE — CARE COORDINATION
Ambulatory Care Coordination Note     2025 4:14 PM     Patient Current Location:  [place]     ACM contacted the patient by telephone. Verified name and  with patient as identifiers.         ACM: Elana Pierre RN     Challenges to be reviewed by the provider   Additional needs identified to be addressed with provider No  none               Method of communication with provider: none.    Utilization: Patient has not had any utilization since our last call.     Care Summary Note:  Chriss was referred to care coordination by CTN following recent hospitalization.  Pt has h/o: CHF, CAD, HTN, PAF, CVA, seizures   Spoke with Chriss briefly today for f/u.    Pt no showed to PCP appt again.  Pt states he must have been tired that day.  Pt states that he will call Monday to reschedule.  Stressed importance of making it to his f/u appts.   Pt has not got in touch with [facility] beds at the [facility] regarding Repatha.  Provided pt with their contact number again.  Advised him to call.    Pt not home at time of call and requesting call back next wk.   Offered patient enrollment in the Remote Patient Monitoring (RPM) program for in-home monitoring: Yes, but did not enroll at this time: declined to enroll in the program becausecost .     Assessments Completed:   No changes since last call    Medications Reviewed:   Pt not home at time of call    Advance Care Planning:   Not reviewed during this call     Care Planning:   Not completed during this call    PCP/Specialist follow up:   Future Appointments         Provider Specialty Dept Phone    2025 11:00 AM Kamilah Chu, CHEN - CNP Cardiology [phone]    2025 11:30 AM SCHEDULE, SRPMARC PACER NURSE Cardiology [phone]    10/29/2025 1:30 PM Jean Carlos Smith PA-C Cardiology [phone]    2026 1:15 PM Anand Lisa MD Cardiology [phone]            Follow Up:   Plan for next AC outreach in approximately 1 week to complete:  - CC Protocol assessments  - disease

## 2025-06-06 ENCOUNTER — CARE COORDINATION (OUTPATIENT)
Dept: CARE COORDINATION | Age: 70
End: 2025-06-06

## 2025-06-06 NOTE — CARE COORDINATION
Attempted to reach patient for continued Care Coordination follow up and education.  Patient was unavailable at the time of my call, and a generic voicemail message was left asking patient to return my call at [phone].

## 2025-06-09 ENCOUNTER — CARE COORDINATION (OUTPATIENT)
Dept: CARE COORDINATION | Age: 70
End: 2025-06-09

## 2025-06-09 ENCOUNTER — APPOINTMENT (OUTPATIENT)
Dept: GENERAL RADIOLOGY | Age: 70
DRG: 313 | End: 2025-06-09
Payer: MEDICARE

## 2025-06-09 ENCOUNTER — HOSPITAL ENCOUNTER (INPATIENT)
Age: 70
LOS: 1 days | Discharge: HOME | DRG: 313 | End: 2025-06-12
Attending: EMERGENCY MEDICINE | Admitting: NURSE PRACTITIONER
Payer: MEDICARE

## 2025-06-09 DIAGNOSIS — R07.9 CHEST PAIN, UNSPECIFIED TYPE: Primary | ICD-10-CM

## 2025-06-09 PROBLEM — H53.8 BLURRING OF VISUAL IMAGE: Status: RESOLVED | Noted: 2023-08-01 | Resolved: 2025-06-09

## 2025-06-09 PROBLEM — J20.8 ACUTE BRONCHITIS DUE TO OTHER SPECIFIED ORGANISMS: Status: RESOLVED | Noted: 2024-02-09 | Resolved: 2025-06-09

## 2025-06-09 LAB
ANION GAP SERPL CALC-SCNC: 10 MEQ/L (ref 8–16)
APTT PPP: 37.4 SECONDS (ref 22–38)
BASOPHILS ABSOLUTE: 0 THOU/MM3 (ref 0–0.1)
BASOPHILS NFR BLD AUTO: 0.1 %
BUN SERPL-MCNC: 17 MG/DL (ref 8–23)
CALCIUM SERPL-MCNC: 9.6 MG/DL (ref 8.8–10.2)
CHLORIDE SERPL-SCNC: 105 MEQ/L (ref 98–111)
CO2 SERPL-SCNC: 24 MEQ/L (ref 22–29)
CREAT SERPL-MCNC: 1.4 MG/DL (ref 0.7–1.2)
DEPRECATED RDW RBC AUTO: 38.8 FL (ref 35–45)
EKG ATRIAL RATE: 65 BPM
EKG P AXIS: 77 DEGREES
EKG P-R INTERVAL: 172 MS
EKG Q-T INTERVAL: 420 MS
EKG QRS DURATION: 88 MS
EKG QTC CALCULATION (BAZETT): 436 MS
EKG R AXIS: 23 DEGREES
EKG T AXIS: 21 DEGREES
EKG VENTRICULAR RATE: 65 BPM
EOSINOPHIL NFR BLD AUTO: 1.3 %
EOSINOPHILS ABSOLUTE: 0.1 THOU/MM3 (ref 0–0.4)
ERYTHROCYTE [DISTWIDTH] IN BLOOD BY AUTOMATED COUNT: 16.8 % (ref 11.5–14.5)
GFR SERPL CREATININE-BSD FRML MDRD: 54 ML/MIN/1.73M2
GLUCOSE SERPL-MCNC: 95 MG/DL (ref 74–109)
HCT VFR BLD AUTO: 40.1 % (ref 42–52)
HEPARIN UNFRACTIONATED: 0.2 U/ML (ref 0.3–0.7)
HGB BLD-MCNC: 12.5 GM/DL (ref 14–18)
IMM GRANULOCYTES # BLD AUTO: 0.04 THOU/MM3 (ref 0–0.07)
IMM GRANULOCYTES NFR BLD AUTO: 0.6 %
INR PPP: 1.15 (ref 0.85–1.13)
LYMPHOCYTES ABSOLUTE: 1.5 THOU/MM3 (ref 1–4.8)
LYMPHOCYTES NFR BLD AUTO: 20.7 %
MCH RBC QN AUTO: 21.7 PG (ref 26–33)
MCHC RBC AUTO-ENTMCNC: 31.2 GM/DL (ref 32.2–35.5)
MCV RBC AUTO: 69.5 FL (ref 80–94)
MICROCYTES BLD QL SMEAR: PRESENT
MONOCYTES ABSOLUTE: 0.5 THOU/MM3 (ref 0.4–1.3)
MONOCYTES NFR BLD AUTO: 6.5 %
NEUTROPHILS ABSOLUTE: 5.1 THOU/MM3 (ref 1.8–7.7)
NEUTROPHILS NFR BLD AUTO: 70.8 %
NRBC BLD AUTO-RTO: 0 /100 WBC
NT-PROBNP SERPL IA-MCNC: 1701 PG/ML (ref 0–124)
OSMOLALITY SERPL CALC.SUM OF ELEC: 278.9 MOSMOL/KG (ref 275–300)
PLATELET # BLD AUTO: 220 THOU/MM3 (ref 130–400)
PLATELET BLD QL SMEAR: ADEQUATE
PMV BLD AUTO: 9.6 FL (ref 9.4–12.4)
POIKILOCYTES: ABNORMAL
POTASSIUM SERPL-SCNC: 4.2 MEQ/L (ref 3.5–5.2)
PROTHROMBIN TIME: 13.1 SECONDS (ref 10–13.5)
RBC # BLD AUTO: 5.77 MILL/MM3 (ref 4.7–6.1)
REASON FOR REJECTION: NORMAL
REJECTED TEST: NORMAL
SCAN OF BLOOD SMEAR: NORMAL
SODIUM SERPL-SCNC: 139 MEQ/L (ref 135–145)
TROPONIN, HIGH SENSITIVITY: 10 NG/L (ref 0–12)
TROPONIN, HIGH SENSITIVITY: 7 NG/L (ref 0–12)
TROPONIN, HIGH SENSITIVITY: 8 NG/L (ref 0–12)
WBC # BLD AUTO: 7.2 THOU/MM3 (ref 4.8–10.8)

## 2025-06-09 PROCEDURE — 2500000003 HC RX 250 WO HCPCS

## 2025-06-09 PROCEDURE — 80048 BASIC METABOLIC PNL TOTAL CA: CPT

## 2025-06-09 PROCEDURE — 96365 THER/PROPH/DIAG IV INF INIT: CPT

## 2025-06-09 PROCEDURE — 85025 COMPLETE CBC W/AUTO DIFF WBC: CPT

## 2025-06-09 PROCEDURE — 93010 ELECTROCARDIOGRAM REPORT: CPT | Performed by: INTERNAL MEDICINE

## 2025-06-09 PROCEDURE — G0378 HOSPITAL OBSERVATION PER HR: HCPCS

## 2025-06-09 PROCEDURE — 36415 COLL VENOUS BLD VENIPUNCTURE: CPT

## 2025-06-09 PROCEDURE — 84484 ASSAY OF TROPONIN QUANT: CPT

## 2025-06-09 PROCEDURE — 85730 THROMBOPLASTIN TIME PARTIAL: CPT

## 2025-06-09 PROCEDURE — 71045 X-RAY EXAM CHEST 1 VIEW: CPT

## 2025-06-09 PROCEDURE — 99285 EMERGENCY DEPT VISIT HI MDM: CPT

## 2025-06-09 PROCEDURE — 85520 HEPARIN ASSAY: CPT

## 2025-06-09 PROCEDURE — 99223 1ST HOSP IP/OBS HIGH 75: CPT

## 2025-06-09 PROCEDURE — 85610 PROTHROMBIN TIME: CPT

## 2025-06-09 PROCEDURE — 96374 THER/PROPH/DIAG INJ IV PUSH: CPT

## 2025-06-09 PROCEDURE — 83880 ASSAY OF NATRIURETIC PEPTIDE: CPT

## 2025-06-09 PROCEDURE — 93005 ELECTROCARDIOGRAM TRACING: CPT | Performed by: EMERGENCY MEDICINE

## 2025-06-09 PROCEDURE — 96366 THER/PROPH/DIAG IV INF ADDON: CPT

## 2025-06-09 PROCEDURE — 6360000002 HC RX W HCPCS

## 2025-06-09 RX ORDER — SODIUM CHLORIDE 9 MG/ML
INJECTION, SOLUTION INTRAVENOUS PRN
Status: DISCONTINUED | OUTPATIENT
Start: 2025-06-09 | End: 2025-06-12 | Stop reason: HOSPADM

## 2025-06-09 RX ORDER — ACETAMINOPHEN 650 MG/1
650 SUPPOSITORY RECTAL EVERY 6 HOURS PRN
Status: DISCONTINUED | OUTPATIENT
Start: 2025-06-09 | End: 2025-06-12 | Stop reason: HOSPADM

## 2025-06-09 RX ORDER — SODIUM CHLORIDE 9 MG/ML
INJECTION, SOLUTION INTRAVENOUS CONTINUOUS
Status: ACTIVE | OUTPATIENT
Start: 2025-06-09 | End: 2025-06-10

## 2025-06-09 RX ORDER — MAGNESIUM SULFATE IN WATER 40 MG/ML
2000 INJECTION, SOLUTION INTRAVENOUS PRN
Status: DISCONTINUED | OUTPATIENT
Start: 2025-06-09 | End: 2025-06-12 | Stop reason: HOSPADM

## 2025-06-09 RX ORDER — HEPARIN SODIUM 1000 [USP'U]/ML
4000 INJECTION, SOLUTION INTRAVENOUS; SUBCUTANEOUS PRN
Status: DISCONTINUED | OUTPATIENT
Start: 2025-06-09 | End: 2025-06-12

## 2025-06-09 RX ORDER — HEPARIN SODIUM 1000 [USP'U]/ML
2000 INJECTION, SOLUTION INTRAVENOUS; SUBCUTANEOUS PRN
Status: DISCONTINUED | OUTPATIENT
Start: 2025-06-09 | End: 2025-06-12

## 2025-06-09 RX ORDER — SODIUM CHLORIDE 0.9 % (FLUSH) 0.9 %
5-40 SYRINGE (ML) INJECTION PRN
Status: DISCONTINUED | OUTPATIENT
Start: 2025-06-09 | End: 2025-06-12 | Stop reason: HOSPADM

## 2025-06-09 RX ORDER — POTASSIUM CHLORIDE 7.45 MG/ML
10 INJECTION INTRAVENOUS PRN
Status: DISCONTINUED | OUTPATIENT
Start: 2025-06-09 | End: 2025-06-12 | Stop reason: HOSPADM

## 2025-06-09 RX ORDER — ACETAMINOPHEN 325 MG/1
650 TABLET ORAL EVERY 6 HOURS PRN
Status: DISCONTINUED | OUTPATIENT
Start: 2025-06-09 | End: 2025-06-12 | Stop reason: HOSPADM

## 2025-06-09 RX ORDER — ONDANSETRON 4 MG/1
4 TABLET, ORALLY DISINTEGRATING ORAL EVERY 8 HOURS PRN
Status: DISCONTINUED | OUTPATIENT
Start: 2025-06-09 | End: 2025-06-12 | Stop reason: HOSPADM

## 2025-06-09 RX ORDER — HEPARIN SODIUM 10000 [USP'U]/100ML
5-30 INJECTION, SOLUTION INTRAVENOUS CONTINUOUS
Status: DISCONTINUED | OUTPATIENT
Start: 2025-06-09 | End: 2025-06-12

## 2025-06-09 RX ORDER — ONDANSETRON 2 MG/ML
4 INJECTION INTRAMUSCULAR; INTRAVENOUS EVERY 6 HOURS PRN
Status: DISCONTINUED | OUTPATIENT
Start: 2025-06-09 | End: 2025-06-12 | Stop reason: HOSPADM

## 2025-06-09 RX ORDER — SODIUM CHLORIDE 0.9 % (FLUSH) 0.9 %
5-40 SYRINGE (ML) INJECTION EVERY 12 HOURS SCHEDULED
Status: DISCONTINUED | OUTPATIENT
Start: 2025-06-09 | End: 2025-06-12 | Stop reason: HOSPADM

## 2025-06-09 RX ORDER — NITROGLYCERIN 0.4 MG/1
0.4 TABLET SUBLINGUAL EVERY 5 MIN PRN
Status: COMPLETED | OUTPATIENT
Start: 2025-06-09 | End: 2025-06-10

## 2025-06-09 RX ORDER — POLYETHYLENE GLYCOL 3350 17 G/17G
17 POWDER, FOR SOLUTION ORAL DAILY PRN
Status: DISCONTINUED | OUTPATIENT
Start: 2025-06-09 | End: 2025-06-12 | Stop reason: HOSPADM

## 2025-06-09 RX ORDER — POTASSIUM CHLORIDE 1500 MG/1
40 TABLET, EXTENDED RELEASE ORAL PRN
Status: DISCONTINUED | OUTPATIENT
Start: 2025-06-09 | End: 2025-06-12 | Stop reason: HOSPADM

## 2025-06-09 RX ORDER — HEPARIN SODIUM 1000 [USP'U]/ML
4000 INJECTION, SOLUTION INTRAVENOUS; SUBCUTANEOUS ONCE
Status: COMPLETED | OUTPATIENT
Start: 2025-06-09 | End: 2025-06-09

## 2025-06-09 RX ADMIN — HEPARIN SODIUM 4000 UNITS: 1000 INJECTION INTRAVENOUS; SUBCUTANEOUS at 19:41

## 2025-06-09 RX ADMIN — HEPARIN SODIUM 12 UNITS/KG/HR: 10000 INJECTION, SOLUTION INTRAVENOUS at 19:42

## 2025-06-09 RX ADMIN — SODIUM CHLORIDE, PRESERVATIVE FREE 10 ML: 5 INJECTION INTRAVENOUS at 21:48

## 2025-06-09 ASSESSMENT — PAIN DESCRIPTION - LOCATION: LOCATION: CHEST

## 2025-06-09 ASSESSMENT — ENCOUNTER SYMPTOMS
ABDOMINAL PAIN: 0
VOMITING: 0
DIARRHEA: 0
COLOR CHANGE: 0
NAUSEA: 0
SORE THROAT: 0
BACK PAIN: 1
RHINORRHEA: 0
SINUS PAIN: 0
CONSTIPATION: 0
SINUS PRESSURE: 0
COUGH: 0
SHORTNESS OF BREATH: 1
WHEEZING: 1

## 2025-06-09 ASSESSMENT — PAIN SCALES - GENERAL
PAINLEVEL_OUTOF10: 8
PAINLEVEL_OUTOF10: 8

## 2025-06-09 ASSESSMENT — PAIN - FUNCTIONAL ASSESSMENT: PAIN_FUNCTIONAL_ASSESSMENT: 0-10

## 2025-06-09 ASSESSMENT — PAIN DESCRIPTION - ORIENTATION: ORIENTATION: LEFT

## 2025-06-09 NOTE — ED NOTES
Pt resting in bed. Pt rates chest pain 8/10 at this time. Vitals assessed. RR easy and unlabored. Report from Devon JO. Assumed care at this time.

## 2025-06-09 NOTE — ED NOTES
Patient lying in bed with blankets watching tv at this time. Patient respirations are regular and unlabored. Patient appears to be in no current distress. Patient VSS. Call light is within reach. Patient expresses no needs at this time.

## 2025-06-09 NOTE — ED NOTES
Patient resting in cot at this time. Patient states that chest pain is still at an 8/10. Respirations even and unlabored. VSS. Call light within reach.
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
## 2025-06-09 NOTE — ED NOTES
This RN to the bedside for rounding. Patient remains watching tv waiting for admission to IP floor. Patient denies needs. Patient VSS. Respirations are easy and unlabored. No acute distress noted. Call light within reach.

## 2025-06-09 NOTE — ED TRIAGE NOTES
Pt to the ED via EMS. Patient presents with complaints of chest pain. Patient states that pain is constant and started one hour ago. Pt currently rates pain 8/10. EMS states they provided 324 ASA and 2 nitro en route to facility. Pt expresses slight improvement with nitro. EKG done. Patient is alert and oriented x 4. Respirations are regular and unlabored. Call light within reach.

## 2025-06-09 NOTE — ED NOTES
Patient resting in cot at this time. Patient states chest pain is still at an 8/10 and requested something for pain. Respirations even and unlabored. No further needs addressed at this time. Call light within reach.

## 2025-06-09 NOTE — ED NOTES
Pt transported to [facility] in stable condition. Spoke to the floor prior to arrival.

## 2025-06-09 NOTE — ED NOTES
Patient resting in cot at this time. Patient states chest pain remains unchanged and rates pain 8/10. Respirations even and unlabored. Call light within reach.

## 2025-06-09 NOTE — ED NOTES
This RN to the bedside for rounding. Patient updated on labs at this time and verbalizes understanding. Patient VSS. Respirations are easy and unlabored. Patient appears to be in no current distress at this time. Call light within reach.

## 2025-06-09 NOTE — ED PROVIDER NOTES
Orthostatic hypotension, Polysubstance abuse (HCC), Precordial pain, Pulmonary nodule, Shortness of breath, Syncopal episodes, Thromboembolism of vein, Tiredness, Vasovagal near-syncope, and Ventricular hypertrophy.    SURGICAL HISTORY      has a past surgical history that includes Appendectomy; Pelvic fracture surgery; Coronary artery bypass graft (1 16 2011); Tonsillectomy and adenoidectomy (childhood.); Cardiac defibrillator placement (5 24 2011); transthoracic echocardiogram (5 23 2011); transthoracic echocardiogram (2 08 2011); transthoracic echocardiogram (1 05 2011); Diagnostic Cardiac Cath Lab Procedure (1 05 2011); Cardiac defibrillator placement (5/24/11); Cervical spine surgery (1/2012); Cardiac catheterization (8/2012); vascular surgery; fracture surgery; back surgery; Cardiac procedure (N/A, 1/17/2024); Cardiac procedure (N/A, 1/17/2024); bronchoscopy (N/A, 2/5/2024); Cardiac procedure (N/A, 4/7/2025); and Cardiac procedure (N/A, 4/7/2025).    CURRENT MEDICATIONS       Previous Medications    APIXABAN (ELIQUIS) 5 MG TABS TABLET    Take 1 tablet by mouth 2 times daily    ATORVASTATIN (LIPITOR) 80 MG TABLET    Take 1 tablet by mouth daily    CLOPIDOGREL (PLAVIX) 75 MG TABLET    Take 1 tablet by mouth daily    EMPAGLIFLOZIN (JARDIANCE) 10 MG TABLET    Take 1 tablet by mouth daily    EVOLOCUMAB (REPATHA SURECLICK) 140 MG/ML SOAJ    Inject 140 mg into the skin every 14 days    FERROUS SULFATE (IRON 325) 325 (65 FE) MG TABLET    Take 1 tablet by mouth every other day    FUROSEMIDE (LASIX) 20 MG TABLET    Take 1 tablet by mouth daily as needed (for increased swelling or 3#/day wt gain)    ISOSORBIDE MONONITRATE (IMDUR) 60 MG EXTENDED RELEASE TABLET    Take 1 tablet by mouth daily    LEVETIRACETAM (KEPPRA) 500 MG TABLET    Take 1 tablet by mouth 2 times daily    METOPROLOL SUCCINATE (TOPROL XL) 50 MG EXTENDED RELEASE TABLET    Take 1 tablet by mouth 2 times daily    NITROGLYCERIN (NITROSTAT) 0.4 MG SL TABLET     of this note were completed with a voice recognition program.  Efforts were made to edit the dictations but occasionally words are mis-transcribed.)    DO Juwan Busby Seth, DO  06/09/25 1600

## 2025-06-10 LAB
ANION GAP SERPL CALC-SCNC: 12 MEQ/L (ref 8–16)
APTT PPP: 36.8 SECONDS (ref 22–38)
APTT PPP: 53.9 SECONDS (ref 22–38)
APTT PPP: 71.1 SECONDS (ref 22–38)
APTT PPP: 96.1 SECONDS (ref 22–38)
BUN SERPL-MCNC: 14 MG/DL (ref 8–23)
CALCIUM SERPL-MCNC: 9.3 MG/DL (ref 8.8–10.2)
CHLORIDE SERPL-SCNC: 107 MEQ/L (ref 98–111)
CO2 SERPL-SCNC: 21 MEQ/L (ref 22–29)
CREAT SERPL-MCNC: 1 MG/DL (ref 0.7–1.2)
DEPRECATED RDW RBC AUTO: 38.5 FL (ref 35–45)
EKG ATRIAL RATE: 64 BPM
EKG P AXIS: 66 DEGREES
EKG P-R INTERVAL: 178 MS
EKG Q-T INTERVAL: 466 MS
EKG QRS DURATION: 98 MS
EKG QTC CALCULATION (BAZETT): 480 MS
EKG R AXIS: 50 DEGREES
EKG T AXIS: 25 DEGREES
EKG VENTRICULAR RATE: 64 BPM
ERYTHROCYTE [DISTWIDTH] IN BLOOD BY AUTOMATED COUNT: 17.3 % (ref 11.5–14.5)
GFR SERPL CREATININE-BSD FRML MDRD: 82 ML/MIN/1.73M2
GLUCOSE SERPL-MCNC: 106 MG/DL (ref 74–109)
HCT VFR BLD AUTO: 43.7 % (ref 42–52)
HGB BLD-MCNC: 13.8 GM/DL (ref 14–18)
MCH RBC QN AUTO: 21.9 PG (ref 26–33)
MCHC RBC AUTO-ENTMCNC: 31.6 GM/DL (ref 32.2–35.5)
MCV RBC AUTO: 69.3 FL (ref 80–94)
PLATELET # BLD AUTO: 227 THOU/MM3 (ref 130–400)
PMV BLD AUTO: 9.8 FL (ref 9.4–12.4)
POTASSIUM SERPL-SCNC: 4.1 MEQ/L (ref 3.5–5.2)
RBC # BLD AUTO: 6.31 MILL/MM3 (ref 4.7–6.1)
SODIUM SERPL-SCNC: 140 MEQ/L (ref 135–145)
WBC # BLD AUTO: 8.2 THOU/MM3 (ref 4.8–10.8)

## 2025-06-10 PROCEDURE — 93005 ELECTROCARDIOGRAM TRACING: CPT

## 2025-06-10 PROCEDURE — 96366 THER/PROPH/DIAG IV INF ADDON: CPT

## 2025-06-10 PROCEDURE — 36415 COLL VENOUS BLD VENIPUNCTURE: CPT

## 2025-06-10 PROCEDURE — G0378 HOSPITAL OBSERVATION PER HR: HCPCS

## 2025-06-10 PROCEDURE — 6370000000 HC RX 637 (ALT 250 FOR IP)

## 2025-06-10 PROCEDURE — 96375 TX/PRO/DX INJ NEW DRUG ADDON: CPT

## 2025-06-10 PROCEDURE — 85730 THROMBOPLASTIN TIME PARTIAL: CPT

## 2025-06-10 PROCEDURE — 93010 ELECTROCARDIOGRAM REPORT: CPT | Performed by: INTERNAL MEDICINE

## 2025-06-10 PROCEDURE — 6360000002 HC RX W HCPCS

## 2025-06-10 PROCEDURE — 99232 SBSQ HOSP IP/OBS MODERATE 35: CPT | Performed by: PHYSICIAN ASSISTANT

## 2025-06-10 PROCEDURE — 96376 TX/PRO/DX INJ SAME DRUG ADON: CPT

## 2025-06-10 PROCEDURE — 99223 1ST HOSP IP/OBS HIGH 75: CPT | Performed by: INTERNAL MEDICINE

## 2025-06-10 PROCEDURE — 85027 COMPLETE CBC AUTOMATED: CPT

## 2025-06-10 PROCEDURE — 2500000003 HC RX 250 WO HCPCS

## 2025-06-10 PROCEDURE — 6360000002 HC RX W HCPCS: Performed by: PHYSICIAN ASSISTANT

## 2025-06-10 PROCEDURE — 80048 BASIC METABOLIC PNL TOTAL CA: CPT

## 2025-06-10 RX ORDER — SPIRONOLACTONE 25 MG/1
25 TABLET ORAL DAILY
Status: DISCONTINUED | OUTPATIENT
Start: 2025-06-10 | End: 2025-06-12 | Stop reason: HOSPADM

## 2025-06-10 RX ORDER — ERGOCALCIFEROL 1.25 MG/1
50000 CAPSULE, LIQUID FILLED ORAL WEEKLY
Status: DISCONTINUED | OUTPATIENT
Start: 2025-06-16 | End: 2025-06-12 | Stop reason: HOSPADM

## 2025-06-10 RX ORDER — RANOLAZINE 500 MG/1
1000 TABLET, EXTENDED RELEASE ORAL DAILY
Status: DISCONTINUED | OUTPATIENT
Start: 2025-06-10 | End: 2025-06-12 | Stop reason: HOSPADM

## 2025-06-10 RX ORDER — MORPHINE SULFATE 2 MG/ML
2 INJECTION, SOLUTION INTRAMUSCULAR; INTRAVENOUS
Status: DISCONTINUED | OUTPATIENT
Start: 2025-06-10 | End: 2025-06-11

## 2025-06-10 RX ORDER — METOPROLOL SUCCINATE 50 MG/1
50 TABLET, EXTENDED RELEASE ORAL 2 TIMES DAILY
Status: DISCONTINUED | OUTPATIENT
Start: 2025-06-10 | End: 2025-06-12 | Stop reason: HOSPADM

## 2025-06-10 RX ORDER — LIDOCAINE 4 G/G
1 PATCH TOPICAL DAILY
Status: DISCONTINUED | OUTPATIENT
Start: 2025-06-10 | End: 2025-06-12 | Stop reason: HOSPADM

## 2025-06-10 RX ORDER — ISOSORBIDE MONONITRATE 60 MG/1
60 TABLET, EXTENDED RELEASE ORAL DAILY
Status: DISCONTINUED | OUTPATIENT
Start: 2025-06-10 | End: 2025-06-12 | Stop reason: HOSPADM

## 2025-06-10 RX ORDER — ATORVASTATIN CALCIUM 80 MG/1
80 TABLET, FILM COATED ORAL NIGHTLY
Status: DISCONTINUED | OUTPATIENT
Start: 2025-06-10 | End: 2025-06-12 | Stop reason: HOSPADM

## 2025-06-10 RX ORDER — LEVETIRACETAM 500 MG/1
500 TABLET ORAL 2 TIMES DAILY
Status: DISCONTINUED | OUTPATIENT
Start: 2025-06-10 | End: 2025-06-12 | Stop reason: HOSPADM

## 2025-06-10 RX ORDER — MORPHINE SULFATE 2 MG/ML
1 INJECTION, SOLUTION INTRAMUSCULAR; INTRAVENOUS
Status: DISCONTINUED | OUTPATIENT
Start: 2025-06-10 | End: 2025-06-11

## 2025-06-10 RX ORDER — CLOPIDOGREL BISULFATE 75 MG/1
75 TABLET ORAL DAILY
Status: DISCONTINUED | OUTPATIENT
Start: 2025-06-10 | End: 2025-06-12 | Stop reason: HOSPADM

## 2025-06-10 RX ADMIN — CLOPIDOGREL BISULFATE 75 MG: 75 TABLET, FILM COATED ORAL at 09:13

## 2025-06-10 RX ADMIN — ATORVASTATIN CALCIUM 80 MG: 80 TABLET, FILM COATED ORAL at 00:47

## 2025-06-10 RX ADMIN — ATORVASTATIN CALCIUM 80 MG: 80 TABLET, FILM COATED ORAL at 21:41

## 2025-06-10 RX ADMIN — METOPROLOL SUCCINATE 50 MG: 50 TABLET, EXTENDED RELEASE ORAL at 21:41

## 2025-06-10 RX ADMIN — METOPROLOL SUCCINATE 50 MG: 50 TABLET, EXTENDED RELEASE ORAL at 00:47

## 2025-06-10 RX ADMIN — LEVETIRACETAM 500 MG: 500 TABLET, FILM COATED ORAL at 09:13

## 2025-06-10 RX ADMIN — MORPHINE SULFATE 2 MG: 2 INJECTION, SOLUTION INTRAMUSCULAR; INTRAVENOUS at 11:29

## 2025-06-10 RX ADMIN — SACUBITRIL AND VALSARTAN 1 TABLET: 49; 51 TABLET, FILM COATED ORAL at 00:47

## 2025-06-10 RX ADMIN — RANOLAZINE 1000 MG: 500 TABLET, EXTENDED RELEASE ORAL at 09:12

## 2025-06-10 RX ADMIN — MORPHINE SULFATE 2 MG: 2 INJECTION, SOLUTION INTRAMUSCULAR; INTRAVENOUS at 18:24

## 2025-06-10 RX ADMIN — SACUBITRIL AND VALSARTAN 1 TABLET: 49; 51 TABLET, FILM COATED ORAL at 21:41

## 2025-06-10 RX ADMIN — SODIUM CHLORIDE, PRESERVATIVE FREE 10 ML: 5 INJECTION INTRAVENOUS at 21:53

## 2025-06-10 RX ADMIN — MORPHINE SULFATE 2 MG: 2 INJECTION, SOLUTION INTRAMUSCULAR; INTRAVENOUS at 16:07

## 2025-06-10 RX ADMIN — HEPARIN SODIUM 2000 UNITS: 1000 INJECTION INTRAVENOUS; SUBCUTANEOUS at 23:51

## 2025-06-10 RX ADMIN — NITROGLYCERIN 0.4 MG: 0.4 TABLET, ORALLY DISINTEGRATING SUBLINGUAL at 01:06

## 2025-06-10 RX ADMIN — LEVETIRACETAM 500 MG: 500 TABLET, FILM COATED ORAL at 00:47

## 2025-06-10 RX ADMIN — MORPHINE SULFATE 2 MG: 2 INJECTION, SOLUTION INTRAMUSCULAR; INTRAVENOUS at 09:13

## 2025-06-10 RX ADMIN — NITROGLYCERIN 0.4 MG: 0.4 TABLET, ORALLY DISINTEGRATING SUBLINGUAL at 01:13

## 2025-06-10 RX ADMIN — MORPHINE SULFATE 2 MG: 2 INJECTION, SOLUTION INTRAMUSCULAR; INTRAVENOUS at 23:53

## 2025-06-10 RX ADMIN — MORPHINE SULFATE 2 MG: 2 INJECTION, SOLUTION INTRAMUSCULAR; INTRAVENOUS at 21:43

## 2025-06-10 RX ADMIN — HEPARIN SODIUM 10 UNITS/KG/HR: 10000 INJECTION, SOLUTION INTRAVENOUS at 02:08

## 2025-06-10 RX ADMIN — MORPHINE SULFATE 2 MG: 2 INJECTION, SOLUTION INTRAMUSCULAR; INTRAVENOUS at 02:33

## 2025-06-10 RX ADMIN — LEVETIRACETAM 500 MG: 500 TABLET, FILM COATED ORAL at 21:41

## 2025-06-10 RX ADMIN — NITROGLYCERIN 0.4 MG: 0.4 TABLET, ORALLY DISINTEGRATING SUBLINGUAL at 01:21

## 2025-06-10 RX ADMIN — SPIRONOLACTONE 25 MG: 25 TABLET ORAL at 09:13

## 2025-06-10 RX ADMIN — MORPHINE SULFATE 2 MG: 2 INJECTION, SOLUTION INTRAMUSCULAR; INTRAVENOUS at 13:41

## 2025-06-10 RX ADMIN — EMPAGLIFLOZIN 10 MG: 10 TABLET, FILM COATED ORAL at 09:13

## 2025-06-10 ASSESSMENT — PAIN SCALES - GENERAL
PAINLEVEL_OUTOF10: 3
PAINLEVEL_OUTOF10: 4
PAINLEVEL_OUTOF10: 7
PAINLEVEL_OUTOF10: 8
PAINLEVEL_OUTOF10: 8
PAINLEVEL_OUTOF10: 3
PAINLEVEL_OUTOF10: 8
PAINLEVEL_OUTOF10: 9
PAINLEVEL_OUTOF10: 7
PAINLEVEL_OUTOF10: 8
PAINLEVEL_OUTOF10: 8
PAINLEVEL_OUTOF10: 7
PAINLEVEL_OUTOF10: 8
PAINLEVEL_OUTOF10: 7

## 2025-06-10 ASSESSMENT — PAIN DESCRIPTION - DESCRIPTORS: DESCRIPTORS: PRESSURE

## 2025-06-10 ASSESSMENT — PAIN DESCRIPTION - ORIENTATION: ORIENTATION: MID

## 2025-06-10 ASSESSMENT — PAIN SCALES - WONG BAKER
WONGBAKER_NUMERICALRESPONSE: HURTS WHOLE LOT
WONGBAKER_NUMERICALRESPONSE: NO HURT
WONGBAKER_NUMERICALRESPONSE: NO HURT
WONGBAKER_NUMERICALRESPONSE: HURTS WHOLE LOT
WONGBAKER_NUMERICALRESPONSE: HURTS WHOLE LOT

## 2025-06-10 ASSESSMENT — PAIN DESCRIPTION - LOCATION
LOCATION: CHEST

## 2025-06-10 NOTE — PLAN OF CARE
Problem: Chronic Conditions and Co-morbidities  Goal: Patient's chronic conditions and co-morbidity symptoms are monitored and maintained or improved  Outcome: Progressing  Flowsheets (Taken 6/10/2025 1643)  Care Plan - Patient's Chronic Conditions and Co-Morbidity Symptoms are Monitored and Maintained or Improved:   Collaborate with multidisciplinary team to address chronic and comorbid conditions and prevent exacerbation or deterioration   Update acute care plan with appropriate goals if chronic or comorbid symptoms are exacerbated and prevent overall improvement and discharge   Monitor and assess patient's chronic conditions and comorbid symptoms for stability, deterioration, or improvement  Note: Patient remains stable at this time.      Problem: Discharge Planning  Goal: Discharge to home or other facility with appropriate resources  Outcome: Progressing  Flowsheets (Taken 6/10/2025 1644)  Discharge to home or other facility with appropriate resources:   Identify barriers to discharge with patient and caregiver   Arrange for needed discharge resources and transportation as appropriate   Identify discharge learning needs (meds, wound care, etc)   Arrange for interpreters to assist at discharge as needed   Refer to discharge planning if patient needs post-hospital services based on physician order or complex needs related to functional status, cognitive ability or social support system  Note: Patient will discharge home when medically stable  Problem: Pain  Goal: Verbalizes/displays adequate comfort level or baseline comfort level  Flowsheets (Taken 6/10/2025 1644)  Verbalizes/displays adequate comfort level or baseline comfort level: Encourage patient to monitor pain and request assistance  Note: Patient rating pain 8/10 and taking prn morphine q2 as ordered. Pain is improving

## 2025-06-10 NOTE — CONSULTS
ischemic cardiomyopathy: Not in exacerbation  Previous smoker  History of CVA    Plan:  Continuous telemetry monitoring  Continue heparin drip  Schedule for stress test  Replete to maintain K above 4, Mg above 2  Strict I's and O's  Salt and fluid restricted diet    Thank you for allowing us to participate in the care of this patient.  Please do not hesitate to call us with questions.    Time spent reviewing notes, data, discussing with patient/family, and formulating plan with clinical documentation was approximately 80 minutes.     Electronically signed by Kris Harry MD PGY-1 on 6/10/2025 at 12:52 PM      I have seen and examined the patient independently. . Face to face evaluation and examination performed.   The above evaluation and note has been reviewed and Changes made as necessary. Labs, EKG, echo, and radiographs reviewed.   I Have discussed with Kris Harry MD about this patient in detail   D/w Patient's R.N. and specific instructions given and cardiovascular disease issues addressed.   I above assessment and plan has been reviewed and modified per my assessment as necessary and I agree with it.    Chest pain: atypical with localized tenderness on left chest wall and worsen with change of position Troponin neg x2   CAD s/p CABG, PCI.  EKG showed previous anterior infarct but no acute ST or T wave changes.  Serial troponin found to be WNL.  S/p cath 4/2025 and RCA stent  Paroxysmal A-fib: Previously known, home meds include metoprolol and Eliquis.  [id number] or 6.  HFrEF-stable  Ischemic cardiomyopathy:   Previous smoker  History of CVA       Plan:     Cont the current medical therapy  GDMT for CMP, HFeEF, and CAD  Cont Plavix  Cont heparin ( apixaban on hold to be resumed if the stress test turns to be okay)  Александр nuc stress at am    D/w the pat the plan of care  Based on the course and result we will gauge further care   Will follow    Thank you for allowing me to participate in the  care of your patient. Please don't hesitate to contact me regarding any further issues related to the patient care      Tash Corbett MD, MD,FACC   Cardiology - [facility]

## 2025-06-10 NOTE — CARE COORDINATION
Case Management Assessment Initial Evaluation    Date/Time of Evaluation: 6/10/2025 7:55 AM  Assessment Completed by: Sally Pineda    If patient is discharged prior to next notation, then this note serves as note for discharge by case management.    Patient Name: Chriss Braga                   YOB: 1955  Diagnosis: Chest pain [R07.9]  Chest pain, unspecified type [R07.9]                   Date / Time: 2025 11:46 AM  [facility]     Patient Admission Status: Observation   Readmission Risk Low 0-14, Mod 15-19), High > 20: Readmission Risk Score: 28.9    Current PCP: Newman-Waterhouse, Aundrea N,   Health Care Decision Makers:   Primary Decision Maker: Willie Braga - Child - [phone]    Secondary Decision Maker: Teena Arteaga - Niece/Nephew - [phone]    Additional Case Management Notes: Patient presents to the ED with chest pain. Hospitalist and Cardio following. Heparin gtt. Eliquis on hold. Pain Management.     Procedures: none    Imagin/9 CXR: negative    Patient Goals/Plan/Treatment Preferences: Met with Chriss. Patient from home and lives with brothers. Has needed DME. Denies additional services.          06/10/25 1510   Service Assessment   Patient Orientation Alert and Oriented   Cognition Alert   History Provided By Patient   Primary Caregiver Self   Accompanied By/Relationship n/a   Support Systems Friends/Neighbors;Family Members   Patient's Healthcare Decision Maker is: Legal Next of Kin   PCP Verified by CM Yes   Last Visit to PCP Within last 6 months   Prior Functional Level Independent in ADLs/IADLs   Current Functional Level Independent in ADLs/IADLs   Can patient return to prior living arrangement Yes   Ability to make needs known: Good   Family able to assist with home care needs: Yes  (Brother and friends)   Would you like for me to discuss the discharge plan with any other family members/significant others, and if so, who? No   Financial Resources Other  (Comment)  ([facility])   Community Resources None   Discharge Planning   Type of Residence House   Living Arrangements Family Members   Current Services Prior To Admission Durable Medical Equipment   Current DME Prior to Arrival Cane   Potential Assistance Needed N/A   DME Ordered? No   Potential Assistance Purchasing Medications No   Type of Home Care Services None   Patient expects to be discharged to: House   Follow Up Appointment: Best Day/Time    (No preference)   History of falls? 0

## 2025-06-10 NOTE — PROCEDURES
PROCEDURE NOTE  Date: 6/10/2025   Name: Chriss Braga  YOB: 1955    Procedures  12 lead EKG completed. Results handed to Kayla JO.

## 2025-06-10 NOTE — PROGRESS NOTES
Managed with Keppra    Ex-smoker    Dyslipidemia: Managed with atorvastatin    Pituitary adenoma      Hemiparesis of left dominant side      History of cardioembolic cerebrovascular accident (CVA)    History of medication noncompliance        ===================================================================    Chief Complaint: Chest pain    Subjective (past 24 hours): patient states that chest pain is persistent. No improvement with nitro. Points to specific area of chest wall just inferior to AICD.  Tender to touch.    Reports ongoing, stable CEDENO       Medications:    Infusion Medications    sodium chloride      sodium chloride      heparin (PORCINE) Infusion 10 Units/kg/hr (06/10/25 0916)    Scheduled Medications    [Held by provider] apixaban  5 mg Oral BID    atorvastatin  80 mg Oral Nightly    clopidogrel  75 mg Oral Daily    empagliflozin  10 mg Oral Daily    isosorbide mononitrate  60 mg Oral Daily    levETIRAcetam  500 mg Oral BID    metoprolol succinate  50 mg Oral BID    ranolazine  1,000 mg Oral Daily    sacubitril-valsartan  1 tablet Oral BID    spironolactone  25 mg Oral Daily    [START ON 6/16/2025] vitamin D  50,000 Units Oral Weekly    sodium chloride flush  5-40 mL IntraVENous 2 times per day    PRN Meds: morphine **OR** morphine, sodium chloride flush, sodium chloride, potassium chloride **OR** potassium alternative oral replacement **OR** potassium chloride, magnesium sulfate, ondansetron **OR** ondansetron, acetaminophen **OR** acetaminophen, polyethylene glycol, heparin (porcine), heparin (porcine)    Exam:  BP 99/74   Pulse 66   Temp 98 °F (36.7 °C) (Oral)   Resp 16   Wt 68.8 kg (151 lb 9.6 oz)   SpO2 97%   BMI 22.39 kg/m²   General: No distress, appears stated age.  Eyes:  PERRL. Conjunctivae/corneas clear.  HENT: Head normal appearing. Nares normal. Oral mucosa moist.  Hearing intact.   Neck: Supple, with full range of motion. Trachea midline.  No gross JVD appreciated.  Respiratory:   Normal effort. Clear to auscultation, without rales or wheezes or rhonchi.  Cardiovascular: Normal rate, regular rhythm with normal S1/S2 without murmurs.    No lower extremity edema. Chest wall is tender to palpation just inferior to ICD.   No induration, fluctuance, erythema or swelling noted.   Abdomen: Soft, non-tender, non-distended with normal bowel sounds.  Musculoskeletal: No joint swelling or tenderness. Normal tone. No abnormal movements.   Skin: Warm and dry. No rashes or lesions.  Neurologic:  No focal sensory/motor deficits in the upper or lower extremities. Cranial nerves:  grossly non-focal 2-12.     Psychiatric: Alert and oriented, normal insight and thought content.   Capillary Refill: Brisk,< 3 seconds.  Peripheral Pulses: +2 palpable, equal bilaterally.       Labs/Radiology: See chart or assessment above.     Electronically signed by Jennifer Bruce PA-C on 6/10/2025 at 12:47 PM

## 2025-06-11 ENCOUNTER — APPOINTMENT (OUTPATIENT)
Dept: NUCLEAR MEDICINE | Age: 70
DRG: 313 | End: 2025-06-11
Payer: MEDICARE

## 2025-06-11 ENCOUNTER — HOSPITAL ENCOUNTER (OUTPATIENT)
Age: 70
Setting detail: OBSERVATION
Discharge: HOME | End: 2025-06-13
Payer: MEDICARE

## 2025-06-11 LAB
ALBUMIN SERPL BCG-MCNC: 3.9 G/DL (ref 3.4–4.9)
ALP SERPL-CCNC: 86 U/L (ref 40–129)
ALT SERPL W/O P-5'-P-CCNC: 9 U/L (ref 10–50)
APTT PPP: 165.7 SECONDS (ref 22–38)
APTT PPP: 33.6 SECONDS (ref 22–38)
APTT PPP: 43.9 SECONDS (ref 22–38)
APTT PPP: 67.9 SECONDS (ref 22–38)
AST SERPL-CCNC: 17 U/L (ref 10–50)
BILIRUB CONJ SERPL-MCNC: 0.4 MG/DL (ref 0–0.2)
BILIRUB SERPL-MCNC: 1.1 MG/DL (ref 0.3–1.2)
CHOLEST SERPL-MCNC: 143 MG/DL (ref 100–199)
HDLC SERPL-MCNC: 39 MG/DL
LDLC SERPL CALC-MCNC: 85 MG/DL
LIPASE SERPL-CCNC: 14 U/L (ref 13–60)
NUC STRESS EJECTION FRACTION: 22 %
PROT SERPL-MCNC: 7.2 G/DL (ref 6.4–8.3)
REASON FOR REJECTION: NORMAL
REJECTED TEST: NORMAL
STRESS BASELINE DIAS BP: 82 MMHG
STRESS BASELINE HR: 60 BPM
STRESS BASELINE ST DEPRESSION: 0 MM
STRESS BASELINE SYS BP: 100 MMHG
STRESS ESTIMATED WORKLOAD: 1 METS
STRESS PEAK DIAS BP: 94 MMHG
STRESS PEAK SYS BP: 139 MMHG
STRESS PERCENT HR ACHIEVED: 71 %
STRESS POST PEAK HR: 108 BPM
STRESS RATE PRESSURE PRODUCT: NORMAL BPM*MMHG
STRESS ST DEPRESSION: 0 MM
STRESS STAGE 1 BP: NORMAL MMHG
STRESS STAGE 1 DURATION: 1 MIN:SEC
STRESS STAGE 1 HR: 61 BPM
STRESS STAGE 2 BP: NORMAL MMHG
STRESS STAGE 2 DURATION: 1 MIN:SEC
STRESS STAGE 2 HR: 66 BPM
STRESS STAGE 3 BP: NORMAL MMHG
STRESS STAGE 3 COMMENTS: NORMAL
STRESS STAGE 3 DURATION: 1 MIN:SEC
STRESS STAGE 3 HR: 72 BPM
STRESS STAGE RECOVERY 1 BP: NORMAL MMHG
STRESS STAGE RECOVERY 1 DURATION: 1 MIN:SEC
STRESS STAGE RECOVERY 1 HR: 73 BPM
STRESS STAGE RECOVERY 2 BP: NORMAL MMHG
STRESS STAGE RECOVERY 2 DURATION: 1 MIN:SEC
STRESS STAGE RECOVERY 2 HR: 88 BPM
STRESS STAGE RECOVERY 3 BP: NORMAL MMHG
STRESS STAGE RECOVERY 3 DURATION: 1 MIN:SEC
STRESS STAGE RECOVERY 3 HR: 103 BPM
STRESS STAGE RECOVERY 4 BP: NORMAL MMHG
STRESS STAGE RECOVERY 4 COMMENTS: NORMAL
STRESS STAGE RECOVERY 4 DURATION: 2 MIN:SEC
STRESS STAGE RECOVERY 4 HR: 82 BPM
STRESS TARGET HR: 152 BPM
TID: 1.05
TRIGL SERPL-MCNC: 94 MG/DL (ref 0–199)

## 2025-06-11 PROCEDURE — 36415 COLL VENOUS BLD VENIPUNCTURE: CPT

## 2025-06-11 PROCEDURE — 99233 SBSQ HOSP IP/OBS HIGH 50: CPT | Performed by: NURSE PRACTITIONER

## 2025-06-11 PROCEDURE — 78452 HT MUSCLE IMAGE SPECT MULT: CPT | Performed by: INTERNAL MEDICINE

## 2025-06-11 PROCEDURE — 6370000000 HC RX 637 (ALT 250 FOR IP): Performed by: PHYSICIAN ASSISTANT

## 2025-06-11 PROCEDURE — A9500 TC99M SESTAMIBI: HCPCS | Performed by: PHYSICIAN ASSISTANT

## 2025-06-11 PROCEDURE — 6360000002 HC RX W HCPCS: Performed by: PHYSICIAN ASSISTANT

## 2025-06-11 PROCEDURE — 83690 ASSAY OF LIPASE: CPT

## 2025-06-11 PROCEDURE — 93016 CV STRESS TEST SUPVJ ONLY: CPT | Performed by: INTERNAL MEDICINE

## 2025-06-11 PROCEDURE — 96366 THER/PROPH/DIAG IV INF ADDON: CPT

## 2025-06-11 PROCEDURE — 93017 CV STRESS TEST TRACING ONLY: CPT

## 2025-06-11 PROCEDURE — 78452 HT MUSCLE IMAGE SPECT MULT: CPT

## 2025-06-11 PROCEDURE — 85730 THROMBOPLASTIN TIME PARTIAL: CPT

## 2025-06-11 PROCEDURE — 93018 CV STRESS TEST I&R ONLY: CPT | Performed by: INTERNAL MEDICINE

## 2025-06-11 PROCEDURE — 97530 THERAPEUTIC ACTIVITIES: CPT

## 2025-06-11 PROCEDURE — 1200000003 HC TELEMETRY R&B

## 2025-06-11 PROCEDURE — 96376 TX/PRO/DX INJ SAME DRUG ADON: CPT

## 2025-06-11 PROCEDURE — 6360000002 HC RX W HCPCS

## 2025-06-11 PROCEDURE — 80061 LIPID PANEL: CPT

## 2025-06-11 PROCEDURE — 3430000000 HC RX DIAGNOSTIC RADIOPHARMACEUTICAL: Performed by: PHYSICIAN ASSISTANT

## 2025-06-11 PROCEDURE — 6370000000 HC RX 637 (ALT 250 FOR IP)

## 2025-06-11 PROCEDURE — 80076 HEPATIC FUNCTION PANEL: CPT

## 2025-06-11 PROCEDURE — 97162 PT EVAL MOD COMPLEX 30 MIN: CPT

## 2025-06-11 RX ORDER — TETRAKIS(2-METHOXYISOBUTYLISOCYANIDE)COPPER(I) TETRAFLUOROBORATE 1 MG/ML
31 INJECTION, POWDER, LYOPHILIZED, FOR SOLUTION INTRAVENOUS
Status: COMPLETED | OUTPATIENT
Start: 2025-06-11 | End: 2025-06-11

## 2025-06-11 RX ORDER — AMINOPHYLLINE 25 MG/ML
50 INJECTION, SOLUTION INTRAVENOUS PRN
Status: ACTIVE | OUTPATIENT
Start: 2025-06-11 | End: 2025-06-11

## 2025-06-11 RX ORDER — TETRAKIS(2-METHOXYISOBUTYLISOCYANIDE)COPPER(I) TETRAFLUOROBORATE 1 MG/ML
9.6 INJECTION, POWDER, LYOPHILIZED, FOR SOLUTION INTRAVENOUS
Status: COMPLETED | OUTPATIENT
Start: 2025-06-11 | End: 2025-06-11

## 2025-06-11 RX ORDER — AMINOPHYLLINE 25 MG/ML
75 INJECTION, SOLUTION INTRAVENOUS
Status: DISCONTINUED | OUTPATIENT
Start: 2025-06-11 | End: 2025-06-14 | Stop reason: HOSPADM

## 2025-06-11 RX ORDER — METOPROLOL TARTRATE 1 MG/ML
5 INJECTION, SOLUTION INTRAVENOUS EVERY 5 MIN PRN
Status: ACTIVE | OUTPATIENT
Start: 2025-06-11 | End: 2025-06-11

## 2025-06-11 RX ORDER — ATROPINE SULFATE 0.1 MG/ML
0.5 INJECTION INTRAVENOUS EVERY 5 MIN PRN
Status: ACTIVE | OUTPATIENT
Start: 2025-06-11 | End: 2025-06-11

## 2025-06-11 RX ORDER — SODIUM CHLORIDE 0.9 % (FLUSH) 0.9 %
5-40 SYRINGE (ML) INJECTION PRN
Status: ACTIVE | OUTPATIENT
Start: 2025-06-11 | End: 2025-06-11

## 2025-06-11 RX ORDER — REGADENOSON 0.08 MG/ML
0.4 INJECTION, SOLUTION INTRAVENOUS
Status: DISCONTINUED | OUTPATIENT
Start: 2025-06-11 | End: 2025-06-11

## 2025-06-11 RX ORDER — NITROGLYCERIN 0.4 MG/1
0.4 TABLET SUBLINGUAL EVERY 5 MIN PRN
Status: ACTIVE | OUTPATIENT
Start: 2025-06-11 | End: 2025-06-11

## 2025-06-11 RX ORDER — SODIUM CHLORIDE 9 MG/ML
500 INJECTION, SOLUTION INTRAVENOUS CONTINUOUS PRN
Status: ACTIVE | OUTPATIENT
Start: 2025-06-11 | End: 2025-06-11

## 2025-06-11 RX ORDER — REGADENOSON 0.08 MG/ML
0.4 INJECTION, SOLUTION INTRAVENOUS
Status: COMPLETED | OUTPATIENT
Start: 2025-06-11 | End: 2025-06-11

## 2025-06-11 RX ADMIN — RANOLAZINE 1000 MG: 500 TABLET, EXTENDED RELEASE ORAL at 08:38

## 2025-06-11 RX ADMIN — LEVETIRACETAM 500 MG: 500 TABLET, FILM COATED ORAL at 08:38

## 2025-06-11 RX ADMIN — SPIRONOLACTONE 25 MG: 25 TABLET ORAL at 08:38

## 2025-06-11 RX ADMIN — METOPROLOL SUCCINATE 50 MG: 50 TABLET, EXTENDED RELEASE ORAL at 20:15

## 2025-06-11 RX ADMIN — MORPHINE SULFATE 2 MG: 2 INJECTION, SOLUTION INTRAMUSCULAR; INTRAVENOUS at 02:32

## 2025-06-11 RX ADMIN — HEPARIN SODIUM 4000 UNITS: 1000 INJECTION INTRAVENOUS; SUBCUTANEOUS at 11:14

## 2025-06-11 RX ADMIN — MORPHINE SULFATE 2 MG: 2 INJECTION, SOLUTION INTRAMUSCULAR; INTRAVENOUS at 11:19

## 2025-06-11 RX ADMIN — ATORVASTATIN CALCIUM 80 MG: 80 TABLET, FILM COATED ORAL at 20:15

## 2025-06-11 RX ADMIN — METOPROLOL SUCCINATE 50 MG: 50 TABLET, EXTENDED RELEASE ORAL at 08:38

## 2025-06-11 RX ADMIN — Medication 31 MILLICURIE: at 09:56

## 2025-06-11 RX ADMIN — EMPAGLIFLOZIN 10 MG: 10 TABLET, FILM COATED ORAL at 08:38

## 2025-06-11 RX ADMIN — LEVETIRACETAM 500 MG: 500 TABLET, FILM COATED ORAL at 20:15

## 2025-06-11 RX ADMIN — HEPARIN SODIUM 19 UNITS/KG/HR: 10000 INJECTION, SOLUTION INTRAVENOUS at 11:14

## 2025-06-11 RX ADMIN — MORPHINE SULFATE 2 MG: 2 INJECTION, SOLUTION INTRAMUSCULAR; INTRAVENOUS at 04:44

## 2025-06-11 RX ADMIN — SACUBITRIL AND VALSARTAN 1 TABLET: 49; 51 TABLET, FILM COATED ORAL at 08:38

## 2025-06-11 RX ADMIN — CLOPIDOGREL BISULFATE 75 MG: 75 TABLET, FILM COATED ORAL at 08:38

## 2025-06-11 RX ADMIN — REGADENOSON 0.4 MG: 0.08 INJECTION, SOLUTION INTRAVENOUS at 09:53

## 2025-06-11 RX ADMIN — MORPHINE SULFATE 2 MG: 2 INJECTION, SOLUTION INTRAMUSCULAR; INTRAVENOUS at 06:40

## 2025-06-11 RX ADMIN — SACUBITRIL AND VALSARTAN 1 TABLET: 49; 51 TABLET, FILM COATED ORAL at 20:15

## 2025-06-11 RX ADMIN — Medication 9.6 MILLICURIE: at 09:00

## 2025-06-11 RX ADMIN — ISOSORBIDE MONONITRATE 60 MG: 60 TABLET, EXTENDED RELEASE ORAL at 08:38

## 2025-06-11 ASSESSMENT — PAIN SCALES - GENERAL
PAINLEVEL_OUTOF10: 8
PAINLEVEL_OUTOF10: 7
PAINLEVEL_OUTOF10: 8

## 2025-06-11 ASSESSMENT — PAIN DESCRIPTION - FREQUENCY: FREQUENCY: CONTINUOUS

## 2025-06-11 ASSESSMENT — PAIN DESCRIPTION - PAIN TYPE: TYPE: CHRONIC PAIN

## 2025-06-11 ASSESSMENT — PAIN DESCRIPTION - LOCATION: LOCATION: CHEST

## 2025-06-11 ASSESSMENT — PAIN DESCRIPTION - ONSET: ONSET: ON-GOING

## 2025-06-11 ASSESSMENT — PAIN DESCRIPTION - DESCRIPTORS: DESCRIPTORS: ACHING;STABBING

## 2025-06-11 ASSESSMENT — PAIN - FUNCTIONAL ASSESSMENT: PAIN_FUNCTIONAL_ASSESSMENT: ACTIVITIES ARE NOT PREVENTED

## 2025-06-11 ASSESSMENT — PAIN DESCRIPTION - ORIENTATION: ORIENTATION: MID

## 2025-06-11 NOTE — PLAN OF CARE
Problem: Discharge Planning  Goal: Discharge to home or other facility with appropriate resources  6/11/2025 1424 by Jahaira Wallace LSW  Outcome: Progressing       Consult received. Please see SW note dated 6/11.

## 2025-06-11 NOTE — PROGRESS NOTES
[facility]  OCCUPATIONAL THERAPY MISSED TREATMENT NOTE  [facility] MED SURG 8AB  8B-21/021-A      Date: 2025  Patient Name: Chriss Braga        CSN: [id number]   : 1955  (69 y.o.)  Gender: male   Referring Practitioner: Jennifer Bruce PA-C  Diagnosis: Chest Pain         REASON FOR MISSED TREATMENT: Patient Refused.  Pt reported feeling nauseated and refused any further therapy today.  His nurse was notified.  Pt was left sitting in the chair.

## 2025-06-11 NOTE — PLAN OF CARE
Problem: Chronic Conditions and Co-morbidities  Goal: Patient's chronic conditions and co-morbidity symptoms are monitored and maintained or improved  Outcome: Progressing  Flowsheets (Taken 6/11/2025 0800)  Care Plan - Patient's Chronic Conditions and Co-Morbidity Symptoms are Monitored and Maintained or Improved: Monitor and assess patient's chronic conditions and comorbid symptoms for stability, deterioration, or improvement     Problem: Discharge Planning  Goal: Discharge to home or other facility with appropriate resources  Outcome: Progressing  Flowsheets (Taken 6/11/2025 0800)  Discharge to home or other facility with appropriate resources: Identify barriers to discharge with patient and caregiver     Problem: Pain  Goal: Verbalizes/displays adequate comfort level or baseline comfort level  Outcome: Progressing  Flowsheets (Taken 6/11/2025 0800)  Verbalizes/displays adequate comfort level or baseline comfort level: Encourage patient to monitor pain and request assistance     Problem: Skin/Tissue Integrity  Goal: Skin integrity remains intact  Description: 1.  Monitor for areas of redness and/or skin breakdown2.  Assess vascular access sites hourly3.  Every 4-6 hours minimum:  Change oxygen saturation probe site4.  Every 4-6 hours:  If on nasal continuous positive airway pressure, respiratory therapy assess nares and determine need for appliance change or resting period  Outcome: Progressing  Flowsheets (Taken 6/11/2025 0800)  Skin Integrity Remains Intact: Monitor for areas of redness and/or skin breakdown

## 2025-06-11 NOTE — PROGRESS NOTES
[facility]  INPATIENT PHYSICAL THERAPY  EVALUATION  STRZ MED SURG 8AB - 8B-21/021-A    Discharge Recommendations: Continue to assess pending progress, 24 hour supervision or assist, Subacute/Skilled Nursing Facility  Equipment Recommendations: No             Time In: 1055  Time Out: 1113  Timed Code Treatment Minutes: 10 Minutes  Minutes: 18          Date: 2025  Patient Name: Chriss Braga,  Gender:  male        MRN: [id number]  : 1955  (69 y.o.)      Referring Practitioner: Jennifer Bruce PA-C  Diagnosis: Chest pain  Additional Pertinent Hx: Per EMR \"Chriss Braga is a 69 y.o. 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 with chest pain.  Patient states that he has 8/10 sharp intermittent substernal chest pain that radiates into the left neck as well as into the left arm.  He denies any alleviating factors.  He reports aggravation when up moving and when coughing.  He is also endorsing coughing and dyspnea on exertion as well as palpitations, urinary frequency, back pain, dizziness, lightheadedness.  He reports that it feels similar to his previous cardiac events however worsened.  He was given ASA and nitro in EMS en route to hospital this did not relieve pain.\"     Restrictions/Precautions:  Restrictions/Precautions: General Precautions, Fall Risk            Required Braces or Orthoses?: No      Subjective:  Chart Reviewed: Yes  Patient assessed for rehabilitation services?: Yes  Family/Caregiver Present: No  Subjective: OK to see pt per nursing. Pt in bed when PT arrived, agreeable to PT session, recently got back from stress test. Pt reports he got sick and vomiting when he was down there and just does not feel well. Pt agreeable to chair only due to not feeling well.    General:  Overall Orientation Status: Within Functional  Balance training, Gait training, Functional mobility training, Transfer training, Endurance training, Equipment evaluation, education, & procurement, Neuromuscular re-education, Patient/Caregiver education & training, Safety education & training, Therapeutic activities, Stair training  General Plan:  (3-5x GM)    Goals:  Patient Goals : plannning rehab and then home  Short Term Goals  Time Frame for Short Term Goals: by discharge  Short Term Goal 1: Pt will demo sit to.from stand transfers with LRAD with S to progress with mobility.  Short Term Goal 2: Pt will demo IND with bed mobility tasks with bed flat to progress with mobility.  Short Term Goal 3: Pt will amb for 40 feet with LRAD with S to progress with mobility.  Short Term Goal 4: Pt will negotiate steps with rail for support and SBA to progress with mobility.  Long Term Goals  Time Frame for Long Term Goals : NA due to short ELOS    Following session, patient left in safe position. Pt in bedside chair following session, all needs and call light in reach, alarm on.             No

## 2025-06-11 NOTE — CARE COORDINATION
6/11/25, 10:27 AM EDT    DISCHARGE PLANNING EVALUATION    Attempted to see patient to discuss discharge planning, however patient is currently at a stress test. Will re-attempt later in day.      2:24 PM- RUTH did speak with patient this afternoon about discharge planning. He states that he lives with his brother and plans to return there at discharge, he denies wanting a SNF at this time. Patient is agreeable to home health being ordered, his preference is [id number] and [facility] as back up choice. RUTH did send referral to [facility] through [facility] for RN, PT/OT and aide services.     3:05 PM- received a message that [id number] is not able to accept due to insurance, referral sent to back up choice [facility].

## 2025-06-11 NOTE — PROGRESS NOTES
Assuming care from primary RN Luz. Patient sitting up in chair breathing even and unlabored. Verified Heparin gtt running at 19u/kg/hr per order. Called phlebotomy to verify they will be attempting to redraw ptt as last draw was rejected. Patient denies needs at this time, call light and belongings within reach.    1854 Called lab regarding ptt 2nd redraw, as still unable to see results. Called and rec. aPtt Critical result of 165.7

## 2025-06-11 NOTE — PROGRESS NOTES
Hospitalist Progress Note    Patient:  Chriss Braga      Unit/Bed:8B-21/021-A    YOB: 1955    MRN: [id number]       Acct: [id number]     PCP: Newman-Waterhouse, Aundrea N, DO    Date of Admission: 6/9/2025    Assessment/Plan:    Acute chest pain--appreciate cardiology input and plan is for stress test today, ACS ruled out by 3 negative troponins; add lipid panel to this morning's blood; on Lipitor, Plavix, Imdur, Toprol, Ranexa; pain-free  Nausea/vomiting--occurred while in stress test; antiemetics as needed, will add on LFTs to this morning's blood  Chronic systolic heart failure--echo from April 4, 2025 revealed an EF 15 to 20%; on Toprol, Entresto, Aldactone; has ICD  PAF with probable secondary hypercoagulable state--on Eliquis at home however currently on hold secondary to being on heparin drip, on Toprol  GERD  Seizure disorder--Keppra  Hyperlipidemia--on statin  CKD stage II/IIIb--stable  Pituitary adenoma  History of CVA with hemiparesis of left dominant side  CAD S/P PCI of distal RCA with LESTER x 2 on April 7, 2025--on statin, Plavix, Toprol, Ranexa      Expected discharge date: Pending clinical course    Disposition:    [x] Home       [] TCU       [] Rehab       [] Psych       [] SNF       [] Long Term Care Facility       [] Other-    Chief Complaint: Chest pain    Hospital Course: Per H&P done 6/9/2025: \" Chriss Braga is a 69 y.o. 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  Patient states that he has 8/10 sharp intermittent substernal chest pain that radiates into the left neck as well as into the left arm.  He denies any alleviating factors.  He reports aggravation when up moving and when coughing.  He is also endorsing coughing and dyspnea on exertion as well as palpitations, urinary frequency, back pain,  12/29/2011    Cardiomyopathy (HCC) [I42.9]     GERD (gastroesophageal reflux disease) [K21.9]     CAD (coronary artery disease) [I25.10]        Electronically signed by CHEN Edmonds CNP on 6/11/2025 at 6:35 AM

## 2025-06-11 NOTE — PROGRESS NOTES
[facility]  PHYSICAL THERAPY MISSED TREATMENT NOTE  STRZ MED SURG 8AB    Date: 2025  Patient Name: Chriss Braga        MRN: [id number]   : 1955  (69 y.o.)  Gender: male                REASON FOR MISSED TREATMENT:  Patient at testing and/or off unit.  Pt at stress test.

## 2025-06-12 VITALS
SYSTOLIC BLOOD PRESSURE: 112 MMHG | OXYGEN SATURATION: 95 % | HEART RATE: 67 BPM | BODY MASS INDEX: 22.98 KG/M2 | DIASTOLIC BLOOD PRESSURE: 97 MMHG | TEMPERATURE: 97.5 F | RESPIRATION RATE: 16 BRPM | WEIGHT: 155.65 LBS

## 2025-06-12 LAB
APTT PPP: 107.3 SECONDS (ref 22–38)
APTT PPP: 65.4 SECONDS (ref 22–38)

## 2025-06-12 PROCEDURE — 99232 SBSQ HOSP IP/OBS MODERATE 35: CPT | Performed by: PHYSICIAN ASSISTANT

## 2025-06-12 PROCEDURE — 36415 COLL VENOUS BLD VENIPUNCTURE: CPT

## 2025-06-12 PROCEDURE — 6370000000 HC RX 637 (ALT 250 FOR IP): Performed by: NURSE PRACTITIONER

## 2025-06-12 PROCEDURE — 2T015 HOSPITALIST 2ND TOUCH: CPT | Performed by: NURSE PRACTITIONER

## 2025-06-12 PROCEDURE — 99239 HOSP IP/OBS DSCHRG MGMT >30: CPT | Performed by: NURSE PRACTITIONER

## 2025-06-12 PROCEDURE — 6370000000 HC RX 637 (ALT 250 FOR IP): Performed by: PHYSICIAN ASSISTANT

## 2025-06-12 PROCEDURE — 6370000000 HC RX 637 (ALT 250 FOR IP)

## 2025-06-12 PROCEDURE — 97166 OT EVAL MOD COMPLEX 45 MIN: CPT

## 2025-06-12 PROCEDURE — 2500000003 HC RX 250 WO HCPCS: Performed by: PHYSICIAN ASSISTANT

## 2025-06-12 PROCEDURE — 85730 THROMBOPLASTIN TIME PARTIAL: CPT

## 2025-06-12 PROCEDURE — 6360000002 HC RX W HCPCS: Performed by: PHYSICIAN ASSISTANT

## 2025-06-12 PROCEDURE — 96375 TX/PRO/DX INJ NEW DRUG ADDON: CPT

## 2025-06-12 PROCEDURE — 97530 THERAPEUTIC ACTIVITIES: CPT

## 2025-06-12 RX ORDER — KETOROLAC TROMETHAMINE 30 MG/ML
30 INJECTION, SOLUTION INTRAMUSCULAR; INTRAVENOUS ONCE
Status: COMPLETED | OUTPATIENT
Start: 2025-06-12 | End: 2025-06-12

## 2025-06-12 RX ORDER — OXYCODONE HYDROCHLORIDE 5 MG/1
2.5 TABLET ORAL EVERY 4 HOURS PRN
Refills: 0 | Status: DISCONTINUED | OUTPATIENT
Start: 2025-06-12 | End: 2025-06-12 | Stop reason: HOSPADM

## 2025-06-12 RX ADMIN — METHYLPREDNISOLONE SODIUM SUCCINATE 60 MG: 40 INJECTION INTRAMUSCULAR; INTRAVENOUS at 10:37

## 2025-06-12 RX ADMIN — EMPAGLIFLOZIN 10 MG: 10 TABLET, FILM COATED ORAL at 08:12

## 2025-06-12 RX ADMIN — KETOROLAC TROMETHAMINE 30 MG: 30 INJECTION, SOLUTION INTRAMUSCULAR at 10:37

## 2025-06-12 RX ADMIN — RANOLAZINE 1000 MG: 500 TABLET, EXTENDED RELEASE ORAL at 08:09

## 2025-06-12 RX ADMIN — CLOPIDOGREL BISULFATE 75 MG: 75 TABLET, FILM COATED ORAL at 08:08

## 2025-06-12 RX ADMIN — APIXABAN 5 MG: 5 TABLET, FILM COATED ORAL at 11:28

## 2025-06-12 RX ADMIN — LEVETIRACETAM 500 MG: 500 TABLET, FILM COATED ORAL at 08:10

## 2025-06-12 ASSESSMENT — PAIN SCALES - GENERAL
PAINLEVEL_OUTOF10: 8
PAINLEVEL_OUTOF10: 0

## 2025-06-12 ASSESSMENT — PAIN - FUNCTIONAL ASSESSMENT: PAIN_FUNCTIONAL_ASSESSMENT: ACTIVITIES ARE NOT PREVENTED

## 2025-06-12 ASSESSMENT — PAIN DESCRIPTION - ORIENTATION: ORIENTATION: MID

## 2025-06-12 ASSESSMENT — PAIN DESCRIPTION - DESCRIPTORS: DESCRIPTORS: SHARP

## 2025-06-12 ASSESSMENT — PAIN DESCRIPTION - LOCATION: LOCATION: CHEST

## 2025-06-12 NOTE — PROGRESS NOTES
[facility]  INPATIENT OCCUPATIONAL THERAPY  STRZ MED SURG 8AB  EVALUATION      Discharge Recommendations: Continue to assess pending progress  Equipment Recommendations:          Time In: 08  Time Out: 09  Timed Code Treatment Minutes: 8 Minutes  Minutes: 19          Date: 2025  Patient Name: Chriss Braga,   Gender: male      MRN: [id number]  : 1955  (69 y.o.)  Referring Practitioner: Jennifer Bruce PA-C  Diagnosis: Chest pain  Additional Pertinent Hx: Pt with a PMHx of CHF, A-fib, seizures, GERD, hyperlipidemia, pituitary adenoma, CVA with residual hemiparesis left-sided who presented with chest pain.     Upon presentation he endorsed cough, dyspnea on exertion palpitations, urinary frequency, back pain, dizziness, lightheadedness.  He reported that the constellation of symptoms that prompted this presentation were similar to prior presentations where he had cardiac workup.    Restrictions/Precautions:  Restrictions/Precautions: General Precautions, Fall Risk    Subjective  Chart Reviewed: Yes, Orders, Progress Notes, History and Physical, Labs  Patient assessed for rehabilitation services?: Yes    Subjective: Pt lying supine upon arrival, agreeable to OT session. RN okayed therapy session.    Pain: Denies    Vitals: Vitals not assessed per clinical judgement, see nursing flowsheet    Social/Functional History:  Lives With: Other (Comment) (brother)  Type of Home: House  Home Layout: Two level  Home Access: Stairs to enter with rails  Entrance Stairs - Number of Steps: 4 MARIANA with rail  Home Equipment: Cane, Walker - Rolling   Bathroom Shower/Tub: Tub/Shower unit, Shower chair with back  Bathroom Equipment: Grab bars in shower       Prior Level of Assist for ADLs: Independent  Prior Level of Assist for Homemaking: Independent  Prior Level of Assist for Transfers: Independent  Prior Level of Assist for Ambulation: Independent household ambulator, with or without device  Has the  homemaking tasks.  Short Term Goal 3: Pt will complete functional mobility using AD with MI and 0-1 cues for safety to/from BR and progressing to household distances.  Short Term Goal 4: Pt will complete ADL routine with MI using adaptive strategies or AE as needed to increase (I) with ADLs.  Additional Goals?: No  Long Term Goals  Time Frame for Long Term Goals : None stated d/t ELOS.    AM-PAC Inpatient Daily Activity Raw Score: 21  AM-PAC Inpatient ADL T-Scale Score : 44.27    Following session, patient left in safe position in chair, with alarm, and call light within reach

## 2025-06-12 NOTE — DISCHARGE SUMMARY
Hospital Medicine Discharge Summary      Patient Identification:   Chriss Braga   : 1955  MRN: [id number]   Account: [id number]      Patient's PCP: Newman-Waterhouse, Aundrea N, DO    Admit Date: 2025     Discharge Date:   2025    Admitting Physician: CHEN Edmonds CNP     Discharging Nurse Practitioner: CHEN Edmonds CNP     Discharge Diagnoses with Assessment/Plan:  Acute chest pain, likely musculoskeletal in nature--appreciate cardiology input; stress test was negative for myocardial ischemia however suggestive of old myocardial infarct in the inferior wall, ACS ruled out by 3 negative troponins; lipid panel with cholesterol 143 and LDL at 85; on Lipitor, Plavix, Imdur, Toprol, Ranexa; cardiology added Toradol and Solu-Medrol x 1 dose; pain only occurs per patient with movement and palpation  Nausea/vomiting--occurred while in stress test; antiemetics as needed, LFTs and lipase were normal; resolved  Chronic systolic heart failure--echo from 2025 revealed an EF 15 to 20%; on Toprol, Entresto, Aldactone; has ICD  PAF with probable secondary hypercoagulable state--on Eliquis, Toprol  GERD  Seizure disorder--Keppra  Hyperlipidemia--on statin  CKD stage II/IIIb--stable  Pituitary adenoma  History of CVA with hemiparesis of left dominant side  CAD S/P PCI of distal RCA with LESTER x 2 on 2025--on statin, Plavix, Toprol, Ranexa        The patient was seen and examined on day of discharge and this discharge summary is in conjunction with any daily progress note from day of discharge.    Hospital Course:   Chriss Braga is a 69 y.o. male admitted to [facility] on 2025 for chest pain; Per H&P done 2025: \" Chriss Braga is a 69 y.o. 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, Who presents to [facility] with chest  06/12/25 0449 06/12/25 0450 06/12/25 0736 06/12/25 1132   BP: 118/81  100/77 (!) 112/97   Pulse: 58  74 67   Resp: 16  16 16   Temp: 98.1 °F (36.7 °C)  98.2 °F (36.8 °C) 97.5 °F (36.4 °C)   TempSrc: Oral  Oral Oral   SpO2: 99%  94% 95%   Weight:  70.6 kg (155 lb 10.3 oz)       Weight: Weight - Scale: 70.6 kg (155 lb 10.3 oz)     24 hour intake/output:  Intake/Output Summary (Last 24 hours) at 6/12/2025 1428  Last data filed at 6/12/2025 1132  Gross per 24 hour   Intake 125 ml   Output 100 ml   Net 25 ml         General appearance:  No apparent distress, appears stated age and cooperative.  HEENT:  Normal cephalic, atraumatic without obvious deformity. Pupils equal, round, and reactive to light.  Conjunctivae/corneas clear.  Neck: Supple, with full range of motion. No jugular venous distention. Trachea midline.  Respiratory:  Normal respiratory effort. Clear to auscultation, bilaterally without Rales/Wheezes/Rhonchi.  Cardiovascular:  Regular rate and rhythm with normal S1/S2 without murmurs, rubs or gallops.  Tenderness to palpation to the left anterior chest wall  Abdomen: Soft, non-tender, non-distended with normal bowel sounds.  Musculoskeletal:  No clubbing, cyanosis or edema bilaterally.  Full range of motion without deformity.  Skin: Skin color, texture, turgor normal.    Neurologic:  Neurovascularly intact without any focal sensory/motor deficits. Cranial nerves: II-XII intact, grossly non-focal.  Psychiatric:  Alert and oriented x 4, thought content appropriate  Capillary Refill: Brisk,< 3 seconds   Peripheral Pulses: +2 palpable, equal bilaterally       Labs: For convenience and continuity at follow-up the following most recent labs are provided:      CBC:    Lab Results   Component Value Date/Time    WBC 8.2 06/10/2025 07:27 AM    HGB 13.8 06/10/2025 07:27 AM    HGB 10.6 03/14/2012 06:03 AM    HCT 43.7 06/10/2025 07:27 AM     06/10/2025 07:27 AM       Renal:    Lab Results   Component Value

## 2025-06-12 NOTE — PROGRESS NOTES
[facility]   PROGRESS NOTE      Patient: Chriss Braga  Room #: 8B-21/021-A            YOB: 1955  Age: 69 y.o.  Gender: male            Admit Date & Time: 6/9/2025 11:46 AM    Assessment:    The patient declined a visit at this time and requested later visits.    Interventions:  The patient was provided information about Spiritual Care being available.     Outcomes:  The  wished the patient a positive day.     Plan:  1.Spiritual care will continue to follow the patient according to [facility] spiritual care SOP.       Electronically signed by Chaplain Anel, on 6/12/2025 at 9:13 AM.  Spiritual Care Department  [facility]  [phone]

## 2025-06-12 NOTE — PROGRESS NOTES
Educated on discharge instructions, medications, and follow up appointments. No further questions or concerns voiced. Discharged to home with cab.

## 2025-06-12 NOTE — DISCHARGE INSTRUCTIONS
Please take it easy for the next couple days and let your chest muscles heal      Keep blood pressure log twice daily - hold entresto toprol xl, imdur for sbp <100

## 2025-06-12 NOTE — PROGRESS NOTES
Cardiology Progress Note      Patient:  Chriss Braga  YOB: 1955  MRN: [id number]   Acct: [id number]  Admit Date:  6/9/2025  Primary Cardiologist: Anand Lisa MD    Note per dr romero \"CHIEF COMPLAINT: Chest pain        HPI: This is a pleasant 69 y.o. male with a PMHx of CHF, A-fib, seizures, GERD, hyperlipidemia, pituitary adenoma, CVA with residual hemiparesis left-sided who presented with chest pain.     Upon presentation he endorsed cough, dyspnea on exertion palpitations, urinary frequency, back pain, dizziness, lightheadedness.  He reported that the constellation of symptoms that prompted this presentation were similar to prior presentations where he had cardiac workup.  He was given aspirin and nitro via EMS en route to hospital which did not relieve his pain.\"    Subjective (Events in last 24 hours): pt awake and alert.  NAD.  Pt has left side chest pain, none at rest, but provoked with any movement in bed, and tender with touch.  No exertional cp.  Pt with chronic ongoing CEDENO, at baseline. No edema or orthopnea  On RA      Objective:   BP (!) 112/97   Pulse 67   Temp 97.5 °F (36.4 °C) (Oral)   Resp 16   Wt 70.6 kg (155 lb 10.3 oz)   SpO2 95%   BMI 22.98 kg/m²        TELEMETRY: nsr    Physical Exam:  General Appearance: alert and oriented to person, place and time, in no acute distress  Cardiovascular: normal rate, regular rhythm, normal S1 and S2, no murmurs, rubs, clicks, or gallops, distal pulses intact, no carotid bruits, no JVD  Pulmonary/Chest: clear to auscultation bilaterally- no wheezes, rales or rhonchi, normal air movement, no respiratory distress  Abdomen: soft, non-tender, non-distended, normal bowel sounds, no masses Extremities: no cyanosis, clubbing or edema, pulse   Skin: warm and dry, no rash or erythema  Head: normocephalic and atraumatic  Eyes: pupils equal, round, and reactive to light  Neck: supple and non-tender without mass, no thyromegaly   Musculoskeletal:  worse with change of position - trop negx3  Neg stress test 6/11/25  CAD - hx CABG, recent LESTER x2 RCA 4/7/25  PAF  HFrEF - compensated  ICMP - ef 15-20 per TTE 4/4/25  Hx AICD  HTN  HLD  Hx CVA      Plan:    Cont plavix/statin/BB/entresto/aldactone/ranexa/imdur/jardiance  Keep bp log twice daily - hold entresto, toprol xl, imdur for sbp <100  Follow up chf clinic 2 weeks  Will follow prn       Electronically signed by Geri Noel PA-C on 6/12/2025 at 1:00 PM

## 2025-06-12 NOTE — PROGRESS NOTES
Hospitalist Progress Note    Patient:  Chriss Braga      Unit/Bed:8B-21/021-A    YOB: 1955    MRN: [id number]       Acct: [id number]     PCP: Newman-Waterhouse, Aundrea N, DO    Date of Admission: 6/9/2025    Assessment/Plan:    Acute chest pain, likely musculoskeletal in nature--appreciate cardiology input; stress test was negative for myocardial ischemia however suggestive of old myocardial infarct in the inferior wall, ACS ruled out by 3 negative troponins; lipid panel with cholesterol 143 and LDL at 85; on Lipitor, Plavix, Imdur, Toprol, Ranexa; cardiology added Toradol and Solu-Medrol  Nausea/vomiting--occurred while in stress test; antiemetics as needed, LFTs and lipase were normal; resolved  Chronic systolic heart failure--echo from April 4, 2025 revealed an EF 15 to 20%; on Toprol, Entresto, Aldactone; has ICD  PAF with probable secondary hypercoagulable state--on Eliquis at home and will resume and stop heparin drip, on Toprol  GERD  Seizure disorder--Keppra  Hyperlipidemia--on statin  CKD stage II/IIIb--stable  Pituitary adenoma  History of CVA with hemiparesis of left dominant side  CAD S/P PCI of distal RCA with LESTER x 2 on April 7, 2025--on statin, Plavix, Toprol, Ranexa      Expected discharge date: Pending clinical course    Disposition:    [x] Home       [] TCU       [] Rehab       [] Psych       [] SNF       [] Long Term Care Facility       [] Other-    Chief Complaint: Chest pain    Hospital Course: Per H&P done 6/9/2025: \" Chriss Braga is a 69 y.o. 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  Patient states that he has 8/10 sharp intermittent substernal chest pain that radiates into the left neck as well as into the left arm.  He denies any alleviating factors.  He reports aggravation when up moving  [E78.5] 12/07/2022    PAF (paroxysmal atrial fibrillation) (HCC) [I48.0] 12/07/2022    Ex-smoker [Z87.891] 11/09/2021    Pituitary adenoma (HCC) [D35.2] 12/02/2019    Hemiparesis of left dominant side (HCC) [G81.92]     Hx of CABG [Z95.1] 12/29/2011    Cardiomyopathy (HCC) [I42.9]     GERD (gastroesophageal reflux disease) [K21.9]     CAD (coronary artery disease) [I25.10]        Electronically signed by CHEN Edmonds CNP on 6/12/2025 at 6:32 AM

## 2025-06-13 ENCOUNTER — CARE COORDINATION (OUTPATIENT)
Dept: CARE COORDINATION | Age: 70
End: 2025-06-13

## 2025-06-13 NOTE — CARE COORDINATION
6/13/25, 9:59 AM EDT    Patient goals/plan/ treatment preferences discussed by  and .  Patient goals/plan/ treatment preferences reviewed with patient/ family.  Patient/ family verbalize understanding of discharge plan and are in agreement with goal/plan/treatment preferences.  Understanding was demonstrated using the teach back method.  AVS provided by RN at time of discharge, which includes all necessary medical information pertaining to the patients current course of illness, treatment, post-discharge goals of care, and treatment preferences.     Services At/After Discharge: Home Health, Nursing service, OT, and PT- [facility]    Chriss was discharged yesterday afternoon to home with brother and new [facility] for RN, PT and OT services

## 2025-06-13 NOTE — CARE COORDINATION
Ambulatory Care Coordination Note     6/13/2025 9:29 AM     Patient outreach attempt by this ACM today to perform care management follow up  and perform hospital follow up. ACM was unable to reach the patient by telephone today;   hospital follow up call within 2 business days of discharge: Yes  left voice message requesting a return phone call to this ACM.     ACM: Elana Pierre RN     Care Summary Note: Chriss was recently admitted to [facility] for CP.  D/c home with [facility] services.     PCP/Specialist follow up:   Future Appointments         Provider Specialty Dept Phone    6/17/2025 1:20 PM Ancelmo Trujillo MD Internal Medicine [phone]    6/19/2025 2:30 PM Kamilah Chu, APRN - CNP Cardiology [phone]    7/14/2025 11:30 AM SCHEDULE, SRPX PACER NURSE Cardiology [phone]    10/29/2025 1:30 PM Jean Carlos Smith PA-C Cardiology [phone]    4/30/2026 1:15 PM Anand Lisa MD Cardiology [phone]            Follow Up:   Plan for next ACM outreach in approximately 1-2 days  to complete:  - hospital follow up.

## 2025-06-16 ENCOUNTER — CARE COORDINATION (OUTPATIENT)
Dept: CARE COORDINATION | Age: 70
End: 2025-06-16

## 2025-06-16 NOTE — CARE COORDINATION
Ambulatory Care Coordination Note     6/16/2025 10:22 AM     Patient outreach attempt by this ACM today to perform hospital follow up. ACM was unable to reach the patient by telephone today;   hospital follow up call within 2 business days of discharge: Yes  left voice message requesting a return phone call to this ACM.     ACM: Elana Pierre RN     Care Summary Note: Chriss was recently admitted to [facility] for CP. D/c home with [facility] services.     PCP/Specialist follow up:   Future Appointments         Provider Specialty Dept Phone    6/17/2025 1:20 PM Ancelmo Trujillo MD Internal Medicine [phone]    6/19/2025 2:30 PM Kamilah Chu, APRN - CNP Cardiology [phone]    7/14/2025 11:30 AM SCHEDULE, SRPX PACER NURSE Cardiology [phone]    10/29/2025 1:30 PM Jean Carlos Smith, PA-C Cardiology [phone]    4/30/2026 1:15 PM Anand Lisa MD Cardiology [phone]            Follow Up:   Plan for next ACM outreach in approximately 1 week to complete:  - CC Protocol assessments  - disease specific assessments  - goal progression  - education   - follow-up appointment with PCP 6.17.

## 2025-06-17 DIAGNOSIS — I25.119 CORONARY ARTERY DISEASE INVOLVING NATIVE CORONARY ARTERY OF NATIVE HEART WITH ANGINA PECTORIS: ICD-10-CM

## 2025-06-17 RX ORDER — RANOLAZINE 1000 MG/1
1000 TABLET, EXTENDED RELEASE ORAL DAILY
Qty: 90 TABLET | Refills: 0 | Status: SHIPPED | OUTPATIENT
Start: 2025-06-17

## 2025-06-19 ENCOUNTER — TELEPHONE (OUTPATIENT)
Dept: INTERNAL MEDICINE CLINIC | Age: 70
End: 2025-06-19

## 2025-06-19 NOTE — TELEPHONE ENCOUNTER
Occupational therapist called stating that she did evaluation today and will see patient twice a week for 2 weeks the once a week for 2 weeks . Patient BP was up today at visit 160/95 but patient had not taken his medications yet so therapist had him take them . Patient has hospital follow up on 6/23 in resident clinic .

## 2025-06-23 ENCOUNTER — CARE COORDINATION (OUTPATIENT)
Dept: CARE COORDINATION | Age: 70
End: 2025-06-23

## 2025-06-23 NOTE — CARE COORDINATION
Ambulatory Care Coordination Note     6/23/2025 12:07 PM     Patient outreach attempt by this ACM today to perform care management follow up . ACM was unable to reach the patient by telephone today;   left voice message requesting a return phone call to this ACM.     ACM: Elana Pierre RN     Care Summary Note: Chriss was recently admitted to [facility] for CP. D/c home with [facility] services     PCP/Specialist follow up:   Future Appointments         Provider Specialty Dept Phone    6/23/2025 3:20 PM Reji Higginbotham MD Internal Medicine [phone]    6/30/2025 3:30 PM Kamilah Chu, APRN - CNP Cardiology [phone]    7/14/2025 11:30 AM SCHEDULE, SRPX PACER NURSE Cardiology [phone]    10/29/2025 1:30 PM Jean Carlos Smith, PAAzaelC Cardiology [phone]    4/30/2026 1:15 PM Anand Lisa MD Cardiology [phone]            Follow Up:   Plan for next ACM outreach in approximately 1 week to complete:  - CC Protocol assessments  - disease specific assessments  - goal progression  - education .

## 2025-06-27 ENCOUNTER — CARE COORDINATION (OUTPATIENT)
Dept: CARE COORDINATION | Age: 70
End: 2025-06-27

## 2025-06-27 NOTE — CARE COORDINATION
Ambulatory Care Coordination Note     2025 4:02 PM     Patient Current Location:  [place]     ACM contacted the patient by telephone. Verified name and  with patient as identifiers.         ACM: Elana Pierre RN     Challenges to be reviewed by the provider   Additional needs identified to be addressed with provider No  none               Method of communication with provider: none.    Utilization: Patient has not had any utilization since our last call.     Care Summary Note: Chriss was recently admitted to [facility] for CP. D/c home with [facility] services.   Spoke with Chriss today.    Pt reports that he is doing well.  Feels he is getting stronger with HH PT.    Pt missed hospital f/u with PCP.  Provided pt with Office number to call and reschedule  Reminded pt he has an appt on  with CHF clinic.  Pt reports that he has been weighing self daily.  Reports wts have been stable at 175#.  Denies swelling to ext's, bloating, or worsening SOB. Reinforced CHF zone tools  Denies any CP since hospital d/c.   Pt has not f/u with [facility]'s regarding Repatha.  Provided number to pt again and explained why he needs to call them.    Stressed importance of making it to his f/u appts.   Offered patient enrollment in the Remote Patient Monitoring (RPM) program for in-home monitoring: Yes, but did not enroll at this time: pt declined.     Assessments Completed:   Congestive Heart Failure Assessment    Are you currently restricting fluids?: 2000cc  Do you understand a low sodium diet?: Yes  Do you understand how to read food labels?: Yes  How many restaurant meals do you eat per week?: 0  Do you salt your food before tasting it?: No         Symptoms:  CHF associated angina: Neg, CHF associated dyspnea on exertion: Pos, CHF associated fatigue: Neg, CHF associated leg swelling: Neg, CHF associated orthostatic hypotension: Neg, CHF associated PND: Neg, CHF associated shortness of breath: Neg, CHF associated weakness: Neg

## 2025-06-30 ENCOUNTER — TELEPHONE (OUTPATIENT)
Dept: CARDIOLOGY CLINIC | Age: 70
End: 2025-06-30

## 2025-07-01 ENCOUNTER — TELEPHONE (OUTPATIENT)
Dept: INTERNAL MEDICINE CLINIC | Age: 70
End: 2025-07-01

## 2025-07-01 NOTE — TELEPHONE ENCOUNTER
Physical therapist called stating that patient was evaluated 6/20 and will get PT 1 time a week for 1 week the 2 times a week for 3 weeks then 1 time a week for 3 weeks .

## 2025-07-07 ENCOUNTER — TELEPHONE (OUTPATIENT)
Dept: INTERNAL MEDICINE CLINIC | Age: 70
End: 2025-07-07

## 2025-07-14 ENCOUNTER — CARE COORDINATION (OUTPATIENT)
Dept: CARE COORDINATION | Age: 70
End: 2025-07-14

## 2025-07-14 NOTE — CARE COORDINATION
Ambulatory Care Coordination Note     7/14/2025 4:22 PM     Patient outreach attempt by this ACM today to perform care management follow up . ACM was unable to reach the patient by telephone today;   left voice message requesting a return phone call to this ACM.     ACM: Elana Pierre RN     Care Summary Note: Chriss was referred to care coordination by CTN following recent hospitalization. Pt has h/o: CHF, CAD, HTN, PAF, CVA, seizures     PCP/Specialist follow up:   Future Appointments         Provider Specialty Dept Phone    10/29/2025 1:30 PM Jean Carlos Smith PA-C Cardiology [phone]    4/30/2026 1:15 PM Anand Lisa MD Cardiology [phone]            Follow Up:   Plan for next ACM outreach in approximately 1 week to complete:  - CC Protocol assessments  - disease specific assessments  - goal progression  - education   Pt needs to reschedule IM and CHF clinic appt.

## 2025-07-19 ENCOUNTER — APPOINTMENT (OUTPATIENT)
Dept: GENERAL RADIOLOGY | Age: 70
DRG: 065 | End: 2025-07-19
Payer: MEDICARE

## 2025-07-19 ENCOUNTER — APPOINTMENT (OUTPATIENT)
Dept: CT IMAGING | Age: 70
DRG: 065 | End: 2025-07-19
Payer: MEDICARE

## 2025-07-19 ENCOUNTER — HOSPITAL ENCOUNTER (INPATIENT)
Age: 70
LOS: 3 days | Discharge: HOME HEALTH | DRG: 065 | End: 2025-07-22
Attending: STUDENT IN AN ORGANIZED HEALTH CARE EDUCATION/TRAINING PROGRAM
Payer: MEDICARE

## 2025-07-19 DIAGNOSIS — I63.9 CEREBROVASCULAR ACCIDENT (CVA), UNSPECIFIED MECHANISM (HCC): Primary | ICD-10-CM

## 2025-07-19 DIAGNOSIS — I63.81 LACUNAR INFARCTION (HCC): ICD-10-CM

## 2025-07-19 DIAGNOSIS — Z86.73 HISTORY OF RIGHT MCA STROKE: ICD-10-CM

## 2025-07-19 DIAGNOSIS — R07.9 CHEST PAIN, UNSPECIFIED TYPE: ICD-10-CM

## 2025-07-19 PROBLEM — R07.89 ATYPICAL CHEST PAIN: Status: ACTIVE | Noted: 2025-07-19

## 2025-07-19 LAB
ANION GAP SERPL CALC-SCNC: 13 MEQ/L (ref 8–16)
BASOPHILS ABSOLUTE: 0 THOU/MM3 (ref 0–0.1)
BASOPHILS NFR BLD AUTO: 0.4 %
BUN SERPL-MCNC: 21 MG/DL (ref 8–23)
CALCIUM SERPL-MCNC: 9 MG/DL (ref 8.8–10.2)
CHLORIDE 24H UR-SRATE: 85 MEQ/L
CHLORIDE SERPL-SCNC: 108 MEQ/L (ref 98–111)
CO2 SERPL-SCNC: 23 MEQ/L (ref 22–29)
CREAT SERPL-MCNC: 1.3 MG/DL (ref 0.7–1.2)
CREAT UR-MCNC: 112 MG/DL
DEPRECATED RDW RBC AUTO: 41.1 FL (ref 35–45)
EKG ATRIAL RATE: 78 BPM
EKG P AXIS: 72 DEGREES
EKG P-R INTERVAL: 192 MS
EKG Q-T INTERVAL: 448 MS
EKG QRS DURATION: 92 MS
EKG QTC CALCULATION (BAZETT): 510 MS
EKG R AXIS: 11 DEGREES
EKG T AXIS: 16 DEGREES
EKG VENTRICULAR RATE: 78 BPM
EOSINOPHIL NFR BLD AUTO: 0.7 %
EOSINOPHILS ABSOLUTE: 0.1 THOU/MM3 (ref 0–0.4)
ERYTHROCYTE [DISTWIDTH] IN BLOOD BY AUTOMATED COUNT: 16.5 % (ref 11.5–14.5)
GFR SERPL CREATININE-BSD FRML MDRD: 59 ML/MIN/1.73M2
GLUCOSE SERPL-MCNC: 127 MG/DL (ref 74–109)
HCT VFR BLD AUTO: 34.1 % (ref 42–52)
HGB BLD-MCNC: 10.6 GM/DL (ref 14–18)
IMM GRANULOCYTES # BLD AUTO: 0.06 THOU/MM3 (ref 0–0.07)
IMM GRANULOCYTES NFR BLD AUTO: 0.7 %
LYMPHOCYTES ABSOLUTE: 1.8 THOU/MM3 (ref 1–4.8)
LYMPHOCYTES NFR BLD AUTO: 21.7 %
MAGNESIUM SERPL-MCNC: 2.4 MG/DL (ref 1.6–2.6)
MCH RBC QN AUTO: 21.9 PG (ref 26–33)
MCHC RBC AUTO-ENTMCNC: 31.1 GM/DL (ref 32.2–35.5)
MCV RBC AUTO: 70.6 FL (ref 80–94)
MONOCYTES ABSOLUTE: 0.6 THOU/MM3 (ref 0.4–1.3)
MONOCYTES NFR BLD AUTO: 6.9 %
NEUTROPHILS ABSOLUTE: 5.8 THOU/MM3 (ref 1.8–7.7)
NEUTROPHILS NFR BLD AUTO: 69.6 %
NRBC BLD AUTO-RTO: 0 /100 WBC
NT-PROBNP SERPL IA-MCNC: 652 PG/ML (ref 0–124)
OSMOLALITY SERPL CALC.SUM OF ELEC: 291.4 MOSMOL/KG (ref 275–300)
PLATELET # BLD AUTO: 205 THOU/MM3 (ref 130–400)
PMV BLD AUTO: 9.9 FL (ref 9.4–12.4)
POTASSIUM SERPL-SCNC: 4.2 MEQ/L (ref 3.5–5.2)
POTASSIUM UR-SCNC: 37 MEQ/L
RBC # BLD AUTO: 4.83 MILL/MM3 (ref 4.7–6.1)
SODIUM SERPL-SCNC: 144 MEQ/L (ref 135–145)
SODIUM UR-SCNC: 118 MEQ/L
TROPONIN, HIGH SENSITIVITY: 8 NG/L (ref 0–12)
TROPONIN, HIGH SENSITIVITY: 8 NG/L (ref 0–12)
UUN 24H UR-MCNC: 700 MG/DL
WBC # BLD AUTO: 8.3 THOU/MM3 (ref 4.8–10.8)

## 2025-07-19 PROCEDURE — 70498 CT ANGIOGRAPHY NECK: CPT

## 2025-07-19 PROCEDURE — 96374 THER/PROPH/DIAG INJ IV PUSH: CPT

## 2025-07-19 PROCEDURE — 70496 CT ANGIOGRAPHY HEAD: CPT

## 2025-07-19 PROCEDURE — 82436 ASSAY OF URINE CHLORIDE: CPT

## 2025-07-19 PROCEDURE — 80048 BASIC METABOLIC PNL TOTAL CA: CPT

## 2025-07-19 PROCEDURE — 1200000003 HC TELEMETRY R&B

## 2025-07-19 PROCEDURE — 6360000002 HC RX W HCPCS: Performed by: STUDENT IN AN ORGANIZED HEALTH CARE EDUCATION/TRAINING PROGRAM

## 2025-07-19 PROCEDURE — 93005 ELECTROCARDIOGRAM TRACING: CPT | Performed by: STUDENT IN AN ORGANIZED HEALTH CARE EDUCATION/TRAINING PROGRAM

## 2025-07-19 PROCEDURE — 6370000000 HC RX 637 (ALT 250 FOR IP): Performed by: STUDENT IN AN ORGANIZED HEALTH CARE EDUCATION/TRAINING PROGRAM

## 2025-07-19 PROCEDURE — 2580000003 HC RX 258

## 2025-07-19 PROCEDURE — 83735 ASSAY OF MAGNESIUM: CPT

## 2025-07-19 PROCEDURE — 85025 COMPLETE CBC W/AUTO DIFF WBC: CPT

## 2025-07-19 PROCEDURE — 2500000003 HC RX 250 WO HCPCS

## 2025-07-19 PROCEDURE — 82570 ASSAY OF URINE CREATININE: CPT

## 2025-07-19 PROCEDURE — 99222 1ST HOSP IP/OBS MODERATE 55: CPT | Performed by: NURSE PRACTITIONER

## 2025-07-19 PROCEDURE — 84133 ASSAY OF URINE POTASSIUM: CPT

## 2025-07-19 PROCEDURE — 70450 CT HEAD/BRAIN W/O DYE: CPT

## 2025-07-19 PROCEDURE — 84540 ASSAY OF URINE/UREA-N: CPT

## 2025-07-19 PROCEDURE — 36415 COLL VENOUS BLD VENIPUNCTURE: CPT

## 2025-07-19 PROCEDURE — 83880 ASSAY OF NATRIURETIC PEPTIDE: CPT

## 2025-07-19 PROCEDURE — 6370000000 HC RX 637 (ALT 250 FOR IP)

## 2025-07-19 PROCEDURE — 84300 ASSAY OF URINE SODIUM: CPT

## 2025-07-19 PROCEDURE — 71045 X-RAY EXAM CHEST 1 VIEW: CPT

## 2025-07-19 PROCEDURE — 84484 ASSAY OF TROPONIN QUANT: CPT

## 2025-07-19 PROCEDURE — 99285 EMERGENCY DEPT VISIT HI MDM: CPT

## 2025-07-19 PROCEDURE — 6360000004 HC RX CONTRAST MEDICATION

## 2025-07-19 PROCEDURE — 93010 ELECTROCARDIOGRAM REPORT: CPT | Performed by: NUCLEAR MEDICINE

## 2025-07-19 RX ORDER — LEVETIRACETAM 500 MG/1
500 TABLET ORAL 2 TIMES DAILY
Status: DISCONTINUED | OUTPATIENT
Start: 2025-07-19 | End: 2025-07-22 | Stop reason: HOSPADM

## 2025-07-19 RX ORDER — ACETAMINOPHEN 650 MG/1
650 SUPPOSITORY RECTAL EVERY 6 HOURS PRN
Status: DISCONTINUED | OUTPATIENT
Start: 2025-07-19 | End: 2025-07-22 | Stop reason: HOSPADM

## 2025-07-19 RX ORDER — BISACODYL 5 MG/1
5 TABLET, DELAYED RELEASE ORAL DAILY PRN
Status: DISCONTINUED | OUTPATIENT
Start: 2025-07-19 | End: 2025-07-22 | Stop reason: HOSPADM

## 2025-07-19 RX ORDER — IOPAMIDOL 755 MG/ML
80 INJECTION, SOLUTION INTRAVASCULAR
Status: COMPLETED | OUTPATIENT
Start: 2025-07-19 | End: 2025-07-19

## 2025-07-19 RX ORDER — ONDANSETRON 4 MG/1
4 TABLET, ORALLY DISINTEGRATING ORAL EVERY 8 HOURS PRN
Status: DISCONTINUED | OUTPATIENT
Start: 2025-07-19 | End: 2025-07-22 | Stop reason: HOSPADM

## 2025-07-19 RX ORDER — MAGNESIUM SULFATE IN WATER 40 MG/ML
2000 INJECTION, SOLUTION INTRAVENOUS PRN
Status: DISCONTINUED | OUTPATIENT
Start: 2025-07-19 | End: 2025-07-22 | Stop reason: HOSPADM

## 2025-07-19 RX ORDER — SODIUM CHLORIDE 0.9 % (FLUSH) 0.9 %
5-40 SYRINGE (ML) INJECTION EVERY 12 HOURS SCHEDULED
Status: DISCONTINUED | OUTPATIENT
Start: 2025-07-19 | End: 2025-07-22 | Stop reason: HOSPADM

## 2025-07-19 RX ORDER — POTASSIUM CHLORIDE 1500 MG/1
40 TABLET, EXTENDED RELEASE ORAL PRN
Status: DISCONTINUED | OUTPATIENT
Start: 2025-07-19 | End: 2025-07-22 | Stop reason: HOSPADM

## 2025-07-19 RX ORDER — SODIUM CHLORIDE 0.9 % (FLUSH) 0.9 %
5-40 SYRINGE (ML) INJECTION PRN
Status: DISCONTINUED | OUTPATIENT
Start: 2025-07-19 | End: 2025-07-22 | Stop reason: HOSPADM

## 2025-07-19 RX ORDER — ATORVASTATIN CALCIUM 80 MG/1
80 TABLET, FILM COATED ORAL DAILY
Status: DISCONTINUED | OUTPATIENT
Start: 2025-07-19 | End: 2025-07-22 | Stop reason: HOSPADM

## 2025-07-19 RX ORDER — SENNOSIDES 8.8 MG/5ML
5 LIQUID ORAL 2 TIMES DAILY PRN
Status: DISCONTINUED | OUTPATIENT
Start: 2025-07-19 | End: 2025-07-22 | Stop reason: HOSPADM

## 2025-07-19 RX ORDER — SODIUM CHLORIDE, SODIUM LACTATE, POTASSIUM CHLORIDE, AND CALCIUM CHLORIDE .6; .31; .03; .02 G/100ML; G/100ML; G/100ML; G/100ML
500 INJECTION, SOLUTION INTRAVENOUS ONCE
Status: COMPLETED | OUTPATIENT
Start: 2025-07-19 | End: 2025-07-19

## 2025-07-19 RX ORDER — POTASSIUM CHLORIDE 7.45 MG/ML
10 INJECTION INTRAVENOUS PRN
Status: DISCONTINUED | OUTPATIENT
Start: 2025-07-19 | End: 2025-07-22 | Stop reason: HOSPADM

## 2025-07-19 RX ORDER — METOPROLOL SUCCINATE 50 MG/1
50 TABLET, EXTENDED RELEASE ORAL 2 TIMES DAILY
Status: DISCONTINUED | OUTPATIENT
Start: 2025-07-19 | End: 2025-07-22 | Stop reason: HOSPADM

## 2025-07-19 RX ORDER — FUROSEMIDE 40 MG/1
20 TABLET ORAL DAILY PRN
Status: DISCONTINUED | OUTPATIENT
Start: 2025-07-19 | End: 2025-07-22 | Stop reason: HOSPADM

## 2025-07-19 RX ORDER — ERGOCALCIFEROL 1.25 MG/1
50000 CAPSULE, LIQUID FILLED ORAL WEEKLY
Status: DISCONTINUED | OUTPATIENT
Start: 2025-07-19 | End: 2025-07-19

## 2025-07-19 RX ORDER — BUTALBITAL, ACETAMINOPHEN AND CAFFEINE 50; 325; 40 MG/1; MG/1; MG/1
1 TABLET ORAL EVERY 4 HOURS PRN
Status: DISCONTINUED | OUTPATIENT
Start: 2025-07-19 | End: 2025-07-22 | Stop reason: HOSPADM

## 2025-07-19 RX ORDER — ONDANSETRON 2 MG/ML
4 INJECTION INTRAMUSCULAR; INTRAVENOUS EVERY 6 HOURS PRN
Status: DISCONTINUED | OUTPATIENT
Start: 2025-07-19 | End: 2025-07-22 | Stop reason: HOSPADM

## 2025-07-19 RX ORDER — ISOSORBIDE MONONITRATE 60 MG/1
60 TABLET, EXTENDED RELEASE ORAL DAILY
Status: DISCONTINUED | OUTPATIENT
Start: 2025-07-19 | End: 2025-07-22 | Stop reason: HOSPADM

## 2025-07-19 RX ORDER — NITROGLYCERIN 0.4 MG/1
0.4 TABLET SUBLINGUAL ONCE
Status: COMPLETED | OUTPATIENT
Start: 2025-07-19 | End: 2025-07-19

## 2025-07-19 RX ORDER — ACETAMINOPHEN 325 MG/1
650 TABLET ORAL EVERY 6 HOURS PRN
Status: DISCONTINUED | OUTPATIENT
Start: 2025-07-19 | End: 2025-07-22 | Stop reason: HOSPADM

## 2025-07-19 RX ORDER — LIDOCAINE 4 G/G
1 PATCH TOPICAL DAILY
Status: DISCONTINUED | OUTPATIENT
Start: 2025-07-19 | End: 2025-07-22 | Stop reason: HOSPADM

## 2025-07-19 RX ORDER — CLOPIDOGREL BISULFATE 75 MG/1
75 TABLET ORAL DAILY
Status: DISCONTINUED | OUTPATIENT
Start: 2025-07-19 | End: 2025-07-22 | Stop reason: HOSPADM

## 2025-07-19 RX ORDER — MORPHINE SULFATE 2 MG/ML
2 INJECTION, SOLUTION INTRAMUSCULAR; INTRAVENOUS ONCE
Refills: 0 | Status: COMPLETED | OUTPATIENT
Start: 2025-07-19 | End: 2025-07-19

## 2025-07-19 RX ORDER — RANOLAZINE 500 MG/1
1000 TABLET, EXTENDED RELEASE ORAL DAILY
Status: DISCONTINUED | OUTPATIENT
Start: 2025-07-19 | End: 2025-07-22 | Stop reason: HOSPADM

## 2025-07-19 RX ORDER — SODIUM CHLORIDE 9 MG/ML
INJECTION, SOLUTION INTRAVENOUS PRN
Status: DISCONTINUED | OUTPATIENT
Start: 2025-07-19 | End: 2025-07-22 | Stop reason: HOSPADM

## 2025-07-19 RX ADMIN — RANOLAZINE 1000 MG: 500 TABLET, EXTENDED RELEASE ORAL at 11:28

## 2025-07-19 RX ADMIN — MORPHINE SULFATE 2 MG: 2 INJECTION, SOLUTION INTRAMUSCULAR; INTRAVENOUS at 05:36

## 2025-07-19 RX ADMIN — LEVETIRACETAM 500 MG: 500 TABLET, FILM COATED ORAL at 21:15

## 2025-07-19 RX ADMIN — METOPROLOL SUCCINATE 50 MG: 50 TABLET, EXTENDED RELEASE ORAL at 11:27

## 2025-07-19 RX ADMIN — BUTALBITAL, ACETAMINOPHEN AND CAFFEINE 1 TABLET: 325; 50; 40 TABLET ORAL at 18:01

## 2025-07-19 RX ADMIN — ATORVASTATIN CALCIUM 80 MG: 80 TABLET, FILM COATED ORAL at 11:28

## 2025-07-19 RX ADMIN — LIDOCAINE HYDROCHLORIDE: 20 SOLUTION ORAL at 05:34

## 2025-07-19 RX ADMIN — SODIUM CHLORIDE, SODIUM LACTATE, POTASSIUM CHLORIDE, AND CALCIUM CHLORIDE 500 ML: .6; .31; .03; .02 INJECTION, SOLUTION INTRAVENOUS at 13:20

## 2025-07-19 RX ADMIN — LEVETIRACETAM 500 MG: 500 TABLET, FILM COATED ORAL at 11:28

## 2025-07-19 RX ADMIN — CLOPIDOGREL BISULFATE 75 MG: 75 TABLET, FILM COATED ORAL at 11:27

## 2025-07-19 RX ADMIN — SODIUM CHLORIDE, PRESERVATIVE FREE 5 ML: 5 INJECTION INTRAVENOUS at 13:21

## 2025-07-19 RX ADMIN — APIXABAN 5 MG: 5 TABLET, FILM COATED ORAL at 11:28

## 2025-07-19 RX ADMIN — METOPROLOL SUCCINATE 50 MG: 50 TABLET, EXTENDED RELEASE ORAL at 21:15

## 2025-07-19 RX ADMIN — NITROGLYCERIN 0.4 MG: 0.4 TABLET, ORALLY DISINTEGRATING SUBLINGUAL at 11:33

## 2025-07-19 RX ADMIN — SODIUM CHLORIDE, PRESERVATIVE FREE 10 ML: 5 INJECTION INTRAVENOUS at 21:15

## 2025-07-19 RX ADMIN — IOPAMIDOL 80 ML: 755 INJECTION, SOLUTION INTRAVENOUS at 07:51

## 2025-07-19 RX ADMIN — APIXABAN 5 MG: 5 TABLET, FILM COATED ORAL at 21:15

## 2025-07-19 RX ADMIN — ISOSORBIDE MONONITRATE 60 MG: 60 TABLET, EXTENDED RELEASE ORAL at 11:27

## 2025-07-19 ASSESSMENT — PAIN DESCRIPTION - DESCRIPTORS
DESCRIPTORS: SHARP
DESCRIPTORS: ACHING
DESCRIPTORS: SHARP

## 2025-07-19 ASSESSMENT — PAIN - FUNCTIONAL ASSESSMENT
PAIN_FUNCTIONAL_ASSESSMENT: PREVENTS OR INTERFERES SOME ACTIVE ACTIVITIES AND ADLS
PAIN_FUNCTIONAL_ASSESSMENT: 0-10

## 2025-07-19 ASSESSMENT — PAIN DESCRIPTION - LOCATION
LOCATION: CHEST;ARM
LOCATION: CHEST;ARM
LOCATION: HEAD
LOCATION: CHEST;ARM
LOCATION: CHEST;ARM
LOCATION: CHEST

## 2025-07-19 ASSESSMENT — PAIN DESCRIPTION - FREQUENCY
FREQUENCY: INTERMITTENT
FREQUENCY: INTERMITTENT

## 2025-07-19 ASSESSMENT — PAIN DESCRIPTION - ORIENTATION
ORIENTATION: LEFT;ANTERIOR
ORIENTATION: LEFT
ORIENTATION: LEFT;ANTERIOR
ORIENTATION: LEFT;ANTERIOR
ORIENTATION: ANTERIOR
ORIENTATION: LEFT;ANTERIOR;MID;UPPER
ORIENTATION: LEFT;ANTERIOR

## 2025-07-19 ASSESSMENT — PAIN SCALES - GENERAL
PAINLEVEL_OUTOF10: 3
PAINLEVEL_OUTOF10: 7
PAINLEVEL_OUTOF10: 8

## 2025-07-19 ASSESSMENT — PAIN DESCRIPTION - ONSET: ONSET: ON-GOING

## 2025-07-19 ASSESSMENT — PAIN DESCRIPTION - PAIN TYPE: TYPE: ACUTE PAIN

## 2025-07-19 ASSESSMENT — PAIN DESCRIPTION - DIRECTION: RADIATING_TOWARDS: DOWN LEFT ARM

## 2025-07-19 NOTE — PROGRESS NOTES
Spiritual Health Attempted Visit Note  [facility]      Room # 8A-16/016-A    Name: Chriss Braga           Age: 69 y.o.    Gender: male          MRN: [id number]  Evangelical: Anabaptist       Preferred Language: English      Date: 07/19/25  Visit Time: Begin Time: (P) 1015 End Time : (P) 1020 Complexity of Encounter: (P) Low      Visit Summary:  visited the patient. The patient stated he only wished for a moment of prayer and blessing and was provided this care.       Referral/Consult From: Nurse  Encounter Overview/Reason: Initial Encounter  Encounter Code:     Crisis (if applicable):    Service Provided For: Patient     Patient was available.          Electronically signed by

## 2025-07-19 NOTE — CARE COORDINATION
07/19/25 0943   Service Assessment   Patient Orientation Alert and Oriented   Cognition Alert   History Provided By Patient   Primary Caregiver Self   Accompanied By/Relationship n/a   Support Systems Children;Family Members;Home Care Staff  ([facility] )   Patient's Healthcare Decision Maker is: Patient Declined (Legal Next of Kin Remains as Decision Maker)   PCP Verified by CM Yes   Last Visit to PCP Within last 3 months   Prior Functional Level Independent in ADLs/IADLs   Current Functional Level Independent in ADLs/IADLs   Can patient return to prior living arrangement Yes   Ability to make needs known: Good   Family able to assist with home care needs: Yes   Would you like for me to discuss the discharge plan with any other family members/significant others, and if so, who? No   Financial Resources Medicare   Community Resources ECF/Home Care  ([facility] )   Social/Functional History   Active  Yes   Discharge Planning   Type of Residence House   Living Arrangements Family Members  (Brother)   Current Services Prior To Admission Durable Medical Equipment;Home Care  ([facility] )   Current DME Prior to Arrival Cane   Potential Assistance Needed N/A   DME Ordered? No   Potential Assistance Purchasing Medications No   Type of Home Care Services PT;OT;Nursing Services   Patient expects to be discharged to: House   Services At/After Discharge   Mode of Transport at Discharge Other (see comment)  ([employer])   Confirm Follow Up Transport Cab   Condition of Participation: Discharge Planning   The Plan for Transition of Care is related to the following treatment goals: \"get rid of the chest pain\"     Patient Goals/Plan/Treatment Preferences: Plans home with brother. Uses [facility] to get home [facility] hospital. [facility]     If patient is discharged prior to next notation, then this note serves as note for discharge by case management.

## 2025-07-19 NOTE — ED TRIAGE NOTES
Pt present to the ED via EMS from home with c/o chest pain that started about an hour ago and woke pt up from sleep. Pt rates pain 8/10 constant and sharp in mid to left side of chest radiating to left side of neck, shoulder and arm. Pt medicated by EMS en route with 324mg Aspirin and 1 Nitro, pt states the medication helped a little bit. Pt vitals stable and pt alert and oriented x4 at this time. EMS states pt smoke marijuana around same time chest pain started, pt denies to this RN doing any alcohol, drugs, nicotine.

## 2025-07-19 NOTE — ED NOTES
ED to inpatient nurses report      Chief Complaint:  No chief complaint on file.    Present to ED from: Home    MOA:     LOC: alert and orientated to name, place, date  Mobility: Requires assistance * 1  Oxygen Baseline: ra    Current needs required: See MAR     Code Status:   Prior    What abnormal results were found and what did you give/do to treat them? Chest pain, Lacunar Infarction  Any procedures or intervention occur? See MAR    Mental Status:  Level of Consciousness: Alert (0)    Psych Assessment:        Vitals:  Patient Vitals for the past 24 hrs:   BP Temp Temp src Pulse Resp SpO2 Height Weight   07/19/25 0647 -- -- -- 60 10 98 % -- --   07/19/25 0601 121/79 -- -- 63 11 97 % -- --   07/19/25 0539 118/78 -- -- 65 12 94 % -- --   07/19/25 0536 -- -- -- -- 12 -- -- --   07/19/25 0517 116/79 97.5 °F (36.4 °C) Oral 71 13 92 % 1.753 m (5' 9\") 70.3 kg (155 lb)        LDAs:   Peripheral IV 07/19/25 Left Antecubital (Active)   Site Assessment Clean, dry & intact 07/19/25 0647   Line Status Normal saline locked 07/19/25 0647   Phlebitis Assessment No symptoms 07/19/25 0647   Infiltration Assessment 0 07/19/25 0647   Dressing Status Clean, dry & intact 07/19/25 0647       Ambulatory Status:  Presents to emergency department  because of falls (Syncope, seizure, or loss of consciousness): No, Age > 70: No, Altered Mental Status, Intoxication with alcohol or substance confusion (Disorientation, impaired judgment, poor safety awaremess, or inability to follow instructions): No, Impaired Mobility: Ambulates or transfers with assistive devices or assistance; Unable to ambulate or transer.: Yes, Nursing Judgement: Yes    Diagnosis:  DISPOSITION Decision To Admit 07/19/2025 06:49:44 AM   Final diagnoses:   Chest pain, unspecified type   Lacunar infarction (HCC)        Consults:  None     Pain Score:  Pain Assessment  Pain Assessment: 0-10  Pain Level: 7  Pain Location: Chest, Arm  Pain Orientation: Left, Anterior  Pain

## 2025-07-19 NOTE — ED PROVIDER NOTES
[facility] EMERGENCY DEPARTMENT    EMERGENCY MEDICINE      Pt Name: Chriss Braga  MRN: [id number]  Birthdate 1955  Date of evaluation: 7/19/2025  Treating Physician: David Huber DO    CHIEF COMPLAINT     No chief complaint on file.    History obtained from chart review and the patient.    HISTORY OF PRESENT ILLNESS    HPI    Chriss Braga is a 69 y.o. male presents to the emergency department for evaluation of chest pain.  Patient states that it began 1 hour prior to arrival.  It woke him up from sleep.  He endorses significant cardiac history with open heart.  Taking all medication as prescribed.  Was given aspirin and 1 nitro and route that mildly helped.  Pain level 8 out of 10.  Mid chest rating to the left arm and left neck area.  Also he is feeling general weakness and fatigue.  Denies any sweating, shortness of breath, fevers, chills, new rashes, changes to bowel movements or urination.    The patient has no other acute complaints at this time.      PAST MEDICAL AND SURGICAL HISTORY     Past Medical History:   Diagnosis Date    Abnormal chest CT 02/05/2024    Accident due to mechanical fall without injury 01/27/2025    Acute cerebrovascular accident (CVA) due to ischemia (HCC) 09/10/2021    Anemia     Microcytic anemia.     Angina     Arthritis     Atypical chest pain     Question if this is secondary to his uncontrolled hypertension or if this is secondary to exacerbation of COPD or secondary to his recent ICD implantation.    Blood transfusion 01/01/2011    CAD (coronary artery disease)     Cardiac arrhythmia 01/31/2025    Cardiomyopathy (HCC)     Cataract     bilateral    Chest discomfort     Chronic systolic heart failure (HCC) 12/07/2022    Depression     Dizziness     Patient complains of dizziness with movement.     Erectile dysfunction     Possible erectile dysfunction symptoms.     Fall from ground level 11/16/2018    Family history of hypertension     Frequent nocturnal awakening

## 2025-07-19 NOTE — PROGRESS NOTES
Pt still c/o of 7/10 CP. Pt stated, \"Pain comes and goes.\" I asked pt if he wanted another Nitro, pt stated, \"No. I have a HA, can I have some morphine?\" This nurse told pt we could start with tylenol for his HA. Pt refused tylenol, he said it does not help him. This nurse updated the attending on pt's request. See new order.

## 2025-07-19 NOTE — CONSULTS
Neurology Consult Note    Date:7/19/2025       Room:[address] [place] [address]  Patient Name:Chriss Braga     YOB: 1955     Age:69 y.o.    Requesting Physician: Xochilt Kate MD     Reason for Consult:  Age indeterminate right thalamic stroke        Chief Complaint: No chief complaint on file.      Subjective     Chriss Braga is a 69 y.o. male w/pmh of HTN, HLD, CAD, ICD, PAF on Eliquis, HFrEF, Prior smoker, Marijuana user who presents with chest pain. CT Head was obtained in the ER that showed an age indeterminate right thalamic stroke in comparison to prior CT Head obtained in February this year. Neurology consulted for stroke.     When asked why he presents to the hospital, patient states that he came in due to chest pain, along with left sided weakness/numbness that have been ongoing x 1 year. He then states that he developed left sided weakness yesterday. Then states that he developed left sided weakness with walking difficulty today; that he got up this morning and could not walk.     Review of Systems   Negative except for HPI   Medications   Scheduled Meds:    sodium chloride flush  5-40 mL IntraVENous 2 times per day    apixaban  5 mg Oral BID    atorvastatin  80 mg Oral Daily    clopidogrel  75 mg Oral Daily    isosorbide mononitrate  60 mg Oral Daily    levETIRAcetam  500 mg Oral BID    metoprolol succinate  50 mg Oral BID    ranolazine  1,000 mg Oral Daily    vitamin D  50,000 Units Oral Weekly    lactated ringers  500 mL IntraVENous Once     Continuous Infusions:    sodium chloride       PRN Meds: sodium chloride flush, sodium chloride, potassium chloride **OR** potassium alternative oral replacement **OR** potassium chloride, magnesium sulfate, ondansetron **OR** ondansetron, bisacodyl, senna, acetaminophen **OR** acetaminophen, furosemide, iopamidol  Medications Prior to Admission:   No current facility-administered medications on file prior to encounter.     Current Outpatient Medications on File

## 2025-07-19 NOTE — ED NOTES
Pt is laying in bed with lights off, pt appears to be resting comfortably at this time. Pt is breathing regular and unlabored on room air at this time. Pt is alert and oriented x4 at this time. Call light within reach. Provider made aware of NIH findings.

## 2025-07-19 NOTE — H&P
Patient:  Chriss Braga  YOB: 1955  Date of Service: 7/19/2025  Admission Date:7/19/2025  Code Status: Prior  NIHSS:    Chief Complaint   No chief complaint on file.      Assessment / Plan     Age indeterminate infarct: As noted on CT.  Neurology consulted for recommendations.  Continue statin and DAPT therapy.MRI wo contrast pending. CTA head and neck pending.   JASMYN on CKD:  1.3 Cr OA. Baseline 1. Urine studies pending. Fluids given pre contrast.   Hx of CVA: Noted to have left-sided weakness in the upper and lower extremity which is residual from prior CVA in 2021  Pituitary adenoma: Diagnosed on June 2024.  Unchanged on recent CT  CAD: Low suspicion for ACS at this time. post PCI of the right RCA: Stent placed on April 7, 2025. On DAPT. Patient is status post AICD with NSVT.  Currently on metoprolol 25 twice daily.  Most recent stress test negative. Trops flat. On ranexa.   Hypertension: On antihypertensives. Continue.  HFrEF: Status post AICD.  Echo 15 to 25%. On GDMT medication  Polysubstance abuse: Endorses marijuana and cocaine use.  Paroxysmal atrial fibrillation not in RVR: Chads 5.  On Eliquis.  Rate controlled with metoprolol,          LDA: []CVC / []PICC / []Midline / []Lucero / []Drains / []Mediport / [x]None  Antibiotics:   []Yes / [x]No  Steroids:  []Yes / [x]No  Labs: [x]Yes   Level of care: [x]Step Down / [x]Med-Surg  Bed Status: [x]Inpatient  Telemetry: [x]Yes / []No  PT/OT: []Yes / [x]No    DVT Prophylaxis: [] Lovenox / [] Heparin / [] SCDs / [x] Already on Systemic Anticoagulation / [] None     Bowel:    BP: 121/79 HR: 60  Air:   on None (Room air) on    I/O: No intake/output data recorded.  Diet: No diet orders on file      Subjective   General Medicine History and Physical   Per Initial H&P:  Chriss Braga is a 69 y.o., , male with a PMH of CAD, PAF, chronic systolic heart failure who presented with chest pain.  Patient was recently admitted for similar type  cigarettes. He started smoking about 18 years ago. He has a 2 pack-year smoking history. He has never used smokeless tobacco. He reports that he does not currently use alcohol. He reports that he does not currently use drugs after having used the following drugs: Marijuana (Weed). family history includes Arthritis in his father and mother; Cancer in his mother; Heart Attack in his father; High Blood Pressure in his maternal aunt and mother; High Cholesterol in his maternal aunt; Stroke in his maternal aunt and mother.     Medications Prior to Admissions   Prior to Admission medications    Medication Sig Start Date End Date Taking? Authorizing Provider   ranolazine (RANEXA) 1000 MG extended release tablet Take 1 tablet by mouth daily 6/17/25   Newman-Waterhouse, Aundrea N,    furosemide (LASIX) 20 MG tablet Take 1 tablet by mouth daily as needed (for increased swelling or 3#/day wt gain) 5/1/25   Kamilah Chu, APRN - CNP   spironolactone (ALDACTONE) 25 MG tablet Take 1 tablet by mouth daily 4/29/25   Jean Carlos Smith PA-C   empagliflozin (JARDIANCE) 10 MG tablet Take 1 tablet by mouth daily 4/16/25   Kamilah Chu, APRN - CNP   apixaban (ELIQUIS) 5 MG TABS tablet Take 1 tablet by mouth 2 times daily 4/15/25   Zacarias Salazar MD   isosorbide mononitrate (IMDUR) 60 MG extended release tablet Take 1 tablet by mouth daily 4/15/25   Zacarias Salazar MD   atorvastatin (LIPITOR) 80 MG tablet Take 1 tablet by mouth daily 4/15/25   Zacarias Salazar MD   vitamin D (ERGOCALCIFEROL) 1.25 MG (08188 UT) CAPS capsule Take 1 capsule by mouth once a week 4/10/25   Teena Noel MD   nitroGLYCERIN (NITROSTAT) 0.4 MG SL tablet Place 1 tablet under the tongue every 5 minutes as needed for Chest pain up to max of 3 total doses. If no relief after 1 dose, call 911. 4/8/25   Geri Noel PA-C   Evolocumab (REPATHA SURECLICK) 140 MG/ML SOAJ Inject 140 mg into the skin every 14 days  Patient not taking: Reported on

## 2025-07-19 NOTE — SIGNIFICANT EVENT
Name: Chriss Braga  YOB: 1955  MRN: [id number]  Date: 07/19/25     Plan of Care        Patient's ICD incompatible with MRI. Cancelling order. Neuro informed.

## 2025-07-20 LAB
ANION GAP SERPL CALC-SCNC: 8 MEQ/L (ref 8–16)
BUN SERPL-MCNC: 16 MG/DL (ref 8–23)
CALCIUM SERPL-MCNC: 8.3 MG/DL (ref 8.8–10.2)
CHLORIDE SERPL-SCNC: 110 MEQ/L (ref 98–111)
CO2 SERPL-SCNC: 23 MEQ/L (ref 22–29)
CREAT SERPL-MCNC: 0.9 MG/DL (ref 0.7–1.2)
DEPRECATED RDW RBC AUTO: 42.1 FL (ref 35–45)
ERYTHROCYTE [DISTWIDTH] IN BLOOD BY AUTOMATED COUNT: 16.6 % (ref 11.5–14.5)
GFR SERPL CREATININE-BSD FRML MDRD: > 90 ML/MIN/1.73M2
GLUCOSE SERPL-MCNC: 95 MG/DL (ref 74–109)
HCT VFR BLD AUTO: 32.7 % (ref 42–52)
HGB BLD-MCNC: 10 GM/DL (ref 14–18)
MAGNESIUM SERPL-MCNC: 2.1 MG/DL (ref 1.6–2.6)
MCH RBC QN AUTO: 22 PG (ref 26–33)
MCHC RBC AUTO-ENTMCNC: 30.6 GM/DL (ref 32.2–35.5)
MCV RBC AUTO: 71.9 FL (ref 80–94)
PLATELET # BLD AUTO: 192 THOU/MM3 (ref 130–400)
PMV BLD AUTO: 10 FL (ref 9.4–12.4)
POTASSIUM SERPL-SCNC: 4.2 MEQ/L (ref 3.5–5.2)
RBC # BLD AUTO: 4.55 MILL/MM3 (ref 4.7–6.1)
SODIUM SERPL-SCNC: 141 MEQ/L (ref 135–145)
TROPONIN, HIGH SENSITIVITY: 7 NG/L (ref 0–12)
WBC # BLD AUTO: 6.7 THOU/MM3 (ref 4.8–10.8)

## 2025-07-20 PROCEDURE — 93005 ELECTROCARDIOGRAM TRACING: CPT

## 2025-07-20 PROCEDURE — 36415 COLL VENOUS BLD VENIPUNCTURE: CPT

## 2025-07-20 PROCEDURE — 85027 COMPLETE CBC AUTOMATED: CPT

## 2025-07-20 PROCEDURE — 80048 BASIC METABOLIC PNL TOTAL CA: CPT

## 2025-07-20 PROCEDURE — 84484 ASSAY OF TROPONIN QUANT: CPT

## 2025-07-20 PROCEDURE — 83735 ASSAY OF MAGNESIUM: CPT

## 2025-07-20 PROCEDURE — 99232 SBSQ HOSP IP/OBS MODERATE 35: CPT | Performed by: PHYSICIAN ASSISTANT

## 2025-07-20 PROCEDURE — 94669 MECHANICAL CHEST WALL OSCILL: CPT

## 2025-07-20 PROCEDURE — 1200000003 HC TELEMETRY R&B

## 2025-07-20 PROCEDURE — 6370000000 HC RX 637 (ALT 250 FOR IP)

## 2025-07-20 PROCEDURE — 2500000003 HC RX 250 WO HCPCS

## 2025-07-20 RX ORDER — NITROGLYCERIN 0.4 MG/1
0.4 TABLET SUBLINGUAL PRN
Status: DISCONTINUED | OUTPATIENT
Start: 2025-07-20 | End: 2025-07-22 | Stop reason: HOSPADM

## 2025-07-20 RX ADMIN — LEVETIRACETAM 500 MG: 500 TABLET, FILM COATED ORAL at 22:27

## 2025-07-20 RX ADMIN — LEVETIRACETAM 500 MG: 500 TABLET, FILM COATED ORAL at 10:09

## 2025-07-20 RX ADMIN — SODIUM CHLORIDE, PRESERVATIVE FREE 10 ML: 5 INJECTION INTRAVENOUS at 22:28

## 2025-07-20 RX ADMIN — APIXABAN 5 MG: 5 TABLET, FILM COATED ORAL at 22:27

## 2025-07-20 RX ADMIN — ISOSORBIDE MONONITRATE 60 MG: 60 TABLET, EXTENDED RELEASE ORAL at 10:08

## 2025-07-20 RX ADMIN — NITROGLYCERIN 0.4 MG: 0.4 TABLET, ORALLY DISINTEGRATING SUBLINGUAL at 22:36

## 2025-07-20 RX ADMIN — SODIUM CHLORIDE, PRESERVATIVE FREE 10 ML: 5 INJECTION INTRAVENOUS at 10:09

## 2025-07-20 RX ADMIN — METOPROLOL SUCCINATE 50 MG: 50 TABLET, EXTENDED RELEASE ORAL at 10:08

## 2025-07-20 RX ADMIN — APIXABAN 5 MG: 5 TABLET, FILM COATED ORAL at 10:09

## 2025-07-20 RX ADMIN — NITROGLYCERIN 0.4 MG: 0.4 TABLET, ORALLY DISINTEGRATING SUBLINGUAL at 22:29

## 2025-07-20 RX ADMIN — RANOLAZINE 1000 MG: 500 TABLET, EXTENDED RELEASE ORAL at 10:09

## 2025-07-20 RX ADMIN — METOPROLOL SUCCINATE 50 MG: 50 TABLET, EXTENDED RELEASE ORAL at 22:27

## 2025-07-20 RX ADMIN — ATORVASTATIN CALCIUM 80 MG: 80 TABLET, FILM COATED ORAL at 10:08

## 2025-07-20 RX ADMIN — CLOPIDOGREL BISULFATE 75 MG: 75 TABLET, FILM COATED ORAL at 10:08

## 2025-07-20 ASSESSMENT — PAIN DESCRIPTION - DESCRIPTORS
DESCRIPTORS: ACHING
DESCRIPTORS: SHARP

## 2025-07-20 ASSESSMENT — PAIN DESCRIPTION - LOCATION
LOCATION: CHEST

## 2025-07-20 ASSESSMENT — PAIN SCALES - GENERAL
PAINLEVEL_OUTOF10: 8
PAINLEVEL_OUTOF10: 7
PAINLEVEL_OUTOF10: 6

## 2025-07-20 ASSESSMENT — PAIN DESCRIPTION - ORIENTATION
ORIENTATION: LEFT;MID
ORIENTATION: MID;LEFT
ORIENTATION: MID
ORIENTATION: LEFT;MID

## 2025-07-20 ASSESSMENT — PAIN DESCRIPTION - ONSET: ONSET: ON-GOING

## 2025-07-20 ASSESSMENT — PAIN DESCRIPTION - DIRECTION: RADIATING_TOWARDS: DOWN LEFT ARM

## 2025-07-20 ASSESSMENT — PAIN - FUNCTIONAL ASSESSMENT: PAIN_FUNCTIONAL_ASSESSMENT: ACTIVITIES ARE NOT PREVENTED

## 2025-07-20 ASSESSMENT — PAIN DESCRIPTION - FREQUENCY: FREQUENCY: CONTINUOUS

## 2025-07-20 ASSESSMENT — PAIN DESCRIPTION - PAIN TYPE: TYPE: ACUTE PAIN

## 2025-07-20 NOTE — PROGRESS NOTES
Hospitalist Progress Note      Patient:  Chriss Braga 69 y.o. male     Unit/Bed:8A-16/016-A    Date of Admission: 7/19/2025      ASSESSMENT AND PLAN    Active Problems  Right Thalamic stroke, age indeterminate   Continue home Eliquis 5 mg BID and Plavix 75 mg for stroke prevention   Continue Atorvastatin 80 mg for stroke prevention   LDL 85, hemoglobin A1c ordered   CT Head W WO Contrast ordered for 7/22  CT Angio Head and Neck negative for significant stenosis, LVO   MRI brain is unable to be obtained due to patients ICD  PT & OT     Resolved Problems  JASMYN on CKD:  1.3 Cr OA. Currently .9. Baseline 1. Urine studies normal. Fluids given pre contrast.       Chronic Conditions (reviewed and stable unless otherwise stated)  Hx of CVA: Noted to have left-sided weakness in the upper and lower extremity which is residual from prior CVA in 2021  Pituitary adenoma: Diagnosed on June 2024.  Unchanged on recent CT  CAD: Low suspicion for ACS at this time. post PCI of the right RCA: Stent placed on April 7, 2025. On DAPT. Patient is status post AICD with NSVT.  Currently on metoprolol 25 twice daily.  Most recent stress test negative. Trops flat. On ranexa.   Hypertension: On antihypertensives. Continue.  HFrEF: Status post AICD.  Echo 15 to 25%. On GDMT medication  Polysubstance abuse: Endorses marijuana and cocaine use.  Paroxysmal atrial fibrillation not in RVR: Chads 5.  On Eliquis.  Rate controlled with metoprolol,      LDA: []CVC / []PICC / []Midline / []Lucero / []Drains / []Mediport / [x]None  Antibiotics: none   Steroids: none  Labs (still needed?): [x]Yes / []No  IVF (still needed?): [x]Yes / []No    Level of care: [x]Step Down / []Med-Surg  Bed Status: [x]Inpatient / []Observation  Telemetry: [x]Yes / []No  PT/OT: []Yes / [x]No    DVT Prophylaxis: [] Lovenox / [] Heparin / [] SCDs / [x] Already on Systemic Anticoagulation / [] None   Code status: Full Code     Expected discharge date:  TBD  Disposition: Home

## 2025-07-20 NOTE — PLAN OF CARE
Neurology Brief Plan of Care Note    Patient is a 69 year old male whom Neurology was consulted on for age indeterminate right thalamic stroke. Patient noted to have LUE and LLE weakness on exam, reporting acute onset of sx on 7/19 or 7/18 verus one year. Given ambiguity of sx onset have recommended repeating a CT Head WO Contrast. This is ordered for Tuesday on 7/22. Will follow peripherally till it is obtained.     Stephanie Qureshi NP   Case discussed with attending physician Dr. Allen

## 2025-07-20 NOTE — PLAN OF CARE
Problem: Chronic Conditions and Co-morbidities  Goal: Patient's chronic conditions and co-morbidity symptoms are monitored and maintained or improved  Outcome: Progressing     Problem: Discharge Planning  Goal: Discharge to home or other facility with appropriate resources  Outcome: Progressing     Problem: Pain  Goal: Verbalizes/displays adequate comfort level or baseline comfort level  Outcome: Progressing  Flowsheets (Taken 7/20/2025 5840)  Verbalizes/displays adequate comfort level or baseline comfort level:   Assess pain using appropriate pain scale   Encourage patient to monitor pain and request assistance     Problem: Safety - Adult  Goal: Free from fall injury  Outcome: Progressing

## 2025-07-21 PROBLEM — R53.1 LEFT-SIDED WEAKNESS: Status: ACTIVE | Noted: 2025-07-21

## 2025-07-21 LAB
ANION GAP SERPL CALC-SCNC: 11 MEQ/L (ref 8–16)
BUN SERPL-MCNC: 12 MG/DL (ref 8–23)
CALCIUM SERPL-MCNC: 8.8 MG/DL (ref 8.8–10.2)
CHLORIDE SERPL-SCNC: 106 MEQ/L (ref 98–111)
CO2 SERPL-SCNC: 23 MEQ/L (ref 22–29)
CREAT SERPL-MCNC: 0.9 MG/DL (ref 0.7–1.2)
DEPRECATED RDW RBC AUTO: 40.7 FL (ref 35–45)
EKG ATRIAL RATE: 66 BPM
EKG P AXIS: 65 DEGREES
EKG P-R INTERVAL: 184 MS
EKG Q-T INTERVAL: 436 MS
EKG QRS DURATION: 92 MS
EKG QTC CALCULATION (BAZETT): 457 MS
EKG R AXIS: 16 DEGREES
EKG T AXIS: -21 DEGREES
EKG VENTRICULAR RATE: 66 BPM
ERYTHROCYTE [DISTWIDTH] IN BLOOD BY AUTOMATED COUNT: 16.7 % (ref 11.5–14.5)
GFR SERPL CREATININE-BSD FRML MDRD: > 90 ML/MIN/1.73M2
GLUCOSE SERPL-MCNC: 90 MG/DL (ref 74–109)
HCT VFR BLD AUTO: 36.6 % (ref 42–52)
HGB BLD-MCNC: 11.5 GM/DL (ref 14–18)
MAGNESIUM SERPL-MCNC: 2.2 MG/DL (ref 1.6–2.6)
MCH RBC QN AUTO: 21.9 PG (ref 26–33)
MCHC RBC AUTO-ENTMCNC: 31.4 GM/DL (ref 32.2–35.5)
MCV RBC AUTO: 69.7 FL (ref 80–94)
PLATELET # BLD AUTO: 191 THOU/MM3 (ref 130–400)
PMV BLD AUTO: 9.5 FL (ref 9.4–12.4)
POTASSIUM SERPL-SCNC: 4 MEQ/L (ref 3.5–5.2)
RBC # BLD AUTO: 5.25 MILL/MM3 (ref 4.7–6.1)
SCAN OF BLOOD SMEAR: NORMAL
SODIUM SERPL-SCNC: 140 MEQ/L (ref 135–145)
WBC # BLD AUTO: 8.3 THOU/MM3 (ref 4.8–10.8)

## 2025-07-21 PROCEDURE — 97166 OT EVAL MOD COMPLEX 45 MIN: CPT

## 2025-07-21 PROCEDURE — 80048 BASIC METABOLIC PNL TOTAL CA: CPT

## 2025-07-21 PROCEDURE — 92610 EVALUATE SWALLOWING FUNCTION: CPT

## 2025-07-21 PROCEDURE — 6370000000 HC RX 637 (ALT 250 FOR IP)

## 2025-07-21 PROCEDURE — 36415 COLL VENOUS BLD VENIPUNCTURE: CPT

## 2025-07-21 PROCEDURE — 97530 THERAPEUTIC ACTIVITIES: CPT

## 2025-07-21 PROCEDURE — 85027 COMPLETE CBC AUTOMATED: CPT

## 2025-07-21 PROCEDURE — 99232 SBSQ HOSP IP/OBS MODERATE 35: CPT | Performed by: PHYSICIAN ASSISTANT

## 2025-07-21 PROCEDURE — 83735 ASSAY OF MAGNESIUM: CPT

## 2025-07-21 PROCEDURE — 97162 PT EVAL MOD COMPLEX 30 MIN: CPT

## 2025-07-21 PROCEDURE — 97535 SELF CARE MNGMENT TRAINING: CPT

## 2025-07-21 PROCEDURE — 2500000003 HC RX 250 WO HCPCS

## 2025-07-21 PROCEDURE — 99232 SBSQ HOSP IP/OBS MODERATE 35: CPT

## 2025-07-21 PROCEDURE — 1200000003 HC TELEMETRY R&B

## 2025-07-21 PROCEDURE — 92523 SPEECH SOUND LANG COMPREHEN: CPT

## 2025-07-21 PROCEDURE — 93010 ELECTROCARDIOGRAM REPORT: CPT | Performed by: NUCLEAR MEDICINE

## 2025-07-21 RX ADMIN — LEVETIRACETAM 500 MG: 500 TABLET, FILM COATED ORAL at 08:36

## 2025-07-21 RX ADMIN — ATORVASTATIN CALCIUM 80 MG: 80 TABLET, FILM COATED ORAL at 08:36

## 2025-07-21 RX ADMIN — APIXABAN 5 MG: 5 TABLET, FILM COATED ORAL at 19:56

## 2025-07-21 RX ADMIN — CLOPIDOGREL BISULFATE 75 MG: 75 TABLET, FILM COATED ORAL at 08:35

## 2025-07-21 RX ADMIN — METOPROLOL SUCCINATE 50 MG: 50 TABLET, EXTENDED RELEASE ORAL at 08:36

## 2025-07-21 RX ADMIN — SENNOSIDES 8.8 MG: 8.8 LIQUID ORAL at 08:35

## 2025-07-21 RX ADMIN — SODIUM CHLORIDE, PRESERVATIVE FREE 10 ML: 5 INJECTION INTRAVENOUS at 08:35

## 2025-07-21 RX ADMIN — RANOLAZINE 1000 MG: 500 TABLET, EXTENDED RELEASE ORAL at 08:36

## 2025-07-21 RX ADMIN — LEVETIRACETAM 500 MG: 500 TABLET, FILM COATED ORAL at 19:56

## 2025-07-21 RX ADMIN — BUTALBITAL, ACETAMINOPHEN AND CAFFEINE 1 TABLET: 325; 50; 40 TABLET ORAL at 13:13

## 2025-07-21 RX ADMIN — ISOSORBIDE MONONITRATE 60 MG: 60 TABLET, EXTENDED RELEASE ORAL at 08:36

## 2025-07-21 RX ADMIN — APIXABAN 5 MG: 5 TABLET, FILM COATED ORAL at 08:36

## 2025-07-21 RX ADMIN — FUROSEMIDE 20 MG: 40 TABLET ORAL at 08:36

## 2025-07-21 RX ADMIN — SODIUM CHLORIDE, PRESERVATIVE FREE 10 ML: 5 INJECTION INTRAVENOUS at 19:55

## 2025-07-21 RX ADMIN — METOPROLOL SUCCINATE 50 MG: 50 TABLET, EXTENDED RELEASE ORAL at 19:56

## 2025-07-21 RX ADMIN — BISACODYL 5 MG: 5 TABLET, COATED ORAL at 08:36

## 2025-07-21 ASSESSMENT — PAIN DESCRIPTION - LOCATION: LOCATION: HEAD

## 2025-07-21 ASSESSMENT — PAIN SCALES - GENERAL
PAINLEVEL_OUTOF10: 0
PAINLEVEL_OUTOF10: 0
PAINLEVEL_OUTOF10: 10
PAINLEVEL_OUTOF10: 0

## 2025-07-21 ASSESSMENT — VISUAL ACUITY: VA_NORMAL: 1

## 2025-07-21 ASSESSMENT — PAIN DESCRIPTION - DESCRIPTORS: DESCRIPTORS: ACHING

## 2025-07-21 ASSESSMENT — PAIN DESCRIPTION - ONSET: ONSET: SUDDEN

## 2025-07-21 ASSESSMENT — ENCOUNTER SYMPTOMS
PHOTOPHOBIA: 0
TROUBLE SWALLOWING: 0

## 2025-07-21 ASSESSMENT — PAIN - FUNCTIONAL ASSESSMENT: PAIN_FUNCTIONAL_ASSESSMENT: ACTIVITIES ARE NOT PREVENTED

## 2025-07-21 ASSESSMENT — PAIN DESCRIPTION - PAIN TYPE: TYPE: ACUTE PAIN

## 2025-07-21 ASSESSMENT — PAIN DESCRIPTION - ORIENTATION: ORIENTATION: RIGHT;LEFT

## 2025-07-21 ASSESSMENT — PAIN DESCRIPTION - FREQUENCY: FREQUENCY: INTERMITTENT

## 2025-07-21 NOTE — PROGRESS NOTES
[facility]  INPATIENT PHYSICAL THERAPY  EVALUATION  [facility] MED SURG 8AB - 8A-16/016-A    Discharge Recommendations: Home with Home health PT  Equipment Recommendations: No               Time In: 1403  Time Out: 1427  Timed Code Treatment Minutes: 15 Minutes  Minutes: 24          Date: 2025  Patient Name: Chriss Braga,  Gender:  male        MRN: [id number]  : 1955  (69 y.o.)      Referring Practitioner: Jaclyn Berrios PA  Diagnosis: Atypical chest pain  Additional Pertinent Hx: Per EMR: \"Chriss Braga is a 69 y.o., , male with a PMH of CAD, PAF, chronic systolic heart failure who presented with chest pain.  Patient was recently admitted for similar type of chest pain however it was determined with musculoskeletal in nature.  Patient states his chest pain woke him up from sleep this morning 1 hour prior to the arrival in the ED.  Patient does have a significant heart history and states he is taking an compliant with all his medications.  Patient also states that aspirin and nitro did help his chest pain mildly.  His chest pain is located in the mid sternum with left-sided radiation down the arm and up to the neck.  He generally feels weak and fatigued.     ED course revealed flat troponins however a CT head was done given a significant history of pituitary adenoma.  On repeat CT there seems to be an age-indeterminate infarct in thalamus that was not present on prior CT in February.  ED requested admission given new findings.  Neurology was consulted prior to admission\" Head CT pending      Restrictions/Precautions:  Restrictions/Precautions: Fall Risk, General Precautions       Other Position/Activity Restrictions: L weakness, + R thalamic stroke with unknown age determinate    Required Braces or Orthoses?: No      Subjective:  Chart Reviewed: Yes  Patient assessed for rehabilitation services?: Yes  Family/Caregiver Present: No  Subjective: Clearance from RN to see pt this

## 2025-07-21 NOTE — PLAN OF CARE
Problem: Chronic Conditions and Co-morbidities  Goal: Patient's chronic conditions and co-morbidity symptoms are monitored and maintained or improved  7/21/2025 1023 by Melanie Christensen RN  Outcome: Progressing  7/21/2025 0018 by Marce Patel RN  Outcome: Progressing     Problem: Discharge Planning  Goal: Discharge to home or other facility with appropriate resources  7/21/2025 1023 by Melanie Christensen RN  Outcome: Progressing  7/21/2025 0018 by Marce Patel RN  Outcome: Progressing     Problem: Pain  Goal: Verbalizes/displays adequate comfort level or baseline comfort level  7/21/2025 1023 by Melanie Christensen RN  Outcome: Progressing  7/21/2025 0018 by Marce Patel RN  Outcome: Progressing     Problem: Safety - Adult  Goal: Free from fall injury  7/21/2025 1023 by Melanie Christensen RN  Outcome: Progressing  7/21/2025 0018 by Marce Patel RN  Outcome: Progressing     Problem: Neurosensory - Adult  Goal: Achieves stable or improved neurological status  Outcome: Progressing

## 2025-07-21 NOTE — CARE COORDINATION
DISCHARGE PLANNING EVALUATION  7/21/25, 3:25 PM EDT    Reason for Referral: Current with [facility]  Decision Maker: Patient makes own decisions  Current Services: [facility]; nursing, PT & OT  New Services Requested: none  Family/ Social/ Home environment: Spoke with patient, he lives with his brother.  His brother transports to appointments.  Patient states he is independent with ADL's, home alone when his brother is working.   Payment Source:[facility] Medicare and [facility]  Transportation at Discharge: brother  Post-acute (PAC) provider list was provided to patient. Patient was informed of their freedom to choose PAC provider. Discussed and offered to show the patient the relevant PAC Providers quality and resource use measures on Medicare Compare web site via computer based on patient's goals of care and treatment preferences. Questions regarding selection process were answered.      Teach Back Method used with patient regarding care plan and discharge planning.  Patient  verbalized understanding of the plan of care and contribute to goal setting.       Patient preferences and discharge plan: Plan home with brother and resume [facility].    HH services confirmed with [facility].    Electronically signed by BUBBA Henson on 7/21/2025 at 3:25 PM

## 2025-07-21 NOTE — PROGRESS NOTES
[facility]  INPATIENT OCCUPATIONAL THERAPY  STRZ MED SURG 8AB  EVALUATION      Discharge Recommendations: Home with assist PRN  Equipment Recommendations:    none      Time In: 1108  Time Out: 1139  Timed Code Treatment Minutes: 23 Minutes  Minutes: 31          Date: 2025  Patient Name: Chriss Braga,   Gender: male      MRN: [id number]  : 1955  (69 y.o.)  Referring Practitioner: Jaclyn Berrios PA  Diagnosis: Atypical chest pain  Additional Pertinent Hx: Initial H&P: Chriss Braga is a 69 y.o., , male with a PMH of CAD, PAF, chronic systolic heart failure who presented with chest pain.  Patient was recently admitted for similar type of chest pain however it was determined with musculoskeletal in nature.  Patient states his chest pain woke him up from sleep this morning 1 hour prior to the arrival in the ED.  Patient does have a significant heart history and states he is taking an compliant with all his medications.  Patient also states that aspirin and nitro did help his chest pain mildly.  His chest pain is located in the mid sternum with left-sided radiation down the arm and up to the neck.  He generally feels weak and fatigued. \"    Restrictions/Precautions:  Restrictions/Precautions: Fall Risk    Subjective  Chart Reviewed: Yes, Orders, Progress Notes, History and Physical    Subjective: Nurse approved OT eval. Pt in recliner on OT arrival, pleasant and cooperative.    Pain: does not c/o pain     Vitals: Nurse checked vitals prior to session    Social/Functional History:  Lives With: Family (brother)  Type of Home: House  Home Layout: Two level (bedroom upstairs)  Home Access: Stairs to enter with rails  Entrance Stairs - Number of Steps: 4 MARIANA  Home Equipment: Walker - Rolling, Cane   Bathroom Shower/Tub: Tub/Shower unit  Bathroom Equipment: Shower chair       Prior Level of Assist for ADLs: Independent  Prior Level of Assist for Homemaking: Independent  Prior Level of

## 2025-07-21 NOTE — PROGRESS NOTES
Neurology Progress Note    Date:7/21/2025       Room:[facility]16/016-A  Patient Name:Chriss Braga     YOB: 1955     Age:69 y.o.    Requesting Physician: Jaclyn Berrios PA     Reason for Consult:  Age indeterminate right thalamic stroke        Chief Complaint: atypical chest pain       Subjective     Chriss Braga is a 69 y.o. male w/pmh of HTN, HLD, CAD, ICD, PAF on Eliquis, HFrEF, Prior smoker, Marijuana user who presents with chest pain. CT Head was obtained in the ER that showed an age indeterminate right thalamic stroke in comparison to prior CT Head obtained in February this year. Neurology consulted for stroke.     When asked why he presents to the hospital, patient states that he came in due to chest pain, along with left sided weakness/numbness that have been ongoing x 1 year. He then states that he developed left sided weakness yesterday. Then states that he developed left sided weakness with walking difficulty today; that he got up this morning and could not walk.     Interval History 7/21/25:  Patient very familiar with our service, last seen in Feb 2025. He has a history of R MCA stroke with residual left sided numbness and weakness. He has a history of noncompliance with his Eliquis and Plavix, as well. Today, patient states he came in for evaluation for tachycardia and did not have increased left sided symptoms from his baseline. Initially, this was unclear due to coverage being provided by two providers who are new to our service and a repeat CTH with and without contrast was ordered for tomorrow, as patient cannot undergo MRI due to ICD. Patient examined today at is at his baseline neurologically. Discussed with primary team.     Review of Systems   Review of Systems   HENT:  Negative for trouble swallowing.    Eyes:  Positive for visual disturbance (intermittent, blurred). Negative for photophobia.   Cardiovascular:  Positive for chest pain.   Neurological:  Positive for weakness and

## 2025-07-21 NOTE — PLAN OF CARE
Problem: Chronic Conditions and Co-morbidities  Goal: Patient's chronic conditions and co-morbidity symptoms are monitored and maintained or improved  7/21/2025 0018 by Marce Patel RN  Outcome: Progressing  7/20/2025 1802 by Nica Nobles RN  Outcome: Progressing     Problem: Discharge Planning  Goal: Discharge to home or other facility with appropriate resources  7/21/2025 0018 by Marce Patel RN  Outcome: Progressing  7/20/2025 1802 by Nica Nobles RN  Outcome: Progressing     Problem: Pain  Goal: Verbalizes/displays adequate comfort level or baseline comfort level  7/21/2025 0018 by Marce Patel RN  Outcome: Progressing  7/20/2025 1802 by Nica Nobles RN  Outcome: Progressing  Flowsheets (Taken 7/20/2025 0952)  Verbalizes/displays adequate comfort level or baseline comfort level:   Assess pain using appropriate pain scale   Encourage patient to monitor pain and request assistance     Problem: Safety - Adult  Goal: Free from fall injury  7/21/2025 0018 by Marce Patel RN  Outcome: Progressing  7/20/2025 1802 by Nica Nobles RN  Outcome: Progressing

## 2025-07-21 NOTE — PROGRESS NOTES
Hospitalist Progress Note      Patient:  Chriss Braga 69 y.o. male     Unit/Bed:8A-16/016-A    Date of Admission: 7/19/2025      ASSESSMENT AND PLAN    Active Problems  Right Thalamic stroke, age indeterminate   Continue home Eliquis 5 mg BID and Plavix 75 mg for stroke prevention   Continue Atorvastatin 80 mg for stroke prevention   LDL 85, hemoglobin A1c ordered   CT Head W WO Contrast ordered for 7/22  CT Angio Head and Neck negative for significant stenosis, LVO   MRI brain is unable to be obtained due to patients ICD  PT, OT and SLP    Resolved Problems  JASMYN on CKD:  1.3 Cr OA. Currently .9. Baseline 1. Urine studies normal. Fluids given pre contrast.       Chronic Conditions (reviewed and stable unless otherwise stated)  Hx of CVA: Noted to have left-sided weakness in the upper and lower extremity which is residual from prior CVA in 2021  Pituitary adenoma: Diagnosed on June 2024.  Unchanged on recent CT  CAD: Low suspicion for ACS at this time. post PCI of the right RCA: Stent placed on April 7, 2025. On DAPT. Patient is status post AICD with NSVT.  Currently on metoprolol 25 twice daily.  Most recent stress test negative. Trops flat. On ranexa.   Hypertension: On antihypertensives. Continue.  HFrEF: Status post AICD.  Echo 15 to 25%. On GDMT medication  Polysubstance abuse: Endorses marijuana and cocaine use.  Paroxysmal atrial fibrillation not in RVR: Chads 5.  On Eliquis.  Rate controlled with metoprolol,      LDA: []CVC / []PICC / []Midline / []Lucero / []Drains / []Mediport / [x]None  Antibiotics: none   Steroids: none  Labs (still needed?): [x]Yes / []No  IVF (still needed?): [x]Yes / []No    Level of care: [x]Step Down / []Med-Surg  Bed Status: [x]Inpatient / []Observation  Telemetry: [x]Yes / []No  PT/OT: []Yes / [x]No    DVT Prophylaxis: [] Lovenox / [] Heparin / [] SCDs / [x] Already on Systemic Anticoagulation / [] None   Code status: Full Code     Expected discharge date:  TBD  Disposition:

## 2025-07-21 NOTE — PROGRESS NOTES
Adult Outpatient Nutrition  Assessment/PES    Patient Name:  Richy Thompson  YOB: 2009  MRN: [id number]    Assessment Date:  7/27/2023    NTN follow-up. Visited pt at [facility]. Per RN, pt has been receiving recommended bolus feedings via PEG @ 400 mL 4x per day with water flushes @ 40 mL before and after. However, weight has remained steady (59.6 lbs to 59.2 lbs) from 7/20 to 7/27. Per RN, pt has not been tolerating feedings well. RN noted abd distention and visual abd discomfort. Recommend bowel study. Continue bolus feeding via PEG @ 400 mL 4x per day but change flushes to 30 mL after feedings. At this time, pt is still receiving Compleat Pediatric Standard 1.0 and has not received new formula, Compleat Pediatric Standard 1.4. Called [facility], Compleat Pediatric Standard 1.4 is on back order. Estimated calorie/water from Compleat Pediatric Standard 1.0 @ 400 mL 4x per day detailed below:  Calories: 1600 kcal (80%)  Protein: 51.2 g (108%)  TF water: 1344 mL  Total water with flushes: 1464 mL (73%)    When Compleat Pediatric Standard 1.4 is received. Decrease bolus feedings to 350 mL 4x per day with 30 mL flushes after.  Calories: 1960 kcal (98%)  Protein: 61.6 g (114%)  TF water: 1106 mL  Total water with flushes: 1206 mL (60%)    Per RN, pt has been able to tolerate pear juice via syringe and bites of pureed PO. During my visit, observed pt weight trend chart. I observed that pt weight steadily increases throughout week but drops after weekend. Provided 3-day food log to mother to track calories, protein, and fluids over the weekend. Will continue to monitor TF tolerance and weight trend. Will follow-up with pt on 8/3.     General Info       Row Name 07/27/23 0927       Today's Session    Person(s) attending today's session Patient  SLP and RN       General Information    How Well Do You Speak English? other (see comments)  Nonverbal    People in Home parent(s)                    [facility]  SPEECH THERAPY  STRZ MED SURG 8AB  Speech - Language - Cognitive Evaluation + Clinical Swallow Evaluation    Discharge Recommendations: Home Health and SNF  DIET ORDER RECOMMENDATIONS AFTER EVALUATION: Regular diet and thin liquids  STRATEGIES: Universal safe swallow strategies      SLP Individual Minutes  Time In: 1224  Time Out: 1248  Minutes: 24  Timed Code Treatment Minutes: 0 Minutes     Speech, Language, Cognitive Evaluation: 16  Clinical Swallow Evaluation: 8 mins    Date: 2025  Patient Name: Chriss Braga      CSN: [id number]   : 1955  (69 y.o.)  Gender: male   Referring Physician:    Jaclyn Berrios PA    Diagnosis: Atypical chest pain  Precautions: fall risk, aspiration risk   History of Present Illness/Injury: Subjective (past 24 hours):   Patient was sitting in bed. He states that he has had chest pain in the mornings when he wakes up. He denies chest pain and shortness of breath at this time. No acute events overnight.         HPI / Hospital Course:  Initial H&P: Chriss Braga is a 69 y.o., , male with a PMH of CAD, PAF, chronic systolic heart failure who presented with chest pain.  Patient was recently admitted for similar type of chest pain however it was determined with musculoskeletal in nature.  Patient states his chest pain woke him up from sleep this morning 1 hour prior to the arrival in the ED.  Patient does have a significant heart history and states he is taking an compliant with all his medications.  Patient also states that aspirin and nitro did help his chest pain mildly.  His chest pain is located in the mid sternum with left-sided radiation down the arm and up to the neck.  He generally feels weak and fatigued. \"    Head CT  IMPRESSION:  Age-indeterminate right thalamic lacunar infarct new versus prior CT.  Stable pituitary mass, likely macroadenoma.  Chronic changes as described    Past Medical History:   Diagnosis Date    Abnormal  "Physical Findings       Row Name 07/27/23 0927          Physical Findings    Overall Physical Appearance Pt was aggitated at some portions during the visit. Pt had scabs and scratches on hand.     Enteral Access Devices PEG                    Retired Nutritional Info/Activity       Row Name 07/27/23 0931          Eating Environment    Eating environment Institutional;Family                    Home Nutrition Report       Row Name 07/27/23 0931          Nutrition Prescription EN    Enteral Route PEG     TF Delivery Method Bolus     TF Bolus Goal Volume (mL) 400 mL     TF Bolus Current Volume (mL) 400 mL     TF Bolus Frequency 4 times a day     TF Bolus Cycle Over Other (comment)  Syringe     Water flush (mL)  80 mL     Water Flush Frequency Every feeding  40 mL before and after                    Estimated/Assessed Needs - Anthropometrics       Row Name 07/27/23 0928          Anthropometrics    Weight 26.9 kg (59 lb 3.2 oz)     Height for Calculation 1.365 m (4' 5.74\")     Weight for Calculation 26.9 kg (59 lb 3.2 oz)        Fluid Requirements    Fluid Requirements (mL/day) 2000     Estimated Fluid Requirement Method RDA Method     RDA Method (mL) 2000                      Malnutrition Severity Assessment       Row Name 07/27/23 0936          Malnutrition Severity Assessment    Malnutrition Type Chronic Disease - Related Malnutrition        Criteria Met (Must meet criteria for severity in at least 2 of these categories: M Wasting, Fat Loss, Fluid, Secondary Signs, Wt. Status, Intake)    Patient meets criteria for  Moderate (non-severe) Malnutrition                     Problem/Interventions:   Problem 1       Row Name 07/27/23 0937          Nutrition Diagnoses Problem 1    Problem 1 Malnutrition     Etiology (related to) Factors Affecting Nutrition     Oral Chewing Difficulty     Condition Unable to Self Feed     Signs/Symptoms (evidenced by) Report/Observation     Reported/Observed By RD/Tech     Other Comment " BMI-for-age z-score: 2.76                            Intervention Goal       Row Name 07/27/23 0938          Intervention Goal    General Nutrition support treatment;Meet nutritional needs for age/condition     PO Tolerate PO;Increase intake     TF/PN Adjust TF/PN;Deliver (%) goal;Deliver estimated need (%);Tolerate TF at goal     Deliver % of Goal 75 %  or greater     Deliver % of Estimated Need 75 %  or greater     Weight Support appropriate growth                        Education/Evaluation       Row Name 07/27/23 0940          Education    Education No discharge needs identified at this time;Will Instruct as appropriate        Monitor/Evaluation    Monitor Per protocol;PO intake;TF delivery/tolerance;Weight;Symptoms                     Electronically signed by:  Johnson Perkins RD  07/27/23 09:41 CDT

## 2025-07-21 NOTE — PROGRESS NOTES
Neuroscience Department  Acute Stroke - Neurology Nurse Navigator   Admission Note      ?? Admission Assessment     Type of stroke: Ischemic: Right Thalamic Stroke, Age Indeterminate   Confirmed by imaging: CT Head  Interventions: no interventions  Baseline functional status: Independent  Current living situation: Home w/ Brother  Has [facility] - PT OT Nursing services  Primary caregiver/support identified: Yes  Social determinants: Transportation - uses cab services  Psychosocial concerns: No   consult made: No  Discharge disposition: Home w/ brother. Continue HH services  Barriers to discharge: Yes -- Repeat CTH, therapy evals  Admission NIH: 3 (from Screenings)  Pre-Morbid Modified Caroline Score: 1 - No significant disability: despite symptoms, able to carry out all usual duties and activities.  Cantu Quick score: Not indicated      ?? Inpatient Coordination     PT/OT/ST ordered: Yes   Swallow screen completed: Miss  Imaging complete: ECHO deferred as it will not  per neuro EARNEST  Lipid panel: FLP:    Lab Results   Component Value Date/Time    CHOL 143 2025 07:25 AM    TRIG 94 2025 07:25 AM    HDL 39 2025 07:25 AM     VTE prophylaxis: Eliquis  Telemetry monitoring initiated: Yes x24 hrs. -     ? Plan/Interventions     Monitor for neurological changes.  Reinforce stroke education with patient/family.  Ensure swallow screening and dietary safety.  Confirm telemetry and VTE prophylaxis documentation.  Begin stroke core measure tracking.  Follow-up with therapy, case management, and rehab placement as appropriate.  Provide support and resources regarding secondary prevention and recovery trajectory.      ?? Patient/Family Stroke Education     Patient/family has been educated on their personal risk factors of: HF, Hypertension, Hyperlipidemia, Atrial Fibrillation, Coronary Artery Disease, and Other prior smoker, marijuana use    They have been given handouts  on the following medications: Anti-Thrombotic's: Plavix,  Statins: Atorvastatin, dose 80mg, Antihypertensives: Lopressor, and Anticoagulation: Apixaban (Eliquis    Treatment for stroke includes:  Risk factor modifications  Following the medication regime prescribed by physician    Educated on FAST-Face-Arm-Speech-Time    A stroke is a brain attack. Stroke is a brain injury. It occurs when the brain's blood supply is interrupted. Blood carries oxygen and nutrients to the brain. Without oxygen and nutrients from blood, brain tissue starts to die rapidly. This can happen in less than 10 minutes.    A stroke occurs when blood flow to the brain is blocked (called ischemic stroke). This is caused by one of the following:   Sudden decreased blood flow   Damage to a blood vessel supplying blood to the brain can occur suddenly from either:   Injury   A clot that forms and breaks off from another part of the body (such as the heart or neck)   There are certain conditions which predispose people to form blood clots, such as:   Cancer   Pregnancy   Atrial fibrillation   Certain autoimmune diseases   Local blood clot   A build-up of fatty substances ( atherosclerotic plaque ) along the inner lining of the artery causes:   Narrowing of artery   Reduced elasticity   Local inflammation   Blood protein defects leading to increased clotting tendency   Decreased blood flow in the artery   Clot in an artery supplying the brain   Inflammatory conditions in the blood vessels (vasculitis)   A stroke may also occur if a blood vessel breaks and bleeds into or around the brain. This is called hemorrhagic stroke.      This condition needs to be monitored closely. Be sure to keep all appointments. Have exams and blood tests done as directed.     Call 911 If Any of the Following Occurs   It is important that you and those around you know the warning signs for stroke. CALL 911 immediately if you have any of the following which may suggest a new

## 2025-07-21 NOTE — PROCEDURES
PROCEDURE NOTE  Date: 7/20/2025   Name: Chriss Braga  YOB: 1955    Procedures  12 lead EKG completed. Results handed to Ling JO.

## 2025-07-21 NOTE — PROGRESS NOTES
Pt denies pain and discomfort at this time  Pt voiced 0 issues and or concerns at this time  Pt call light and personal items noted to be within reach  Safety maintained    0810-Pt denies pain and discomfort at this time  Pt voiced 0 issues and or concerns at this time    0836-pt took all am meds with no issues at this time    1110-Pt denies pain and discomfort at this time  Pt voiced 0 issues and or concerns at this time    1415-pt noted to work with PT/OT and speech this shift     1515-Pt denies pain and discomfort at this time  Pt voiced 0 issues and or concerns at this time    1815-end of shift rounding completed at this time  This nurse attempted to call pt sister back to update on POC with no answer  This nurse informed pt at this time  New order noted to dc CT scan  Pt bed alarm noted to be activated  Pt denies pain and discomfort at this time  Pt voiced 0 issues and or concerns at this time  Pt call light and personal items noted to be within reach  Safety maintained

## 2025-07-22 VITALS
TEMPERATURE: 98 F | HEIGHT: 69 IN | SYSTOLIC BLOOD PRESSURE: 117 MMHG | WEIGHT: 153.6 LBS | RESPIRATION RATE: 17 BRPM | OXYGEN SATURATION: 100 % | DIASTOLIC BLOOD PRESSURE: 102 MMHG | BODY MASS INDEX: 22.75 KG/M2 | HEART RATE: 75 BPM

## 2025-07-22 LAB
ANION GAP SERPL CALC-SCNC: 11 MEQ/L (ref 8–16)
BUN SERPL-MCNC: 13 MG/DL (ref 8–23)
CALCIUM SERPL-MCNC: 8.9 MG/DL (ref 8.8–10.2)
CHLORIDE SERPL-SCNC: 104 MEQ/L (ref 98–111)
CO2 SERPL-SCNC: 22 MEQ/L (ref 22–29)
CREAT SERPL-MCNC: 1 MG/DL (ref 0.7–1.2)
DEPRECATED MEAN GLUCOSE BLD GHB EST-ACNC: 111 MG/DL (ref 70–126)
DEPRECATED RDW RBC AUTO: 40.5 FL (ref 35–45)
ERYTHROCYTE [DISTWIDTH] IN BLOOD BY AUTOMATED COUNT: 17.7 % (ref 11.5–14.5)
GFR SERPL CREATININE-BSD FRML MDRD: 81 ML/MIN/1.73M2
GLUCOSE SERPL-MCNC: 93 MG/DL (ref 74–109)
HBA1C MFR BLD HPLC: 5.7 % (ref 4–6)
HCT VFR BLD AUTO: 40.9 % (ref 42–52)
HGB BLD-MCNC: 13 GM/DL (ref 14–18)
MAGNESIUM SERPL-MCNC: 2.2 MG/DL (ref 1.6–2.6)
MCH RBC QN AUTO: 22.1 PG (ref 26–33)
MCHC RBC AUTO-ENTMCNC: 31.8 GM/DL (ref 32.2–35.5)
MCV RBC AUTO: 69.7 FL (ref 80–94)
PLATELET # BLD AUTO: 226 THOU/MM3 (ref 130–400)
PMV BLD AUTO: 9.6 FL (ref 9.4–12.4)
POTASSIUM SERPL-SCNC: 4.3 MEQ/L (ref 3.5–5.2)
RBC # BLD AUTO: 5.87 MILL/MM3 (ref 4.7–6.1)
SODIUM SERPL-SCNC: 137 MEQ/L (ref 135–145)
WBC # BLD AUTO: 8.3 THOU/MM3 (ref 4.8–10.8)

## 2025-07-22 PROCEDURE — 83036 HEMOGLOBIN GLYCOSYLATED A1C: CPT

## 2025-07-22 PROCEDURE — 99239 HOSP IP/OBS DSCHRG MGMT >30: CPT | Performed by: PHYSICIAN ASSISTANT

## 2025-07-22 PROCEDURE — 6370000000 HC RX 637 (ALT 250 FOR IP)

## 2025-07-22 PROCEDURE — 97535 SELF CARE MNGMENT TRAINING: CPT

## 2025-07-22 PROCEDURE — 85027 COMPLETE CBC AUTOMATED: CPT

## 2025-07-22 PROCEDURE — 97110 THERAPEUTIC EXERCISES: CPT

## 2025-07-22 PROCEDURE — 2500000003 HC RX 250 WO HCPCS

## 2025-07-22 PROCEDURE — 80048 BASIC METABOLIC PNL TOTAL CA: CPT

## 2025-07-22 PROCEDURE — 97530 THERAPEUTIC ACTIVITIES: CPT

## 2025-07-22 PROCEDURE — 83735 ASSAY OF MAGNESIUM: CPT

## 2025-07-22 PROCEDURE — 36415 COLL VENOUS BLD VENIPUNCTURE: CPT

## 2025-07-22 RX ADMIN — ATORVASTATIN CALCIUM 80 MG: 80 TABLET, FILM COATED ORAL at 09:12

## 2025-07-22 RX ADMIN — METOPROLOL SUCCINATE 50 MG: 50 TABLET, EXTENDED RELEASE ORAL at 09:13

## 2025-07-22 RX ADMIN — SODIUM CHLORIDE, PRESERVATIVE FREE 10 ML: 5 INJECTION INTRAVENOUS at 09:13

## 2025-07-22 RX ADMIN — CLOPIDOGREL BISULFATE 75 MG: 75 TABLET, FILM COATED ORAL at 09:12

## 2025-07-22 RX ADMIN — LEVETIRACETAM 500 MG: 500 TABLET, FILM COATED ORAL at 09:12

## 2025-07-22 RX ADMIN — RANOLAZINE 1000 MG: 500 TABLET, EXTENDED RELEASE ORAL at 09:14

## 2025-07-22 RX ADMIN — APIXABAN 5 MG: 5 TABLET, FILM COATED ORAL at 09:12

## 2025-07-22 RX ADMIN — ISOSORBIDE MONONITRATE 60 MG: 60 TABLET, EXTENDED RELEASE ORAL at 09:13

## 2025-07-22 NOTE — DISCHARGE SUMMARY
Hospital Medicine Discharge Summary      Patient Identification:   Chriss Braga   : 1955  MRN: [id number]   Account: [id number]      Patient's PCP: Newman-Waterhouse, Aundrea N, DO    Admit Date: 2025     Discharge Date: 2025  2:12 PM    Admitting Physician: No admitting provider for patient encounter.     Discharge Physician: VERITO Sung     Discharge Diagnoses:    Active Hospital Problems    Diagnosis Date Noted    Left-sided weakness [R53.1] 2025    Atypical chest pain [R07.89] 2025    Cerebrovascular accident (CVA) (HCC) [I63.9] 2024    History of right MCA stroke [Z86.73] 2024       Discharge Assessment & Plan:  Right Thalamic stroke, age indeterminate   Continue home Eliquis 5 mg BID and Plavix 75 mg for stroke prevention   Continue Atorvastatin 80 mg for stroke prevention   LDL 85, hemoglobin A1c ordered   CT Head W WO Contrast ordered for   CT Angio Head and Neck negative for significant stenosis, LVO   MRI brain is unable to be obtained due to patients ICD  PT, OT and SLP  Pt will resume HH on DC     The patient was seen and examined on day of discharge and this discharge summary is in conjunction with any daily progress note from day of discharge.    Hospital Course:   Chriss Braga is a 69 y.o. male admitted to [facility] on 2025 for Chest Pain.        Initial H&P: Chriss Braga is a 69 y.o., , male with a PMH of CAD, PAF, chronic systolic heart failure who presented with chest pain.  Patient was recently admitted for similar type of chest pain however it was determined with musculoskeletal in nature.  Patient states his chest pain woke him up from sleep this morning 1 hour prior to the arrival in the ED.  Patient does have a significant heart history and states he is taking an compliant with all his medications.  Patient also states that aspirin and nitro did help his chest pain mildly.  His chest pain is located      Patient Instructions:    Discharge lab work: None   Activity: activity as tolerated  Diet: No diet orders on file      Follow-up visits:   [facility] [place]  [address] [facility] [address] [phone]        Shannon Aguilar MD  [address] [address] [phone]    Follow up in 2 week(s)  message left with office staff to call with appointment date and time.         Discharge Medications:        Medication List        CONTINUE taking these medications      apixaban 5 MG Tabs tablet  Commonly known as: Eliquis  Take 1 tablet by mouth 2 times daily     atorvastatin 80 MG tablet  Commonly known as: LIPITOR  Take 1 tablet by mouth daily     clopidogrel 75 MG tablet  Commonly known as: PLAVIX  Take 1 tablet by mouth daily     empagliflozin 10 MG tablet  Commonly known as: Jardiance  Take 1 tablet by mouth daily     ferrous sulfate 325 (65 Fe) MG tablet  Commonly known as: IRON 325  Take 1 tablet by mouth every other day     furosemide 20 MG tablet  Commonly known as: Lasix  Take 1 tablet by mouth daily as needed (for increased swelling or 3#/day wt gain)     isosorbide mononitrate 60 MG extended release tablet  Commonly known as: IMDUR  Take 1 tablet by mouth daily     levETIRAcetam 500 MG tablet  Commonly known as: KEPPRA  Take 1 tablet by mouth 2 times daily     metoprolol succinate 50 MG extended release tablet  Commonly known as: TOPROL XL  Take 1 tablet by mouth 2 times daily     nitroGLYCERIN 0.4 MG SL tablet  Commonly known as: Nitrostat  Place 1 tablet under the tongue every 5 minutes as needed for Chest pain up to max of 3 total doses. If no relief after 1 dose, call 911.     ranolazine 1000 MG extended release tablet  Commonly known as: RANEXA  Take 1 tablet by mouth daily     Repatha SureClick Soaj pen  Generic drug: Evolocumab  Inject 140 mg into the skin every 14 days     sacubitril-valsartan 49-51 MG per tablet  Commonly known as: ENTRESTO  Take 1

## 2025-07-22 NOTE — PROGRESS NOTES
[facility]  STRZ MED SURG 8AB  Occupational Therapy  Daily Note    Discharge Recommendations: Home with Home Health OT  Equipment Recommendations:          Time In: 903  Time Out: 941  Timed Code Treatment Minutes: 38 Minutes  Minutes: 38          Date: 2025  Patient Name: Chriss Braga,   Gender: male      Room: 8A16/016-A  MRN: [id number]  : 1955  (69 y.o.)  Referring Practitioner: Jaclyn Berrios PA  Diagnosis: Atypical chest pain  Additional Pertinent Hx: Initial H&P: Chriss Braga is a 69 y.o., , male with a PMH of CAD, PAF, chronic systolic heart failure who presented with chest pain.  Patient was recently admitted for similar type of chest pain however it was determined with musculoskeletal in nature.  Patient states his chest pain woke him up from sleep this morning 1 hour prior to the arrival in the ED.  Patient does have a significant heart history and states he is taking an compliant with all his medications.  Patient also states that aspirin and nitro did help his chest pain mildly.  His chest pain is located in the mid sternum with left-sided radiation down the arm and up to the neck.  He generally feels weak and fatigued. \"    Restrictions/Precautions:  Restrictions/Precautions: Fall Risk, General Precautions  Position Activity Restriction  Other Position/Activity Restrictions: L weakness, + R thalamic stroke with unknown age determinate     Social/Functional History:  Lives With: Family (brother)  Type of Home: House  Home Layout: Two level (bedroom upstairs)  Home Access: Stairs to enter with rails  Entrance Stairs - Number of Steps: 4 MARIANA  Entrance Stairs - Rails: Both  Home Equipment: Walker - Rolling, Cane   Bathroom Shower/Tub: Tub/Shower unit  Bathroom Toilet: Standard  Bathroom Equipment: Shower chair       Prior Level of Assist for ADLs: Independent  Prior Level of Assist for Homemaking: Independent  Prior Level of Assist for Transfers:  activity. Task completed to challenge endurance and balance required for ADL based skills.    Therapeutic Exercise:   Patient completed BUE strengthening exercises with a minimal  resistance band, completing 15-20 reps 1 set in all joints and all planes in order to improve UE strength and activity tolerance required for BADL routine and toilet / shower transfers. Patient tolerated fair, requiring rest breaks. Patient required cues for technique        Modified Benedict:  Current Functional Status:  Not Applicable    Education:  Learners: Patient  Safety with transfers and mobility, UB HEP, ADL strategies    ASSESSMENT:     Activity Tolerance:  Patient tolerance of  treatment: Fair treatment tolerance and limited by dizziness       Plan: Times Per Week: 3-5x  Times Per Day: Once a day  Current Treatment Recommendations: Strengthening, Balance training, Functional mobility training, Neuromuscular re-education, Safety education & training, Patient/Caregiver education & training, Equipment evaluation, education, & procurement, Self-Care / ADL    Goals  Short Term Goals  Time Frame for Short Term Goals: until discharge  Short Term Goal 1: Pt will navigate to/from bathroom and community distances utilizing LRAD with MI and good awareness for safety to improve indep access with ADLs.  Short Term Goal 2: Pt will complete UB/LB dressing/bathing with MI to improve indep with ADL routines.  Short Term Goal 3: Pt will tolerate x5 minutes standing with 1-2 UE release at MI to improve standing balance and activity tolerance required for sinkside I/ADLs.  Short Term Goal 4: Pt will perform toileting routine with MI and 0 cues for safety awareness to improve indep with ADL routines.    Following session, patient left in safe position in chair, with alarm, and call light within reach

## 2025-07-22 NOTE — PROGRESS NOTES
[facility]  SPEECH THERAPY MISSED TREATMENT NOTE  STRZ MED SURG 8AB      Date: 2025  Patient Name: Chriss Braga        MRN: [id number]    : 1955  (69 y.o.)    REASON FOR MISSED TREATMENT:      Rn, Latasha, gave approval to see his date.  Upon arrival in room, patient was dressed and ready to leave.  Patient reports that he is waiting on cab to pick him up and he doesn't want to do therapy he just wants to leave.  RN confirmed this with therapist when asked.      Jackson Levine M.A., CCC-SLP [id number]

## 2025-07-22 NOTE — CARE COORDINATION
7/22/25, 1:01 PM EDT    Patient goals/plan/ treatment preferences discussed by  and .  Patient goals/plan/ treatment preferences reviewed with patient/ family.  Patient/ family verbalize understanding of discharge plan and are in agreement with goal/plan/treatment preferences.  Understanding was demonstrated using the teach back method.  AVS provided by RN at time of discharge, which includes all necessary medical information pertaining to the patients current course of illness, treatment, post-discharge goals of care, and treatment preferences.     Services At/After Discharge: None

## 2025-07-22 NOTE — PLAN OF CARE
Problem: Chronic Conditions and Co-morbidities  Goal: Patient's chronic conditions and co-morbidity symptoms are monitored and maintained or improved  7/21/2025 2002 by Etelvina Sales RN  Outcome: Progressing  7/21/2025 1023 by Melanie Christensen RN  Outcome: Progressing  Flowsheets (Taken 7/21/2025 0810)  Care Plan - Patient's Chronic Conditions and Co-Morbidity Symptoms are Monitored and Maintained or Improved: Monitor and assess patient's chronic conditions and comorbid symptoms for stability, deterioration, or improvement     Problem: Discharge Planning  Goal: Discharge to home or other facility with appropriate resources  7/21/2025 2002 by Etelvina Sales RN  Outcome: Progressing  7/21/2025 1529 by Ankita Lincoln LSW  Outcome: Progressing  7/21/2025 1023 by Melanie Christensen RN  Outcome: Progressing  Flowsheets (Taken 7/21/2025 0810)  Discharge to home or other facility with appropriate resources: Identify barriers to discharge with patient and caregiver     Problem: Pain  Goal: Verbalizes/displays adequate comfort level or baseline comfort level  7/21/2025 2002 by Etelvina Sales RN  Outcome: Progressing  Flowsheets  Taken 7/21/2025 1815 by Fermin, Melanie, RN  Verbalizes/displays adequate comfort level or baseline comfort level: Encourage patient to monitor pain and request assistance  Taken 7/21/2025 1515 by Melanie Christensen, RN  Verbalizes/displays adequate comfort level or baseline comfort level: Encourage patient to monitor pain and request assistance  Taken 7/21/2025 1313 by Melanie Christensen, RN  Verbalizes/displays adequate comfort level or baseline comfort level: Encourage patient to monitor pain and request assistance  7/21/2025 1023 by Melanie Christensen RN  Outcome: Progressing  Flowsheets  Taken 7/21/2025 0810  Verbalizes/displays adequate comfort level or baseline comfort level: Encourage patient to monitor pain and request assistance  Taken 7/21/2025

## 2025-07-22 NOTE — DISCHARGE INSTR - COC
Continuity of Care Form    Patient Name: Chriss Braga   :  1955  MRN:  [id number]    Admit date:  2025  Discharge date:  25    Code Status Order: Full Code   Advance Directives:     Admitting Physician:  No admitting provider for patient encounter.  PCP: Newman-Waterhouse, Aundrea N, DO    Discharging Nurse: SANDRO Pagan  Discharging Hospital Unit/Room#: 8A-16/016-A  Discharging Unit Phone Number: [phone]    Emergency Contact:   Extended Emergency Contact Information  Primary Emergency Contact: Willie Braga  Home Phone: [phone]  Mobile Phone: [phone]  Relation: Child   needed? No  Secondary Emergency Contact: Maddison Ceja  Home Phone: [phone]  Mobile Phone: [phone]  Relation: Friend   needed? No    Past Surgical History:  Past Surgical History:   Procedure Laterality Date    APPENDECTOMY      BACK SURGERY      BRONCHOSCOPY N/A 2024    BRONCHOSCOPY FOR AIRWAY EXAMINATION performed by Dylan Ayala MD at [facility] ENDOSCOPY    CARDIAC CATHETERIZATION  2012    CARDIAC DEFIBRILLATOR PLACEMENT  2011    Successful dual-chamber AICD implantation using [employer] device Sarah ZHANG, serial # [id number]. Left axillary and left subclavian venogram. Defibrillation threshold testing. Device programming. Of note, the patient went into severe bradycardiac episode w/drop in BP and heart rate in low 30's while I was suturing leads which required pacing through atrial lead with good AV conduction.     CARDIAC DEFIBRILLATOR PLACEMENT  11    successful placement of Dual chamber AICD implantation using [employer] device Fortify DR serial #[id number]. Lt axillary & lt subclavian venogram.    CARDIAC PROCEDURE N/A 2024    Left heart cath / coronary angiography performed by Gunner Hatch MD at [facility] CARDIAC CATH LAB    CARDIAC PROCEDURE N/A 2024    Percutaneous coronary intervention performed by Gunner Hatch MD at [facility] CARDIAC CATH

## 2025-07-23 ENCOUNTER — CARE COORDINATION (OUTPATIENT)
Dept: CARE COORDINATION | Age: 70
End: 2025-07-23

## 2025-07-23 ENCOUNTER — TELEPHONE (OUTPATIENT)
Dept: CARDIOLOGY CLINIC | Age: 70
End: 2025-07-23

## 2025-07-23 NOTE — CARE COORDINATION
7/23/25, 7:32 AM EDT    Patient goals/plan/ treatment preferences discussed by  and .  Patient goals/plan/ treatment preferences reviewed with patient/ family.  Patient/ family verbalize understanding of discharge plan and are in agreement with goal/plan/treatment preferences.  Understanding was demonstrated using the teach back method.  AVS provided by RN at time of discharge, which includes all necessary medical information pertaining to the patients current course of illness, treatment, post-discharge goals of care, and treatment preferences.     Services At/After Discharge: Home Health, Nursing service, OT, and PT       Patient discharged with  and new [facility].

## 2025-07-23 NOTE — CARE COORDINATION
Ambulatory Care Coordination Note     7/23/2025 9:11 AM     Patient outreach attempt by this ACM today to perform hospital follow up. ACM was unable to reach the patient by telephone today;   hospital follow up call within 2 business days of discharge: Yes  left voice message requesting a return phone call to this ACM.     ACM: Elana Pierre RN     Care Summary Note: pt admitted 7.19-7.22 for CP.  D/c home with [facility] PT/OT, nsg services.     PCP/Specialist follow up:   Future Appointments         Provider Specialty Dept Phone    7/29/2025 1:40 PM Gabriel Putnam MD Internal Medicine [phone]    10/29/2025 1:30 PM Jean Carlos Smith PA-C Cardiology [phone]    4/30/2026 1:15 PM Anand Lisa MD Cardiology [phone]            Follow Up:   Plan for next ACM outreach in approximately 1-2 days  to complete:  - hospital follow up.

## 2025-07-23 NOTE — TELEPHONE ENCOUNTER
Patient discharged from hospital  Left message for patient to call office to sched f/u appointment   Was Kamilah null

## 2025-07-23 NOTE — TELEPHONE ENCOUNTER
HPI Patient is a 35 yo male who presents to ER for evaluation of having tooth x 3 days. He denies having fever, chills, N/V, headache, ear pain. Past Medical History:   Diagnosis Date    Asthma        Past Surgical History:   Procedure Laterality Date    HX APPENDECTOMY           Family History:   Problem Relation Age of Onset    Diabetes Mother     Diabetes Maternal Grandfather        Social History     Socioeconomic History    Marital status: SINGLE     Spouse name: Not on file    Number of children: Not on file    Years of education: Not on file    Highest education level: Not on file   Occupational History    Not on file   Social Needs    Financial resource strain: Not on file    Food insecurity:     Worry: Not on file     Inability: Not on file    Transportation needs:     Medical: Not on file     Non-medical: Not on file   Tobacco Use    Smoking status: Current Every Day Smoker     Packs/day: 1.00   Substance and Sexual Activity    Alcohol use: Yes     Comment: rarely    Drug use: Not on file    Sexual activity: Not on file   Lifestyle    Physical activity:     Days per week: Not on file     Minutes per session: Not on file    Stress: Not on file   Relationships    Social connections:     Talks on phone: Not on file     Gets together: Not on file     Attends Hinduism service: Not on file     Active member of club or organization: Not on file     Attends meetings of clubs or organizations: Not on file     Relationship status: Not on file    Intimate partner violence:     Fear of current or ex partner: Not on file     Emotionally abused: Not on file     Physically abused: Not on file     Forced sexual activity: Not on file   Other Topics Concern    Not on file   Social History Narrative    Not on file         ALLERGIES: Patient has no known allergies. Review of Systems   Constitutional: Negative. HENT: Positive for dental problem. Eyes: Negative. Respiratory: Negative.
Home Health nurse yomaira;led stating that Chriss's BP was elevated at her visit at 162/90 . Patient had not taken his medication yet and nurse had hime take them during visit .   
Patient has had 3 No shows in a row.  Make appt and tell him if no shows again - will will need to dismiss from clinic.  
Scheduled for 7/29.  
Cardiovascular: Negative. Gastrointestinal: Negative. Endocrine: Negative. Genitourinary: Negative. Musculoskeletal: Negative. Skin: Negative. Allergic/Immunologic: Negative. Neurological: Negative. Hematological: Negative. Psychiatric/Behavioral: Negative. All other systems reviewed and are negative. Vitals:    08/08/19 1853   BP: (!) 173/93   Pulse: 89   Resp: 20   Temp: 98.7 °F (37.1 °C)   SpO2: 94%   Weight: 158.8 kg (350 lb)   Height: 6' 4\" (1.93 m)            Physical Exam   Constitutional: He is oriented to person, place, and time. He appears well-developed and well-nourished. No distress. Mouth: (+) tenderness of tooth #32  Gingiva: no erythema, (+) tenderness  Normal mastication. Throat - patent   HENT:   Head: Normocephalic. Mouth/Throat: Oropharynx is clear and moist.   Eyes: Pupils are equal, round, and reactive to light. Conjunctivae and EOM are normal.   Neck: Normal range of motion. Neck supple. Cardiovascular: Normal rate, regular rhythm and normal heart sounds. No murmur heard. Pulmonary/Chest: Effort normal and breath sounds normal. No respiratory distress. He has no wheezes. Neurological: He is alert and oriented to person, place, and time. No cranial nerve deficit. Skin: No rash noted. No erythema. Psychiatric: He has a normal mood and affect. His behavior is normal. Judgment and thought content normal.   Nursing note and vitals reviewed. MDM differential diagnosis: Acute toothache, gingivitis, trigeminal neuralgia     Diagnosis: Acute toothache    Disposition: Discharge home. Follow-up with dentist within 24 hours. Prescription: Amoxicillin and Motrin.   Return to ER as needed
Statement Selected

## 2025-07-24 ENCOUNTER — CARE COORDINATION (OUTPATIENT)
Dept: CARE COORDINATION | Age: 70
End: 2025-07-24

## 2025-07-24 NOTE — CARE COORDINATION
Ambulatory Care Coordination Note     7/24/2025 8:56 AM     Patient outreach attempt by this ACM today to perform hospital follow up. ACM was unable to reach the patient by telephone today;   hospital follow up call within 2 business days of discharge: Yes  left voice message requesting a return phone call to this ACM.     ACM: Elana Pierre RN     Care Summary Note: pt admitted 7.19-7.22 for CP. D/c home with [facility] PT/OT, nsg services.     PCP/Specialist follow up:   Future Appointments         Provider Specialty Dept Phone    7/29/2025 1:40 PM Gabriel Putnam MD Internal Medicine [phone]    10/29/2025 1:30 PM Jean Carlos Smith PA-C Cardiology [phone]    4/30/2026 1:15 PM Anand Lisa MD Cardiology [phone]            Follow Up:   Plan for next ACM outreach in approximately 1 week to complete:  - hospital follow up.

## 2025-07-29 ENCOUNTER — CARE COORDINATION (OUTPATIENT)
Dept: CARE COORDINATION | Age: 70
End: 2025-07-29

## 2025-07-29 NOTE — CARE COORDINATION
Attempted to reach patient for continued Care Coordination follow up and education.  Patient was unavailable at the time of my call, and a generic voicemail message was left asking patient to return my call at [phone].

## 2025-07-30 ENCOUNTER — TELEPHONE (OUTPATIENT)
Dept: NEUROLOGY | Age: 70
End: 2025-07-30

## 2025-07-30 NOTE — TELEPHONE ENCOUNTER
Neuroscience Department  Acute Stroke - Neurology Nurse Navigator  Post Discharge Follow Up      Patient Name: Chriss Braga    MRN:  [id number]  :  1955  Date/Time of Call:  2025 1015   Caller:  Linda MORA, RN.    Reason for Call     Follow-up call post-discharge for stroke (CVA/TIA) to assess recovery, reinforce education, identify concerns, and provide ongoing support.       Patient Status     No answer. HIPAA compliant VM left asking patient to return my call.      Navigator Plan     Will continue to monitor and follow-up as needed  Contact information provided for ongoing support

## 2025-07-31 ENCOUNTER — TELEPHONE (OUTPATIENT)
Dept: INTERNAL MEDICINE CLINIC | Age: 70
End: 2025-07-31

## 2025-07-31 ENCOUNTER — TELEPHONE (OUTPATIENT)
Dept: CARDIOLOGY CLINIC | Age: 70
End: 2025-07-31

## 2025-08-05 ENCOUNTER — TELEPHONE (OUTPATIENT)
Dept: INTERNAL MEDICINE CLINIC | Age: 70
End: 2025-08-05

## 2025-08-05 ENCOUNTER — CARE COORDINATION (OUTPATIENT)
Dept: CARE COORDINATION | Age: 70
End: 2025-08-05

## 2025-08-12 ENCOUNTER — CARE COORDINATION (OUTPATIENT)
Dept: CARE COORDINATION | Age: 70
End: 2025-08-12

## 2025-08-15 ENCOUNTER — HOSPITAL ENCOUNTER (EMERGENCY)
Age: 70
Discharge: HOME | End: 2025-08-15
Attending: EMERGENCY MEDICINE
Payer: MEDICARE

## 2025-08-15 ENCOUNTER — APPOINTMENT (OUTPATIENT)
Dept: GENERAL RADIOLOGY | Age: 70
End: 2025-08-15
Payer: MEDICARE

## 2025-08-15 VITALS
DIASTOLIC BLOOD PRESSURE: 108 MMHG | BODY MASS INDEX: 25.1 KG/M2 | HEART RATE: 68 BPM | SYSTOLIC BLOOD PRESSURE: 155 MMHG | TEMPERATURE: 98.1 F | RESPIRATION RATE: 14 BRPM | OXYGEN SATURATION: 100 % | WEIGHT: 170 LBS

## 2025-08-15 DIAGNOSIS — R07.89 ATYPICAL CHEST PAIN: Primary | ICD-10-CM

## 2025-08-15 LAB
ALBUMIN SERPL BCG-MCNC: 3.8 G/DL (ref 3.4–4.9)
ALP SERPL-CCNC: 67 U/L (ref 40–129)
ALT SERPL W/O P-5'-P-CCNC: 10 U/L (ref 10–50)
ANION GAP SERPL CALC-SCNC: 11 MEQ/L (ref 8–16)
AST SERPL-CCNC: 17 U/L (ref 10–50)
BASOPHILS ABSOLUTE: 0 THOU/MM3 (ref 0–0.1)
BASOPHILS NFR BLD AUTO: 0.6 %
BILIRUB SERPL-MCNC: 0.9 MG/DL (ref 0.3–1.2)
BUN SERPL-MCNC: 14 MG/DL (ref 8–23)
CALCIUM SERPL-MCNC: 8.7 MG/DL (ref 8.5–10.5)
CHLORIDE SERPL-SCNC: 106 MEQ/L (ref 98–111)
CO2 SERPL-SCNC: 23 MEQ/L (ref 22–29)
CREAT SERPL-MCNC: 1.1 MG/DL (ref 0.7–1.2)
D DIMER PPP IA.FEU-MCNC: 418 NG/ML FEU (ref 0–500)
DEPRECATED RDW RBC AUTO: 40.8 FL (ref 35–45)
EKG ATRIAL RATE: 74 BPM
EKG P AXIS: 73 DEGREES
EKG P-R INTERVAL: 172 MS
EKG Q-T INTERVAL: 416 MS
EKG QRS DURATION: 86 MS
EKG QTC CALCULATION (BAZETT): 461 MS
EKG R AXIS: 6 DEGREES
EKG T AXIS: -3 DEGREES
EKG VENTRICULAR RATE: 74 BPM
EOSINOPHIL NFR BLD AUTO: 1.4 %
EOSINOPHILS ABSOLUTE: 0.1 THOU/MM3 (ref 0–0.4)
ERYTHROCYTE [DISTWIDTH] IN BLOOD BY AUTOMATED COUNT: 16.7 % (ref 11.5–14.5)
GFR SERPL CREATININE-BSD FRML MDRD: 73 ML/MIN/1.73M2
GLUCOSE SERPL-MCNC: 138 MG/DL (ref 74–109)
HCT VFR BLD AUTO: 36.5 % (ref 42–52)
HGB BLD-MCNC: 11.3 GM/DL (ref 14–18)
IMM GRANULOCYTES # BLD AUTO: 0.06 THOU/MM3 (ref 0–0.07)
IMM GRANULOCYTES NFR BLD AUTO: 0.8 %
LYMPHOCYTES ABSOLUTE: 1.2 THOU/MM3 (ref 1–4.8)
LYMPHOCYTES NFR BLD AUTO: 17.4 %
MCH RBC QN AUTO: 21.6 PG (ref 26–33)
MCHC RBC AUTO-ENTMCNC: 31 GM/DL (ref 32.2–35.5)
MCV RBC AUTO: 69.9 FL (ref 80–94)
MICROCYTES BLD QL SMEAR: PRESENT
MONOCYTES ABSOLUTE: 0.4 THOU/MM3 (ref 0.4–1.3)
MONOCYTES NFR BLD AUTO: 5.3 %
NEUTROPHILS ABSOLUTE: 5.3 THOU/MM3 (ref 1.8–7.7)
NEUTROPHILS NFR BLD AUTO: 74.5 %
NRBC BLD AUTO-RTO: 0 /100 WBC
NT-PROBNP SERPL IA-MCNC: 887 PG/ML (ref 0–124)
OSMOLALITY SERPL CALC.SUM OF ELEC: 282.1 MOSMOL/KG (ref 275–300)
PLATELET # BLD AUTO: 205 THOU/MM3 (ref 130–400)
PLATELET BLD QL SMEAR: ADEQUATE
PMV BLD AUTO: 10 FL (ref 9.4–12.4)
POIKILOCYTES: ABNORMAL
POTASSIUM SERPL-SCNC: 3.7 MEQ/L (ref 3.5–5.2)
PROT SERPL-MCNC: 6.7 G/DL (ref 6.4–8.3)
RBC # BLD AUTO: 5.22 MILL/MM3 (ref 4.7–6.1)
SCAN OF BLOOD SMEAR: NORMAL
SODIUM SERPL-SCNC: 140 MEQ/L (ref 135–145)
TROPONIN, HIGH SENSITIVITY: 8 NG/L (ref 0–12)
TROPONIN, HIGH SENSITIVITY: 8 NG/L (ref 0–12)
WBC # BLD AUTO: 7.1 THOU/MM3 (ref 4.8–10.8)

## 2025-08-15 PROCEDURE — 36415 COLL VENOUS BLD VENIPUNCTURE: CPT

## 2025-08-15 PROCEDURE — 80053 COMPREHEN METABOLIC PANEL: CPT

## 2025-08-15 PROCEDURE — 71046 X-RAY EXAM CHEST 2 VIEWS: CPT

## 2025-08-15 PROCEDURE — 83880 ASSAY OF NATRIURETIC PEPTIDE: CPT

## 2025-08-15 PROCEDURE — 93010 ELECTROCARDIOGRAM REPORT: CPT | Performed by: NUCLEAR MEDICINE

## 2025-08-15 PROCEDURE — 85379 FIBRIN DEGRADATION QUANT: CPT

## 2025-08-15 PROCEDURE — 93005 ELECTROCARDIOGRAM TRACING: CPT

## 2025-08-15 PROCEDURE — 99285 EMERGENCY DEPT VISIT HI MDM: CPT

## 2025-08-15 PROCEDURE — 6360000002 HC RX W HCPCS

## 2025-08-15 PROCEDURE — 96374 THER/PROPH/DIAG INJ IV PUSH: CPT

## 2025-08-15 PROCEDURE — 6370000000 HC RX 637 (ALT 250 FOR IP)

## 2025-08-15 PROCEDURE — 84484 ASSAY OF TROPONIN QUANT: CPT

## 2025-08-15 PROCEDURE — 85025 COMPLETE CBC W/AUTO DIFF WBC: CPT

## 2025-08-15 RX ORDER — LIDOCAINE 4 G/G
1 PATCH TOPICAL ONCE
Status: DISCONTINUED | OUTPATIENT
Start: 2025-08-15 | End: 2025-08-15 | Stop reason: HOSPADM

## 2025-08-15 RX ORDER — MORPHINE SULFATE 2 MG/ML
2 INJECTION, SOLUTION INTRAMUSCULAR; INTRAVENOUS ONCE
Refills: 0 | Status: COMPLETED | OUTPATIENT
Start: 2025-08-15 | End: 2025-08-15

## 2025-08-15 RX ADMIN — MORPHINE SULFATE 2 MG: 2 INJECTION, SOLUTION INTRAMUSCULAR; INTRAVENOUS at 11:03

## 2025-08-15 ASSESSMENT — PAIN DESCRIPTION - FREQUENCY: FREQUENCY: CONTINUOUS

## 2025-08-15 ASSESSMENT — PAIN - FUNCTIONAL ASSESSMENT
PAIN_FUNCTIONAL_ASSESSMENT: WONG-BAKER FACES
PAIN_FUNCTIONAL_ASSESSMENT: 0-10
PAIN_FUNCTIONAL_ASSESSMENT: WONG-BAKER FACES
PAIN_FUNCTIONAL_ASSESSMENT: 0-10
PAIN_FUNCTIONAL_ASSESSMENT: 0-10

## 2025-08-15 ASSESSMENT — HEART SCORE: ECG: NON-SPECIFC REPOLARIZATION DISTURBANCE/LBTB/PM

## 2025-08-15 ASSESSMENT — PAIN DESCRIPTION - LOCATION
LOCATION: CHEST

## 2025-08-15 ASSESSMENT — PAIN DESCRIPTION - ORIENTATION: ORIENTATION: LEFT

## 2025-08-15 ASSESSMENT — PAIN SCALES - GENERAL
PAINLEVEL_OUTOF10: 8

## 2025-08-15 ASSESSMENT — PAIN DESCRIPTION - PAIN TYPE
TYPE: ACUTE PAIN
TYPE: ACUTE PAIN

## 2025-08-15 ASSESSMENT — PAIN SCALES - WONG BAKER
WONGBAKER_NUMERICALRESPONSE: NO HURT
WONGBAKER_NUMERICALRESPONSE: HURTS A LITTLE BIT

## 2025-09-03 ENCOUNTER — APPOINTMENT (OUTPATIENT)
Dept: GENERAL RADIOLOGY | Age: 70
End: 2025-09-03
Payer: COMMERCIAL

## 2025-09-03 ENCOUNTER — HOSPITAL ENCOUNTER (EMERGENCY)
Age: 70
Discharge: HOME | End: 2025-09-03
Payer: COMMERCIAL

## 2025-09-03 VITALS
DIASTOLIC BLOOD PRESSURE: 97 MMHG | HEIGHT: 69 IN | OXYGEN SATURATION: 96 % | TEMPERATURE: 98.7 F | BODY MASS INDEX: 25.18 KG/M2 | WEIGHT: 170 LBS | SYSTOLIC BLOOD PRESSURE: 165 MMHG | HEART RATE: 78 BPM | RESPIRATION RATE: 14 BRPM

## 2025-09-03 DIAGNOSIS — R07.9 CHEST PAIN, UNSPECIFIED TYPE: Primary | ICD-10-CM

## 2025-09-03 LAB
ALBUMIN SERPL BCG-MCNC: 4 G/DL (ref 3.4–4.9)
ALP SERPL-CCNC: 64 U/L (ref 40–129)
ALT SERPL W/O P-5'-P-CCNC: 8 U/L (ref 10–50)
ANION GAP SERPL CALC-SCNC: 9 MEQ/L (ref 8–16)
AST SERPL-CCNC: 19 U/L (ref 10–50)
BASOPHILS ABSOLUTE: 0 THOU/MM3 (ref 0–0.1)
BASOPHILS NFR BLD AUTO: 0.4 %
BILIRUB CONJ SERPL-MCNC: 0.2 MG/DL (ref 0–0.2)
BILIRUB SERPL-MCNC: 0.6 MG/DL (ref 0.3–1.2)
BUN SERPL-MCNC: 20 MG/DL (ref 8–23)
CALCIUM SERPL-MCNC: 9.2 MG/DL (ref 8.5–10.5)
CHLORIDE SERPL-SCNC: 103 MEQ/L (ref 98–111)
CO2 SERPL-SCNC: 25 MEQ/L (ref 22–29)
CREAT SERPL-MCNC: 1.2 MG/DL (ref 0.7–1.2)
DEPRECATED RDW RBC AUTO: 40.8 FL (ref 35–45)
EOSINOPHIL NFR BLD AUTO: 0.7 %
EOSINOPHILS ABSOLUTE: 0.1 THOU/MM3 (ref 0–0.4)
ERYTHROCYTE [DISTWIDTH] IN BLOOD BY AUTOMATED COUNT: 16 % (ref 11.5–14.5)
GFR SERPL CREATININE-BSD FRML MDRD: 65 ML/MIN/1.73M2
GLUCOSE SERPL-MCNC: 94 MG/DL (ref 74–109)
HCT VFR BLD AUTO: 37 % (ref 42–52)
HGB BLD-MCNC: 11.4 GM/DL (ref 14–18)
IMM GRANULOCYTES # BLD AUTO: 0.04 THOU/MM3 (ref 0–0.07)
IMM GRANULOCYTES NFR BLD AUTO: 0.5 %
LYMPHOCYTES ABSOLUTE: 1.4 THOU/MM3 (ref 1–4.8)
LYMPHOCYTES NFR BLD AUTO: 17.2 %
MCH RBC QN AUTO: 21.8 PG (ref 26–33)
MCHC RBC AUTO-ENTMCNC: 30.8 GM/DL (ref 32.2–35.5)
MCV RBC AUTO: 70.7 FL (ref 80–94)
MONOCYTES ABSOLUTE: 0.6 THOU/MM3 (ref 0.4–1.3)
MONOCYTES NFR BLD AUTO: 7.6 %
NEUTROPHILS ABSOLUTE: 6 THOU/MM3 (ref 1.8–7.7)
NEUTROPHILS NFR BLD AUTO: 73.6 %
NRBC BLD AUTO-RTO: 0 /100 WBC
NT-PROBNP SERPL IA-MCNC: 1300 PG/ML (ref 0–124)
OSMOLALITY SERPL CALC.SUM OF ELEC: 276.2 MOSMOL/KG (ref 275–300)
PLATELET # BLD AUTO: 215 THOU/MM3 (ref 130–400)
PMV BLD AUTO: 10 FL (ref 9.4–12.4)
POTASSIUM SERPL-SCNC: 4.1 MEQ/L (ref 3.5–5.2)
PROT SERPL-MCNC: 7.1 G/DL (ref 6.4–8.3)
RBC # BLD AUTO: 5.23 MILL/MM3 (ref 4.7–6.1)
SODIUM SERPL-SCNC: 137 MEQ/L (ref 135–145)
TROPONIN, HIGH SENSITIVITY: 10 NG/L (ref 0–12)
TROPONIN, HIGH SENSITIVITY: 8 NG/L (ref 0–12)
WBC # BLD AUTO: 8.2 THOU/MM3 (ref 4.8–10.8)

## 2025-09-03 PROCEDURE — 83880 ASSAY OF NATRIURETIC PEPTIDE: CPT

## 2025-09-03 PROCEDURE — 36415 COLL VENOUS BLD VENIPUNCTURE: CPT

## 2025-09-03 PROCEDURE — 93005 ELECTROCARDIOGRAM TRACING: CPT

## 2025-09-03 PROCEDURE — 84484 ASSAY OF TROPONIN QUANT: CPT

## 2025-09-03 PROCEDURE — 71101 X-RAY EXAM UNILAT RIBS/CHEST: CPT

## 2025-09-03 PROCEDURE — 85025 COMPLETE CBC W/AUTO DIFF WBC: CPT

## 2025-09-03 PROCEDURE — 80053 COMPREHEN METABOLIC PANEL: CPT

## 2025-09-03 PROCEDURE — 82248 BILIRUBIN DIRECT: CPT

## 2025-09-03 PROCEDURE — 6360000002 HC RX W HCPCS

## 2025-09-03 RX ORDER — KETOROLAC TROMETHAMINE 30 MG/ML
15 INJECTION, SOLUTION INTRAMUSCULAR; INTRAVENOUS ONCE
Status: COMPLETED | OUTPATIENT
Start: 2025-09-03 | End: 2025-09-03

## 2025-09-03 RX ADMIN — KETOROLAC TROMETHAMINE 15 MG: 30 INJECTION, SOLUTION INTRAMUSCULAR at 17:49

## 2025-09-03 ASSESSMENT — ENCOUNTER SYMPTOMS
ALLERGIC/IMMUNOLOGIC NEGATIVE: 1
EYES NEGATIVE: 1
GASTROINTESTINAL NEGATIVE: 1
RESPIRATORY NEGATIVE: 1

## 2025-09-03 ASSESSMENT — PAIN - FUNCTIONAL ASSESSMENT
PAIN_FUNCTIONAL_ASSESSMENT: 0-10

## 2025-09-03 ASSESSMENT — PAIN SCALES - GENERAL
PAINLEVEL_OUTOF10: 8
PAINLEVEL_OUTOF10: 6
PAINLEVEL_OUTOF10: 8

## 2025-09-03 ASSESSMENT — HEART SCORE: ECG: NORMAL

## 2025-09-03 ASSESSMENT — PAIN DESCRIPTION - ORIENTATION: ORIENTATION: MID

## 2025-09-03 ASSESSMENT — PAIN DESCRIPTION - DESCRIPTORS: DESCRIPTORS: SHARP

## 2025-09-03 ASSESSMENT — PAIN DESCRIPTION - LOCATION: LOCATION: CHEST

## 2025-09-03 ASSESSMENT — PAIN DESCRIPTION - PAIN TYPE: TYPE: ACUTE PAIN

## 2025-09-04 LAB
EKG ATRIAL RATE: 79 BPM
EKG P AXIS: 67 DEGREES
EKG P-R INTERVAL: 158 MS
EKG Q-T INTERVAL: 404 MS
EKG QRS DURATION: 84 MS
EKG QTC CALCULATION (BAZETT): 463 MS
EKG R AXIS: 20 DEGREES
EKG T AXIS: 34 DEGREES
EKG VENTRICULAR RATE: 79 BPM

## 2025-09-04 PROCEDURE — 93010 ELECTROCARDIOGRAM REPORT: CPT | Performed by: INTERNAL MEDICINE

## (undated) DEVICE — CATHETER DIAG 5FR L100CM LUMN ID0.047IN JL4 CRV 0 SIDE H

## (undated) DEVICE — GLIDESHEATH SLENDER NITINOL HYDROPHILIC COATED INTRODUCER SHEATH: Brand: GLIDESHEATH SLENDER

## (undated) DEVICE — PINNACLE INTRODUCER SHEATH: Brand: PINNACLE

## (undated) DEVICE — CATH BLLN ANGIO 2.50X12MM NC EUPHORIA RX

## (undated) DEVICE — CATHETER DIAG 5FR L100CM SPEC IMA CRV SZ DBL BRAID WIRE SFT

## (undated) DEVICE — ANGIO-SEAL VIP VASCULAR CLOSURE DEVICE: Brand: ANGIO-SEAL

## (undated) DEVICE — Device: Brand: NOMOLINE™ LH ADULT NASAL CO2 CANNULA WITH O2 4M

## (undated) DEVICE — BAND COMPR L24CM REG CLR PLAS HEMSTAT EXT HK AND LOOP RETEN

## (undated) DEVICE — RUNTHROUGH NS EXTRA FLOPPY PTCA GUIDEWIRE: Brand: RUNTHROUGH

## (undated) DEVICE — CARDIAC CATH LAB PACK LF

## (undated) DEVICE — CATHETER COR DIAG PIGTAILS PIG 145 CRV 5FR 110CM 6 SIDE H

## (undated) DEVICE — CATHETER COR DIAG MP MPA 5FR 100CM 2 SIDE H DXTERITY

## (undated) DEVICE — DEVICE INFL 20ML 30ATM POLYCARB BRL ABS PLUNG DGT DISPLAY

## (undated) DEVICE — CATHETER GUID 5FR DIA0.058IN SHFT NYL AL 1 CRV ENH VIS

## (undated) DEVICE — 4F (1.0MM ID) X 9CM STIFF4F (1.0 MICRO-STICK®INTRODUCER SE WITH NITINOL GUIDEWIREWITH NITIN WITH RADIOPAQUE TIPWITH RADIOPAQ: Brand: MICRO-STICK SETMICRO-STICK SET

## (undated) DEVICE — CATHETER DIAG 5FR L100CM LUMN ID0.047IN JR4 CRV 0 SIDE H

## (undated) DEVICE — OFF - ST. RITAS VASC: Brand: MEDLINE INDUSTRIES, INC.

## (undated) DEVICE — MICROPUNCTURE INTRODUCER SET SILHOUETTE TRANSITIONLESS PUSH-PLUS DESIGN - STIFFENED CANNULA WITH NITINOL WIRE GUIDE: Brand: MICROPUNCTURE

## (undated) DEVICE — Device

## (undated) DEVICE — GUIDEWIRE VASC L260CM DIA0.035IN RAD 3MM J TIP L7CM PTFE

## (undated) DEVICE — GUIDEWIRE VASC L150CM DIA0.035IN FLX END L7CM J 3MM PTFE

## (undated) DEVICE — CATHETER DIAG 5FR L100CM SPEC 3 DRC CRV SZ DBL BRAID WIRE

## (undated) DEVICE — DRAPE KIT RAMAPR RADIATION SHIELD

## (undated) DEVICE — CATHETER ETER GUID 5FR L100CM 3DRC CRV ENH VIS RADPQ MRK ROBUST

## (undated) DEVICE — FLEXOR, CHECK-FLO, INTRODUCER SET: Brand: FLEXOR

## (undated) DEVICE — GUIDE EXTENSION CATHETER: Brand: GUIDEZILLA™ II

## (undated) DEVICE — CATHETER GUID 6FR DIA0.071IN SHFT NYL STD L JR 4 CRV ENH

## (undated) DEVICE — VALVE HEMSTAS W/ GWIRE INSRTN TOOL GRDIAN II NC

## (undated) DEVICE — CATHETER GUID 6FR L100CM DIA0.071IN NYL SHFT 3DRC W/O SIDE